# Patient Record
Sex: FEMALE | Race: WHITE | NOT HISPANIC OR LATINO | Employment: UNEMPLOYED | URBAN - METROPOLITAN AREA
[De-identification: names, ages, dates, MRNs, and addresses within clinical notes are randomized per-mention and may not be internally consistent; named-entity substitution may affect disease eponyms.]

---

## 2017-03-31 ENCOUNTER — HOSPITAL ENCOUNTER (EMERGENCY)
Facility: CLINIC | Age: 27
Discharge: HOME | End: 2017-04-01
Attending: INTERNAL MEDICINE | Admitting: INTERNAL MEDICINE
Payer: MEDICAID

## 2017-03-31 DIAGNOSIS — F10.920 ALCOHOL INTOXICATION, UNCOMPLICATED (H): ICD-10-CM

## 2017-03-31 LAB
ALCOHOL BREATH TEST: 0.22 (ref 0–0.01)
ALCOHOL BREATH TEST: 0.27 (ref 0–0.01)
AMPHETAMINES UR QL SCN: ABNORMAL
BARBITURATES UR QL: ABNORMAL
BENZODIAZ UR QL: ABNORMAL
CANNABINOIDS UR QL SCN: ABNORMAL
COCAINE UR QL: ABNORMAL
ETHANOL UR QL SCN: ABNORMAL
HCG UR QL: NEGATIVE
OPIATES UR QL SCN: ABNORMAL

## 2017-03-31 PROCEDURE — 82075 ASSAY OF BREATH ETHANOL: CPT | Mod: 76

## 2017-03-31 PROCEDURE — 99285 EMERGENCY DEPT VISIT HI MDM: CPT | Mod: 25

## 2017-03-31 PROCEDURE — 81025 URINE PREGNANCY TEST: CPT | Performed by: INTERNAL MEDICINE

## 2017-03-31 PROCEDURE — 80320 DRUG SCREEN QUANTALCOHOLS: CPT | Performed by: INTERNAL MEDICINE

## 2017-03-31 PROCEDURE — 80307 DRUG TEST PRSMV CHEM ANLYZR: CPT | Performed by: INTERNAL MEDICINE

## 2017-03-31 PROCEDURE — 99284 EMERGENCY DEPT VISIT MOD MDM: CPT | Mod: Z6 | Performed by: INTERNAL MEDICINE

## 2017-03-31 NOTE — ED AVS SNAPSHOT
[facility], Emergency Department    [address]    [place] [address]    Phone:  [phone]    Fax:  [phone]                                       Pat Delgado   MRN: [id number]    Department:  [facility], Emergency Department   Date of Visit:  3/31/2017           Patient Information     Date Of Birth          1990        Your diagnoses for this visit were:     Alcohol intoxication, uncomplicated (H)        You were seen by Rik Phillips MD and Stephen Joe MD.        Discharge Instructions         Alcohol Addiction  Are you addicted to alcohol?    You may be addicted to alcohol if your drinking harms yourself or others or leads to other problems with your daily life.  The medical term for this is alcohol use disorder. Your health care provider may diagnose you with this disorder if you have at least two of the following problems within the span of a year:    You drink alcohol in larger amounts or for a longer period than you intended.    You frequently want to cut down or control alcohol use, or have frequently failed efforts to do so.    You spend a lot of time getting alcohol, using alcohol, or recovering from alcohol use.    You crave or have a strong desire or urge to drink alcohol.    Ongoing alcohol use makes it hard for you to be responsible at work, school, or home.    You continue to use alcohol even though you have had problems in relationships or social settings because of it.    You give up or miss important social, work, or recreational activities because of alcohol use.    You drink alcohol in situations in which it is physically unsafe, such as driving while intoxicated.    You continue to drink alcohol despite knowing it has caused physical or emotional problems.    You need more and more alcohol to get the same effects.    You have withdrawal symptoms or use alcohol to avoid withdrawal symptoms.    7810-2127 [employer]. [address] [place] [address]  Road, [place] [address]. All rights reserved. This information is not intended as a substitute for professional medical care. Always follow your healthcare professional's instructions.      Please make an appointment to follow up with Your Primary Care Provider as soon as possible even if entirely better.     24 Hour Appointment Hotline       To make an appointment at any [place] clinic, call [phone] ([phone]). If you don't have a family doctor or clinic, we will help you find one. [place] clinics are conveniently located to serve the needs of you and your family.             Review of your medicines      Our records show that you are taking the medicines listed below. If these are incorrect, please call your family doctor or clinic.        Dose / Directions Last dose taken    amphetamine-dextroamphetamine 30 MG per 24 hr capsule   Commonly known as:  ADDERALL XR   Dose:  60 mg   Quantity:  60 capsule        Take 2 capsules (60 mg) by mouth daily   Refills:  0        escitalopram 20 MG tablet   Commonly known as:  LEXAPRO   Dose:  20 mg   Quantity:  30 tablet        Take 1 tablet (20 mg) by mouth daily   Refills:  0        GABAPENTIN PO        Take by mouth 3 times daily   Refills:  0        HYDROXYZINE HCL PO   Dose:  50 mg   Indication:  Anxiety Neurosis        Take 50 mg by mouth as needed for itching   Refills:  0        PROPRANOLOL HCL PO   Dose:  10 mg        Take 10 mg by mouth 3 times daily   Refills:  0                Procedures and tests performed during your visit     Procedure/Test Number of Times Performed    Alcohol breath test POCT 2    Drug abuse screen 6 urine (chem dep) 1    HCG qualitative urine 1      Orders Needing Specimen Collection     None      Pending Results     No orders found for last 3 day(s).            Pending Culture Results     No orders found for last 3 day(s).            Thank you for choosing [facility]       Thank you for choosing [facility] for your care. Our goal  "is always to provide you with excellent care. Hearing back from our patients is one way we can continue to improve our services. Please take a few minutes to complete the written survey that you may receive in the mail after you visit with us. Thank you!        [employer] Information     [employer] lets you send messages to your doctor, view your test results, renew your prescriptions, schedule appointments and more. To sign up, go to [email or web address] . Click on \"Log in\" on the left side of the screen, which will take you to the Welcome page. Then click on \"Sign up Now\" on the right side of the page.     You will be asked to enter the access code listed below, as well as some personal information. Please follow the directions to create your username and password.     Your access code is: [email or web address]  Expires: 2017  6:24 AM     Your access code will  in 90 days. If you need help or a new code, please call your [facility] clinic or [phone].        Care EveryWhere ID     This is your Care EveryWhere ID. This could be used by other organizations to access your [facility] [place] medical records  [id number]        After Visit Summary       This is your record. Keep this with you and show to your community pharmacist(s) and doctor(s) at your next visit.                  "

## 2017-03-31 NOTE — ED AVS SNAPSHOT
[facility], Emergency Department    [address]    [facility] [address]    Phone:  [phone]    Fax:  [phone]                                       Pat Delgado   MRN: [id number]    Department:  [facility], Emergency Department   Date of Visit:  3/31/2017           After Visit Summary Signature Page     I have received my discharge instructions, and my questions have been answered. I have discussed any challenges I see with this plan with the nurse or doctor.    ..........................................................................................................................................  Patient/Patient Representative Signature      ..........................................................................................................................................  Patient Representative Print Name and Relationship to Patient    ..................................................               ................................................  Date                                            Time    ..........................................................................................................................................  Reviewed by Signature/Title    ...................................................              ..............................................  Date                                                            Time

## 2017-04-01 VITALS
HEART RATE: 96 BPM | SYSTOLIC BLOOD PRESSURE: 152 MMHG | OXYGEN SATURATION: 100 % | RESPIRATION RATE: 18 BRPM | TEMPERATURE: 97.3 F | DIASTOLIC BLOOD PRESSURE: 97 MMHG

## 2017-04-01 PROCEDURE — 90791 PSYCH DIAGNOSTIC EVALUATION: CPT

## 2017-04-01 ASSESSMENT — ENCOUNTER SYMPTOMS
SHORTNESS OF BREATH: 0
ABDOMINAL PAIN: 0
FEVER: 0

## 2017-04-01 NOTE — DISCHARGE INSTRUCTIONS
Alcohol Addiction  Are you addicted to alcohol?    You may be addicted to alcohol if your drinking harms yourself or others or leads to other problems with your daily life.  The medical term for this is alcohol use disorder. Your health care provider may diagnose you with this disorder if you have at least two of the following problems within the span of a year:    You drink alcohol in larger amounts or for a longer period than you intended.    You frequently want to cut down or control alcohol use, or have frequently failed efforts to do so.    You spend a lot of time getting alcohol, using alcohol, or recovering from alcohol use.    You crave or have a strong desire or urge to drink alcohol.    Ongoing alcohol use makes it hard for you to be responsible at work, school, or home.    You continue to use alcohol even though you have had problems in relationships or social settings because of it.    You give up or miss important social, work, or recreational activities because of alcohol use.    You drink alcohol in situations in which it is physically unsafe, such as driving while intoxicated.    You continue to drink alcohol despite knowing it has caused physical or emotional problems.    You need more and more alcohol to get the same effects.    You have withdrawal symptoms or use alcohol to avoid withdrawal symptoms.    3256-6980 [employer]. [address] [place] [address], [place] [address]. All rights reserved. This information is not intended as a substitute for professional medical care. Always follow your healthcare professional's instructions.      Please make an appointment to follow up with Your Primary Care Provider as soon as possible even if entirely better.

## 2017-04-01 NOTE — ED NOTES
Patient got in a disagreement with her mom whom she lives with and her mother called the police.  Patient is intoxicated, unemployed.  She stated she has been on a 4 day mayorga and last drank at about 7pm and started drinking at about noon.  She drank about 2/3rd bottle of a 1.75L of whiskey today.  She has been on and off with sobriety.  Patient is calm, cooperative and steady on her feet.  Denies SI.

## 2017-04-01 NOTE — ED PROVIDER NOTES
[facility] [place] ([facility])    March 31, 2017    ED 16 10:00 PM   History     Chief Complaint   Patient presents with     Alcohol Intoxication     drank 2/3's bottle whiskey today. last drink about 7pm and blew 0.274.     Aggressive Behavior     got in altercation with mother who called police.     The history is provided by the patient and medical records.     Pat Delgado is a 26 year old female who presents with alcohol intoxication. She has a history of alcohol abuse with prior attempts in treatment with good stretches with sobriety. Prior to December she had 6 months of sobriety and intermittent bouts of use. Patient has been living with her parents until she found a new place. She states that living with her family has been been stressful. She is trying to be sober but drank tonight. Her mother got very upset at this and they had an argument. Ultimately mother called police and patient was brought here in restraints. She state  I was brought here not under any choice of mine.  She admits to drinking over the last couple of days, unsure of how much exactly. Patient notes that her parents trying to kick her out at this point given her alcohol abuse. She is not interested in detox at this time and is unsure of what to do. She wants to get a new place and is hoping they would reconsider and take her back home and let her get her car and cat.     I have reviewed the Medications, Allergies, Past Medical and Surgical History, and Social History in the [facility] system.  PAST MEDICAL HISTORY:   Past Medical History:   Diagnosis Date     ADHD (attention deficit hyperactivity disorder)      Anxiety      Migraine headache      Substance abuse        PAST SURGICAL HISTORY: History reviewed. No pertinent surgical history.    FAMILY HISTORY:   Family History   Problem Relation Age of Onset     Anxiety Disorder Mother      Dementia Maternal Grandfather      Unknown/Adopted No family hx of       Depression No family hx of      Schizophrenia No family hx of      Bipolar Disorder No family hx of      Suicide No family hx of      [place] Disease No family hx of      Parkinsonism No family hx of      Autism Spectrum Disorder No family hx of      Intellectual Disability (Mental Retardation) No family hx of      MENTAL ILLNESS No family hx of      Substance Abuse Other        SOCIAL HISTORY:   Social History   Substance Use Topics     Smoking status: Never Smoker     Smokeless tobacco: Not on file     Alcohol use Yes      Comment: 750mL whiskey per day       Patient's Medications   New Prescriptions    No medications on file   Previous Medications    AMPHETAMINE-DEXTROAMPHETAMINE (ADDERALL XR) 30 MG PER CAPSULE    Take 2 capsules (60 mg) by mouth daily    ESCITALOPRAM (LEXAPRO) 20 MG TABLET    Take 1 tablet (20 mg) by mouth daily    GABAPENTIN PO    Take by mouth 3 times daily    HYDROXYZINE HCL PO    Take 50 mg by mouth as needed for itching    PROPRANOLOL HCL PO    Take 10 mg by mouth 3 times daily   Modified Medications    No medications on file   Discontinued Medications    ACAMPROSATE (CAMPRAL) 333 MG TABLET    Take 2 tablets (666 mg) by mouth 3 times daily    QUETIAPINE FUMARATE (SEROQUEL PO)    Take by mouth as needed          Allergies   Allergen Reactions     Penicillins Unknown      Review of Systems   Constitutional: Negative for fever.   Respiratory: Negative for shortness of breath.    Cardiovascular: Negative for chest pain.   Gastrointestinal: Negative for abdominal pain.   All other systems reviewed and are negative.      Physical Exam   BP: 136/90  Pulse: 109  Temp: 97.4  F (36.3  C)  Resp: 16  SpO2: 98 %  Physical Exam   Constitutional: She is oriented to person, place, and time. No distress.   HENT:   Head: Atraumatic.   Mouth/Throat: Oropharynx is clear and moist. No oropharyngeal exudate.   Eyes: Pupils are equal, round, and reactive to light. No scleral icterus.   Neck: Neck supple. No  JVD present.   Cardiovascular: Normal rate, normal heart sounds and intact distal pulses.  Exam reveals no gallop and no friction rub.    No murmur heard.  Pulmonary/Chest: Effort normal and breath sounds normal. No respiratory distress. She has no wheezes. She has no rales. She exhibits no tenderness.   Abdominal: Soft. Bowel sounds are normal. She exhibits no distension and no mass. There is no tenderness. There is no rebound and no guarding.   Musculoskeletal: She exhibits no edema or tenderness.   Neurological: She is alert and oriented to person, place, and time. No cranial nerve deficit. Coordination normal.   Skin: Skin is warm. No rash noted. She is not diaphoretic.   Psychiatric: She has a normal mood and affect. Her behavior is normal. Judgment and thought content normal.       ED Course     ED Course     Procedures  Results for orders placed or performed during the hospital encounter of 03/31/17 (from the past 24 hour(s))   HCG qualitative urine   Result Value Ref Range    HCG Qual Urine Negative NEG   Drug abuse screen 6 urine (chem dep)   Result Value Ref Range    Amphetamine Qual Urine (A) NEG     Positive   Cutoff for a positive amphetamine is greater than 500 ng/mL. This is an   unconfirmed screening result to be used for medical purposes only.      Barbiturates Qual Urine  NEG     Negative   Cutoff for a negative barbiturate is 200 ng/mL or less.      Benzodiazepine Qual Urine  NEG     Negative   Cutoff for a negative benzodiazepine is 200 ng/mL or less.      Cannabinoids Qual Urine  NEG     Negative   Cutoff for a negative cannabinoid is 50 ng/mL or less.      Cocaine Qual Urine  NEG     Negative   Cutoff for a negative cocaine is 300 ng/mL or less.      Ethanol Qual Urine (A) NEG     Positive   Cutoff for a positive urine ethanol is greater than 0.05 g/dL.  This is an   unconfirmed screening result to be used for medical purposes only.      Opiates Qualitative Urine  NEG     Negative   Cutoff for  a negative opiate is 300 ng/mL or less.     Alcohol breath test POCT   Result Value Ref Range    Alcohol Breath Test 0.274 (A) 0.00 - 0.01   Alcohol breath test POCT   Result Value Ref Range    Alcohol Breath Test 0.222 (A) 0.00 - 0.01     Medications - No data to display      Assessments & Plan (with Medical Decision Making)  Alcohol intoxication, just recently relapsed, not interested in Detox, no withdraw now, since parents not coming to take her home, the plan is for her to sober up overnight and DC. Will sign out to incoming EP.       I have reviewed the nursing notes.    I have reviewed the findings, diagnosis, plan and need for follow up with the patient.    New Prescriptions    No medications on file       Final diagnoses:   Alcohol intoxication, uncomplicated (H)     I, Carolyn Negro, am serving as a trained medical scribe to document services personally performed by Rik Phillips MD, based on the provider's statements to me.  This document has been checked and approved by the attending provider.    IRik MD, was physically present and have reviewed and verified the accuracy of this note documented by Carolyn Negro medical scribe.    3/31/2017   [facility], [place], EMERGENCY DEPARTMENT     Rik Phillips MD  04/03/17 0000

## 2017-04-01 NOTE — ED NOTES
Patient was signed out to me by Dr. Phillips at 1:28 AM.  Please see their note for full details and history.  Patient has history of etoh abuse.  Here with intoxication.  Got in fight with mom.  Plan at time of sign out is reevaluate when sober.  Likely d/c home. .       Course in the ED:  Reevaluated at 6:53 AM .  Patient is now clinically sober.  No slurred speech, ataxia or signs of alcohol intoxication.  No current signs of withdrawal.  No suicidal ideation will discharge.       Stephen Joe MD  04/01/17 0653

## 2017-05-28 ENCOUNTER — HOSPITAL ENCOUNTER (EMERGENCY)
Facility: CLINIC | Age: 27
Discharge: HOME | End: 2017-05-28
Attending: EMERGENCY MEDICINE | Admitting: EMERGENCY MEDICINE
Payer: MEDICAID

## 2017-05-28 VITALS
SYSTOLIC BLOOD PRESSURE: 115 MMHG | HEART RATE: 96 BPM | TEMPERATURE: 98 F | DIASTOLIC BLOOD PRESSURE: 95 MMHG | OXYGEN SATURATION: 97 % | RESPIRATION RATE: 20 BRPM

## 2017-05-28 DIAGNOSIS — F10.930 ALCOHOL WITHDRAWAL, UNCOMPLICATED (H): ICD-10-CM

## 2017-05-28 DIAGNOSIS — F41.9 ANXIETY: ICD-10-CM

## 2017-05-28 LAB — ETHANOL SERPL-MCNC: <0.01 G/DL

## 2017-05-28 PROCEDURE — 25000132 ZZH RX MED GY IP 250 OP 250 PS 637: Performed by: EMERGENCY MEDICINE

## 2017-05-28 PROCEDURE — 90791 PSYCH DIAGNOSTIC EVALUATION: CPT

## 2017-05-28 PROCEDURE — 80320 DRUG SCREEN QUANTALCOHOLS: CPT | Performed by: EMERGENCY MEDICINE

## 2017-05-28 PROCEDURE — 99285 EMERGENCY DEPT VISIT HI MDM: CPT | Mod: 25

## 2017-05-28 PROCEDURE — 36415 COLL VENOUS BLD VENIPUNCTURE: CPT | Performed by: EMERGENCY MEDICINE

## 2017-05-28 RX ORDER — LORAZEPAM 1 MG/1
1 TABLET ORAL ONCE
Status: COMPLETED | OUTPATIENT
Start: 2017-05-28 | End: 2017-05-28

## 2017-05-28 RX ADMIN — LORAZEPAM 1 MG: 1 TABLET ORAL at 11:13

## 2017-05-28 ASSESSMENT — ENCOUNTER SYMPTOMS
CHILLS: 0
FEVER: 0
NERVOUS/ANXIOUS: 1

## 2017-05-28 NOTE — ED AVS SNAPSHOT
[facility] Emergency Department    [address]    [facility] [address]    Phone:  [phone]    Fax:  [phone]                                       Pat Delgado   MRN: [id number]    Department:  [facility] Emergency Department   Date of Visit:  5/28/2017           After Visit Summary Signature Page     I have received my discharge instructions, and my questions have been answered. I have discussed any challenges I see with this plan with the nurse or doctor.    ..........................................................................................................................................  Patient/Patient Representative Signature      ..........................................................................................................................................  Patient Representative Print Name and Relationship to Patient    ..................................................               ................................................  Date                                            Time    ..........................................................................................................................................  Reviewed by Signature/Title    ...................................................              ..............................................  Date                                                            Time

## 2017-05-28 NOTE — DISCHARGE INSTRUCTIONS
Understanding the Disease of Addiction  What is addiction?  Addiction is a chronic disease of the brain. It affects how your brain learns and functions.  Your genes and your environment can affect your risk for addiction. A family history of addiction also raises your risk, but anyone can have an addiction.  Unfortunately, many people falsely believe that addiction is a moral weakness. They think that people addicted to drugs or alcohol are simply behaving badly or making poor choices.    How does addiction affect my brain?  Whether you start using drugs or alcohol is your choice. But once your brain is exposed to the addictive substance, your brain begins to change. This is especially true if you are vulnerable to addiction. These brain changes override self-control. This happens because the substance overexcites the reward center in the brain. The substance mimics the brain's own natural feel-good chemicals. The brain is rewired into believing that the substance is a good thing and that you need it to survive. So strong is this rewiring that over time, you no longer find pleasure in other things. The addiction trumps all other motivations.  If you continue to use the substance, your brain makes less and less of its own feel-good chemicals. You then must keep using drugs or alcohol to try to make up for the low levels of the brain chemicals. Your brain eventually needs more and more of the drug to achieve this. You need the drug without regard to the physical, emotional, and social costs.  Can you become addicted to things other than drugs or alcohol?  Addiction can develop in response to other pleasurable activities that stimulate the reward center of the brain. These activities include eating, having sex, gambling, using tobacco, and even using the internet.  Can you get control over a brain disease?  The only way to get over an addiction is to stop using the substance. By not using it, your brain can recover  "and return to its normal functioning. You can relearn how to find pleasure in other things. But, your brain will always be at risk for addiction. Because addiction is so powerful, you usually will need medical help and social support for long-term success.    9485-4299 [employer]. [address] [place] [address]. All rights reserved. This information is not intended as a substitute for professional medical care. Always follow your healthcare professional's instructions.          Alcohol Abuse  Alcoholic drinks are harmful when you have too many of them. There is no set number of drinks that defines too much. Drinking that disrupts your life or your health is called alcohol abuse. Alcohol abuse can hurt your relationships with others. You may lose friends, a spouse, or even your job. You may be abusing alcohol if any of the following are true for you:    Duties at home or with  suffer because of drinking.    Duties at work or in school suffer because of drinking.    You have missed work or school because of drinking.    You use alcohol while driving or operating machinery.    You have legal problems such as arrests due to drinking.    You keep drinking even though it causes serious problems in your life.  Health effects  Alcohol abuse causes health problems. Sometimes this can happen after only drinking a  little.\" There is no set number of drinks or amount of alcohol that defines too much. The more you drink at one time, and the more often you drink determine both the short-term and long-term health effects. It affects all parts of your body and your health, including your:    Brain. Alcohol is a central nervous system depressant. It can damage parts of the brain that affect your balance, memory, thinking, and emotions. It can cause memory loss, blackouts, depression, agitation, sleep cycle changes, and seizures. These changes may or may not be reversible.    Heart and vascular " system. Alcohol affects multiple areas. It can damage heart muscle causing cardiomyopathy, which is a weakening and stretching of the heart muscle. This can lead to trouble breathing, an irregular heartbeat, atrial fibrillation, leg swelling, and heart failure. Alcohol use makes the blood vessels stiffen causing hypertension (high blood pressure). All of these problems increase your risk of having heart attacks or strokes.    Liver. Alcohol causes fat to build up in the liver, affecting its normal function. This increases the risk for hepatitis, leading to abdominal pain, appetite loss, jaundice, bleeding problems, liver fibrosis, and cirrhosis. This, in turn, can affect your ability to fight off infections, and can cause diabetes. The liver changes prevent it from removing toxins in your blood that can cause encephalopathy which may show with confusion, altered level of consciousness, personality changes, memory loss, seizures, coma, and death.    Pancreas. Alcohol can cause inflammation of the pancreas, or pancreatitis. This can cause abdominal pain, fever, and diabetes.    Immune system. Alcohol weakens your immune system in a number of ways. It suppresses your immune system making it harder to fight infections and colds. It also increases the chance of getting pneumonia and tuberculosis.    Cancer. Alcohol is a risk factor for developing cancer of the mouth, esophagus, pharynx, larynx, liver, and breast.    Sexual function. Alcohol can lead to sexual problems.  Home care  The following guidelines will help you deal with alcohol abuse:    Admit you have a problem with alcohol.    Ask for help from your health care provider and trusted family members or close friends.    Get help from people trained in dealing with alcohol abuse. This may be individual counseling or group therapy, or it may be a supervised alcohol treatment program.    Join a self-help group for alcohol abuse such as Alcoholics Anonymous  (AA).    Avoid people who abuse alcohol or tempt you to drink.  Follow-up care  Follow up as advised by the doctor or our staff. Contact these groups to get help:    Alcoholics Anonymous (AA): Go to www.aa.org or check the phone book for meetings near you.    National Alcohol and Substance Abuse Information Center (NASAIC): 777.306.6204 www.addictionQualiall.PolyPid    National [place] on Alcoholism and Drug Dependence (NCADD): 618-UTE-FBDY (189-5258) www.ncadd.org    Al-Anon: 730-1BD-DUIO (808-4724) www.al-anon.org  Call 911  Call 911 if any of these occur:    Trouble breathing or slow irregular breathing    Chest pain    Sudden weakness on one side of your body or sudden trouble speaking    Heavy bleeding or vomiting blood    Very drowsy or trouble awakening    Fainting or loss of consciousness    Rapid heart rate    Seizure  When to seek medical care  Get prompt medical attention if you have:    Confusion    Hallucinations (seeing, hearing, or feeling things that aren t there)    Pain in your upper abdomen that gets worse    Repeated vomiting or black or tarry stools    Severe shakiness    2972-1108 [employer]. [address] [place] [address], [place] [address]. All rights reserved. This information is not intended as a substitute for professional medical care. Always follow your healthcare professional's instructions.

## 2017-05-28 NOTE — ED AVS SNAPSHOT
[facility]    [address] [place] [address]    Phone:  [phone]    Fax:  [phone]                                       Pat Delgado   MRN: [id number]    Department:  [facility]   Date of Visit:  5/28/2017           Patient Information     Date Of Birth          1990        Your diagnoses for this visit were:     Alcohol withdrawal, uncomplicated (H)     Anxiety        You were seen by Kai Bailon MD.      Follow-up Information     Follow up with Page Campos MD In 2 days.    Specialty:  Family Practice    Contact information:    [facility] [place]  [address] [place] [address]  [phone]          Discharge Instructions         Understanding the Disease of Addiction  What is addiction?  Addiction is a chronic disease of the brain. It affects how your brain learns and functions.  Your genes and your environment can affect your risk for addiction. A family history of addiction also raises your risk, but anyone can have an addiction.  Unfortunately, many people falsely believe that addiction is a moral weakness. They think that people addicted to drugs or alcohol are simply behaving badly or making poor choices.    How does addiction affect my brain?  Whether you start using drugs or alcohol is your choice. But once your brain is exposed to the addictive substance, your brain begins to change. This is especially true if you are vulnerable to addiction. These brain changes override self-control. This happens because the substance overexcites the reward center in the brain. The substance mimics the brain's own natural feel-good chemicals. The brain is rewired into believing that the substance is a good thing and that you need it to survive. So strong is this rewiring that over time, you no longer find pleasure in other things. The addiction trumps all other motivations.  If you  continue to use the substance, your brain makes less and less of its own feel-good chemicals. You then must keep using drugs or alcohol to try to make up for the low levels of the brain chemicals. Your brain eventually needs more and more of the drug to achieve this. You need the drug without regard to the physical, emotional, and social costs.  Can you become addicted to things other than drugs or alcohol?  Addiction can develop in response to other pleasurable activities that stimulate the reward center of the brain. These activities include eating, having sex, gambling, using tobacco, and even using the internet.  Can you get control over a brain disease?  The only way to get over an addiction is to stop using the substance. By not using it, your brain can recover and return to its normal functioning. You can relearn how to find pleasure in other things. But, your brain will always be at risk for addiction. Because addiction is so powerful, you usually will need medical help and social support for long-term success.    5580-0312 [employer]. [address] [place] [address]. All rights reserved. This information is not intended as a substitute for professional medical care. Always follow your healthcare professional's instructions.          Alcohol Abuse  Alcoholic drinks are harmful when you have too many of them. There is no set number of drinks that defines too much. Drinking that disrupts your life or your health is called alcohol abuse. Alcohol abuse can hurt your relationships with others. You may lose friends, a spouse, or even your job. You may be abusing alcohol if any of the following are true for you:    Duties at home or with  suffer because of drinking.    Duties at work or in school suffer because of drinking.    You have missed work or school because of drinking.    You use alcohol while driving or operating machinery.    You have legal problems such as arrests due  "to drinking.    You keep drinking even though it causes serious problems in your life.  Health effects  Alcohol abuse causes health problems. Sometimes this can happen after only drinking a  little.\" There is no set number of drinks or amount of alcohol that defines too much. The more you drink at one time, and the more often you drink determine both the short-term and long-term health effects. It affects all parts of your body and your health, including your:    Brain. Alcohol is a central nervous system depressant. It can damage parts of the brain that affect your balance, memory, thinking, and emotions. It can cause memory loss, blackouts, depression, agitation, sleep cycle changes, and seizures. These changes may or may not be reversible.    Heart and vascular system. Alcohol affects multiple areas. It can damage heart muscle causing cardiomyopathy, which is a weakening and stretching of the heart muscle. This can lead to trouble breathing, an irregular heartbeat, atrial fibrillation, leg swelling, and heart failure. Alcohol use makes the blood vessels stiffen causing hypertension (high blood pressure). All of these problems increase your risk of having heart attacks or strokes.    Liver. Alcohol causes fat to build up in the liver, affecting its normal function. This increases the risk for hepatitis, leading to abdominal pain, appetite loss, jaundice, bleeding problems, liver fibrosis, and cirrhosis. This, in turn, can affect your ability to fight off infections, and can cause diabetes. The liver changes prevent it from removing toxins in your blood that can cause encephalopathy which may show with confusion, altered level of consciousness, personality changes, memory loss, seizures, coma, and death.    Pancreas. Alcohol can cause inflammation of the pancreas, or pancreatitis. This can cause abdominal pain, fever, and diabetes.    Immune system. Alcohol weakens your immune system in a number of ways. It " suppresses your immune system making it harder to fight infections and colds. It also increases the chance of getting pneumonia and tuberculosis.    Cancer. Alcohol is a risk factor for developing cancer of the mouth, esophagus, pharynx, larynx, liver, and breast.    Sexual function. Alcohol can lead to sexual problems.  Home care  The following guidelines will help you deal with alcohol abuse:    Admit you have a problem with alcohol.    Ask for help from your health care provider and trusted family members or close friends.    Get help from people trained in dealing with alcohol abuse. This may be individual counseling or group therapy, or it may be a supervised alcohol treatment program.    Join a self-help group for alcohol abuse such as Alcoholics Anonymous (AA).    Avoid people who abuse alcohol or tempt you to drink.  Follow-up care  Follow up as advised by the doctor or our staff. Contact these groups to get help:    Alcoholics Anonymous (AA): Go to www.aa.org or check the phone book for meetings near you.    National Alcohol and Substance Abuse Information Center (NASAIC): 137.637.1441 www.addictioncareGetfugu.Dash    National [place] on Alcoholism and Drug Dependence (NCADD): 187-ZIK-GRVN (427-5430) www.ncadd.org    Al-Anon: 955-4IM-OLQE (833-4885) www.al-anon.org  Call 911  Call 911 if any of these occur:    Trouble breathing or slow irregular breathing    Chest pain    Sudden weakness on one side of your body or sudden trouble speaking    Heavy bleeding or vomiting blood    Very drowsy or trouble awakening    Fainting or loss of consciousness    Rapid heart rate    Seizure  When to seek medical care  Get prompt medical attention if you have:    Confusion    Hallucinations (seeing, hearing, or feeling things that aren t there)    Pain in your upper abdomen that gets worse    Repeated vomiting or black or tarry stools    Severe shakiness    3869-4379 [employer]. [address] [place] [address],  [place] [address]. All rights reserved. This information is not intended as a substitute for professional medical care. Always follow your healthcare professional's instructions.          24 Hour Appointment Hotline       To make an appointment at any [facility], call [phone] ([phone]). If you don't have a family doctor or clinic, we will help you find one. [facility] clinics are conveniently located to serve the needs of you and your family.             Review of your medicines      Our records show that you are taking the medicines listed below. If these are incorrect, please call your family doctor or clinic.        Dose / Directions Last dose taken    amphetamine-dextroamphetamine 30 MG per 24 hr capsule   Commonly known as:  ADDERALL XR   Dose:  60 mg   Quantity:  60 capsule        Take 2 capsules (60 mg) by mouth daily   Refills:  0        escitalopram 20 MG tablet   Commonly known as:  LEXAPRO   Dose:  20 mg   Quantity:  30 tablet        Take 1 tablet (20 mg) by mouth daily   Refills:  0        HYDROXYZINE HCL PO   Dose:  50 mg   Indication:  Anxiety Neurosis        Take 50 mg by mouth as needed for itching   Refills:  0                Procedures and tests performed during your visit     Alcohol level blood      Orders Needing Specimen Collection     None      Pending Results     No orders found from 5/26/2017 to 5/29/2017.            Pending Culture Results     No orders found from 5/26/2017 to 5/29/2017.            Pending Results Instructions     If you had any lab results that were not finalized at the time of your Discharge, you can call the ED Lab Result RN at [phone]. You will be contacted by this team for any positive Lab results or changes in treatment. The nurses are available 7 days a week from 10A to 6:30P.  You can leave a message 24 hours per day and they will return your call.        Test Results From Your Hospital Stay        5/28/2017 11:29 AM      Component Results      Component Value Ref Range & Units Status    Ethanol g/dL <0.01 <0.01 g/dL Final                Clinical Quality Measure: Blood Pressure Screening     Your blood pressure was checked while you were in the emergency department today. The last reading we obtained was  BP: (!) 115/95 . Please read the guidelines below about what these numbers mean and what you should do about them.  If your systolic blood pressure (the top number) is less than 120 and your diastolic blood pressure (the bottom number) is less than 80, then your blood pressure is normal. There is nothing more that you need to do about it.  If your systolic blood pressure (the top number) is 120-139 or your diastolic blood pressure (the bottom number) is 80-89, your blood pressure may be higher than it should be. You should have your blood pressure rechecked within a year by a primary care provider.  If your systolic blood pressure (the top number) is 140 or greater or your diastolic blood pressure (the bottom number) is 90 or greater, you may have high blood pressure. High blood pressure is treatable, but if left untreated over time it can put you at risk for heart attack, stroke, or kidney failure. You should have your blood pressure rechecked by a primary care provider within the next 4 weeks.  If your provider in the emergency department today gave you specific instructions to follow-up with your doctor or provider even sooner than that, you should follow that instruction and not wait for up to 4 weeks for your follow-up visit.        Thank you for choosing [facility]       Thank you for choosing [facility] for your care. Our goal is always to provide you with excellent care. Hearing back from our patients is one way we can continue to improve our services. Please take a few minutes to complete the written survey that you may receive in the mail after you visit with us. Thank you!        [employer] Information     [facility] lets you send messages to your  "doctor, view your test results, renew your prescriptions, schedule appointments and more. To sign up, go to [email or web address] . Click on \"Log in\" on the left side of the screen, which will take you to the Welcome page. Then click on \"Sign up Now\" on the right side of the page.     You will be asked to enter the access code listed below, as well as some personal information. Please follow the directions to create your username and password.     Your access code is: [email or web address]  Expires: 2017  6:24 AM     Your access code will  in 90 days. If you need help or a new code, please call your [facility] clinic or [phone].        Care EveryWhere ID     This is your Care EveryWhere ID. This could be used by other organizations to access your [facility] medical records  [id number]        After Visit Summary       This is your record. Keep this with you and show to your community pharmacist(s) and doctor(s) at your next visit.                  "

## 2017-05-28 NOTE — ED PROVIDER NOTES
History     Chief Complaint:  Drug / Alcohol Assessment    HPI   Pat Delgado is a 26 year old female with a history of anxiety, alcohol dependence, chemical dependency, ADHD, and depression who presents to the emergency department for a drug and alcohol assessment.  The patient states that she was drinking vodka both last night and this morning after being removed from the sober house in [place].  She states that she currently has withdrawal symptoms as well as slight anxiety.  She also says that she has been sober on and off for the past 2 year. She states that last week while at the sober house she did use alcohol as well, but was not caught at that time.  The patient denies any use of other drugs.  She would like to get back into treatment and notes that she last completed treatment in July of 2016.  Of note, she is just getting over a sinus infection.    Allergies:  Penicillins    Medications:    Hydroxyzine  Gabapentin  Propranolol  Lexapro  Adderall    Past Medical History:    Alcohol dependence  Anxiety  Hypertension  Depressive disorder  Chemical dependency  ADHD  Migraine headaches    Past Surgical History:    The patient does not have any pertinent past surgical history.    Family History:    Mother: Anxiety disorder    Social History:  Alcohol abuse  Never a smoker  Marital Status:  Single    Review of Systems   Constitutional: Negative for chills and fever.   Psychiatric/Behavioral: The patient is nervous/anxious.    All other systems reviewed and are negative.    Physical Exam     Patient Vitals for the past 24 hrs:   BP Temp Temp src Pulse Resp SpO2   05/28/17 0935 (!) 115/95 98  F (36.7  C) Oral 96 20 97 %          Physical Exam  General: Patient is alert and interactive when I enter the room  Head:  The scalp, face, and head appear normal  Eyes:  The pupils are equal, round, and reactive to light    Conjunctivae and sclerae are normal  ENT:    External acoustic canals are normal    The  oropharynx is normal without erythema.     Uvula is in the midline  Neck:  Normal range of motion  CV:  Regular rate. S1/S2. No murmurs.   Resp:  Lungs are clear without wheezes or rales. No distress  GI:  Abdomen is soft, no rigidity, guarding, or rebound    No distension. No tenderness to palpation in any quadrant.     MS:  Normal tone. Joints grossly normal without effusions.     No asymmetric leg swelling, calf or thigh tenderness.      Normal motor assessment of all extremities.  Skin:  No rash or lesions noted. Normal capillary refill noted  Neuro: Speech is normal and fluent. Face is symmetric.     Moving all extremities well.   Psych:  Anxious. Denies SI/HI.  Mood not depressed. Awake. Alert.  Normal affect.  Appropriate interactions.  Lymph: No anterior cervical lymphadenopathy noted    Emergency Department Course   Laboratory:  Alcohol level Ethanol <0.01    Interventions:  1113 Ativan 1 mg PO     Emergency Department Course:  Nursing notes and vitals reviewed.  I performed an exam of the patient as documented above.    Blood was drawn from the patient. This was sent for laboratory testing, findings above.      (1031) I spoke with DEC regarding the patient. They are working on getting the patient into detox.     (1345) A treatment facility for the patient was possibly found, however, she now refuses to go anywhere and would like to be discharged.     Findings and plan explained to the patient. Patient discharged home with instructions regarding supportive care, medications, and reasons to return. The importance of close follow-up was reviewed.    I personally reviewed the laboratory results with the patient and answered all related questions prior to discharge.      Impression & Plan    Medical Decision Making:  Pat Delgado is a 26 year old female who presents for evaluation of alcohol abuse.  She is not  intoxicated here in ED by blood work. No signs of alcoholic ketoacidosis, significant liver  impairment or acute alcoholic hepatitis.    She appears on exam to be going through very mild acute alcohol withdrawal.  We discussed this and decided on treatment w/ benzodiazepines, see medications given in ED above.     There are no signs of co-ingestion including acetaminophen, drugs, medications, volatile alcohols. She has no signs of trauma related to alcohol use and no further workup is needed including head CT.     Alcohol counseling provided by myself and patient does not want treatment resources.  We discussed withdrawal, seizures, DT's.  DEC/SW did evaluate patient.          Diagnosis:    ICD-10-CM    1. Alcohol withdrawal, uncomplicated (H) F10.230    2. Anxiety F41.9        Disposition:  discharged to home      Mihai OLIVIA, am serving as a scribe on 5/28/2017 at 9:34 AM to personally document services performed by Kai Bailon MD based on my observations and the provider's statements to me.     5/28/2017   [facility] EMERGENCY DEPARTMENT       Kai Bailon MD  05/28/17 1400

## 2017-05-28 NOTE — ED NOTES
Pt was 0.18 last evening by breathalyzer at her sober house.  Pt reports drinking 1/5 bottle of voda at that time but denies any alcohol consumption after that.  Pt requesting eval for inpatient alcohol treatment.

## 2017-06-10 ENCOUNTER — HEALTH MAINTENANCE LETTER (OUTPATIENT)
Age: 27
End: 2017-06-10

## 2020-02-09 ENCOUNTER — HOSPITAL ENCOUNTER (INPATIENT)
Facility: CLINIC | Age: 30
LOS: 25 days | Discharge: HOME | DRG: 871 | End: 2020-03-05
Attending: EMERGENCY MEDICINE | Admitting: HOSPITALIST
Payer: COMMERCIAL

## 2020-02-09 ENCOUNTER — APPOINTMENT (OUTPATIENT)
Dept: ULTRASOUND IMAGING | Facility: CLINIC | Age: 30
DRG: 871 | End: 2020-02-09
Attending: EMERGENCY MEDICINE
Payer: COMMERCIAL

## 2020-02-09 ENCOUNTER — APPOINTMENT (OUTPATIENT)
Dept: GENERAL RADIOLOGY | Facility: CLINIC | Age: 30
DRG: 871 | End: 2020-02-09
Attending: HOSPITALIST
Payer: COMMERCIAL

## 2020-02-09 ENCOUNTER — APPOINTMENT (OUTPATIENT)
Dept: CT IMAGING | Facility: CLINIC | Age: 30
DRG: 871 | End: 2020-02-09
Attending: NURSE PRACTITIONER
Payer: COMMERCIAL

## 2020-02-09 DIAGNOSIS — E87.1 HYPONATREMIA: ICD-10-CM

## 2020-02-09 DIAGNOSIS — A41.9 SEPSIS, DUE TO UNSPECIFIED ORGANISM, UNSPECIFIED WHETHER ACUTE ORGAN DYSFUNCTION PRESENT (H): ICD-10-CM

## 2020-02-09 DIAGNOSIS — K70.10 ALCOHOLIC HEPATITIS, UNSPECIFIED WHETHER ASCITES PRESENT (H): ICD-10-CM

## 2020-02-09 DIAGNOSIS — K70.11 ALCOHOLIC HEPATITIS WITH ASCITES (H): Primary | ICD-10-CM

## 2020-02-09 DIAGNOSIS — K72.00 ACUTE LIVER FAILURE WITHOUT HEPATIC COMA: ICD-10-CM

## 2020-02-09 LAB
ALBUMIN SERPL-MCNC: 1.6 G/DL (ref 3.4–5)
ALBUMIN UR-MCNC: 100 MG/DL
ALP SERPL-CCNC: 357 U/L (ref 40–150)
ALT SERPL W P-5'-P-CCNC: 33 U/L (ref 0–50)
AMPHETAMINES UR QL SCN: POSITIVE
ANION GAP SERPL CALCULATED.3IONS-SCNC: 12 MMOL/L (ref 3–14)
ANION GAP SERPL CALCULATED.3IONS-SCNC: 14 MMOL/L (ref 3–14)
ANION GAP SERPL CALCULATED.3IONS-SCNC: 15 MMOL/L (ref 3–14)
APPEARANCE UR: ABNORMAL
APTT PPP: 42 SEC (ref 22–37)
AST SERPL W P-5'-P-CCNC: 203 U/L (ref 0–45)
B-HCG SERPL-ACNC: <1 IU/L (ref 0–5)
BACTERIA #/AREA URNS HPF: ABNORMAL /HPF
BARBITURATES UR QL: NEGATIVE
BASE DEFICIT BLDV-SCNC: 1.7 MMOL/L
BASOPHILS # BLD AUTO: 0.1 10E9/L (ref 0–0.2)
BASOPHILS NFR BLD AUTO: 0.2 %
BENZODIAZ UR QL: NEGATIVE
BILIRUB SERPL-MCNC: 8.1 MG/DL (ref 0.2–1.3)
BILIRUB UR QL STRIP: ABNORMAL
BUN SERPL-MCNC: 1 MG/DL (ref 7–30)
BUN SERPL-MCNC: 2 MG/DL (ref 7–30)
BUN SERPL-MCNC: 2 MG/DL (ref 7–30)
CA-I BLD-MCNC: 3.2 MG/DL (ref 4.4–5.2)
CALCIUM SERPL-MCNC: 6.2 MG/DL (ref 8.5–10.1)
CALCIUM SERPL-MCNC: 6.3 MG/DL (ref 8.5–10.1)
CALCIUM SERPL-MCNC: 6.8 MG/DL (ref 8.5–10.1)
CANNABINOIDS UR QL SCN: POSITIVE
CHLORIDE SERPL-SCNC: 84 MMOL/L (ref 94–109)
CHLORIDE SERPL-SCNC: 85 MMOL/L (ref 94–109)
CHLORIDE SERPL-SCNC: 86 MMOL/L (ref 94–109)
CO2 SERPL-SCNC: 18 MMOL/L (ref 20–32)
CO2 SERPL-SCNC: 21 MMOL/L (ref 20–32)
CO2 SERPL-SCNC: 22 MMOL/L (ref 20–32)
COCAINE UR QL: NEGATIVE
COLOR UR AUTO: ABNORMAL
CREAT SERPL-MCNC: 0.32 MG/DL (ref 0.52–1.04)
CREAT SERPL-MCNC: 0.35 MG/DL (ref 0.52–1.04)
CREAT SERPL-MCNC: 0.38 MG/DL (ref 0.52–1.04)
CRP SERPL-MCNC: 57.7 MG/L (ref 0–8)
DIFFERENTIAL METHOD BLD: ABNORMAL
EOSINOPHIL # BLD AUTO: 0 10E9/L (ref 0–0.7)
EOSINOPHIL NFR BLD AUTO: 0.2 %
ERYTHROCYTE [DISTWIDTH] IN BLOOD BY AUTOMATED COUNT: 19.5 % (ref 10–15)
ERYTHROCYTE [DISTWIDTH] IN BLOOD BY AUTOMATED COUNT: 19.5 % (ref 10–15)
ETHANOL SERPL-MCNC: 0.14 G/DL
FERRITIN SERPL-MCNC: 116 NG/ML (ref 12–150)
GFR SERPL CREATININE-BSD FRML MDRD: >90 ML/MIN/{1.73_M2}
GLUCOSE BLDC GLUCOMTR-MCNC: 126 MG/DL (ref 70–99)
GLUCOSE SERPL-MCNC: 108 MG/DL (ref 70–99)
GLUCOSE SERPL-MCNC: 118 MG/DL (ref 70–99)
GLUCOSE SERPL-MCNC: 123 MG/DL (ref 70–99)
GLUCOSE UR STRIP-MCNC: 30 MG/DL
HBA1C MFR BLD: 5.1 % (ref 0–5.6)
HCO3 BLDV-SCNC: 23 MMOL/L (ref 21–28)
HCT VFR BLD AUTO: 28.8 % (ref 35–47)
HCT VFR BLD AUTO: 29.4 % (ref 35–47)
HGB BLD-MCNC: 10.4 G/DL (ref 11.7–15.7)
HGB BLD-MCNC: 9.8 G/DL (ref 11.7–15.7)
HGB UR QL STRIP: ABNORMAL
HYALINE CASTS #/AREA URNS LPF: 28 /LPF (ref 0–2)
IMM GRANULOCYTES # BLD: 0.1 10E9/L (ref 0–0.4)
IMM GRANULOCYTES NFR BLD: 0.4 %
INR PPP: 1.86 (ref 0.86–1.14)
INTERPRETATION ECG - MUSE: NORMAL
IRON SATN MFR SERPL: 41 % (ref 15–46)
IRON SERPL-MCNC: 68 UG/DL (ref 35–180)
KETONES UR STRIP-MCNC: 5 MG/DL
LACTATE BLD-SCNC: 5.9 MMOL/L (ref 0.7–2)
LACTATE BLD-SCNC: 6.8 MMOL/L (ref 0.7–2)
LACTATE BLD-SCNC: 7.5 MMOL/L (ref 0.7–2)
LEUKOCYTE ESTERASE UR QL STRIP: ABNORMAL
LIPASE SERPL-CCNC: 119 U/L (ref 73–393)
LYMPHOCYTES # BLD AUTO: 2.8 10E9/L (ref 0.8–5.3)
LYMPHOCYTES NFR BLD AUTO: 11.4 %
MAGNESIUM SERPL-MCNC: 1.8 MG/DL (ref 1.6–2.3)
MCH RBC QN AUTO: 33.4 PG (ref 26.5–33)
MCH RBC QN AUTO: 34.4 PG (ref 26.5–33)
MCHC RBC AUTO-ENTMCNC: 34 G/DL (ref 31.5–36.5)
MCHC RBC AUTO-ENTMCNC: 35.4 G/DL (ref 31.5–36.5)
MCV RBC AUTO: 97 FL (ref 78–100)
MCV RBC AUTO: 98 FL (ref 78–100)
MONOCYTES # BLD AUTO: 2.2 10E9/L (ref 0–1.3)
MONOCYTES NFR BLD AUTO: 9.1 %
MUCOUS THREADS #/AREA URNS LPF: PRESENT /LPF
NEUTROPHILS # BLD AUTO: 19.1 10E9/L (ref 1.6–8.3)
NEUTROPHILS NFR BLD AUTO: 78.7 %
NITRATE UR QL: NEGATIVE
NRBC # BLD AUTO: 0 10*3/UL
NRBC BLD AUTO-RTO: 0 /100
OPIATES UR QL SCN: NEGATIVE
OXYHGB MFR BLDV: 32 %
PCO2 BLDV: 37 MM HG (ref 40–50)
PCP UR QL SCN: NEGATIVE
PH BLDV: 7.4 PH (ref 7.32–7.43)
PH UR STRIP: 6 PH (ref 5–7)
PHOSPHATE SERPL-MCNC: 1.2 MG/DL (ref 2.5–4.5)
PLATELET # BLD AUTO: 279 10E9/L (ref 150–450)
PLATELET # BLD AUTO: 304 10E9/L (ref 150–450)
PO2 BLDV: 24 MM HG (ref 25–47)
POTASSIUM SERPL-SCNC: 3.3 MMOL/L (ref 3.4–5.3)
POTASSIUM SERPL-SCNC: 3.4 MMOL/L (ref 3.4–5.3)
POTASSIUM SERPL-SCNC: 3.7 MMOL/L (ref 3.4–5.3)
PROCALCITONIN SERPL-MCNC: 0.53 NG/ML
PROT SERPL-MCNC: 6.7 G/DL (ref 6.8–8.8)
RBC # BLD AUTO: 2.93 10E12/L (ref 3.8–5.2)
RBC # BLD AUTO: 3.02 10E12/L (ref 3.8–5.2)
RBC #/AREA URNS AUTO: 4 /HPF (ref 0–2)
RETICS # AUTO: 218 10E9/L (ref 25–95)
RETICS/RBC NFR AUTO: 7 % (ref 0.5–2)
SODIUM SERPL-SCNC: 118 MMOL/L (ref 133–144)
SODIUM SERPL-SCNC: 119 MMOL/L (ref 133–144)
SODIUM SERPL-SCNC: 120 MMOL/L (ref 133–144)
SOURCE: ABNORMAL
SP GR UR STRIP: 1.03 (ref 1–1.03)
SQUAMOUS #/AREA URNS AUTO: 10 /HPF (ref 0–1)
TIBC SERPL-MCNC: 166 UG/DL (ref 240–430)
UROBILINOGEN UR STRIP-MCNC: 2 MG/DL (ref 0–2)
WBC # BLD AUTO: 19.9 10E9/L (ref 4–11)
WBC # BLD AUTO: 24.3 10E9/L (ref 4–11)
WBC #/AREA URNS AUTO: 24 /HPF (ref 0–5)

## 2020-02-09 PROCEDURE — 84999 UNLISTED CHEMISTRY PROCEDURE: CPT | Performed by: HOSPITALIST

## 2020-02-09 PROCEDURE — 82805 BLOOD GASES W/O2 SATURATION: CPT | Performed by: NURSE PRACTITIONER

## 2020-02-09 PROCEDURE — 83036 HEMOGLOBIN GLYCOSYLATED A1C: CPT | Performed by: HOSPITALIST

## 2020-02-09 PROCEDURE — 85025 COMPLETE CBC W/AUTO DIFF WBC: CPT | Performed by: EMERGENCY MEDICINE

## 2020-02-09 PROCEDURE — 25000125 ZZHC RX 250: Performed by: HOSPITALIST

## 2020-02-09 PROCEDURE — 00000146 ZZHCL STATISTIC GLUCOSE BY METER IP

## 2020-02-09 PROCEDURE — HZ2ZZZZ DETOXIFICATION SERVICES FOR SUBSTANCE ABUSE TREATMENT: ICD-10-PCS | Performed by: INTERNAL MEDICINE

## 2020-02-09 PROCEDURE — 25000125 ZZHC RX 250: Performed by: EMERGENCY MEDICINE

## 2020-02-09 PROCEDURE — 83735 ASSAY OF MAGNESIUM: CPT | Performed by: EMERGENCY MEDICINE

## 2020-02-09 PROCEDURE — 25000132 ZZH RX MED GY IP 250 OP 250 PS 637: Performed by: HOSPITALIST

## 2020-02-09 PROCEDURE — 86803 HEPATITIS C AB TEST: CPT | Performed by: EMERGENCY MEDICINE

## 2020-02-09 PROCEDURE — 84100 ASSAY OF PHOSPHORUS: CPT | Performed by: EMERGENCY MEDICINE

## 2020-02-09 PROCEDURE — 82330 ASSAY OF CALCIUM: CPT | Performed by: NURSE PRACTITIONER

## 2020-02-09 PROCEDURE — 80329 ANALGESICS NON-OPIOID 1 OR 2: CPT | Performed by: HOSPITALIST

## 2020-02-09 PROCEDURE — 96365 THER/PROPH/DIAG IV INF INIT: CPT

## 2020-02-09 PROCEDURE — 80048 BASIC METABOLIC PNL TOTAL CA: CPT | Performed by: HOSPITALIST

## 2020-02-09 PROCEDURE — 21000000 ZZH R&B IMCU HEART CARE

## 2020-02-09 PROCEDURE — 87340 HEPATITIS B SURFACE AG IA: CPT | Performed by: EMERGENCY MEDICINE

## 2020-02-09 PROCEDURE — 25000128 H RX IP 250 OP 636: Performed by: HOSPITALIST

## 2020-02-09 PROCEDURE — 99291 CRITICAL CARE FIRST HOUR: CPT | Performed by: NURSE PRACTITIONER

## 2020-02-09 PROCEDURE — 83690 ASSAY OF LIPASE: CPT | Performed by: EMERGENCY MEDICINE

## 2020-02-09 PROCEDURE — 83550 IRON BINDING TEST: CPT | Performed by: HOSPITALIST

## 2020-02-09 PROCEDURE — 25800030 ZZH RX IP 258 OP 636: Performed by: NURSE PRACTITIONER

## 2020-02-09 PROCEDURE — 80307 DRUG TEST PRSMV CHEM ANLYZR: CPT | Performed by: HOSPITALIST

## 2020-02-09 PROCEDURE — 74177 CT ABD & PELVIS W/CONTRAST: CPT

## 2020-02-09 PROCEDURE — 25000128 H RX IP 250 OP 636: Performed by: EMERGENCY MEDICINE

## 2020-02-09 PROCEDURE — 85045 AUTOMATED RETICULOCYTE COUNT: CPT | Performed by: EMERGENCY MEDICINE

## 2020-02-09 PROCEDURE — 96367 TX/PROPH/DG ADDL SEQ IV INF: CPT

## 2020-02-09 PROCEDURE — 85610 PROTHROMBIN TIME: CPT | Performed by: HOSPITALIST

## 2020-02-09 PROCEDURE — 82728 ASSAY OF FERRITIN: CPT | Performed by: HOSPITALIST

## 2020-02-09 PROCEDURE — 80320 DRUG SCREEN QUANTALCOHOLS: CPT | Performed by: EMERGENCY MEDICINE

## 2020-02-09 PROCEDURE — 99285 EMERGENCY DEPT VISIT HI MDM: CPT | Mod: 25

## 2020-02-09 PROCEDURE — 87040 BLOOD CULTURE FOR BACTERIA: CPT | Performed by: EMERGENCY MEDICINE

## 2020-02-09 PROCEDURE — 83605 ASSAY OF LACTIC ACID: CPT | Performed by: HOSPITALIST

## 2020-02-09 PROCEDURE — 85027 COMPLETE CBC AUTOMATED: CPT | Performed by: HOSPITALIST

## 2020-02-09 PROCEDURE — 36415 COLL VENOUS BLD VENIPUNCTURE: CPT | Performed by: HOSPITALIST

## 2020-02-09 PROCEDURE — 86140 C-REACTIVE PROTEIN: CPT | Performed by: EMERGENCY MEDICINE

## 2020-02-09 PROCEDURE — 83540 ASSAY OF IRON: CPT | Performed by: HOSPITALIST

## 2020-02-09 PROCEDURE — 83605 ASSAY OF LACTIC ACID: CPT | Performed by: EMERGENCY MEDICINE

## 2020-02-09 PROCEDURE — 71045 X-RAY EXAM CHEST 1 VIEW: CPT

## 2020-02-09 PROCEDURE — 25800030 ZZH RX IP 258 OP 636: Performed by: EMERGENCY MEDICINE

## 2020-02-09 PROCEDURE — 96366 THER/PROPH/DIAG IV INF ADDON: CPT

## 2020-02-09 PROCEDURE — 83605 ASSAY OF LACTIC ACID: CPT | Performed by: NURSE PRACTITIONER

## 2020-02-09 PROCEDURE — 84702 CHORIONIC GONADOTROPIN TEST: CPT | Performed by: EMERGENCY MEDICINE

## 2020-02-09 PROCEDURE — 36415 COLL VENOUS BLD VENIPUNCTURE: CPT | Performed by: NURSE PRACTITIONER

## 2020-02-09 PROCEDURE — 84145 PROCALCITONIN (PCT): CPT | Performed by: EMERGENCY MEDICINE

## 2020-02-09 PROCEDURE — 80048 BASIC METABOLIC PNL TOTAL CA: CPT | Performed by: NURSE PRACTITIONER

## 2020-02-09 PROCEDURE — 21000001 ZZH R&B HEART CARE

## 2020-02-09 PROCEDURE — 81001 URINALYSIS AUTO W/SCOPE: CPT | Performed by: HOSPITALIST

## 2020-02-09 PROCEDURE — 80053 COMPREHEN METABOLIC PANEL: CPT | Performed by: EMERGENCY MEDICINE

## 2020-02-09 PROCEDURE — 99223 1ST HOSP IP/OBS HIGH 75: CPT | Mod: AI | Performed by: HOSPITALIST

## 2020-02-09 PROCEDURE — 96361 HYDRATE IV INFUSION ADD-ON: CPT

## 2020-02-09 PROCEDURE — 76705 ECHO EXAM OF ABDOMEN: CPT

## 2020-02-09 PROCEDURE — 85610 PROTHROMBIN TIME: CPT | Performed by: EMERGENCY MEDICINE

## 2020-02-09 PROCEDURE — 93005 ELECTROCARDIOGRAM TRACING: CPT

## 2020-02-09 PROCEDURE — 85730 THROMBOPLASTIN TIME PARTIAL: CPT | Performed by: EMERGENCY MEDICINE

## 2020-02-09 PROCEDURE — 25000132 ZZH RX MED GY IP 250 OP 250 PS 637: Performed by: EMERGENCY MEDICINE

## 2020-02-09 PROCEDURE — 87086 URINE CULTURE/COLONY COUNT: CPT | Performed by: EMERGENCY MEDICINE

## 2020-02-09 PROCEDURE — 96375 TX/PRO/DX INJ NEW DRUG ADDON: CPT

## 2020-02-09 RX ORDER — LORAZEPAM 2 MG/ML
2 INJECTION INTRAMUSCULAR ONCE
Status: COMPLETED | OUTPATIENT
Start: 2020-02-09 | End: 2020-02-09

## 2020-02-09 RX ORDER — MAGNESIUM SULFATE HEPTAHYDRATE 40 MG/ML
2 INJECTION, SOLUTION INTRAVENOUS DAILY PRN
Status: DISCONTINUED | OUTPATIENT
Start: 2020-02-09 | End: 2020-02-24

## 2020-02-09 RX ORDER — GABAPENTIN 300 MG/1
300 CAPSULE ORAL 3 TIMES DAILY
Status: DISCONTINUED | OUTPATIENT
Start: 2020-02-09 | End: 2020-02-11

## 2020-02-09 RX ORDER — NAPROXEN SODIUM 220 MG
220 TABLET ORAL DAILY PRN
Status: ON HOLD | COMMUNITY
End: 2020-03-05

## 2020-02-09 RX ORDER — SODIUM CHLORIDE AND POTASSIUM CHLORIDE 150; 900 MG/100ML; MG/100ML
INJECTION, SOLUTION INTRAVENOUS CONTINUOUS
Status: DISCONTINUED | OUTPATIENT
Start: 2020-02-09 | End: 2020-02-11

## 2020-02-09 RX ORDER — NALOXONE HYDROCHLORIDE 0.4 MG/ML
.1-.4 INJECTION, SOLUTION INTRAMUSCULAR; INTRAVENOUS; SUBCUTANEOUS
Status: DISCONTINUED | OUTPATIENT
Start: 2020-02-09 | End: 2020-03-05 | Stop reason: HOSPADM

## 2020-02-09 RX ORDER — LORAZEPAM 1 MG/1
1-2 TABLET ORAL EVERY 30 MIN PRN
Status: DISCONTINUED | OUTPATIENT
Start: 2020-02-09 | End: 2020-02-21

## 2020-02-09 RX ORDER — AMOXICILLIN 250 MG
2 CAPSULE ORAL 2 TIMES DAILY
Status: DISCONTINUED | OUTPATIENT
Start: 2020-02-09 | End: 2020-02-13

## 2020-02-09 RX ORDER — ONDANSETRON 4 MG/1
4 TABLET, ORALLY DISINTEGRATING ORAL EVERY 6 HOURS PRN
Status: DISCONTINUED | OUTPATIENT
Start: 2020-02-09 | End: 2020-03-05 | Stop reason: HOSPADM

## 2020-02-09 RX ORDER — IOPAMIDOL 755 MG/ML
135 INJECTION, SOLUTION INTRAVASCULAR ONCE
Status: COMPLETED | OUTPATIENT
Start: 2020-02-09 | End: 2020-02-09

## 2020-02-09 RX ORDER — FAMOTIDINE 20 MG/1
20 TABLET, FILM COATED ORAL 2 TIMES DAILY PRN
COMMUNITY
End: 2020-10-04

## 2020-02-09 RX ORDER — AMOXICILLIN 250 MG
1 CAPSULE ORAL 2 TIMES DAILY
Status: DISCONTINUED | OUTPATIENT
Start: 2020-02-09 | End: 2020-02-13

## 2020-02-09 RX ORDER — ONDANSETRON 2 MG/ML
4 INJECTION INTRAMUSCULAR; INTRAVENOUS EVERY 6 HOURS PRN
Status: DISCONTINUED | OUTPATIENT
Start: 2020-02-09 | End: 2020-03-05 | Stop reason: HOSPADM

## 2020-02-09 RX ORDER — DEXTROAMPHETAMINE SACCHARATE, AMPHETAMINE ASPARTATE, DEXTROAMPHETAMINE SULFATE AND AMPHETAMINE SULFATE 5; 5; 5; 5 MG/1; MG/1; MG/1; MG/1
20 TABLET ORAL EVERY EVENING
Status: ON HOLD | COMMUNITY
End: 2020-04-06

## 2020-02-09 RX ORDER — LIDOCAINE 40 MG/G
CREAM TOPICAL
Status: DISCONTINUED | OUTPATIENT
Start: 2020-02-09 | End: 2020-02-24

## 2020-02-09 RX ORDER — AMOXICILLIN 250 MG
1 CAPSULE ORAL 2 TIMES DAILY PRN
Status: DISCONTINUED | OUTPATIENT
Start: 2020-02-09 | End: 2020-02-18

## 2020-02-09 RX ORDER — QUETIAPINE FUMARATE 25 MG/1
25-50 TABLET, FILM COATED ORAL EVERY 6 HOURS PRN
Status: DISCONTINUED | OUTPATIENT
Start: 2020-02-09 | End: 2020-02-09

## 2020-02-09 RX ORDER — DEXTROAMPHETAMINE SACCHARATE, AMPHETAMINE ASPARTATE MONOHYDRATE, DEXTROAMPHETAMINE SULFATE AND AMPHETAMINE SULFATE 7.5; 7.5; 7.5; 7.5 MG/1; MG/1; MG/1; MG/1
30 CAPSULE, EXTENDED RELEASE ORAL EVERY MORNING
Status: ON HOLD | COMMUNITY
End: 2020-04-06

## 2020-02-09 RX ORDER — LORAZEPAM 2 MG/ML
2 INJECTION INTRAMUSCULAR ONCE
Status: DISCONTINUED | OUTPATIENT
Start: 2020-02-09 | End: 2020-02-09

## 2020-02-09 RX ORDER — PROCHLORPERAZINE MALEATE 5 MG
10 TABLET ORAL EVERY 6 HOURS PRN
Status: DISCONTINUED | OUTPATIENT
Start: 2020-02-09 | End: 2020-02-24

## 2020-02-09 RX ORDER — DEXTROSE MONOHYDRATE 25 G/50ML
25-50 INJECTION, SOLUTION INTRAVENOUS
Status: DISCONTINUED | OUTPATIENT
Start: 2020-02-09 | End: 2020-03-05 | Stop reason: HOSPADM

## 2020-02-09 RX ORDER — MAGNESIUM SULFATE HEPTAHYDRATE 40 MG/ML
2 INJECTION, SOLUTION INTRAVENOUS ONCE
Status: COMPLETED | OUTPATIENT
Start: 2020-02-09 | End: 2020-02-09

## 2020-02-09 RX ORDER — POTASSIUM CHLORIDE 29.8 MG/ML
20 INJECTION INTRAVENOUS
Status: DISCONTINUED | OUTPATIENT
Start: 2020-02-09 | End: 2020-02-11

## 2020-02-09 RX ORDER — NICOTINE POLACRILEX 4 MG
15-30 LOZENGE BUCCAL
Status: DISCONTINUED | OUTPATIENT
Start: 2020-02-09 | End: 2020-03-05 | Stop reason: HOSPADM

## 2020-02-09 RX ORDER — POTASSIUM CHLORIDE 1500 MG/1
20-40 TABLET, EXTENDED RELEASE ORAL
Status: DISCONTINUED | OUTPATIENT
Start: 2020-02-09 | End: 2020-02-11

## 2020-02-09 RX ORDER — POTASSIUM CHLORIDE 7.45 MG/ML
10 INJECTION INTRAVENOUS
Status: DISCONTINUED | OUTPATIENT
Start: 2020-02-09 | End: 2020-02-11

## 2020-02-09 RX ORDER — POTASSIUM CL/LIDO/0.9 % NACL 10MEQ/0.1L
10 INTRAVENOUS SOLUTION, PIGGYBACK (ML) INTRAVENOUS
Status: DISCONTINUED | OUTPATIENT
Start: 2020-02-09 | End: 2020-02-11

## 2020-02-09 RX ORDER — METOCLOPRAMIDE HYDROCHLORIDE 5 MG/ML
10 INJECTION INTRAMUSCULAR; INTRAVENOUS EVERY 6 HOURS PRN
Status: DISCONTINUED | OUTPATIENT
Start: 2020-02-09 | End: 2020-02-24

## 2020-02-09 RX ORDER — PROCHLORPERAZINE 25 MG
25 SUPPOSITORY, RECTAL RECTAL EVERY 12 HOURS PRN
Status: DISCONTINUED | OUTPATIENT
Start: 2020-02-09 | End: 2020-02-24

## 2020-02-09 RX ORDER — METOCLOPRAMIDE 5 MG/1
10 TABLET ORAL EVERY 6 HOURS PRN
Status: DISCONTINUED | OUTPATIENT
Start: 2020-02-09 | End: 2020-02-24

## 2020-02-09 RX ORDER — POTASSIUM CHLORIDE 1.5 G/1.58G
20-40 POWDER, FOR SOLUTION ORAL
Status: DISCONTINUED | OUTPATIENT
Start: 2020-02-09 | End: 2020-02-11

## 2020-02-09 RX ORDER — MAGNESIUM SULFATE HEPTAHYDRATE 40 MG/ML
4 INJECTION, SOLUTION INTRAVENOUS EVERY 4 HOURS PRN
Status: DISCONTINUED | OUTPATIENT
Start: 2020-02-09 | End: 2020-02-24

## 2020-02-09 RX ORDER — AMOXICILLIN 250 MG
2 CAPSULE ORAL 2 TIMES DAILY PRN
Status: DISCONTINUED | OUTPATIENT
Start: 2020-02-09 | End: 2020-02-18

## 2020-02-09 RX ORDER — LORAZEPAM 2 MG/ML
1-2 INJECTION INTRAMUSCULAR EVERY 30 MIN PRN
Status: DISCONTINUED | OUTPATIENT
Start: 2020-02-09 | End: 2020-02-21

## 2020-02-09 RX ORDER — BISACODYL 10 MG
10 SUPPOSITORY, RECTAL RECTAL DAILY PRN
Status: DISCONTINUED | OUTPATIENT
Start: 2020-02-09 | End: 2020-02-24

## 2020-02-09 RX ORDER — POTASSIUM CHLORIDE 1.5 G/1.58G
40 POWDER, FOR SOLUTION ORAL ONCE
Status: COMPLETED | OUTPATIENT
Start: 2020-02-09 | End: 2020-02-09

## 2020-02-09 RX ORDER — CALCIUM CARBONATE 500 MG/1
1 TABLET, CHEWABLE ORAL 2 TIMES DAILY PRN
Status: ON HOLD | COMMUNITY
End: 2020-03-05

## 2020-02-09 RX ADMIN — SODIUM CHLORIDE 1000 ML: 9 INJECTION, SOLUTION INTRAVENOUS at 15:46

## 2020-02-09 RX ADMIN — LORAZEPAM 2 MG: 2 INJECTION INTRAMUSCULAR; INTRAVENOUS at 20:59

## 2020-02-09 RX ADMIN — SODIUM CHLORIDE, POTASSIUM CHLORIDE, SODIUM LACTATE AND CALCIUM CHLORIDE 1000 ML: 600; 310; 30; 20 INJECTION, SOLUTION INTRAVENOUS at 22:39

## 2020-02-09 RX ADMIN — POTASSIUM CHLORIDE 40 MEQ: 1.5 POWDER, FOR SOLUTION ORAL at 18:12

## 2020-02-09 RX ADMIN — SODIUM CHLORIDE, POTASSIUM CHLORIDE, SODIUM LACTATE AND CALCIUM CHLORIDE 1000 ML: 600; 310; 30; 20 INJECTION, SOLUTION INTRAVENOUS at 21:05

## 2020-02-09 RX ADMIN — LORAZEPAM 1 MG: 1 TABLET ORAL at 22:35

## 2020-02-09 RX ADMIN — LORAZEPAM 2 MG: 2 INJECTION INTRAMUSCULAR; INTRAVENOUS at 18:15

## 2020-02-09 RX ADMIN — MAGNESIUM SULFATE HEPTAHYDRATE 2 G: 40 INJECTION, SOLUTION INTRAVENOUS at 18:11

## 2020-02-09 RX ADMIN — POTASSIUM CHLORIDE AND SODIUM CHLORIDE: 900; 150 INJECTION, SOLUTION INTRAVENOUS at 20:04

## 2020-02-09 RX ADMIN — SODIUM CHLORIDE 80 ML: 9 INJECTION, SOLUTION INTRAVENOUS at 23:02

## 2020-02-09 RX ADMIN — CEFEPIME HYDROCHLORIDE 2 G: 2 INJECTION, POWDER, FOR SOLUTION INTRAVENOUS at 16:25

## 2020-02-09 RX ADMIN — IOPAMIDOL 135 ML: 755 INJECTION, SOLUTION INTRAVENOUS at 23:01

## 2020-02-09 RX ADMIN — SENNOSIDES AND DOCUSATE SODIUM 2 TABLET: 8.6; 5 TABLET ORAL at 20:04

## 2020-02-09 RX ADMIN — FOLIC ACID: 5 INJECTION, SOLUTION INTRAMUSCULAR; INTRAVENOUS; SUBCUTANEOUS at 16:59

## 2020-02-09 ASSESSMENT — ACTIVITIES OF DAILY LIVING (ADL)
AMBULATION: 0-->INDEPENDENT
COGNITION: 0 - NO COGNITION ISSUES REPORTED
RETIRED_EATING: 0-->INDEPENDENT
ADLS_ACUITY_SCORE: 10
TRANSFERRING: 0-->INDEPENDENT
TOILETING: 0-->INDEPENDENT
FALL_HISTORY_WITHIN_LAST_SIX_MONTHS: NO
RETIRED_COMMUNICATION: 0-->UNDERSTANDS/COMMUNICATES WITHOUT DIFFICULTY
SWALLOWING: 0-->SWALLOWS FOODS/LIQUIDS WITHOUT DIFFICULTY
BATHING: 0-->INDEPENDENT
DRESS: 0-->INDEPENDENT

## 2020-02-09 ASSESSMENT — ENCOUNTER SYMPTOMS
SHORTNESS OF BREATH: 1
ABDOMINAL DISTENTION: 1
NAUSEA: 1
ABDOMINAL PAIN: 1
CONSTIPATION: 1

## 2020-02-09 ASSESSMENT — MIFFLIN-ST. JEOR: SCORE: 1993.5

## 2020-02-09 NOTE — ED NOTES
DATE:  2/9/2020   TIME OF RECEIPT FROM LAB: 9521  LAB TEST:  LA  LAB VALUE:  7.5  RESULTS GIVEN WITH READ-BACK TO (PROVIDER):  Espino  TIME LAB VALUE REPORTED TO PROVIDER:   8868

## 2020-02-09 NOTE — ED PROVIDER NOTES
"  History     Chief Complaint:  Abdominal Pain      HPI   Pat Delgado is a 29 year old female with a history of alcoholism who presents to the emergency department with her significant other for evaluation of abdominal pain. Patient reports on and off abdominal distension and constipation for the past month that worsened this week in conjunction with nausea, abdominal pain, and yellow eyes. Patient also states she feels short of breath on exertion. Patient lost her job several months ago and since then has been drinking more than usual. Her last drink was at noon today. She denies blood in emesis and dark/tarry stools.     Allergies:  Penicillins    Medications:    Adderall  Lexapro  Hydroxyzine    Past Medical History:    ADHD  Anxiety  Migraine  Substance abuse  Alcohol dependence  HTN  Depression    Past Surgical History:    History reviewed. No pertinent surgical history.    Family History:    Anxiety - mother    Social History:  Smoking status: everyday smoker  Alcohol use: yes. 750 mL whiskey per day  Drug use: no  The patient presents to the emergency department with her significant other.  PCP: Page Campos  Marital Status:  Single    Review of Systems   Respiratory: Positive for shortness of breath.    Gastrointestinal: Positive for abdominal distention, abdominal pain, constipation and nausea.   All other systems reviewed and are negative.    Physical Exam     Patient Vitals for the past 24 hrs:   BP Temp Temp src Pulse Resp SpO2 Height   02/09/20 1519 139/85 98.5  F (36.9  C) Oral 129 18 98 % 1.727 m (5' 8\")       Physical Exam  Vitals: reviewed by me  General: Pt seen on [facility], pleasant, cooperative, and alert to conversation, unwell appearing and sick appearing.  Somewhat pale.  Jaundice.  Eyes: Tracking well, mild icterus noted bilateral conjunctivitis.  ENT: MMM, midline trachea.   Lungs:   No tachypnea, no accessory muscle use. No respiratory distress.   CV: Rate as above, " regular rhythm.    Abd: Soft, distended, noted thyromegaly.  Minimally tender throughout, no guarding or rebound though.  MSK: 2+ peripheral edema symmetrically, no joint effusion.  No evidence of trauma  Skin: No rash, normal turgor and temperature  Neuro: Clear speech and no facial droop.  Psych: Not RIS, no e/o AH/VH        Emergency Department Course   ECG (18:59:50):  Rate 128 bpm. WA interval 126. QRS duration 78. QT/QTc 338/493. P-R-T axes 68 57 38. Sinus tachycardia. Otherwise normal ECG. Interpreted at 1910 by Alexis Espino MD.    Imaging:  Radiographic findings were communicated with the patient who voiced understanding of the findings.  US Abdomen, limited (RUQ):  IMPRESSION:  1. Steatosis and hepatomegaly.   2. Limited study due to patient body habitus, bowel gas, and distended  abdomen.  3. Pancreas is completely obscured and could not be evaluated. Bile  ducts are not seen due to difficulty in penetrating the liver.  4. No evidence for cholelithiasis or acute cholecystitis.    Laboratory:  CBC: WBC: 24.3 (H), HGB: 10.4 (L), PLT: 304  CMP: Glucose 118 (H), Sodium 120 (L), Potassium 3.3 (L), Chloride 84 (L), Urea nitrogen 1 (L), Calcium 6.8 (L), Bilirubin total 8.1 (H), Albumin 1.6 (L), Protein total 6.7 (L), Alkaline phosphatase 357 (H),  (H), o/w WNL (Creatinine: 0.35 (L))  1547 Lactic acid: 7.5 (HH)  UA: Glucose 30, Bilirubin large, Ketones 5, Blood moderate, Protein albumin 100, Leukocyte esterase small, WBC 24 (H), RBC 4 (H), Bacteria few, Squamous epithelial 10 (H), Mucous present, Hyaline Casts 28 (H), o/w Negative  INR: 1.86 (H)  Alcohol ethyl: 0.14 (H)  PTT: 42 (H)  CRP inflammation: 57.7 (H)  Magnesium: 1.8  Phosphorus: 1.2 (L)  Blood cultures (X2): pending  Urine culture aerobic bacterial: pending  Procalcitonin: 0.53  HCG quantitative pregnancy: <1    Interventions:  1546 NS 1000 mL IV  1625 Maxipime 2 g IV  1659 NS 1000 mL with Infuvite adult 10 mL, thiamine 100 mg,  folic acid 1 mg infusion IV  1811 magnesium sulfate 2 g IV  1812 potassium chloride 40 mEq Oral  1815 Ativan 2 mg IV    Emergency Department Course:  Nursing notes and vitals reviewed. (1525) I performed an exam of the patient as documented above.     IV inserted. Medicine administered as documented above. Blood drawn. This was sent to the lab for further testing, results above.     The patient was sent for a ultrasound while in the emergency department, findings above.     1740 I rechecked the patient and discussed the results of her workup thus far.     1753  I consulted with Dr. Watts of the hospitalist services. He is in agreement to accept the patient for admission.    Findings and plan explained to the Patient who consents to admission. Discussed the patient with Dr. Watts, who will admit the patient to a [facility] bed for further monitoring, evaluation, and treatment.    Impression & Plan    Medical Decision Making:  Pat Delgado is a very pleasant 29 year old female who presents to the emergency department with what appears to be liver failure. This is likely due to alcohol abuse. This diagnosis is supported by her physical exam, large liver on ultrasound, and laboratory analyses. It also fits with her history as she has been endorsing a large amount of alcohol in the last several days to weeks. I think that she actually is progressing towards liver failure and needs to be admitted, of course, to the care of the hospitalist Dr. Watts. His has generously agreed to accept care of the patient. We will also provide antibiotics given her white count and elevated lactate. This is likely due to the decreased clearance on behalf on her liver. We will plan for admission as above and supportive therapy throughout.    Diagnosis:    ICD-10-CM    1. Acute liver failure without hepatic coma K72.00 CRP inflammation     CRP inflammation     Magnesium     Magnesium     Procalcitonin     Procalcitonin     Phosphorus      Phosphorus   2. Hyponatremia E87.1    3. Sepsis, due to unspecified organism, unspecified whether acute organ dysfunction present (H) A41.9        Disposition:  Admitted to the care of Dr. Stefanie Ashford  2/9/2020    EMERGENCY DEPARTMENT  Scribe Disclosure:  I, Nathaniel Ashford, am serving as a scribe at 3:25 PM on 2/9/2020 to document services personally performed by Alexis Espino MD based on my observations and the provider's statements to me.        Alexis Espino MD  02/09/20 2153

## 2020-02-09 NOTE — ED TRIAGE NOTES
Patient has had bloating and abd pain for the past month, and today it became severe and her boyfriend noticed that she had increased yellowing of her eyes.

## 2020-02-09 NOTE — ED NOTES
[facility]  ED Nurse Handoff Report    ED Chief complaint: Abdominal Pain      ED Diagnosis:   Final diagnoses:   Acute liver failure without hepatic coma   Hyponatremia   Sepsis, due to unspecified organism, unspecified whether acute organ dysfunction present (H)       Code Status: Full Code    Allergies:   Allergies   Allergen Reactions     Penicillins Unknown     Hives         Patient Story: Chronic ETOH use with abd pain  Focused Assessment:  matti has a history of ETOH abuse, drinking 750ml or more a day of vodka or whiskey.  She recenlty has increased in etoh use and has become severely bloated with abd pain and her eyes have turned yellow which became worry some to her boyfriend.  Her abd is tender and slightly firm over her liver.  She is super thirsty.    Labs Ordered and Resulted from Time of ED Arrival Up to the Time of Departure from the ED   CBC WITH PLATELETS DIFFERENTIAL - Abnormal; Notable for the following components:       Result Value    WBC 24.3 (*)     RBC Count 3.02 (*)     Hemoglobin 10.4 (*)     Hematocrit 29.4 (*)     MCH 34.4 (*)     RDW 19.5 (*)     Absolute Neutrophil 19.1 (*)     Absolute Monocytes 2.2 (*)     All other components within normal limits   COMPREHENSIVE METABOLIC PANEL - Abnormal; Notable for the following components:    Sodium 120 (*)     Potassium 3.3 (*)     Chloride 84 (*)     Glucose 118 (*)     Urea Nitrogen 1 (*)     Creatinine 0.35 (*)     Calcium 6.8 (*)     Bilirubin Total 8.1 (*)     Albumin 1.6 (*)     Protein Total 6.7 (*)     Alkaline Phosphatase 357 (*)      (*)     All other components within normal limits   LACTIC ACID WHOLE BLOOD - Abnormal; Notable for the following components:    Lactic Acid 7.5 (*)     All other components within normal limits   ALCOHOL ETHYL - Abnormal; Notable for the following components:    Ethanol g/dL 0.14 (*)     All other components within normal limits   INR - Abnormal; Notable for the following  "components:    INR 1.86 (*)     All other components within normal limits   PARTIAL THROMBOPLASTIN TIME - Abnormal; Notable for the following components:    PTT 42 (*)     All other components within normal limits   CRP INFLAMMATION - Abnormal; Notable for the following components:    CRP Inflammation 57.7 (*)     All other components within normal limits   PHOSPHORUS - Abnormal; Notable for the following components:    Phosphorus 1.2 (*)     All other components within normal limits   MAGNESIUM   PROCALCITONIN   MAGNESIUM   PHOSPHORUS   PROCALCITONIN   CRP INFLAMMATION   DRUG ABUSE SCREEN 77 URINE (FL, RH, SH)   ROUTINE UA WITH MICROSCOPIC REFLEX TO CULTURE   BLOOD CULTURE   BLOOD CULTURE         Treatments and/or interventions provided: Abx, liter of fluids and vit bag  Patient's response to treatments and/or interventions: fair    To be done/followed up on inpatient unit:  NA    Does this patient have any cognitive concerns?: NA    Activity level - Baseline/Home:  Independent  Activity Level - Current:   Independent    Patient's Preferred language: English   Needed?: No    Isolation: None  Infection: Not Applicable  Bariatric?: No    Vital Signs:   Vitals:    02/09/20 1519   BP: 139/85   Pulse: 129   Resp: 18   Temp: 98.5  F (36.9  C)   TempSrc: Oral   SpO2: 98%   Height: 1.727 m (5' 8\")       Cardiac Rhythm:     Was the PSS-3 completed:   Yes  What interventions are required if any?               Family Comments: boyfriend is at the bedside  OBS brochure/video discussed/provided to patient/family: NO              Name of person given brochure if not patient: NA              Relationship to patient: NA    For the majority of the shift this patient's behavior was Green.   Behavioral interventions performed were NONE.    ED NURSE PHONE NUMBER: *[phone]           "

## 2020-02-09 NOTE — ED NOTES
Bed: ED21  Expected date:   Expected time:   Means of arrival:   Comments:  padma - 531 - 29 F abd pain eta 1503

## 2020-02-10 ENCOUNTER — APPOINTMENT (OUTPATIENT)
Dept: BEHAVIORAL HEALTH | Facility: CLINIC | Age: 30
End: 2020-02-10
Payer: COMMERCIAL

## 2020-02-10 LAB
ALBUMIN SERPL-MCNC: 1.5 G/DL (ref 3.4–5)
ALP SERPL-CCNC: 325 U/L (ref 40–150)
ALT SERPL W P-5'-P-CCNC: 32 U/L (ref 0–50)
ANION GAP SERPL CALCULATED.3IONS-SCNC: 9 MMOL/L (ref 3–14)
AST SERPL W P-5'-P-CCNC: 189 U/L (ref 0–45)
BACTERIA SPEC CULT: NORMAL
BILIRUB SERPL-MCNC: 9.4 MG/DL (ref 0.2–1.3)
BUN SERPL-MCNC: 2 MG/DL (ref 7–30)
CALCIUM SERPL-MCNC: 6.7 MG/DL (ref 8.5–10.1)
CHLORIDE SERPL-SCNC: 88 MMOL/L (ref 94–109)
CO2 SERPL-SCNC: 23 MMOL/L (ref 20–32)
CREAT SERPL-MCNC: 0.34 MG/DL (ref 0.52–1.04)
ERYTHROCYTE [DISTWIDTH] IN BLOOD BY AUTOMATED COUNT: 20 % (ref 10–15)
FOLATE SERPL-MCNC: 15.4 NG/ML
GFR SERPL CREATININE-BSD FRML MDRD: >90 ML/MIN/{1.73_M2}
GLUCOSE BLDC GLUCOMTR-MCNC: 106 MG/DL (ref 70–99)
GLUCOSE BLDC GLUCOMTR-MCNC: 116 MG/DL (ref 70–99)
GLUCOSE BLDC GLUCOMTR-MCNC: 121 MG/DL (ref 70–99)
GLUCOSE BLDC GLUCOMTR-MCNC: 132 MG/DL (ref 70–99)
GLUCOSE BLDC GLUCOMTR-MCNC: 139 MG/DL (ref 70–99)
GLUCOSE SERPL-MCNC: 106 MG/DL (ref 70–99)
HBV SURFACE AG SERPL QL IA: NONREACTIVE
HCT VFR BLD AUTO: 27.4 % (ref 35–47)
HCV AB SERPL QL IA: NONREACTIVE
HGB BLD-MCNC: 9.5 G/DL (ref 11.7–15.7)
INR PPP: 1.99 (ref 0.86–1.14)
LACTATE BLD-SCNC: 2.4 MMOL/L (ref 0.7–2)
LACTATE BLD-SCNC: 2.4 MMOL/L (ref 0.7–2)
LACTATE BLD-SCNC: 2.9 MMOL/L (ref 0.7–2)
LACTATE BLD-SCNC: 3.4 MMOL/L (ref 0.7–2)
MAGNESIUM SERPL-MCNC: 2.1 MG/DL (ref 1.6–2.3)
MCH RBC QN AUTO: 33.8 PG (ref 26.5–33)
MCHC RBC AUTO-ENTMCNC: 34.7 G/DL (ref 31.5–36.5)
MCV RBC AUTO: 98 FL (ref 78–100)
PHOSPHATE SERPL-MCNC: 0.9 MG/DL (ref 2.5–4.5)
PLATELET # BLD AUTO: 263 10E9/L (ref 150–450)
POTASSIUM SERPL-SCNC: 3.7 MMOL/L (ref 3.4–5.3)
PROT SERPL-MCNC: 6.5 G/DL (ref 6.8–8.8)
RBC # BLD AUTO: 2.81 10E12/L (ref 3.8–5.2)
SALICYLATES SERPL-MCNC: <2 MG/DL
SODIUM SERPL-SCNC: 120 MMOL/L (ref 133–144)
SODIUM SERPL-SCNC: 121 MMOL/L (ref 133–144)
SODIUM SERPL-SCNC: 121 MMOL/L (ref 133–144)
SPECIMEN SOURCE: NORMAL
VIT B12 SERPL-MCNC: 1312 PG/ML (ref 193–986)
WBC # BLD AUTO: 19 10E9/L (ref 4–11)

## 2020-02-10 PROCEDURE — 83605 ASSAY OF LACTIC ACID: CPT | Performed by: HOSPITALIST

## 2020-02-10 PROCEDURE — 99232 SBSQ HOSP IP/OBS MODERATE 35: CPT | Performed by: HOSPITALIST

## 2020-02-10 PROCEDURE — 25000132 ZZH RX MED GY IP 250 OP 250 PS 637: Performed by: PHYSICIAN ASSISTANT

## 2020-02-10 PROCEDURE — 25000132 ZZH RX MED GY IP 250 OP 250 PS 637: Performed by: HOSPITALIST

## 2020-02-10 PROCEDURE — 83605 ASSAY OF LACTIC ACID: CPT | Performed by: NURSE PRACTITIONER

## 2020-02-10 PROCEDURE — 40000007 ZZH STATISTIC ADULT CD FACE TO FACE-NO CHRG

## 2020-02-10 PROCEDURE — 84100 ASSAY OF PHOSPHORUS: CPT | Performed by: HOSPITALIST

## 2020-02-10 PROCEDURE — 84295 ASSAY OF SERUM SODIUM: CPT | Performed by: HOSPITALIST

## 2020-02-10 PROCEDURE — 82607 VITAMIN B-12: CPT | Performed by: HOSPITALIST

## 2020-02-10 PROCEDURE — 25000125 ZZHC RX 250: Performed by: PHYSICIAN ASSISTANT

## 2020-02-10 PROCEDURE — 25000125 ZZHC RX 250: Performed by: HOSPITALIST

## 2020-02-10 PROCEDURE — 99223 1ST HOSP IP/OBS HIGH 75: CPT | Performed by: PSYCHIATRY & NEUROLOGY

## 2020-02-10 PROCEDURE — 80053 COMPREHEN METABOLIC PANEL: CPT | Performed by: HOSPITALIST

## 2020-02-10 PROCEDURE — 85027 COMPLETE CBC AUTOMATED: CPT | Performed by: HOSPITALIST

## 2020-02-10 PROCEDURE — 82746 ASSAY OF FOLIC ACID SERUM: CPT | Performed by: HOSPITALIST

## 2020-02-10 PROCEDURE — 25000132 ZZH RX MED GY IP 250 OP 250 PS 637: Performed by: NURSE PRACTITIONER

## 2020-02-10 PROCEDURE — 25000128 H RX IP 250 OP 636: Performed by: HOSPITALIST

## 2020-02-10 PROCEDURE — 36415 COLL VENOUS BLD VENIPUNCTURE: CPT | Performed by: HOSPITALIST

## 2020-02-10 PROCEDURE — 83735 ASSAY OF MAGNESIUM: CPT | Performed by: HOSPITALIST

## 2020-02-10 PROCEDURE — 80329 ANALGESICS NON-OPIOID 1 OR 2: CPT | Performed by: HOSPITALIST

## 2020-02-10 PROCEDURE — 25000125 ZZHC RX 250: Performed by: NURSE PRACTITIONER

## 2020-02-10 PROCEDURE — 25800030 ZZH RX IP 258 OP 636: Performed by: HOSPITALIST

## 2020-02-10 PROCEDURE — 25800030 ZZH RX IP 258 OP 636: Performed by: NURSE PRACTITIONER

## 2020-02-10 PROCEDURE — 36415 COLL VENOUS BLD VENIPUNCTURE: CPT | Performed by: NURSE PRACTITIONER

## 2020-02-10 PROCEDURE — 00000146 ZZHCL STATISTIC GLUCOSE BY METER IP

## 2020-02-10 PROCEDURE — 25000128 H RX IP 250 OP 636: Performed by: NURSE PRACTITIONER

## 2020-02-10 PROCEDURE — 21000000 ZZH R&B IMCU HEART CARE

## 2020-02-10 RX ORDER — METOPROLOL TARTRATE 1 MG/ML
2.5 INJECTION, SOLUTION INTRAVENOUS EVERY 4 HOURS PRN
Status: DISCONTINUED | OUTPATIENT
Start: 2020-02-10 | End: 2020-02-10

## 2020-02-10 RX ADMIN — FOLIC ACID: 5 INJECTION, SOLUTION INTRAMUSCULAR; INTRAVENOUS; SUBCUTANEOUS at 23:45

## 2020-02-10 RX ADMIN — POTASSIUM PHOSPHATE, MONOBASIC AND POTASSIUM PHOSPHATE, DIBASIC 25 MMOL: 224; 236 INJECTION, SOLUTION INTRAVENOUS at 17:43

## 2020-02-10 RX ADMIN — LORAZEPAM 1 MG: 1 TABLET ORAL at 00:41

## 2020-02-10 RX ADMIN — LORAZEPAM 1 MG: 1 TABLET ORAL at 08:56

## 2020-02-10 RX ADMIN — POTASSIUM CHLORIDE AND SODIUM CHLORIDE: 900; 150 INJECTION, SOLUTION INTRAVENOUS at 00:30

## 2020-02-10 RX ADMIN — CEFEPIME HYDROCHLORIDE 2 G: 2 INJECTION, POWDER, FOR SOLUTION INTRAVENOUS at 00:03

## 2020-02-10 RX ADMIN — POTASSIUM CHLORIDE AND SODIUM CHLORIDE 100 ML/HR: 900; 150 INJECTION, SOLUTION INTRAVENOUS at 09:37

## 2020-02-10 RX ADMIN — GABAPENTIN 300 MG: 300 CAPSULE ORAL at 00:21

## 2020-02-10 RX ADMIN — SENNOSIDES AND DOCUSATE SODIUM 2 TABLET: 8.6; 5 TABLET ORAL at 08:57

## 2020-02-10 RX ADMIN — LACTULOSE 200 G: 10 SOLUTION ORAL; RECTAL at 14:39

## 2020-02-10 RX ADMIN — GABAPENTIN 300 MG: 300 CAPSULE ORAL at 08:56

## 2020-02-10 RX ADMIN — CEFEPIME HYDROCHLORIDE 2 G: 2 INJECTION, POWDER, FOR SOLUTION INTRAVENOUS at 08:57

## 2020-02-10 RX ADMIN — CEFEPIME HYDROCHLORIDE 2 G: 2 INJECTION, POWDER, FOR SOLUTION INTRAVENOUS at 23:04

## 2020-02-10 RX ADMIN — PHYTONADIONE 5 MG: 10 INJECTION, EMULSION INTRAMUSCULAR; INTRAVENOUS; SUBCUTANEOUS at 10:19

## 2020-02-10 RX ADMIN — GABAPENTIN 300 MG: 300 CAPSULE ORAL at 17:00

## 2020-02-10 RX ADMIN — LORAZEPAM 1 MG: 2 INJECTION INTRAMUSCULAR; INTRAVENOUS at 16:59

## 2020-02-10 RX ADMIN — CEFEPIME HYDROCHLORIDE 2 G: 2 INJECTION, POWDER, FOR SOLUTION INTRAVENOUS at 16:59

## 2020-02-10 RX ADMIN — FOLIC ACID: 5 INJECTION, SOLUTION INTRAMUSCULAR; INTRAVENOUS; SUBCUTANEOUS at 00:08

## 2020-02-10 RX ADMIN — LACTULOSE 200 G: 10 SOLUTION ORAL; RECTAL at 22:44

## 2020-02-10 RX ADMIN — POTASSIUM CHLORIDE AND SODIUM CHLORIDE: 900; 150 INJECTION, SOLUTION INTRAVENOUS at 19:52

## 2020-02-10 RX ADMIN — CALCIUM GLUCONATE 2 G: 98 INJECTION, SOLUTION INTRAVENOUS at 00:16

## 2020-02-10 RX ADMIN — METOPROLOL TARTRATE 2.5 MG: 5 INJECTION INTRAVENOUS at 04:23

## 2020-02-10 ASSESSMENT — MIFFLIN-ST. JEOR: SCORE: 2012.73

## 2020-02-10 NOTE — PROGRESS NOTES
Update in patient status; patient will be transferring [place] [facility] due to unstable lab values. LA is elevated at 6.8, one bolus given and ativan for CIWA of 12. BP slightly hypertensive and pt is tachycardic 130's. Possible low grade fever, mouth is very dry temp of 98.2, axillary 98.6/99.1, a bit sweaty. Up SBA. Reported some dizziness, when up. Unable to complete orthostatics. , A1c elevated, pt denies being dx with diabetes or told to check her sugars. Abdomen is tender and tight, complains of fullness and at times crampy stabby sensation on movement. US largely non-revealing/conclusive, c-xray clear. CT pending. Urine abnormal. Patient tolerated a meal at 8pm. Denies nausea at the time. Pt has jaundiced eyes. Tele is sinus tachy.  Patient also reports bloody stools, last one yesterday. hgb in 10's.    Report given to [facility] nurse

## 2020-02-10 NOTE — CONSULTS
"CLINICAL NUTRITION SERVICES  -  ASSESSMENT NOTE    Recommendations Ordered by Registered Dietitian (RD):   - Will await bowel function, GI consult.    Malnutrition:   % Weight Loss: Unable to assess  % Intake:  </= 75% for >/= 1 month (severe malnutrition) --> based on information obtained from EMR.   Subcutaneous Fat Loss:  None observed  Muscle Loss:  Temporal region mild depletion  Fluid Retention:  Mild 2+    Malnutrition Diagnosis: Non-Severe malnutrition  In Context of:  Acute illness or injury  Environmental or social circumstances     REASON FOR ASSESSMENT  Pat Delgado is a 29 year old female seen by Registered Dietitian for Admission Nutrition Risk Screen for reduced oral intake over the last month \"(also gaining weight)\"    NUTRITION HISTORY  - Information obtained from EMR, patient. Pat gives vague history.   - PMH: known alcohol use disorder. H/o depression, ADHD.   - Admitted with LE swelling, abdominal pain, distention over several days. Dx - lactic acidosis, severe sepsis, electrolyte abnormalities, abdominal pain.   - Pt has been drinking 750 mL vodka daily for the past 3 months. Oral intake has been poor, abdomen distended. Bowels have not bee moving normally.   - Pat states that she has been eating much less than normal. Sometimes she is able to cook a meal, but that only happens a couple times weekly. She likes pasta with pesto.   She also may have easier snacks on hand.   - Has been eating less, because she has been drinking more. Also endorses some nausea and emesis over the past few weeks.   - Unsure if she has lost weight, denies extensive fat/muscle losses.     CURRENT NUTRITION ORDERS  Diet Order:     NPO     Current Intake/Tolerance:  NA - taking ice chips, requested a popsicle    NUTRITION FOCUSED PHYSICAL ASSESSMENT FOR DIAGNOSING MALNUTRITION)  Yes           Observed:    Mild temporal wasting  Distention in abdomen    Obtained from Chart/Interdisciplinary Team:  Slightly " "jaundiced   Alcoholic hepatitis --> possible ileus. GI consult  Mild Edema 2+    ANTHROPOMETRICS  Height: 5' 8\"  Weight: 273 lbs 3.2 oz (123.9 kg)   Body mass index is 41.54 kg/m .  Weight Status:  Obesity Grade III BMI >40  IBW: 63.6 kg  % IBW: 195%  Weight History: Unable to assess recent weight hx. Likely up with fluid? Pt denies recent weight loss, UBW is ~220#.   Wt Readings from Last 10 Encounters:   02/10/20 123.9 kg (273 lb 3.2 oz)   02/09/15 95.3 kg (210 lb)   01/22/15 108.4 kg (239 lb)   12/25/17 99.8 kg (220 lb)    LABS  Labs reviewed  Recent Labs   Lab Test 02/10/20  0545 02/09/20  2334 02/09/20  2131 02/09/20  1547 01/21/15  0733   POTASSIUM 3.7 3.4 3.7 3.3* 4.1     Recent Labs   Lab Test 02/09/20  1547   PHOS 1.2*     Recent Labs   Lab Test 02/09/20  1547   MAG 1.8     Recent Labs   Lab Test 02/10/20  0545 02/09/20  2334 02/09/20  2131 02/09/20  1547 01/21/15  0733   * 118* 119* 120* 137     Recent Labs   Lab Test 02/10/20  0545 02/09/20  2334 02/09/20  2131 02/09/20  1547 01/21/15  0733   CR 0.34* 0.38* 0.32* 0.35* 0.57     Recent Labs   Lab 02/10/20  0545 02/09/20  2334 02/09/20  2131 02/09/20  1547   * 108* 123* 118*     Lab Results   Component Value Date    A1C 5.1 02/09/2020     MEDICATIONS  Medications reviewed  MSSI   Bowel program  NaCl + KCl IVF @ 100 ml/hr   PRN antiemetics  Lyte replacement protocol in place    ASSESSED NUTRITION NEEDS PER APPROVED PRACTICE GUIDELINES:  Dosing Weight 79 kg - adjusted   Estimated Energy Needs: 7091-7021 kcals (25-30 Kcal/Kg)  Justification: maintenance  Estimated Protein Needs:  grams protein (1.2-1.5 g pro/Kg)  Justification: maintenance  Estimated Fluid Needs: Per MD pending fluid status     MALNUTRITION:  % Weight Loss: None reported  % Intake:  </= 75% for >/= 1 month (severe malnutrition) --> based on information obtained from EMR.   Subcutaneous Fat Loss:  None observed  Muscle Loss:  Temporal region mild depletion  Fluid Retention:  " Mild 2+    Malnutrition Diagnosis: Non-Severe malnutrition  In Context of:  Acute illness or injury  Environmental or social circumstances    NUTRITION DIAGNOSIS:  Inadequate oral intake related to suspected displacement of nutrient-dense options with etoh as evidenced by intakes <75% for >1 month, e/o at least mild muscle loss, coding for non-severe malnutrition.       NUTRITION INTERVENTIONS  Recommendations / Nutrition Prescription  Diet per MD --> pending return of bowel function, GI consult.       Implementation  Nutrition education: Not appropriate at this time due to patient condition      Nutrition Goals  Tolerance of diet >CLD in the next 48 hours.       MONITORING AND EVALUATION:  Progress towards goals will be monitored and evaluated per protocol and Practice Guidelines    Nicole Larios RD, LD  [facility] [place], 66, 55, [facility]   Pager: [phone]  Weekend Pager: [phone]

## 2020-02-10 NOTE — PROGRESS NOTES
Sepsis Evaluation Progress Note    I was called to see Pat Delgado due to abnormal vital signs triggering the Sepsis SIRS screening alert. She is not known to have an infection.     Physical Exam   Vital Signs:  Temp: 98.6  F (37  C) Temp src: Oral BP: (!) 141/88 Pulse: 130   Resp: 16 SpO2: 98 % O2 Device: None (Room air)      Lab:  Lactic Acid   Date Value Ref Range Status   02/09/2020 6.8 (HH) 0.7 - 2.0 mmol/L Final     Comment:     Critical Value called to and read back by  FROILAN MIRANDA IN 66 AT 2035 AN         The patient is at baseline mental status.     The rest of their physical exam is significant for refer to RRT note    Assessment & Plan   Pat Delgado meets SIRS criteria AND has a lactate >2 or other evidence of acute organ damage.  These vital signs, lab and physical exam findings are consistent with SEVERE SEPSIS.    Sepsis Time-Zero (time severe sepsis diagnosis confirmed): 1547  02/09/20 as this was the time when Lactate resulted, and the level was > 2.0     Anti-infectives (From now, onward)    Start     Dose/Rate Route Frequency Ordered Stop    02/10/20 0000  ceFEPIme (MAXIPIME) 2 g vial to attach to  ml bag for ADULTS or 50 ml bag for PEDS      2 g  over 30 Minutes Intravenous EVERY 8 HOURS 02/09/20 1940          Current antibiotic coverage is appropriate for source of infection.    3 Hour Severe Sepsis Bundle Completion:  1. Initial Lactic Acid Result:   Recent Labs   Lab Test 02/09/20 2022 02/09/20  1547   LACT 6.8* 7.5*     2. Blood Cultures before Antibiotics: Yes  3. Broad Spectrum Antibiotics Administered: yes  4. Fluids: 2000 ml of IV fluids given.     Current weight: 121 kg   Ideal body weight: 63.9 kg (140 lb 14 oz)  Adjusted ideal body weight: 87.1 kg (192 lb 1.7 oz) (must be >=60 inches tall to calculate IBW)      Body mass index is 40.9 kg/m .      I attest to having performed a repeat sepsis exam and assessment of perfusion at 2040 and the results demonstrate no  change.      Lab: Repeat lactic acid ordered for 2 hours from now.    Disposition: The patient will remain on the current unit. We will continue to monitor this patient closely.    NELIDA Palm, CNP  Hospitalist - [facility]  Text Page  ([id number])

## 2020-02-10 NOTE — H&P
[facility]    History and Physical - Hospitalist Service       Date of Admission:  2/9/2020    Assessment & Plan   Pat Delgado is a 29 year old female with a history of alcohol dependence, ADHD and major depression who is been drinking 750 mL of vodka daily for about 3 months.  She presented to the emergency room complaining of several days of abdominal pain and distention.  She was found to have evidence of alcoholic hepatitis, lactic acidosis, electrolyte abnormalities, coagulopathy and possibly sepsis.  She is being admitted to the hospital for further care.    Lactic acidosis (7.5)  Hyponatremia, probably chronic (120)  Hypokalemia(3.3), hypophosphatemia(1.2)  The lactic acid level is little high for just type B lactic acidosis from her liver disease.  Clinically she does not appear to be septic and she is not hypotensive however.    Continue IV fluid    Monitor serum sodium every 6 hours and try to avoid fast correction of her serum sodium level as this is probably chronic hyponatremia    Replace and monitor potassium, phosphate and magnesium levels    Monitor lactic acid level    Alcoholic hepatitis with hyperbilirubinemia(TB 8.1), hypoalbuminemia(1.6), coagulopathy(INR 1.9)  Abdominal distention/pain ?ileus or ascites  Right upper quadrant ultrasound showed hepatomegaly and steatosis.  I do not have the final report so it is unclear if she has ascites.  She might have an ileus.    Monitor    Consult GI tomorrow    Possible sepsis, no clear source   No clear source of infection and clinically she does not appear septic.  However, she has leukocytosis, elevated CRP, procalcitonin and lactic acid. Chest X-ray is negative. Urinanalysis is mildly abnormal.    Cultures were sent in the emergency department and she received cefepime.    Continue intravenous cefepime    Await culture results    Follow up ultrasound report to see if there is ascites     Anemia, normocytic(10.4g)  Possibly from  alcohol toxicity but MCV is normal and platelets are normal  She does not report any bleeding or black stools.    Check iron studies, B12, folate and occult blood    Monitor hemoglobin    Alcohol intoxication(0.14) and dependence   ADHD and major depressive disorder   As mentioned in the HPI, she has been drinking 750 mL of vodka over the past 3 months.  Her last period of sobriety was about 2 or 2-1/2 years ago after inpatient alcohol treatment.  She was sober for 6 to 8 months.  She has been through treatment several times.  She was already in mild withdrawal in the emergency room and received lorazepam.  She denies a history of DTs or alcohol withdrawal seizure.    [facility] protocol ordered    Psychiatry and chemical dependency consultation     Diet: clears, advance diet as tolerated   DVT Prophylaxis: Pneumatic Compression Devices  Amado Catheter: not present  Code Status: Full     Disposition Plan   Expected discharge: 4 - 7 days, recommended to prior living arrangement once liver function is stable, alcohol detox completed.  Entered: Jh Watts MD 02/09/2020, 6:29 PM     The patient's care was discussed with the Patient and Patient's Family.    Jh Watts MD  [facility]    ______________________________________________________________________    Chief Complaint   Abdominal pain     History is obtained from the patient, electronic health record and emergency department physician    History of Present Illness   Pat Delgado is a 29 year old female who has a history of alcohol dependence as well as ADHD and major depressive disorder.  She does not have a known history of alcoholic liver disease.  She presented to the emergency room today due to abdominal pain and distention which has been present for several days and worsening.  She has been drinking 750 mL of vodka daily for about 3 months.  Her last period of sobriety was after inpatient treatment about 2-1/2 years ago  she says.  She was sober for about 6 to 8 months.  Her oral intake other than alcohol is been poor recently.  She feels that her abdomen is very distended.  She does not have right upper quadrant, right lower quadrant or left lower quadrant abdominal pain.  She is noticed that she is not really moving her bowels.  She is urinating less than usual.  She has no fever, chills, chest pain.  She has some dyspnea with exertion.  She had a cough a couple weeks ago but that is been getting better.  She has no dysuria or flank pain.  She notes that her skin has been yellow in her urine dark.  She has been drinking alcohol up until around noon today.  She has no blood in her stool or black stools.  In the emergency department her heart rate was 129 but her other vital signs were stable.  She had a non-surgical abdomen.  EKG showed sinus tachycardia.  Labs notable for white count 24,000, hemoglobin 10.4 g, sodium 120, potassium 3.3, bilirubin 8.1, albumin 1.6, alkaline phosphatase 357, , creatinine 0.35, lactic acid 7.5, INR 1.86, PTT 42, alcohol 0.14, CRP 58, magnesium 1.2, phosphorus 1.2, procalcitonin 0.53, quantitative hCG undetectable.  UA showed 30 glucose, 5 ketones, 24 white cells, 4 red cells, few bacteria, 28 hyaline casts.  Right upper quadrant ultrasound showed hepatomegaly with steatosis.  Pancreas obscured.  The patient was given 2 L of normal saline, cefepime, Infuvite, thiamine, folic acid, magnesium sulfate, potassium chloride and lorazepam.  She is being admitted to the hospital for further management.    Review of Systems    The 10 point Review of Systems is negative other than noted in the HPI or here.     Past Medical History    I have reviewed this patient's medical history and updated it with pertinent information if needed.   Past Medical History:   Diagnosis Date     ADHD (attention deficit hyperactivity disorder)      Alcohol dependence (H) 1/20/2015     Anxiety      Chemical dependency (H)  1/22/2015     Depressive disorder, not elsewhere classified 8/20/2015    Per 1/22/15 visit with Batool Gonzalez NP      Hypertension goal BP (blood pressure) < 150/90 11/10/2015     Migraine headache      Substance abuse (H)      Tobacco abuse        Past Surgical History   I have reviewed this patient's surgical history and updated it with pertinent information if needed.  Past Surgical History:   Procedure Laterality Date     HC TOOTH EXTRACTION W/FORCEP         Social History   I have reviewed this patient's social history and updated it with pertinent information if needed.  Social History     Tobacco Use     Smoking status: Current Every Day Smoker     Types: Vaping Device     Smokeless tobacco: Current User   Substance Use Topics     Alcohol use: Yes     Comment: 750mL whiskey per day     Drug use: No       Family History   I have reviewed this patient's family history and updated it with pertinent information if needed.   Family History   Problem Relation Age of Onset     Anxiety Disorder Mother      Dementia Maternal Grandfather      Substance Abuse Other      Unknown/Adopted No family hx of      Depression No family hx of      Schizophrenia No family hx of      Bipolar Disorder No family hx of      Suicide No family hx of      Loup Disease No family hx of      Parkinsonism No family hx of      Autism Spectrum Disorder No family hx of      Intellectual Disability (Mental Retardation) No family hx of      Mental Illness No family hx of        Prior to Admission Medications   Prior to Admission Medications   Prescriptions Last Dose Informant Patient Reported? Taking?   HYDROXYZINE HCL PO More than a month at Unknown time  Yes No   Sig: Take 50 mg by mouth as needed for itching   amphetamine-dextroamphetamine (ADDERALL XR) 30 MG per capsule 2/9/2020 at Unknown time Self No Yes   Sig: Take 2 capsules (60 mg) by mouth daily   escitalopram (LEXAPRO) 20 MG tablet More than a month at Unknown time  No No   Sig:  Take 1 tablet (20 mg) by mouth daily   Patient taking differently: Take 30 mg by mouth daily       Facility-Administered Medications: None     Allergies   Allergies   Allergen Reactions     Penicillins Unknown     Hives         Physical Exam   Vital Signs: Temp: 98.5  F (36.9  C) Temp src: Oral BP: 139/85 Pulse: 129   Resp: 18 SpO2: 98 % O2 Device: None (Room air)    Weight: 0 lbs 0 oz    Constitutional: awake, alert, cooperative, no apparent distress  Eyes: Lids and lashes normal, pupils equal, round, extra ocular muscles intact, sclera icteric, conjunctiva normal  ENT: Normocephalic, without obvious abnormality, atraumatic  Hematologic / Lymphatic: no cervical lymphadenopathy  Respiratory: No increased work of breathing, good air exchange, clear to auscultation bilaterally, no crackles or wheezing  Cardiovascular: tachycardic, normal S1 and S2, no S3 or S4, and no murmur noted  GI: soft, distended, non tender, markedly decrease bowel sounds, no mass or hernia.  Liver edge/spleen not palpated due to body habitus   Skin: no bruising or bleeding, no rashes and jaundice noted  Musculoskeletal: There is no redness, warmth, or swelling of the joints.  Full range of motion noted.  Motor strength is 5 out of 5 all extremities bilaterally.  Tone is normal.  Neurologic: Awake, alert, oriented to name, place and time.  Cranial nerves II-XII are grossly intact.  Motor is 5 out of 5 bilaterally. No tremor.  Neuropsychiatric: General: normal, calm and normal eye contact  Level of consciousness: alert / normal  Affect: normal    Data   Data reviewed today: I reviewed all medications, new labs and imaging results over the last 24 hours. I personally reviewed no images or EKG's today.    Recent Labs   Lab 02/09/20  1547   WBC 24.3*   HGB 10.4*   MCV 97      INR 1.86*   *   POTASSIUM 3.3*   CHLORIDE 84*   CO2 22   BUN 1*   CR 0.35*   ANIONGAP 14   EMILY 6.8*   *   ALBUMIN 1.6*   PROTTOTAL 6.7*   BILITOTAL 8.1*    ALKPHOS 357*   ALT 33   *   LIPASE 119     No results found for this or any previous visit (from the past 24 hour(s)).

## 2020-02-10 NOTE — CONSULTS
[facility]  Gastroenterology Consultation         Pat Delgado  [address]   [facility] [address]  29 year old female    Admission Date/Time: 2/9/2020  Primary Care Provider: [facility], [place]  Referring / Attending Physician:  Dr. Jh Watts    We were asked to see the patient in consultation by Dr. Jh Watts for evaluation of alcoholic hepatitis.      CC: abdominal pain and distention    HPI:  Pat Delgado is a 29 year old female who has a history of alcohol dependence as well as ADHD and major depressive disorder. She presented to ED with complaints of abdominal pain and distention for a week and was increasing. She drinks 750 mL of vodka daily for last 3 months. Had previously been sober for last 6-8 months prior to drinking again. She reports nausea and minimal oral intake besides alcohol. She has had infrequent bowel movement and noted hard stools as well as soft stools. She has noted bright red blood in stool as well. She denies rectal pain. She has had no fever, chills, chest pain, cough, or back pain. She has not vomited.     She has been afebrile, normotensive and normal O2 stats. Labs notable for white count 24 -> 19, hemoglobin 10.4 g- > 9.5, sodium 120, potassium 3.3 -> 3.7, bilirubin 8.1, albumin 1.6-> 1.5, alkaline phosphatase 357- > 325, ->189, creatinine 0.35, lactic acid 7.5 -> 2.9, INR 1.86, , alcohol 0.14, CRP 58, procalcitonin 0.53, quantitative hCG undetectable.    Imaging:  Abdominal ultrasound findings:   1. Steatosis and hepatomegaly.   2. Limited study due to patient body habitus, bowel gas, and distended  abdomen.  3. Pancreas is completely obscured and could not be evaluated. Bile  ducts are not seen due to difficulty in penetrating the liver.  4. No evidence for cholelithiasis or acute cholecystitis.    CT scan of abdomen and pelvis:  1.  Marked hepatomegaly and hepatic steatosis.  2.  Small amount of ascites along  the liver margin and in the pelvis.  3.  Wall thickening of the cecum and proximal ascending colon possibly related to incomplete distention. Colitis could have a similar appearance. No evidence for obstruction.  4.  Edema subcutaneous tissues.    ROS: A comprehensive ten point review of systems was negative aside from those in mentioned in the HPI.      PAST MED HX:  I have reviewed this patient's medical history and updated it with pertinent information if needed.   Past Medical History:   Diagnosis Date     ADHD (attention deficit hyperactivity disorder)      Alcohol dependence (H) 1/20/2015     Anxiety      Chemical dependency (H) 1/22/2015     Depressive disorder, not elsewhere classified 8/20/2015    Per 1/22/15 visit with Batool Gonzalez NP      Hypertension goal BP (blood pressure) < 150/90 11/10/2015     Migraine headache      Substance abuse (H)      Tobacco abuse        MEDICATIONS:   Prior to Admission Medications   Prescriptions Last Dose Informant Patient Reported? Taking?   amphetamine-dextroamphetamine (ADDERALL XR) 30 MG 24 hr capsule 2/8/2020 at Unknown time Self Yes Yes   Sig: Take 30 mg by mouth every morning   amphetamine-dextroamphetamine (ADDERALL) 20 MG tablet 2/7/2020 Self Yes Yes   Sig: Take 20 mg by mouth every evening   calcium carbonate (TUMS) 500 MG chewable tablet  at PRN Self Yes Yes   Sig: Take 1 chew tab by mouth 2 times daily as needed   famotidine (PEPCID) 20 MG tablet  at PRN Self Yes Yes   Sig: Take 20 mg by mouth 2 times daily as needed   magnesium oxide 200 MG TABS  at PRN Self Yes Yes   Sig: Take 200 mg by mouth daily as needed   naproxen sodium (ANAPROX) 220 MG tablet  at PRN Self Yes Yes   Sig: Take 220 mg by mouth daily as needed for moderate pain      Facility-Administered Medications: None       ALLERGIES:   Allergies   Allergen Reactions     Penicillins Unknown     Hives         SOCIAL HISTORY:  Social History     Tobacco Use     Smoking status: Current Every Day Smoker      Types: Vaping Device     Smokeless tobacco: Current User   Substance Use Topics     Alcohol use: Yes     Comment: 750mL whiskey per day     Drug use: No       FAMILY HISTORY:  Family History   Problem Relation Age of Onset     Anxiety Disorder Mother      Dementia Maternal Grandfather      Substance Abuse Other      Unknown/Adopted No family hx of      Depression No family hx of      Schizophrenia No family hx of      Bipolar Disorder No family hx of      Suicide No family hx of      [place] Disease No family hx of      Parkinsonism No family hx of      Autism Spectrum Disorder No family hx of      Intellectual Disability (Mental Retardation) No family hx of      Mental Illness No family hx of        PHYSICAL EXAM:   General  Patient is somnolent, slurring words, oriented and comfortable  Vital Signs with Ranges  Temp: 97.4  F (36.3  C) Temp src: Oral BP: 117/76 Pulse: 129 Heart Rate: 129 Resp: (!) 33 SpO2: 96 % O2 Device: None (Room air)    I/O last 3 completed shifts:  In: 1305 [I.V.:1305]  Out: 275 [Urine:275]    Constitutional: alert, mild distress, cooperative, pale and obese   Cardiovascular: negative, PMI normal. No lifts, heaves, or thrills. RRR. No murmurs, clicks gallops or rub  Respiratory: negative, Percussion normal. Good diaphragmatic excursion. Lungs clear  Head: Normocephalic. No masses, lesions, tenderness or abnormalities  Neck: Neck supple. No adenopathy. Thyroid symmetric, normal size,, Carotids without bruits.  Abdomen: Abdomen soft, tender. BS normal. No masses, organomegaly, positive findings: distended, tenderness moderate generalized, distant bowel sounds. Tympanic- minimal fluid- very low in pelvis          ADDITIONAL COMMENTS:   I reviewed the patient's new clinical lab test results.   Recent Labs   Lab Test 02/10/20  0545 02/09/20  2334 02/09/20  2131 02/09/20  1547   WBC 19.0*  --  19.9* 24.3*   HGB 9.5*  --  9.8* 10.4*   MCV 98  --  98 97     --  279 304   INR  --  1.99*  --   1.86*     Recent Labs   Lab Test 02/10/20  0545 02/09/20  2334 02/09/20  2131   POTASSIUM 3.7 3.4 3.7   CHLORIDE 88* 85* 86*   CO2 23 21 18*   BUN 2* 2* 2*   ANIONGAP 9 12 15*     Recent Labs   Lab Test 02/10/20  0545 02/09/20  1755 02/09/20  1547 02/05/15  1030   ALBUMIN 1.5*  --  1.6* 3.9   BILITOTAL 9.4*  --  8.1* 0.3   ALT 32  --  33 71*   *  --  203* 43   PROTEIN  --  100*  --   --    LIPASE  --   --  119  --        I reviewed the patient's new imaging results.        CONSULTATION ASSESSMENT AND PLAN:    Pat Delgado is a pleasant 29 year old female with history of alcohol abuse. She has complaints of abdominal pain and distention. GI was asked to evaluate for ascites and consider paracentesis.    Active Problems:  Abdominal distention  Patient presented with complaints of abdominal pain. She is noted have elevated WBC and lactic acid. Being treated for sepsis but unsure source. Imaging notes no pocket of abdominal or pelvic fluid collection. Only small amount of fluid noted on imaging. NO ascites noted on exam. SBP not likely given no fluid collection. A sample would be difficult/impossible to obtain given no pocket of fluid noted. Imaging does not mention ileus or evidence of colonic obstruction. She has had limited oral intake and constipation. Therefore abdominal distention likely related to chronic constipation. NO paracentesis recommended.    - Recommend got give rectal lactulose q 6 hours  - Start clear liquid diet    Alcoholic hepatitis  Patient has noted hepatomegaly on CT scan. Platelets 263. INR is elevated. No history of EGD. Unlikely has liver cirrhosis as no evidence on CT and no noted thrombocytopenia. AST elevated and trending down. Consistent with alcohol hepatitis.   - Appreciate psychiatry and CD consults  - Avoid alcohol intake  - Daily CMP  - CIWA protocol    Bright red rectal bleeding  Anemia  Patient has reported bright red blood in stools but not to ED. Her hemoglobin is at  9.5 today and below baseline 12-13. There is a concern for possible hemorrhoids vs unlikely IBD. CT does note wall thickening in cecum likely from non-distended colon. Also there is concern for alcoholic gastritis with anemia.   - Recommend daily CBC  - Will plan EGD and colonoscopy once stable or as outpatient to r/o GI bleed - sodium currently at 120.    Possible Sepsis  Currently being treated with IV cefepime. Highly unlikely related to SBP. Awaiting culture results    Hyponatremia  Hypokalemia  Hypophosphatemia  Lactic acidosis  Currently replacing and monitoring potassium phosphate and magnesium. Once patient has withdrawn from alcohol and electrolytes improved can consider EGD and possible colonoscopy      [facility].  Office: [phone]  Cell : [phone] (Dr. Hobson)  Cell: [phone] (Aminata Rangel PA-C)

## 2020-02-10 NOTE — CODE/RAPID RESPONSE
[facility]  [facility] RRT Note  2/9/2020   Time Called: 2035  RRT called for: Lactic acidosis  Code Status: Full Code    Assessment & Plan   I was paged to the bedside to evaluate Ms. Pat Delgado for elevated lactic acid of 6.8, which is a decrease from prior of 7.5. Patient has complaints of abdominal pain and a feeling of fullness in her abdomen. Patient states that she has not had a fever or chills but has been feeling more tired at home recently. On my initial exam, patient appeared acutely ill. Patient was pale, lightly diaphoretic, and exhibited mild tremors. Hemodynamically, patient was tachycardic and mildly hypertensive, patient was on room air. Patient has endorsed withdrawing from alcohol before and did not endorse seizures. Prior labs and imaging reviewed. Ultrasound imaging demonstrates hepatomegaly, but bile ducts and pancreas were not visualized. There is no cholelithiasis or cholecystitis. Given the limited study and concern for bowel obstruction, I have ordered stat CT imaging of her abdomen. In the meantime we will give 2 L LR, patient is hyponatremic at 120, however, LR's sodium content is around 130 mEq/L. Repeat LA at 2300.    Diagnosis:  -- lactic acidosis, unknown etiology   -- severe sepsis, unknown source, however patient does have a UTI based on UA in the ED  -- electrolyte abnormalities, hyponatremia, hypokalemia, hypochloremia  -- abdominal pain    Interventions ordered/provided:  -- CBC, BMP  -- 2 L LR   -- CT abdomen with contrast  -- telemetry given electrolyte abnormalities  -- repeat LA and a VBG at 2300  -- 2 g IV calcium gluconate  -- transfer to [facility]    At the conclusion of this RRT patient will be transferred to [facility]    0100 ADDENDUM - repeat 2330 LA shows some improvement however BMP shows worsening sodium despite LR and NS infusions. Repeat LA and BMP ordered for 2 hours from now.    Patient has persistent tachycardia and is also hypertensive. I have  ordered a small dose of metoprolol.    Interval History     Ms. Pat Delgado is a 29 year old female who was admitted on 2/9/2020 for abdominal pain.    Her history is significant for:  Past Medical History:   Diagnosis Date     ADHD (attention deficit hyperactivity disorder)      Alcohol dependence (H) 1/20/2015     Anxiety      Chemical dependency (H) 1/22/2015     Depressive disorder, not elsewhere classified 8/20/2015    Per 1/22/15 visit with Batool Gonzalez NP      Hypertension goal BP (blood pressure) < 150/90 11/10/2015     Migraine headache      Substance abuse (H)      Tobacco abuse      Past Surgical History:   Procedure Laterality Date     HC TOOTH EXTRACTION W/FORCEP         Allergies   Allergies   Allergen Reactions     Penicillins Unknown     Hives         Physical Exam   Physical Exam  Vitals signs and nursing note reviewed.   Constitutional:       Appearance: Normal appearance. She is obese.   HENT:      Head: Normocephalic and atraumatic.      Mouth/Throat:      Mouth: Mucous membranes are dry.   Eyes:      Pupils: Pupils are equal, round, and reactive to light.   Neck:      Musculoskeletal: Normal range of motion and neck supple.   Cardiovascular:      Rate and Rhythm: Normal rate and regular rhythm.      Pulses: Normal pulses.   Pulmonary:      Effort: Pulmonary effort is normal.      Breath sounds: Normal breath sounds.   Abdominal:      General: There is distension.      Tenderness: There is abdominal tenderness.   Musculoskeletal: Normal range of motion.   Skin:     General: Skin is warm and dry.      Capillary Refill: Capillary refill takes 2 to 3 seconds.   Neurological:      General: No focal deficit present.      Mental Status: She is alert and oriented to person, place, and time.   Psychiatric:         Mood and Affect: Mood normal.         Behavior: Behavior normal.         Vital Signs with Ranges:  Temp:  [98.5  F (36.9  C)-99.1  F (37.3  C)] 98.6  F (37  C)  Pulse:  [126-135]  130  Resp:  [16-18] 16  BP: (127-141)/(65-88) 141/88  SpO2:  [98 %-100 %] 98 %  No intake/output data recorded.    Data     EKG:  none    ABG:  -No lab results found in last 7 days.    Troponin:    No lab results found.    IMAGING: (X-ray/CT/MRI)   Recent Results (from the past 24 hour(s))   Abdomen US, limited (RUQ only)    Narrative    LIMITED ABDOMINAL ULTRASOUND  2/9/2020 5:42 PM     HISTORY:  Severe RUQ pain.    COMPARISON: None.    FINDINGS: Study is limited due to patient body habitus, bowel gas, and  hard distended abdomen.   Gallbladder: Normal with no cholelithiasis, wall thickening or focal  tenderness.      Bile ducts:  Common bile duct is not seen and cannot be evaluated. No  definite intrahepatic biliary ductal dilatation is seen.    Liver: Liver is hyperechoic and very difficult to penetrate most  consistent with diffuse fatty infiltration. Liver is enlarged  measuring 21 cm in length. No obvious focal intrahepatic lesion or  biliary ductal dilatation is seen but could be obscured by the  difficulty in penetration.    Pancreas: Completely obscured by bowel gas and patient body habitus.  Pancreas could not be evaluated.    Right kidney:  Normal.       Impression    IMPRESSION:  1. Steatosis and hepatomegaly.   2. Limited study due to patient body habitus, bowel gas, and distended  abdomen.  3. Pancreas is completely obscured and could not be evaluated. Bile  ducts are not seen due to difficulty in penetrating the liver.  4. No evidence for cholelithiasis or acute cholecystitis.   XR Chest Port 1 View    Narrative    EXAM: XR CHEST PORT 1 VW  LOCATION: [facility]  DATE/TIME: 2/9/2020 7:49 PM    INDICATION: SIRS  COMPARISON: None.      Impression    IMPRESSION: Heart size and pulmonary vascularity normal. The lungs are clear. Old right seventh rib fracture.       CBC with Diff:  Recent Labs   Lab Test 02/09/20  1547   WBC 24.3*   HGB 10.4*   MCV 97      INR 1.86*      No results  found for: RETICABSCT  No results found for: RETP    Lactic Acid:    Lactic Acid   Date Value Ref Range Status   02/09/2020 6.8 (HH) 0.7 - 2.0 mmol/L Final     Comment:     Critical Value called to and read back by  FROILAN MIRANDA IN 66 AT 2035 AN     02/09/2020 7.5 (HH) 0.7 - 2.0 mmol/L Final     Comment:     Critical Value called to and read back by  HOLGER MAJOR IN ER AT 1557 AN       No results found for: LACTS     Comprehensive Metabolic Panel:  Recent Labs   Lab 02/09/20  1547   *   POTASSIUM 3.3*   CHLORIDE 84*   CO2 22   ANIONGAP 14   *   BUN 1*   CR 0.35*   GFRESTIMATED >90   GFRESTBLACK >90   EMILY 6.8*   MAG 1.8   PHOS 1.2*   PROTTOTAL 6.7*   ALBUMIN 1.6*   BILITOTAL 8.1*   ALKPHOS 357*   *   ALT 33       INR:    Recent Labs   Lab Test 02/09/20  1547   INR 1.86*       D-DIMER:  No components found for: DDIMER    BNP:  No results found for: BNP    UA:  Recent Labs   Lab 02/09/20  1755   COLOR Dark Brown   APPEARANCE Cloudy   URINEGLC 30*   URINEBILI Large*   URINEKETONE 5*   SG 1.033   UBLD Moderate*   URINEPH 6.0   PROTEIN 100*   NITRITE Negative   LEUKEST Small*   RBCU 4*   WBCU 24*       Time Spent on this Encounter   I spent 60 minutes of critical care time on the unit/floor managing the care of Pat Delgado. Upon evaluation, this patient had a high probability of imminent or life-threatening deterioration due to lactic acidosis, which required my direct attention, intervention, and personal management. 100% of my time was spent at the bedside counseling the patient and/or coordinating care regarding services listed in this note.    Unruly Whitlock, APRN, CNP  Hospitalist - [facility]  Text Page  [phone])

## 2020-02-10 NOTE — CONSULTS
"2/10/20 chem dep consult completed.      Met with patient, introduced self and role, and she was agreeable to interview.  She reported that she completed [facility] program and sober living about 1-1.5 years ago.  She reported she still has resources and connections through this program and is open to returning to outpatient programming there.  She reported she did have \"some\" sobriety following her completion of that program and when asked for specific she reported she did just relapse 3 months ago following quitting her job.  I inquired with her about the need for inpatient programming following the hospital stay due to any risk for return to drinking, she reported that her boyfriend is in recovery and she also has supportive parents.  I offered to complete a full evaluation in order to make referral to [facility] for programming, but patient reported she has connections there and can follow up. I recommended patient connect with [facility] prior to discharge from hospital to arrange outpatient services so she has a plan in place.  I left patient my business card and informed her she could contact me int he future for any resources regarding substance use services.      Following meeting with patient I discussed case with , Lindsey THOMAS  I suggested prior to patient's discharge it may be helpful to secure an outpatient appointment for her at [facility] if that remains her program of choice. Per psychiatry consult, they may support a commitment; however, did not state they would file one at this time and it is unclear if they are being re-consulted.     Dariana Francisco, [facility] [place]  [phone]  "

## 2020-02-10 NOTE — PROVIDER NOTIFICATION
MD Notification    Notified Person: MD    Notified Person Name:     Notification Date/Time: 2-, 1551    Notification Interaction:    Purpose of Notification: Lactic acid 2.4    Orders Received:    Comments:

## 2020-02-10 NOTE — PHARMACY-ADMISSION MEDICATION HISTORY
Pharmacy Medication History  Admission medication history interview status for the 2/9/2020  admission is complete. See EPIC admission navigator for prior to admission medications     Medication history sources: Patient  Medication history source reliability: Good  Adherence assessment: Good    Significant changes made to the medication list:  Medications removed:  - Escitalopram  - Hydroxyzine    Medications added:  - Adderall IR  - Naproxen  - Tums  - Famotidine  - Magnesium oxide      Additional medication history information:   Patient confirmed taking Adderall XR 30 mg in the morning and Adderall IR 20 mg in the evening.    Medication reconciliation completed by provider prior to medication history? No    Time spent in this activity: 20 minutes      Prior to Admission medications    Medication Sig Last Dose Taking? Auth Provider   amphetamine-dextroamphetamine (ADDERALL XR) 30 MG 24 hr capsule Take 30 mg by mouth every morning 2/8/2020 at Unknown time Yes Unknown, Entered By History   amphetamine-dextroamphetamine (ADDERALL) 20 MG tablet Take 20 mg by mouth every evening 2/7/2020 Yes Unknown, Entered By History   calcium carbonate (TUMS) 500 MG chewable tablet Take 1 chew tab by mouth 2 times daily as needed  at PRN Yes Unknown, Entered By History   famotidine (PEPCID) 20 MG tablet Take 20 mg by mouth 2 times daily as needed  at PRN Yes Unknown, Entered By History   magnesium oxide 200 MG TABS Take 200 mg by mouth daily as needed  at PRN Yes Unknown, Entered By History   naproxen sodium (ANAPROX) 220 MG tablet Take 220 mg by mouth daily as needed for moderate pain  at PRN Yes Unknown, Entered By History

## 2020-02-10 NOTE — PROGRESS NOTES
[facility]    Medicine Progress Note - Hospitalist Service       Date of Admission:  2/9/2020  Assessment & Plan      Lactic acidosis (7.5)  Hyponatremia, probably chronic (120)  Hypokalemia(3.3), hypophosphatemia(1.2)  The lactic acid level is little high for just type B lactic acidosis from her liver disease.  Clinically she does not appear to be septic  Could be multifactorial: hepatitis and hypovolumia, but agree with continuing to treat sepsis    q6 hours sodiums    Replace and monitor potassium, phosphate and magnesium levels    q6 hour lactic acids    Continue potential sepsis treatment with cefepime     Alcoholic hepatitis with hyperbilirubinemia(TB 8.1), hypoalbuminemia(1.6), coagulopathy(INR 1.9)  Right upper quadrant ultrasound showed hepatomegaly and steatosis.   CT scan did confirm a small amount of ascites as well as extensive hepatomegaly  SBP considered a possibility, but given she has > 24 hours of antibiotic, INR is > 2 will defer need for paracentesis to GI    Monitor    Consult GI    Oral vitamin K x 1     Possible sepsis, no clear source   No clear source of infection and clinically she does not appear septic.  However, she has leukocytosis, elevated CRP, procalcitonin and lactic acid. Chest X-ray is negative. Urinanalysis is mildly abnormal.    Cultures were sent in the emergency department and she received cefepime  Possibly SBP as above, but will defer need for paracentesis to GI given the rationale above    Continue intravenous cefepime    Await culture results     Anemia, normocytic(10.4g)  Possibly from alcohol toxicity but MCV is normal and platelets are normal  She does not report any bleeding or black stools.    Check iron studies, B12, folate and occult blood    Monitor hemoglobin     Alcohol intoxication(0.14) and dependence   ADHD and major depressive disorder   As mentioned in the HPI, she has been drinking 750 mL of vodka over the past 3 months.  Her last period of  sobriety was about 2 or 2-1/2 years ago after inpatient alcohol treatment.  She was sober for 6 to 8 months.  She has been through treatment several times.  She was already in mild withdrawal in the emergency room and received lorazepam.  She denies a history of DTs or alcohol withdrawal seizure.    [facility] protocol ordered    Psychiatry and chemical dependency consultation    Diet: Advance Diet as Tolerated: Clear Liquid Diet    DVT Prophylaxis: Pneumatic Compression Devices  Amado Catheter: not present  Code Status: Full Code      Disposition Plan   Expected discharge: 2 - 3 days, recommended to prior living arrangement once SIRS/Sepsis treated.  Entered: Emory Mcdaniel DO 02/10/2020, 8:24 AM       The patient's care was discussed with the Patient.    Emory Mcdaniel DO  Hospitalist Service  [facility]    ______________________________________________________________________    Interval History   Feels distended. No fever or chills. Interested in stopping alcohol use. Ativan x 1    Data reviewed today: I reviewed all medications, new labs and imaging results over the last 24 hours. I personally reviewed no images or EKG's today.    Physical Exam   Vital Signs: Temp: 99.3  F (37.4  C) Temp src: Oral BP: 134/72 Pulse: 126 Heart Rate: 128 Resp: 18 SpO2: 97 % O2 Device: None (Room air)    Weight: 273 lbs 3.2 oz  Constitutional: awake, alert, cooperative, no apparent distress  Eyes: extra ocular muscles intact, sclera icteric  Respiratory: No increased work of breathing, good air exchange, clear to auscultation bilaterally, no crackles or wheezing  Cardiovascular: tachycardic, normal S1 and S2, no S3 or S4, and no murmur noted  GI: soft, distended, non tender, markedly decrease bowel sounds, no mass or hernia.  hepatomegaly noted  Skin: no bruising or bleeding, no rashes and jaundice noted  Neurologic: Awake, alert, oriented to name, place and time.  Cranial nerves II-XII are grossly intact.   Motor is 5 out of 5 bilaterally. No tremor.  Neuropsychiatric: General: normal, calm and normal eye contact  Level of consciousness: alert / normal  Affect: normal    Data   Recent Labs   Lab 02/10/20  0545 02/09/20  2334 02/09/20  2131 02/09/20  1547   WBC 19.0*  --  19.9* 24.3*   HGB 9.5*  --  9.8* 10.4*   MCV 98  --  98 97     --  279 304   INR  --  1.99*  --  1.86*   * 118* 119* 120*   POTASSIUM 3.7 3.4 3.7 3.3*   CHLORIDE 88* 85* 86* 84*   CO2 23 21 18* 22   BUN 2* 2* 2* 1*   CR 0.34* 0.38* 0.32* 0.35*   ANIONGAP 9 12 15* 14   EMILY 6.7* 6.2* 6.3* 6.8*   * 108* 123* 118*   ALBUMIN 1.5*  --   --  1.6*   PROTTOTAL 6.5*  --   --  6.7*   BILITOTAL 9.4*  --   --  8.1*   ALKPHOS 325*  --   --  357*   ALT 32  --   --  33   *  --   --  203*   LIPASE  --   --   --  119     Recent Results (from the past 24 hour(s))   Abdomen US, limited (RUQ only)    Narrative    LIMITED ABDOMINAL ULTRASOUND  2/9/2020 5:42 PM     HISTORY:  Severe RUQ pain.    COMPARISON: None.    FINDINGS: Study is limited due to patient body habitus, bowel gas, and  hard distended abdomen.   Gallbladder: Normal with no cholelithiasis, wall thickening or focal  tenderness.      Bile ducts:  Common bile duct is not seen and cannot be evaluated. No  definite intrahepatic biliary ductal dilatation is seen.    Liver: Liver is hyperechoic and very difficult to penetrate most  consistent with diffuse fatty infiltration. Liver is enlarged  measuring 21 cm in length. No obvious focal intrahepatic lesion or  biliary ductal dilatation is seen but could be obscured by the  difficulty in penetration.    Pancreas: Completely obscured by bowel gas and patient body habitus.  Pancreas could not be evaluated.    Right kidney:  Normal.       Impression    IMPRESSION:  1. Steatosis and hepatomegaly.   2. Limited study due to patient body habitus, bowel gas, and distended  abdomen.  3. Pancreas is completely obscured and could not be evaluated.  Bile  ducts are not seen due to difficulty in penetrating the liver.  4. No evidence for cholelithiasis or acute cholecystitis.    GERALDO GARZON MD   XR Chest Port 1 View    Narrative    EXAM: XR CHEST PORT 1 VW  LOCATION: [facility]  DATE/TIME: 2/9/2020 7:49 PM    INDICATION: SIRS  COMPARISON: None.      Impression    IMPRESSION: Heart size and pulmonary vascularity normal. The lungs are clear. Old right seventh rib fracture.   CT Abdomen Pelvis w Contrast    Narrative    EXAM: CT ABDOMEN PELVIS W CONTRAST  LOCATION: [facility]  DATE/TIME: 2/9/2020 10:55 PM    INDICATION: Abd distension. Generalized abdominal pain.  COMPARISON: None.  TECHNIQUE: CT scan of the abdomen and pelvis was performed following injection of IV contrast. Multiplanar reformats were obtained. Dose reduction techniques were used.  CONTRAST: 135mL [id number]    FINDINGS:   LOWER CHEST: Normal.    HEPATOBILIARY: Marked hepatomegaly measuring 30 cm in length with diffuse fatty infiltration. Small amount of ascites along the margin of the liver.    PANCREAS: Normal.    SPLEEN: Normal.    ADRENAL GLANDS: Normal.    KIDNEYS/BLADDER: Normal.    BOWEL: Wall thickening of the cecum and proximal ascending colon. The rest of the colon is unremarkable. No evidence for obstruction.    LYMPH NODES: Normal.    VASCULATURE: Unremarkable.    PELVIC ORGANS: Mild ascites.    MUSCULOSKELETAL: Edematous changes in the subcutaneous tissues.      Impression    IMPRESSION:   1.  Marked hepatomegaly and hepatic steatosis.  2.  Small amount of ascites along the liver margin and in the pelvis.  3.  Wall thickening of the cecum and proximal ascending colon possibly related to incomplete distention. Colitis could have a similar appearance. No evidence for obstruction.  4.  Edema subcutaneous tissues.       Medications     0.9% sodium chloride + KCl 20 mEq/L 100 mL/hr at 02/10/20 0030       ceFEPIme (MAXIPIME) IV  2 g Intravenous Q8H      gabapentin  300 mg Oral TID     influenza vaccine adult (product based on age)  0.5 mL Intramuscular Prior to discharge     insulin aspart  1-7 Units Subcutaneous TID AC     insulin aspart  1-5 Units Subcutaneous At Bedtime     phytonadione  5 mg Oral Once     senna-docusate  1 tablet Oral BID    Or     senna-docusate  2 tablet Oral BID     sodium chloride (PF)  3 mL Intracatheter Q8H     IV Fluid with vitamins   Intravenous Q24H

## 2020-02-10 NOTE — PROGRESS NOTES
Critical phos called from lab of 0.9. Pt is already on protocol, dose requested from pharmacy. No MD notification made at this time.

## 2020-02-10 NOTE — PROGRESS NOTES
RECEIVING UNIT ED HANDOFF REVIEW    ED Nurse Handoff Report was reviewed by: Meka Russo RN on February 9, 2020 at 6:39 PM

## 2020-02-10 NOTE — CONSULTS
"Consult Date:  02/10/2020      PSYCHIATRY CONSULTATION      REQUESTING PHYSICIAN:  Dr. Watts      REASON FOR CONSULTATION:  Alcohol abuse, ADHD.      IDENTIFYING DATA:  The patient is a 29-year-old single  female with a known alcohol use disorder, past history of depression/ADHD, admitted with lower extremity leg swelling and signs of significant alcoholic liver disease.  She came in with complaints of abdominal pain and distention over several days.  She has been drinking about 750 mL of vodka on a daily basis for the last several months and lives with her boyfriend.  She had some inpatient treatment almost 3 years ago after completing detoxification at [facility], but has been unable to stay sober.      CHIEF COMPLAINT:  \"I was having pain and my stomach started to swell.\"      HISTORY OF PRESENT ILLNESS:  The patient is a 29-year-old  female who came to the emergency room with the above history.  She is convalescing on the cardiac intensive care unit.  She has markedly abnormal labs.  Her blood alcohol level on admission was 0.14 with a CRP of 58.  Her alkaline phosphatase was 357 and  with a bilirubin of 8.1 and a low sodium of 120.  She is not currently in any distress, but she looks a little bit short of breath.  She was sitting comfortably on her bed, eating a popsicle when I came into the room.  She indicates she has had long-term treatment using Adderall for ADHD and sees Dr. Elif Baca through [facility].  She has never been hospitalized for psychiatric reasons, and I do not believe she has had medical hospitalizations because of her drinking.  She has had a couple of DWIs.  She has been on Celexa and Lexapro in the past but has not been taking her antidepressant recently.  Lexapro was listed as a prior to admission medication along with Adderall-XR 60 mg daily.      On further questioning, she does not allude to convincing hypomania, kasia, psychosis, " panic disorder, obsessive-compulsive disorder, eating disorder history or trauma history.  She does mention having some issues with anxiety and mild depression.  She used to take some hydroxyzine, but has not been taking that recently.  She is not currently in any distress, not endorsing neurovegetative symptoms of depression or any safety concerns.  She has some willingness to talk with chemical dependency, currently has insurance, but is unemployed.      PAST PSYCHIATRIC HISTORY:  As above.      PAST CHEMICAL DEPENDENCY HISTORY:  Notable for severe alcohol dependence.  One prior treatment with detox at [facility] followed by a stay at [facility].  She has been drinking for the last 2-1/2 years on and off with heavy use in the last 3 months.      PAST MEDICAL HISTORY:  Per chart review includes alcoholic liver disease with significant hepatitis, migraine headaches, hypertension.      MEDICATIONS:  Listed medications in the hospital:   1.  Calcium gluconate.   2.  Maxipime   3.  Gabapentin 300 mg t.i.d.   4.  Sliding scale insulin.   5.  Isovue.   6.  Lactated Ringer's.   7.  Ativan p.r.n.    8.  Mephyton/vitamin K.   9.  Klor-Con.   10.  Magnesium supplements.    11.  Ativan p.r.n. per our [facility] protocol.      FAMILY HISTORY:  It sounds like there is some alcoholism and anxiety in her family.      SOCIAL HISTORY:  The patient grew [place] locally in the [place] and lives in [place] with her boyfriend.  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  She has never been , does not have any children.  Reports having 2 DWIs.  She denies trauma history or  history.      REVIEW OF SYSTEMS:  A 10-point review of systems is unchanged from Dr. Watts's initial H and P 02/09/2019 at 8:40 p.m.      MOST RECENT VITAL SIGNS:  Temperature 99.3, pulse 126, respiratory rate 18, blood pressure 134/72, oxygen saturation 97%.      MENTAL STATUS EXAMINATION:  Appearance:  The patient is a pleasant woman.   "She is sitting in her bed.  Her abdomen is distended and she looks a little bit jaundiced, slightly short of breath.  She seems to be a reasonable historian and is cooperative.  Speech is soft and winded.  Use of language appropriate.  Motor exam:  Calm.  Coordination, station and gait within normal limits.  Muscle strength and tone adequate.  Affect is pleasant.  Mood \"okay.\"  Thought process logical, coherent and goal directed.  No loosening of associations, no flight of ideas.  No formal thought disorder on exam.  Thought content negative for hallucinations, delusions, paranoia, suicidal or homicidal ideation.  Insight and judgment poor with respect to chemical use.  Cognitive exam:  The patient is alert and oriented x 3.  Concentration intact.  Recent and remote memory intact.  General fund of knowledge average.      IMPRESSION:  The patient is a 29-year-old woman presenting with a severe alcohol use disorder and resultant medical complications, including evidence of alcoholic liver disease and hyponatremia.  By history, she has attention deficit hyperactivity disorder and anxiety.  Given the grave nature of her situation, a chemical dependency assessment is warranted.  She says she would agree to this.  I would hold off on giving her stimulants at this point or antidepressants, given the fact she has not been taking an SSRI and she has some hyponatremia.  Issues regarding psychiatric medication management can be looked at once she establishes sobriety and an appropriate chemical dependency referral is made.  Currently, she is cooperative with assessment and understands my strong recommendation that she consider chemical dependency treatment.  Should she demand to leave in the immediate future, I think there may be grounds to consider a 72-hour hold and a commitment process, but hopefully, we can avoid that.      DIAGNOSES:   1.  Alcohol use disorder, severe.   2.  Attention deficit hyperactivity disorder by " history.   3.  Unspecified depressive disorder.   4.  Alcoholic liver disease with evidence of ascites and severe hepatitis.   5.  Hyponatremia.   6.  Coagulopathy secondary to liver impairment.      PLAN:   1.  Continue present medical management.   2.  Offer chemical dependency assessment.   3.  Would hold off on psychotropic medicines at this point.  The relative benefit of giving her a stimulant in this situation is questionable given her chemical use patterns and current liver impairment.   4.  Chemical dependency assessment has been ordered, and she says she would agree to cooperate with their recommendations.  If she demands to leave impulsively, I think there may be grounds to consider a 72-hour hold and a commitment process, given her young age and very grave medical problems.  Currently, she is agreeing to cooperate with assessment.         AVIS DOHERTY MD             D: 02/10/2020   T: 02/10/2020   MT: EDA      Name:     MARCUS LUEVANO   MRN:      [id number]        Account:       [id number]   :      1990           Consult Date:  02/10/2020      Document: [id number]

## 2020-02-10 NOTE — PLAN OF CARE
VSS. Monitor remains Sinus tachycardia. Pt. Complains of abdominal distention and bloating. Lactic acid 2.4. Pt. Has been lethargic, sleeping most of shift. Continue to monitor. Mother at bedside.

## 2020-02-10 NOTE — PLAN OF CARE
Neuro: Alert and oriented x 4, yellow sclera,   Recent vital signs: tachycardia, diaphoretic, hypertensive at time, dizzy with movement  Respiratory: room air, denies SOB   Pain: abdomen  Tele: ST  Activity:Assist  of 2 with belt, complains of leg weakness,   Drips/Drains/IVF: NS with KCL 20 mEq at 100 ml/hr; infuvite  Skin:scattered red rash on the back, trace edema   GI/:Clear liquid diet, voided 275 ml/hr tea/ dark maria isabel color, hypoactive bowel sounds, distended abdomen  Aggression color:green  Plan:Test/Consult: GI consult  Labs:  Misc: IV on  each arm   Events this shift:  Metoprolol given for HR > 130, bladder scan 300. CIWA score 8 and 4, ativan po given x1.  and 116

## 2020-02-11 ENCOUNTER — APPOINTMENT (OUTPATIENT)
Dept: GENERAL RADIOLOGY | Facility: CLINIC | Age: 30
DRG: 871 | End: 2020-02-11
Attending: INTERNAL MEDICINE
Payer: COMMERCIAL

## 2020-02-11 ENCOUNTER — APPOINTMENT (OUTPATIENT)
Dept: GENERAL RADIOLOGY | Facility: CLINIC | Age: 30
DRG: 871 | End: 2020-02-11
Attending: HOSPITALIST
Payer: COMMERCIAL

## 2020-02-11 LAB
ALBUMIN SERPL-MCNC: 1.5 G/DL (ref 3.4–5)
ALP SERPL-CCNC: 296 U/L (ref 40–150)
ALT SERPL W P-5'-P-CCNC: 33 U/L (ref 0–50)
AMMONIA PLAS-SCNC: <10 UMOL/L (ref 10–50)
ANION GAP SERPL CALCULATED.3IONS-SCNC: 7 MMOL/L (ref 3–14)
ANION GAP SERPL CALCULATED.3IONS-SCNC: 8 MMOL/L (ref 3–14)
APAP SERPL-MCNC: <5 UG/ML (ref 10–30)
AST SERPL W P-5'-P-CCNC: 172 U/L (ref 0–45)
BASE DEFICIT BLDV-SCNC: 3.2 MMOL/L
BASE DEFICIT BLDV-SCNC: 3.5 MMOL/L
BILIRUB SERPL-MCNC: 12.1 MG/DL (ref 0.2–1.3)
BUN SERPL-MCNC: 4 MG/DL (ref 7–30)
BUN SERPL-MCNC: 5 MG/DL (ref 7–30)
CALCIUM SERPL-MCNC: 6.8 MG/DL (ref 8.5–10.1)
CALCIUM SERPL-MCNC: 6.9 MG/DL (ref 8.5–10.1)
CHLORIDE SERPL-SCNC: 94 MMOL/L (ref 94–109)
CHLORIDE SERPL-SCNC: 94 MMOL/L (ref 94–109)
CO2 SERPL-SCNC: 21 MMOL/L (ref 20–32)
CO2 SERPL-SCNC: 22 MMOL/L (ref 20–32)
CREAT SERPL-MCNC: 0.48 MG/DL (ref 0.52–1.04)
CREAT SERPL-MCNC: 0.56 MG/DL (ref 0.52–1.04)
ERYTHROCYTE [DISTWIDTH] IN BLOOD BY AUTOMATED COUNT: 20.2 % (ref 10–15)
GFR SERPL CREATININE-BSD FRML MDRD: >90 ML/MIN/{1.73_M2}
GFR SERPL CREATININE-BSD FRML MDRD: >90 ML/MIN/{1.73_M2}
GLUCOSE BLDC GLUCOMTR-MCNC: 117 MG/DL (ref 70–99)
GLUCOSE BLDC GLUCOMTR-MCNC: 120 MG/DL (ref 70–99)
GLUCOSE BLDC GLUCOMTR-MCNC: 129 MG/DL (ref 70–99)
GLUCOSE BLDC GLUCOMTR-MCNC: 135 MG/DL (ref 70–99)
GLUCOSE BLDC GLUCOMTR-MCNC: 148 MG/DL (ref 70–99)
GLUCOSE SERPL-MCNC: 133 MG/DL (ref 70–99)
GLUCOSE SERPL-MCNC: 141 MG/DL (ref 70–99)
HCO3 BLDV-SCNC: 21 MMOL/L (ref 21–28)
HCO3 BLDV-SCNC: 22 MMOL/L (ref 21–28)
HCT VFR BLD AUTO: 28.2 % (ref 35–47)
HEMOCCULT STL QL: NEGATIVE
HGB BLD-MCNC: 9.5 G/DL (ref 11.7–15.7)
INR PPP: 1.99 (ref 0.86–1.14)
LACTATE BLD-SCNC: 1.9 MMOL/L (ref 0.7–2)
LACTATE BLD-SCNC: 2.5 MMOL/L (ref 0.7–2)
MCH RBC QN AUTO: 33.7 PG (ref 26.5–33)
MCHC RBC AUTO-ENTMCNC: 33.7 G/DL (ref 31.5–36.5)
MCV RBC AUTO: 100 FL (ref 78–100)
NT-PROBNP SERPL-MCNC: 142 PG/ML (ref 0–450)
O2/TOTAL GAS SETTING VFR VENT: ABNORMAL %
OXYHGB MFR BLDV: 69 %
OXYHGB MFR BLDV: 84 %
PCO2 BLDV: 35 MM HG (ref 40–50)
PCO2 BLDV: 36 MM HG (ref 40–50)
PH BLDV: 7.38 PH (ref 7.32–7.43)
PH BLDV: 7.39 PH (ref 7.32–7.43)
PHOSPHATE SERPL-MCNC: 1.4 MG/DL (ref 2.5–4.5)
PHOSPHATE SERPL-MCNC: 1.9 MG/DL (ref 2.5–4.5)
PHOSPHATE SERPL-MCNC: 2.3 MG/DL (ref 2.5–4.5)
PLATELET # BLD AUTO: 310 10E9/L (ref 150–450)
PO2 BLDV: 41 MM HG (ref 25–47)
PO2 BLDV: 54 MM HG (ref 25–47)
POTASSIUM SERPL-SCNC: 4 MMOL/L (ref 3.4–5.3)
POTASSIUM SERPL-SCNC: 4.3 MMOL/L (ref 3.4–5.3)
PROT SERPL-MCNC: 6.3 G/DL (ref 6.8–8.8)
RBC # BLD AUTO: 2.82 10E12/L (ref 3.8–5.2)
SODIUM SERPL-SCNC: 121 MMOL/L (ref 133–144)
SODIUM SERPL-SCNC: 122 MMOL/L (ref 133–144)
SODIUM SERPL-SCNC: 123 MMOL/L (ref 133–144)
WBC # BLD AUTO: 19.7 10E9/L (ref 4–11)

## 2020-02-11 PROCEDURE — 85610 PROTHROMBIN TIME: CPT | Performed by: HOSPITALIST

## 2020-02-11 PROCEDURE — 99207 ZZC NO BILLABLE SERVICE THIS VISIT: CPT | Performed by: INTERNAL MEDICINE

## 2020-02-11 PROCEDURE — 85027 COMPLETE CBC AUTOMATED: CPT | Performed by: HOSPITALIST

## 2020-02-11 PROCEDURE — 82805 BLOOD GASES W/O2 SATURATION: CPT | Performed by: INTERNAL MEDICINE

## 2020-02-11 PROCEDURE — 82272 OCCULT BLD FECES 1-3 TESTS: CPT | Performed by: HOSPITALIST

## 2020-02-11 PROCEDURE — 84100 ASSAY OF PHOSPHORUS: CPT | Performed by: HOSPITALIST

## 2020-02-11 PROCEDURE — 84295 ASSAY OF SERUM SODIUM: CPT | Performed by: HOSPITALIST

## 2020-02-11 PROCEDURE — 83880 ASSAY OF NATRIURETIC PEPTIDE: CPT | Performed by: HOSPITALIST

## 2020-02-11 PROCEDURE — 99231 SBSQ HOSP IP/OBS SF/LOW 25: CPT | Performed by: PSYCHIATRY & NEUROLOGY

## 2020-02-11 PROCEDURE — 36415 COLL VENOUS BLD VENIPUNCTURE: CPT | Performed by: INTERNAL MEDICINE

## 2020-02-11 PROCEDURE — 00000146 ZZHCL STATISTIC GLUCOSE BY METER IP

## 2020-02-11 PROCEDURE — 25000125 ZZHC RX 250: Performed by: HOSPITALIST

## 2020-02-11 PROCEDURE — 25000128 H RX IP 250 OP 636: Performed by: HOSPITALIST

## 2020-02-11 PROCEDURE — 25800030 ZZH RX IP 258 OP 636: Performed by: HOSPITALIST

## 2020-02-11 PROCEDURE — 36415 COLL VENOUS BLD VENIPUNCTURE: CPT | Performed by: HOSPITALIST

## 2020-02-11 PROCEDURE — 40000275 ZZH STATISTIC RCP TIME EA 10 MIN

## 2020-02-11 PROCEDURE — 80048 BASIC METABOLIC PNL TOTAL CA: CPT | Performed by: HOSPITALIST

## 2020-02-11 PROCEDURE — 80053 COMPREHEN METABOLIC PANEL: CPT | Performed by: HOSPITALIST

## 2020-02-11 PROCEDURE — P9047 ALBUMIN (HUMAN), 25%, 50ML: HCPCS | Performed by: HOSPITALIST

## 2020-02-11 PROCEDURE — 83605 ASSAY OF LACTIC ACID: CPT | Performed by: HOSPITALIST

## 2020-02-11 PROCEDURE — 21000000 ZZH R&B IMCU HEART CARE

## 2020-02-11 PROCEDURE — 82805 BLOOD GASES W/O2 SATURATION: CPT | Performed by: HOSPITALIST

## 2020-02-11 PROCEDURE — 99233 SBSQ HOSP IP/OBS HIGH 50: CPT | Performed by: HOSPITALIST

## 2020-02-11 PROCEDURE — 71045 X-RAY EXAM CHEST 1 VIEW: CPT | Mod: 77

## 2020-02-11 PROCEDURE — 82140 ASSAY OF AMMONIA: CPT | Performed by: HOSPITALIST

## 2020-02-11 PROCEDURE — 83605 ASSAY OF LACTIC ACID: CPT | Performed by: INTERNAL MEDICINE

## 2020-02-11 PROCEDURE — 71045 X-RAY EXAM CHEST 1 VIEW: CPT

## 2020-02-11 PROCEDURE — 25000132 ZZH RX MED GY IP 250 OP 250 PS 637: Performed by: PHYSICIAN ASSISTANT

## 2020-02-11 PROCEDURE — 94640 AIRWAY INHALATION TREATMENT: CPT | Mod: 76

## 2020-02-11 PROCEDURE — 94640 AIRWAY INHALATION TREATMENT: CPT

## 2020-02-11 RX ORDER — IPRATROPIUM BROMIDE AND ALBUTEROL SULFATE 2.5; .5 MG/3ML; MG/3ML
3 SOLUTION RESPIRATORY (INHALATION)
Status: DISCONTINUED | OUTPATIENT
Start: 2020-02-11 | End: 2020-02-12

## 2020-02-11 RX ORDER — IPRATROPIUM BROMIDE AND ALBUTEROL SULFATE 2.5; .5 MG/3ML; MG/3ML
3 SOLUTION RESPIRATORY (INHALATION)
Status: DISCONTINUED | OUTPATIENT
Start: 2020-02-11 | End: 2020-02-11

## 2020-02-11 RX ORDER — IPRATROPIUM BROMIDE AND ALBUTEROL SULFATE 2.5; .5 MG/3ML; MG/3ML
SOLUTION RESPIRATORY (INHALATION)
Status: DISCONTINUED
Start: 2020-02-11 | End: 2020-02-12 | Stop reason: HOSPADM

## 2020-02-11 RX ORDER — POTASSIUM CL/LIDO/0.9 % NACL 10MEQ/0.1L
10 INTRAVENOUS SOLUTION, PIGGYBACK (ML) INTRAVENOUS
Status: DISCONTINUED | OUTPATIENT
Start: 2020-02-11 | End: 2020-02-21

## 2020-02-11 RX ORDER — POTASSIUM CHLORIDE 7.45 MG/ML
10 INJECTION INTRAVENOUS
Status: DISCONTINUED | OUTPATIENT
Start: 2020-02-11 | End: 2020-02-21

## 2020-02-11 RX ORDER — BUMETANIDE 0.25 MG/ML
1 INJECTION INTRAMUSCULAR; INTRAVENOUS EVERY 12 HOURS
Status: DISCONTINUED | OUTPATIENT
Start: 2020-02-11 | End: 2020-02-11

## 2020-02-11 RX ORDER — POTASSIUM CHLORIDE 29.8 MG/ML
20 INJECTION INTRAVENOUS
Status: DISCONTINUED | OUTPATIENT
Start: 2020-02-11 | End: 2020-02-21

## 2020-02-11 RX ORDER — POTASSIUM CHLORIDE 1500 MG/1
20-40 TABLET, EXTENDED RELEASE ORAL
Status: DISCONTINUED | OUTPATIENT
Start: 2020-02-11 | End: 2020-02-21

## 2020-02-11 RX ORDER — POTASSIUM CHLORIDE 1.5 G/1.58G
20-40 POWDER, FOR SOLUTION ORAL
Status: DISCONTINUED | OUTPATIENT
Start: 2020-02-11 | End: 2020-02-21

## 2020-02-11 RX ORDER — THIAMINE HYDROCHLORIDE 100 MG/ML
100 INJECTION, SOLUTION INTRAMUSCULAR; INTRAVENOUS DAILY
Status: DISCONTINUED | OUTPATIENT
Start: 2020-02-11 | End: 2020-03-02

## 2020-02-11 RX ORDER — POLYETHYLENE GLYCOL 3350 17 G/17G
17 POWDER, FOR SOLUTION ORAL DAILY
Status: DISCONTINUED | OUTPATIENT
Start: 2020-02-11 | End: 2020-02-18

## 2020-02-11 RX ORDER — ALBUMIN (HUMAN) 12.5 G/50ML
12.5 SOLUTION INTRAVENOUS ONCE
Status: COMPLETED | OUTPATIENT
Start: 2020-02-11 | End: 2020-02-11

## 2020-02-11 RX ORDER — SODIUM CHLORIDE 9 MG/ML
INJECTION, SOLUTION INTRAVENOUS CONTINUOUS
Status: DISCONTINUED | OUTPATIENT
Start: 2020-02-11 | End: 2020-02-12

## 2020-02-11 RX ORDER — PANTOPRAZOLE SODIUM 40 MG/1
40 TABLET, DELAYED RELEASE ORAL
Status: DISCONTINUED | OUTPATIENT
Start: 2020-02-11 | End: 2020-02-18 | Stop reason: CLARIF

## 2020-02-11 RX ADMIN — IPRATROPIUM BROMIDE AND ALBUTEROL SULFATE 3 ML: .5; 3 SOLUTION RESPIRATORY (INHALATION) at 20:16

## 2020-02-11 RX ADMIN — IPRATROPIUM BROMIDE AND ALBUTEROL SULFATE 3 ML: .5; 3 SOLUTION RESPIRATORY (INHALATION) at 15:56

## 2020-02-11 RX ADMIN — CEFEPIME HYDROCHLORIDE 2 G: 2 INJECTION, POWDER, FOR SOLUTION INTRAVENOUS at 09:06

## 2020-02-11 RX ADMIN — THIAMINE HYDROCHLORIDE 100 MG: 100 INJECTION, SOLUTION INTRAMUSCULAR; INTRAVENOUS at 20:59

## 2020-02-11 RX ADMIN — ALBUMIN HUMAN 12.5 G: 0.25 SOLUTION INTRAVENOUS at 10:41

## 2020-02-11 RX ADMIN — ONDANSETRON 4 MG: 2 INJECTION INTRAMUSCULAR; INTRAVENOUS at 05:43

## 2020-02-11 RX ADMIN — POTASSIUM PHOSPHATE, MONOBASIC AND POTASSIUM PHOSPHATE, DIBASIC 20 MMOL: 224; 236 INJECTION, SOLUTION INTRAVENOUS at 13:31

## 2020-02-11 RX ADMIN — SODIUM CHLORIDE: 9 INJECTION, SOLUTION INTRAVENOUS at 21:02

## 2020-02-11 RX ADMIN — IPRATROPIUM BROMIDE AND ALBUTEROL SULFATE 3 ML: .5; 3 SOLUTION RESPIRATORY (INHALATION) at 13:25

## 2020-02-11 RX ADMIN — PANTOPRAZOLE SODIUM 40 MG: 40 TABLET, DELAYED RELEASE ORAL at 10:31

## 2020-02-11 RX ADMIN — CEFEPIME HYDROCHLORIDE 2 G: 2 INJECTION, POWDER, FOR SOLUTION INTRAVENOUS at 16:32

## 2020-02-11 RX ADMIN — POTASSIUM PHOSPHATE, MONOBASIC AND POTASSIUM PHOSPHATE, DIBASIC 20 MMOL: 224; 236 INJECTION, SOLUTION INTRAVENOUS at 03:23

## 2020-02-11 RX ADMIN — LORAZEPAM 1 MG: 2 INJECTION INTRAMUSCULAR; INTRAVENOUS at 05:43

## 2020-02-11 RX ADMIN — BUMETANIDE 1 MG: 0.25 INJECTION INTRAMUSCULAR; INTRAVENOUS at 14:06

## 2020-02-11 RX ADMIN — CEFEPIME HYDROCHLORIDE 2 G: 2 INJECTION, POWDER, FOR SOLUTION INTRAVENOUS at 23:44

## 2020-02-11 RX ADMIN — PROCHLORPERAZINE EDISYLATE 10 MG: 5 INJECTION INTRAMUSCULAR; INTRAVENOUS at 10:38

## 2020-02-11 ASSESSMENT — MIFFLIN-ST. JEOR: SCORE: 2042.66

## 2020-02-11 NOTE — PLAN OF CARE
A&Ox4, c/o mild HA. On RA, c/o intermittent SOB when anxious, dyspnea on exertion. ST, VSS. Abdomen distended & firm, denies tenderness or nausea. Minimal void this shift, difficult to assess with diarrhea from enema. CIWA 5-8 this shift. Ativan given once. Unsteady.

## 2020-02-11 NOTE — PROGRESS NOTES
[facility] [place]    Medicine Progress Note - Hospitalist Service       Date of Admission:  2/9/2020  Assessment & Plan      Acute hypoxemic respiratory failure  New on 2/11, became hypoxemic with increased work of breathing  Differential includes pulmonary edema in the setting of hypoalbueminemia, aspiration pneumonia, hepatic hydrothorax, etc  No chest pain  Did start requiring O2 at 3 liters, crackles at bases, lower extremity edema present  Urine output has been very poor since admission, was receiving IVF for the hyponatremia, banana bag, antibiotics, phosphorus, etc  Plan  - stop all IVF for now  - stat CXR and VBG  - bumex 1 mg once  - bipap at the bedside if needed  - continue cefepime, based on if there is aspiration concerns would start flagyl  - daily cmp  - supplemental O2  - place loaiza for strict ins and outs    Addendum 1800  CXR with no infiltrates. Restart fluids at lower rate of 50 ml/hr, her urine output has been poor. bnp and CXR make volume overload less likely. Aspiration precautions and IV thiamine. Discussed with patient's mother.      Lactic acidosis  Hyponatremia, probably chronic (120)  Hypokalemia(3.3), hypophosphatemia(1.2)  The lactic acid level is little high for just type B lactic acidosis from her liver disease.  Clinically she does not appear to be septic  Could be multifactorial: hepatitis and hypovolumia, but agree with continuing to treat sepsis    q6 hours sodiums    Stop the IVF     Alcoholic hepatitis with hyperbilirubinemia(TB 8.1), hypoalbuminemia(1.6) with anasarca coagulopathy(INR 1.9)  Right upper quadrant ultrasound showed hepatomegaly and steatosis.   CT scan did confirm a small amount of ascites as well as extensive hepatomegaly  SBP considered a possibility, but given she has > 24 hours of antibiotic    Monitor    Consulted GI    Oral vitamin K x 1     Possible sepsis, no clear source   No clear source of infection and clinically she does not appear septic.   However, she has leukocytosis, elevated CRP, procalcitonin and lactic acid. Chest X-ray is negative. Urinanalysis is mildly abnormal.    Cultures were sent in the emergency department and she received cefepime  Possibly SBP as above, but will defer need for paracentesis to GI given the rationale above    Continue intravenous cefepime    Await culture results     Anemia, normocytic(10.4g) possible from liver and anemia of chronic disease  Possibly from alcohol toxicity but MCV is normal and platelets are normal  She does not report any bleeding or black stools.  Iron studies, b12, and folic acid negative  Hemoccult negative    Monitor hemoglobin     Alcohol intoxication(0.14) and dependence   ADHD and major depressive disorder   As mentioned in the HPI, she has been drinking 750 mL of vodka over the past 3 months.  Her last period of sobriety was about 2 or 2-1/2 years ago after inpatient alcohol treatment.  She was sober for 6 to 8 months.  She has been through treatment several times.  She was already in mild withdrawal in the emergency room and received lorazepam.  She denies a history of DTs or alcohol withdrawal seizure.    [facility] protocol ordered    Psychiatry and chemical dependency consultation    Diet: NPO for Medical/Clinical Reasons Except for: Ice Chips, Meds    DVT Prophylaxis: Pneumatic Compression Devices  Amado Catheter: not present  Code Status: Full Code      Disposition Plan   Expected discharge: 4 - 7 days, recommended to prior living arrangement once SIRS/Sepsis treated, respiratory status improves  Entered: Emory Mcdaniel DO 02/11/2020, 2:05 PM       The patient's care was discussed with the Patient.    Emory Mcdaniel DO  Hospitalist Service  [facility]    ______________________________________________________________________    Interval History   Feels short of breath, no chest pain. Abdominal discomfort slightly improved. Multiple BM overnight. No nausea and no  vomiting    Data reviewed today: I reviewed all medications, new labs and imaging results over the last 24 hours. I personally reviewed no images or EKG's today.    Physical Exam   Vital Signs: Temp: 99.5  F (37.5  C) Temp src: Oral BP: 125/69 Pulse: 125 Heart Rate: 124 Resp: 20 SpO2: 91 % O2 Device: Nasal cannula Oxygen Delivery: 2 LPM  Weight: 279 lbs 12.8 oz  Constitutional: awake, alert, cooperative, no apparent distress  Eyes: extra ocular muscles intact, sclera icteric  Respiratory: WOB increased. Crackles at tbe bases  Cardiovascular: tachycardic, normal S1 and S2, no S3 or S4, and no murmur noted. 2+ pitting edema  GI: soft, distended, non tender, markedly decrease bowel sounds, no mass or hernia.  hepatomegaly noted  Skin: no bruising or bleeding, no rashes and jaundice noted  Neurologic: Awake, alert, oriented to name, place and time.  Cranial nerves II-XII are grossly intact.  Motor is 5 out of 5 bilaterally. No tremor.  Neuropsychiatric: General: normal, calm and normal eye contact  Level of consciousness: alert / normal  Affect: normal    Data   Recent Labs   Lab 02/11/20  1344 02/11/20  1133 02/11/20  0542  02/10/20  0545 02/09/20  2334 02/09/20  2131 02/09/20  1547   WBC  --   --  19.7*  --  19.0*  --  19.9* 24.3*   HGB  --   --  9.5*  --  9.5*  --  9.8* 10.4*   MCV  --   --  100  --  98  --  98 97   PLT  --   --  310  --  263  --  279 304   INR  --   --  1.99*  --   --  1.99*  --  1.86*   * 123* 123*   < > 120* 118* 119* 120*   POTASSIUM 4.3  --  4.0  --  3.7 3.4 3.7 3.3*   CHLORIDE 94  --  94  --  88* 85* 86* 84*   CO2 PENDING  --  22  --  23 21 18* 22   BUN PENDING  --  4*  --  2* 2* 2* 1*   CR PENDING  --  0.48*  --  0.34* 0.38* 0.32* 0.35*   ANIONGAP PENDING  --  7  --  9 12 15* 14   EMILY PENDING  --  6.8*  --  6.7* 6.2* 6.3* 6.8*   GLC PENDING  --  141*  --  106* 108* 123* 118*   ALBUMIN  --   --  1.5*  --  1.5*  --   --  1.6*   PROTTOTAL  --   --  6.3*  --  6.5*  --   --  6.7*   BILITOTAL   --   --  12.1*  --  9.4*  --   --  8.1*   ALKPHOS  --   --  296*  --  325*  --   --  357*   ALT  --   --  33  --  32  --   --  33   AST  --   --  172*  --  189*  --   --  203*   LIPASE  --   --   --   --   --   --   --  119    < > = values in this interval not displayed.     No results found for this or any previous visit (from the past 24 hour(s)).  Medications     - MEDICATION INSTRUCTIONS -         ipratropium - albuterol 0.5 mg/2.5 mg/3 mL         bumetanide  1 mg Intravenous Q12H     ceFEPIme (MAXIPIME) IV  2 g Intravenous Q8H     influenza vaccine adult (product based on age)  0.5 mL Intramuscular Prior to discharge     insulin aspart  1-7 Units Subcutaneous TID AC     insulin aspart  1-5 Units Subcutaneous At Bedtime     ipratropium - albuterol 0.5 mg/2.5 mg/3 mL  3 mL Nebulization 4x daily     pantoprazole  40 mg Oral QAM AC     polyethylene glycol  17 g Oral Daily     psyllium  1 packet Oral Daily     senna-docusate  1 tablet Oral BID    Or     senna-docusate  2 tablet Oral BID     sodium chloride (PF)  3 mL Intracatheter Q8H     IV Fluid with vitamins   Intravenous Q24H

## 2020-02-11 NOTE — PROGRESS NOTES
"MD Notification    Notified Person Name: Dr. Mcdaniel    Notification Date/Time: 2/11/20 9200    Notification Interaction: Text page    Purpose of Notification: Patient's breathing more labored. Wheezes noted. Patient stating that she \"can't breathe\".    Orders Received: Duoneb.     Comments: No change after duoneb, requiring 3L NC to maintain sats >90%. MD made aware, assessed patient at bedside. Order for IV bumex and to discontinue IVF. Orders placed for bipap if needed. Amado placed for strict I&Os.  "

## 2020-02-11 NOTE — PROVIDER NOTIFICATION
MD Notification    Notified Person: MD    Notified Person Name: Dr Elias  Notification Date/Time: 02/11/20 6:11 AM    Notification Interaction: Text page    Purpose of Notification:  Pt with large watery stool. Lactulose enema ordered for this am. Hold? Or, can we change to oral?    Orders Received: Hold per MD    Comments:

## 2020-02-11 NOTE — PROGRESS NOTES
Hospitalist Yuval Cover  2/11/2020    Notified of ongoing watery stool with scheduled lactulose enemas. Recommend holding AM dose and consider changing to po lactulose.     Since last update, patient has had some urine output but not easily quantified as it is mixed with liquid stool. Still getting continuous IV fluids but given albumin 1.5 likely third spacing.     Lactate 2.5 noted. No change in plan for now.     Laura Elias MD

## 2020-02-11 NOTE — PROGRESS NOTES
[facility]  Gastroenterology Progress Note     Pat Delgado MRN# [id number]   YOB: 1990 Age: 29 year old          Assessment and Plan:   Update:   Patient has had elevated in bilirubin but ALP and AST trending down. WBC remain elevated at 19.7. Abdomen distended- having frequent watery stools. Patient states slightly better than yesterday. Patient quiet somnolent today and mild diaphoretic.     Active Problems:  Abdominal distention  Pat Delgado is a pleasant 29 year old female with history of alcohol abuse. She has complaints of abdominal pain and distention. GI was asked to evaluate for ascites and consider paracentesis.  She is noted have elevated WBC and lactic acid. Being treated for sepsis but unsure source. Imaging notes only small amount of fluid noted on imaging largest pocket 2 cm. NO ascites noted on exam. SBP not likely given no fluid collection. A sample would be difficult/impossible to obtain given no pocket of fluid noted. Imaging does not mention ileus or evidence of colonic obstruction. She has had limited oral intake and constipation. Therefore abdominal distention likely related to chronic constipation. Started on rectal lactulose and having large amount of watery stool- now discontinued.     - Has positive bowel sounds- ok to advance to regular diet  - Start daily miralax and metamucil     Alcoholic hepatitis  Patient has noted hepatomegaly on CT scan. Platelets 263. INR is elevated. No history of EGD. Unlikely has liver cirrhosis as no evidence on CT and no noted thrombocytopenia. AST elevated and trending down. Consistent with alcohol hepatitis.   - Appreciate psychiatry and CD consults  - Avoid alcohol intake  - Daily CMP  - CIWA protocol     Bright red rectal bleeding  Anemia  Patient has reported bright red blood in stools but not to ED. Her hemoglobin is at 9.5 today and below baseline 12-13. There is a concern for possible hemorrhoids vs unlikely  IBD. CT does note wall thickening in cecum likely from non-distended colon. Also there is concern for alcoholic gastritis with anemia. Occult blood negative.  - Recommend daily CBC  - Will plan EGD and colonoscopy once stable or as outpatient to r/o GI bleed - sodium currently at 120.  - Start oral pantoprazole 40 mg daily     Possible Sepsis  Currently being treated with IV cefepime. Highly unlikely related to SBP. Awaiting blood culture results- prelim results negative.     Hyponatremia  Hypokalemia  Hypophosphatemia  Lactic acidosis  Currently replacing and monitoring potassium and sodium. Once patient has withdrawn from alcohol and electrolytes improved can plan EGD and colonoscopy. Sodium has improved to 123, potassium at 4.            Acute liver failure without hepatic coma  Hyponatremia  Sepsis, due to unspecified organism, unspecified whether acute organ dysfunction present (H)      Interval History:   no new complaints, denies chest pain, denies shortness of breath, pain is controlled, alert, oriented to person, place and time and has had a bowel movement in the last 24 hours              Review of Systems:   C: NEGATIVE for fever, chills, change in weight  E/M: NEGATIVE for ear, mouth and throat problems  R: NEGATIVE for significant cough or SOB  CV: NEGATIVE for chest pain, palpitations or peripheral edema             Medications:   I have reviewed this patient's current medications    albumin human  12.5 g Intravenous Once     ceFEPIme (MAXIPIME) IV  2 g Intravenous Q8H     gabapentin  300 mg Oral TID     influenza vaccine adult (product based on age)  0.5 mL Intramuscular Prior to discharge     insulin aspart  1-7 Units Subcutaneous TID AC     insulin aspart  1-5 Units Subcutaneous At Bedtime     senna-docusate  1 tablet Oral BID    Or     senna-docusate  2 tablet Oral BID     sodium chloride (PF)  3 mL Intracatheter Q8H     IV Fluid with vitamins   Intravenous Q24H                  Physical Exam:    Vitals were reviewed  Vital Signs with Ranges  Temp:  [99  F (37.2  C)-99.6  F (37.6  C)] 99.4  F (37.4  C)  Pulse:  [121-129] 124  Heart Rate:  [123-130] 125  Resp:  [13-26] 21  BP: (108-159)/() 124/62  SpO2:  [93 %-98 %] 94 %  I/O last 3 completed shifts:  In: 2128.33 [P.O.:450; I.V.:1678.33]  Out: 300 [Urine:300]  Constitutional: healthy, alert and no distress   Cardiovascular: negative, PMI normal. No lifts, heaves, or thrills. RRR. No murmurs, clicks gallops or rub  Respiratory: negative, Percussion normal. Good diaphragmatic excursion. Lungs clear  Head: Normocephalic. No masses, lesions, tenderness or abnormalities  Neck: Neck supple. No adenopathy. Thyroid symmetric, normal size,, Carotids without bruits.  Abdomen: Abdomen soft, non-tender. BS normal. No masses, organomegaly, positive findings: distended           Data:   I reviewed the patient's new clinical lab test results.   Recent Labs   Lab Test 02/11/20 0542 02/10/20  0545 02/09/20  2334 02/09/20  2131 02/09/20  1547   WBC 19.7* 19.0*  --  19.9* 24.3*   HGB 9.5* 9.5*  --  9.8* 10.4*    98  --  98 97    263  --  279 304   INR 1.99*  --  1.99*  --  1.86*     Recent Labs   Lab Test 02/11/20  0542 02/10/20  0545 02/09/20  2334   POTASSIUM 4.0 3.7 3.4   CHLORIDE 94 88* 85*   CO2 22 23 21   BUN 4* 2* 2*   ANIONGAP 7 9 12     Recent Labs   Lab Test 02/11/20  0542 02/10/20  0545 02/09/20  1755 02/09/20  1547   ALBUMIN 1.5* 1.5*  --  1.6*   BILITOTAL 12.1* 9.4*  --  8.1*   ALT 33 32  --  33   * 189*  --  203*   PROTEIN  --   --  100*  --    LIPASE  --   --   --  119       I reviewed the patient's new imaging results.    All laboratory data reviewed  All imaging studies reviewed by me.    Aminata Rangel PA-C,  2/11/2020  [facility]  Office : [phone]  Cell: [phone] (Dr. Hobson)  Cell: [phone] (Aminata Rangel PA-C)

## 2020-02-11 NOTE — PROVIDER NOTIFICATION
MD Notification    Notified Person: MD    Notified Person Name: Jada    Notification Date/Time: 2/11/20 0633    Notification Interaction: amcon text page    Purpose of Notification: Lactic acid 2.5, please advise. Ok to hold morning enema? Pt having large watery stools.    Orders Received: Ok to hold morning enema. No new orders at this time.     Comments:

## 2020-02-11 NOTE — PROVIDER NOTIFICATION
MD Notification    Notified Person: MD    Notified Person Name: Jada    Notification Date/Time: 2/10/20 4685    Notification Interaction: amcon text page    Purpose of Notification: No urine output from 1500. bladder scan for 230cc.  Having diarrhea, due to lactulose enemas. Serial lactic acids, last one was 2.4. On NS with 20mEqKCl @ 100ml/hr, please advise.     Orders Received: Continue to monitor. Notify MD if no void by 0600.    Comments:

## 2020-02-11 NOTE — PLAN OF CARE
A/Ox4. VSS. CIWA 9 and 4, scoring for noticeable tremors, headache and anxiety. Ativan given per protocol. Lung sounds are clear. Pt on phosphorous, mag and high potassium protocol. Phosophorous and potassium being replaced. Phosphorous recheck for 0200.  +2 edema to BLE. NS with 20 K+ infusing. Pt is jaundice and has yellow sclera. Up with assist x2 to BSC.  Tele .

## 2020-02-11 NOTE — PROVIDER NOTIFICATION
Spoke to Dr Elias regarding no void since 1500 and bladder scan of 230. Continue to monitor, encourage voiding on BSC, notify MD by 0600 if still no void.

## 2020-02-11 NOTE — CONSULTS
"[facility]  Psychiatry Consultation - Follow-up note      Interim History:   Reason for consultation: Assistant comanaging alcohol use disorder.  Prior consultation from Dr. Lyon noted.    On examination today, the patient was in her room sleeping comfortably.  She awoke briefly on name call, was able to tell me that she is feeling \"okay\", then proceeded to fall back asleep.  This presentation was maintained throughout my meeting with her today.    xamination was limited today by the patient's level of sedation.    She did not report suicidal or homicidal ideation today.  She did not report hallucinations.           Medications:       ceFEPIme (MAXIPIME) IV  2 g Intravenous Q8H     influenza vaccine adult (product based on age)  0.5 mL Intramuscular Prior to discharge     insulin aspart  1-7 Units Subcutaneous TID AC     insulin aspart  1-5 Units Subcutaneous At Bedtime     ipratropium - albuterol 0.5 mg/2.5 mg/3 mL         ipratropium - albuterol 0.5 mg/2.5 mg/3 mL  3 mL Nebulization 4x daily     pantoprazole  40 mg Oral QAM AC     polyethylene glycol  17 g Oral Daily     psyllium  1 packet Oral Daily     senna-docusate  1 tablet Oral BID    Or     senna-docusate  2 tablet Oral BID     sodium chloride (PF)  3 mL Intracatheter Q8H     IV Fluid with vitamins   Intravenous Q24H          Allergies:     Allergies   Allergen Reactions     Penicillins Unknown     Hives            Labs:     Recent Results (from the past 24 hour(s))   Glucose by meter    Collection Time: 02/10/20  6:01 PM   Result Value Ref Range    Glucose 132 (H) 70 - 99 mg/dL   Sodium    Collection Time: 02/10/20  8:48 PM   Result Value Ref Range    Sodium 121 (L) 133 - 144 mmol/L   Lactic acid whole blood    Collection Time: 02/10/20  8:48 PM   Result Value Ref Range    Lactic Acid 2.4 (H) 0.7 - 2.0 mmol/L   Glucose by meter    Collection Time: 02/10/20 10:29 PM   Result Value Ref Range    Glucose 139 (H) 70 - 99 mg/dL "   Phosphorus    Collection Time: 02/11/20  1:30 AM   Result Value Ref Range    Phosphorus 1.4 (L) 2.5 - 4.5 mg/dL   Sodium    Collection Time: 02/11/20  1:30 AM   Result Value Ref Range    Sodium 122 (L) 133 - 144 mmol/L   Glucose by meter    Collection Time: 02/11/20  2:00 AM   Result Value Ref Range    Glucose 120 (H) 70 - 99 mg/dL   Comprehensive metabolic panel    Collection Time: 02/11/20  5:42 AM   Result Value Ref Range    Sodium 123 (L) 133 - 144 mmol/L    Potassium 4.0 3.4 - 5.3 mmol/L    Chloride 94 94 - 109 mmol/L    Carbon Dioxide 22 20 - 32 mmol/L    Anion Gap 7 3 - 14 mmol/L    Glucose 141 (H) 70 - 99 mg/dL    Urea Nitrogen 4 (L) 7 - 30 mg/dL    Creatinine 0.48 (L) 0.52 - 1.04 mg/dL    GFR Estimate >90 >60 mL/min/[1.73_m2]    GFR Estimate If Black >90 >60 mL/min/[1.73_m2]    Calcium 6.8 (L) 8.5 - 10.1 mg/dL    Bilirubin Total 12.1 (H) 0.2 - 1.3 mg/dL    Albumin 1.5 (L) 3.4 - 5.0 g/dL    Protein Total 6.3 (L) 6.8 - 8.8 g/dL    Alkaline Phosphatase 296 (H) 40 - 150 U/L    ALT 33 0 - 50 U/L     (H) 0 - 45 U/L   INR    Collection Time: 02/11/20  5:42 AM   Result Value Ref Range    INR 1.99 (H) 0.86 - 1.14   CBC with platelets    Collection Time: 02/11/20  5:42 AM   Result Value Ref Range    WBC 19.7 (H) 4.0 - 11.0 10e9/L    RBC Count 2.82 (L) 3.8 - 5.2 10e12/L    Hemoglobin 9.5 (L) 11.7 - 15.7 g/dL    Hematocrit 28.2 (L) 35.0 - 47.0 %     78 - 100 fl    MCH 33.7 (H) 26.5 - 33.0 pg    MCHC 33.7 31.5 - 36.5 g/dL    RDW 20.2 (H) 10.0 - 15.0 %    Platelet Count 310 150 - 450 10e9/L   Lactic acid whole blood    Collection Time: 02/11/20  5:42 AM   Result Value Ref Range    Lactic Acid 2.5 (H) 0.7 - 2.0 mmol/L   Occult blood stool    Collection Time: 02/11/20  6:05 AM   Result Value Ref Range    Occult Blood Negative NEG^Negative   Glucose by meter    Collection Time: 02/11/20  8:16 AM   Result Value Ref Range    Glucose 135 (H) 70 - 99 mg/dL   Phosphorus    Collection Time: 02/11/20 10:40 AM  "  Result Value Ref Range    Phosphorus 1.9 (L) 2.5 - 4.5 mg/dL   Sodium    Collection Time: 02/11/20 11:33 AM   Result Value Ref Range    Sodium 123 (L) 133 - 144 mmol/L   Glucose by meter    Collection Time: 02/11/20 11:33 AM   Result Value Ref Range    Glucose 148 (H) 70 - 99 mg/dL   Basic metabolic panel    Collection Time: 02/11/20  1:44 PM   Result Value Ref Range    Sodium 123 (L) 133 - 144 mmol/L    Potassium 4.3 3.4 - 5.3 mmol/L    Chloride 94 94 - 109 mmol/L    Carbon Dioxide 21 20 - 32 mmol/L    Anion Gap 8 3 - 14 mmol/L    Glucose 133 (H) 70 - 99 mg/dL    Urea Nitrogen 5 (L) 7 - 30 mg/dL    Creatinine 0.56 0.52 - 1.04 mg/dL    GFR Estimate >90 >60 mL/min/[1.73_m2]    GFR Estimate If Black >90 >60 mL/min/[1.73_m2]    Calcium 6.9 (L) 8.5 - 10.1 mg/dL   Ammonia    Collection Time: 02/11/20  1:44 PM   Result Value Ref Range    Ammonia <10 (L) 10 - 50 umol/L   Nt probnp inpatient    Collection Time: 02/11/20  1:44 PM   Result Value Ref Range    N-Terminal Pro BNP Inpatient 142 0 - 450 pg/mL   Blood gas venous with oxyhemoglobin    Collection Time: 02/11/20  3:20 PM   Result Value Ref Range    Ph Venous 7.39 7.32 - 7.43 pH    PCO2 Venous 35 (L) 40 - 50 mm Hg    PO2 Venous 54 (H) 25 - 47 mm Hg    Bicarbonate Venous 21 21 - 28 mmol/L    Oxyhemoglobin Venous 84 %    Base Deficit Venous 3.5 mmol/L   Glucose by meter    Collection Time: 02/11/20  5:24 PM   Result Value Ref Range    Glucose 129 (H) 70 - 99 mg/dL          Psychiatric Examination:     /89   Pulse 125   Temp 98.3  F (36.8  C) (Axillary)   Resp 20   Ht 1.727 m (5' 8\")   Wt 126.9 kg (279 lb 12.8 oz)   SpO2 98%   BMI 42.54 kg/m    Weight is 279 lbs 12.8 oz  Body mass index is 42.54 kg/m .  Orthostatic Vitals     None            Appearance: fatigued  Attitude:  Passive  Eye Contact:  poor   Mood:  \"Okay\"  Affect:  intensity is blunted  Speech:  decreased prosody  Psychomotor Behavior:  no evidence of tardive dyskinesia, dystonia, or " tics  Throught Process:  linear  Associations:  no loose associations  Thought Content:  no evidence of suicidal ideation or homicidal ideation and no evidence of psychotic thought  Insight:  limited  Judgement:  limited  Oriented to:  Could not assess  Attention Span and Concentration:  limited  Recent and Remote Memory:  limited           DIagnoses:     Alcohol use disorder, severe  Alcoholic hepatitis      Recommendations:     Examination was limited today by the patient's level of sedation.  Once the patient is more alert and able to engage in a more comprehensive interview, please reconsult to ensure mood stability while commenting on optimal disposition planning.    Records do indicate that the patient was able to interview with the chemical dependency counselor yesterday who has [facility].  Assuming adequate recovery from her current medical condition and stability of mental health symptoms, this should be a reasonable disposition option to proceed with.    Please reconsult with Psychiatry as needed.   Dimitris Wallace MD   Text Page

## 2020-02-11 NOTE — PLAN OF CARE
Disoriented to time. Lethargic, arouses with verbal or tactile stimulation.Tele: ST, HR 120s. On 2L NC. Amado draining dark maria isabel urine. Urine output minimal. One loose bowel movement this shift. Scoring between 2 and 6 on CIWA. Very minimal oral intake. Abdomin distended.

## 2020-02-12 ENCOUNTER — APPOINTMENT (OUTPATIENT)
Dept: GENERAL RADIOLOGY | Facility: CLINIC | Age: 30
DRG: 871 | End: 2020-02-12
Attending: NURSE PRACTITIONER
Payer: COMMERCIAL

## 2020-02-12 ENCOUNTER — APPOINTMENT (OUTPATIENT)
Dept: ULTRASOUND IMAGING | Facility: CLINIC | Age: 30
DRG: 871 | End: 2020-02-12
Attending: HOSPITALIST
Payer: COMMERCIAL

## 2020-02-12 ENCOUNTER — APPOINTMENT (OUTPATIENT)
Dept: SPEECH THERAPY | Facility: CLINIC | Age: 30
DRG: 871 | End: 2020-02-12
Attending: INTERNAL MEDICINE
Payer: COMMERCIAL

## 2020-02-12 LAB
ALBUMIN SERPL-MCNC: 1.4 G/DL (ref 3.4–5)
ALP SERPL-CCNC: 244 U/L (ref 40–150)
ALT SERPL W P-5'-P-CCNC: 30 U/L (ref 0–50)
ANION GAP SERPL CALCULATED.3IONS-SCNC: 9 MMOL/L (ref 3–14)
ANION GAP SERPL CALCULATED.3IONS-SCNC: 9 MMOL/L (ref 3–14)
AST SERPL W P-5'-P-CCNC: 139 U/L (ref 0–45)
BASE DEFICIT BLDV-SCNC: 4.2 MMOL/L
BILIRUB SERPL-MCNC: 13.9 MG/DL (ref 0.2–1.3)
BUN SERPL-MCNC: 6 MG/DL (ref 7–30)
BUN SERPL-MCNC: 7 MG/DL (ref 7–30)
CALCIUM SERPL-MCNC: 6.7 MG/DL (ref 8.5–10.1)
CALCIUM SERPL-MCNC: 6.9 MG/DL (ref 8.5–10.1)
CHLORIDE SERPL-SCNC: 93 MMOL/L (ref 94–109)
CHLORIDE SERPL-SCNC: 94 MMOL/L (ref 94–109)
CK SERPL-CCNC: 108 U/L (ref 30–225)
CO2 SERPL-SCNC: 18 MMOL/L (ref 20–32)
CO2 SERPL-SCNC: 20 MMOL/L (ref 20–32)
CREAT SERPL-MCNC: 0.83 MG/DL (ref 0.52–1.04)
CREAT SERPL-MCNC: 1.06 MG/DL (ref 0.52–1.04)
ERYTHROCYTE [DISTWIDTH] IN BLOOD BY AUTOMATED COUNT: 20.6 % (ref 10–15)
GFR SERPL CREATININE-BSD FRML MDRD: 71 ML/MIN/{1.73_M2}
GFR SERPL CREATININE-BSD FRML MDRD: >90 ML/MIN/{1.73_M2}
GLUCOSE BLDC GLUCOMTR-MCNC: 110 MG/DL (ref 70–99)
GLUCOSE BLDC GLUCOMTR-MCNC: 114 MG/DL (ref 70–99)
GLUCOSE BLDC GLUCOMTR-MCNC: 84 MG/DL (ref 70–99)
GLUCOSE BLDC GLUCOMTR-MCNC: 92 MG/DL (ref 70–99)
GLUCOSE BLDC GLUCOMTR-MCNC: 93 MG/DL (ref 70–99)
GLUCOSE SERPL-MCNC: 97 MG/DL (ref 70–99)
GLUCOSE SERPL-MCNC: 97 MG/DL (ref 70–99)
HCO3 BLDV-SCNC: 22 MMOL/L (ref 21–28)
HCT VFR BLD AUTO: 27 % (ref 35–47)
HGB BLD-MCNC: 9 G/DL (ref 11.7–15.7)
INR PPP: 1.95 (ref 0.86–1.14)
LACTATE BLD-SCNC: 2 MMOL/L (ref 0.7–2)
MAGNESIUM SERPL-MCNC: 1.9 MG/DL (ref 1.6–2.3)
MAGNESIUM SERPL-MCNC: 2 MG/DL (ref 1.6–2.3)
MCH RBC QN AUTO: 33.6 PG (ref 26.5–33)
MCHC RBC AUTO-ENTMCNC: 33.3 G/DL (ref 31.5–36.5)
MCV RBC AUTO: 101 FL (ref 78–100)
O2/TOTAL GAS SETTING VFR VENT: NORMAL %
OXYHGB MFR BLDV: 49 %
PCO2 BLDV: 42 MM HG (ref 40–50)
PH BLDV: 7.32 PH (ref 7.32–7.43)
PHOSPHATE SERPL-MCNC: 2.3 MG/DL (ref 2.5–4.5)
PHOSPHATE SERPL-MCNC: 2.7 MG/DL (ref 2.5–4.5)
PLATELET # BLD AUTO: 310 10E9/L (ref 150–450)
PO2 BLDV: 32 MM HG (ref 25–47)
POTASSIUM SERPL-SCNC: 4.3 MMOL/L (ref 3.4–5.3)
POTASSIUM SERPL-SCNC: 4.5 MMOL/L (ref 3.4–5.3)
PROT SERPL-MCNC: 6.1 G/DL (ref 6.8–8.8)
RBC # BLD AUTO: 2.68 10E12/L (ref 3.8–5.2)
SODIUM SERPL-SCNC: 120 MMOL/L (ref 133–144)
SODIUM SERPL-SCNC: 123 MMOL/L (ref 133–144)
SODIUM SERPL-SCNC: 123 MMOL/L (ref 133–144)
SODIUM SERPL-SCNC: 124 MMOL/L (ref 133–144)
WBC # BLD AUTO: 21.4 10E9/L (ref 4–11)

## 2020-02-12 PROCEDURE — 36415 COLL VENOUS BLD VENIPUNCTURE: CPT | Performed by: HOSPITALIST

## 2020-02-12 PROCEDURE — 83605 ASSAY OF LACTIC ACID: CPT | Performed by: HOSPITALIST

## 2020-02-12 PROCEDURE — 25000125 ZZHC RX 250: Performed by: INTERNAL MEDICINE

## 2020-02-12 PROCEDURE — 85027 COMPLETE CBC AUTOMATED: CPT | Performed by: HOSPITALIST

## 2020-02-12 PROCEDURE — 83735 ASSAY OF MAGNESIUM: CPT | Performed by: HOSPITALIST

## 2020-02-12 PROCEDURE — 21000000 ZZH R&B IMCU HEART CARE

## 2020-02-12 PROCEDURE — 93010 ELECTROCARDIOGRAM REPORT: CPT | Performed by: INTERNAL MEDICINE

## 2020-02-12 PROCEDURE — 25000132 ZZH RX MED GY IP 250 OP 250 PS 637: Performed by: INTERNAL MEDICINE

## 2020-02-12 PROCEDURE — 80053 COMPREHEN METABOLIC PANEL: CPT | Performed by: HOSPITALIST

## 2020-02-12 PROCEDURE — 25000128 H RX IP 250 OP 636: Performed by: HOSPITALIST

## 2020-02-12 PROCEDURE — 84100 ASSAY OF PHOSPHORUS: CPT | Performed by: HOSPITALIST

## 2020-02-12 PROCEDURE — 92526 ORAL FUNCTION THERAPY: CPT | Mod: GN | Performed by: SPEECH-LANGUAGE PATHOLOGIST

## 2020-02-12 PROCEDURE — 99232 SBSQ HOSP IP/OBS MODERATE 35: CPT | Performed by: HOSPITALIST

## 2020-02-12 PROCEDURE — P9047 ALBUMIN (HUMAN), 25%, 50ML: HCPCS | Performed by: HOSPITALIST

## 2020-02-12 PROCEDURE — 25000132 ZZH RX MED GY IP 250 OP 250 PS 637: Performed by: PHYSICIAN ASSISTANT

## 2020-02-12 PROCEDURE — 84295 ASSAY OF SERUM SODIUM: CPT | Performed by: HOSPITALIST

## 2020-02-12 PROCEDURE — 85610 PROTHROMBIN TIME: CPT | Performed by: HOSPITALIST

## 2020-02-12 PROCEDURE — 25000125 ZZHC RX 250: Performed by: HOSPITALIST

## 2020-02-12 PROCEDURE — 36415 COLL VENOUS BLD VENIPUNCTURE: CPT | Performed by: NURSE PRACTITIONER

## 2020-02-12 PROCEDURE — 92610 EVALUATE SWALLOWING FUNCTION: CPT | Mod: GN | Performed by: SPEECH-LANGUAGE PATHOLOGIST

## 2020-02-12 PROCEDURE — 25000132 ZZH RX MED GY IP 250 OP 250 PS 637: Performed by: HOSPITALIST

## 2020-02-12 PROCEDURE — 76770 US EXAM ABDO BACK WALL COMP: CPT

## 2020-02-12 PROCEDURE — 82805 BLOOD GASES W/O2 SATURATION: CPT | Performed by: NURSE PRACTITIONER

## 2020-02-12 PROCEDURE — 00000146 ZZHCL STATISTIC GLUCOSE BY METER IP

## 2020-02-12 PROCEDURE — 82550 ASSAY OF CK (CPK): CPT | Performed by: HOSPITALIST

## 2020-02-12 PROCEDURE — 90686 IIV4 VACC NO PRSV 0.5 ML IM: CPT | Performed by: HOSPITALIST

## 2020-02-12 PROCEDURE — 74018 RADEX ABDOMEN 1 VIEW: CPT

## 2020-02-12 PROCEDURE — 25000128 H RX IP 250 OP 636: Performed by: NURSE PRACTITIONER

## 2020-02-12 PROCEDURE — 84100 ASSAY OF PHOSPHORUS: CPT | Performed by: NURSE PRACTITIONER

## 2020-02-12 PROCEDURE — 93005 ELECTROCARDIOGRAM TRACING: CPT

## 2020-02-12 PROCEDURE — 80048 BASIC METABOLIC PNL TOTAL CA: CPT | Performed by: NURSE PRACTITIONER

## 2020-02-12 PROCEDURE — 71045 X-RAY EXAM CHEST 1 VIEW: CPT

## 2020-02-12 PROCEDURE — 25800030 ZZH RX IP 258 OP 636: Performed by: HOSPITALIST

## 2020-02-12 PROCEDURE — 99356 ZZC PROLONGED SERV,INPATIENT,1ST HR: CPT | Performed by: HOSPITALIST

## 2020-02-12 PROCEDURE — 83735 ASSAY OF MAGNESIUM: CPT | Performed by: NURSE PRACTITIONER

## 2020-02-12 PROCEDURE — 76705 ECHO EXAM OF ABDOMEN: CPT

## 2020-02-12 RX ORDER — SODIUM CHLORIDE 9 MG/ML
INJECTION, SOLUTION INTRAVENOUS CONTINUOUS
Status: DISCONTINUED | OUTPATIENT
Start: 2020-02-13 | End: 2020-02-12

## 2020-02-12 RX ORDER — ALBUMIN (HUMAN) 12.5 G/50ML
12.5 SOLUTION INTRAVENOUS ONCE
Status: DISCONTINUED | OUTPATIENT
Start: 2020-02-12 | End: 2020-02-12

## 2020-02-12 RX ORDER — ALBUMIN (HUMAN) 12.5 G/50ML
25 SOLUTION INTRAVENOUS 4 TIMES DAILY
Status: DISCONTINUED | OUTPATIENT
Start: 2020-02-12 | End: 2020-02-12

## 2020-02-12 RX ORDER — FUROSEMIDE 10 MG/ML
10 INJECTION INTRAMUSCULAR; INTRAVENOUS EVERY 6 HOURS
Status: DISCONTINUED | OUTPATIENT
Start: 2020-02-12 | End: 2020-02-12

## 2020-02-12 RX ORDER — METOPROLOL TARTRATE 25 MG/1
25 TABLET, FILM COATED ORAL 2 TIMES DAILY
Status: DISCONTINUED | OUTPATIENT
Start: 2020-02-12 | End: 2020-02-14

## 2020-02-12 RX ORDER — ALBUMIN (HUMAN) 12.5 G/50ML
25 SOLUTION INTRAVENOUS 4 TIMES DAILY
Status: COMPLETED | OUTPATIENT
Start: 2020-02-12 | End: 2020-02-14

## 2020-02-12 RX ORDER — MULTIVITAMIN,THERAPEUTIC
1 TABLET ORAL DAILY
Status: DISCONTINUED | OUTPATIENT
Start: 2020-02-13 | End: 2020-02-18

## 2020-02-12 RX ORDER — FUROSEMIDE 10 MG/ML
10 INJECTION INTRAMUSCULAR; INTRAVENOUS EVERY 6 HOURS
Status: DISCONTINUED | OUTPATIENT
Start: 2020-02-12 | End: 2020-02-13

## 2020-02-12 RX ORDER — ALBUMIN (HUMAN) 12.5 G/50ML
12.5 SOLUTION INTRAVENOUS ONCE
Status: COMPLETED | OUTPATIENT
Start: 2020-02-12 | End: 2020-02-12

## 2020-02-12 RX ORDER — FOLIC ACID 1 MG/1
1 TABLET ORAL DAILY
Status: DISCONTINUED | OUTPATIENT
Start: 2020-02-13 | End: 2020-02-18

## 2020-02-12 RX ORDER — METOPROLOL TARTRATE 1 MG/ML
5 INJECTION, SOLUTION INTRAVENOUS EVERY 4 HOURS PRN
Status: DISCONTINUED | OUTPATIENT
Start: 2020-02-12 | End: 2020-03-05 | Stop reason: HOSPADM

## 2020-02-12 RX ORDER — BUMETANIDE 0.25 MG/ML
1 INJECTION INTRAMUSCULAR; INTRAVENOUS ONCE
Status: COMPLETED | OUTPATIENT
Start: 2020-02-12 | End: 2020-02-12

## 2020-02-12 RX ORDER — IPRATROPIUM BROMIDE AND ALBUTEROL SULFATE 2.5; .5 MG/3ML; MG/3ML
3 SOLUTION RESPIRATORY (INHALATION) EVERY 4 HOURS PRN
Status: DISCONTINUED | OUTPATIENT
Start: 2020-02-12 | End: 2020-02-18

## 2020-02-12 RX ORDER — FUROSEMIDE 10 MG/ML
10 INJECTION INTRAMUSCULAR; INTRAVENOUS EVERY 12 HOURS
Status: DISCONTINUED | OUTPATIENT
Start: 2020-02-12 | End: 2020-02-12

## 2020-02-12 RX ADMIN — POTASSIUM PHOSPHATE, MONOBASIC AND POTASSIUM PHOSPHATE, DIBASIC 15 MMOL: 224; 236 INJECTION, SOLUTION INTRAVENOUS at 00:37

## 2020-02-12 RX ADMIN — METOPROLOL TARTRATE 25 MG: 25 TABLET ORAL at 22:12

## 2020-02-12 RX ADMIN — ALBUMIN HUMAN 12.5 G: 0.25 SOLUTION INTRAVENOUS at 14:51

## 2020-02-12 RX ADMIN — CEFEPIME HYDROCHLORIDE 2 G: 2 INJECTION, POWDER, FOR SOLUTION INTRAVENOUS at 09:11

## 2020-02-12 RX ADMIN — PANTOPRAZOLE SODIUM 40 MG: 40 TABLET, DELAYED RELEASE ORAL at 09:22

## 2020-02-12 RX ADMIN — SENNOSIDES AND DOCUSATE SODIUM 1 TABLET: 8.6; 5 TABLET ORAL at 22:12

## 2020-02-12 RX ADMIN — METOPROLOL TARTRATE 25 MG: 25 TABLET ORAL at 09:23

## 2020-02-12 RX ADMIN — CEFEPIME HYDROCHLORIDE 2 G: 2 INJECTION, POWDER, FOR SOLUTION INTRAVENOUS at 22:16

## 2020-02-12 RX ADMIN — FUROSEMIDE 10 MG: 10 INJECTION, SOLUTION INTRAVENOUS at 22:14

## 2020-02-12 RX ADMIN — ALBUMIN HUMAN 25 G: 0.25 SOLUTION INTRAVENOUS at 22:19

## 2020-02-12 RX ADMIN — INFLUENZA A VIRUS A/BRISBANE/02/2018 IVR-190 (H1N1) ANTIGEN (FORMALDEHYDE INACTIVATED), INFLUENZA A VIRUS A/KANSAS/14/2017 X-327 (H3N2) ANTIGEN (FORMALDEHYDE INACTIVATED), INFLUENZA B VIRUS B/PHUKET/3073/2013 ANTIGEN (FORMALDEHYDE INACTIVATED), AND INFLUENZA B VIRUS B/MARYLAND/15/2016 BX-69A ANTIGEN (FORMALDEHYDE INACTIVATED) 0.5 ML: 15; 15; 15; 15 INJECTION, SUSPENSION INTRAMUSCULAR at 09:13

## 2020-02-12 RX ADMIN — METOPROLOL TARTRATE 5 MG: 5 INJECTION INTRAVENOUS at 02:26

## 2020-02-12 RX ADMIN — BUMETANIDE 1 MG: 0.25 INJECTION INTRAMUSCULAR; INTRAVENOUS at 17:34

## 2020-02-12 RX ADMIN — THIAMINE HYDROCHLORIDE 100 MG: 100 INJECTION, SOLUTION INTRAMUSCULAR; INTRAVENOUS at 09:12

## 2020-02-12 ASSESSMENT — MIFFLIN-ST. JEOR: SCORE: 2065.5

## 2020-02-12 NOTE — PLAN OF CARE
"DATE & TIME: 20 1900 - 20 0730                  COGNITION/BEHAVIOR: Disoriented to time, otherwise oriented. Lethargic, sleeping between cares, difficult to assess at times due to lethargy.   Vital signs: /73 (BP Location: Right arm)   Pulse 115   Temp 98.7  F (37.1  C) (Oral)   Resp 20   Ht 1.727 m (5' 8\")   Wt 126.9 kg (279 lb 12.8 oz)   SpO2 96%   BMI 42.54 kg/m        TELEMETRY RHYTHM: ST -119 after PRN IV metoprolol admin  MOBILITY: Assist x2 with gait belt & pivot to commode  PAIN: Occasional headache relieved with ice to neck, abdominal discomfort/fullness that pt is able to sleep through intermittently   DIET: NPO with ice chips - pt having difficulty swallowing water - unable to give PO meds overnight. Aspiration precautions. Speech consult ordered by MD.  RESP: O2 sats >94% on 1L NC - pt reports SOB/difficulty breathing  CV/PV: BL upper lobes clear, Diminished in bases  GI/: Abdomen firm/distended, TTP. Pt reports \"I feel like I'm going to burst\". Bowel sounds audible. Liquid BM x1 - pt reports this slightly improved her sensation of abdominal fullness. Very low UO - Ghislaine/tea colored.  SKIN: Blanchable redness to buttocks, jaundiced, pale extremities. Generalized edema & edema to BLE  LINES: PIV x2, NS @ 50 mL/hr, Amado catheter  LAB/B , Q6 hour Na+ checks, electrolyte replacement protocol  TESTS/PROCEDURES: CXR completed overnight for SOB  Neuro: Able to follow commands, sluggish responses. Jaundiced sclera & skin. Neuros otherwise intact. Lethargic.   CIWA: 4, 4, & 3 - no interventions needed    Dolores Haas RN on 2020 at 6:29 AM        "

## 2020-02-12 NOTE — PLAN OF CARE
Monitor remains Sinus tachycardia. Pt. Is lethargic, responds to voice. Abdomen is distended, Tender and firm. Poor urine   Output, 150 ml this shift. IV Albumin per order. Continue to monitor.

## 2020-02-12 NOTE — PLAN OF CARE
Discharge Planner SLP   Patient plan for discharge: Not stated.   Current status: Clinical swallow evaluation completed and treatment initiated. Patient tolerating trials of thin liquids, puree, solids, and whole pills with water without overt Sx of aspiration, but with increased work of breathing during upright positioning and intake.  Recommend initiate regular diet with thin liquids, as approved per GI note as well.  Eating only when alert, up at 90 degrees during all intake.    Barriers to return to prior living situation: Weakness, confusion.   Recommendations for discharge: Home per SLP needs.   Rationale for recommendations: Defer to care team for further care needs and safety assessment, however, likely no swallowing barriers to return home.        Entered by: Rachel Liriano 02/12/2020 9:35 AM

## 2020-02-12 NOTE — CODE/RAPID RESPONSE
[facility]    RRT Note  2/12/2020   Time Called: 434pm    RRT called for: Hypoxia, dyspnea, abdominal distention decreased urine output    Assessment & Plan   IMPRESSION & PLAN:    Pat Delgado is a 29 year old female w/ PMH of ADHD and depression who was admitted on 2/9/2020 for alcoholic hepatitis and withdrawal with hyperbilirubinemia, hypoalbuminemia, anasarca, coagulopathy.  Her course has been complicated by acute respiratory failure secondary to aspiration, EBEN, hyponatremia and other electrolyte abnormalities and lactic acidosis as well as possible sepsis of unclear source being empirically treated for SBP.    Over the course of the afternoon urine output has been decreasing with 30 mL's in the last 2 hours.  She has had a new O2 requirement up to 2 L after having SPO2 was 88% on room air.  RN staff reports she is dyspneic with exertion as well as conversation and having reflux-like chest discomfort.    On my arrival heart rate is 106, SPO2 98% on 2 L, respiratory rate 22, /59.  She is uncomfortable appearing, jaundiced, grossly volume overloaded with anasarca.  She is passing flatus during the exam and staff reports she had a BM today.  Her abdomen is distended and tympanitic on percussion.  Patient's biggest complaint is fatigue she is requesting a rest as well as dry mouth and wheezing through her nose and feeling bloated.  EKG was obtained prior to my arrival.    Impression    Respiratory insufficiency with mild hypoxia and tachypnea coupled with EBEN (?contrast related vs hypovolemia vs HRS) total body volume overload.    Differential diagnosis:  -pulmonary edema, noncardiogenic  -atelectasis  -Need to rule out acidemia.  -Considered ileus and/or abdominal distention causing atelectasis  -Need to evaluate for pleural effusion, considered hepatic hydrothorax but less likely in the absence of any significant amount of intra-abdominal ascites.  -Considered PE as she is not been  terribly mobile but although her INR is elevated recognizes not completely protective.  -Considered HAP in light of her leukocytosis.  Patient is on cefepime.    INTERVENTIONS:  -stat pCXR--> poor inspiration, elevated right hemidiaphragm, bilateral pulmonary vascular congestion  -stat abd XR--> distended loops of bowel on the left but no large gastric bubble so we will hold off on NG tube for now  -stat BMP, vbg  -stat gluc 92mg/dL  -IS x10 q1h while awake  -mobilize OOB  -consult PT to assist w/ mobility  -discontinue IVF  -bumex 1mg IV x1 stat  -echo in am to r/o etoh related cardiomyopathy    Working diagnosis: Atelectasis and/or noncardiogenic pulmonary edema    At the end of the RRT and EKG  radiographic diagnostics have been completed and reviewed, labs have been drawn, Bumex administered and incentive spirometry teaching provided.    disposition: Patient may remain on this unit    Discussed with and defer further cares to rounding hospitalist Dr. Mcdaniel    Code Status: Full Code    Allergies   Allergies   Allergen Reactions     Penicillins Unknown     Hives         Physical Exam   Vital Signs with Ranges:  Temp:  [98.3  F (36.8  C)-99.7  F (37.6  C)] 98.3  F (36.8  C)  Pulse:  [103-125] 103  Heart Rate:  [103-125] 105  Resp:  [16-30] 20  BP: ()/(59-89) 157/87  SpO2:  [88 %-100 %] 97 %  I/O last 3 completed shifts:  In: 1529.33 [P.O.:660; I.V.:869.33]  Out: 770 [Urine:770]    Constitutional mild acute distress  Neuro: +follows commands wiggle toes and show 2 fingers bilat, face symmetric, tongue midline, speech fluent  HEENT: NC/AT, pupils equal round 3mm briskly reactive bilat, sclera icteric, noninjected, conjunctiva pink, mouth moist oral mucosa  Neck: supple  Heart: S1S2, ST, normotensive  Lungs: audible wheezing heard from foot of bed, expiratory wheeze on bilat upper & lower lobe auscultation  Abd:normoactive bowel sounds, soft, nontender, nondisteded  Ext: anasarca, from feet to abd  Integ:  jaundiced    Data     EKG:  Interpreted by NELIDA Guillaume CNP  Time reviewed: 452pm  Symptoms at time of EKG: dyspnea   Rhythm: sinus tachycardia  Rate: 100-110  Axis: Normal  Ectopy: none  Conduction: normal, although QTc is longer than prior >500mS  ST Segments/ T Waves: No acute ischemic changes  Q Waves: none  Comparison to prior: Unchanged from 2/9/20    Troponin:    No lab results found.    IMAGING: (X-ray/CT/MRI)   Recent Results (from the past 24 hour(s))   XR Chest Port 1 View    Narrative    EXAM: XR CHEST PORT 1 VW  LOCATION: [facility]  DATE/TIME: 2/11/2020 10:07 PM    INDICATION: Tachypnea.  COMPARISON: None.      Impression    IMPRESSION: Low lung volumes. Heart size within normal limits for portable technique. Pulmonary vascularity normal. The lungs are clear.   US Abdomen Limited    Narrative    ULTRASOUND ABDOMEN LIMITED February 12, 2020 8:18 AM     HISTORY: Ascites evaluation.    FINDINGS: There are fluid-filled bowel loops.      Impression    IMPRESSION: No significant ascites identified.    JAMA HWANG MD       CBC with Diff:  Recent Labs   Lab Test 02/12/20  0555   WBC 21.4*   HGB 9.0*   *      INR 1.95*      Comprehensive Metabolic Panel:  Recent Labs   Lab 02/12/20  1251 02/12/20  0708 02/12/20  0555   *  --  120*   POTASSIUM  --   --  4.3   CHLORIDE  --   --  93*   CO2  --   --  18*   ANIONGAP  --   --  9   GLC  --   --  97   BUN  --   --  6*   CR  --   --  0.83   GFRESTIMATED  --   --  >90   GFRESTBLACK  --   --  >90   EMILY  --   --  6.7*   MAG  --   --  2.0   PHOS  --  2.7  --    PROTTOTAL  --   --  6.1*   ALBUMIN  --   --  1.4*   BILITOTAL  --   --  13.9*   ALKPHOS  --   --  244*   AST  --   --  139*   ALT  --   --  30       INR:    Recent Labs   Lab Test 02/12/20  0555   INR 1.95*     UA:  Recent Labs   Lab 02/09/20  1755   COLOR Dark Brown   APPEARANCE Cloudy   URINEGLC 30*   URINEBILI Large*   URINEKETONE 5*   SG 1.033   UBLD Moderate*   URINEPH  6.0   PROTEIN 100*   NITRITE Negative   LEUKEST Small*   RBCU 4*   WBCU 24*       Time Spent on this Encounter   I spent 60 minutes (435-535pm) on the unit/floor managing the care of Pat Delgado. Over 50% of my time was spent counseling the patient and/or coordinating care regarding services listed in this note.    Aminata Mckinney, [facility]  Hospitalist [place]  [phone]

## 2020-02-12 NOTE — PROGRESS NOTES
[facility]    Medicine Progress Note - Hospitalist Service       Date of Admission:  2/9/2020  Assessment & Plan      Acute hypoxemic respiratory failure likely secondary to episode of aspiration (resolving)  New on 2/11, became hypoxemic with increased work of breathing  bumex 1 given with improvement after  CXR was clear after done a few hours later, VBG without severe hypoxemia  bnp was negative  Plan  - continues on gentle IVF  - stat CXR and VBG  - continue cefepime  - place loaiza for strict ins and outs    Acute kidney injury  Likely secondary to prerenal azotemia, hopefully not hepatorenal syndrome  0.4 baseline, up to 0.8 with sluggish urine output  Plan  - continue loaiza for strict ins and out  - albumin x1, watch for increasing shortness of breath  - UA, urine sodium/urea/creatinine, renal US  - if continues to elevate will need nephrology    Lactic acidosis  Hyponatremia, probably chronic (120)  Hypokalemia(3.3), hypophosphatemia(1.2)  The lactic acid level is little high for just type B lactic acidosis from her liver disease.  Clinically she does not appear to be septic  Could be multifactorial: hepatitis and hypovolumia, but agree with continuing to treat sepsis    q6 hours sodiums    Gentle IVF     Alcoholic hepatitis with hyperbilirubinemia(TB 8.1), hypoalbuminemia(1.6) with anasarca coagulopathy(INR 1.9)  Right upper quadrant ultrasound showed hepatomegaly and steatosis.   CT scan did confirm a small amount of ascites as well as extensive hepatomegaly  Repeat US showed no ascites    Monitor    Consulted GI, asked to reassess for steroids    Oral vitamin K x 1 did not correct hypercoagulapathy     Possible sepsis, no clear source   No clear source of infection and clinically she does not appear septic.  However, she has leukocytosis, elevated CRP, procalcitonin and lactic acid. Chest X-ray is negative. Urinanalysis is mildly abnormal.    Cultures were sent in the emergency department  and she received cefepime  Possibly SBP as above, but will defer need for paracentesis to GI given the rationale above    Continue intravenous cefepime    Await culture results    Plan for 5 day course at least     Anemia, normocytic(10.4g) possible from liver and anemia of chronic disease  Possibly from alcohol toxicity but MCV is normal and platelets are normal  She does not report any bleeding or black stools.  Iron studies, b12, and folic acid negative  Hemoccult negative    Monitor hemoglobin     Alcohol intoxication(0.14) and dependence   ADHD and major depressive disorder   As mentioned in the HPI, she has been drinking 750 mL of vodka over the past 3 months.  Her last period of sobriety was about 2 or 2-1/2 years ago after inpatient alcohol treatment.  She was sober for 6 to 8 months.  She has been through treatment several times.  She was already in mild withdrawal in the emergency room and received lorazepam.  She denies a history of DTs or alcohol withdrawal seizure.    [facility] protocol ordered    Psychiatry and chemical dependency consultation    Diet: Combination Diet 2 gm NA Diet; Thin Liquids (water, ice chips, juice, milk, gelatin, ice cream, etc)    DVT Prophylaxis: Pneumatic Compression Devices  Amado Catheter: in place, indication: Strict 1-2 Hour I&O  Code Status: Full Code      Disposition Plan   Expected discharge: 4 - 7 days, recommended to prior living arrangement once SIRS/Sepsis treated, respiratory status improves  Entered: Emory Mcdaniel DO 02/12/2020, 1:33 PM       The patient's care was discussed with the Patient.    Emory Mcdaniel DO  Hospitalist Service  [facility]    ______________________________________________________________________    Interval History   Shortness of breath improved. SLP cleared, started diet. Feels abdomen is a bit less distended. No chest pain. Discussed in length with patient and her parents.    Data reviewed today: I reviewed all  medications, new labs and imaging results over the last 24 hours. I personally reviewed no images or EKG's today.    Physical Exam   Vital Signs: Temp: 99.6  F (37.6  C) Temp src: Oral BP: 113/78 Pulse: 112 Heart Rate: 103 Resp: 20 SpO2: 96 % O2 Device: Nasal cannula Oxygen Delivery: 2 LPM  Weight: 284 lbs 13.35 oz  Constitutional: awake, alert, cooperative, no apparent distress  Eyes: extra ocular muscles intact, sclera icteric  Respiratory: WOB WNL. Diminished at bases but no adventitious signs  Cardiovascular: tachycardic, normal S1 and S2, no S3 or S4, and no murmur noted. 2+ pitting edema  GI: soft, distended, non tender, markedly decrease bowel sounds, no mass or hernia.  hepatomegaly noted  Skin: no bruising or bleeding, no rashes and jaundice noted  Neurologic: Awake, alert, oriented to name, place and time.  Cranial nerves II-XII are grossly intact.  Motor is 5 out of 5 bilaterally. No tremor.  Neuropsychiatric: General: normal, calm and normal eye contact  Level of consciousness: alert / normal  Affect: normal    Data   Recent Labs   Lab 02/12/20  1251 02/12/20  0555 02/11/20  2224  02/11/20  1344  02/11/20  0542  02/10/20  0545 02/09/20  2334  02/09/20  1547   WBC  --  21.4*  --   --   --   --  19.7*  --  19.0*  --    < > 24.3*   HGB  --  9.0*  --   --   --   --  9.5*  --  9.5*  --    < > 10.4*   MCV  --  101*  --   --   --   --  100  --  98  --    < > 97   PLT  --  310  --   --   --   --  310  --  263  --    < > 304   INR  --  1.95*  --   --   --   --  1.99*  --   --  1.99*  --  1.86*   * 120* 123*   < > 123*   < > 123*   < > 120* 118*   < > 120*   POTASSIUM  --  4.3  --   --  4.3  --  4.0  --  3.7 3.4   < > 3.3*   CHLORIDE  --  93*  --   --  94  --  94  --  88* 85*   < > 84*   CO2  --  18*  --   --  21  --  22  --  23 21   < > 22   BUN  --  6*  --   --  5*  --  4*  --  2* 2*   < > 1*   CR  --  0.83  --   --  0.56  --  0.48*  --  0.34* 0.38*   < > 0.35*   ANIONGAP  --  9  --   --  8  --  7  --  9  12   < > 14   EMILY  --  6.7*  --   --  6.9*  --  6.8*  --  6.7* 6.2*   < > 6.8*   GLC  --  97  --   --  133*  --  141*  --  106* 108*   < > 118*   ALBUMIN  --  1.4*  --   --   --   --  1.5*  --  1.5*  --   --  1.6*   PROTTOTAL  --  6.1*  --   --   --   --  6.3*  --  6.5*  --   --  6.7*   BILITOTAL  --  13.9*  --   --   --   --  12.1*  --  9.4*  --   --  8.1*   ALKPHOS  --  244*  --   --   --   --  296*  --  325*  --   --  357*   ALT  --  30  --   --   --   --  33  --  32  --   --  33   AST  --  139*  --   --   --   --  172*  --  189*  --   --  203*   LIPASE  --   --   --   --   --   --   --   --   --   --   --  119    < > = values in this interval not displayed.     Recent Results (from the past 24 hour(s))   XR Chest Port 1 View    Narrative    CHEST ONE VIEW February 11, 2020 4:00 PM     HISTORY: Shortness of breath.    COMPARISON: February 9, 2020.      Impression    IMPRESSION: Low lung volumes, no definite infiltrates.    GINO RYAN MD   XR Chest Port 1 View    Narrative    EXAM: XR CHEST PORT 1 VW  LOCATION: [facility]  DATE/TIME: 2/11/2020 10:07 PM    INDICATION: Tachypnea.  COMPARISON: None.      Impression    IMPRESSION: Low lung volumes. Heart size within normal limits for portable technique. Pulmonary vascularity normal. The lungs are clear.   US Abdomen Limited    Narrative    ULTRASOUND ABDOMEN LIMITED February 12, 2020 8:18 AM     HISTORY: Ascites evaluation.    FINDINGS: There are fluid-filled bowel loops.      Impression    IMPRESSION: No significant ascites identified.    JAMA HWANG MD     Medications     - MEDICATION INSTRUCTIONS -       sodium chloride 50 mL/hr (02/12/20 0914)       ceFEPIme (MAXIPIME) IV  2 g Intravenous Q12H     insulin aspart  1-7 Units Subcutaneous TID AC     insulin aspart  1-5 Units Subcutaneous At Bedtime     ipratropium - albuterol 0.5 mg/2.5 mg/3 mL  3 mL Nebulization 4x daily     metoprolol tartrate  25 mg Oral BID     pantoprazole  40 mg Oral QAM AC      polyethylene glycol  17 g Oral Daily     psyllium  1 packet Oral Daily     senna-docusate  1 tablet Oral BID    Or     senna-docusate  2 tablet Oral BID     sodium chloride (PF)  3 mL Intracatheter Q8H     IV Fluid with vitamins   Intravenous Q24H     thiamine  100 mg Intravenous Daily

## 2020-02-12 NOTE — PLAN OF CARE
MD Notification    Notified Person: MD    Notified Person Name: Jada    Notification Date/Time: 02/12/20 1:22 AM    Notification Interaction: Text page sent    Purpose of Notification: [address] M. G.  To clarify - pt is not doing well with swallowing, held her oral senna earlier. On aspiration precautions. May need SLP consult? Do you want the metoprolol IV?  Dolores MEDINA *[id number]    Orders Received: PRN metoprolol ordered for HR >120    Comments:

## 2020-02-12 NOTE — PLAN OF CARE
MD Notification    Notified Person: MD    Notified Person Name: Avelino    Notification Date/Time: 2/11/20 9:14 PM    Notification Interaction: Text page    Purpose of Notification: 267 M. G.  Pt feeling very SOB, difficult to get words out, restless. O2 sats 96% on 2L NC. Wondering if you'd like to lay eyes on her.  Dolores MEDINA *[id number]    Orders Received: CXR, lactic acid, VBG    Comments:

## 2020-02-12 NOTE — PLAN OF CARE
"MD Notification    Notified Person: MD    Notified Person Name: Jada    Notification Date/Time: 1:09 AM 2/12/20    Notification Interaction: Text page sent    Purpose of Notification: [address] M. G.  Pt having trouble sleeping, restless. Feeling SOB, VSS except HR 120s O2 sats 96% on 1L NC. Abdomen distended & very low UO in Amado. States \"I just feel like I'm going to burst\" re: abdomen.   Dolores MEDINA *[id number]    Orders Received: Scheduled metoprolol ordered.    Comments:      "

## 2020-02-12 NOTE — PROGRESS NOTES
CLINICAL SWALLOW EVALUATION       02/12/20 0900   General Information   Onset Date 02/11/20   Start of Care Date 02/12/20   Referring Physician Dr. Mcdaniel   Patient Profile Review/OT: Additional Occupational Profile Info See Profile for full history and prior level of function   Patient/Family Goals Statement Patient would like to eat and drink.    Swallowing Evaluation Bedside swallow evaluation   Behaviorial Observations Alert;Confused   Mode of current nutrition NPO   Respiratory Status Room air   Comments Patient admitted with acute respiratory failure and lactic acidosis, alcoholic hepatitis.  Her PMH is significant for ETOH abuse, ADHD, and depression.  Her mouth is very dry and she reports blood on lips and teeth is due to dryness.  She denies difficulty swallowing at baseline and eats a regular diet with thin liquids.     Clinical Swallow Evaluation   Oral Musculature generally intact   Structural Abnormalities none present   Dentition present and adequate   Mucosal Quality dry;cracked   Mandibular Strength and Mobility intact   Oral Labial Strength and Mobility WFL   Lingual Strength and Mobility WFL   Velar Elevation intact   Buccal Strength and Mobility intact   Laryngeal Function Cough;Throat clear;Swallow;Voicing initiated;Dry swallow palpated   Additional Documentation Yes   Clinical Swallow Eval: Thin Liquid Texture Trial   Mode of Presentation, Thin Liquids cup;self-fed;spoon;fed by clinician   Volume of Liquid or Food Presented 8 oz   Oral Phase of Swallow WFL   Pharyngeal Phase of Swallow intact   Diagnostic Statement No overt Sx of aspiration.    Clinical Swallow Eval: Puree Solid Texture Trial   Mode of Presentation, Puree spoon;self-fed   Volume of Puree Presented 3 oz   Oral Phase, Puree Poor AP movement   Pharyngeal Phase, Puree intact   Diagnostic Statement Slow oral phase, no overt Sx of aspiration.    Clinical Swallow Eval: Solid Food Texture Trial   Mode of Presentation, Solid  self-fed   Volume of Solid Food Presented 2 crackers   Oral Phase, Solid Poor AP movement   Pharyngeal Phase, Solid intact   Diagnostic Statement Slow oral phase with dry solids, no aspiration Sx and good clearance with liquid rinse.    VFSS Evaluation   VFSS Additional Documentation No   Esophageal Phase of Swallow   Esophageal comments GERD hx, on PPI   Swallow Eval: Clinical Impressions   Skilled Criteria for Therapy Intervention Skilled criteria met.  Treatment indicated.   Functional Assessment Scale (FAS) 5   Dysphagia Outcome Severity Scale (JANAY) Level 5 - JANAY   Treatment Diagnosis Mild oropharyngeal dysphagia   Diet texture recommendations Regular diet;Thin liquids   Recommended Feeding/Eating Techniques maintain upright posture during/after eating for 30 mins;alternate between small bites and sips of food/liquid;small sips/bites;other (see comments)  (Breathing breaks)   Demonstrates Need for Referral to Another Service dietitian;social work   Therapy Frequency 2x/week   Predicted Duration of Therapy Intervention (days/wks) 1 week   Anticipated Discharge Disposition home   Risks and Benefits of Treatment have been explained. Yes   Patient, family and/or staff in agreement with Plan of Care Yes   Clinical Impression Comments Patient presents with acute dysphagia likely related to altered mental status and generalized weakness, component of oral dryness.  Patient tolerating trials of thin liquids, puree, solids, and whole pills with water without overt Sx of aspiration, but with increased work of breathing during upright positioning and intake.  Recommend initiate regular diet with thin liquids, as approved per GI note as well.  Eating only when alert, up at 90 degrees during all intake.  Recommend intermittent cueing to slow pace rate of intake to allow breathing breaks.    Total Evaluation Time   Total Evaluation Time (Minutes) 30

## 2020-02-12 NOTE — PLAN OF CARE
MD Notification    Notified Person: MD    Notified Person Name: Jada    Notification Date/Time: 2/11/20 11:01 PM    Notification Interaction: Text page    Purpose of Notification: [address] M. G.  Pt CXR & lab results in that were ordered STAT by Dr. You.  Dolores RN *[id number]    Orders Received: Dr. You called and stated no interventions necessary.    Comments:

## 2020-02-12 NOTE — PROGRESS NOTES
[facility]  Gastroenterology Progress Note     Pat Delgado MRN# [id number]   YOB: 1990 Age: 29 year old          Assessment and Plan:   Update:   Patient has had elevated in bilirubin but ALP and AST trending down. WBC remain elevated at 21.4. Abdomen distended-but patient states abdomen feels better. Having loose non bloody stools. Tolerating regular diet. Much more alert today with slurred speech.      Active Problems:  Abdominal distention  Pat Delgado is a pleasant 29 year old female with history of alcohol abuse. She has complaints of abdominal pain and distention. GI was asked to evaluate for ascites and consider paracentesis.  She is noted have elevated WBC and lactic acid. Being treated for sepsis but unsure source. Imaging notes only small amount of fluid noted on imaging largest pocket 2 cm. NO ascites noted on exam. SBP not likely given no fluid collection. A sample would be difficult/impossible to obtain given no pocket of fluid noted. Imaging does not mention ileus or evidence of colonic obstruction. She has had limited oral intake and constipation. Therefore abdominal distention likely related to chronic constipation. Started on rectal lactulose and having large amount of watery stool- now discontinued.     - Has positive bowel sounds- ok to continue regular diet  - Continue with daily miralax and metamucil     Alcoholic hepatitis  Patient has noted hepatomegaly on CT scan. Platelets 310. INR is elevated. No history of EGD. Unlikely has liver cirrhosis as no evidence on CT and no noted thrombocytopenia. AST elevated and trending down. Bilirubin continues to elevate but often trails liver transaminases. Consistent with alcohol hepatitis.   - Appreciate psychiatry and CD consults  - Avoid alcohol intake  - Daily CMP  - CIWA protocol     Bright red rectal bleeding  Anemia  Patient has reported bright red blood in stools but not to ED. Her hemoglobin is at 9.0  today and below baseline 12-13.  Occult blood negative. There is a concern for possible hemorrhoids vs unlikely IBD vs PUD.  Also there is concern for alcoholic gastritis with anemia.   CT does note wall thickening in cecum likely from non-distended colon.   - Recommend daily CBC  - Will plan EGD and colonoscopy once stable or as outpatient to r/o GI bleed - sodium currently at 120.  - Continue oral pantoprazole 40 mg daily     Possible Sepsis  Currently being treated with IV cefepime. Highly unlikely related to SBP. Awaiting blood culture results- prelim results negative.            Acute liver failure without hepatic coma  Hyponatremia  Sepsis, due to unspecified organism, unspecified whether acute organ dysfunction present (H)      Interval History:   no new complaints, denies chest pain, denies shortness of breath, pain is controlled, alert, oriented to person, place and time and has had a bowel movement in the last 24 hours              Review of Systems:   C: NEGATIVE for fever, chills, change in weight  E/M: NEGATIVE for ear, mouth and throat problems  R: NEGATIVE for significant cough or SOB  CV: NEGATIVE for chest pain, palpitations or peripheral edema             Medications:   I have reviewed this patient's current medications    ceFEPIme (MAXIPIME) IV  2 g Intravenous Q8H     insulin aspart  1-7 Units Subcutaneous TID AC     insulin aspart  1-5 Units Subcutaneous At Bedtime     ipratropium - albuterol 0.5 mg/2.5 mg/3 mL  3 mL Nebulization 4x daily     metoprolol tartrate  25 mg Oral BID     pantoprazole  40 mg Oral QAM AC     polyethylene glycol  17 g Oral Daily     psyllium  1 packet Oral Daily     senna-docusate  1 tablet Oral BID    Or     senna-docusate  2 tablet Oral BID     sodium chloride (PF)  3 mL Intracatheter Q8H     IV Fluid with vitamins   Intravenous Q24H     thiamine  100 mg Intravenous Daily                  Physical Exam:   Vitals were reviewed  Vital Signs with Ranges  Temp:  [98.3  F  (36.8  C)-100.2  F (37.9  C)] 98.8  F (37.1  C)  Pulse:  [115-126] 125  Heart Rate:  [107-126] 122  Resp:  [16-26] 26  BP: ()/(52-89) 120/69  SpO2:  [87 %-100 %] 96 %  I/O last 3 completed shifts:  In: 3760.83 [P.O.:180; I.V.:3580.83]  Out: 620 [Urine:620]  Constitutional: alert, no distress, cooperative, smiling and jaundice   Cardiovascular: negative, PMI normal. No lifts, heaves, or thrills. RRR. No murmurs, clicks gallops or rub  Respiratory: negative, Percussion normal. Good diaphragmatic excursion. Lungs clear  Head: Normocephalic. No masses, lesions, tenderness or abnormalities  Neck: Neck supple. No adenopathy. Thyroid symmetric, normal size,, Carotids without bruits.  Abdomen: Abdomen firm- improved , tender. BS normal. No masses, organomegaly, positive findings: distended, tenderness moderate generalized           Data:   I reviewed the patient's new clinical lab test results.   Recent Labs   Lab Test 02/12/20  0555 02/11/20  0542 02/10/20  0545 02/09/20  2334   WBC 21.4* 19.7* 19.0*  --    HGB 9.0* 9.5* 9.5*  --    * 100 98  --     310 263  --    INR 1.95* 1.99*  --  1.99*     Recent Labs   Lab Test 02/12/20  0555 02/11/20  1344 02/11/20  0542   POTASSIUM 4.3 4.3 4.0   CHLORIDE 93* 94 94   CO2 18* 21 22   BUN 6* 5* 4*   ANIONGAP 9 8 7     Recent Labs   Lab Test 02/12/20  0555 02/11/20  0542 02/10/20  0545 02/09/20  1755 02/09/20  1547   ALBUMIN 1.4* 1.5* 1.5*  --  1.6*   BILITOTAL 13.9* 12.1* 9.4*  --  8.1*   ALT 30 33 32  --  33   * 172* 189*  --  203*   PROTEIN  --   --   --  100*  --    LIPASE  --   --   --   --  119       I reviewed the patient's new imaging results.    All laboratory data reviewed  All imaging studies reviewed by me.    Aminata Rangel PA-C,  2/12/2020  [facility]  Office : [phone]  Cell: [phone] (Dr. Hobson)  Cell: [phone] (Aminata Rangel PA-C)

## 2020-02-12 NOTE — PROGRESS NOTES
Paged by nurse, patient is lethargic, confused, complaining of abdominal discomfort and appearing to have some SOB. IVF were restarted around 2000. CXR earlier today with low lung volumes. Lactic acid earlier today mildly elevated to 2.5. Patient is on cefepime. Heart rate is stable in 120s, /75. I do not see any clear change in her clinical status.  - Check CXR  - Check lactate, VBG  - Continue to monitor    Addendum: Labs look stable, CXR unchanged. Patient reportedly doing better with family visiting. May be a component of alcohol withdrawal earlier.    Kavon You MD

## 2020-02-12 NOTE — PROGRESS NOTES
Patient was given  Nebulizer treatments as ordered. BS are Clear but diminished bilaterally. SpO2 is 96% on 1 lpm nc.

## 2020-02-13 ENCOUNTER — APPOINTMENT (OUTPATIENT)
Dept: PHYSICAL THERAPY | Facility: CLINIC | Age: 30
DRG: 871 | End: 2020-02-13
Attending: NURSE PRACTITIONER
Payer: COMMERCIAL

## 2020-02-13 ENCOUNTER — APPOINTMENT (OUTPATIENT)
Dept: CARDIOLOGY | Facility: CLINIC | Age: 30
DRG: 871 | End: 2020-02-13
Attending: HOSPITALIST
Payer: COMMERCIAL

## 2020-02-13 LAB
ALBUMIN SERPL-MCNC: 1.7 G/DL (ref 3.4–5)
ALP SERPL-CCNC: 190 U/L (ref 40–150)
ALT SERPL W P-5'-P-CCNC: 27 U/L (ref 0–50)
ANION GAP SERPL CALCULATED.3IONS-SCNC: 7 MMOL/L (ref 3–14)
ANION GAP SERPL CALCULATED.3IONS-SCNC: 8 MMOL/L (ref 3–14)
AST SERPL W P-5'-P-CCNC: 104 U/L (ref 0–45)
BILIRUB SERPL-MCNC: 15.4 MG/DL (ref 0.2–1.3)
BUN SERPL-MCNC: 8 MG/DL (ref 7–30)
BUN SERPL-MCNC: 9 MG/DL (ref 7–30)
CALCIUM SERPL-MCNC: 7.1 MG/DL (ref 8.5–10.1)
CALCIUM SERPL-MCNC: 7.1 MG/DL (ref 8.5–10.1)
CHLORIDE SERPL-SCNC: 94 MMOL/L (ref 94–109)
CHLORIDE SERPL-SCNC: 96 MMOL/L (ref 94–109)
CO2 SERPL-SCNC: 20 MMOL/L (ref 20–32)
CO2 SERPL-SCNC: 20 MMOL/L (ref 20–32)
CREAT SERPL-MCNC: 1.07 MG/DL (ref 0.52–1.04)
CREAT SERPL-MCNC: 1.25 MG/DL (ref 0.52–1.04)
ERYTHROCYTE [DISTWIDTH] IN BLOOD BY AUTOMATED COUNT: 21.1 % (ref 10–15)
GFR SERPL CREATININE-BSD FRML MDRD: 58 ML/MIN/{1.73_M2}
GFR SERPL CREATININE-BSD FRML MDRD: 70 ML/MIN/{1.73_M2}
GLUCOSE BLDC GLUCOMTR-MCNC: 80 MG/DL (ref 70–99)
GLUCOSE BLDC GLUCOMTR-MCNC: 82 MG/DL (ref 70–99)
GLUCOSE BLDC GLUCOMTR-MCNC: 88 MG/DL (ref 70–99)
GLUCOSE BLDC GLUCOMTR-MCNC: 93 MG/DL (ref 70–99)
GLUCOSE BLDC GLUCOMTR-MCNC: 97 MG/DL (ref 70–99)
GLUCOSE SERPL-MCNC: 89 MG/DL (ref 70–99)
GLUCOSE SERPL-MCNC: 90 MG/DL (ref 70–99)
HCT VFR BLD AUTO: 23.9 % (ref 35–47)
HGB BLD-MCNC: 8.2 G/DL (ref 11.7–15.7)
INR PPP: 2.13 (ref 0.86–1.14)
LACTATE BLD-SCNC: 1.5 MMOL/L (ref 0.7–2)
MAGNESIUM SERPL-MCNC: 2.2 MG/DL (ref 1.6–2.3)
MCH RBC QN AUTO: 34.7 PG (ref 26.5–33)
MCHC RBC AUTO-ENTMCNC: 34.3 G/DL (ref 31.5–36.5)
MCV RBC AUTO: 101 FL (ref 78–100)
PHOSPHATE SERPL-MCNC: 2.4 MG/DL (ref 2.5–4.5)
PHOSPHATE SERPL-MCNC: 2.5 MG/DL (ref 2.5–4.5)
PLATELET # BLD AUTO: 253 10E9/L (ref 150–450)
POTASSIUM SERPL-SCNC: 4.1 MMOL/L (ref 3.4–5.3)
POTASSIUM SERPL-SCNC: 4.4 MMOL/L (ref 3.4–5.3)
PROT SERPL-MCNC: 5.5 G/DL (ref 6.8–8.8)
RBC # BLD AUTO: 2.36 10E12/L (ref 3.8–5.2)
SODIUM SERPL-SCNC: 121 MMOL/L (ref 133–144)
SODIUM SERPL-SCNC: 122 MMOL/L (ref 133–144)
SODIUM SERPL-SCNC: 122 MMOL/L (ref 133–144)
SODIUM SERPL-SCNC: 124 MMOL/L (ref 133–144)
SODIUM SERPL-SCNC: 125 MMOL/L (ref 133–144)
WBC # BLD AUTO: 16.7 10E9/L (ref 4–11)

## 2020-02-13 PROCEDURE — 25000125 ZZHC RX 250: Performed by: HOSPITALIST

## 2020-02-13 PROCEDURE — 25000132 ZZH RX MED GY IP 250 OP 250 PS 637: Performed by: HOSPITALIST

## 2020-02-13 PROCEDURE — 84100 ASSAY OF PHOSPHORUS: CPT | Performed by: HOSPITALIST

## 2020-02-13 PROCEDURE — 80048 BASIC METABOLIC PNL TOTAL CA: CPT | Performed by: HOSPITALIST

## 2020-02-13 PROCEDURE — 25000132 ZZH RX MED GY IP 250 OP 250 PS 637: Performed by: INTERNAL MEDICINE

## 2020-02-13 PROCEDURE — 25800030 ZZH RX IP 258 OP 636: Performed by: HOSPITALIST

## 2020-02-13 PROCEDURE — 85027 COMPLETE CBC AUTOMATED: CPT | Performed by: HOSPITALIST

## 2020-02-13 PROCEDURE — 99232 SBSQ HOSP IP/OBS MODERATE 35: CPT | Performed by: HOSPITALIST

## 2020-02-13 PROCEDURE — 25000128 H RX IP 250 OP 636: Performed by: HOSPITALIST

## 2020-02-13 PROCEDURE — 93306 TTE W/DOPPLER COMPLETE: CPT | Mod: 26 | Performed by: INTERNAL MEDICINE

## 2020-02-13 PROCEDURE — 36415 COLL VENOUS BLD VENIPUNCTURE: CPT | Performed by: HOSPITALIST

## 2020-02-13 PROCEDURE — 25500064 ZZH RX 255 OP 636: Performed by: HOSPITALIST

## 2020-02-13 PROCEDURE — 97161 PT EVAL LOW COMPLEX 20 MIN: CPT | Mod: GP

## 2020-02-13 PROCEDURE — 21000000 ZZH R&B IMCU HEART CARE

## 2020-02-13 PROCEDURE — 83605 ASSAY OF LACTIC ACID: CPT | Performed by: HOSPITALIST

## 2020-02-13 PROCEDURE — 93306 TTE W/DOPPLER COMPLETE: CPT

## 2020-02-13 PROCEDURE — P9047 ALBUMIN (HUMAN), 25%, 50ML: HCPCS | Performed by: HOSPITALIST

## 2020-02-13 PROCEDURE — 00000146 ZZHCL STATISTIC GLUCOSE BY METER IP

## 2020-02-13 PROCEDURE — 84295 ASSAY OF SERUM SODIUM: CPT | Performed by: HOSPITALIST

## 2020-02-13 PROCEDURE — 25000132 ZZH RX MED GY IP 250 OP 250 PS 637: Performed by: PHYSICIAN ASSISTANT

## 2020-02-13 PROCEDURE — 25000132 ZZH RX MED GY IP 250 OP 250 PS 637: Performed by: NURSE PRACTITIONER

## 2020-02-13 PROCEDURE — 97116 GAIT TRAINING THERAPY: CPT | Mod: GP

## 2020-02-13 PROCEDURE — 83735 ASSAY OF MAGNESIUM: CPT | Performed by: HOSPITALIST

## 2020-02-13 PROCEDURE — 97530 THERAPEUTIC ACTIVITIES: CPT | Mod: GP

## 2020-02-13 PROCEDURE — 80053 COMPREHEN METABOLIC PANEL: CPT | Performed by: HOSPITALIST

## 2020-02-13 PROCEDURE — 40000141 ZZH STATISTIC PERIPHERAL IV START W/O US GUIDANCE

## 2020-02-13 PROCEDURE — 85610 PROTHROMBIN TIME: CPT | Performed by: HOSPITALIST

## 2020-02-13 RX ORDER — MIDODRINE HYDROCHLORIDE 2.5 MG/1
2.5 TABLET ORAL
Status: DISCONTINUED | OUTPATIENT
Start: 2020-02-13 | End: 2020-02-14

## 2020-02-13 RX ORDER — ALBUMIN (HUMAN) 12.5 G/50ML
25 SOLUTION INTRAVENOUS EVERY 8 HOURS
Status: DISCONTINUED | OUTPATIENT
Start: 2020-02-13 | End: 2020-02-13

## 2020-02-13 RX ADMIN — THIAMINE HYDROCHLORIDE 100 MG: 100 INJECTION, SOLUTION INTRAMUSCULAR; INTRAVENOUS at 08:46

## 2020-02-13 RX ADMIN — METOPROLOL TARTRATE 25 MG: 25 TABLET ORAL at 20:50

## 2020-02-13 RX ADMIN — CEFEPIME HYDROCHLORIDE 2 G: 2 INJECTION, POWDER, FOR SOLUTION INTRAVENOUS at 20:47

## 2020-02-13 RX ADMIN — MAGNESIUM SULFATE HEPTAHYDRATE 2 G: 40 INJECTION, SOLUTION INTRAVENOUS at 00:25

## 2020-02-13 RX ADMIN — ALBUMIN HUMAN 25 G: 0.25 SOLUTION INTRAVENOUS at 18:31

## 2020-02-13 RX ADMIN — SENNOSIDES AND DOCUSATE SODIUM 1 TABLET: 8.6; 5 TABLET ORAL at 08:55

## 2020-02-13 RX ADMIN — CEFEPIME HYDROCHLORIDE 2 G: 2 INJECTION, POWDER, FOR SOLUTION INTRAVENOUS at 08:46

## 2020-02-13 RX ADMIN — PANTOPRAZOLE SODIUM 40 MG: 40 TABLET, DELAYED RELEASE ORAL at 08:55

## 2020-02-13 RX ADMIN — FUROSEMIDE 10 MG: 10 INJECTION, SOLUTION INTRAVENOUS at 08:46

## 2020-02-13 RX ADMIN — THERA TABS 1 TABLET: TAB at 08:55

## 2020-02-13 RX ADMIN — ALBUMIN HUMAN 25 G: 0.25 SOLUTION INTRAVENOUS at 08:47

## 2020-02-13 RX ADMIN — POLYETHYLENE GLYCOL 3350 17 G: 17 POWDER, FOR SOLUTION ORAL at 11:20

## 2020-02-13 RX ADMIN — POTASSIUM PHOSPHATE, MONOBASIC AND POTASSIUM PHOSPHATE, DIBASIC 15 MMOL: 224; 236 INJECTION, SOLUTION INTRAVENOUS at 00:03

## 2020-02-13 RX ADMIN — METOPROLOL TARTRATE 25 MG: 25 TABLET ORAL at 08:55

## 2020-02-13 RX ADMIN — HUMAN ALBUMIN MICROSPHERES AND PERFLUTREN 9 ML: 10; .22 INJECTION, SOLUTION INTRAVENOUS at 10:25

## 2020-02-13 RX ADMIN — PSYLLIUM HUSK 1 PACKET: 3.4 POWDER ORAL at 13:02

## 2020-02-13 RX ADMIN — FUROSEMIDE 10 MG: 10 INJECTION, SOLUTION INTRAVENOUS at 15:54

## 2020-02-13 RX ADMIN — ALBUMIN HUMAN 25 G: 0.25 SOLUTION INTRAVENOUS at 12:58

## 2020-02-13 RX ADMIN — CARBIDOPA AND LEVODOPA 2.5 MG: 50; 200 TABLET, EXTENDED RELEASE ORAL at 20:49

## 2020-02-13 RX ADMIN — FUROSEMIDE 10 MG: 10 INJECTION, SOLUTION INTRAVENOUS at 05:09

## 2020-02-13 RX ADMIN — FOLIC ACID 1 MG: 1 TABLET ORAL at 08:55

## 2020-02-13 RX ADMIN — ALBUMIN HUMAN 25 G: 0.25 SOLUTION INTRAVENOUS at 21:52

## 2020-02-13 ASSESSMENT — MIFFLIN-ST. JEOR: SCORE: 2071.69

## 2020-02-13 NOTE — PLAN OF CARE
Pt is disorientated to time, unable to recall day. Pt is lethargic and arouses to voice, telling staff all I want to do is sleep. Pt dyspneic and is requiring 2L of O2. Pt c/o nasal congestion and BROWNING. Unable to complete a sentence without becoming winded. Abdomen distended and firm, tender to touch. Amado in place with decreased urine output.  Bowel sounds are hypoactive. +3/+4 generalized edema. Sclera and skin yellow. Up with assist x2 using gait belt to bedside commode. RRT called for low urine output and increased dyspnea. Pt refused dinner stating she was too tired to eat. Tele .

## 2020-02-13 NOTE — PLAN OF CARE
Neuro- Disoriented to time of day   Most Recent Vitals- Temp: 99.2  F (37.3  C) Temp src: Oral BP: 124/57 Pulse: 96 Heart Rate: 100 Resp: 26 SpO2: 97 % O2 Device: None (Room air) Oxygen Delivery: 2 LPM  Tele - SR/ST  Cardiac- No chest pain reported   Resp- LS diminished-no shortness of breath reported  O2- RA  Activity- Ax2 with GB  Pain- Denies   Drips- SL  Drains/Tubes- PIV  Aggression Color- Green  Plan- Lasix to be given with albumin to increase output, echo to be done today  Misc-     Cheryl Frye, RN

## 2020-02-13 NOTE — PROGRESS NOTES
[facility]  Gastroenterology Progress Note     Pat Delgado MRN# [id number]   YOB: 1990 Age: 29 year old          Assessment and Plan:   Update:   Patient has had elevated in bilirubin to 15.4,but ALP and AST trending down. WBC remain elevated but improved to 16.4 Abdomen distended-but patient states abdomen feels better. Abdominal xray notes concerns for small bowel obstruction. Has hypoactive bowel sounds. Had large bowel movement yesterday and passing flatus. Has decreased urine output. Albumin 1.7 and had albumin started yesterday. Had episode of shortness of breath and evaluated yesterday. Chest xray noted hypo inflated lungs and bilateral vascular prominence and could represent increased edema. Die not borderline cardiomegaly.Having loose non bloody stools.      Active Problems:  Abdominal distention  Pat Delgado is a pleasant 29 year old female with history of alcohol abuse. She has complaints of abdominal pain and distention. GI was asked to evaluate for ascites and consider paracentesis.  Imaging notes only small amount of fluid noted on imaging largest pocket 2 cm. NO ascites noted on exam. SBP not likely given no fluid collection. Abdominal xray notes small bowel obstruction. Passing gas, bowel sound hypoactive.     - Start clear liquid diet  - Repeat abdominal xray tomorrow  - Monitor bowel movement     Alcoholic hepatitis  Maddrey score calculated yesterday at 52. Mentation is improved. Patient has noted hepatomegaly on CT scan. Cause of elevation in WBC may be related to hepatitis. INR continues to elevate.  AST elevated and trending down. Bilirubin continues to elevate but often trails liver transaminases.    - Appreciate psychiatry and CD consults  - Avoid alcohol intake  - Daily CMP and INR  - [facility] protocol     Bright red rectal bleeding  Anemia  Patient has reported bright red blood in stools but not to ED. Her hemoglobin is at 8.2 today and below baseline  12-13.  Occult blood negative. There is a concern for PUD vs lower gi bleed.  Also, there is concern for alcoholic gastritis with anemia.   CT does note wall thickening in cecum likely from non-distended colon.   - Recommend daily CBC  - Will plan EGD and colonoscopy once stable or as outpatient to r/o GI bleed - sodium currently at 121.  - Continue oral pantoprazole 40 mg daily     Hyponatremia w/ increase in creatinine  -doubling of the creatinine is a fair concern given usually liver dz pt has protein wasting given decrease in synthesis and catabolism, combined with pt's anasarca, hypoalbuminemia, will start albumin challenge, it will also help w/ inflammation from Etoh hepatitis  -will increase albumin to 25g QID w/ lasix IV 10 mg QID in hope of increase oncotic pressure with lasix to diuresis generalized edema, increase lasix as per renal function or urine output            Acute liver failure without hepatic coma  Hyponatremia  Sepsis, due to unspecified organism, unspecified whether acute organ dysfunction present (H)      Interval History:   no new complaints, doing well, denies chest pain, denies shortness of breath, denies abdominal pain, alert, oriented to person, place and time, has had a bowel movement in the last 24 hours and doing well; no cp, sob, n/v/d, or abd pain.              Review of Systems:   C: NEGATIVE for fever, chills, change in weight  E/M: NEGATIVE for ear, mouth and throat problems  R: NEGATIVE for significant cough or SOB  CV: NEGATIVE for chest pain, palpitations or peripheral edema             Medications:   I have reviewed this patient's current medications    albumin human  25 g Intravenous 4x Daily     ceFEPIme (MAXIPIME) IV  2 g Intravenous Q12H     folic acid  1 mg Oral Daily     furosemide  10 mg Intravenous Q6H     insulin aspart  1-7 Units Subcutaneous TID AC     insulin aspart  1-5 Units Subcutaneous At Bedtime     metoprolol tartrate  25 mg Oral BID     multivitamin,  therapeutic  1 tablet Oral Daily     pantoprazole  40 mg Oral QAM AC     polyethylene glycol  17 g Oral Daily     psyllium  1 packet Oral Daily     senna-docusate  1 tablet Oral BID    Or     senna-docusate  2 tablet Oral BID     sodium chloride (PF)  3 mL Intracatheter Q8H     thiamine  100 mg Intravenous Daily                  Physical Exam:   Vitals were reviewed  Vital Signs with Ranges  Temp:  [97.5  F (36.4  C)-99.6  F (37.6  C)] 97.5  F (36.4  C)  Pulse:  [] 96  Heart Rate:  [] 100  Resp:  [13-30] 26  BP: ()/() 124/57  SpO2:  [88 %-100 %] 96 %  I/O last 3 completed shifts:  In: 480 [P.O.:480]  Out: 630 [Urine:630]  Constitutional: healthy, alert and no distress   Cardiovascular: negative, PMI normal. No lifts, heaves, or thrills. RRR. No murmurs, clicks gallops or rub  Respiratory: negative, Percussion normal. Good diaphragmatic excursion. Lungs clear  Head: Normocephalic. No masses, lesions, tenderness or abnormalities  Neck: Neck supple. No adenopathy. Thyroid symmetric, normal size,, Carotids without bruits.  Abdomen: Abdomen soft, non-tender. No masses, organomegaly, positive findings: distended, hypoactive bowel sounds           Data:   I reviewed the patient's new clinical lab test results.   Recent Labs   Lab Test 02/13/20  0544 02/12/20  0555 02/11/20  0542   WBC 16.7* 21.4* 19.7*   HGB 8.2* 9.0* 9.5*   * 101* 100    310 310   INR 2.13* 1.95* 1.99*     Recent Labs   Lab Test 02/13/20  0544 02/12/20  1731 02/12/20  0555   POTASSIUM 4.4 4.5 4.3   CHLORIDE 94 94 93*   CO2 20 20 18*   BUN 8 7 6*   ANIONGAP 7 9 9     Recent Labs   Lab Test 02/13/20  0544 02/12/20  0555 02/11/20  0542  02/09/20  1755 02/09/20  1547   ALBUMIN 1.7* 1.4* 1.5*   < >  --  1.6*   BILITOTAL 15.4* 13.9* 12.1*   < >  --  8.1*   ALT 27 30 33   < >  --  33   * 139* 172*   < >  --  203*   PROTEIN  --   --   --   --  100*  --    LIPASE  --   --   --   --   --  119    < > = values in this  interval not displayed.       I reviewed the patient's new imaging results.    All laboratory data reviewed  All imaging studies reviewed by me.    Aminata Rangel PA-C,  2/13/2020  [facility]  Office : [phone]  Cell: [phone] (Dr. Hobson)  Cell: [phone] (Aminata Rangel PA-C)

## 2020-02-13 NOTE — PROGRESS NOTES
"CLINICAL NUTRITION SERVICES - REASSESSMENT NOTE      Recommendations Ordered by Registered Dietitian (RD): If unable to advance from clear liquid diet within 1-3 days, consider TPN   Malnutrition (2/10):   % Weight Loss: Unable to assess  % Intake:  </= 75% for >/= 1 month (severe malnutrition) --> based on information obtained from EMR.   Subcutaneous Fat Loss: None observed  Muscle Loss: Temporal region mild depletion  Fluid Retention:  Mild 2+  Malnutrition Diagnosis: Non-Severe malnutrition  In Context of:  Acute illness or injury  Environmental or social circumstances     EVALUATION OF PROGRESS TOWARD GOALS   Diet:  Clear liquid - started 2/13    Intake/Tolerance: No oral intake x4 days since admission.    ASSESSED NUTRITION NEEDS:  Dosing Weight: 79 kg - adjusted   Estimated Energy Needs: 5844-4583 kcals (25-30 Kcal/Kg)  Justification: maintenance  Estimated Protein Needs:  grams protein (1.2-1.5 g pro/Kg)  Justification: maintenance  Estimated Fluid Needs: Per MD pending fluid status     NEW FINDINGS:   - Per GI note 2/13 - \"Abdominal xray notes concerns for small bowel obstruction. Has hypoactive bowel sounds. Had large bowel movement yesterday and passing flatus. Has decreased urine output.\"    Previous Goals:   Tolerance of diet >CLD in the next 48 hours.  Evaluation: Not met     Previous Nutrition Diagnosis:   Inadequate oral intake related to suspected displacement of nutrient-dense options with etoh as evidenced by intakes <75% for >1 month, e/o at least mild muscle loss, coding for non-severe malnutrition.   Evaluation: No change    MALNUTRITION  % Weight Loss: Unable to assess  % Intake:  </= 75% for >/= 1 month (severe malnutrition) --> based on information obtained from EMR.   Subcutaneous Fat Loss: None observed  Muscle Loss:  Temporal region mild depletion  Fluid Retention:  Mild 2+  Malnutrition Diagnosis: Non-Severe malnutrition  In Context of:  Acute illness or injury  Environmental or " social circumstances    CURRENT NUTRITION DIAGNOSIS  Inadequate oral intake related to altered GI function and inability to advance diet AEB NPO/CLD x4 days and coding for malnutrition.     INTERVENTIONS  Recommendations / Nutrition Prescription  -If unable to advance from clear liquid diet within 1-3 days, consider TPN   -Diet advancement per GI    Implementation  Nutrition education: Not appropriate at this time due to patient condition    Goals  Tolerance of diet advancement from clear liquids within 1-3 days.       MONITORING AND EVALUATION:  Progress towards goals will be monitored and evaluated per protocol and Practice Guidelines      Kiara Arreguin  Dietetic Intern

## 2020-02-13 NOTE — PROGRESS NOTES
[facility]    Medicine Progress Note - Hospitalist Service       Date of Admission:  2/9/2020  Assessment & Plan      Acute hypoxemic respiratory failure likely secondary to episode of aspiration (resolving)  New on 2/11 and occurred again on 2/12, became hypoxemic with increased work of breathing  Occurred after her first 2 dosings of IV albumin but has not occurred on subsequent infusions  Some improvements with diuretics after   CXR on both occasions was poor inspiration, which can exaggerate pulmonary edema patters so it is not entirely clear to me if she was truly volume overloaded  BNP was only 200 and normal  Plan  - continues on albumin with close monitoring with lasix coadministration  - limit other unnecessary IVF  - continue cefepime and complete course  - continue loaiza  - supplemental oxygen    Acute kidney injury  Likely secondary to prerenal azotemia, hopefully not hepatorenal syndrome  0.4 baseline, up to 1  UOP improved after albumin  US of the renal system with no hydronephrosis  Plan  - continue loaiza for strict ins and out  - albumin for today  - urine tests are pending  - monitor daily    Dilated central small bowel loops  Hint at possible obstruction  Clinically, however, the patient is passing stool and flatus, and not having nausea or vomiting.   Additionally her abdominal distention is improve  Plan  - clears for now per GI  - repeat KUB in the AM    Lactic acidosis (resolved)  Hyponatremia, probably chronic (120)  Hypokalemia(3.3), hypophosphatemia(1.2)  Lactic acid quite elevated on admission likely secondary to hypovolumia and alcoholic hepatitis  Hyponatremia has remained stable and most likely secondary to hepatitis as well  Received IVF without Na improvement as she tends to third space.    q12 hours sodiums    Phos replacement held for now to limit NS infusion     Alcoholic hepatitis with hyperbilirubinemia(TB 8.1), hypoalbuminemia(1.6) with anasarca coagulopathy(INR  1.9)  Right upper quadrant ultrasound showed hepatomegaly and steatosis.   CT scan did confirm a small amount of ascites as well as extensive hepatomegaly  Repeat US showed no ascites    Bilirubin continues to worsen    Consulted GI, asked to reassess for steroids    Oral vitamin K x 1 did not correct hypercoagulapathy     Possible sepsis, no clear source   No clear source of infection and clinically she does not appear septic.  However, she has leukocytosis, elevated CRP, procalcitonin and lactic acid. Chest X-ray is negative. Urinanalysis is mildly abnormal.    Cultures were sent in the emergency department and she received cefepime  Possibly SBP as above, but will defer need for paracentesis to GI given the rationale above    Continue intravenous cefepime and complete course    Blood cultures negative     Anemia, normocytic(10.4g) possible from liver and anemia of chronic disease  Possibly from alcohol toxicity but MCV is normal and platelets are normal  She does not report any bleeding or black stools.  Iron studies, b12, and folic acid negative  Hemoccult negative    Monitor hemoglobin      Alcohol intoxication(0.14) and dependence   ADHD and major depressive disorder   As mentioned in the HPI, she has been drinking 750 mL of vodka over the past 3 months.  Her last period of sobriety was about 2 or 2-1/2 years ago after inpatient alcohol treatment.  She was sober for 6 to 8 months.  She has been through treatment several times.  She was already in mild withdrawal in the emergency room and received lorazepam.  She denies a history of DTs or alcohol withdrawal seizure.    Can cancel CIWA at this point    Psychiatry and chemical dependency consultation obtained    Patient hopeful to go to an outpatient facility and potentially live at home with her parents at this point.    Would be holdable if attempts to leave AMA    Diet: Clear Liquid Diet    DVT Prophylaxis: Pneumatic Compression Devices  Amado Catheter: in  place, indication: Strict 1-2 Hour I&O  Code Status: Full Code      Disposition Plan   Expected discharge: 4 - 7 days, recommended to prior living arrangement once SIRS/Sepsis treated, respiratory status improves  Entered: Emory cMdaniel DO 02/13/2020, 2:00 PM       The patient's care was discussed with the Patient.    Emory Mcdaniel DO  Hospitalist Service  [facility]    ______________________________________________________________________    Interval History   Had RRT later yesterday, but did respond well to the albumin in terms of UOP. Creatinine stable. Denies nausea or vomiting. Passing gas. And abdominal distention is improved today     Data reviewed today: I reviewed all medications, new labs and imaging results over the last 24 hours. I personally reviewed no images or EKG's today.    Physical Exam   Vital Signs: Temp: 97.5  F (36.4  C) Temp src: Oral BP: 100/70 Pulse: 94 Heart Rate: 101 Resp: 20 SpO2: 94 % O2 Device: None (Room air) Oxygen Delivery: 2 LPM  Weight: 286 lbs 3.2 oz  Constitutional: awake, alert, cooperative, no apparent distress  Eyes: extra ocular muscles intact, sclera icteric  Respiratory: WOB WNL. Diminished at bases but no adventitious signs  Cardiovascular: tachycardic, normal S1 and S2, no S3 or S4, and no murmur noted. 4+ pitting edema  GI: soft, distended, non tender, markedly decrease bowel sounds, no mass or hernia.  hepatomegaly noted  Skin: no bruising or bleeding, no rashes and jaundice noted  Neurologic: Awake, alert, oriented to name, place and time.  Cranial nerves II-XII are grossly intact.  Motor is 5 out of 5 bilaterally. No tremor.  Neuropsychiatric: General: normal, calm and normal eye contact  Level of consciousness: alert / normal  Affect: normal    Data   Recent Labs   Lab 02/13/20  1152 02/13/20  0544 02/13/20  0010 02/12/20  1731  02/12/20  0555  02/11/20  0542  02/09/20  1547   WBC  --  16.7*  --   --   --  21.4*  --  19.7*   < > 24.3*    HGB  --  8.2*  --   --   --  9.0*  --  9.5*   < > 10.4*   MCV  --  101*  --   --   --  101*  --  100   < > 97   PLT  --  253  --   --   --  310  --  310   < > 304   INR  --  2.13*  --   --   --  1.95*  --  1.99*   < > 1.86*   * 121* 122* 123*  123*   < > 120*   < > 123*   < > 120*   POTASSIUM  --  4.4  --  4.5  --  4.3   < > 4.0   < > 3.3*   CHLORIDE  --  94  --  94  --  93*   < > 94   < > 84*   CO2  --  20  --  20  --  18*   < > 22   < > 22   BUN  --  8  --  7  --  6*   < > 4*   < > 1*   CR  --  1.07*  --  1.06*  --  0.83   < > 0.48*   < > 0.35*   ANIONGAP  --  7  --  9  --  9   < > 7   < > 14   EMILY  --  7.1*  --  6.9*  --  6.7*   < > 6.8*   < > 6.8*   GLC  --  89  --  97  --  97   < > 141*   < > 118*   ALBUMIN  --  1.7*  --   --   --  1.4*  --  1.5*   < > 1.6*   PROTTOTAL  --  5.5*  --   --   --  6.1*  --  6.3*   < > 6.7*   BILITOTAL  --  15.4*  --   --   --  13.9*  --  12.1*   < > 8.1*   ALKPHOS  --  190*  --   --   --  244*  --  296*   < > 357*   ALT  --  27  --   --   --  30  --  33   < > 33   AST  --  104*  --   --   --  139*  --  172*   < > 203*   LIPASE  --   --   --   --   --   --   --   --   --  119    < > = values in this interval not displayed.     Recent Results (from the past 24 hour(s))   XR Chest Port 1 View    Narrative    XR PORTABLE CHEST ONE VIEW   2/12/2020 5:56 PM     HISTORY: Dyspnea, new oxygen requirement.    COMPARISON: 2/11/2020.      Impression    IMPRESSION: Hypoinflated lungs. Mild bilateral vascular prominence  could represent increased edema. Borderline cardiomegaly.    LAISHA TOMLINSON MD   XR Abdomen Port 1 View    Narrative    ABDOMEN ONE VIEW PORTABLE    2/12/2020 5:58 PM     HISTORY: Abdominal distention    COMPARISON: None.      Impression    IMPRESSION: Multiple gas dilated central small bowel loops worrisome  for potential obstruction.    LAISHA TOMLINSON MD   US Renal Complete    Narrative    US RENAL COMPLETE   2/12/2020 6:54 PM     HISTORY: Rule out  obstruction.    COMPARISON: CT abdomen and pelvis 2020.    FINDINGS:  Right Kidney: No hydronephrosis. Kidney measures 10.7 x 7.5 x 5.3 cm.  Cortical thickness is 2.3 cm.    Left Kidney: No hydronephrosis. Kidney measures 13.5 x 5.9 x 6.6 cm.  Cortical thickness is 2 cm.    Bladder: Decompressed bladder limits assessment.    Minimal ascites of the pelvis.      Impression    IMPRESSION:   1. No hydronephrosis.  2. Renal measurements as above.  3. Trace ascites.    LAISHA TOMLINSON MD   Echocardiogram Complete    Narrative    [id number]  [id number]  [id number]  [id number]^DIONISIO^TJ^[facility]  Echocardiography Laboratory  [address] [place] [address]        Name: MARCUS LUEVANO  MRN: [id number]  : 1990  Study Date: 2020 09:45 AM  Age: 29 yrs  Gender: Female  Patient Location: [facility]  Reason For Study: SOB  Ordering Physician: TJ NICHOLE  Referring Physician: BURNSVILLE PARK NICOLLET  Performed By: Ciro Hughes RDCS     BSA: 2.4 m2  Height: 68 in  Weight: 286 lb  HR: 93  BP: 124/57 mmHg  _____________________________________________________________________________  __        Procedure  Complete Portable Echo Adult. Optison (NDC #[id number]) given intravenously.  _____________________________________________________________________________  __        Interpretation Summary     The left ventricle is normal in structure, function and size.  The visual ejection fraction is estimated at 60-65%.  The right ventricle is normal in structure, function and size.  No significant valve dysfunction.  The inferior vena cava was normal in size with preserved respiratory  variability.  IVC diameter <2.1 cm collapsing >50% with sniff suggests a normal RA pressure  of 3 mmHg.  There is no pericardial effusion.  Pericardium appears normal however there is significant abnormal septal shift  and mitral velocity inflow variation which is suggestive of  constrictive  physiology.     No prior for comparison. Recommend cardiac MRI to evaluate for constriction.  _____________________________________________________________________________  __        Left Ventricle  The left ventricle is normal in structure, function and size. There is normal  left ventricular wall thickness. The visual ejection fraction is estimated at  60-65%. Left ventricular systolic function is normal. Left ventricular  diastolic function is normal. Normal left ventricular wall motion.     Right Ventricle  The right ventricle is normal in structure, function and size.     Atria  The left atrium is mildly dilated. Right atrial size is normal.     Mitral Valve  The mitral valve is normal in structure and function. There is mild (1+)  mitral regurgitation.        Tricuspid Valve  The tricuspid valve is normal in structure and function. The right ventricular  systolic pressure is approximated at 24mmHg plus the right atrial pressure.  Right ventricular systolic pressure is normal.     Aortic Valve  The aortic valve is normal in structure and function.     Pulmonic Valve  The pulmonic valve is normal in structure and function.     Vessels  Normal size aorta. The aortic root is normal size. The inferior vena cava was  normal in size with preserved respiratory variability. IVC diameter <2.1 cm  collapsing >50% with sniff suggests a normal RA pressure of 3 mmHg.     Pericardium  There is no pericardial effusion. Pericardium appears normal however there is  significant abnormal septal shift and mitral velocity inflow variation which  is suggestive of constrictive physiology.     _____________________________________________________________________________  __  MMode/2D Measurements & Calculations  IVSd: 0.86 cm  LVIDd: 5.4 cm  LVIDs: 3.0 cm  LVPWd: 0.82 cm  FS: 43.3 %  LV mass(C)d: 163.3 grams  LV mass(C)dI: 68.6 grams/m2     Ao root diam: 3.0 cm  LA dimension: 4.2 cm  asc Aorta Diam: 2.7 cm  LA/Ao:  1.4  LA Volume (BP): 90.9 ml  LA Volume Index (BP): 38.2 ml/m2  RWT: 0.30        Doppler Measurements & Calculations  MV E max martha: 141.0 cm/sec  MV A max martha: 113.8 cm/sec  MV E/A: 1.2  MV dec time: 0.19 sec     Ao V2 max: 160.2 cm/sec  Ao max PG: 10.0 mmHg  Ao V2 mean: 111.4 cm/sec  Ao mean P.6 mmHg  Ao V2 VTI: 33.5 cm  PA acc time: 0.12 sec  TR max martha: 242.4 cm/sec  TR max P.5 mmHg  E/E' avg: 10.8  Lateral E/e': 10.1  Medial E/e': 11.5           _____________________________________________________________________________  __           Report approved by: Roxana Bass 2020 11:59 AM        Medications     - MEDICATION INSTRUCTIONS -         albumin human  25 g Intravenous 4x Daily     ceFEPIme (MAXIPIME) IV  2 g Intravenous Q12H     folic acid  1 mg Oral Daily     furosemide  10 mg Intravenous Q6H     insulin aspart  1-7 Units Subcutaneous TID AC     insulin aspart  1-5 Units Subcutaneous At Bedtime     metoprolol tartrate  25 mg Oral BID     multivitamin, therapeutic  1 tablet Oral Daily     pantoprazole  40 mg Oral QAM AC     polyethylene glycol  17 g Oral Daily     psyllium  1 packet Oral Daily     senna-docusate  1 tablet Oral BID    Or     senna-docusate  2 tablet Oral BID     sodium chloride (PF)  3 mL Intracatheter Q8H     thiamine  100 mg Intravenous Daily

## 2020-02-13 NOTE — PROGRESS NOTES
"Spoke with dr markham-consult due to echo report of constriction  I do not think this is constriction-the exaggerated MV E wave can be due to constriction OR due to accentuated respiratory effort either from obesity or a resp problem    Ideally a SVC doppler separates the two (resp variation if due to a resp problem and no sig resp variation if due to constriction.) I don't see any SVC doppler  BUT--with constriction the IVC is dilated (I dont see the ivc clearly but the report says it was normal) and with constriction we expect \"annulus reversus\" which is the e' velocity of the medial annulus is > lateral annulus (normal is Lat>medial)--in this case it is normal    Therefore this is almost certainly exaggerated respiratory effort and not constriction  After talking to MD constriction is unlikely in this clinical picture of Alcohol liver dz  Consult was cancelled  "

## 2020-02-13 NOTE — PROGRESS NOTES
Cross cover:    I received sign out of the patient from my collegues Aminata Mckinney CNP and Dr. Emory Mcdaniel regarding concerns with volume status and respiratory status.  I was called by nursing regarding low urine output and IV multivitamin replacement.   Concern for total body volume overload with possible development of hepatorenal syndrome with decreased urine output and rising creatinine. Urine output decreased throughout the day despite additional IV bumex and albumin/lasix administered. Continue to closely monitor I/O status, aware of low urine output, no new interventions at this time.   Patient was scheduled for IV multivitamin bag tonight. Due to volume status and patient able to tolerate PO intake, IV multivitamin changed to PO multivitamin and folic acid 1mg daily, patient already has thiamine scheduled.     NELIDA Carrizales CNP  Text Page     No charge

## 2020-02-13 NOTE — PLAN OF CARE
SLP: Patient currently on clear liquids per GI. Will follow up with patient to complete a meal follow up session once she is able to return to solid textures again per GI. Will reschedule for 2/14/20 in the PM.

## 2020-02-13 NOTE — PROGRESS NOTES
[facility]  Heart    SH visited Pt per length of stay. Pt said she is doing well and explained that her  visited her earlier today. She is part of a [employer] and has been a member for over five years. She finds a lot of support in her [employer] community. Pt has no additional SH needs at this time.     SH will remain available as needed.     Abbie Lira  Chaplain Resident

## 2020-02-13 NOTE — PLAN OF CARE
Discharge Planner PT   Patient plan for discharge: none stated.   Current status: PT eval and treatment initiated. 28 y/o F admitted with acute hypoxemic resp failure. Dilated central small bowel loops, alcoholic hepatitis, possible sepsis, alcohol intoxication (0.14) and dependence. PLOF: Pt lives in an apt with her boyfriend, elevator access, however pt reports sometimes broken, 2 flights to her apt. Boyfriend works at night. Pt currently looking for work. Pt reports typically IND with no mobility issues until 1 week prior to admission.     Supine to sit with elevated HOB with mod A at trunk. Sit to stand SBA from EOB and toilet, pt IND with pericares. Amb in room 10ft+50ft with CGA for safety with balance, slight staggering gait with lateral deviation, pt required 1 standing rest break in the hallway due to dyspnea and fatigue, required intermittent holding onto counter for support in casillas. Vitals stable post amb (see flowsheet), RR: 32resp/min post amb. SOB and abdominal pain appear to be the largest barrier for activity tolerance currently.   Barriers to return to prior living situation: decreased activity tolerance for basic mobility, level of assist, 2 flights of stairs to her apt (if elevator not working).   Recommendations for discharge: home with assist x 1 for mobility.   Rationale for recommendations: Anticipate pt will improve with her level of mobility with continued medical management and skilled IP PT to discharge home.        Entered by: Maria M Lopez 02/13/2020 3:20 PM

## 2020-02-13 NOTE — PROVIDER NOTIFICATION
MD Notification    Notified Person: MD    Notified Person Name: Dr. Elias     Notification Date/Time: 2/13 0615    Notification Interaction: Web based     Purpose of Notification: Total bilirubin 15.4, up from 13.9    Orders Received: No new orders at this time     Comments:

## 2020-02-14 LAB
ALBUMIN SERPL-MCNC: 2.3 G/DL (ref 3.4–5)
ALBUMIN UR-MCNC: 100 MG/DL
ALP SERPL-CCNC: 175 U/L (ref 40–150)
ALT SERPL W P-5'-P-CCNC: 25 U/L (ref 0–50)
AMORPH CRY #/AREA URNS HPF: ABNORMAL /HPF
ANION GAP SERPL CALCULATED.3IONS-SCNC: 12 MMOL/L (ref 3–14)
APPEARANCE UR: ABNORMAL
AST SERPL W P-5'-P-CCNC: 97 U/L (ref 0–45)
BACTERIA #/AREA URNS HPF: ABNORMAL /HPF
BILIRUB SERPL-MCNC: 17.7 MG/DL (ref 0.2–1.3)
BILIRUB UR QL STRIP: ABNORMAL
BUN SERPL-MCNC: 12 MG/DL (ref 7–30)
CALCIUM SERPL-MCNC: 7.5 MG/DL (ref 8.5–10.1)
CHLORIDE SERPL-SCNC: 94 MMOL/L (ref 94–109)
CO2 SERPL-SCNC: 16 MMOL/L (ref 20–32)
COLOR UR AUTO: ABNORMAL
CREAT SERPL-MCNC: 1.51 MG/DL (ref 0.52–1.04)
CREAT UR-MCNC: 197 MG/DL
CREAT UR-MCNC: 197 MG/DL
ERYTHROCYTE [DISTWIDTH] IN BLOOD BY AUTOMATED COUNT: 21.7 % (ref 10–15)
GFR SERPL CREATININE-BSD FRML MDRD: 46 ML/MIN/{1.73_M2}
GLUCOSE BLDC GLUCOMTR-MCNC: 112 MG/DL (ref 70–99)
GLUCOSE BLDC GLUCOMTR-MCNC: 74 MG/DL (ref 70–99)
GLUCOSE BLDC GLUCOMTR-MCNC: 85 MG/DL (ref 70–99)
GLUCOSE BLDC GLUCOMTR-MCNC: 95 MG/DL (ref 70–99)
GLUCOSE BLDC GLUCOMTR-MCNC: 97 MG/DL (ref 70–99)
GLUCOSE SERPL-MCNC: 81 MG/DL (ref 70–99)
GLUCOSE UR STRIP-MCNC: NEGATIVE MG/DL
GRAN CASTS #/AREA URNS LPF: 1 /LPF
HCT VFR BLD AUTO: 24.1 % (ref 35–47)
HGB BLD-MCNC: 7.8 G/DL (ref 11.7–15.7)
HGB BLD-MCNC: 8.1 G/DL (ref 11.7–15.7)
HGB UR QL STRIP: ABNORMAL
HYALINE CASTS #/AREA URNS LPF: 12 /LPF (ref 0–2)
INR PPP: 2.22 (ref 0.86–1.14)
KETONES UR STRIP-MCNC: 10 MG/DL
LAB SCANNED RESULT: NORMAL
LACTATE BLD-SCNC: 1.8 MMOL/L (ref 0.7–2)
LEUKOCYTE ESTERASE UR QL STRIP: ABNORMAL
MCH RBC QN AUTO: 34.8 PG (ref 26.5–33)
MCHC RBC AUTO-ENTMCNC: 33.6 G/DL (ref 31.5–36.5)
MCV RBC AUTO: 103 FL (ref 78–100)
MUCOUS THREADS #/AREA URNS LPF: PRESENT /LPF
NITRATE UR QL: NEGATIVE
PH UR STRIP: 5.5 PH (ref 5–7)
PLATELET # BLD AUTO: 259 10E9/L (ref 150–450)
POTASSIUM SERPL-SCNC: 4.3 MMOL/L (ref 3.4–5.3)
PROT SERPL-MCNC: 5.8 G/DL (ref 6.8–8.8)
RBC # BLD AUTO: 2.33 10E12/L (ref 3.8–5.2)
RBC #/AREA URNS AUTO: 47 /HPF (ref 0–2)
SODIUM SERPL-SCNC: 122 MMOL/L (ref 133–144)
SODIUM SERPL-SCNC: 124 MMOL/L (ref 133–144)
SODIUM UR-SCNC: <5 MMOL/L
SOURCE: ABNORMAL
SP GR UR STRIP: 1.02 (ref 1–1.03)
SQUAMOUS #/AREA URNS AUTO: 1 /HPF (ref 0–1)
UROBILINOGEN UR STRIP-MCNC: NORMAL MG/DL (ref 0–2)
UUN UR-MCNC: 145 MG/DL
UUN/CREAT 24H UR: 1 G/G CR
WBC # BLD AUTO: 16.4 10E9/L (ref 4–11)
WBC #/AREA URNS AUTO: 19 /HPF (ref 0–5)

## 2020-02-14 PROCEDURE — 87088 URINE BACTERIA CULTURE: CPT | Performed by: HOSPITALIST

## 2020-02-14 PROCEDURE — 40000275 ZZH STATISTIC RCP TIME EA 10 MIN

## 2020-02-14 PROCEDURE — 85610 PROTHROMBIN TIME: CPT | Performed by: HOSPITALIST

## 2020-02-14 PROCEDURE — 99232 SBSQ HOSP IP/OBS MODERATE 35: CPT | Performed by: HOSPITALIST

## 2020-02-14 PROCEDURE — 80053 COMPREHEN METABOLIC PANEL: CPT | Performed by: HOSPITALIST

## 2020-02-14 PROCEDURE — 25000132 ZZH RX MED GY IP 250 OP 250 PS 637: Performed by: INTERNAL MEDICINE

## 2020-02-14 PROCEDURE — P9047 ALBUMIN (HUMAN), 25%, 50ML: HCPCS | Performed by: HOSPITALIST

## 2020-02-14 PROCEDURE — 25800030 ZZH RX IP 258 OP 636: Performed by: HOSPITALIST

## 2020-02-14 PROCEDURE — 25000128 H RX IP 250 OP 636: Performed by: HOSPITALIST

## 2020-02-14 PROCEDURE — 87086 URINE CULTURE/COLONY COUNT: CPT | Performed by: HOSPITALIST

## 2020-02-14 PROCEDURE — 84300 ASSAY OF URINE SODIUM: CPT | Performed by: HOSPITALIST

## 2020-02-14 PROCEDURE — 25000125 ZZHC RX 250: Performed by: HOSPITALIST

## 2020-02-14 PROCEDURE — 81001 URINALYSIS AUTO W/SCOPE: CPT | Performed by: HOSPITALIST

## 2020-02-14 PROCEDURE — 40000141 ZZH STATISTIC PERIPHERAL IV START W/O US GUIDANCE

## 2020-02-14 PROCEDURE — 83605 ASSAY OF LACTIC ACID: CPT | Performed by: HOSPITALIST

## 2020-02-14 PROCEDURE — 85018 HEMOGLOBIN: CPT | Performed by: HOSPITALIST

## 2020-02-14 PROCEDURE — 25000132 ZZH RX MED GY IP 250 OP 250 PS 637: Performed by: HOSPITALIST

## 2020-02-14 PROCEDURE — 94640 AIRWAY INHALATION TREATMENT: CPT

## 2020-02-14 PROCEDURE — 94640 AIRWAY INHALATION TREATMENT: CPT | Mod: 76

## 2020-02-14 PROCEDURE — 84295 ASSAY OF SERUM SODIUM: CPT | Performed by: HOSPITALIST

## 2020-02-14 PROCEDURE — 25000131 ZZH RX MED GY IP 250 OP 636 PS 637: Performed by: INTERNAL MEDICINE

## 2020-02-14 PROCEDURE — 21000000 ZZH R&B IMCU HEART CARE

## 2020-02-14 PROCEDURE — 25000132 ZZH RX MED GY IP 250 OP 250 PS 637: Performed by: NURSE PRACTITIONER

## 2020-02-14 PROCEDURE — 00000146 ZZHCL STATISTIC GLUCOSE BY METER IP

## 2020-02-14 PROCEDURE — 36415 COLL VENOUS BLD VENIPUNCTURE: CPT | Performed by: HOSPITALIST

## 2020-02-14 PROCEDURE — 25000132 ZZH RX MED GY IP 250 OP 250 PS 637: Performed by: PHYSICIAN ASSISTANT

## 2020-02-14 PROCEDURE — 84540 ASSAY OF URINE/UREA-N: CPT | Performed by: HOSPITALIST

## 2020-02-14 PROCEDURE — 85027 COMPLETE CBC AUTOMATED: CPT | Performed by: HOSPITALIST

## 2020-02-14 RX ORDER — SODIUM CHLORIDE 9 MG/ML
INJECTION, SOLUTION INTRAVENOUS CONTINUOUS
Status: DISCONTINUED | OUTPATIENT
Start: 2020-02-14 | End: 2020-02-14

## 2020-02-14 RX ORDER — SODIUM CHLORIDE 9 MG/ML
INJECTION, SOLUTION INTRAVENOUS CONTINUOUS
Status: ACTIVE | OUTPATIENT
Start: 2020-02-14 | End: 2020-02-14

## 2020-02-14 RX ORDER — SODIUM CHLORIDE 9 MG/ML
INJECTION, SOLUTION INTRAVENOUS CONTINUOUS
Status: ACTIVE | OUTPATIENT
Start: 2020-02-14 | End: 2020-02-15

## 2020-02-14 RX ORDER — IPRATROPIUM BROMIDE AND ALBUTEROL SULFATE 2.5; .5 MG/3ML; MG/3ML
3 SOLUTION RESPIRATORY (INHALATION)
Status: DISCONTINUED | OUTPATIENT
Start: 2020-02-14 | End: 2020-02-18

## 2020-02-14 RX ORDER — OCTREOTIDE ACETATE 200 UG/ML
200 INJECTION, SOLUTION INTRAVENOUS; SUBCUTANEOUS 3 TIMES DAILY
Status: DISCONTINUED | OUTPATIENT
Start: 2020-02-14 | End: 2020-02-19

## 2020-02-14 RX ORDER — MIDODRINE HYDROCHLORIDE 5 MG/1
10 TABLET ORAL
Status: DISCONTINUED | OUTPATIENT
Start: 2020-02-14 | End: 2020-02-18

## 2020-02-14 RX ORDER — ALBUMIN (HUMAN) 12.5 G/50ML
25 SOLUTION INTRAVENOUS 4 TIMES DAILY
Status: COMPLETED | OUTPATIENT
Start: 2020-02-14 | End: 2020-02-15

## 2020-02-14 RX ORDER — ALBUMIN (HUMAN) 12.5 G/50ML
25 SOLUTION INTRAVENOUS 4 TIMES DAILY
Status: DISCONTINUED | OUTPATIENT
Start: 2020-02-14 | End: 2020-02-14

## 2020-02-14 RX ADMIN — IPRATROPIUM BROMIDE AND ALBUTEROL SULFATE 3 ML: .5; 3 SOLUTION RESPIRATORY (INHALATION) at 11:42

## 2020-02-14 RX ADMIN — OCTREOTIDE ACETATE 200 MCG: 200 INJECTION, SOLUTION INTRAVENOUS; SUBCUTANEOUS at 22:16

## 2020-02-14 RX ADMIN — CEFEPIME HYDROCHLORIDE 2 G: 2 INJECTION, POWDER, FOR SOLUTION INTRAVENOUS at 10:18

## 2020-02-14 RX ADMIN — FOLIC ACID 1 MG: 1 TABLET ORAL at 09:24

## 2020-02-14 RX ADMIN — SODIUM CHLORIDE 50 ML/HR: 9 INJECTION, SOLUTION INTRAVENOUS at 10:18

## 2020-02-14 RX ADMIN — ALBUMIN HUMAN 25 G: 0.25 SOLUTION INTRAVENOUS at 15:31

## 2020-02-14 RX ADMIN — IPRATROPIUM BROMIDE AND ALBUTEROL SULFATE 3 ML: .5; 3 SOLUTION RESPIRATORY (INHALATION) at 03:25

## 2020-02-14 RX ADMIN — CARBIDOPA AND LEVODOPA 10 MG: 50; 200 TABLET, EXTENDED RELEASE ORAL at 17:41

## 2020-02-14 RX ADMIN — ALBUMIN HUMAN 25 G: 0.25 SOLUTION INTRAVENOUS at 17:42

## 2020-02-14 RX ADMIN — IPRATROPIUM BROMIDE AND ALBUTEROL SULFATE 3 ML: .5; 3 SOLUTION RESPIRATORY (INHALATION) at 19:47

## 2020-02-14 RX ADMIN — METOPROLOL TARTRATE 25 MG: 25 TABLET ORAL at 09:24

## 2020-02-14 RX ADMIN — ALBUMIN HUMAN 25 G: 0.25 SOLUTION INTRAVENOUS at 22:40

## 2020-02-14 RX ADMIN — CARBIDOPA AND LEVODOPA 2.5 MG: 50; 200 TABLET, EXTENDED RELEASE ORAL at 09:24

## 2020-02-14 RX ADMIN — THERA TABS 1 TABLET: TAB at 09:24

## 2020-02-14 RX ADMIN — OCTREOTIDE ACETATE 200 MCG: 200 INJECTION, SOLUTION INTRAVENOUS; SUBCUTANEOUS at 17:56

## 2020-02-14 RX ADMIN — THIAMINE HYDROCHLORIDE 100 MG: 100 INJECTION, SOLUTION INTRAMUSCULAR; INTRAVENOUS at 09:23

## 2020-02-14 RX ADMIN — IPRATROPIUM BROMIDE AND ALBUTEROL SULFATE 3 ML: .5; 3 SOLUTION RESPIRATORY (INHALATION) at 08:15

## 2020-02-14 RX ADMIN — MAGNESIUM HYDROXIDE 30 ML: 400 SUSPENSION ORAL at 03:12

## 2020-02-14 RX ADMIN — LORAZEPAM 1 MG: 1 TABLET ORAL at 15:32

## 2020-02-14 RX ADMIN — OXYCODONE HYDROCHLORIDE 2.5 MG: 5 TABLET ORAL at 18:03

## 2020-02-14 RX ADMIN — PANTOPRAZOLE SODIUM 40 MG: 40 TABLET, DELAYED RELEASE ORAL at 09:24

## 2020-02-14 RX ADMIN — CEFEPIME HYDROCHLORIDE 2 G: 2 INJECTION, POWDER, FOR SOLUTION INTRAVENOUS at 22:02

## 2020-02-14 RX ADMIN — IPRATROPIUM BROMIDE AND ALBUTEROL SULFATE 3 ML: .5; 3 SOLUTION RESPIRATORY (INHALATION) at 15:45

## 2020-02-14 RX ADMIN — ALBUMIN HUMAN 25 G: 0.25 SOLUTION INTRAVENOUS at 09:10

## 2020-02-14 ASSESSMENT — MIFFLIN-ST. JEOR: SCORE: 2071.69

## 2020-02-14 NOTE — PLAN OF CARE
SLP: Patient currently on full liquid diet per medical team. Will schedule meal follow up session once patient is able to return to solid textures per GI team. Will reschedule for 2/15/2020 in the PM.

## 2020-02-14 NOTE — PROGRESS NOTES
GI review note:  Pt's creatinine slowly going up w/ prior strategy of albumin w/ lasix, doesn't increase much of the output either.   -will continue albumin  -stop IV lasix  -agree with midodrine  -recommend nephrology consult    Aida Winter MD on 2/14/2020 at 8:30 AM  Cell: [phone]

## 2020-02-14 NOTE — CONSULTS
[facility]    Nephrology Consultation     Date of Admission:  2/9/2020    Assessment & Plan     Pat Delgado is a 29 year old female who was admitted on 2/9/2020.       1) Alcohol Use Disorder with depression    2) Acute Alcohol Hepatitis. Falling AST, but climbing bili.  INR 2.2.     3) EBEN: In setting of severe acute liver disease.  U na < 5.  This has not responded so far to volume loading.  This may be hepatorenal syndrome.  The only thing that makes the water a little cloudy is the IV contrast given on 2/9/20.  Her creatinine started to rise 2/11/20.  This is appropriate timing for contrast related EBEN.  It may be a matter of both factors contributing.      4) Hyponatremia.  Due to liver disease.      Suggest:    Agree with 25% albumin  Stop metoprolol  Increase midodrine to 10 mg TID  Add octreotide     Following.        Eliseo Alford MD  [facility] - Nephrology  [phone]    Reason for Consult     I was asked to see the patient for EBEN.      Primary Care Physician     [facility]    Chief Complaint     EBEN    History is obtained from the patient and chart review.      History of Present Illness     Pat Delgado is a 29 year old female who presents with EBEN.    She was admitted 2/9/20 via ED after presenting with abdominal pain, nausea, constipation and SOB.    She underwent CT with contrast to evaluate her abdominal pain and distension.  Findings included hepatomegaly, steatosis, and a small amount of ascites.      She has been found to have acute liver failure due to alcoholic hepatitis.      Her cr at time of admission was 0.35.    It began to rise on 2/11 and has been increasing daily since.  Today it is 1.51.  She remains oliguric as well despite challenges with 25% albumin and furosemide.  The furosemide has been stopped due to declining kidney function.     U na < 5.      Past Medical History   I have reviewed this patient's medical history and  updated it with pertinent information if needed.   Past Medical History:   Diagnosis Date     ADHD (attention deficit hyperactivity disorder)      Alcohol dependence (H) 1/20/2015     Anxiety      Chemical dependency (H) 1/22/2015     Depressive disorder, not elsewhere classified 8/20/2015    Per 1/22/15 visit with Batool Gonzalez NP      Hypertension goal BP (blood pressure) < 150/90 11/10/2015     Migraine headache      Substance abuse (H)      Tobacco abuse        Past Surgical History   I have reviewed this patient's surgical history and updated it with pertinent information if needed.  Past Surgical History:   Procedure Laterality Date     HC TOOTH EXTRACTION W/FORCEP         Prior to Admission Medications   Prior to Admission Medications   Prescriptions Last Dose Informant Patient Reported? Taking?   amphetamine-dextroamphetamine (ADDERALL XR) 30 MG 24 hr capsule 2/8/2020 at Unknown time Self Yes Yes   Sig: Take 30 mg by mouth every morning   amphetamine-dextroamphetamine (ADDERALL) 20 MG tablet 2/7/2020 Self Yes Yes   Sig: Take 20 mg by mouth every evening   calcium carbonate (TUMS) 500 MG chewable tablet  at PRN Self Yes Yes   Sig: Take 1 chew tab by mouth 2 times daily as needed   famotidine (PEPCID) 20 MG tablet  at PRN Self Yes Yes   Sig: Take 20 mg by mouth 2 times daily as needed   magnesium oxide 200 MG TABS  at PRN Self Yes Yes   Sig: Take 200 mg by mouth daily as needed   naproxen sodium (ANAPROX) 220 MG tablet  at PRN Self Yes Yes   Sig: Take 220 mg by mouth daily as needed for moderate pain      Facility-Administered Medications: None     Allergies   Allergies   Allergen Reactions     Penicillins Unknown     Hives         Social History   I have reviewed this patient's social history and updated it with pertinent information if needed. Pat Delgado  reports that she has been smoking vaping device. She uses smokeless tobacco. She reports current alcohol use. She reports that she does not use  drugs.    Family History   I have reviewed this patient's family history and updated it with pertinent information if needed.   Family History   Problem Relation Age of Onset     Anxiety Disorder Mother      Dementia Maternal Grandfather      Substance Abuse Other      Unknown/Adopted No family hx of      Depression No family hx of      Schizophrenia No family hx of      Bipolar Disorder No family hx of      Suicide No family hx of      [place] Disease No family hx of      Parkinsonism No family hx of      Autism Spectrum Disorder No family hx of      Intellectual Disability (Mental Retardation) No family hx of      Mental Illness No family hx of        Review of Systems   The 10 point Review of Systems is negative other than noted in the HPI.     Physical Exam   Temp: 99.3  F (37.4  C) Temp src: Oral BP: 91/44 Pulse: 107 Heart Rate: 93 Resp: 18 SpO2: 94 % O2 Device: None (Room air)    Vital Signs with Ranges  Temp:  [98.3  F (36.8  C)-99.3  F (37.4  C)] 99.3  F (37.4  C)  Pulse:  [] 107  Heart Rate:  [] 93  Resp:  [16-32] 18  BP: ()/(41-98) 91/44  SpO2:  [93 %-99 %] 94 %  286 lbs 3.2 oz    GENERAL: ill appearing.    HEENT:  Normocephalic. No gross abnormalities.  Pupils equal.  MMM.  Dentition is ok.  CV: RRR, no murmurs, no clicks, gallops, or rubs, 2-3+ edema, no carotid bruits  RESP: Clear bilaterally with good efforts  GI: Abdomen distended, soft, fluid wave  MUSCULOSKELETAL: extremities nl - no gross deformities noted  SKIN: no suspicious lesions or rashes, dry to touch  NEURO:  Strength normal and symmetric.   PSYCH: mood good, affect appropriate  LYMPH: No palpable ant/post cervical and supraclavicular adenopathy    Data   BMP  Recent Labs   Lab 02/14/20  0601 02/13/20  1753 02/13/20  1359 02/13/20  1152 02/13/20  0544  02/12/20  1731   * 122* 124* 125* 121*   < > 123*  123*   POTASSIUM 4.3  --  4.1  --  4.4  --  4.5   CHLORIDE 94  --  96  --  94  --  94   EMILY 7.5*  --  7.1*  --   7.1*  --  6.9*   CO2 16*  --  20  --  20  --  20   BUN 12  --  9  --  8  --  7   CR 1.51*  --  1.25*  --  1.07*  --  1.06*   GLC 81  --  90  --  89  --  97    < > = values in this interval not displayed.     [email or web address](phos:4)  CBC)  Recent Labs   Lab 02/14/20  0601 02/13/20  0544 02/12/20  0555 02/11/20  0542   WBC 16.4* 16.7* 21.4* 19.7*   HGB 8.1* 8.2* 9.0* 9.5*   HCT 24.1* 23.9* 27.0* 28.2*   * 101* 101* 100    253 310 310     Recent Labs   Lab 02/14/20  0601 02/11/20  1344   AST 97*   < >  --    ALT 25   < >  --    ALKPHOS 175*   < >  --    BILITOTAL 17.7*   < >  --    SRINIVAS  --   --  <10*    < > = values in this interval not displayed.     Recent Labs   Lab 02/14/20  0601   INR 2.22*     No results found for: D2VIT, D3VIT, DTOT  Recent Labs   Lab 02/14/20  0601  02/10/20  0545 02/09/20  2131 02/09/20  1547   HGB 8.1*   < > 9.5* 9.8* 10.4*   HCT 24.1*   < > 27.4* 28.8* 29.4*   *   < > 98 98 97   IRON  --   --   --  68  --    IRONSAT  --   --   --  41  --    RETICABSCT  --   --   --   --  218.0*   RETP  --   --   --   --  7.0*   FEB  --   --   --  166*  --    ASHLEY  --   --   --  116  --    B12  --   --  1,312*  --   --    FOLIC  --   --  15.4  --   --     < > = values in this interval not displayed.     No results for input(s): PTHI in the last 168 hours.

## 2020-02-14 NOTE — PROVIDER NOTIFICATION
MD Notification    Notified Person: MD     Notified Person Name:     Notification Date/Time: 2-, 13:20    Notification Interaction:    Purpose of Notification: Pt. Had loose BM. Bright red blood noted in toilet.    Orders Received:    Comments:

## 2020-02-14 NOTE — PROGRESS NOTES
02/13/20 1451   Quick Adds   Type of Visit Initial PT Evaluation   Living Environment   Lives With significant other   Living Arrangements apartment   Home Accessibility stairs to enter home   Number of Stairs, Main Entrance other (see comments)  (2 flights to apt)   Stair Railings, Main Entrance railings on both sides of stairs   Living Environment Comment pt reports has elevator access in apt building, but frequently broken, 2 flight of stairs to apt, pt reports her S.O. works at night.    Self-Care   Usual Activity Tolerance good   Current Activity Tolerance fair   Regular Exercise No   Equipment Currently Used at Home none   Functional Level Prior   Ambulation 0-->independent   Transferring 0-->independent   Toileting 0-->independent   Bathing 0-->independent   Communication 0-->understands/communicates without difficulty   Swallowing 0-->swallows foods/liquids without difficulty   Cognition 0 - no cognition issues reported   Fall history within last six months no   Which of the above functional risks had a recent onset or change? none   Prior Functional Level Comment pt reports looking for a new job currently.    General Information   Onset of Illness/Injury or Date of Surgery - Date 02/09/20   Referring Physician Aminata Mckinney, NELIDA CNP   Patient/Family Goals Statement none stated   Pertinent History of Current Problem (include personal factors and/or comorbidities that impact the POC)  30 y/o F admitted with acute hypoxemic resp failure. Dilated central small bowel loops, alcoholic hepatitis, possible sepsis, alcohol intoxication (0.14) and dependence.   Precautions/Limitations fall precautions   General Observations sleeping in bed, able to arouse by voice   Cognitive Status Examination   Orientation orientation to person, place and time   Level of Consciousness alert   Follows Commands and Answers Questions 100% of the time   Personal Safety and Judgment intact   Pain Assessment   Patient Currently in  "Pain Yes, see Vital Sign flowsheet  (8/10 with movement )   Integumentary/Edema   Integumentary/Edema Comments pitting edema    Range of Motion (ROM)   ROM Comment ext x 4 WFL   Strength   Strength Comments no focal weakness noted, 5/5 bilateral UE/LE   Bed Mobility   Bed Mobility Comments supine to sit with mod A at trunk    Transfer Skills   Transfer Comments sit to stand SBA    Gait   Gait Comments amb 10 ft to the toilet wtih CGA for safety with balance no AD.    Balance   Balance Comments SBA with standing balance with WBOS without UE support   Sensory Examination   Sensory Perception Comments bilateral LE intact to light touch, pt denies numbness   Coordination   Coordination Comments decreased coordination with reaching for her cup-spilled her water on her bed.    Muscle Tone   Muscle Tone no deficits were identified   General Therapy Interventions   Planned Therapy Interventions balance training;bed mobility training;gait training;transfer training;strengthening;home program guidelines;progressive activity/exercise;risk factor education   Clinical Impression   Criteria for Skilled Therapeutic Intervention yes, treatment indicated   PT Diagnosis impaired gait   Influenced by the following impairments impaired balance, impaired activity tolerance   Functional limitations due to impairments impaired IND with functional mobility    Clinical Presentation Stable/Uncomplicated   Clinical Presentation Rationale medical status, clinical judgement    Clinical Decision Making (Complexity) Low complexity   Therapy Frequency 4x/week   Predicted Duration of Therapy Intervention (days/wks) 1 week   Anticipated Discharge Disposition Home with Assist   Risk & Benefits of therapy have been explained Yes   Patient, Family & other staff in agreement with plan of care Yes   [facility] AM-PAC TM \"6 Clicks\"   2016, Trustees of [facility], under license to [employer].  All rights reserved.   6 Clicks Short Forms " "Basic Mobility Inpatient Short Form   [facility] AM-PAC  \"6 Clicks\" V.2 Basic Mobility Inpatient Short Form   1. Turning from your back to your side while in a flat bed without using bedrails? 4 - None   2. Moving from lying on your back to sitting on the side of a flat bed without using bedrails? 2 - A Lot   3. Moving to and from a bed to a chair (including a wheelchair)? 3 - A Little   4. Standing up from a chair using your arms (e.g., wheelchair, or bedside chair)? 3 - A Little   5. To walk in hospital room? 3 - A Little   6. Climbing 3-5 steps with a railing? 3 - A Little   Basic Mobility Raw Score (Score out of 24.Lower scores equate to lower levels of function) 18   Total Evaluation Time   Total Evaluation Time (Minutes) 10     "

## 2020-02-14 NOTE — PLAN OF CARE
VSS.  Pt denies any c/o of pain.  Tele is SR/ST.  Up with SBA to bathroom, ambulated in casillas x1 with therapy.  Amado patent, maria isabel urine 350 in output.  2 BM's today.  Tolerating clear liquid diet.  Repeat abd US ordered for tomorrow.  Plan to continue abx, and albumin.  +3-4 generalized edema.  Per Dr. Mcdaniel no replacement of Phos 2.4 today, will recheck tomorrow.  Family updated at bedside.  Will continue to monitor.

## 2020-02-14 NOTE — PLAN OF CARE
Monitor shows Sinus rhythm. Pt. Becomes very dyspneic with ambulation to bathroom. Pt. Had 2 loose stools today. Pt. Is anxious at times. Pt. Has had 100 ml urine output this shift. Abdomen is very distended and firm. Tolerated full liquid diet.   Boyfriend at bedside. Continue to monitor closely.

## 2020-02-14 NOTE — PLAN OF CARE
PT: Attempted PT session. RN reports pt SOB with activity and just returned to bed to rest. Will defer PT today to allow pt to rest.

## 2020-02-14 NOTE — PROGRESS NOTES
[facility]    Hospitalist Progress Note  Interval History   Pt clinically doing well which has non-specific abdominal pain which is from gaseous distension. Her kidney function however has worsened which she was started on albumin 25% challenge but reported to have SOB at the end of transfusion which improved w/ bumex hence it was decided to give lasix together w/ albumin.     But after further detail hx, pt states she has SOB in general and doesn't appear to be correlated with albumin infusion which triggers the lasix use. Given severely pre-renal (Na <5), will stop lasix and continue with albumin.    All other review of systems are negative.    Assessment & Plan   Pat Delgado is a 29 year old female who was admitted on 2/9/2020. GI has been consulted for Etoh hepatitis.    Minimal Ascites:  -s/p 5 days course of IV cefepime for SBP ppx  -no pocket to tap at this time     Etoh hepatitis: Maddrey score of 50s  -however mental status improving  -elevated white count and lactate could be also from Etoh hepatitis  -will not start steroid at this time given clinically pt is doing better, will trend daily LFT w/ INR  -the elevation in Tbili likely a lagging response given all AST and ALT trending down; ALP also trending down  -daily LFT w/ INR     Abdominal pain:  -from gaseous distention given minimal ascites, likely from clear liquid diet which comprises mostly carbohydrate, pt also states to have minimal stool output  -can advance diet even to regular  -given relative well mental status, will not give lactulose at this time given it may make sx worse given the mechanism of lactulose is unabsorbable simple sugar   -avoid sugar or substitute sugar intake  -need to follow up daily BM/flatus to ensure this is not from ileus or obstruction (unlikely given still passing gas and stool daily)     Hyponatremia w/ increase in creatinine and minimal urine output in the setting of anasarca:  -no need for  fluid restriction at this time given Na > 120 and mentating well  -c/w albumin to 25g QID, no need for lasix given pt's SOB has nothing to do w/ fluid challenge and is a statement of generalized unwellness  -midodrine 10 mg TID, octreotide, follow up daily creatinine    Code Status: Full Code    -Data reviewed today: I reviewed all new labs and imaging results over the last 24 hours.     Physical Exam   Temp: 99.3  F (37.4  C) Temp src: Oral BP: 111/45 Pulse: 107 Heart Rate: 98 Resp: 18 SpO2: 98 % O2 Device: Nasal cannula Oxygen Delivery: 2 LPM  Vitals:    02/12/20 0639 02/13/20 0835 02/14/20 0050   Weight: 129.2 kg (284 lb 13.4 oz) 129.8 kg (286 lb 3.2 oz) 129.8 kg (286 lb 3.2 oz)     Vital Signs with Ranges  Temp:  [98.3  F (36.8  C)-99.3  F (37.4  C)] 99.3  F (37.4  C)  Pulse:  [] 107  Heart Rate:  [] 98  Resp:  [16-20] 18  BP: ()/(41-90) 111/45  SpO2:  [93 %-99 %] 98 %  I/O last 3 completed shifts:  In: 1855 [P.O.:1380; I.V.:475]  Out: 520 [Urine:520]    Constitutional: Awake, alert, cooperative, no apparent distress  Respiratory: Clear to auscultation bilaterally, no crackles or wheezing  Cardiovascular: Regular rate and rhythm, normal S1 and S2, and no murmur noted  GI: Normal bowel sounds, soft, distended w/ gaseous sound, non-tender  Skin/Integumen: No rashes, no cyanosis, no edema  Other:     Aida Winter MD  ([facility])  [facility]  Office: [phone]  Cell: [phone], please feel free to call the cell    Medications     - MEDICATION INSTRUCTIONS -       sodium chloride 50 mL/hr (02/14/20 1504)       albumin human  25 g Intravenous 4x Daily     ceFEPIme (MAXIPIME) IV  2 g Intravenous Q12H     folic acid  1 mg Oral Daily     insulin aspart  1-7 Units Subcutaneous TID AC     insulin aspart  1-5 Units Subcutaneous At Bedtime     ipratropium - albuterol 0.5 mg/2.5 mg/3 mL  3 mL Nebulization Q4H While awake     midodrine  10 mg Oral TID w/meals     multivitamin,  therapeutic  1 tablet Oral Daily     octreotide  200 mcg Subcutaneous TID     pantoprazole  40 mg Oral QAM AC     polyethylene glycol  17 g Oral Daily     sodium chloride (PF)  3 mL Intracatheter Q8H     thiamine  100 mg Intravenous Daily       Data   Recent Labs   Lab 02/14/20  0601 02/13/20  1753 02/13/20  1359  02/13/20  0544  02/12/20  0555  02/09/20  1547   WBC 16.4*  --   --   --  16.7*  --  21.4*   < > 24.3*   HGB 8.1*  --   --   --  8.2*  --  9.0*   < > 10.4*   *  --   --   --  101*  --  101*   < > 97     --   --   --  253  --  310   < > 304   INR 2.22*  --   --   --  2.13*  --  1.95*   < > 1.86*   * 122* 124*   < > 121*   < > 120*   < > 120*   POTASSIUM 4.3  --  4.1  --  4.4   < > 4.3   < > 3.3*   CHLORIDE 94  --  96  --  94   < > 93*   < > 84*   CO2 16*  --  20  --  20   < > 18*   < > 22   BUN 12  --  9  --  8   < > 6*   < > 1*   CR 1.51*  --  1.25*  --  1.07*   < > 0.83   < > 0.35*   ANIONGAP 12  --  8  --  7   < > 9   < > 14   EMILY 7.5*  --  7.1*  --  7.1*   < > 6.7*   < > 6.8*   GLC 81  --  90  --  89   < > 97   < > 118*   ALBUMIN 2.3*  --   --   --  1.7*  --  1.4*   < > 1.6*   PROTTOTAL 5.8*  --   --   --  5.5*  --  6.1*   < > 6.7*   BILITOTAL 17.7*  --   --   --  15.4*  --  13.9*   < > 8.1*   ALKPHOS 175*  --   --   --  190*  --  244*   < > 357*   ALT 25  --   --   --  27  --  30   < > 33   AST 97*  --   --   --  104*  --  139*   < > 203*   LIPASE  --   --   --   --   --   --   --   --  119    < > = values in this interval not displayed.       No results found for this or any previous visit (from the past 24 hour(s)).     Aida Winter MD  ([facility]  Office: [phone]  Cell: [phone]

## 2020-02-14 NOTE — PROVIDER NOTIFICATION
MD Notification    Notified Person: MD    Notified Person Name:      Notification Date/Time: 2-, 1505    Notification Interaction:    Purpose of Notification: Pt. Requests medication for abdominal discomfort.    Orders Received:    Comments:

## 2020-02-14 NOTE — PROGRESS NOTES
[facility]    Medicine Progress Note - Hospitalist Service       Date of Admission:  2/9/2020  Assessment & Plan        Acute kidney injury  Likely secondary to prerenal azotemia or perhaps contrast nephropathy, hopefully not the hepatorenal syndrome  0.4 baseline, up to 1.5 with slowing of UOP  UOP improved after albumin  Initially albumin infusion was followed by respiratory distress (see below) with RRTs  US of the renal system with no hydronephrosis  Usodium of 5  Goal is to increase intravascular volume, and albumin is finally improving so hopefully will see renal improvement soon.  Plan  - continue loaiza for strict ins and out  - albumin x 3 days  - nephrology consultation  - continue midodrine     Alcoholic hepatitis with hyperbilirubinemia(TB 8.1), hypoalbuminemia(1.6) with anasarca coagulopathy(INR 1.9)  Right upper quadrant ultrasound showed hepatomegaly and steatosis.   CT scan did confirm a small amount of ascites as well as extensive hepatomegaly  Repeat US showed no ascites    Bilirubin continues to worsen    GI consulted    Oral vitamin K x 5 mg did not correct hypercoagulapathy     Acute hypoxemic respiratory failure likely secondary to episode of aspiration (resolved)  New on 2/11 and occurred again on 2/12, became hypoxemic with increased work of breathing  Occurred after her first 2 dosings of IV albumin but has not occurred on subsequent infusions  Some improvements with diuretics after   CXR on both occasions was poor inspiration, which can exaggerate pulmonary edema patters so it is not entirely clear to me if she was truly volume overloaded  BNP was only 200 and normal  Plan  - continues on albumin with close monitoring with lasix coadministration  - gentle NS given her albumin is improving  - continue cefepime and complete course  - continue loaiza  - supplemental oxygen prn  - schedule nebs    Dilated central small bowel loops  Hint at possible obstruction  Clinically,  however, the patient is passing stool and flatus, and not having nausea or vomiting.   Additionally her abdominal distention is improved andshe is tolerating the clears  Plan  - discussed with Dr. Hobson, start full liquis  - GI may repeat the XR    Lactic acidosis (resolved)  Hyponatremia, probably chronic (120)  Hypokalemia(3.3), hypophosphatemia(1.2)  Lactic acid quite elevated on admission likely secondary to hypovolumia and alcoholic hepatitis  Hyponatremia has remained stable and most likely secondary to hepatitis as well  Received IVF without Na improvement as she tends to third space.    q12 hours sodiums    Phos replacement held for now to limit NS infusion         Possible sepsis, no clear source   No clear source of infection and clinically she does not appear septic.  However, she has leukocytosis, elevated CRP, procalcitonin and lactic acid. Chest X-ray is negative. Urinanalysis is mildly abnormal.    Cultures were sent in the emergency department and she received cefepime  Possibly SBP as above, but will defer need for paracentesis to GI given the rationale above    Continue intravenous cefepime and complete course    Blood cultures negative     Anemia, normocytic(10.4g) possible from liver and anemia of chronic disease  Possibly from alcohol toxicity but MCV is normal and platelets are normal  She does not report any bleeding or black stools.  Iron studies, b12, and folic acid negative  Hemoccult negative    Monitor hemoglobin      Alcohol intoxication(0.14) and dependence   ADHD and major depressive disorder   As mentioned in the HPI, she has been drinking 750 mL of vodka over the past 3 months.  Her last period of sobriety was about 2 or 2-1/2 years ago after inpatient alcohol treatment.  She was sober for 6 to 8 months.  She has been through treatment several times.  She was already in mild withdrawal in the emergency room and received lorazepam.  She denies a history of DTs or alcohol withdrawal  seizure.    Can cancel CIWA at this point    Psychiatry and chemical dependency consultation obtained    Patient hopeful to go to an outpatient facility and potentially live at home with her parents at this point.    Would be holdable if attempts to leave AMA    Diet: Full Liquid Diet    DVT Prophylaxis: Pneumatic Compression Devices  Amado Catheter: in place, indication: Strict 1-2 Hour I&O  Code Status: Full Code      Disposition Plan   Expected discharge: 4 - 7 days, recommended to prior living arrangement once SIRS/Sepsis treated, respiratory status improves  Entered: Emory Mcdaniel DO 02/14/2020, 11:57 AM       The patient's care was discussed with the Patient.    Emory Mcdaniel DO  Hospitalist Service  [facility]    ______________________________________________________________________    Interval History   Worsening renal failure despite albumin and midodrine. Bili worse. Patient more alert and awake, out of bed yesterday afternoon. Agreeable to stay. We discussed her kidney failure is worse slightly.    Data reviewed today: I reviewed all medications, new labs and imaging results over the last 24 hours. I personally reviewed no images or EKG's today.    Physical Exam   Vital Signs: Temp: 99.3  F (37.4  C) Temp src: Oral BP: 91/44 Pulse: 107 Heart Rate: 93 Resp: 18 SpO2: 94 % O2 Device: None (Room air)    Weight: 286 lbs 3.2 oz  Constitutional: awake, alert, cooperative, no apparent distress  Eyes: extra ocular muscles intact, sclera icteric  Respiratory: WOB WNL. Diminished at bases but no adventitious signs  Cardiovascular: tachycardic, normal S1 and S2, no S3 or S4, and no murmur noted. 4+ pitting edema  GI: soft, distended, non tender, markedly decrease bowel sounds, no mass or hernia.  hepatomegaly noted  Skin: no bruising or bleeding, no rashes and jaundice noted  Neurologic: Awake, alert, oriented to name, place and time.  Cranial nerves II-XII are grossly intact.  Motor is  5 out of 5 bilaterally. No tremor.  Neuropsychiatric: General: normal, calm and normal eye contact  Level of consciousness: alert / normal  Affect: normal    Data   Recent Labs   Lab 02/14/20  0601 02/13/20  1753 02/13/20  1359  02/13/20  0544  02/12/20  0555  02/09/20  1547   WBC 16.4*  --   --   --  16.7*  --  21.4*   < > 24.3*   HGB 8.1*  --   --   --  8.2*  --  9.0*   < > 10.4*   *  --   --   --  101*  --  101*   < > 97     --   --   --  253  --  310   < > 304   INR 2.22*  --   --   --  2.13*  --  1.95*   < > 1.86*   * 122* 124*   < > 121*   < > 120*   < > 120*   POTASSIUM 4.3  --  4.1  --  4.4   < > 4.3   < > 3.3*   CHLORIDE 94  --  96  --  94   < > 93*   < > 84*   CO2 16*  --  20  --  20   < > 18*   < > 22   BUN 12  --  9  --  8   < > 6*   < > 1*   CR 1.51*  --  1.25*  --  1.07*   < > 0.83   < > 0.35*   ANIONGAP 12  --  8  --  7   < > 9   < > 14   EMILY 7.5*  --  7.1*  --  7.1*   < > 6.7*   < > 6.8*   GLC 81  --  90  --  89   < > 97   < > 118*   ALBUMIN 2.3*  --   --   --  1.7*  --  1.4*   < > 1.6*   PROTTOTAL 5.8*  --   --   --  5.5*  --  6.1*   < > 6.7*   BILITOTAL 17.7*  --   --   --  15.4*  --  13.9*   < > 8.1*   ALKPHOS 175*  --   --   --  190*  --  244*   < > 357*   ALT 25  --   --   --  27  --  30   < > 33   AST 97*  --   --   --  104*  --  139*   < > 203*   LIPASE  --   --   --   --   --   --   --   --  119    < > = values in this interval not displayed.     No results found for this or any previous visit (from the past 24 hour(s)).  Medications     - MEDICATION INSTRUCTIONS -       sodium chloride 50 mL/hr (02/14/20 1018)       ceFEPIme (MAXIPIME) IV  2 g Intravenous Q12H     folic acid  1 mg Oral Daily     insulin aspart  1-7 Units Subcutaneous TID AC     insulin aspart  1-5 Units Subcutaneous At Bedtime     ipratropium - albuterol 0.5 mg/2.5 mg/3 mL  3 mL Nebulization Q4H While awake     metoprolol tartrate  25 mg Oral BID     midodrine  2.5 mg Oral TID w/meals     multivitamin,  therapeutic  1 tablet Oral Daily     pantoprazole  40 mg Oral QAM AC     polyethylene glycol  17 g Oral Daily     sodium chloride (PF)  3 mL Intracatheter Q8H     thiamine  100 mg Intravenous Daily

## 2020-02-14 NOTE — PROVIDER NOTIFICATION
MD Notification    Notified Person: MD    Notified Person Name: Jada    Notification Date/Time: 2/13/20 3489    Notification Interaction: amcon text page    Purpose of Notification: Low urine output. 110 cc in 5 hours.     Orders Received: No new orders at this time. Continue to monitor.    Comments:

## 2020-02-14 NOTE — PLAN OF CARE
5436-1310: A&Ox4 but very tired. VSS on RA, soft BPs, on midodrine. LS diminished with exp wheezes with activity. Prn neb given x1 BROWNING. Jaundiced and pale. +3-4 BLE and abdominal edema. BS hypoactive, passing gas, no stools overnight. Reports abd discomfort and distention the same, reports feeling constipated, given prn MOM x1. Amado catheter in place, low urine output, tea colored, 220cc in last 12 hours. Up SBA. Tele SR. Bgm checks, running 80-90s. On clear liquid diet, poor appetite. Having a difficult time with IV access, may need PICC line. Plan for repeat abd XR 2-14.

## 2020-02-14 NOTE — PROGRESS NOTES
Initial visit with patient and parents at bedside. Pt became very irritable and restless. C/o anxiety and abd pain / fullness. Ativan 1 mg given to pt at 1532 with good relief of anxiety at this time. Cont to monitor.

## 2020-02-14 NOTE — PROVIDER NOTIFICATION
MD Notification    Notified Person: MD    Notified Person Name: Jada    Notification Date/Time: 2/14/20 0607    Notification Interaction: amcon text page    Purpose of Notification: FYI continued low urine output of 110cc in 7 hours.     Orders Received: No new orders at this time. Leave sticky note for AM rounders.     Comments:

## 2020-02-15 ENCOUNTER — APPOINTMENT (OUTPATIENT)
Dept: SPEECH THERAPY | Facility: CLINIC | Age: 30
DRG: 871 | End: 2020-02-15
Payer: COMMERCIAL

## 2020-02-15 LAB
ALBUMIN SERPL-MCNC: 3.2 G/DL (ref 3.4–5)
ALP SERPL-CCNC: 159 U/L (ref 40–150)
ALT SERPL W P-5'-P-CCNC: 26 U/L (ref 0–50)
ANION GAP SERPL CALCULATED.3IONS-SCNC: 12 MMOL/L (ref 3–14)
AST SERPL W P-5'-P-CCNC: 92 U/L (ref 0–45)
BACTERIA SPEC CULT: NO GROWTH
BACTERIA SPEC CULT: NO GROWTH
BILIRUB SERPL-MCNC: 20.2 MG/DL (ref 0.2–1.3)
BUN SERPL-MCNC: 13 MG/DL (ref 7–30)
CALCIUM SERPL-MCNC: 7.8 MG/DL (ref 8.5–10.1)
CHLORIDE SERPL-SCNC: 93 MMOL/L (ref 94–109)
CO2 SERPL-SCNC: 15 MMOL/L (ref 20–32)
CREAT SERPL-MCNC: 1.95 MG/DL (ref 0.52–1.04)
ERYTHROCYTE [DISTWIDTH] IN BLOOD BY AUTOMATED COUNT: 21.6 % (ref 10–15)
GFR SERPL CREATININE-BSD FRML MDRD: 34 ML/MIN/{1.73_M2}
GLUCOSE BLDC GLUCOMTR-MCNC: 110 MG/DL (ref 70–99)
GLUCOSE BLDC GLUCOMTR-MCNC: 115 MG/DL (ref 70–99)
GLUCOSE BLDC GLUCOMTR-MCNC: 116 MG/DL (ref 70–99)
GLUCOSE BLDC GLUCOMTR-MCNC: 120 MG/DL (ref 70–99)
GLUCOSE BLDC GLUCOMTR-MCNC: 122 MG/DL (ref 70–99)
GLUCOSE SERPL-MCNC: 120 MG/DL (ref 70–99)
HCT VFR BLD AUTO: 22.9 % (ref 35–47)
HGB BLD-MCNC: 7 G/DL (ref 11.7–15.7)
HGB BLD-MCNC: 7.5 G/DL (ref 11.7–15.7)
INR PPP: 2.26 (ref 0.86–1.14)
MCH RBC QN AUTO: 34.4 PG (ref 26.5–33)
MCHC RBC AUTO-ENTMCNC: 32.8 G/DL (ref 31.5–36.5)
MCV RBC AUTO: 105 FL (ref 78–100)
PHOSPHATE SERPL-MCNC: 2.8 MG/DL (ref 2.5–4.5)
PLATELET # BLD AUTO: 279 10E9/L (ref 150–450)
POTASSIUM SERPL-SCNC: 4.2 MMOL/L (ref 3.4–5.3)
PROT SERPL-MCNC: 6.5 G/DL (ref 6.8–8.8)
RBC # BLD AUTO: 2.18 10E12/L (ref 3.8–5.2)
SODIUM SERPL-SCNC: 120 MMOL/L (ref 133–144)
SODIUM SERPL-SCNC: 121 MMOL/L (ref 133–144)
SPECIMEN SOURCE: NORMAL
SPECIMEN SOURCE: NORMAL
WBC # BLD AUTO: 18.3 10E9/L (ref 4–11)

## 2020-02-15 PROCEDURE — 25000128 H RX IP 250 OP 636: Performed by: HOSPITALIST

## 2020-02-15 PROCEDURE — 21000000 ZZH R&B IMCU HEART CARE

## 2020-02-15 PROCEDURE — 25000132 ZZH RX MED GY IP 250 OP 250 PS 637: Performed by: PHYSICIAN ASSISTANT

## 2020-02-15 PROCEDURE — 25000132 ZZH RX MED GY IP 250 OP 250 PS 637: Performed by: INTERNAL MEDICINE

## 2020-02-15 PROCEDURE — 85018 HEMOGLOBIN: CPT | Performed by: HOSPITALIST

## 2020-02-15 PROCEDURE — P9047 ALBUMIN (HUMAN), 25%, 50ML: HCPCS | Performed by: HOSPITALIST

## 2020-02-15 PROCEDURE — 85610 PROTHROMBIN TIME: CPT | Performed by: HOSPITALIST

## 2020-02-15 PROCEDURE — 80053 COMPREHEN METABOLIC PANEL: CPT | Performed by: HOSPITALIST

## 2020-02-15 PROCEDURE — 25000132 ZZH RX MED GY IP 250 OP 250 PS 637: Performed by: HOSPITALIST

## 2020-02-15 PROCEDURE — 94640 AIRWAY INHALATION TREATMENT: CPT | Mod: 76

## 2020-02-15 PROCEDURE — 92526 ORAL FUNCTION THERAPY: CPT | Mod: GN

## 2020-02-15 PROCEDURE — 99232 SBSQ HOSP IP/OBS MODERATE 35: CPT | Performed by: HOSPITALIST

## 2020-02-15 PROCEDURE — 94640 AIRWAY INHALATION TREATMENT: CPT

## 2020-02-15 PROCEDURE — 84100 ASSAY OF PHOSPHORUS: CPT | Performed by: HOSPITALIST

## 2020-02-15 PROCEDURE — 84295 ASSAY OF SERUM SODIUM: CPT | Performed by: HOSPITALIST

## 2020-02-15 PROCEDURE — 25000132 ZZH RX MED GY IP 250 OP 250 PS 637: Performed by: NURSE PRACTITIONER

## 2020-02-15 PROCEDURE — 40000275 ZZH STATISTIC RCP TIME EA 10 MIN

## 2020-02-15 PROCEDURE — 36415 COLL VENOUS BLD VENIPUNCTURE: CPT | Performed by: HOSPITALIST

## 2020-02-15 PROCEDURE — 85027 COMPLETE CBC AUTOMATED: CPT | Performed by: HOSPITALIST

## 2020-02-15 PROCEDURE — 00000146 ZZHCL STATISTIC GLUCOSE BY METER IP

## 2020-02-15 PROCEDURE — 25000125 ZZHC RX 250: Performed by: HOSPITALIST

## 2020-02-15 PROCEDURE — 25000131 ZZH RX MED GY IP 250 OP 636 PS 637: Performed by: INTERNAL MEDICINE

## 2020-02-15 RX ADMIN — ALBUMIN HUMAN 25 G: 0.25 SOLUTION INTRAVENOUS at 12:32

## 2020-02-15 RX ADMIN — FOLIC ACID 1 MG: 1 TABLET ORAL at 08:19

## 2020-02-15 RX ADMIN — ALBUMIN HUMAN 25 G: 0.25 SOLUTION INTRAVENOUS at 10:14

## 2020-02-15 RX ADMIN — CARBIDOPA AND LEVODOPA 10 MG: 50; 200 TABLET, EXTENDED RELEASE ORAL at 08:18

## 2020-02-15 RX ADMIN — OCTREOTIDE ACETATE 200 MCG: 200 INJECTION, SOLUTION INTRAVENOUS; SUBCUTANEOUS at 15:48

## 2020-02-15 RX ADMIN — LORAZEPAM 2 MG: 2 INJECTION INTRAMUSCULAR; INTRAVENOUS at 22:16

## 2020-02-15 RX ADMIN — CARBIDOPA AND LEVODOPA 10 MG: 50; 200 TABLET, EXTENDED RELEASE ORAL at 18:07

## 2020-02-15 RX ADMIN — IPRATROPIUM BROMIDE AND ALBUTEROL SULFATE 3 ML: .5; 3 SOLUTION RESPIRATORY (INHALATION) at 11:00

## 2020-02-15 RX ADMIN — THIAMINE HYDROCHLORIDE 100 MG: 100 INJECTION, SOLUTION INTRAMUSCULAR; INTRAVENOUS at 08:17

## 2020-02-15 RX ADMIN — OXYCODONE HYDROCHLORIDE 2.5 MG: 5 TABLET ORAL at 14:30

## 2020-02-15 RX ADMIN — OCTREOTIDE ACETATE 200 MCG: 200 INJECTION, SOLUTION INTRAVENOUS; SUBCUTANEOUS at 10:12

## 2020-02-15 RX ADMIN — OCTREOTIDE ACETATE 200 MCG: 200 INJECTION, SOLUTION INTRAVENOUS; SUBCUTANEOUS at 22:07

## 2020-02-15 RX ADMIN — LORAZEPAM 1 MG: 1 TABLET ORAL at 08:19

## 2020-02-15 RX ADMIN — ALBUMIN HUMAN 25 G: 0.25 SOLUTION INTRAVENOUS at 18:04

## 2020-02-15 RX ADMIN — IPRATROPIUM BROMIDE AND ALBUTEROL SULFATE 3 ML: .5; 3 SOLUTION RESPIRATORY (INHALATION) at 08:25

## 2020-02-15 RX ADMIN — PANTOPRAZOLE SODIUM 40 MG: 40 TABLET, DELAYED RELEASE ORAL at 08:19

## 2020-02-15 RX ADMIN — IPRATROPIUM BROMIDE AND ALBUTEROL SULFATE 3 ML: .5; 3 SOLUTION RESPIRATORY (INHALATION) at 14:41

## 2020-02-15 RX ADMIN — THERA TABS 1 TABLET: TAB at 08:19

## 2020-02-15 RX ADMIN — CARBIDOPA AND LEVODOPA 10 MG: 50; 200 TABLET, EXTENDED RELEASE ORAL at 12:33

## 2020-02-15 RX ADMIN — ONDANSETRON 4 MG: 2 INJECTION INTRAMUSCULAR; INTRAVENOUS at 22:15

## 2020-02-15 RX ADMIN — IPRATROPIUM BROMIDE AND ALBUTEROL SULFATE 3 ML: .5; 3 SOLUTION RESPIRATORY (INHALATION) at 23:55

## 2020-02-15 RX ADMIN — POLYETHYLENE GLYCOL 3350 17 G: 17 POWDER, FOR SOLUTION ORAL at 08:20

## 2020-02-15 RX ADMIN — LORAZEPAM 1 MG: 1 TABLET ORAL at 22:07

## 2020-02-15 RX ADMIN — CEFEPIME HYDROCHLORIDE 2 G: 2 INJECTION, POWDER, FOR SOLUTION INTRAVENOUS at 08:20

## 2020-02-15 RX ADMIN — IPRATROPIUM BROMIDE AND ALBUTEROL SULFATE 3 ML: .5; 3 SOLUTION RESPIRATORY (INHALATION) at 17:58

## 2020-02-15 ASSESSMENT — MIFFLIN-ST. JEOR: SCORE: 2095.28

## 2020-02-15 NOTE — PLAN OF CARE
"Pt A/O. VSS. Up with sba. Tele shows SR. Pt denies pain most of day, had episodes of moaning and pain but could not locate the pain, described it as an all over pain/discomfort. Oxy given x1. 2L NC on for comfort. Very poor urine output, MD notified. Continue albumin, midodrine, ocreotide. States having small BMs throughout day. Put on regular diet. Amado patent. Pt has no new complaints.    Blood pressure 111/73, pulse 100, temperature 97.5  F (36.4  C), temperature source Oral, resp. rate 22, height 1.727 m (5' 8\"), weight 132.2 kg (291 lb 6.4 oz), SpO2 98 %, not currently breastfeeding.      "

## 2020-02-15 NOTE — PLAN OF CARE
Neuro- A&Ox4 but lethargic at times  Most Recent Vitals- Temp: 98.5  F (36.9  C) Temp src: Oral BP: 106/78 Pulse: 95 Heart Rate: 98 Resp: 22 SpO2: 96 % O2 Device: Nasal cannula Oxygen Delivery: 2 LPM  Tele/Cardiac- SR, denies CP  Resp- continues on 2L via NC, gets BROWNING/SOB, LS diminished  Activity- SBA/Ind  Pain- denies  Drips- NS @ 50ml/hr  Drains/Tubes- PIV, Amado  Skin- Anasarca +4 edema, jaundice   GI/- minimal UO on shift, urine dark tea colored   Aggression Color- Green  Plan- Continue to monitor kidney and liver function  Misc- NA    Alyssa Baugh, RN

## 2020-02-15 NOTE — PLAN OF CARE
Discharge Planner SLP   Patient plan for discharge: Patient did not state  Current status: Patient seen for swallowing tx in the AM. Per RN, GI wants to advance diet to regular diet and he did so she was able to order breakfast. Trialed puree and solid consistencies with mild throat clearing observed with dry cracker. Pt independently alternated thin liquid via straw with no overt s/sx of aspiration. Provided education to pt and her mother regarding safe swallow strategies, they demonstrated understanding.     Recommend regular diet with thin liquids. Strict swallow precautions: GERD precautions, upright for all oral intake, slow rate, small bites/sips, and alternate consistencies. SLP to follow 1x to ensure tolerance of regular diet and continued education.   Barriers to return to prior living situation: Medical status  Recommendations for discharge: Home   Rationale for recommendations: Likely will meet swallowing goals inpatient       Entered by: Brooklyn Mancia 02/15/2020 9:21 AM

## 2020-02-15 NOTE — PROGRESS NOTES
"[facility]    Nephrology Progress Note     Assessment & Plan     Pat Delgado is a 29 year old female who was admitted on 2/9/2020.         1) Alcohol Use Disorder with depression     2) Acute Alcohol Hepatitis. Falling AST, but climbing bili.  INR ~2.2.      3) EBEN: In setting of severe acute liver disease.  U na < 5.  This has not responded so far to volume loading.  This may be hepatorenal syndrome.  The only thing that makes the water a little cloudy is the IV contrast given on 2/9/20.  Her creatinine started to rise 2/11/20.  This is appropriate timing for contrast related EBEN.  It may be a matter of both factors contributing.      I discussed with her and her mother that if her kidney function does not improve in the next few days, she may need hemodialysis.  I would consider her a candidate given her age and also the acuity of her liver disease.       4) Hyponatremia.  Due to liver disease.        Suggest:     Continue 25% albumin,midodrine and octreotide.       Following.           Eliseo Alford MD  [facility] - Nephrology  [phone]    Interval History     Feels about the same.  She says \"a little wheezy.\"  No pain.  No N/V.  No urine output.    Physical Exam   Temp: 99.1  F (37.3  C) Temp src: Axillary BP: 98/51 Pulse: 96 Heart Rate: 98 Resp: 22 SpO2: 93 % O2 Device: Nasal cannula Oxygen Delivery: 2 LPM  Vitals:    02/13/20 0835 02/14/20 0050 02/15/20 0500   Weight: 129.8 kg (286 lb 3.2 oz) 129.8 kg (286 lb 3.2 oz) 132.2 kg (291 lb 6.4 oz)     Vital Signs with Ranges  Temp:  [98.5  F (36.9  C)-99.3  F (37.4  C)] 99.1  F (37.3  C)  Pulse:  [] 96  Heart Rate:  [93-98] 98  Resp:  [18-22] 22  BP: ()/() 98/51  SpO2:  [93 %-98 %] 93 %  I/O last 3 completed shifts:  In: 955 [P.O.:780; I.V.:175]  Out: 275 [Urine:275]    GENERAL APPEARANCE: pleasant, NAD, a & o  HEENT:  Eyes/ears/nose/neck grossly normal  RESP: lungs cta b c good efforts, no crackles, rhonchi " or wheezes  CV: RRR, nl S1/S2, no m/r/g   ABDOMEN: o/s/nt/nd, bs present  EXTREMITIES/SKIN: no rashes/lesions; 2-3+ BLE edema    Medications     - MEDICATION INSTRUCTIONS -         albumin human  25 g Intravenous 4x Daily     folic acid  1 mg Oral Daily     insulin aspart  1-7 Units Subcutaneous TID AC     insulin aspart  1-5 Units Subcutaneous At Bedtime     ipratropium - albuterol 0.5 mg/2.5 mg/3 mL  3 mL Nebulization Q4H While awake     midodrine  10 mg Oral TID w/meals     multivitamin, therapeutic  1 tablet Oral Daily     octreotide  200 mcg Subcutaneous TID     pantoprazole  40 mg Oral QAM AC     polyethylene glycol  17 g Oral Daily     sodium chloride (PF)  3 mL Intracatheter Q8H     thiamine  100 mg Intravenous Daily       Data   BMP  Recent Labs   Lab 02/15/20  0539 02/14/20  1809 02/14/20  0601 02/13/20  1753 02/13/20  1359  02/13/20  0544   * 124* 122* 122* 124*   < > 121*   POTASSIUM 4.2  --  4.3  --  4.1  --  4.4   CHLORIDE 93*  --  94  --  96  --  94   EMILY 7.8*  --  7.5*  --  7.1*  --  7.1*   CO2 15*  --  16*  --  20  --  20   BUN 13  --  12  --  9  --  8   CR 1.95*  --  1.51*  --  1.25*  --  1.07*   *  --  81  --  90  --  89    < > = values in this interval not displayed.     [email or web address](phos:4)  CBC)  Recent Labs   Lab 02/15/20  0539 02/14/20  1809 02/14/20  0601 02/13/20  0544 02/12/20  0555   WBC 18.3*  --  16.4* 16.7* 21.4*   HGB 7.5* 7.8* 8.1* 8.2* 9.0*   HCT 22.9*  --  24.1* 23.9* 27.0*   *  --  103* 101* 101*     --  259 253 310     Recent Labs   Lab 02/15/20  0539  02/11/20  1344   AST 92*   < >  --    ALT 26   < >  --    ALKPHOS 159*   < >  --    BILITOTAL 20.2*   < >  --    SRINIVAS  --   --  <10*    < > = values in this interval not displayed.     Recent Labs   Lab 02/15/20  0539   INR 2.26*     No results found for: D2VIT, D3VIT, DTOT  Recent Labs   Lab 02/15/20  0539  02/10/20  0545 02/09/20  2131 02/09/20  1547   HGB 7.5*   < > 9.5* 9.8* 10.4*   HCT 22.9*   <  > 27.4* 28.8* 29.4*   *   < > 98 98 97   IRON  --   --   --  68  --    IRONSAT  --   --   --  41  --    RETICABSCT  --   --   --   --  218.0*   RETP  --   --   --   --  7.0*   FEB  --   --   --  166*  --    ASHLEY  --   --   --  116  --    B12  --   --  1,312*  --   --    FOLIC  --   --  15.4  --   --     < > = values in this interval not displayed.     No results for input(s): PTHI in the last 168 hours.    Attestation:   I have reviewed today's relevant vital signs, notes, medications, labs and imaging.

## 2020-02-15 NOTE — PROGRESS NOTES
[facility]    Medicine Progress Note - Hospitalist Service       Date of Admission:  2/9/2020  Assessment & Plan       Acute alcoholic hepatitis with Maddrey score >50  Abdominal pain likely due to gaseous distension  Minimal ascites status post 5 days of cefepime for prophylaxis   Lab Results   Component Value Date    BILITOTAL 20.2 02/15/2020    BILITOTAL 17.7 02/14/2020    BILITOTAL 15.4 02/13/2020    BILITOTAL 13.9 02/12/2020    BILITOTAL 12.1 02/11/2020     INR   Date Value Ref Range Status   02/15/2020 2.26 (H) 0.86 - 1.14 Final       Continue to monitor     GI is following     Acute kidney injury due to acute tubular necrosis and/or hepatorenal syndrome   Hyponatremia with anasarca   Lactic acidosis (resolved)  Hyponatremia, probably chronic   Hypokalemia, hypophosphatemia  Creatinine   Date Value Ref Range Status   02/15/2020 1.95 (H) 0.52 - 1.04 mg/dL Final     Sodium   Date Value Ref Range Status   02/15/2020 120 (L) 133 - 144 mmol/L Final     Potassium   Date Value Ref Range Status   02/15/2020 4.2 3.4 - 5.3 mmol/L Final       Creatinine still trending up, urine output poor.      She may ultimately require hemodialysis     Continue albumin, octreotide and midodrine    Monitor renal function, urine output and electrolytes     Acute hypoxemic respiratory failure likely secondary to episode of aspiration (resolved)  New on 2/11 and occurred again on 2/12, became hypoxemic with increased work of breathing  Occurred after her first 2 dosings of IV albumin but has not occurred on subsequent infusions  Some improvements with diuretics.  CXR on both occasions was poor inspiration  BNP was only 200 and normal    Continue supplemental oxygen as needed    Diet: Regular Diet Adult    DVT Prophylaxis: Pneumatic Compression Devices  Amado Catheter: in place, indication: Strict 1-2 Hour I&O  Code Status: Full Code      Disposition Plan   Expected discharge: > 7 days, recommended to transitional care  unit once renal and liver function are stable .  Entered: Jh Watts MD 02/15/2020, 12:52 PM       The patient's care was discussed with the Bedside Nurse, Patient and Patient's Family.    Jh Watts MD  Hospitalist Service  [facility]    ______________________________________________________________________    Interval History   No pain or complaints.  She has just received lorazepam for anxiety.    Data reviewed today: I reviewed all medications, new labs and imaging results over the last 24 hours. I personally reviewed no images or EKG's today.    Physical Exam   Vital Signs: Temp: 99.1  F (37.3  C) Temp src: Axillary BP: 105/88 Pulse: 100 Heart Rate: 98 Resp: 22 SpO2: 98 % O2 Device: Nasal cannula Oxygen Delivery: 2 LPM  Weight: 291 lbs 6.4 oz  Constitutional: drowsy, cooperative, no apparent distress  Respiratory: No increased work of breathing, good air exchange, clear to auscultation bilaterally, no crackles or wheezing  Cardiovascular: regular rate and rhythm, normal S1 and S2, no S3 or S4, and no murmur noted  GI: normal bowel sounds, soft, mildly distended, non tender   Skin: no rashes, jaundice noted  Neuropsychiatric: General: drowsy, calm    Data   Recent Labs   Lab 02/15/20  0539 02/14/20  1809 02/14/20  0601  02/13/20  1359  02/13/20  0544  02/09/20  1547   WBC 18.3*  --  16.4*  --   --   --  16.7*   < > 24.3*   HGB 7.5* 7.8* 8.1*  --   --   --  8.2*   < > 10.4*   *  --  103*  --   --   --  101*   < > 97     --  259  --   --   --  253   < > 304   INR 2.26*  --  2.22*  --   --   --  2.13*   < > 1.86*   * 124* 122*   < > 124*   < > 121*   < > 120*   POTASSIUM 4.2  --  4.3  --  4.1  --  4.4   < > 3.3*   CHLORIDE 93*  --  94  --  96  --  94   < > 84*   CO2 15*  --  16*  --  20  --  20   < > 22   BUN 13  --  12  --  9  --  8   < > 1*   CR 1.95*  --  1.51*  --  1.25*  --  1.07*   < > 0.35*   ANIONGAP 12  --  12  --  8  --  7   < > 14   EMILY 7.8*   --  7.5*  --  7.1*  --  7.1*   < > 6.8*   *  --  81  --  90  --  89   < > 118*   ALBUMIN 3.2*  --  2.3*  --   --   --  1.7*   < > 1.6*   PROTTOTAL 6.5*  --  5.8*  --   --   --  5.5*   < > 6.7*   BILITOTAL 20.2*  --  17.7*  --   --   --  15.4*   < > 8.1*   ALKPHOS 159*  --  175*  --   --   --  190*   < > 357*   ALT 26  --  25  --   --   --  27   < > 33   AST 92*  --  97*  --   --   --  104*   < > 203*   LIPASE  --   --   --   --   --   --   --   --  119    < > = values in this interval not displayed.     No results found for this or any previous visit (from the past 24 hour(s)).  Medications     - MEDICATION INSTRUCTIONS -         albumin human  25 g Intravenous 4x Daily     folic acid  1 mg Oral Daily     insulin aspart  1-7 Units Subcutaneous TID AC     insulin aspart  1-5 Units Subcutaneous At Bedtime     ipratropium - albuterol 0.5 mg/2.5 mg/3 mL  3 mL Nebulization Q4H While awake     midodrine  10 mg Oral TID w/meals     multivitamin, therapeutic  1 tablet Oral Daily     octreotide  200 mcg Subcutaneous TID     pantoprazole  40 mg Oral QAM AC     polyethylene glycol  17 g Oral Daily     sodium chloride (PF)  3 mL Intracatheter Q8H     thiamine  100 mg Intravenous Daily

## 2020-02-15 NOTE — PROGRESS NOTES
HGB 7.8  Paged to hospitalist Dr You.Awaiting response. Incidentelly pt was up to bathroom and had small brown stool. No blood noted. Will cont to monitor.

## 2020-02-15 NOTE — PLAN OF CARE
PT: Attempted session, pt supine in bed, sleeping, difficult to awaken and once awake pt becoming agitated at repeated attempts to encourage mobilization. Pt's family reports she has been up all morning and is quite fatigued. Spoke with RN who states he will attempt to walk with pt later if she is more agreeable. Will cancel PT session at this time.

## 2020-02-16 ENCOUNTER — APPOINTMENT (OUTPATIENT)
Dept: SPEECH THERAPY | Facility: CLINIC | Age: 30
DRG: 871 | End: 2020-02-16
Payer: COMMERCIAL

## 2020-02-16 ENCOUNTER — APPOINTMENT (OUTPATIENT)
Dept: GENERAL RADIOLOGY | Facility: CLINIC | Age: 30
DRG: 871 | End: 2020-02-16
Attending: HOSPITALIST
Payer: COMMERCIAL

## 2020-02-16 LAB
ABO + RH BLD: NORMAL
ABO + RH BLD: NORMAL
ALBUMIN SERPL-MCNC: 3.3 G/DL (ref 3.4–5)
ALP SERPL-CCNC: 143 U/L (ref 40–150)
ALT SERPL W P-5'-P-CCNC: 23 U/L (ref 0–50)
AMMONIA PLAS-SCNC: 27 UMOL/L (ref 10–50)
ANION GAP SERPL CALCULATED.3IONS-SCNC: 12 MMOL/L (ref 3–14)
AST SERPL W P-5'-P-CCNC: 83 U/L (ref 0–45)
BACTERIA SPEC CULT: ABNORMAL
BASE DEFICIT BLDA-SCNC: 10 MMOL/L
BILIRUB SERPL-MCNC: 21.2 MG/DL (ref 0.2–1.3)
BLD GP AB SCN SERPL QL: NORMAL
BLD PROD TYP BPU: NORMAL
BLD UNIT ID BPU: 0
BLD UNIT ID BPU: 0
BLOOD BANK CMNT PATIENT-IMP: NORMAL
BLOOD PRODUCT CODE: NORMAL
BLOOD PRODUCT CODE: NORMAL
BPU ID: NORMAL
BPU ID: NORMAL
BUN SERPL-MCNC: 15 MG/DL (ref 7–30)
CALCIUM SERPL-MCNC: 8.1 MG/DL (ref 8.5–10.1)
CHLORIDE SERPL-SCNC: 91 MMOL/L (ref 94–109)
CO2 SERPL-SCNC: 17 MMOL/L (ref 20–32)
CREAT SERPL-MCNC: 2.24 MG/DL (ref 0.52–1.04)
ERYTHROCYTE [DISTWIDTH] IN BLOOD BY AUTOMATED COUNT: 21.1 % (ref 10–15)
GFR SERPL CREATININE-BSD FRML MDRD: 29 ML/MIN/{1.73_M2}
GLUCOSE BLDC GLUCOMTR-MCNC: 101 MG/DL (ref 70–99)
GLUCOSE BLDC GLUCOMTR-MCNC: 104 MG/DL (ref 70–99)
GLUCOSE BLDC GLUCOMTR-MCNC: 79 MG/DL (ref 70–99)
GLUCOSE BLDC GLUCOMTR-MCNC: 99 MG/DL (ref 70–99)
GLUCOSE SERPL-MCNC: 105 MG/DL (ref 70–99)
HCO3 BLD-SCNC: 16 MMOL/L (ref 21–28)
HCT VFR BLD AUTO: 21 % (ref 35–47)
HGB BLD-MCNC: 7.1 G/DL (ref 11.7–15.7)
HGB BLD-MCNC: 7.1 G/DL (ref 11.7–15.7)
HGB BLD-MCNC: 7.8 G/DL (ref 11.7–15.7)
INR PPP: 2.33 (ref 0.86–1.14)
Lab: ABNORMAL
MCH RBC QN AUTO: 35 PG (ref 26.5–33)
MCHC RBC AUTO-ENTMCNC: 33.8 G/DL (ref 31.5–36.5)
MCV RBC AUTO: 103 FL (ref 78–100)
NUM BPU REQUESTED: 2
O2/TOTAL GAS SETTING VFR VENT: ABNORMAL %
OXYHGB MFR BLD: 98 % (ref 92–100)
PCO2 BLD: 34 MM HG (ref 35–45)
PH BLD: 7.28 PH (ref 7.35–7.45)
PLATELET # BLD AUTO: 267 10E9/L (ref 150–450)
PO2 BLD: 171 MM HG (ref 80–105)
POTASSIUM SERPL-SCNC: 4.1 MMOL/L (ref 3.4–5.3)
PROT SERPL-MCNC: 6.2 G/DL (ref 6.8–8.8)
RBC # BLD AUTO: 2.03 10E12/L (ref 3.8–5.2)
SODIUM SERPL-SCNC: 120 MMOL/L (ref 133–144)
SODIUM SERPL-SCNC: 120 MMOL/L (ref 133–144)
SPECIMEN EXP DATE BLD: NORMAL
SPECIMEN SOURCE: ABNORMAL
TRANSFUSION STATUS PATIENT QL: NORMAL
WBC # BLD AUTO: 18.1 10E9/L (ref 4–11)

## 2020-02-16 PROCEDURE — 80053 COMPREHEN METABOLIC PANEL: CPT | Performed by: HOSPITALIST

## 2020-02-16 PROCEDURE — 21000000 ZZH R&B IMCU HEART CARE

## 2020-02-16 PROCEDURE — 85018 HEMOGLOBIN: CPT | Performed by: HOSPITALIST

## 2020-02-16 PROCEDURE — 40000257 ZZH STATISTIC CONSULT NO CHARGE VASC ACCESS

## 2020-02-16 PROCEDURE — 84295 ASSAY OF SERUM SODIUM: CPT | Performed by: HOSPITALIST

## 2020-02-16 PROCEDURE — 71045 X-RAY EXAM CHEST 1 VIEW: CPT

## 2020-02-16 PROCEDURE — 36600 WITHDRAWAL OF ARTERIAL BLOOD: CPT

## 2020-02-16 PROCEDURE — 85027 COMPLETE CBC AUTOMATED: CPT | Performed by: HOSPITALIST

## 2020-02-16 PROCEDURE — 25000125 ZZHC RX 250: Performed by: HOSPITALIST

## 2020-02-16 PROCEDURE — 82140 ASSAY OF AMMONIA: CPT | Performed by: HOSPITALIST

## 2020-02-16 PROCEDURE — 94640 AIRWAY INHALATION TREATMENT: CPT | Mod: 76

## 2020-02-16 PROCEDURE — 86900 BLOOD TYPING SEROLOGIC ABO: CPT | Performed by: INTERNAL MEDICINE

## 2020-02-16 PROCEDURE — 87106 FUNGI IDENTIFICATION YEAST: CPT | Performed by: HOSPITALIST

## 2020-02-16 PROCEDURE — 99233 SBSQ HOSP IP/OBS HIGH 50: CPT | Performed by: HOSPITALIST

## 2020-02-16 PROCEDURE — 25000132 ZZH RX MED GY IP 250 OP 250 PS 637: Performed by: NURSE PRACTITIONER

## 2020-02-16 PROCEDURE — 86923 COMPATIBILITY TEST ELECTRIC: CPT | Performed by: INTERNAL MEDICINE

## 2020-02-16 PROCEDURE — 94660 CPAP INITIATION&MGMT: CPT

## 2020-02-16 PROCEDURE — P9016 RBC LEUKOCYTES REDUCED: HCPCS | Performed by: INTERNAL MEDICINE

## 2020-02-16 PROCEDURE — 36569 INSJ PICC 5 YR+ W/O IMAGING: CPT

## 2020-02-16 PROCEDURE — 36415 COLL VENOUS BLD VENIPUNCTURE: CPT | Performed by: INTERNAL MEDICINE

## 2020-02-16 PROCEDURE — 92526 ORAL FUNCTION THERAPY: CPT | Mod: GN

## 2020-02-16 PROCEDURE — 27211441 ZZ H KIT SHRLOCK 5FR POWER PICC TRIPLE LUMEN

## 2020-02-16 PROCEDURE — 25000132 ZZH RX MED GY IP 250 OP 250 PS 637: Performed by: HOSPITALIST

## 2020-02-16 PROCEDURE — 86901 BLOOD TYPING SEROLOGIC RH(D): CPT | Performed by: INTERNAL MEDICINE

## 2020-02-16 PROCEDURE — 25000131 ZZH RX MED GY IP 250 OP 636 PS 637: Performed by: INTERNAL MEDICINE

## 2020-02-16 PROCEDURE — 82805 BLOOD GASES W/O2 SATURATION: CPT | Performed by: HOSPITALIST

## 2020-02-16 PROCEDURE — 40000275 ZZH STATISTIC RCP TIME EA 10 MIN

## 2020-02-16 PROCEDURE — 36415 COLL VENOUS BLD VENIPUNCTURE: CPT | Performed by: HOSPITALIST

## 2020-02-16 PROCEDURE — 00000146 ZZHCL STATISTIC GLUCOSE BY METER IP

## 2020-02-16 PROCEDURE — 25000128 H RX IP 250 OP 636: Performed by: HOSPITALIST

## 2020-02-16 PROCEDURE — 94640 AIRWAY INHALATION TREATMENT: CPT

## 2020-02-16 PROCEDURE — 86850 RBC ANTIBODY SCREEN: CPT | Performed by: INTERNAL MEDICINE

## 2020-02-16 PROCEDURE — 25000132 ZZH RX MED GY IP 250 OP 250 PS 637: Performed by: INTERNAL MEDICINE

## 2020-02-16 PROCEDURE — 87102 FUNGUS ISOLATION CULTURE: CPT | Performed by: HOSPITALIST

## 2020-02-16 PROCEDURE — 25000132 ZZH RX MED GY IP 250 OP 250 PS 637: Performed by: PHYSICIAN ASSISTANT

## 2020-02-16 PROCEDURE — 85610 PROTHROMBIN TIME: CPT | Performed by: HOSPITALIST

## 2020-02-16 PROCEDURE — 25000131 ZZH RX MED GY IP 250 OP 636 PS 637: Performed by: HOSPITALIST

## 2020-02-16 PROCEDURE — 85018 HEMOGLOBIN: CPT | Performed by: INTERNAL MEDICINE

## 2020-02-16 RX ORDER — LIDOCAINE 40 MG/G
CREAM TOPICAL
Status: DISCONTINUED | OUTPATIENT
Start: 2020-02-16 | End: 2020-02-16

## 2020-02-16 RX ORDER — PREDNISOLONE SODIUM PHOSPHATE 10 MG/1
40 TABLET, ORALLY DISINTEGRATING ORAL DAILY
Status: DISCONTINUED | OUTPATIENT
Start: 2020-02-16 | End: 2020-02-18 | Stop reason: CLARIF

## 2020-02-16 RX ORDER — SODIUM BICARBONATE 650 MG/1
1300 TABLET ORAL 2 TIMES DAILY
Status: DISCONTINUED | OUTPATIENT
Start: 2020-02-16 | End: 2020-02-18

## 2020-02-16 RX ORDER — HEPARIN SODIUM,PORCINE 10 UNIT/ML
2-5 VIAL (ML) INTRAVENOUS
Status: DISCONTINUED | OUTPATIENT
Start: 2020-02-16 | End: 2020-02-18

## 2020-02-16 RX ORDER — SODIUM BICARBONATE 84 MG/ML
50 INJECTION, SOLUTION INTRAVENOUS ONCE
Status: COMPLETED | OUTPATIENT
Start: 2020-02-16 | End: 2020-02-16

## 2020-02-16 RX ADMIN — FOLIC ACID 1 MG: 1 TABLET ORAL at 08:35

## 2020-02-16 RX ADMIN — OCTREOTIDE ACETATE 200 MCG: 200 INJECTION, SOLUTION INTRAVENOUS; SUBCUTANEOUS at 19:37

## 2020-02-16 RX ADMIN — PREDNISOLONE SODIUM PHOSPHATE 40 MG: 10 TABLET, ORALLY DISINTEGRATING ORAL at 17:27

## 2020-02-16 RX ADMIN — THERA TABS 1 TABLET: TAB at 08:35

## 2020-02-16 RX ADMIN — PANTOPRAZOLE SODIUM 40 MG: 40 TABLET, DELAYED RELEASE ORAL at 08:35

## 2020-02-16 RX ADMIN — CARBIDOPA AND LEVODOPA 10 MG: 50; 200 TABLET, EXTENDED RELEASE ORAL at 12:19

## 2020-02-16 RX ADMIN — SODIUM BICARBONATE 650 MG TABLET 1300 MG: at 21:25

## 2020-02-16 RX ADMIN — OCTREOTIDE ACETATE 200 MCG: 200 INJECTION, SOLUTION INTRAVENOUS; SUBCUTANEOUS at 11:41

## 2020-02-16 RX ADMIN — IPRATROPIUM BROMIDE AND ALBUTEROL SULFATE 3 ML: .5; 3 SOLUTION RESPIRATORY (INHALATION) at 07:19

## 2020-02-16 RX ADMIN — CARBIDOPA AND LEVODOPA 10 MG: 50; 200 TABLET, EXTENDED RELEASE ORAL at 08:35

## 2020-02-16 RX ADMIN — SODIUM BICARBONATE 50 MEQ: 84 INJECTION, SOLUTION INTRAVENOUS at 21:25

## 2020-02-16 RX ADMIN — CARBIDOPA AND LEVODOPA 10 MG: 50; 200 TABLET, EXTENDED RELEASE ORAL at 17:28

## 2020-02-16 RX ADMIN — LORAZEPAM 1 MG: 1 TABLET ORAL at 08:38

## 2020-02-16 RX ADMIN — IPRATROPIUM BROMIDE AND ALBUTEROL SULFATE 3 ML: .5; 3 SOLUTION RESPIRATORY (INHALATION) at 20:20

## 2020-02-16 RX ADMIN — IPRATROPIUM BROMIDE AND ALBUTEROL SULFATE 3 ML: .5; 3 SOLUTION RESPIRATORY (INHALATION) at 16:25

## 2020-02-16 RX ADMIN — LIDOCAINE HYDROCHLORIDE 1 ML: 10 INJECTION, SOLUTION INFILTRATION; PERINEURAL at 09:40

## 2020-02-16 RX ADMIN — THIAMINE HYDROCHLORIDE 100 MG: 100 INJECTION, SOLUTION INTRAMUSCULAR; INTRAVENOUS at 10:39

## 2020-02-16 ASSESSMENT — MIFFLIN-ST. JEOR: SCORE: 2112.52

## 2020-02-16 NOTE — PROGRESS NOTES
[facility]    Medicine Progress Note - Hospitalist Service       Date of Admission:  2/9/2020  Assessment & Plan    Acute alcoholic hepatitis with Maddrey score >50  Abdominal pain likely due to gaseous distension  Minimal ascites status post 5 days of cefepime for prophylaxis   Lab Results   Component Value Date    BILITOTAL 21.2 02/16/2020    BILITOTAL 20.2 02/15/2020    BILITOTAL 17.7 02/14/2020    BILITOTAL 15.4 02/13/2020    BILITOTAL 13.9 02/12/2020     INR   Date Value Ref Range Status   02/16/2020 2.33 (H) 0.86 - 1.14 Final   Bilirubin continues to rise.  Discussed with Dr. Hobson today    Start prednisolone    Continue to monitor     GI is following     Acute kidney injury due to acute tubular necrosis and/or hepatorenal syndrome   Hyponatremia with anasarca   Lactic acidosis (resolved)  Hyponatremia, probably chronic   Hypokalemia, hypophosphatemia  Creatinine   Date Value Ref Range Status   02/16/2020 2.24 (H) 0.52 - 1.04 mg/dL Final     Sodium   Date Value Ref Range Status   02/16/2020 120 (L) 133 - 144 mmol/L Final     Potassium   Date Value Ref Range Status   02/16/2020 4.1 3.4 - 5.3 mmol/L Final       Creatinine still trending up, urine output poor      She will likely require hemodialysis     Continue albumin, octreotide and midodrine    Monitor renal function, urine output and electrolytes     Acute hypoxemic respiratory failure likely secondary to episode of aspiration (resolved)  Recurrent hypoxic respiratory failure due to volume overload    Continue supplemental oxygen as needed    Will likely need hemodialysis    ABG today    Hematochezia  Discussed with Dr. Hobson - possibly hemorrhoidal in the setting of coagulopathy     Monitor hemoglobin     Diet: Regular Diet Adult    DVT Prophylaxis: Pneumatic Compression Devices  Amado Catheter: in place, indication: Strict 1-2 Hour I&O  Code Status: Full Code      Disposition Plan    Prognosis is extremely guarded  Expected discharge: >  7 days, recommended to transitional care unit once renal and liver function are stable .  Entered: Jh Watts MD 02/16/2020, 4:14 PM       The patient's care was discussed with the Bedside Nurse, Patient and Dr. Joce Watts MD  Hospitalist Service  [facility]    ______________________________________________________________________    Interval History   Having rectal bleeding today    Data reviewed today: I reviewed all medications, new labs and imaging results over the last 24 hours. I personally reviewed no images or EKG's today.    Physical Exam   Vital Signs: Temp: 97.8  F (36.6  C) Temp src: Axillary BP: 112/69 Pulse: 102 Heart Rate: 102 Resp: 22 SpO2: 93 % O2 Device: None (Room air) Oxygen Delivery: 2 LPM  Weight: 295 lbs 3.2 oz  Constitutional: drowsy, cooperative, no apparent distress  Respiratory: No increased work of breathing, good air exchange, clear to auscultation bilaterally, no crackles or wheezing  Cardiovascular: regular rate and rhythm, normal S1 and S2, no S3 or S4, and no murmur noted; generalized edema present 2-3+  GI: normal bowel sounds, soft, mildly distended, non tender   Skin: no rashes, jaundice noted  Neuropsychiatric: General: drowsy, calm    Data   Recent Labs   Lab 02/16/20  0548 02/16/20  0022 02/15/20  1835 02/15/20  0539  02/14/20  0601   WBC 18.1*  --   --  18.3*  --  16.4*   HGB 7.1* 7.1* 7.0* 7.5*   < > 8.1*   *  --   --  105*  --  103*     --   --  279  --  259   INR 2.33*  --   --  2.26*  --  2.22*   *  --  121* 120*   < > 122*   POTASSIUM 4.1  --   --  4.2  --  4.3   CHLORIDE 91*  --   --  93*  --  94   CO2 17*  --   --  15*  --  16*   BUN 15  --   --  13  --  12   CR 2.24*  --   --  1.95*  --  1.51*   ANIONGAP 12  --   --  12  --  12   EMILY 8.1*  --   --  7.8*  --  7.5*   *  --   --  120*  --  81   ALBUMIN 3.3*  --   --  3.2*  --  2.3*   PROTTOTAL 6.2*  --   --  6.5*  --  5.8*   BILITOTAL 21.2*  --    --  20.2*  --  17.7*   ALKPHOS 143  --   --  159*  --  175*   ALT 23  --   --  26  --  25   AST 83*  --   --  92*  --  97*    < > = values in this interval not displayed.     Recent Results (from the past 24 hour(s))   XR Chest Port 1 View    Narrative    CHEST ONE VIEW PORTABLE  2/16/2020 7:45 AM     HISTORY: Shortness of breath.    COMPARISON: 2/12/2020      Impression    IMPRESSION: There is vascular congestion and interstitial thickening.  No pneumothorax or pleural effusion. No lobar consolidation. Heart  size similar to prior. Findings suggestive of CHF.    CLIFFORD HUGHES MD     Medications     - MEDICATION INSTRUCTIONS -         folic acid  1 mg Oral Daily     insulin aspart  1-7 Units Subcutaneous TID AC     insulin aspart  1-5 Units Subcutaneous At Bedtime     ipratropium - albuterol 0.5 mg/2.5 mg/3 mL  3 mL Nebulization Q4H While awake     midodrine  10 mg Oral TID w/meals     multivitamin, therapeutic  1 tablet Oral Daily     octreotide  200 mcg Subcutaneous TID     pantoprazole  40 mg Oral QAM AC     polyethylene glycol  17 g Oral Daily     prednisoLONE  40 mg Oral Daily     sodium chloride (PF)  10 mL Intracatheter Q8H     thiamine  100 mg Intravenous Daily

## 2020-02-16 NOTE — PLAN OF CARE
Neuro- A&Ox3, lethargic at times, incomprehensible speech at times  Most Recent Vitals- Temp: 97.9  F (36.6  C) Temp src: Oral BP: 105/75 Pulse: 99 Heart Rate: 101 Resp: 22 SpO2: 100 % O2 Device: Nasal cannula Oxygen Delivery: 2 LPM  Tele/Cardiac- SR/ST, denies CP  Resp- labored breathing, continues on 2L NC, LS diminished, gets BROWNING  Activity- A-1  Pain- denies  Drips- None  Drains/Tubes- Amado. NO IV ACCESS-MD aware  Skin- Anasarca +3-4 all over body, jaundice  GI/- Minimal UO, Urine dark tea colored, Loose BM's with blood  Aggression Color- Yellow  Plan- Continue to monitor Renal fx. Hgb, and Liver fx. Needs Picc placed  Misc- Lost IV access when attempting to administer ordered unit of blood, Unable to get new access, order was placed for a PICC. Pt still needs one unit of PRBC    Alyssa Baugh RN

## 2020-02-16 NOTE — PROVIDER NOTIFICATION
MD Notification    Notified Person: MD    Notified Person Name:   Jh Watts  Notification Date/Time:2/16/2020 at 1415    Notification Interaction: Text paged     Purpose of Notification:  To notify that PICC line ordered by hospitalist service was attempted today but unable to thread PICC down into SVC. Attempted several times. Left line in as a midline and was going to activate order to have pt go to IR tomorrow to adjust PICC into the right place but Dr. Alford with nephrology spoke to bedside RN and recommended to leave as midline at this time as Pt will possibly go for dialysis catheter in the next few days.   Orders Received: Awaiting response    Comments:

## 2020-02-16 NOTE — PROGRESS NOTES
On-Call Provider (Mary Ann) called mother of patient (Natasha) to obtain consent for blood administration. Bedside nurse also called at 0350 to verify consent with Mother. Blood consent form filled out and signed.

## 2020-02-16 NOTE — PROGRESS NOTES
[facility]    Nephrology Progress Note     Assessment & Plan       Pat Delgado is a 29 year old female who was admitted on 2/9/2020.         1) Alcohol Use Disorder with depression     2) Acute Alcohol Hepatitis. Falling AST, but climbing bili.  INR ~2.3     3) EBEN: In setting of severe acute liver disease.  U na < 5.  This has not responded so far to volume loading.  This may be hepatorenal syndrome.  The only thing that makes the water a little cloudy is the IV contrast given on 2/9/20.  Her creatinine started to rise 2/11/20.  This is appropriate timing for contrast related EBEN.  It may be a matter of both factors contributing.       I discussed with her and her mother that if her kidney function does not improve in the next few days, she may need hemodialysis.  I would consider her a candidate given her age and also the acuity of her liver disease.       4) Hyponatremia.  Due to liver disease.     5) Anemia:  She is to have one unit RBCs.     Suggest:     Cont midodrine and octreotide.    Stop albumin in light of RBCs and resp status.    Agree with trial of steroid for acute etoh hepatitis.       Following.      Eliseo Alford MD  [facility] - Nephrology  [phone]    Interval History         Physical Exam   Temp: 98.4  F (36.9  C) Temp src: Axillary BP: 123/72 Pulse: 102 Heart Rate: 103 Resp: 22 SpO2: 97 % O2 Device: Nasal cannula Oxygen Delivery: 2 LPM  Vitals:    02/13/20 0835 02/14/20 0050 02/15/20 0500   Weight: 129.8 kg (286 lb 3.2 oz) 129.8 kg (286 lb 3.2 oz) 132.2 kg (291 lb 6.4 oz)     Vital Signs with Ranges  Temp:  [97.5  F (36.4  C)-98.8  F (37.1  C)] 98.4  F (36.9  C)  Pulse:  [] 102  Heart Rate:  [101-103] 103  Resp:  [20-24] 22  BP: ()/(52-88) 123/72  SpO2:  [91 %-100 %] 97 %  I/O last 3 completed shifts:  In: 2220 [P.O.:820; I.V.:1400]  Out: 400 [Urine:200; Emesis/NG output:200]    GENERAL APPEARANCE: lying in bed, calling out at times,  anxious  HEENT:  Eyes/ears/nose/neck grossly normal  RESP: lungs cta b c good efforts, no crackles, rhonchi or wheezes  CV: RRR, nl S1/S2,  m/r/g   ABDOMEN: mildly distended, non tender.    EXTREMITIES/SKIN: no rashes/lesions; 2-3+ BLE edema    Medications     - MEDICATION INSTRUCTIONS -         folic acid  1 mg Oral Daily     insulin aspart  1-7 Units Subcutaneous TID AC     insulin aspart  1-5 Units Subcutaneous At Bedtime     ipratropium - albuterol 0.5 mg/2.5 mg/3 mL  3 mL Nebulization Q4H While awake     midodrine  10 mg Oral TID w/meals     multivitamin, therapeutic  1 tablet Oral Daily     octreotide  200 mcg Subcutaneous TID     pantoprazole  40 mg Oral QAM AC     polyethylene glycol  17 g Oral Daily     sodium chloride (PF)  10 mL Intracatheter Q8H     thiamine  100 mg Intravenous Daily       Data   BMP  Recent Labs   Lab 02/16/20  0548 02/15/20  1835 02/15/20  0539 02/14/20  1809 02/14/20  0601  02/13/20  1359   * 121* 120* 124* 122*   < > 124*   POTASSIUM 4.1  --  4.2  --  4.3  --  4.1   CHLORIDE 91*  --  93*  --  94  --  96   EMILY 8.1*  --  7.8*  --  7.5*  --  7.1*   CO2 17*  --  15*  --  16*  --  20   BUN 15  --  13  --  12  --  9   CR 2.24*  --  1.95*  --  1.51*  --  1.25*   *  --  120*  --  81  --  90    < > = values in this interval not displayed.     [id number](phos:4)  CBC)  Recent Labs   Lab 02/16/20  0548 02/16/20  0022 02/15/20  1835 02/15/20  0539  02/14/20  0601 02/13/20  0544   WBC 18.1*  --   --  18.3*  --  16.4* 16.7*   HGB 7.1* 7.1* 7.0* 7.5*   < > 8.1* 8.2*   HCT 21.0*  --   --  22.9*  --  24.1* 23.9*   *  --   --  105*  --  103* 101*     --   --  279  --  259 253    < > = values in this interval not displayed.     Recent Labs   Lab 02/16/20  0548  02/11/20  1344   AST 83*   < >  --    ALT 23   < >  --    ALKPHOS 143   < >  --    BILITOTAL 21.2*   < >  --    SRINIVAS  --   --  <10*    < > = values in this interval not displayed.     Recent Labs   Lab  02/16/20  0548   INR 2.33*     No results found for: D2VIT, D3VIT, DTOT  Recent Labs   Lab 02/16/20  0548  02/10/20  0545 02/09/20  2131 02/09/20  1547   HGB 7.1*   < > 9.5* 9.8* 10.4*   HCT 21.0*   < > 27.4* 28.8* 29.4*   *   < > 98 98 97   IRON  --   --   --  68  --    IRONSAT  --   --   --  41  --    RETICABSCT  --   --   --   --  218.0*   RETP  --   --   --   --  7.0*   FEB  --   --   --  166*  --    ASHLEY  --   --   --  116  --    B12  --   --  1,312*  --   --    FOLIC  --   --  15.4  --   --     < > = values in this interval not displayed.     No results for input(s): PTHI in the last 168 hours.    Attestation:   I have reviewed today's relevant vital signs, notes, medications, labs and imaging.

## 2020-02-16 NOTE — TREATMENT PLAN
Called regarding hgb of 7, no signs of bleeding. Patient admitted with alcoholic hepatitis, liver failure with hepatorenal syndrome. She has chronic mild tachycardia and leukocytosis. No signs of clinical change. Recheck hgb in 6 hours. I do not suspect sepsis.

## 2020-02-16 NOTE — PROVIDER NOTIFICATION
MD Notification    Notified Person: MD    Notified Person Name: Dr. Hobson    Notification Date/Time: 1200    Notification Interaction: verbal    Purpose of Notification: having some bloody stools    Orders Received: none, monitor    Comments:

## 2020-02-16 NOTE — PROVIDER NOTIFICATION
MD Notification    Notified Person: MD    Notified Person Name: Dr. Joy    Notification Date/Time: 1851    Notification Interaction: page    Purpose of Notification: Mafengwo 7.0.     Orders Received:    Comments:

## 2020-02-16 NOTE — PROVIDER NOTIFICATION
MD Notification    Notified Person: MD    Notified Person Name: Mary Ann    Notification Date/Time: 2/16 @ 0110, R-epaged d/t no response @ 0134    Notification Interaction: phone    Purpose of Notification: Hgb 7.1, bright red liquid stool    Orders Received: Type and screen and BRBC 1 unit    Comments: MD to call family for blood consent

## 2020-02-16 NOTE — PROGRESS NOTES
[facility]  Gastroenterology Progress Note     Pat Delgado MRN# [id number]   YOB: 1990 Age: 29 year old          Assessment and Plan:     Alcoholic hepatitis  Patient is clinically fairly unchanged patient has significantly severe acute alcoholic hepatitis on top of chronic liver disease patient has worsening of her creatinine patient's findings are worrisome for acute kidney injury possible component also include hepatorenal syndrome patient has improvement in her albumin with IV infusion patient is currently on midodrine patient was started on IV fluids.  Patient has progressive worsening of bilirubin.  There is no clinical signs of sepsis.  I will recommend the patient to continue on current treatment plan.  Greatly appreciate nephrology input.  Patient may benefit from course of prednisone due to significantly high discriminant factor and meld score.  Patient has significantly guarded prognosis due to advanced chronic liver disease and severe alcoholic hepatitis now worsening of her kidney functions.          Acute liver failure without hepatic coma  Hyponatremia  Sepsis, due to unspecified organism, unspecified whether acute organ dysfunction present (H)      Interval History:   doing well; no cp, sob, n/v/d, or abd pain.              Review of Systems:   C: NEGATIVE for fever, chills, change in weight  E/M: NEGATIVE for ear, mouth and throat problems  R: NEGATIVE for significant cough or SOB  CV: NEGATIVE for chest pain, palpitations or peripheral edema             Medications:   I have reviewed this patient's current medications    folic acid  1 mg Oral Daily     insulin aspart  1-7 Units Subcutaneous TID AC     insulin aspart  1-5 Units Subcutaneous At Bedtime     ipratropium - albuterol 0.5 mg/2.5 mg/3 mL  3 mL Nebulization Q4H While awake     midodrine  10 mg Oral TID w/meals     multivitamin, therapeutic  1 tablet Oral Daily     octreotide  200 mcg Subcutaneous TID      pantoprazole  40 mg Oral QAM AC     polyethylene glycol  17 g Oral Daily     sodium chloride (PF)  3 mL Intracatheter Q8H     thiamine  100 mg Intravenous Daily                  Physical Exam:   Vitals were reviewed  Vital Signs with Ranges  Temp:  [97.5  F (36.4  C)-99.1  F (37.3  C)] 97.5  F (36.4  C)  Pulse:  [] 97  Heart Rate:  [101] 101  Resp:  [18-22] 20  BP: ()/(46-88) 114/75  SpO2:  [91 %-98 %] 91 %  I/O last 3 completed shifts:  In: 2220 [P.O.:820; I.V.:1400]  Out: 275 [Urine:275]  Constitutional: healthy, alert and no distress   Cardiovascular: negative, PMI normal. No lifts, heaves, or thrills. RRR. No murmurs, clicks gallops or rub  Respiratory: negative, Percussion normal. Good diaphragmatic excursion. Lungs clear  Head: Normocephalic. No masses, lesions, tenderness or abnormalities  Neck: Neck supple. No adenopathy. Thyroid symmetric, normal size,, Carotids without bruits.  Abdomen: Abdomen soft, non-tender. BS normal. No masses, organomegaly  SKIN: no suspicious lesions or rashes           Data:   I reviewed the patient's new clinical lab test results.   Recent Labs   Lab Test 02/15/20  1835 02/15/20  0539 02/14/20  1809 02/14/20  0601 02/13/20  0544   WBC  --  18.3*  --  16.4* 16.7*   HGB 7.0* 7.5* 7.8* 8.1* 8.2*   MCV  --  105*  --  103* 101*   PLT  --  279  --  259 253   INR  --  2.26*  --  2.22* 2.13*     Recent Labs   Lab Test 02/15/20  0539 02/14/20  0601 02/13/20  1359   POTASSIUM 4.2 4.3 4.1   CHLORIDE 93* 94 96   CO2 15* 16* 20   BUN 13 12 9   ANIONGAP 12 12 8     Recent Labs   Lab Test 02/15/20  0539 02/14/20  0653 02/14/20  0601 02/13/20  0544  02/09/20  1755 02/09/20  1547   ALBUMIN 3.2*  --  2.3* 1.7*   < >  --  1.6*   BILITOTAL 20.2*  --  17.7* 15.4*   < >  --  8.1*   ALT 26  --  25 27   < >  --  33   AST 92*  --  97* 104*   < >  --  203*   PROTEIN  --  100*  --   --   --  100*  --    LIPASE  --   --   --   --   --   --  119    < > = values in this interval not displayed.        I reviewed the patient's new imaging results.    All laboratory data reviewed  All imaging studies reviewed by me.    Liban Hobson MD,  2/15/2020  [facility]  Office : [phone]  Cell: [phone]

## 2020-02-16 NOTE — PROGRESS NOTES
[facility]  Gastroenterology Progress Note     Pat Delgado MRN# [id number]   YOB: 1990 Age: 29 year old          Assessment and Plan:     Alcoholic hepatitis  Clinically patient is fairly unchanged patient continues to be significantly jaundiced patient is responding appropriately to verbal command patient has worsening of her labs.  Patient is significantly tachycardic.  Patient is short of breath chest x-ray is consistent with possible fluid overload patient has no significant urine output her creatinine is continuously climbing.  Patient findings are consistent with significantly severe acute on chronic liver disease with coagulopathy and most likely hepatorenal syndrome along with possible acute kidney injury.  Patient's respiratory symptoms could be due to increase fluid overload versus her body habitus her last echocardiogram showed ejection fraction of 60%.  I will recommend the patient to be evaluated with arterial blood gases.  Check her fungal cultures in the blood.  Continue on supportive care and stop her albumin today patient last albumin is 3.3 patient is getting blood transfusion patient has minimal rectal bleeding most likely due to hemorrhoids in the setting of coagulopathy.  Patient's condition was discussed in detail with patient's family 45 minutes were spent mostly counseling answering question and explaining the underlying disease and process.  Findings were also discussed in detail with hospitalist team.  Patient's condition were also discussed with the nephrology there is strong concern for possible possible need for dialysis.  Patient also need nutritional supplements and likely TPN.            Acute liver failure without hepatic coma  Hyponatremia  Sepsis, due to unspecified organism, unspecified whether acute organ dysfunction present (H)      Interval History:   doing well; no cp, sob, n/v/d, or abd pain.              Review of Systems:   C: NEGATIVE  for fever, chills, change in weight  E/M: NEGATIVE for ear, mouth and throat problems  R: NEGATIVE for significant cough or SOB  CV: NEGATIVE for chest pain, palpitations or peripheral edema             Medications:   I have reviewed this patient's current medications    folic acid  1 mg Oral Daily     insulin aspart  1-7 Units Subcutaneous TID AC     insulin aspart  1-5 Units Subcutaneous At Bedtime     ipratropium - albuterol 0.5 mg/2.5 mg/3 mL  3 mL Nebulization Q4H While awake     midodrine  10 mg Oral TID w/meals     multivitamin, therapeutic  1 tablet Oral Daily     octreotide  200 mcg Subcutaneous TID     pantoprazole  40 mg Oral QAM AC     polyethylene glycol  17 g Oral Daily     prednisoLONE  40 mg Oral Daily     sodium chloride (PF)  10 mL Intracatheter Q8H     thiamine  100 mg Intravenous Daily                  Physical Exam:   Vitals were reviewed  Vital Signs with Ranges  Temp:  [97.8  F (36.6  C)-98.8  F (37.1  C)] 97.8  F (36.6  C)  Pulse:  [] 113  Heart Rate:  [102-103] 102  Resp:  [20-24] 22  BP: ()/(52-92) 105/76  SpO2:  [91 %-100 %] 98 %  I/O last 3 completed shifts:  In: 541.67 [P.O.:100]  Out: 400 [Urine:200; Emesis/NG output:200]  Constitutional: healthy, alert and no distress   Cardiovascular: negative, PMI normal. No lifts, heaves, or thrills. RRR. No murmurs, clicks gallops or rub  Respiratory: negative, Percussion normal. Good diaphragmatic excursion. Lungs clear  Neck: Neck supple. No adenopathy. Thyroid symmetric, normal size,, Carotids without bruits.  ENT: ENT exam normal, no neck nodes or sinus tenderness  SKIN: no suspicious lesions or rashes               Data:   I reviewed the patient's new clinical lab test results.   Recent Labs   Lab Test 02/16/20  1605 02/16/20  0548 02/16/20  0022  02/15/20  0539  02/14/20  0601   WBC  --  18.1*  --   --  18.3*  --  16.4*   HGB 7.8* 7.1* 7.1*   < > 7.5*   < > 8.1*   MCV  --  103*  --   --  105*  --  103*   PLT  --  267  --   --  279   --  259   INR  --  2.33*  --   --  2.26*  --  2.22*    < > = values in this interval not displayed.     Recent Labs   Lab Test 02/16/20  0548 02/15/20  0539 02/14/20  0601   POTASSIUM 4.1 4.2 4.3   CHLORIDE 91* 93* 94   CO2 17* 15* 16*   BUN 15 13 12   ANIONGAP 12 12 12     Recent Labs   Lab Test 02/16/20  0548 02/15/20  0539 02/14/20  0653 02/14/20  0601  02/09/20  1755 02/09/20  1547   ALBUMIN 3.3* 3.2*  --  2.3*   < >  --  1.6*   BILITOTAL 21.2* 20.2*  --  17.7*   < >  --  8.1*   ALT 23 26  --  25   < >  --  33   AST 83* 92*  --  97*   < >  --  203*   PROTEIN  --   --  100*  --   --  100*  --    LIPASE  --   --   --   --   --   --  119    < > = values in this interval not displayed.       I reviewed the patient's new imaging results.    All laboratory data reviewed  All imaging studies reviewed by me.    Liban Hobson MD,  2/16/2020  [facility]  Office : [phone]  Cell: [phone]

## 2020-02-16 NOTE — PROVIDER NOTIFICATION
MD Notification    Notified Person: MD    Notified Person Name: Dr Alford    Notification Date/Time:  02/16/2020 @ 0800  Notification Interaction: Paged MD    Purpose of Notification: To notify of order for PICC line placement in pt who is under consideration for dialysis. Inquiring if Dr. Alford would like VAT team to use R vs L arm or if he has any other input regarding PICC placement due to possible need for dialysis in the near future.     Orders Received: NONE    Comments: OK to go ahead with PICC placement in either arm

## 2020-02-16 NOTE — PROGRESS NOTES
Attempted PICC placement x 1. Accessed basilic vein without complication and was able to thread wire smoothly. Experienced difficulty getting the PICC line to drop down into place as it kept going upwards toward the jugular. After many attempts to get line to drop down into SVC the decision was made to leave the line in as peripheral access and refer her to IR on 2/17. Line was left with 29cm VC out and 12 cm in. Good blood return in all lumens at this position and all are flushing well. Notified bedside RN that line is NOT to be used as a central line but only as a peripheral at this time and that IR order was placed for tomorrow to get PICC to drop down into correct area. Pt tolerated procedure well after receiving some ativan for anxiety and restlessness which helped to calm her some. Will continue to monitor.

## 2020-02-16 NOTE — PROVIDER NOTIFICATION
MD Notification    Notified Person: MD    Notified Person Name: Dr. Watts    Notification Date/Time: 1500    Notification Interaction: page    Purpose of Notification: more significant bloody stools.    Orders Received: check Hgb at 1600    Comments:

## 2020-02-17 LAB
ALBUMIN SERPL-MCNC: 3 G/DL (ref 3.4–5)
ALP SERPL-CCNC: 154 U/L (ref 40–150)
ALT SERPL W P-5'-P-CCNC: 22 U/L (ref 0–50)
AMMONIA PLAS-SCNC: <10 UMOL/L (ref 10–50)
ANION GAP SERPL CALCULATED.3IONS-SCNC: 13 MMOL/L (ref 3–14)
AST SERPL W P-5'-P-CCNC: 82 U/L (ref 0–45)
BILIRUB SERPL-MCNC: 22.8 MG/DL (ref 0.2–1.3)
BUN SERPL-MCNC: 15 MG/DL (ref 7–30)
CALCIUM SERPL-MCNC: 8.3 MG/DL (ref 8.5–10.1)
CHLORIDE SERPL-SCNC: 92 MMOL/L (ref 94–109)
CO2 SERPL-SCNC: 15 MMOL/L (ref 20–32)
CREAT SERPL-MCNC: 2.49 MG/DL (ref 0.52–1.04)
ERYTHROCYTE [DISTWIDTH] IN BLOOD BY AUTOMATED COUNT: 20.6 % (ref 10–15)
GFR SERPL CREATININE-BSD FRML MDRD: 25 ML/MIN/{1.73_M2}
GLUCOSE BLDC GLUCOMTR-MCNC: 110 MG/DL (ref 70–99)
GLUCOSE BLDC GLUCOMTR-MCNC: 111 MG/DL (ref 70–99)
GLUCOSE BLDC GLUCOMTR-MCNC: 92 MG/DL (ref 70–99)
GLUCOSE BLDC GLUCOMTR-MCNC: 94 MG/DL (ref 70–99)
GLUCOSE SERPL-MCNC: 117 MG/DL (ref 70–99)
HCT VFR BLD AUTO: 24.6 % (ref 35–47)
HGB BLD-MCNC: 8.2 G/DL (ref 11.7–15.7)
INR PPP: 2.46 (ref 0.86–1.14)
MAGNESIUM SERPL-MCNC: 2.6 MG/DL (ref 1.6–2.3)
MCH RBC QN AUTO: 34 PG (ref 26.5–33)
MCHC RBC AUTO-ENTMCNC: 33.3 G/DL (ref 31.5–36.5)
MCV RBC AUTO: 102 FL (ref 78–100)
PHOSPHATE SERPL-MCNC: 3.3 MG/DL (ref 2.5–4.5)
PLATELET # BLD AUTO: 265 10E9/L (ref 150–450)
POTASSIUM SERPL-SCNC: 4.6 MMOL/L (ref 3.4–5.3)
PROT SERPL-MCNC: 6.2 G/DL (ref 6.8–8.8)
RBC # BLD AUTO: 2.41 10E12/L (ref 3.8–5.2)
SODIUM SERPL-SCNC: 120 MMOL/L (ref 133–144)
SODIUM SERPL-SCNC: 123 MMOL/L (ref 133–144)
WBC # BLD AUTO: 18.9 10E9/L (ref 4–11)

## 2020-02-17 PROCEDURE — 84295 ASSAY OF SERUM SODIUM: CPT | Performed by: HOSPITALIST

## 2020-02-17 PROCEDURE — 25000131 ZZH RX MED GY IP 250 OP 636 PS 637: Performed by: INTERNAL MEDICINE

## 2020-02-17 PROCEDURE — 94640 AIRWAY INHALATION TREATMENT: CPT

## 2020-02-17 PROCEDURE — 99232 SBSQ HOSP IP/OBS MODERATE 35: CPT | Performed by: HOSPITALIST

## 2020-02-17 PROCEDURE — 25000128 H RX IP 250 OP 636: Performed by: INTERNAL MEDICINE

## 2020-02-17 PROCEDURE — 25000125 ZZHC RX 250: Performed by: INTERNAL MEDICINE

## 2020-02-17 PROCEDURE — 00000146 ZZHCL STATISTIC GLUCOSE BY METER IP

## 2020-02-17 PROCEDURE — 25000131 ZZH RX MED GY IP 250 OP 636 PS 637: Performed by: HOSPITALIST

## 2020-02-17 PROCEDURE — 85027 COMPLETE CBC AUTOMATED: CPT | Performed by: HOSPITALIST

## 2020-02-17 PROCEDURE — 25000132 ZZH RX MED GY IP 250 OP 250 PS 637: Performed by: INTERNAL MEDICINE

## 2020-02-17 PROCEDURE — 40000275 ZZH STATISTIC RCP TIME EA 10 MIN

## 2020-02-17 PROCEDURE — 83735 ASSAY OF MAGNESIUM: CPT | Performed by: HOSPITALIST

## 2020-02-17 PROCEDURE — 85610 PROTHROMBIN TIME: CPT | Performed by: HOSPITALIST

## 2020-02-17 PROCEDURE — 82140 ASSAY OF AMMONIA: CPT | Performed by: HOSPITALIST

## 2020-02-17 PROCEDURE — 25000125 ZZHC RX 250: Performed by: HOSPITALIST

## 2020-02-17 PROCEDURE — 25000132 ZZH RX MED GY IP 250 OP 250 PS 637: Performed by: NURSE PRACTITIONER

## 2020-02-17 PROCEDURE — 25000132 ZZH RX MED GY IP 250 OP 250 PS 637: Performed by: PHYSICIAN ASSISTANT

## 2020-02-17 PROCEDURE — 36415 COLL VENOUS BLD VENIPUNCTURE: CPT | Performed by: HOSPITALIST

## 2020-02-17 PROCEDURE — 21000000 ZZH R&B IMCU HEART CARE

## 2020-02-17 PROCEDURE — 25000128 H RX IP 250 OP 636: Performed by: HOSPITALIST

## 2020-02-17 PROCEDURE — 84100 ASSAY OF PHOSPHORUS: CPT | Performed by: HOSPITALIST

## 2020-02-17 PROCEDURE — 80053 COMPREHEN METABOLIC PANEL: CPT | Performed by: HOSPITALIST

## 2020-02-17 RX ORDER — FUROSEMIDE 10 MG/ML
40 INJECTION INTRAMUSCULAR; INTRAVENOUS ONCE
Status: COMPLETED | OUTPATIENT
Start: 2020-02-17 | End: 2020-02-17

## 2020-02-17 RX ORDER — LORAZEPAM 2 MG/ML
0.5 INJECTION INTRAMUSCULAR ONCE
Status: DISCONTINUED | OUTPATIENT
Start: 2020-02-17 | End: 2020-02-19

## 2020-02-17 RX ADMIN — PREDNISOLONE SODIUM PHOSPHATE 40 MG: 10 TABLET, ORALLY DISINTEGRATING ORAL at 10:06

## 2020-02-17 RX ADMIN — FUROSEMIDE 10 MG/HR: 10 INJECTION, SOLUTION INTRAVENOUS at 16:07

## 2020-02-17 RX ADMIN — OCTREOTIDE ACETATE 200 MCG: 200 INJECTION, SOLUTION INTRAVENOUS; SUBCUTANEOUS at 21:29

## 2020-02-17 RX ADMIN — IPRATROPIUM BROMIDE AND ALBUTEROL SULFATE 3 ML: .5; 3 SOLUTION RESPIRATORY (INHALATION) at 11:37

## 2020-02-17 RX ADMIN — OCTREOTIDE ACETATE 200 MCG: 200 INJECTION, SOLUTION INTRAVENOUS; SUBCUTANEOUS at 02:14

## 2020-02-17 RX ADMIN — CARBIDOPA AND LEVODOPA 10 MG: 50; 200 TABLET, EXTENDED RELEASE ORAL at 12:56

## 2020-02-17 RX ADMIN — THIAMINE HYDROCHLORIDE 100 MG: 100 INJECTION, SOLUTION INTRAMUSCULAR; INTRAVENOUS at 10:38

## 2020-02-17 RX ADMIN — FOLIC ACID 1 MG: 1 TABLET ORAL at 10:16

## 2020-02-17 RX ADMIN — FUROSEMIDE 40 MG: 10 INJECTION, SOLUTION INTRAVENOUS at 10:40

## 2020-02-17 RX ADMIN — THERA TABS 1 TABLET: TAB at 10:27

## 2020-02-17 RX ADMIN — PANTOPRAZOLE SODIUM 40 MG: 40 TABLET, DELAYED RELEASE ORAL at 10:27

## 2020-02-17 RX ADMIN — OCTREOTIDE ACETATE 200 MCG: 200 INJECTION, SOLUTION INTRAVENOUS; SUBCUTANEOUS at 10:47

## 2020-02-17 RX ADMIN — SODIUM BICARBONATE 650 MG TABLET 1300 MG: at 10:04

## 2020-02-17 RX ADMIN — OCTREOTIDE ACETATE 200 MCG: 200 INJECTION, SOLUTION INTRAVENOUS; SUBCUTANEOUS at 16:10

## 2020-02-17 ASSESSMENT — MIFFLIN-ST. JEOR: SCORE: 2103.9

## 2020-02-17 NOTE — PROGRESS NOTES
[facility]  Gastroenterology Progress Note     Pat Delgado MRN# [id number]   YOB: 1990 Age: 29 year old          Assessment and Plan:   Update: Continued increase in creatinine at 2.49- Nephrology following. Remains jaundice- Bili continues to elevate now at 22.8. Patient appears uncomfortable and unable answer questions. Yeast cultures ordered- pending.    Problems:  Acute kidney injury due to acute tubular necrosis and/or hepatorenal syndrome Patient has increase in creatinine at 2.49. Urine output limited. Consistent with severe acute on chronic liver diseae with coagulpathy and hepatorenal syndrome- with possible acute kidney injury. Nephrology following and managing fluids and considering hemodialysis. Started on prednisolone 40 mg.    Recurrent hypoxic respiratory failure due to volume overload  Concern of fluid overload versus body habitus. Echo showed ejection fraction of 60% Blood gases ordered. Breath sounds clear no crackles of wheezes.     Abdominal distention- likely due to gaseous distention  Pat Delgado is a pleasant 29 year old female with history of alcohol abuse. She has complaints of abdominal pain and distention. GI was asked to evaluate for ascites and consider paracentesis.  Imaging notes only small amount of fluid noted on imaging largest pocket 2 cm. NO ascites noted on exam. SBP not likely given no fluid collection. Passing stools and flatus     - Monitor bowel movements     Alcoholic hepatitis  Maddrey score calculated yesterday at 52.  Patient has noted hepatomegaly on CT scan. Cause of elevation in WBC may be related to hepatitis. INR continues to elevate increased to 2.46.  AST elevated and trending down. Bilirubin continues to elevate but often trails liver transaminases.  Albumin at 3.0. ( supplemental albumin has been held- will monitor)  - Appreciate psychiatry and CD consults  - Avoid alcohol intake  - Daily CMP and INR  - Albumin has been  held. Consider restarting if albumin decreases.     Anemia  Patient has reported bright red blood in stools but not to ED. Her hemoglobin is at 8.2 today and below baseline 12-13.  Occult blood negative. There is a concern for PUD vs lower gi bleed.  Also, there is concern for alcoholic gastritis with anemia.   CT does note wall thickening in cecum likely from non-distended colon.   - Recommend daily CBC  - Will plan EGD and colonoscopy once stable or as outpatient to r/o GI bleed - sodium currently at 120  - Continue oral pantoprazole 40 mg daily     Hyponatremia w/ increase in creatinine and anasarca  Hypokalemia and hypophosphatemia  - creatinine trending up and urine output porr  _ will likely require hemodialysis  - Continue on octreotide and midodrine  - Daily CMP          Acute liver failure without hepatic coma  Hyponatremia  Sepsis, due to unspecified organism, unspecified whether acute organ dysfunction present (H)      Interval History:   Patient unable to coherently answer questions              Review of Systems:   C: NEGATIVE for fever, chills, change in weight  E/M: NEGATIVE for ear, mouth and throat problems  R: NEGATIVE for significant cough or SOB  CV: NEGATIVE for chest pain, palpitations or peripheral edema             Medications:   I have reviewed this patient's current medications    folic acid  1 mg Oral Daily     insulin aspart  1-7 Units Subcutaneous TID AC     insulin aspart  1-5 Units Subcutaneous At Bedtime     ipratropium - albuterol 0.5 mg/2.5 mg/3 mL  3 mL Nebulization Q4H While awake     midodrine  10 mg Oral TID w/meals     multivitamin, therapeutic  1 tablet Oral Daily     octreotide  200 mcg Subcutaneous TID     pantoprazole  40 mg Oral QAM AC     polyethylene glycol  17 g Oral Daily     prednisoLONE  40 mg Oral Daily     sodium bicarbonate  1,300 mg Oral BID     sodium chloride (PF)  10 mL Intracatheter Q8H     thiamine  100 mg Intravenous Daily                  Physical Exam:    Vitals were reviewed  Vital Signs with Ranges  Temp:  [97.4  F (36.3  C)-98.7  F (37.1  C)] 97.5  F (36.4  C)  Pulse:  [] 100  Heart Rate:  [102-103] 102  Resp:  [22-24] 22  BP: (105-146)/(69-92) 127/80  SpO2:  [93 %-100 %] 94 %  I/O last 3 completed shifts:  In: 580.67 [P.O.:100; I.V.:39]  Out: 325 [Urine:325]  Constitutional: alert, severe distress and jaundice   Cardiovascular: negative, PMI normal. No lifts, heaves, or thrills. RRR. No murmurs, clicks gallops or rub  Respiratory: negative, Percussion normal. Good diaphragmatic excursion. Lungs clear  Head: Normocephalic. No masses, lesions, tenderness or abnormalities  Neck: Neck supple. No adenopathy. Thyroid symmetric, normal size,, Carotids without bruits.  Abdomen: Abdomen soft, non-tender. BS normal. No masses, organomegaly, positive findings: distended           Data:   I reviewed the patient's new clinical lab test results.   Recent Labs   Lab Test 02/17/20  0532 02/16/20  1605 02/16/20  0548  02/15/20  0539   WBC 18.9*  --  18.1*  --  18.3*   HGB 8.2* 7.8* 7.1*   < > 7.5*   *  --  103*  --  105*     --  267  --  279   INR 2.46*  --  2.33*  --  2.26*    < > = values in this interval not displayed.     Recent Labs   Lab Test 02/17/20  0532 02/16/20  0548 02/15/20  0539   POTASSIUM 4.6 4.1 4.2   CHLORIDE 92* 91* 93*   CO2 15* 17* 15*   BUN 15 15 13   ANIONGAP 13 12 12     Recent Labs   Lab Test 02/17/20  0532 02/16/20  0548 02/15/20  0539 02/14/20  0653  02/09/20  1755 02/09/20  1547   ALBUMIN 3.0* 3.3* 3.2*  --    < >  --  1.6*   BILITOTAL 22.8* 21.2* 20.2*  --    < >  --  8.1*   ALT 22 23 26  --    < >  --  33   AST 82* 83* 92*  --    < >  --  203*   PROTEIN  --   --   --  100*  --  100*  --    LIPASE  --   --   --   --   --   --  119    < > = values in this interval not displayed.       I reviewed the patient's new imaging results.    All laboratory data reviewed  All imaging studies reviewed by me.    Aminata Rangel PA-C,   2/17/2020  [facility]  Office : [phone]  Cell: [phone] (Dr. Hobson)  Cell: [phone] (Aminata Rangel PA-C)

## 2020-02-17 NOTE — TREATMENT PLAN
Cross-Cover Note    Rounding doctor ordered ABG today and I was called with results. Patient is developing ARF in the context of liver failure with metabolic acidosis. She is hyperventilating with normal oxygen saturations.     - Stop BiPAP  - I would normally start bicarbonte drip but given sodium of 120, I have asked the RN to call nephrology for their input regarding IVF. I appreciate their help.

## 2020-02-17 NOTE — PLAN OF CARE
"Pt A/O but lethargic. Slurs her words. Looks to be in constant discomfort while awake. Is impulsive and weak with ambulation. Started having significant blood with bowel movements, GI and IM notified. Received 1 unit of blood, monitoring Hgb. PICC line attempted, wasn't able to advance, so it is in place to be a midline. Pt has had labored breathing all day, ABG checked showing metabolic acidosis, bicarb ordered. Steroids started. Hemodynamically stable. Up with ax2. Tele shows SR/ST. Pt looks noticeably better and more comfortable post BIPAP trial and BiCarb administration.     Blood pressure 120/79, pulse 98, temperature 98.2  F (36.8  C), temperature source Oral, resp. rate 22, height 1.727 m (5' 8\"), weight 133.9 kg (295 lb 3.2 oz), SpO2 94 %, not currently breastfeeding.    "

## 2020-02-17 NOTE — PROGRESS NOTES
CLINICAL NUTRITION SERVICES - REASSESSMENT NOTE    RECOMMENDATIONS FOR MD/PROVIDER TO ORDER:   Consider initiation of TF if PO intake does not improve in 24-48 hours.    Recommendations Ordered by Registered Dietitian (RD):   Continue Regular diet  Add Boost Strawberry between meals  Add Magic cup at breakfast and dinner  Add Gelatein Plus at lunch     Malnutrition (2/10):   % Weight Loss: Unable to assess  % Intake:  </= 75% for >/= 1 month (severe malnutrition) --> based on information obtained from EMR.   Subcutaneous Fat Loss: None observed  Muscle Loss: Temporal region mild depletion  Fluid Retention:  Mild 2+  Malnutrition Diagnosis: Non-Severe malnutrition  In Context of:  Acute illness or injury  Environmental or social circumstances     EVALUATION OF PROGRESS TOWARD GOALS   Diet:  Regular    Intake/Tolerance:  Per flowsheet, 50% of one meal since diet advancement to Regular diet. Confirmed minimal intake with patient's mother.  Discussed supplement options.  Pt's mother agreed they wanted to attempt some intake with supplements.     NEW FINDINGS:   2/17 - Wt 133 kg (293 lb 4.8 oz) - wt up 25# since admit, likely due to fluids  2/16 - Numerous loose BM's with blood in stool. GI consulted.  2/16 - Per Nephrology, pt may need hemodialysis if no improvement in kidney function.   2/17 -  Plan to go to IR to advance PICC today.    Labs reviewed:  Na 120 (L)  Cr 2.49 (H)  Mg 2.6 (H)  Bili 22.8 (H)  Alk Phos 154 (H)  AST 82 (H)    Previous Goals:   Tolerance of diet advancement from clear liquids within 1-3 days.   Evaluation: Met    Previous Nutrition Diagnosis:   Inadequate oral intake related to altered GI function and inability to advance diet AEB NPO/CLD x4 days and coding for malnutrition.   Evaluation: Updated below    CURRENT NUTRITION DIAGNOSIS  Inadequate oral intake related to abdominal pain and distention as evidenced by minimal po intake since admit (9 days).    INTERVENTIONS  Recommendations /  Nutrition Prescription  Continue Regular diet  Add Boost Strawberry between meals  Add Magic cup at breakfast and dinner  Add Gelatein Plus at lunch     Consider initiation of TF if PO intake does not improve in 24-48 hours.     Implementation  Medical Food Supplement - ordered as above    Goals  Patient to consume 25-50% of meals and supplements in 24-48 hours.     MONITORING AND EVALUATION:  Progress towards goals will be monitored and evaluated per protocol and Practice Guidelines    Ophelia Srinivasan RDN, LD

## 2020-02-17 NOTE — PROGRESS NOTES
[facility]     Renal Progress Note       SHORTHAND KEY FOR MY NOTES:  c = with, s = without, p = after, a = before, x = except, asx = asymptomatic, tx = transplant or treatment, sx = symptoms or symptomatic, cx = canceled or culture, rxn = reaction, yday = yesterday, nl = normal, abx = antibiotics, fxn = function, dx = diagnosis, dz = disease, m/h = melena/hematochezia, c/d/l/ha = cramping/dizziness/lightheadedness/headache, d/c = discharge or diarrhea/constipation, f/c/n/v = fevers/chills/nausea/vomiting, cp/sob = chest pain/shortness of breath, tbv = total body volume, rxn = reaction, tdc = tunneled dialysis catheter, pta = prior to admission, hd = hemodialysis, pd = peritoneal dialysis, hhd = home hemodialysis         Assessment/Plan:     1.  Oliguric EBEN.  Pt likely has HRS vs ATN 2 dye.  Either is a possibility.  The rate of change has been the same for the last few days.  She is TBV up from hypoalbuminemic third-spacing, however it's hard to assess her intravascular volume in this setting.  She rec'd a decent amt of alb/blood, but her lungs are fairly clear on exam.  She made ~50 ml of urine p the IV furos earlier.  She doesn't need HD today, but may need it soon.  Her family is interested in pursuing it, if necessary.  A.  Follow labs, uo, sx.  B.  Start furos gtt at 10 mg/h.  C.  Based on results, we may start HD as soon as tmrw.  If so, she will need a TDC.  Already d/w IR.    2.  EtOHic hepatitis / encephalopathy.  Pt's NH3 was ok yday, but she seems encephalopathic today.  Bili is still trending higher.  INR is high from a fixed coagulopathy.  A.  Follow clinically.  B.  May benefit from lactulose, but oral puts her at risk for aspiration at this point.  If worsens, may need rectal.  Will defer to hospitalist team to determine if/when to use it.  C.  Check NH3.    3.  Anemia.  Pt rec'd 1 unit of blood yday.  Hb is up to 8 from 7.  She had red blood in her stool yday.  A.  Follow  "clinically.  B.  No heparin.    4.  Hyponatremia.  Na is stable ~120.  This is from the liver dz.  She is volume up, which may also contribute.  A.  Follow Na.    5.  RTA.  Pt's bicarb is down to 15, but k is ok.  She is on oral bicarb.  A.  Continue bicarb for now.    6.  FEN.  Electrolytes are ok x as noted above.    A.  Pt should be NPO.        Interval History:     Unable.  Pt is encephalopathic.  She's in a tough spot and isn't making much urine.  She has been complaining of being sob, but sats are 95% on RA.    D/w mother, sister at bedside.          Medications and Allergies:       folic acid  1 mg Oral Daily     insulin aspart  1-7 Units Subcutaneous TID AC     insulin aspart  1-5 Units Subcutaneous At Bedtime     ipratropium - albuterol 0.5 mg/2.5 mg/3 mL  3 mL Nebulization Q4H While awake     LORazepam  0.5 mg Intravenous Once     midodrine  10 mg Oral TID w/meals     multivitamin, therapeutic  1 tablet Oral Daily     octreotide  200 mcg Subcutaneous TID     pantoprazole  40 mg Oral QAM AC     polyethylene glycol  17 g Oral Daily     prednisoLONE  40 mg Oral Daily     sodium bicarbonate  1,300 mg Oral BID     sodium chloride (PF)  10 mL Intracatheter Q8H     thiamine  100 mg Intravenous Daily     Allergies   Allergen Reactions     Penicillins Unknown     Hives            Physical Exam:     Vitals were reviewed    Heart Rate: 102, Blood pressure 109/87, pulse 96, temperature 98.9  F (37.2  C), temperature source Axillary, resp. rate 22, height 1.727 m (5' 8\"), weight 133 kg (293 lb 4.8 oz), SpO2 98 %, not currently breastfeeding.  Wt Readings from Last 3 Encounters:   02/17/20 133 kg (293 lb 4.8 oz)   02/09/15 95.3 kg (210 lb)   01/22/15 108.4 kg (239 lb)     Intake/Output Summary (Last 24 hours) at 2/17/2020 1422  Last data filed at 2/17/2020 1300  Gross per 24 hour   Intake 610.67 ml   Output 340 ml   Net 270.67 ml     GENERAL APPEARANCE: uncomfortable, writhing around, confused  HEENT:  " eyes/ears/nose/neck grossly normal x scleral icterus  RESP: lungs CTA B c good efforts, no crackles, shallow resp  CV: Reg, tachy, nl S1/S2  ABDOMEN: o/s/nt, + distended  EXTREMITIES/SKIN: 1-2+ ble edema  OTHER:  R arm PICC (currently fxning as a midline)         Data:     CBC RESULTS:     Recent Labs   Lab 02/17/20  0532 02/16/20  1605 02/16/20  0548 02/16/20  0022 02/15/20  1835 02/15/20  0539  02/14/20  0601 02/13/20  0544 02/12/20  0555   WBC 18.9*  --  18.1*  --   --  18.3*  --  16.4* 16.7* 21.4*   RBC 2.41*  --  2.03*  --   --  2.18*  --  2.33* 2.36* 2.68*   HGB 8.2* 7.8* 7.1* 7.1* 7.0* 7.5*   < > 8.1* 8.2* 9.0*   HCT 24.6*  --  21.0*  --   --  22.9*  --  24.1* 23.9* 27.0*     --  267  --   --  279  --  259 253 310    < > = values in this interval not displayed.     Basic Metabolic Panel:  Recent Labs   Lab 02/17/20  0532 02/16/20  1835 02/16/20  0548 02/15/20  1835 02/15/20  0539 02/14/20  1809 02/14/20  0601  02/13/20  1359  02/13/20  0544   * 120* 120* 121* 120* 124* 122*   < > 124*   < > 121*   POTASSIUM 4.6  --  4.1  --  4.2  --  4.3  --  4.1  --  4.4   CHLORIDE 92*  --  91*  --  93*  --  94  --  96  --  94   CO2 15*  --  17*  --  15*  --  16*  --  20  --  20   BUN 15  --  15  --  13  --  12  --  9  --  8   CR 2.49*  --  2.24*  --  1.95*  --  1.51*  --  1.25*  --  1.07*   *  --  105*  --  120*  --  81  --  90  --  89   EMILY 8.3*  --  8.1*  --  7.8*  --  7.5*  --  7.1*  --  7.1*    < > = values in this interval not displayed.     INR  Recent Labs   Lab 02/17/20  0532 02/16/20  0548 02/15/20  0539 02/14/20  0601   INR 2.46* 2.33* 2.26* 2.22*      Attestation:   I have reviewed today's relevant vital signs, notes, medications, labs and imaging.    Desmond Moses MD  [facility] - Nephrology  [phone]

## 2020-02-17 NOTE — PROGRESS NOTES
Patient had picc line placed on 2-16-20.  VAT nurse unable to get catheter to drop down into the SVC.  Line was pulled back and orders given to use picc as midline for access.  Dr Moses from nephrology here to see patient now.  Discussed picc line issues with Dr. Moses.  His recommendation is to leave picc line in place today and continue to use it as just a midline and then re evaluate on 2-18.  Unit RN updated and present during discussion of this plan.

## 2020-02-17 NOTE — PROVIDER NOTIFICATION
MD Notification    Notified Person: MD    Notified Person Name: Dr Watts    Notification Date/Time: 1730    Notification Interaction: telephone    Purpose of Notification: ABG results in. Want further intervention?    Orders Received: Trial BIPAP    Comments:

## 2020-02-17 NOTE — PROGRESS NOTES
[facility]    Medicine Progress Note - Hospitalist Service       Date of Admission:  2/9/2020  Assessment & Plan    Acute alcoholic hepatitis with Maddrey score >50  Abdominal pain likely due to gaseous distension  Minimal ascites status post 5 days of cefepime for prophylaxis   Lab Results   Component Value Date    BILITOTAL 22.8 02/17/2020    BILITOTAL 21.2 02/16/2020    BILITOTAL 20.2 02/15/2020    BILITOTAL 17.7 02/14/2020    BILITOTAL 15.4 02/13/2020     INR   Date Value Ref Range Status   02/17/2020 2.46 (H) 0.86 - 1.14 Final   Bilirubin continues to rise.  We started prednisolone on 2/16    Continue prednisolone    Continue to monitor     GI is following     Acute kidney injury due to acute tubular necrosis and/or hepatorenal syndrome   Hyponatremia with anasarca   Lactic acidosis (resolved)  Hyponatremia, probably chronic   Hypokalemia, hypophosphatemia  Creatinine   Date Value Ref Range Status   02/17/2020 2.49 (H) 0.52 - 1.04 mg/dL Final     Sodium   Date Value Ref Range Status   02/17/2020 120 (L) 133 - 144 mmol/L Final     Potassium   Date Value Ref Range Status   02/17/2020 4.6 3.4 - 5.3 mmol/L Final       Creatinine still trending up, urine output poor      Continue albumin, octreotide and midodrine    Discussed with nephrologist- trying diuretic today    Monitor renal function, urine output and electrolytes     Acute hypoxemic respiratory failure likely secondary to episode of aspiration (resolved)  Recurrent hypoxic respiratory failure due to volume overload  This morning she was not on supplemental oxygen.    Continue supplemental oxygen as needed     starting diuretic today    Hematochezia  Discussed with Dr. Hobson - possibly hemorrhoidal in the setting of coagulopathy  Bleeding appears to have stopped.  Hemoglobin is stable.    Monitor hemoglobin     Diet: Regular Diet Adult  Snacks/Supplements Adult: Boost Plus; Between Meals  Snacks/Supplements Adult: Magic Cup; With  Meals  Snacks/Supplements Adult: Gelatein Plus; With Meals    DVT Prophylaxis: Pneumatic Compression Devices  Amado Catheter: in place, indication: Strict 1-2 Hour I&O  Code Status: Full Code      Disposition Plan    Prognosis is extremely guarded  Expected discharge: > 7 days, recommended to transitional care unit once renal and liver function are stable .  Entered: Jh Watts MD 02/17/2020, 12:51 PM       The patient's care was discussed with the Bedside Nurse, Patient.    Jh Watts MD  Hospitalist Service  [facility]    ______________________________________________________________________    Interval History   Stable overnight.  Urine output remains poor. Her respiratory status has remained stable.  Hematochezia has stopped for now.    Data reviewed today: I reviewed all medications, new labs and imaging results over the last 24 hours. I personally reviewed no images or EKG's today.    Physical Exam   Vital Signs: Temp: 97.5  F (36.4  C) Temp src: Axillary BP: 109/87 Pulse: 96 Heart Rate: 102 Resp: 22 SpO2: 98 % O2 Device: None (Room air)    Weight: 293 lbs 4.8 oz  Constitutional: drowsy, cooperative, no apparent distress  Respiratory: No increased work of breathing, good air exchange, clear to auscultation bilaterally, no crackles or wheezing  Cardiovascular: regular rate and rhythm, normal S1 and S2, no S3 or S4, and no murmur noted; generalized edema present 2-3+  GI: normal bowel sounds, soft, mildly distended, non tender   Skin: no rashes, jaundice noted  Neuropsychiatric: General: drowsy, calm    Data   Recent Labs   Lab 02/17/20  0532 02/16/20  1835 02/16/20  1605 02/16/20  0548  02/15/20  0539   WBC 18.9*  --   --  18.1*  --  18.3*   HGB 8.2*  --  7.8* 7.1*   < > 7.5*   *  --   --  103*  --  105*     --   --  267  --  279   INR 2.46*  --   --  2.33*  --  2.26*   * 120*  --  120*   < > 120*   POTASSIUM 4.6  --   --  4.1  --  4.2   CHLORIDE 92*   --   --  91*  --  93*   CO2 15*  --   --  17*  --  15*   BUN 15  --   --  15  --  13   CR 2.49*  --   --  2.24*  --  1.95*   ANIONGAP 13  --   --  12  --  12   EMILY 8.3*  --   --  8.1*  --  7.8*   *  --   --  105*  --  120*   ALBUMIN 3.0*  --   --  3.3*  --  3.2*   PROTTOTAL 6.2*  --   --  6.2*  --  6.5*   BILITOTAL 22.8*  --   --  21.2*  --  20.2*   ALKPHOS 154*  --   --  143  --  159*   ALT 22  --   --  23  --  26   AST 82*  --   --  83*  --  92*    < > = values in this interval not displayed.     No results found for this or any previous visit (from the past 24 hour(s)).  Medications     - MEDICATION INSTRUCTIONS -         folic acid  1 mg Oral Daily     insulin aspart  1-7 Units Subcutaneous TID AC     insulin aspart  1-5 Units Subcutaneous At Bedtime     ipratropium - albuterol 0.5 mg/2.5 mg/3 mL  3 mL Nebulization Q4H While awake     midodrine  10 mg Oral TID w/meals     multivitamin, therapeutic  1 tablet Oral Daily     octreotide  200 mcg Subcutaneous TID     pantoprazole  40 mg Oral QAM AC     polyethylene glycol  17 g Oral Daily     prednisoLONE  40 mg Oral Daily     sodium bicarbonate  1,300 mg Oral BID     sodium chloride (PF)  10 mL Intracatheter Q8H     thiamine  100 mg Intravenous Daily

## 2020-02-17 NOTE — PROVIDER NOTIFICATION
MD Notification    Notified Person: MD    Notified Person Name: Dr. Joy    Notification Date/Time: 1923    Notification Interaction: telephone     Purpose of Notification: Bicarb and pH low. Concern for metabolic acidosis.    Orders Received: BIPAP not necessary. Contact nephrology for further treatment plans regarding bicarb.    Comments:

## 2020-02-17 NOTE — PLAN OF CARE
Patient slept intermittently tonight.  A/Ox2-3, lethargic vs restless, extremely tearful/crying and unable to make needs known.  HR SR, BP stable.  Up to bedside commode with assist of 2, quite weak.  HBD=784, dark maria isabel/tea colored.  Plan to go to IR to advance PICC today.

## 2020-02-18 ENCOUNTER — APPOINTMENT (OUTPATIENT)
Dept: GENERAL RADIOLOGY | Facility: CLINIC | Age: 30
DRG: 871 | End: 2020-02-18
Attending: HOSPITALIST
Payer: COMMERCIAL

## 2020-02-18 ENCOUNTER — APPOINTMENT (OUTPATIENT)
Dept: PHYSICAL THERAPY | Facility: CLINIC | Age: 30
DRG: 871 | End: 2020-02-18
Payer: COMMERCIAL

## 2020-02-18 LAB
ALBUMIN SERPL-MCNC: 2.8 G/DL (ref 3.4–5)
ALP SERPL-CCNC: 152 U/L (ref 40–150)
ALT SERPL W P-5'-P-CCNC: 25 U/L (ref 0–50)
ANION GAP SERPL CALCULATED.3IONS-SCNC: 15 MMOL/L (ref 3–14)
AST SERPL W P-5'-P-CCNC: 111 U/L (ref 0–45)
BILIRUB SERPL-MCNC: 22.8 MG/DL (ref 0.2–1.3)
BUN SERPL-MCNC: 20 MG/DL (ref 7–30)
CALCIUM SERPL-MCNC: 8.8 MG/DL (ref 8.5–10.1)
CHLORIDE SERPL-SCNC: 96 MMOL/L (ref 94–109)
CO2 SERPL-SCNC: 15 MMOL/L (ref 20–32)
CREAT SERPL-MCNC: 2.72 MG/DL (ref 0.52–1.04)
ERYTHROCYTE [DISTWIDTH] IN BLOOD BY AUTOMATED COUNT: 20.7 % (ref 10–15)
GFR SERPL CREATININE-BSD FRML MDRD: 23 ML/MIN/{1.73_M2}
GLUCOSE BLDC GLUCOMTR-MCNC: 100 MG/DL (ref 70–99)
GLUCOSE BLDC GLUCOMTR-MCNC: 100 MG/DL (ref 70–99)
GLUCOSE BLDC GLUCOMTR-MCNC: 101 MG/DL (ref 70–99)
GLUCOSE BLDC GLUCOMTR-MCNC: 92 MG/DL (ref 70–99)
GLUCOSE BLDC GLUCOMTR-MCNC: 99 MG/DL (ref 70–99)
GLUCOSE BLDC GLUCOMTR-MCNC: 99 MG/DL (ref 70–99)
GLUCOSE SERPL-MCNC: 101 MG/DL (ref 70–99)
HCT VFR BLD AUTO: 24.2 % (ref 35–47)
HGB BLD-MCNC: 8.1 G/DL (ref 11.7–15.7)
INR PPP: 2.34 (ref 0.86–1.14)
INTERPRETATION ECG - MUSE: NORMAL
MCH RBC QN AUTO: 34 PG (ref 26.5–33)
MCHC RBC AUTO-ENTMCNC: 33.5 G/DL (ref 31.5–36.5)
MCV RBC AUTO: 102 FL (ref 78–100)
PLATELET # BLD AUTO: 282 10E9/L (ref 150–450)
POTASSIUM SERPL-SCNC: 4.6 MMOL/L (ref 3.4–5.3)
PROT SERPL-MCNC: 6.1 G/DL (ref 6.8–8.8)
RBC # BLD AUTO: 2.38 10E12/L (ref 3.8–5.2)
SODIUM SERPL-SCNC: 126 MMOL/L (ref 133–144)
SODIUM SERPL-SCNC: 126 MMOL/L (ref 133–144)
WBC # BLD AUTO: 21.7 10E9/L (ref 4–11)

## 2020-02-18 PROCEDURE — 27210432 ZZH NUTRITION PRODUCT RENAL BASIC LITER

## 2020-02-18 PROCEDURE — 36415 COLL VENOUS BLD VENIPUNCTURE: CPT | Performed by: HOSPITALIST

## 2020-02-18 PROCEDURE — 85027 COMPLETE CBC AUTOMATED: CPT | Performed by: HOSPITALIST

## 2020-02-18 PROCEDURE — 80053 COMPREHEN METABOLIC PANEL: CPT | Performed by: HOSPITALIST

## 2020-02-18 PROCEDURE — 40000275 ZZH STATISTIC RCP TIME EA 10 MIN

## 2020-02-18 PROCEDURE — 99232 SBSQ HOSP IP/OBS MODERATE 35: CPT | Performed by: HOSPITALIST

## 2020-02-18 PROCEDURE — 44500 INTRO GASTROINTESTINAL TUBE: CPT

## 2020-02-18 PROCEDURE — 00000146 ZZHCL STATISTIC GLUCOSE BY METER IP

## 2020-02-18 PROCEDURE — 25000132 ZZH RX MED GY IP 250 OP 250 PS 637: Performed by: HOSPITALIST

## 2020-02-18 PROCEDURE — 94640 AIRWAY INHALATION TREATMENT: CPT

## 2020-02-18 PROCEDURE — 25000128 H RX IP 250 OP 636: Performed by: HOSPITALIST

## 2020-02-18 PROCEDURE — 25000128 H RX IP 250 OP 636: Performed by: INTERNAL MEDICINE

## 2020-02-18 PROCEDURE — 25000131 ZZH RX MED GY IP 250 OP 636 PS 637: Performed by: HOSPITALIST

## 2020-02-18 PROCEDURE — 25000125 ZZHC RX 250: Performed by: INTERNAL MEDICINE

## 2020-02-18 PROCEDURE — 21000000 ZZH R&B IMCU HEART CARE

## 2020-02-18 PROCEDURE — 25000131 ZZH RX MED GY IP 250 OP 636 PS 637: Performed by: INTERNAL MEDICINE

## 2020-02-18 PROCEDURE — 25000128 H RX IP 250 OP 636: Performed by: PHYSICIAN ASSISTANT

## 2020-02-18 PROCEDURE — 84295 ASSAY OF SERUM SODIUM: CPT | Performed by: HOSPITALIST

## 2020-02-18 PROCEDURE — 25000125 ZZHC RX 250: Performed by: HOSPITALIST

## 2020-02-18 PROCEDURE — 97530 THERAPEUTIC ACTIVITIES: CPT | Mod: GP

## 2020-02-18 PROCEDURE — 85610 PROTHROMBIN TIME: CPT | Performed by: HOSPITALIST

## 2020-02-18 PROCEDURE — 94640 AIRWAY INHALATION TREATMENT: CPT | Mod: 76

## 2020-02-18 PROCEDURE — P9047 ALBUMIN (HUMAN), 25%, 50ML: HCPCS | Performed by: INTERNAL MEDICINE

## 2020-02-18 PROCEDURE — 25000125 ZZHC RX 250: Performed by: PHYSICIAN ASSISTANT

## 2020-02-18 PROCEDURE — 27210429 ZZH NUTRITION PRODUCT INTERMEDIATE LITER

## 2020-02-18 PROCEDURE — 40000239 ZZH STATISTIC VAT ROUNDS

## 2020-02-18 RX ORDER — AMOXICILLIN 250 MG
2 CAPSULE ORAL 2 TIMES DAILY PRN
Status: DISCONTINUED | OUTPATIENT
Start: 2020-02-18 | End: 2020-02-24

## 2020-02-18 RX ORDER — PREDNISOLONE SODIUM PHOSPHATE 15 MG/5ML
40 SOLUTION ORAL DAILY
Status: DISCONTINUED | OUTPATIENT
Start: 2020-02-18 | End: 2020-02-22

## 2020-02-18 RX ORDER — NICOTINE POLACRILEX 4 MG
15-30 LOZENGE BUCCAL
Status: CANCELLED | OUTPATIENT
Start: 2020-02-18

## 2020-02-18 RX ORDER — LIDOCAINE 40 MG/G
CREAM TOPICAL
Status: CANCELLED | OUTPATIENT
Start: 2020-02-18

## 2020-02-18 RX ORDER — ALBUMIN (HUMAN) 12.5 G/50ML
25 SOLUTION INTRAVENOUS EVERY 8 HOURS
Status: DISCONTINUED | OUTPATIENT
Start: 2020-02-18 | End: 2020-02-20

## 2020-02-18 RX ORDER — DEXTROSE MONOHYDRATE 100 MG/ML
INJECTION, SOLUTION INTRAVENOUS CONTINUOUS PRN
Status: DISCONTINUED | OUTPATIENT
Start: 2020-02-18 | End: 2020-03-05 | Stop reason: HOSPADM

## 2020-02-18 RX ORDER — LIDOCAINE HYDROCHLORIDE 20 MG/ML
10 JELLY TOPICAL ONCE
Status: COMPLETED | OUTPATIENT
Start: 2020-02-18 | End: 2020-02-18

## 2020-02-18 RX ORDER — DEXTROSE MONOHYDRATE 25 G/50ML
25-50 INJECTION, SOLUTION INTRAVENOUS
Status: CANCELLED | OUTPATIENT
Start: 2020-02-18

## 2020-02-18 RX ORDER — HEPARIN SODIUM,PORCINE 10 UNIT/ML
2-5 VIAL (ML) INTRAVENOUS
Status: DISCONTINUED | OUTPATIENT
Start: 2020-02-18 | End: 2020-02-18

## 2020-02-18 RX ORDER — FOLIC ACID 1 MG/1
1 TABLET ORAL DAILY
Status: DISCONTINUED | OUTPATIENT
Start: 2020-02-19 | End: 2020-02-26

## 2020-02-18 RX ORDER — B COMPLEX C NO.10/FOLIC ACID 900MCG/5ML
5 LIQUID (ML) ORAL DAILY
Status: DISCONTINUED | OUTPATIENT
Start: 2020-02-18 | End: 2020-02-18

## 2020-02-18 RX ORDER — POLYETHYLENE GLYCOL 3350 17 G/17G
17 POWDER, FOR SOLUTION ORAL DAILY
Status: DISCONTINUED | OUTPATIENT
Start: 2020-02-19 | End: 2020-02-26

## 2020-02-18 RX ORDER — ALBUMIN (HUMAN) 12.5 G/50ML
25 SOLUTION INTRAVENOUS ONCE
Status: COMPLETED | OUTPATIENT
Start: 2020-02-18 | End: 2020-02-18

## 2020-02-18 RX ORDER — LIDOCAINE 40 MG/G
CREAM TOPICAL
Status: DISCONTINUED | OUTPATIENT
Start: 2020-02-18 | End: 2020-02-18

## 2020-02-18 RX ORDER — IPRATROPIUM BROMIDE AND ALBUTEROL SULFATE 2.5; .5 MG/3ML; MG/3ML
3 SOLUTION RESPIRATORY (INHALATION) EVERY 4 HOURS PRN
Status: DISCONTINUED | OUTPATIENT
Start: 2020-02-18 | End: 2020-03-05 | Stop reason: HOSPADM

## 2020-02-18 RX ORDER — MIDODRINE HYDROCHLORIDE 5 MG/1
10 TABLET ORAL
Status: DISCONTINUED | OUTPATIENT
Start: 2020-02-18 | End: 2020-02-19

## 2020-02-18 RX ORDER — SODIUM BICARBONATE 650 MG/1
1300 TABLET ORAL 2 TIMES DAILY
Status: DISCONTINUED | OUTPATIENT
Start: 2020-02-18 | End: 2020-02-18 | Stop reason: CLARIF

## 2020-02-18 RX ORDER — AMOXICILLIN 250 MG
1 CAPSULE ORAL 2 TIMES DAILY PRN
Status: DISCONTINUED | OUTPATIENT
Start: 2020-02-18 | End: 2020-02-24

## 2020-02-18 RX ADMIN — FUROSEMIDE 10 MG/HR: 10 INJECTION, SOLUTION INTRAVENOUS at 23:28

## 2020-02-18 RX ADMIN — SODIUM BICARBONATE 15 MEQ: 84 INJECTION, SOLUTION INTRAVENOUS at 14:23

## 2020-02-18 RX ADMIN — OXYCODONE HYDROCHLORIDE 2.5 MG: 5 TABLET ORAL at 15:36

## 2020-02-18 RX ADMIN — ALBUMIN HUMAN 25 G: 0.25 SOLUTION INTRAVENOUS at 09:13

## 2020-02-18 RX ADMIN — IPRATROPIUM BROMIDE AND ALBUTEROL SULFATE 3 ML: .5; 3 SOLUTION RESPIRATORY (INHALATION) at 07:45

## 2020-02-18 RX ADMIN — MIDODRINE HYDROCHLORIDE 10 MG: 5 TABLET ORAL at 17:39

## 2020-02-18 RX ADMIN — ALBUMIN HUMAN 25 G: 0.25 SOLUTION INTRAVENOUS at 14:36

## 2020-02-18 RX ADMIN — OCTREOTIDE ACETATE 200 MCG: 200 INJECTION, SOLUTION INTRAVENOUS; SUBCUTANEOUS at 09:14

## 2020-02-18 RX ADMIN — MIDAZOLAM HYDROCHLORIDE 1 MG: 1 INJECTION, SOLUTION INTRAMUSCULAR; INTRAVENOUS at 10:22

## 2020-02-18 RX ADMIN — OCTREOTIDE ACETATE 200 MCG: 200 INJECTION, SOLUTION INTRAVENOUS; SUBCUTANEOUS at 22:19

## 2020-02-18 RX ADMIN — Medication 40 MG: at 14:30

## 2020-02-18 RX ADMIN — THIAMINE HYDROCHLORIDE 100 MG: 100 INJECTION, SOLUTION INTRAMUSCULAR; INTRAVENOUS at 14:16

## 2020-02-18 RX ADMIN — PREDNISOLONE SODIUM PHOSPHATE 40 MG: 15 SOLUTION ORAL at 15:36

## 2020-02-18 RX ADMIN — OCTREOTIDE ACETATE 200 MCG: 200 INJECTION, SOLUTION INTRAVENOUS; SUBCUTANEOUS at 17:54

## 2020-02-18 RX ADMIN — LIDOCAINE HYDROCHLORIDE 6 ML: 20 JELLY TOPICAL at 10:44

## 2020-02-18 RX ADMIN — ALBUMIN HUMAN 25 G: 0.25 SOLUTION INTRAVENOUS at 22:10

## 2020-02-18 RX ADMIN — IPRATROPIUM BROMIDE AND ALBUTEROL SULFATE 3 ML: .5; 3 SOLUTION RESPIRATORY (INHALATION) at 01:03

## 2020-02-18 ASSESSMENT — MIFFLIN-ST. JEOR: SCORE: 2068.52

## 2020-02-18 NOTE — PROGRESS NOTES
[facility] [place]     Renal Progress Note       SHORTHAND KEY FOR MY NOTES:  c = with, s = without, p = after, a = before, x = except, asx = asymptomatic, tx = transplant or treatment, sx = symptoms or symptomatic, cx = canceled or culture, rxn = reaction, yday = yesterday, nl = normal, abx = antibiotics, fxn = function, dx = diagnosis, dz = disease, m/h = melena/hematochezia, c/d/l/ha = cramping/dizziness/lightheadedness/headache, d/c = discharge or diarrhea/constipation, f/c/n/v = fevers/chills/nausea/vomiting, cp/sob = chest pain/shortness of breath, tbv = total body volume, rxn = reaction, tdc = tunneled dialysis catheter, pta = prior to admission, hd = hemodialysis, pd = peritoneal dialysis, hhd = home hemodialysis         Assessment/Plan:     1.  Oliguric EBEN.  Pt likely has a combo of HRS + ATN 2 dye.  Cr is up again today - rate of change is stable.  She responded to the diuretics, so we will continue them today.  In addition, we will add some IV 25% alb q8h.  No need for  HD, yet.  She is on octreotide, but wasn't getting midodrine bc of NPO status.  Now that she has a FT she can get it.  A.  Follow labs, uo.  B.  Continue furos gtt at 10/h.  C.  Add 25% IV alb q 8h.  D.  Daily assessment for HD needs.  E.  Continue octreotide/midodrine.    2.  EtOHic hepatitis.  Pt's bili continues to rise and she is icteric and jaundiced.  She is on steroids.  A.  Per GI.    3.  Encephalopathy.  Pt's NH3 was ok, but she is still encephalopathic.  BUN is only 20 so it's not uremic in nature.  It doesn't appear that she has rec'd any sedatives today.  Afebrile.  WBCs are high from steroids - doesn't appear infected, though there was yeast in the urine.  A.  Follow clinically.  B.  If worsens, would check BCx and consider empiric abx.    4.   Anemia.  Pt's hb is stable ~8.  She bled a bit p the FT placement.  INR is high.  A.  Follow hb, clinically.    5.  Hyponatremia.  Na is up to 126 c initiation of furos gtt  "suggesting free water and volume loss.    A.  Follow Na daily.    6.  RTA.  Pt's bicarb is stable ~ 15 and k is ok.  She is on oral bicarb.  A.  Continue bicarb for now.    7.  FEN.  FT placed today.  May start feeding slowly.  A.  Watch for aspiration.  B.  Follow electrolytes.        Interval History:     Unable.  Pt is still out of it.      She bled p the NJ tube placement.  Her UO has increased since starting the furos gtt yday.           Medications and Allergies:       albumin human  25 g Intravenous Q8H     [START ON 2/19/2020] folic acid  1 mg Oral or Feeding Tube Daily     insulin aspart  1-7 Units Subcutaneous Q4H     LORazepam  0.5 mg Intravenous Once     midodrine  10 mg Oral or Feeding Tube TID w/meals     octreotide  200 mcg Subcutaneous TID     pantoprazole  40 mg Per Feeding Tube QAM AC     [START ON 2/19/2020] polyethylene glycol  17 g Oral or Feeding Tube Daily     prednisoLONE  40 mg Oral Daily     sodium bicarbonate  15 mEq Oral or Feeding Tube BID     sodium chloride (PF)  10 mL Intracatheter Q8H     thiamine  100 mg Intravenous Daily     Allergies   Allergen Reactions     Penicillins Unknown     Hives            Physical Exam:     Vitals were reviewed    Heart Rate: 98, Blood pressure 132/81, pulse 89, temperature 98.7  F (37.1  C), temperature source Axillary, resp. rate 24, height 1.727 m (5' 8\"), weight 129.5 kg (285 lb 8 oz), SpO2 95 %, not currently breastfeeding.  Wt Readings from Last 3 Encounters:   02/18/20 129.5 kg (285 lb 8 oz)   02/09/15 95.3 kg (210 lb)   01/22/15 108.4 kg (239 lb)     Intake/Output Summary (Last 24 hours) at 2/18/2020 1316  Last data filed at 2/18/2020 0635  Gross per 24 hour   Intake 6.3 ml   Output 575 ml   Net -568.7 ml     GENERAL APPEARANCE: lying in bed, snoring, eyes open to voice, not communicative  HEENT:  eyes/ears/nose/neck grossly normal x scleral icterus, + NJ tube  RESP: lungs CTA B ant/lat c decent efforts, no crackles, shallow resp  CV: Reg, tachy, " nl S1/S2  ABDOMEN: o/s/nt, + distended  EXTREMITIES/SKIN: 1-2+ ble edema  OTHER:  R arm PICC (currently fxning as a midline)         Data:     CBC RESULTS:     Recent Labs   Lab 02/18/20  0558 02/17/20  0532 02/16/20  1605 02/16/20  0548 02/16/20  0022 02/15/20  1835 02/15/20  0539  02/14/20  0601 02/13/20  0544   WBC 21.7* 18.9*  --  18.1*  --   --  18.3*  --  16.4* 16.7*   RBC 2.38* 2.41*  --  2.03*  --   --  2.18*  --  2.33* 2.36*   HGB 8.1* 8.2* 7.8* 7.1* 7.1* 7.0* 7.5*   < > 8.1* 8.2*   HCT 24.2* 24.6*  --  21.0*  --   --  22.9*  --  24.1* 23.9*    265  --  267  --   --  279  --  259 253    < > = values in this interval not displayed.     Basic Metabolic Panel:  Recent Labs   Lab 02/18/20  0558 02/17/20  1819 02/17/20  0532 02/16/20  1835 02/16/20  0548 02/15/20  1835 02/15/20  0539  02/14/20  0601  02/13/20  1359   * 123* 120* 120* 120* 121* 120*   < > 122*   < > 124*   POTASSIUM 4.6  --  4.6  --  4.1  --  4.2  --  4.3  --  4.1   CHLORIDE 96  --  92*  --  91*  --  93*  --  94  --  96   CO2 15*  --  15*  --  17*  --  15*  --  16*  --  20   BUN 20  --  15  --  15  --  13  --  12  --  9   CR 2.72*  --  2.49*  --  2.24*  --  1.95*  --  1.51*  --  1.25*   *  --  117*  --  105*  --  120*  --  81  --  90   EMILY 8.8  --  8.3*  --  8.1*  --  7.8*  --  7.5*  --  7.1*    < > = values in this interval not displayed.     INR  Recent Labs   Lab 02/18/20  0558 02/17/20  0532 02/16/20  0548 02/15/20  0539   INR 2.34* 2.46* 2.33* 2.26*      Attestation:   I have reviewed today's relevant vital signs, notes, medications, labs and imaging.    Desmond Moses MD  [facility] - Nephrology  [phone]

## 2020-02-18 NOTE — PROGRESS NOTES
RADIOLOGY PROCEDURE NOTE  Patient name: Pat Delgado  MRN: [id number]  : 1990    Pre-procedure diagnosis: Hepatitis, dysphagia  Post-procedure diagnosis: Same    Procedure Date/Time: 2020  11:06 AM  Procedure: NJ feeding tube placement  Estimated blood loss: None  Specimen(s) collected with description: none  The patient tolerated the procedure well with no immediate complications.  Significant findings:Tube ready for immediate use, could benefit from placement of bridal for securement    See imaging dictation for procedural details.    Provider name: CARLOS Gilbert  Assistant(s):None

## 2020-02-18 NOTE — PLAN OF CARE
VSS. Pt not responding to orientation questions, occasionally follows commands but is slow to respond. Sating well on RA. HR SR/ST. Pt able to reposition self in bed. Amado patent, urine dark tea colored w/ small amount of sediment noted occasionally.

## 2020-02-18 NOTE — PROGRESS NOTES
[facility]    Medicine Progress Note - Hospitalist Service       Date of Admission:  2/9/2020  Assessment & Plan    Pat Delgado is a 29 year old female with chronic alcoholism who was admitted with acute alcoholic hepatitis and acute kidney injury.       Acute alcoholic hepatitis with Maddrey score >50  Abdominal pain likely due to gaseous distension  Minimal ascites status post 5 days of cefepime for prophylaxis   Lab Results   Component Value Date    BILITOTAL 22.8 02/18/2020    BILITOTAL 22.8 02/17/2020    BILITOTAL 21.2 02/16/2020    BILITOTAL 20.2 02/15/2020    BILITOTAL 17.7 02/14/2020     INR   Date Value Ref Range Status   02/18/2020 2.34 (H) 0.86 - 1.14 Final   Bilirubin stable overnight as is INR.  She was sttarted on prednisolone on 2/16    Continue prednisolone    Continue to monitor     GI is following     Acute kidney injury due to acute tubular necrosis and/or hepatorenal syndrome   Hyponatremia with anasarca   Lactic acidosis (resolved)  Hyponatremia, probably chronic   Hypokalemia, hypophosphatemia  Creatinine   Date Value Ref Range Status   02/18/2020 2.72 (H) 0.52 - 1.04 mg/dL Final     Sodium   Date Value Ref Range Status   02/18/2020 126 (L) 133 - 144 mmol/L Final     Potassium   Date Value Ref Range Status   02/18/2020 4.6 3.4 - 5.3 mmol/L Final     Intake/Output Summary (Last 24 hours) at 2/18/2020 1312  Last data filed at 2/18/2020 0635  Gross per 24 hour   Intake 6.3 ml   Output 575 ml   Net -568.7 ml   She was started on a furosemide infusion yesterday and her urine output has started to increase.      Continue albumin, octreotide and midodrine    Monitor renal function, urine output and electrolytes     No hemodialysis today     Acute hypoxemic respiratory failure likely secondary to episode of aspiration (resolved)  Recurrent hypoxic respiratory failure due to volume overload- improved  She is not on supplemental oxygen.    Continue supplemental oxygen as  needed     continue intravenous furosemide     Hematochezia  Resolved  Discussed with Dr. Hobson - possibly hemorrhoidal in the setting of coagulopathy  Bleeding appears to have stopped.  Hemoglobin is stable.    Monitor hemoglobin     Nutrition     NGT placed today    Starting tube feedings     Diet: NPO for Medical/Clinical Reasons Except for: NPO but receiving Tube Feeding  Adult Formula Drip Feeding: Continuous Nepro; Other - Specify in Comment; Goal Rate: 55; mL/hr; Medication - Feeding Tube Flush Frequency: At least 15-30 mL water before and after medication administration and with tube clogging; FT placement pend...    DVT Prophylaxis: Pneumatic Compression Devices  Amado Catheter: in place, indication: Strict 1-2 Hour I&O  Code Status: Full Code      Disposition Plan    Prognosis is extremely guarded  Expected discharge: > 7 days, recommended to transitional care unit once renal and liver function are stable .  Entered: Jh Watts MD 02/18/2020, 1:11 PM       The patient's care was discussed with the Bedside Nurse, Patient.    Jh Watts MD  Hospitalist Service  [facility]    ______________________________________________________________________    Interval History   No new events.  Respiratory status has been stable.  She had an NGT placed today for tube feedings.      Data reviewed today: I reviewed all medications, new labs and imaging results over the last 24 hours. I personally reviewed no images or EKG's today.    Physical Exam   Vital Signs: Temp: 98.7  F (37.1  C) Temp src: Axillary BP: 132/81 Pulse: 89 Heart Rate: 98 Resp: 24 SpO2: 95 % O2 Device: None (Room air)    Weight: 285 lbs 8 oz  Constitutional: drowsy, cooperative, no apparent distress  Respiratory: No increased work of breathing, good air exchange, clear to auscultation bilaterally, no crackles or wheezing  Cardiovascular: regular rate and rhythm, normal S1 and S2, no S3 or S4, and no murmur noted;  generalized edema present 2-3+  GI: normal bowel sounds, soft, mildly distended, non tender   Skin: no rashes, jaundice noted  Neuropsychiatric: General: drowsy, calm    Data   Recent Labs   Lab 02/18/20  0558 02/17/20  1819 02/17/20  0532  02/16/20  1605 02/16/20  0548   WBC 21.7*  --  18.9*  --   --  18.1*   HGB 8.1*  --  8.2*  --  7.8* 7.1*   *  --  102*  --   --  103*     --  265  --   --  267   INR 2.34*  --  2.46*  --   --  2.33*   * 123* 120*   < >  --  120*   POTASSIUM 4.6  --  4.6  --   --  4.1   CHLORIDE 96  --  92*  --   --  91*   CO2 15*  --  15*  --   --  17*   BUN 20  --  15  --   --  15   CR 2.72*  --  2.49*  --   --  2.24*   ANIONGAP 15*  --  13  --   --  12   EMILY 8.8  --  8.3*  --   --  8.1*   *  --  117*  --   --  105*   ALBUMIN 2.8*  --  3.0*  --   --  3.3*   PROTTOTAL 6.1*  --  6.2*  --   --  6.2*   BILITOTAL 22.8*  --  22.8*  --   --  21.2*   ALKPHOS 152*  --  154*  --   --  143   ALT 25  --  22  --   --  23   *  --  82*  --   --  83*    < > = values in this interval not displayed.     No results found for this or any previous visit (from the past 24 hour(s)).  Medications     dextrose       furosemide 10 mg/hr (02/18/20 0900)     - MEDICATION INSTRUCTIONS -         [START ON 2/19/2020] folic acid  1 mg Oral or Feeding Tube Daily     insulin aspart  1-7 Units Subcutaneous Q4H     LORazepam  0.5 mg Intravenous Once     midodrine  10 mg Oral or Feeding Tube TID w/meals     octreotide  200 mcg Subcutaneous TID     pantoprazole  40 mg Per Feeding Tube QAM AC     [START ON 2/19/2020] polyethylene glycol  17 g Oral or Feeding Tube Daily     prednisoLONE  40 mg Oral Daily     sodium bicarbonate  15 mEq Oral or Feeding Tube BID     sodium chloride (PF)  10 mL Intracatheter Q8H     thiamine  100 mg Intravenous Daily

## 2020-02-18 NOTE — PLAN OF CARE
Pt with limited verbal communication, incomprehensible sounds/words and slurred speech. Often tearful or restless when awake. Pt slow or does not answer questions. Inconsistent/slow to follow commands. VSS on RA. Tele: SR. X1 brown stool. Up to BSC with assist x2, gait belt. Hypoactive bowel sounds, firm abdomen. Amado in pace- low dark maria isabel UO. Lasix gtt started. +3-4 generalized edema. PICC not advanced today, continues to be used as midline. Plan for NG placement in xray for TF tomorrow.

## 2020-02-18 NOTE — PLAN OF CARE
PT:  Discharge Planner PT   Patient plan for discharge: Not stated  Current status: Pt has made a large medical and physical decline over the last few days. Pt is not following commands, assisting with movement or speaking/gesturing this date with PT, although was awake. Required dependent assist of 2 for supine to sit, sat EOB 10 min with CGA for cares and pressure relief. Sit to supine with dependent assist of 2, dependent assist of 3 to roll and change bedding.  Barriers to return to prior living situation: Pt is total cares at this time, unable to ambulate or participate in cares or mobility.  Recommendations for discharge: TCU  Rationale for recommendations: Pt would benefit from continued PT while admitted and at discharge to improve strength, balance, mobility to increase independence, reduce falls risk and increase safety before returning home. Pt is far below her baseline mobility status.       Entered by: Leelee Gilmore 02/18/2020 5:03 PM

## 2020-02-18 NOTE — PROGRESS NOTES
Patient in IR having Feeding tube placed Not available in room.   Labs note ongoing elevation in creatinine to 2.72, albumin down to 2.8, bilirubin 22.8 and increasing, WBC elevated at 21.7 and increasing, hemoglobin 8.1 and stable, INR elevated at 2.34 but improved.  Nephrology following and likely plan to start hemodialysis.    GI will plan to see patient today once back from IR.    [facility]  [phone]

## 2020-02-18 NOTE — PROGRESS NOTES
Pt s/p dobhoff tube placement. Still able to answer one step question but appeared to be lethargic and grossly jaundice. Both the kidney and liver function showed no improvement.    -can start rifaximin BID, if no BM, can give rectal lactulose  -can start nutrition per NGT or gastrostomy tube  -c/w steroid, daily LFT and INR  -c/w midodrine and octreotide    I anticipate this is going to be a long hospital stay given pt not a candidate for liver transplant due to Etoh use and already on steroid, we can only hope that pt's liver function recover by itself while we maintain her stability.    Aida Winter MD (Richard)  Provider's Cell: [phone]     [facility]  [phone]

## 2020-02-18 NOTE — PROGRESS NOTES
"SPIRITUAL HEALTH SERVICES Progress Note  [facility]    F/U visit per family request.  Pt's mother and sister invited SH to offer prayer for pt.  Pt's mother said pt's drinking has created serious health problems and adds that pt \"has a long road ahead of her.\"  Pt was minimally responsive during this brief conversation, but did seem alert to the prayer SH provided.   will plan to visit pt again tomorrow per family invitation to support pt in her recovery efforts.                                                                                                                                           Eliseo Dorado M.Div., [facility]  Staff   Pager [phone]    "

## 2020-02-18 NOTE — PHARMACY-CONSULT NOTE
"Pharmacy Tube Feeding Consult    Medication reviewed for administration by feeding tube and for potential food/drug interactions.    Recommendation:   Change prednisolone and pantoprazole to solution and route to \"per feeding tube\".     Will be ordering sodium bicarb oral solution for tube feeding administration.   All other oral medication routes changed to per feeding tube.      Pharmacy will continue to follow as new medications are ordered.    "

## 2020-02-18 NOTE — CONSULTS
"CLINICAL NUTRITION SERVICES - REASSESSMENT NOTE  +Consult Received: \"Registered Dietitian to order TF per Medical Nutrition Therapy Guidelines\"    RECOMMENDATIONS FOR MD/PROVIDER TO ORDER:   - Update fluids as needed pending fluid status.   - Recommend post-pyloric FT placement    Recommendations Ordered by Registered Dietitian (RD):   Recommend TF as follows:   - Type of Feeding Tube: Placement pending (2/18)  rec p/p placement  - Enteral Frequency:  Continuous  - Enteral Regimen: Nepro at 55 mL/hr  - Total Enteral Provisions: (1320 ml/day) to provide 2376 kcals (30 kcal/kg), 107 g PRO (1.4 g/kg), 964 ml free H2O, 213 g CHO and 17 g Fiber daily.  - Meets > 100% DRIs   - Free Water Flush: standard 60 mL q4 hours for tube patency - updates per MD  Start at 10 mL/hr, increase by 15 mL q12 hours until goal rate reached - If patient exhibits s/sx intolerance of enteral feeds OK to hold at current rate/decrease to last tolerated rate, or hold as needed.   Malnutrition: (2/18)  % Weight Loss: Unable to assess  % Intake:  </= 50% for >/= 5 days (severe malnutrition)  Subcutaneous Fat Loss: None observed - 2/10  Muscle Loss: Temporal region mild depletion - 2/10  Fluid Retention: Moderate-Severe 2-4+    Malnutrition Diagnosis: Non-Severe malnutrition  In Context of:  Acute illness or injury  Environmental or social circumstances     EVALUATION OF PROGRESS TOWARD GOALS   Diet:  NPO  Nutrition Support: Planned FT placement for enteral feeds today    Intake/Tolerance:  Pt has been w/ negligible PO x9 days since admission.    ASSESSED NUTRITION NEEDS:  Dosing Weight: 79 kg - adjusted   Estimated Energy Needs: 8279-7492 kcals (25-30 Kcal/Kg)  Justification: maintenance  Estimated Protein Needs:  grams protein (1.2-1.5 g pro/Kg)  Justification: maintenance    NEW FINDINGS:   - Nephrology --> Start HD as soon as today per last note  - Documented possible hepatorenal syndrome  - Encephalopathy     - Weight trending  Vitals: "    02/09/20 1900 02/10/20 0440 02/11/20 0605 02/12/20 0639   Weight: 122 kg (268 lb 15.4 oz) 123.9 kg (273 lb 3.2 oz) 126.9 kg (279 lb 12.8 oz) 129.2 kg (284 lb 13.4 oz)    02/13/20 0835 02/14/20 0050 02/15/20 0500 02/16/20 1500   Weight: 129.8 kg (286 lb 3.2 oz) 129.8 kg (286 lb 3.2 oz) 132.2 kg (291 lb 6.4 oz) 133.9 kg (295 lb 3.2 oz)    02/17/20 0456 02/18/20 0634   Weight: 133 kg (293 lb 4.8 oz) 129.5 kg (285 lb 8 oz)     - Labs:   LFTs -   AlkPhos 152 (H), ALT 25 - NL,  (H)  Ferritin (2/9) - 116 - NL    Electrolytes  Potassium (mmol/L)   Date Value   02/18/2020 4.6   02/17/2020 4.6   02/16/2020 4.1     Phosphorus (mg/dL)   Date Value   02/17/2020 3.3   02/15/2020 2.8   02/13/2020 2.4 (L)   02/13/2020 2.5   02/12/2020 2.3 (L)    Blood Glucose  Glucose (mg/dL)   Date Value   02/18/2020 101 (H)   02/17/2020 117 (H)   02/16/2020 105 (H)   02/15/2020 120 (H)   02/14/2020 81     Hemoglobin A1C (%)   Date Value   02/09/2020 5.1    Inflammatory Markers  CRP Inflammation (mg/L)   Date Value   02/09/2020 57.7 (H)     WBC (10e9/L)   Date Value   02/18/2020 21.7 (H)   02/17/2020 18.9 (H)   02/16/2020 18.1 (H)     Albumin (g/dL)   Date Value   02/18/2020 2.8 (L)   02/17/2020 3.0 (L)   02/16/2020 3.3 (L)      Magnesium (mg/dL)   Date Value   02/17/2020 2.6 (H)   02/13/2020 2.2   02/12/2020 1.9     Sodium (mmol/L)   Date Value   02/18/2020 126 (L)   02/17/2020 123 (L)   02/17/2020 120 (L)    Renal  Urea Nitrogen (mg/dL)   Date Value   02/18/2020 20   02/17/2020 15   02/16/2020 15     Creatinine (mg/dL)   Date Value   02/18/2020 2.72 (H)   02/17/2020 2.49 (H)   02/16/2020 2.24 (H)     Additional  Ketones Urine (mg/dL)   Date Value   02/14/2020 10 (A)         - Meds  MSSI  Folic Acid 1 mg daily   Thera vit daily   Scheduled Miralax  Lasix off at this time    Previous Goals:   Patient to consume 25-50% of meals and supplements in 24-48 hours.   Evaluation: Not met    Previous Nutrition Diagnosis:   Inadequate oral intake  related to abdominal pain and distention as evidenced by minimal po intake since admit (9 days).  Evaluation: No change    CURRENT NUTRITION DIAGNOSIS  Inadequate protein-energy intake related to Altered GI function w/ abdominal pain, distention as evidenced by negligible intakes x9 days, plan for FT placement.     INTERVENTIONS  Recommendations / Nutrition Prescription  Recommend TF as follows:   - Type of Feeding Tube: Placement pending (2/18)  rec p/p placement  - Enteral Frequency:  Continuous  - Enteral Regimen: Nepro at 55 mL/hr  - Total Enteral Provisions: (1320 ml/day) to provide 2376 kcals (30 kcal/kg), 107 g PRO (1.4 g/kg), 964 ml free H2O, 213 g CHO and 17 g Fiber daily.  - Meets > 100% DRIs   - Free Water Flush: standard 60 mL q4 hours for tube patency - updates per MD  Start at 10 mL/hr, increase by 15 mL q12 hours until goal rate reached - If patient exhibits s/sx intolerance of enteral feeds OK to hold at current rate/decrease to last tolerated rate, or hold as needed.      Implementation  EN Composition, EN Schedule and Feeding Tube Flush: as above    Goals  EN advancement to goal in the next 72 hours.     MONITORING AND EVALUATION:  Progress towards goals will be monitored and evaluated per protocol and Practice Guidelines    Nicole Larios RD,   [facility] [place], 66, 55, [facility]   Pager: [phone]  Weekend Pager: [phone]

## 2020-02-19 ENCOUNTER — APPOINTMENT (OUTPATIENT)
Dept: INTERVENTIONAL RADIOLOGY/VASCULAR | Facility: CLINIC | Age: 30
DRG: 871 | End: 2020-02-19
Attending: HOSPITALIST
Payer: COMMERCIAL

## 2020-02-19 ENCOUNTER — APPOINTMENT (OUTPATIENT)
Dept: INTERVENTIONAL RADIOLOGY/VASCULAR | Facility: CLINIC | Age: 30
DRG: 871 | End: 2020-02-19
Attending: INTERNAL MEDICINE
Payer: COMMERCIAL

## 2020-02-19 LAB
ALBUMIN SERPL-MCNC: 3.5 G/DL (ref 3.4–5)
ALP SERPL-CCNC: 138 U/L (ref 40–150)
ALT SERPL W P-5'-P-CCNC: 28 U/L (ref 0–50)
ANION GAP SERPL CALCULATED.3IONS-SCNC: 16 MMOL/L (ref 3–14)
AST SERPL W P-5'-P-CCNC: 126 U/L (ref 0–45)
BILIRUB SERPL-MCNC: 24.2 MG/DL (ref 0.2–1.3)
BUN SERPL-MCNC: 31 MG/DL (ref 7–30)
CALCIUM SERPL-MCNC: 9.3 MG/DL (ref 8.5–10.1)
CHLORIDE SERPL-SCNC: 97 MMOL/L (ref 94–109)
CO2 SERPL-SCNC: 14 MMOL/L (ref 20–32)
CREAT SERPL-MCNC: 2.99 MG/DL (ref 0.52–1.04)
ERYTHROCYTE [DISTWIDTH] IN BLOOD BY AUTOMATED COUNT: 20.8 % (ref 10–15)
GFR SERPL CREATININE-BSD FRML MDRD: 20 ML/MIN/{1.73_M2}
GLUCOSE BLDC GLUCOMTR-MCNC: 104 MG/DL (ref 70–99)
GLUCOSE BLDC GLUCOMTR-MCNC: 75 MG/DL (ref 70–99)
GLUCOSE BLDC GLUCOMTR-MCNC: 92 MG/DL (ref 70–99)
GLUCOSE BLDC GLUCOMTR-MCNC: 92 MG/DL (ref 70–99)
GLUCOSE BLDC GLUCOMTR-MCNC: 93 MG/DL (ref 70–99)
GLUCOSE SERPL-MCNC: 90 MG/DL (ref 70–99)
HCT VFR BLD AUTO: 23.8 % (ref 35–47)
HGB BLD-MCNC: 7.8 G/DL (ref 11.7–15.7)
INR PPP: 2.12 (ref 0.86–1.14)
Lab: ABNORMAL
MAGNESIUM SERPL-MCNC: 2.7 MG/DL (ref 1.6–2.3)
MCH RBC QN AUTO: 33.8 PG (ref 26.5–33)
MCHC RBC AUTO-ENTMCNC: 32.8 G/DL (ref 31.5–36.5)
MCV RBC AUTO: 103 FL (ref 78–100)
PHOSPHATE SERPL-MCNC: 4.7 MG/DL (ref 2.5–4.5)
PLATELET # BLD AUTO: 231 10E9/L (ref 150–450)
POTASSIUM SERPL-SCNC: 4.5 MMOL/L (ref 3.4–5.3)
PROT SERPL-MCNC: 6.8 G/DL (ref 6.8–8.8)
RBC # BLD AUTO: 2.31 10E12/L (ref 3.8–5.2)
SODIUM SERPL-SCNC: 127 MMOL/L (ref 133–144)
SODIUM SERPL-SCNC: 130 MMOL/L (ref 133–144)
SPECIMEN SOURCE: ABNORMAL
WBC # BLD AUTO: 20.4 10E9/L (ref 4–11)
YEAST SPEC QL CULT: ABNORMAL

## 2020-02-19 PROCEDURE — 02PYX3Z REMOVAL OF INFUSION DEVICE FROM GREAT VESSEL, EXTERNAL APPROACH: ICD-10-PCS | Performed by: RADIOLOGY

## 2020-02-19 PROCEDURE — 25000131 ZZH RX MED GY IP 250 OP 636 PS 637: Performed by: INTERNAL MEDICINE

## 2020-02-19 PROCEDURE — 25000128 H RX IP 250 OP 636: Performed by: HOSPITALIST

## 2020-02-19 PROCEDURE — P9047 ALBUMIN (HUMAN), 25%, 50ML: HCPCS | Performed by: INTERNAL MEDICINE

## 2020-02-19 PROCEDURE — 77001 FLUOROGUIDE FOR VEIN DEVICE: CPT

## 2020-02-19 PROCEDURE — 25000131 ZZH RX MED GY IP 250 OP 636 PS 637: Performed by: HOSPITALIST

## 2020-02-19 PROCEDURE — 25000132 ZZH RX MED GY IP 250 OP 250 PS 637: Performed by: HOSPITALIST

## 2020-02-19 PROCEDURE — 99152 MOD SED SAME PHYS/QHP 5/>YRS: CPT

## 2020-02-19 PROCEDURE — 87076 CULTURE ANAEROBE IDENT EACH: CPT | Performed by: INTERNAL MEDICINE

## 2020-02-19 PROCEDURE — 87181 SC STD AGAR DILUTION PER AGT: CPT | Performed by: INTERNAL MEDICINE

## 2020-02-19 PROCEDURE — 21000000 ZZH R&B IMCU HEART CARE

## 2020-02-19 PROCEDURE — 85027 COMPLETE CBC AUTOMATED: CPT | Performed by: HOSPITALIST

## 2020-02-19 PROCEDURE — 27210432 ZZH NUTRITION PRODUCT RENAL BASIC LITER

## 2020-02-19 PROCEDURE — C1769 GUIDE WIRE: HCPCS

## 2020-02-19 PROCEDURE — 36415 COLL VENOUS BLD VENIPUNCTURE: CPT | Performed by: HOSPITALIST

## 2020-02-19 PROCEDURE — 21000001 ZZH R&B HEART CARE

## 2020-02-19 PROCEDURE — 99232 SBSQ HOSP IP/OBS MODERATE 35: CPT | Performed by: HOSPITALIST

## 2020-02-19 PROCEDURE — 87040 BLOOD CULTURE FOR BACTERIA: CPT | Performed by: INTERNAL MEDICINE

## 2020-02-19 PROCEDURE — 80053 COMPREHEN METABOLIC PANEL: CPT | Performed by: HOSPITALIST

## 2020-02-19 PROCEDURE — 25000125 ZZHC RX 250

## 2020-02-19 PROCEDURE — 87800 DETECT AGNT MULT DNA DIREC: CPT | Performed by: INTERNAL MEDICINE

## 2020-02-19 PROCEDURE — 84295 ASSAY OF SERUM SODIUM: CPT | Performed by: HOSPITALIST

## 2020-02-19 PROCEDURE — 84100 ASSAY OF PHOSPHORUS: CPT | Performed by: HOSPITALIST

## 2020-02-19 PROCEDURE — 25800030 ZZH RX IP 258 OP 636: Performed by: INTERNAL MEDICINE

## 2020-02-19 PROCEDURE — 00000146 ZZHCL STATISTIC GLUCOSE BY METER IP

## 2020-02-19 PROCEDURE — 25000125 ZZHC RX 250: Performed by: INTERNAL MEDICINE

## 2020-02-19 PROCEDURE — 85610 PROTHROMBIN TIME: CPT | Performed by: HOSPITALIST

## 2020-02-19 PROCEDURE — 0DHA3UZ INSERTION OF FEEDING DEVICE INTO JEJUNUM, PERCUTANEOUS APPROACH: ICD-10-PCS | Performed by: RADIOLOGY

## 2020-02-19 PROCEDURE — 99153 MOD SED SAME PHYS/QHP EA: CPT

## 2020-02-19 PROCEDURE — C1751 CATH, INF, PER/CENT/MIDLINE: HCPCS

## 2020-02-19 PROCEDURE — 27210738 ZZH ACCESSORY CR2

## 2020-02-19 PROCEDURE — 36415 COLL VENOUS BLD VENIPUNCTURE: CPT | Performed by: INTERNAL MEDICINE

## 2020-02-19 PROCEDURE — 25000128 H RX IP 250 OP 636

## 2020-02-19 PROCEDURE — 25000128 H RX IP 250 OP 636: Performed by: INTERNAL MEDICINE

## 2020-02-19 PROCEDURE — 25000132 ZZH RX MED GY IP 250 OP 250 PS 637: Performed by: INTERNAL MEDICINE

## 2020-02-19 PROCEDURE — 83735 ASSAY OF MAGNESIUM: CPT | Performed by: HOSPITALIST

## 2020-02-19 RX ORDER — NALOXONE HYDROCHLORIDE 0.4 MG/ML
.1-.4 INJECTION, SOLUTION INTRAMUSCULAR; INTRAVENOUS; SUBCUTANEOUS
Status: DISCONTINUED | OUTPATIENT
Start: 2020-02-19 | End: 2020-02-19

## 2020-02-19 RX ORDER — FENTANYL CITRATE 50 UG/ML
INJECTION, SOLUTION INTRAMUSCULAR; INTRAVENOUS
Status: COMPLETED
Start: 2020-02-19 | End: 2020-02-19

## 2020-02-19 RX ORDER — FENTANYL CITRATE 50 UG/ML
25-50 INJECTION, SOLUTION INTRAMUSCULAR; INTRAVENOUS EVERY 5 MIN PRN
Status: DISCONTINUED | OUTPATIENT
Start: 2020-02-19 | End: 2020-02-21 | Stop reason: HOSPADM

## 2020-02-19 RX ORDER — FLUMAZENIL 0.1 MG/ML
0.2 INJECTION, SOLUTION INTRAVENOUS
Status: DISCONTINUED | OUTPATIENT
Start: 2020-02-19 | End: 2020-02-21 | Stop reason: HOSPADM

## 2020-02-19 RX ORDER — LIDOCAINE HYDROCHLORIDE 10 MG/ML
INJECTION, SOLUTION INFILTRATION; PERINEURAL
Status: COMPLETED
Start: 2020-02-19 | End: 2020-02-19

## 2020-02-19 RX ADMIN — MIDAZOLAM HYDROCHLORIDE 0.5 MG: 1 INJECTION, SOLUTION INTRAMUSCULAR; INTRAVENOUS at 15:00

## 2020-02-19 RX ADMIN — LIDOCAINE HYDROCHLORIDE 6 ML: 10 INJECTION, SOLUTION INFILTRATION; PERINEURAL at 15:30

## 2020-02-19 RX ADMIN — RIFAXIMIN 550 MG: 550 TABLET ORAL at 16:09

## 2020-02-19 RX ADMIN — SODIUM BICARBONATE 15 MEQ: 84 INJECTION, SOLUTION INTRAVENOUS at 20:30

## 2020-02-19 RX ADMIN — ALBUMIN HUMAN 25 G: 0.25 SOLUTION INTRAVENOUS at 16:10

## 2020-02-19 RX ADMIN — RIFAXIMIN 550 MG: 550 TABLET ORAL at 23:59

## 2020-02-19 RX ADMIN — SODIUM BICARBONATE 15 MEQ: 84 INJECTION, SOLUTION INTRAVENOUS at 16:09

## 2020-02-19 RX ADMIN — MICAFUNGIN SODIUM 100 MG: 10 INJECTION, POWDER, LYOPHILIZED, FOR SOLUTION INTRAVENOUS at 16:36

## 2020-02-19 RX ADMIN — Medication 40 MG: at 16:46

## 2020-02-19 RX ADMIN — ALBUMIN HUMAN 25 G: 0.25 SOLUTION INTRAVENOUS at 23:59

## 2020-02-19 RX ADMIN — THIAMINE HYDROCHLORIDE 100 MG: 100 INJECTION, SOLUTION INTRAMUSCULAR; INTRAVENOUS at 12:17

## 2020-02-19 RX ADMIN — FOLIC ACID 1 MG: 1 TABLET ORAL at 17:17

## 2020-02-19 RX ADMIN — PREDNISOLONE SODIUM PHOSPHATE 40 MG: 15 SOLUTION ORAL at 16:46

## 2020-02-19 RX ADMIN — OCTREOTIDE ACETATE 200 MCG: 200 INJECTION, SOLUTION INTRAVENOUS; SUBCUTANEOUS at 12:20

## 2020-02-19 RX ADMIN — ALBUMIN HUMAN 25 G: 0.25 SOLUTION INTRAVENOUS at 06:14

## 2020-02-19 RX ADMIN — FENTANYL CITRATE 25 MCG: 50 INJECTION, SOLUTION INTRAMUSCULAR; INTRAVENOUS at 15:00

## 2020-02-19 ASSESSMENT — MIFFLIN-ST. JEOR: SCORE: 2066.25

## 2020-02-19 NOTE — TREATMENT PLAN
Cross-Cover Note    Patient pulled NG tube placed by IR today and is unable to take PO.   I have reviewed all her medications and these can be held tonight.   Ordered for IR to replace NG tomorrow.

## 2020-02-19 NOTE — PRE-PROCEDURE
[facility] Procedure Note        Sedation:      Performed by: Angelo Mccray MD  Authorized by: Angelo Mccray MD    Pre-Procedure Assessment done at 1400.    Expected Level:  Minimal Sedation    Indication:  Sedation is required to allow for picc      PO Intake:  Appropriately NPO for procedure    ASA Class:  Class 2 - MILD SYSTEMIC DISEASE, NO ACUTE PROBLEMS, NO FUNCTIONAL LIMITATIONS.    Mallampati:  Grade 2:  Soft palate, base of uvula, tonsillar pillars, and portion of posterior pharyngeal wall visible    Lungs: Lungs Clear with good breath sounds bilaterally.     Heart: Normal heart sounds and rate    History and physical reviewed and no updates needed. I have reviewed the lab findings, diagnostic data, medications, and the plan for sedation. I have determined this patient to be an appropriate candidate for the planned sedation and procedure and have reassessed the patient IMMEDIATELY PRIOR to sedation and procedure.

## 2020-02-19 NOTE — PLAN OF CARE
Neuro: Lethargic, nonverbal, incoherent speech, yellow sclera  Recent vital signs:VSS  Respiratory: room air  Pain:AVI  Tele:SR  Activity:Assist of 2-3 with turning and repositioning  Drips/Drains/IVF:Lasix gtt  Skin:Yellow, +3 BLE  and Bilateral upper extremities edema  GI/:Distended firm abdomen, had loose BM once this shift, loaiza in place with tea colored urine, NPO  Aggression color:green  Plan:Test/Consult:NG placement  Labs: BG 99,93  Misc: PICC in place, right  arm,      Events this shift:

## 2020-02-19 NOTE — PROGRESS NOTES
[facility]     Renal Progress Note       SHORTHAND KEY FOR MY NOTES:  c = with, s = without, p = after, a = before, x = except, asx = asymptomatic, tx = transplant or treatment, sx = symptoms or symptomatic, cx = canceled or culture, rxn = reaction, yday = yesterday, nl = normal, abx = antibiotics, fxn = function, dx = diagnosis, dz = disease, m/h = melena/hematochezia, c/d/l/ha = cramping/dizziness/lightheadedness/headache, d/c = discharge or diarrhea/constipation, f/c/n/v = fevers/chills/nausea/vomiting, cp/sob = chest pain/shortness of breath, tbv = total body volume, rxn = reaction, tdc = tunneled dialysis catheter, pta = prior to admission, hd = hemodialysis, pd = peritoneal dialysis, hhd = home hemodialysis         Assessment/Plan:     1.  Oliguric EBEN.  Pt's cr continues to rise slowly.  She is diuresing and her uo is slowing increasing since starting the furos gtt.  Given the uo, this is not HRS I and is most likely due to ATN.  She has yeast in her urine, so it may make sense to treat that - will d/w hospitalist.  Dialysis is not indicated at this time.  She isn't receiving midodrine bc the FT was pulled out, but still getting octreotide.    A.  Continue IV alb / furos gtt.  B.  Follow labs, uo.  C.  Treat yeast UTI - micafungin 100 mg iv every day.  D/w ID.  D.  No need for midodrine/octreotide so will stop it (d/w GI).      2.  EtOHic hepatitis.  Pt's bili continues to rise and she is icteric and jaundiced.  She is on steroids.  GI following.  A.  Per GI.    3.  Encephalopathy.  Pt is still quite encephalopathic, but hasn't rec'd significant sedation.  NH3 has been ok, but GI is considering rifaximin if she doesn't improve.  She has yeast in the urine, but has been afebrile - even so, could have an infectious etiology.  WBC up, but stable, and she's on steroids.    A.  Follow clinically.  B.  Consider BCx and consider empiric abx.    4.   Anemia.  Pt's hb is a bit lower today.  INR is  "high.  No signs of active bleeding at present.  A.  Follow hb, clinically.    5.  Hyponatremia.  Na is up to 127.    A.  Follow Na daily.    6.  RTA.  Pt's bicarb is 14 and k is ok.  She is on oral bicarb.  A.  Continue bicarb for now.    7.  FEN.  Electrolytes are ok x as noted above.  A.  Follow labs daily.        Interval History:     Unable.  Pt is still confused.           Medications and Allergies:       albumin human  25 g Intravenous Q8H     folic acid  1 mg Oral or Feeding Tube Daily     insulin aspart  1-7 Units Subcutaneous Q4H     LORazepam  0.5 mg Intravenous Once     midodrine  10 mg Oral or Feeding Tube TID w/meals     octreotide  200 mcg Subcutaneous TID     pantoprazole  40 mg Per Feeding Tube QAM AC     polyethylene glycol  17 g Oral or Feeding Tube Daily     prednisoLONE  40 mg Oral Daily     sodium bicarbonate  15 mEq Oral or Feeding Tube BID     sodium chloride (PF)  10 mL Intracatheter Q8H     thiamine  100 mg Intravenous Daily     Allergies   Allergen Reactions     Penicillins Unknown     Hives            Physical Exam:     Vitals were reviewed    Heart Rate: 93, Blood pressure 126/87, pulse 98, temperature 98.6  F (37  C), temperature source Axillary, resp. rate 20, height 1.727 m (5' 8\"), weight 129.3 kg (285 lb), SpO2 95 %, not currently breastfeeding.  Wt Readings from Last 3 Encounters:   02/19/20 129.3 kg (285 lb)   02/09/15 95.3 kg (210 lb)   01/22/15 108.4 kg (239 lb)     Intake/Output Summary (Last 24 hours) at 2/19/2020 1150  Last data filed at 2/19/2020 0614  Gross per 24 hour   Intake 293 ml   Output 1000 ml   Net -707 ml     GENERAL APPEARANCE: lying in bed, snoring, eyes open to voice, not communicative  HEENT:  eyes/ears/nose/neck grossly normal x scleral icterus  RESP: lungs CTA B ant/lat c decent efforts, no crackles, shallow resp  CV: Reg, tachy, nl S1/S2  ABDOMEN: o/s/nt, + distended  EXTREMITIES/SKIN: 1-2+ ble edema  OTHER:  R arm PICC         Data:     CBC RESULTS:   "   Recent Labs   Lab 02/19/20  0550 02/18/20  0558 02/17/20  0532 02/16/20  1605 02/16/20  0548 02/16/20  0022  02/15/20  0539  02/14/20  0601   WBC 20.4* 21.7* 18.9*  --  18.1*  --   --  18.3*  --  16.4*   RBC 2.31* 2.38* 2.41*  --  2.03*  --   --  2.18*  --  2.33*   HGB 7.8* 8.1* 8.2* 7.8* 7.1* 7.1*   < > 7.5*   < > 8.1*   HCT 23.8* 24.2* 24.6*  --  21.0*  --   --  22.9*  --  24.1*    282 265  --  267  --   --  279  --  259    < > = values in this interval not displayed.     Basic Metabolic Panel:  Recent Labs   Lab 02/19/20  0550 02/18/20  1911 02/18/20  0558 02/17/20  1819 02/17/20  0532 02/16/20  1835 02/16/20  0548  02/15/20  0539  02/14/20  0601   * 126* 126* 123* 120* 120* 120*   < > 120*   < > 122*   POTASSIUM 4.5  --  4.6  --  4.6  --  4.1  --  4.2  --  4.3   CHLORIDE 97  --  96  --  92*  --  91*  --  93*  --  94   CO2 14*  --  15*  --  15*  --  17*  --  15*  --  16*   BUN 31*  --  20  --  15  --  15  --  13  --  12   CR 2.99*  --  2.72*  --  2.49*  --  2.24*  --  1.95*  --  1.51*   GLC 90  --  101*  --  117*  --  105*  --  120*  --  81   EMILY 9.3  --  8.8  --  8.3*  --  8.1*  --  7.8*  --  7.5*    < > = values in this interval not displayed.     INR  Recent Labs   Lab 02/19/20  0550 02/18/20  0558 02/17/20  0532 02/16/20  0548   INR 2.12* 2.34* 2.46* 2.33*      Attestation:   I have reviewed today's relevant vital signs, notes, medications, labs and imaging.    Desmond Moses MD  [facility] - Nephrology  [phone]

## 2020-02-19 NOTE — PROGRESS NOTES
[facility]    Medicine Progress Note - Hospitalist Service       Date of Admission:  2/9/2020  Assessment & Plan    Pat Delgado is a 29 year old female with chronic alcoholism who was admitted with acute alcoholic hepatitis and acute kidney injury.       Acute alcoholic hepatitis with Maddrey score >50  Abdominal pain likely due to gaseous distension- improved   Minimal ascites status post 5 days of cefepime for prophylaxis   Lab Results   Component Value Date    BILITOTAL 24.2 02/19/2020    BILITOTAL 22.8 02/18/2020    BILITOTAL 22.8 02/17/2020    BILITOTAL 21.2 02/16/2020    BILITOTAL 20.2 02/15/2020     INR   Date Value Ref Range Status   02/19/2020 2.12 (H) 0.86 - 1.14 Final   Bilirubin increased, INR is stable.  She was started on prednisolone on 2/16    Continue prednisolone    Continue to monitor     GI is following     Acute kidney injury due to acute tubular necrosis and/or hepatorenal syndrome   Hyponatremia with anasarca   Lactic acidosis (resolved)  Hyponatremia, probably chronic   Hypokalemia, hypophosphatemia  Creatinine   Date Value Ref Range Status   02/19/2020 2.99 (H) 0.52 - 1.04 mg/dL Final     Sodium   Date Value Ref Range Status   02/19/2020 127 (L) 133 - 144 mmol/L Final     Potassium   Date Value Ref Range Status   02/19/2020 4.5 3.4 - 5.3 mmol/L Final     Intake/Output Summary (Last 24 hours) at 2/19/2020 1150  Last data filed at 2/19/2020 0614  Gross per 24 hour   Intake 293 ml   Output 1000 ml   Net -707 ml     She was started on a furosemide infusion yesterday and her urine output has started to increase. She had 1L out yesterday.      Continue albumin, octreotide and midodrine    Continue furosemide infusion     Monitor renal function, urine output and electrolytes     Acute hypoxemic respiratory failure likely secondary to episode of aspiration (resolved)  Recurrent hypoxic respiratory failure due to volume overload- improved  She is not on supplemental  oxygen.    Continue supplemental oxygen as needed     continue intravenous furosemide     Acute metabolic encephalopathy  Due to kidney and liver failure but serum ammonia is not elevated.  Her mental status has not really changed in the last few days.    Continue to monitor     Hematochezia  Resolved  Discussed with Dr. Hobson - possibly hemorrhoidal in the setting of coagulopathy  Bleeding appears to have stopped.  Hemoglobin is stable.    Monitor hemoglobin     Nutrition     NGT placed but she pulled it out    Replace nasogastric tube and use soft wrist restraints     Resume tube feedings when able     Diet: NPO for Medical/Clinical Reasons Except for: NPO but receiving Tube Feeding  Adult Formula Drip Feeding: Continuous Nepro; Other - Specify in Comment; Goal Rate: 55; mL/hr; Medication - Feeding Tube Flush Frequency: At least 15-30 mL water before and after medication administration and with tube clogging; FT placement pend...    DVT Prophylaxis: Pneumatic Compression Devices  Amado Catheter: in place, indication: Strict 1-2 Hour I&O  Code Status: Full Code      Disposition Plan    Prognosis is extremely guarded  Expected discharge: > 7 days, recommended to transitional care unit once renal and liver function are stable .  Entered: Jh Watts MD 02/19/2020, 11:49 AM       The patient's care was discussed with the Bedside Nurse, Patient.    Jh Watts MD  Hospitalist Service  [facility]    ______________________________________________________________________    Interval History   Stable overnight but she pulled out her nasogastric tube       Data reviewed today: I reviewed all medications, new labs and imaging results over the last 24 hours. I personally reviewed no images or EKG's today.    Physical Exam   Vital Signs: Temp: 98.6  F (37  C) Temp src: Axillary BP: 126/87 Pulse: 98 Heart Rate: 93 Resp: 20 SpO2: 95 % O2 Device: None (Room air)    Weight: 285 lbs 0  oz  Constitutional: drowsy, cooperative, no apparent distress  Respiratory: No increased work of breathing, good air exchange, clear to auscultation bilaterally, no crackles or wheezing  Cardiovascular: regular rate and rhythm, normal S1 and S2, no S3 or S4, and no murmur noted; generalized edema present 2-3+  GI: normal bowel sounds, soft, mildly distended, non tender   Skin: no rashes, jaundice noted  Neuropsychiatric: General: drowsy, calm, not oriented    Data   Recent Labs   Lab 02/19/20  0550 02/18/20  1911 02/18/20  0558  02/17/20  0532   WBC 20.4*  --  21.7*  --  18.9*   HGB 7.8*  --  8.1*  --  8.2*   *  --  102*  --  102*     --  282  --  265   INR 2.12*  --  2.34*  --  2.46*   * 126* 126*   < > 120*   POTASSIUM 4.5  --  4.6  --  4.6   CHLORIDE 97  --  96  --  92*   CO2 14*  --  15*  --  15*   BUN 31*  --  20  --  15   CR 2.99*  --  2.72*  --  2.49*   ANIONGAP 16*  --  15*  --  13   EMILY 9.3  --  8.8  --  8.3*   GLC 90  --  101*  --  117*   ALBUMIN 3.5  --  2.8*  --  3.0*   PROTTOTAL 6.8  --  6.1*  --  6.2*   BILITOTAL 24.2*  --  22.8*  --  22.8*   ALKPHOS 138  --  152*  --  154*   ALT 28  --  25  --  22   *  --  111*  --  82*    < > = values in this interval not displayed.     No results found for this or any previous visit (from the past 24 hour(s)).  Medications     dextrose       furosemide 10 mg/hr (02/18/20 3751)     - MEDICATION INSTRUCTIONS -         albumin human  25 g Intravenous Q8H     folic acid  1 mg Oral or Feeding Tube Daily     insulin aspart  1-7 Units Subcutaneous Q4H     LORazepam  0.5 mg Intravenous Once     midodrine  10 mg Oral or Feeding Tube TID w/meals     octreotide  200 mcg Subcutaneous TID     pantoprazole  40 mg Per Feeding Tube QAM AC     polyethylene glycol  17 g Oral or Feeding Tube Daily     prednisoLONE  40 mg Oral Daily     sodium bicarbonate  15 mEq Oral or Feeding Tube BID     sodium chloride (PF)  10 mL Intracatheter Q8H     thiamine  100 mg  Intravenous Daily

## 2020-02-19 NOTE — PLAN OF CARE
Pt very lethargic this shift. Only able to open eyes half way, not answering questions or following commands. Speech is unintelligible. Pt is in pain per non-verbal indicators. Administered PRN oxycodone x1. When pt returned from XRay following NGT placement, pt was coughing up moderate amount of bloody fluid and in respiratory distress. Required oral suctioning to assist with expectorating secretions. Respiratory status is at baseline and lungs are clear. Meds administered per NGT without difficulty. TF initiated at 10 ml/hr. Pt appears to be tolerating it well. 3+ generalized edema present. Lasix gtt continues at 10 mg/hr. UOP is adequate. Urine is tea-colored with sediment. Turned and repositioned every 2 hrs and PRN. Will have PICC line advanced tomorrow.   Pt pulled out NGT. TF stopped. MD notified.

## 2020-02-19 NOTE — IR NOTE
Interventional Radiology Intra-procedural Nursing Note    Patient Name: Pat Delgado  Medical Record Number: [id number]  Today's Date: February 19, 2020    Start Time: 1455  End of procedure time: 1518  Procedure: right upper extremity picc exchange, nasogastric tube placement  Report given to: ADAM Ratliff  Time pt departs:  1530  : n/a    Other Notes:     Patient tolerated procedure fairly. Bridle securing device for NGT was attempted but caused nose bleeding so it was not used. NGT secured with tape. RUE PICC c/d/i covered with sterile dressing    Versed 0.5mg IV  Fentanyl 25mcg IV    Kelly Do RN on 2/19/2020 at 3:44 PM

## 2020-02-19 NOTE — PROGRESS NOTES
[facility]    Hospitalist Progress Note  Interval History   Improved in urine output but pulled out her NGT overnight. Still lethargic and not able to follow command this am when seen by author.    All other review of systems are negative.    Assessment & Plan   Pat Delgado is a 29 year old female who was admitted on 2/9/2020. GI consulted for Etoh hepatitis complicated by liver failure and ATN.    Decrease in mental status:  -likely multi-factorial w/ both liver and kidney injury, pt being lethargic; however ammonia level normal, having BM daily per nursing  -will start rifaximin BID  -if no BM, can give lactulose either per NGT or per rectum  -pt also has arterial pH in the 7.2s w/ acidosis not sure whether it contributes to her mental status, getting bicarb at this time  -please have low threshold for infection work up, given most of the morbidity from liver dz is infection and pt also on steroid    Minimal Ascites:  -s/p 5 days course of IV cefepime for SBP ppx  -no pocket to tap at this time     Etoh hepatitis: Maddrey score of > 50s  -started on steroid trial on 2/16  -currently Tbili likely peak, INR seems to come down, author is hoping pt at the turning point to do better  -daily LFT w/ INR      Hyponatremia w/ increase in creatinine and minimal urine output in the setting of anasarca:  -no need for fluid restriction at this time given Na > 120 and mentating well  -care per renal, currently on lasix drip w/ albumin infusion, currently albumin at 3.5 now  -appreciate renal input, will treat as ATN, will stop midodrine and octreotide given no change in status while on them and objective evidence of ATN      Code Status: Full Code    -Data reviewed today: I reviewed all new labs and imaging results over the last 24 hours.     Physical Exam   Temp: 98.6  F (37  C) Temp src: Axillary BP: 126/87 Pulse: 98 Heart Rate: 93 Resp: 20 SpO2: 95 % O2 Device: None (Room air)    Vitals:    02/17/20  0456 02/18/20 0634 02/19/20 0400   Weight: 133 kg (293 lb 4.8 oz) 129.5 kg (285 lb 8 oz) 129.3 kg (285 lb)     Vital Signs with Ranges  Temp:  [97.6  F (36.4  C)-98.7  F (37.1  C)] 98.6  F (37  C)  Pulse:  [93-99] 98  Heart Rate:  [93-95] 93  Resp:  [20] 20  BP: (115-131)/(81-92) 126/87  SpO2:  [90 %-98 %] 95 %  I/O last 3 completed shifts:  In: 283 [I.V.:148; NG/GT:135]  Out: 700 [Urine:700]    Constitutional: Lethargic, only responsive to painful stimuli  Respiratory: Clear to auscultation bilaterally, no crackles or wheezing  Cardiovascular: Regular rate and rhythm, normal S1 and S2, and no murmur noted  GI: Normal bowel sounds, soft, distended w/ gas, non-tender  Skin/Integumen: No rashes, no cyanosis, no edema  Other:     Aida Winter MD  ([facility]  Office: [phone]  Cell: [phone], please feel free to call the cell    Medications     dextrose       furosemide 10 mg/hr (02/18/20 2328)     - MEDICATION INSTRUCTIONS -         albumin human  25 g Intravenous Q8H     folic acid  1 mg Oral or Feeding Tube Daily     insulin aspart  1-7 Units Subcutaneous Q4H     micafungin  100 mg Intravenous Q24H     pantoprazole  40 mg Per Feeding Tube QAM AC     polyethylene glycol  17 g Oral or Feeding Tube Daily     prednisoLONE  40 mg Oral Daily     rifaximin  550 mg Oral BID     sodium bicarbonate  15 mEq Oral or Feeding Tube BID     sodium chloride (PF)  10 mL Intracatheter Q8H     thiamine  100 mg Intravenous Daily       Data   Recent Labs   Lab 02/19/20  0550 02/18/20  1911 02/18/20  0558  02/17/20  0532   WBC 20.4*  --  21.7*  --  18.9*   HGB 7.8*  --  8.1*  --  8.2*   *  --  102*  --  102*     --  282  --  265   INR 2.12*  --  2.34*  --  2.46*   * 126* 126*   < > 120*   POTASSIUM 4.5  --  4.6  --  4.6   CHLORIDE 97  --  96  --  92*   CO2 14*  --  15*  --  15*   BUN 31*  --  20  --  15   CR 2.99*  --  2.72*  --  2.49*   ANIONGAP 16*  --  15*  --  13   EMILY  9.3  --  8.8  --  8.3*   GLC 90  --  101*  --  117*   ALBUMIN 3.5  --  2.8*  --  3.0*   PROTTOTAL 6.8  --  6.1*  --  6.2*   BILITOTAL 24.2*  --  22.8*  --  22.8*   ALKPHOS 138  --  152*  --  154*   ALT 28  --  25  --  22   *  --  111*  --  82*    < > = values in this interval not displayed.       Recent Results (from the past 24 hour(s))   IR PICC Exchange Right    Narrative    INTERVENTIONAL RADIOLOGY PICC EXCHANGE RIGHT, INTERVENTIONAL RADIOLOGY  FEEDING TUBE PLACEMENT WITH FLUORO/MD 2/19/2020 3:15 PM    1. IR PICC exchange right.  2. IR feeding tube placement with fluoroscopy/M.D.      HISTORY: 29-year-old female requires secure long-term central venous  access for blood draws, medications. An attempt was made at placing a  PICC 2 days ago however, the catheter could not be advanced centrally  and therefore was left in the left axillary vein. Patient also is not  adequately taking oral nutrition. An NJ tube was requested.    DESCRIPTION OF PROCEDURE: After obtaining informed consent, the  patient was placed in a supine position on the fluoroscopy table. The  right arm including external portions of existing midline were prepped  and draped in the usual sterile manner. 1% lidocaine was injected for  local anesthesia. The midline was removed over a wire. The wire was  advanced into the superior vena cava. A 5 Uzbek peel-away sheath was  placed. Through the peel-away sheath, a 5 Uzbek triple lumen PICC was  placed. The tip was placed at the atrial caval junction. A  fluoroscopic image was saved to document catheter tip position. All 3  ports were aspirated and flushed with saline. The catheter was secured  to the patient's arm. A dressing was applied.    Next, under fluoroscopic guidance, a standard Keofeed nasojejunal tube  was placed with tip positioned in the distal duodenum just proximal to  the ligament of Treitz. A fluoroscopic image was saved to document  catheter position. The catheter was secured  to the patient's right  cheek. A dressing was applied.    I determined this patient to be an appropriate candidate for the  planned sedation and procedure and reassessed the patient immediately  prior to sedation and procedure. Moderate intravenous conscious  sedation was supervised by me. The patient was independently monitored  by a registered nurse assigned to the Department of radiology using  automated blood pressure, EKG and pulse oximetry. The patient  tolerated the procedure well. There were no immediate postprocedure  complications. The patient's vital signs were monitored by radiology  nursing staff under my supervision and remained stable throughout the  study. Radiation dose for this scan was reduced using automated  exposure control, adjustment of the mA and/or kV according to patient  size, or iterative reconstruction technique. Maximal Sterile Barrier  Technique Utilized: Cap AND mask AND sterile gown AND sterile gloves  AND sterile full body drape AND hand hygiene AND skin preparation 2%  chlorhexidine for cutaneous antisepsis (or acceptable alternative  antiseptics).     Sterile Ultrasound Technique Utilized Sterile gel AND sterile probe  covers.    MEDICATIONS: 0.5 mg Versed, 25 mcg fentanyl.    Sedation time: 23 minutes    Fluoroscopy time: 1.2 minutes    Total fluoroscopy dose: 16.99 mGy Air Kerma    Contrast: None    Local anesthetic: 6mL 1% lidocaine      Impression    IMPRESSION:  1. Midline venous catheter exchanged for a PICC as described above.  2. Nasojejunal tube placed as above.    SYDNI ARREOLA MD   IR Feeding Tube Placement w Shira    Narrative    INTERVENTIONAL RADIOLOGY PICC EXCHANGE RIGHT, INTERVENTIONAL RADIOLOGY  FEEDING TUBE PLACEMENT WITH SHIRA 2/19/2020 3:15 PM    1. IR PICC exchange right.  2. IR feeding tube placement with fluoroscopy/M.D.      HISTORY: 29-year-old female requires secure long-term central venous  access for blood draws, medications. An attempt was  made at placing a  PICC 2 days ago however, the catheter could not be advanced centrally  and therefore was left in the left axillary vein. Patient also is not  adequately taking oral nutrition. An NJ tube was requested.    DESCRIPTION OF PROCEDURE: After obtaining informed consent, the  patient was placed in a supine position on the fluoroscopy table. The  right arm including external portions of existing midline were prepped  and draped in the usual sterile manner. 1% lidocaine was injected for  local anesthesia. The midline was removed over a wire. The wire was  advanced into the superior vena cava. A 5 Namibian peel-away sheath was  placed. Through the peel-away sheath, a 5 Namibian triple lumen PICC was  placed. The tip was placed at the atrial caval junction. A  fluoroscopic image was saved to document catheter tip position. All 3  ports were aspirated and flushed with saline. The catheter was secured  to the patient's arm. A dressing was applied.    Next, under fluoroscopic guidance, a standard Keofeed nasojejunal tube  was placed with tip positioned in the distal duodenum just proximal to  the ligament of Treitz. A fluoroscopic image was saved to document  catheter position. The catheter was secured to the patient's right  cheek. A dressing was applied.    I determined this patient to be an appropriate candidate for the  planned sedation and procedure and reassessed the patient immediately  prior to sedation and procedure. Moderate intravenous conscious  sedation was supervised by me. The patient was independently monitored  by a registered nurse assigned to the Department of radiology using  automated blood pressure, EKG and pulse oximetry. The patient  tolerated the procedure well. There were no immediate postprocedure  complications. The patient's vital signs were monitored by radiology  nursing staff under my supervision and remained stable throughout the  study. Radiation dose for this scan was reduced  using automated  exposure control, adjustment of the mA and/or kV according to patient  size, or iterative reconstruction technique. Maximal Sterile Barrier  Technique Utilized: Cap AND mask AND sterile gown AND sterile gloves  AND sterile full body drape AND hand hygiene AND skin preparation 2%  chlorhexidine for cutaneous antisepsis (or acceptable alternative  antiseptics).     Sterile Ultrasound Technique Utilized Sterile gel AND sterile probe  covers.    MEDICATIONS: 0.5 mg Versed, 25 mcg fentanyl.    Sedation time: 23 minutes    Fluoroscopy time: 1.2 minutes    Total fluoroscopy dose: 16.99 mGy Air Kerma    Contrast: None    Local anesthetic: 6mL 1% lidocaine      Impression    IMPRESSION:  1. Midline venous catheter exchanged for a PICC as described above.  2. Nasojejunal tube placed as above.    MD Aida MOSER MD (Richard)  [facility]  Office: [phone]  Cell: [phone]

## 2020-02-20 LAB
ALBUMIN SERPL-MCNC: 3.8 G/DL (ref 3.4–5)
ALP SERPL-CCNC: 128 U/L (ref 40–150)
ALT SERPL W P-5'-P-CCNC: 34 U/L (ref 0–50)
ANION GAP SERPL CALCULATED.3IONS-SCNC: 16 MMOL/L (ref 3–14)
AST SERPL W P-5'-P-CCNC: 146 U/L (ref 0–45)
BILIRUB SERPL-MCNC: 23.6 MG/DL (ref 0.2–1.3)
BUN SERPL-MCNC: 43 MG/DL (ref 7–30)
CALCIUM SERPL-MCNC: 9.2 MG/DL (ref 8.5–10.1)
CHLORIDE SERPL-SCNC: 97 MMOL/L (ref 94–109)
CO2 SERPL-SCNC: 16 MMOL/L (ref 20–32)
CREAT SERPL-MCNC: 3.42 MG/DL (ref 0.52–1.04)
ERYTHROCYTE [DISTWIDTH] IN BLOOD BY AUTOMATED COUNT: 20.9 % (ref 10–15)
GFR SERPL CREATININE-BSD FRML MDRD: 17 ML/MIN/{1.73_M2}
GLUCOSE BLDC GLUCOMTR-MCNC: 100 MG/DL (ref 70–99)
GLUCOSE BLDC GLUCOMTR-MCNC: 103 MG/DL (ref 70–99)
GLUCOSE BLDC GLUCOMTR-MCNC: 124 MG/DL (ref 70–99)
GLUCOSE BLDC GLUCOMTR-MCNC: 88 MG/DL (ref 70–99)
GLUCOSE BLDC GLUCOMTR-MCNC: 96 MG/DL (ref 70–99)
GLUCOSE SERPL-MCNC: 101 MG/DL (ref 70–99)
HCT VFR BLD AUTO: 21.7 % (ref 35–47)
HGB BLD-MCNC: 7.3 G/DL (ref 11.7–15.7)
INR PPP: 2.14 (ref 0.86–1.14)
MAGNESIUM SERPL-MCNC: 2.8 MG/DL (ref 1.6–2.3)
MCH RBC QN AUTO: 34.8 PG (ref 26.5–33)
MCHC RBC AUTO-ENTMCNC: 33.6 G/DL (ref 31.5–36.5)
MCV RBC AUTO: 103 FL (ref 78–100)
PHOSPHATE SERPL-MCNC: 4.8 MG/DL (ref 2.5–4.5)
PLATELET # BLD AUTO: 247 10E9/L (ref 150–450)
POTASSIUM SERPL-SCNC: 4.3 MMOL/L (ref 3.4–5.3)
PROT SERPL-MCNC: 7.1 G/DL (ref 6.8–8.8)
RBC # BLD AUTO: 2.1 10E12/L (ref 3.8–5.2)
SODIUM SERPL-SCNC: 129 MMOL/L (ref 133–144)
SODIUM SERPL-SCNC: 131 MMOL/L (ref 133–144)
WBC # BLD AUTO: 19.5 10E9/L (ref 4–11)

## 2020-02-20 PROCEDURE — 84100 ASSAY OF PHOSPHORUS: CPT | Performed by: HOSPITALIST

## 2020-02-20 PROCEDURE — 84295 ASSAY OF SERUM SODIUM: CPT | Performed by: HOSPITALIST

## 2020-02-20 PROCEDURE — 83735 ASSAY OF MAGNESIUM: CPT | Performed by: HOSPITALIST

## 2020-02-20 PROCEDURE — 99232 SBSQ HOSP IP/OBS MODERATE 35: CPT | Performed by: HOSPITALIST

## 2020-02-20 PROCEDURE — 80053 COMPREHEN METABOLIC PANEL: CPT | Performed by: HOSPITALIST

## 2020-02-20 PROCEDURE — 85610 PROTHROMBIN TIME: CPT | Performed by: HOSPITALIST

## 2020-02-20 PROCEDURE — P9047 ALBUMIN (HUMAN), 25%, 50ML: HCPCS | Performed by: INTERNAL MEDICINE

## 2020-02-20 PROCEDURE — 25000128 H RX IP 250 OP 636: Performed by: INTERNAL MEDICINE

## 2020-02-20 PROCEDURE — 25800030 ZZH RX IP 258 OP 636: Performed by: INTERNAL MEDICINE

## 2020-02-20 PROCEDURE — 00000146 ZZHCL STATISTIC GLUCOSE BY METER IP

## 2020-02-20 PROCEDURE — 25000128 H RX IP 250 OP 636: Performed by: HOSPITALIST

## 2020-02-20 PROCEDURE — 25000132 ZZH RX MED GY IP 250 OP 250 PS 637: Performed by: HOSPITALIST

## 2020-02-20 PROCEDURE — 40000893 ZZH STATISTIC PT IP EVAL DEFER

## 2020-02-20 PROCEDURE — 80048 BASIC METABOLIC PNL TOTAL CA: CPT | Performed by: HOSPITALIST

## 2020-02-20 PROCEDURE — 36415 COLL VENOUS BLD VENIPUNCTURE: CPT | Performed by: HOSPITALIST

## 2020-02-20 PROCEDURE — 40000239 ZZH STATISTIC VAT ROUNDS

## 2020-02-20 PROCEDURE — 85027 COMPLETE CBC AUTOMATED: CPT | Performed by: HOSPITALIST

## 2020-02-20 PROCEDURE — 21000000 ZZH R&B IMCU HEART CARE

## 2020-02-20 PROCEDURE — 25000131 ZZH RX MED GY IP 250 OP 636 PS 637: Performed by: HOSPITALIST

## 2020-02-20 PROCEDURE — 25000125 ZZHC RX 250: Performed by: INTERNAL MEDICINE

## 2020-02-20 RX ORDER — ALBUMIN (HUMAN) 12.5 G/50ML
25 SOLUTION INTRAVENOUS EVERY 8 HOURS
Status: DISCONTINUED | OUTPATIENT
Start: 2020-02-20 | End: 2020-02-21

## 2020-02-20 RX ADMIN — MICAFUNGIN SODIUM 100 MG: 10 INJECTION, POWDER, LYOPHILIZED, FOR SOLUTION INTRAVENOUS at 17:13

## 2020-02-20 RX ADMIN — Medication 40 MG: at 10:14

## 2020-02-20 RX ADMIN — SODIUM BICARBONATE 15 MEQ: 84 INJECTION, SOLUTION INTRAVENOUS at 20:12

## 2020-02-20 RX ADMIN — RIFAXIMIN 550 MG: 550 TABLET ORAL at 22:03

## 2020-02-20 RX ADMIN — ALBUMIN HUMAN 25 G: 0.25 SOLUTION INTRAVENOUS at 10:16

## 2020-02-20 RX ADMIN — PREDNISOLONE SODIUM PHOSPHATE 40 MG: 15 SOLUTION ORAL at 17:13

## 2020-02-20 RX ADMIN — POLYETHYLENE GLYCOL 3350 17 G: 17 POWDER, FOR SOLUTION ORAL at 10:14

## 2020-02-20 RX ADMIN — SODIUM BICARBONATE 15 MEQ: 84 INJECTION, SOLUTION INTRAVENOUS at 10:14

## 2020-02-20 RX ADMIN — RIFAXIMIN 550 MG: 550 TABLET ORAL at 10:13

## 2020-02-20 RX ADMIN — ALBUMIN HUMAN 25 G: 0.25 SOLUTION INTRAVENOUS at 17:13

## 2020-02-20 RX ADMIN — FOLIC ACID 1 MG: 1 TABLET ORAL at 10:13

## 2020-02-20 RX ADMIN — THIAMINE HYDROCHLORIDE 100 MG: 100 INJECTION, SOLUTION INTRAMUSCULAR; INTRAVENOUS at 10:16

## 2020-02-20 ASSESSMENT — MIFFLIN-ST. JEOR: SCORE: 2067.61

## 2020-02-20 NOTE — PROGRESS NOTES
[facility]     Renal Progress Note       SHORTHAND KEY FOR MY NOTES:  c = with, s = without, p = after, a = before, x = except, asx = asymptomatic, tx = transplant or treatment, sx = symptoms or symptomatic, cx = canceled or culture, rxn = reaction, yday = yesterday, nl = normal, abx = antibiotics, fxn = function, dx = diagnosis, dz = disease, m/h = melena/hematochezia, c/d/l/ha = cramping/dizziness/lightheadedness/headache, d/c = discharge or diarrhea/constipation, f/c/n/v = fevers/chills/nausea/vomiting, cp/sob = chest pain/shortness of breath, tbv = total body volume, rxn = reaction, tdc = tunneled dialysis catheter, pta = prior to admission, hd = hemodialysis, pd = peritoneal dialysis, hhd = home hemodialysis         Assessment/Plan:     1.  Oliguric EBEN.  Pt's cr is higher today, in part bc she may now be overdiuresed.  Her uo has decreased steadily over the past couple of days.  Urine remains quite dark.  This is most likely ATN + HRS II, which can convert to HRS I.  Family is interested in dialysis, which may become necessary as early as tmrw.  D/w mother, sister and aunt at bedside.  Mother has signed consents for dialysis and TDC placement.  A.  Stop furos gtt.  B.  Continue IV alb.  C.  Continue treating yeast UTI - 3d course of micafungin 100 mg iv every day.    D.  Ordered TDC to be placed if cr goes up tmrw.  Labs set for 0530.  D/w IR.     2.  EtOHic hepatitis.  Pt's bili remains high.  She is icteric and jaundiced.  She is on steroids.  GI following.  A.  Per GI.    3.  Encephalopathy.  Pt is still quite encephalopathic despite minimal sedative or pain meds.  NH3 has been ok, but now on rifax.  Can't r/o infectious etiology, though afeb and BCx still neg.  BP isn't low.  Her BUN is up to 43 today, but may be more related to being intravascularly dry.  For a chronic alcoholic this could be relatively high, though her mentation has been poor even when the BUN was 20.  A.  Follow  "clinically.    4.   Anemia.  Pt's hb is a bit lower today despite being diuresed.  INR is high.  No signs of active bleeding at present.  A.  Follow hb, clinically.    5.  Hyponatremia.  Na is up to 129.    A.  Follow Na daily.    6.  RTA.  Pt's bicarb is up to 16.  She is on oral bicarb.  A.  Continue bicarb for now.    7.  FEN.  Electrolytes are ok x as noted above.  A.  Follow labs daily.        Interval History:     Unable.  Pt is still confused.           Medications and Allergies:       albumin human  25 g Intravenous Q8H     folic acid  1 mg Oral or Feeding Tube Daily     insulin aspart  1-7 Units Subcutaneous Q4H     micafungin  100 mg Intravenous Q24H     pantoprazole  40 mg Per Feeding Tube QAM AC     polyethylene glycol  17 g Oral or Feeding Tube Daily     prednisoLONE  40 mg Oral Daily     rifaximin  550 mg Oral or Feeding Tube BID     sodium bicarbonate  15 mEq Oral or Feeding Tube BID     sodium chloride (PF)  10 mL Intracatheter Q7 Days     sodium chloride (PF)  10 mL Intracatheter Q8H     thiamine  100 mg Intravenous Daily     Allergies   Allergen Reactions     Penicillins Unknown     Hives            Physical Exam:     Vitals were reviewed    Heart Rate: 105, Blood pressure 131/81, pulse 107, temperature 98.7  F (37.1  C), temperature source Axillary, resp. rate 18, height 1.727 m (5' 8\"), weight 129.4 kg (285 lb 4.8 oz), SpO2 97 %, not currently breastfeeding.  Wt Readings from Last 3 Encounters:   02/20/20 129.4 kg (285 lb 4.8 oz)   02/09/15 95.3 kg (210 lb)   01/22/15 108.4 kg (239 lb)     Intake/Output Summary (Last 24 hours) at 2/20/2020 1434  Last data filed at 2/20/2020 1403  Gross per 24 hour   Intake 1300.1 ml   Output 450 ml   Net 850.1 ml     GENERAL APPEARANCE: lying in bed, not interactive, eyes open to voice, not communicative  HEENT:  eyes/ears/nose/neck grossly normal x scleral icterus, facial edema  RESP: lungs CTA B ant/lat c decent efforts, no crackles, shallow resp  CV: Reg, " tachy, nl S1/S2  ABDOMEN: o/s/nt, + distended  EXTREMITIES/SKIN: 2+ ble edema  OTHER:  R arm PICC; + loaiza c dark urine         Data:     CBC RESULTS:     Recent Labs   Lab 02/20/20  0559 02/19/20  0550 02/18/20  0558 02/17/20  0532 02/16/20  1605 02/16/20  0548  02/15/20  0539   WBC 19.5* 20.4* 21.7* 18.9*  --  18.1*  --  18.3*   RBC 2.10* 2.31* 2.38* 2.41*  --  2.03*  --  2.18*   HGB 7.3* 7.8* 8.1* 8.2* 7.8* 7.1*   < > 7.5*   HCT 21.7* 23.8* 24.2* 24.6*  --  21.0*  --  22.9*    231 282 265  --  267  --  279    < > = values in this interval not displayed.     Basic Metabolic Panel:  Recent Labs   Lab 02/20/20  0559 02/19/20  1812 02/19/20  0550 02/18/20  1911 02/18/20  0558 02/17/20  1819 02/17/20  0532  02/16/20  0548  02/15/20  0539   * 130* 127* 126* 126* 123* 120*   < > 120*   < > 120*   POTASSIUM 4.3  --  4.5  --  4.6  --  4.6  --  4.1  --  4.2   CHLORIDE 97  --  97  --  96  --  92*  --  91*  --  93*   CO2 16*  --  14*  --  15*  --  15*  --  17*  --  15*   BUN 43*  --  31*  --  20  --  15  --  15  --  13   CR 3.42*  --  2.99*  --  2.72*  --  2.49*  --  2.24*  --  1.95*   *  --  90  --  101*  --  117*  --  105*  --  120*   EMILY 9.2  --  9.3  --  8.8  --  8.3*  --  8.1*  --  7.8*    < > = values in this interval not displayed.     INR  Recent Labs   Lab 02/20/20  0559 02/19/20  0550 02/18/20  0558 02/17/20  0532   INR 2.14* 2.12* 2.34* 2.46*      Attestation:   I have reviewed today's relevant vital signs, notes, medications, labs and imaging.    Desmond Moses MD  [facility] - Nephrology  [phone]

## 2020-02-20 NOTE — PLAN OF CARE
VSS, pt appears comfortable, repositioned frequently.  Lasix gtt infusing, urine continues to be dark/tea colored, lg loose BM today.  PICC line placed this afternoon along with NJ tube, both ok to use per RN.  TF restarted at 10ml/hr around 1620 and will increase per orders.  Will continue to monitor.

## 2020-02-20 NOTE — PROGRESS NOTES
[facility]    Hospitalist Progress Note      Assessment & Plan   Pat Delgado is a 29 year old female who was admitted on 2/9/2020 with acute alcoholic hepatitis and acute kidney injury    Acute alcoholic hepatitis with Maddrey score >50  Abdominal pain likely due to gaseous distension- improved   Minimal ascites status post 5 days of cefepime for SBP prophylaxis   She was started on prednisolone on 2/16. T bili peaked on 2/19, numbers starting to improve, continue to trend  - Continue prednisolone  - GI consulted, appreciate their cares     Acute kidney injury likely due to acute tubular necrosis and type 2 hepatorenal syndrome  Hyponatremia with anasarca   Lactic acidosis (resolved)  Hyponatremia, probably chronic   Hypokalemia, hypophosphatemia  She was started on a furosemide infusion 2/18 and her urine output started to increase. She had 1L out 2/18, but decreased output on 2/19 when she also did not have NG in place  - Furosemide gtt for few days, discontinued 2/20.  - Continued albumin q8h  - Monitor renal function, urine output and electrolytes   - On oral bicarbonate tabs  - appreciate nephrology consult  - Dialysis in AM if creatinine continues to worsen  - Pending response to dialysis, will discuss palliative care consult with family.     Acute hypoxemic respiratory failure likely secondary to episode of aspiration (resolved)  Recurrent hypoxic respiratory failure due to volume overload- improved  She is not on supplemental oxygen.  - IV lasix gtt discontinued 2/20     Acute metabolic encephalopathy  Due to kidney and liver failure but serum ammonia is not elevated.  Her mental status has been stable past few days  - Continue to monitor   - Rifaximin BID     Candiduria  Candida albicans/dubliniensis >100k in urine 2/16  - on treatment with micafungin 100mg IV every day x3 days (#2/3)    Hematochezia, Resolved  Discussed with Dr. Hobson - possibly hemorrhoidal in the setting of  coagulopathy  Bleeding appears to have stopped.  Hemoglobin is stable.  - continue to monitor     Nutrition   - NGT replaced by IR on 2/19 (also had PICC placed)    Low back discomfort  Does not like being on her left side. Intermittently pointing at her back, suspected to have some discomfort  - Trial heating pad to see if helps improve pain    DVT Prophylaxis: Pneumatic Compression Devices  Code Status: Full Code  Expected discharge: >7 days, likely TCU, pending stabilization of renal/liver function and improvement in encephalopathy    Tatyana Lundberg, DO  Text Page (7am - 6pm)    Interval History   Patient seen and examined. Eyes intermittently open, but not participating in conversation. Grunting frequently. Mother at bedside and updated with today's lab values. Patient has mitts in place to keep NGT in place, will continue these soft restraint orders. Discussed that patient has been having some low back discomfort, indicating it while position changes in bed. Trial heating pad and follow up.    -Data reviewed today: I reviewed all new labs and imaging results over the last 24 hours. I personally reviewed no new imaging or EKGs    Physical Exam   Temp: 98.7  F (37.1  C) Temp src: Axillary BP: 131/81 Pulse: 107 Heart Rate: 105 Resp: 18 SpO2: 97 % O2 Device: None (Room air)    Vitals:    02/18/20 0634 02/19/20 0400 02/20/20 0650   Weight: 129.5 kg (285 lb 8 oz) 129.3 kg (285 lb) 129.4 kg (285 lb 4.8 oz)     Vital Signs with Ranges  Temp:  [97.6  F (36.4  C)-98.9  F (37.2  C)] 98.7  F (37.1  C)  Pulse:  [105-109] 107  Heart Rate:  [103-110] 105  Resp:  [14-22] 18  BP: (113-132)/(80-99) 131/81  SpO2:  [91 %-100 %] 97 %  I/O last 3 completed shifts:  In: 420 [I.V.:100; NG/GT:180]  Out: 450 [Urine:450]    Constitutional: Eyes open, not following commands, no apparent distress  Respiratory: Clear to auscultation bilaterally, no crackles or wheezing, + snoring respirations   Cardiovascular: Regular rate and rhythm,  normal S1 and S2, and no murmur noted  GI: Normal bowel sounds, soft, non-distended, non-tender  Skin/Integumen: No rashes, no cyanosis, 2-3+ edema (anasarca)  Other: + jaundice    Medications     dextrose       - MEDICATION INSTRUCTIONS -         albumin human  25 g Intravenous Q8H     folic acid  1 mg Oral or Feeding Tube Daily     insulin aspart  1-7 Units Subcutaneous Q4H     micafungin  100 mg Intravenous Q24H     pantoprazole  40 mg Per Feeding Tube QAM AC     polyethylene glycol  17 g Oral or Feeding Tube Daily     prednisoLONE  40 mg Oral Daily     rifaximin  550 mg Oral or Feeding Tube BID     sodium bicarbonate  15 mEq Oral or Feeding Tube BID     sodium chloride (PF)  10 mL Intracatheter Q7 Days     sodium chloride (PF)  10 mL Intracatheter Q8H     thiamine  100 mg Intravenous Daily       Data   Recent Labs   Lab 02/20/20  0559 02/19/20  1812 02/19/20  0550  02/18/20  0558   WBC 19.5*  --  20.4*  --  21.7*   HGB 7.3*  --  7.8*  --  8.1*   *  --  103*  --  102*     --  231  --  282   INR 2.14*  --  2.12*  --  2.34*   * 130* 127*   < > 126*   POTASSIUM 4.3  --  4.5  --  4.6   CHLORIDE 97  --  97  --  96   CO2 16*  --  14*  --  15*   BUN 43*  --  31*  --  20   CR 3.42*  --  2.99*  --  2.72*   ANIONGAP 16*  --  16*  --  15*   EMILY 9.2  --  9.3  --  8.8   *  --  90  --  101*   ALBUMIN 3.8  --  3.5  --  2.8*   PROTTOTAL 7.1  --  6.8  --  6.1*   BILITOTAL 23.6*  --  24.2*  --  22.8*   ALKPHOS 128  --  138  --  152*   ALT 34  --  28  --  25   *  --  126*  --  111*    < > = values in this interval not displayed.       Recent Results (from the past 24 hour(s))   IR PICC Exchange Right    Narrative    INTERVENTIONAL RADIOLOGY PICC EXCHANGE RIGHT, INTERVENTIONAL RADIOLOGY  FEEDING TUBE PLACEMENT WITH FLUORO/MD 2/19/2020 3:15 PM    1. IR PICC exchange right.  2. IR feeding tube placement with fluoroscopy/M.D.      HISTORY: 29-year-old female requires secure long-term central  venous  access for blood draws, medications. An attempt was made at placing a  PICC 2 days ago however, the catheter could not be advanced centrally  and therefore was left in the left axillary vein. Patient also is not  adequately taking oral nutrition. An NJ tube was requested.    DESCRIPTION OF PROCEDURE: After obtaining informed consent, the  patient was placed in a supine position on the fluoroscopy table. The  right arm including external portions of existing midline were prepped  and draped in the usual sterile manner. 1% lidocaine was injected for  local anesthesia. The midline was removed over a wire. The wire was  advanced into the superior vena cava. A 5 Lebanese peel-away sheath was  placed. Through the peel-away sheath, a 5 Lebanese triple lumen PICC was  placed. The tip was placed at the atrial caval junction. A  fluoroscopic image was saved to document catheter tip position. All 3  ports were aspirated and flushed with saline. The catheter was secured  to the patient's arm. A dressing was applied.    Next, under fluoroscopic guidance, a standard Keofeed nasojejunal tube  was placed with tip positioned in the distal duodenum just proximal to  the ligament of Treitz. A fluoroscopic image was saved to document  catheter position. The catheter was secured to the patient's right  cheek. A dressing was applied.    I determined this patient to be an appropriate candidate for the  planned sedation and procedure and reassessed the patient immediately  prior to sedation and procedure. Moderate intravenous conscious  sedation was supervised by me. The patient was independently monitored  by a registered nurse assigned to the Department of radiology using  automated blood pressure, EKG and pulse oximetry. The patient  tolerated the procedure well. There were no immediate postprocedure  complications. The patient's vital signs were monitored by radiology  nursing staff under my supervision and remained stable  throughout the  study. Radiation dose for this scan was reduced using automated  exposure control, adjustment of the mA and/or kV according to patient  size, or iterative reconstruction technique. Maximal Sterile Barrier  Technique Utilized: Cap AND mask AND sterile gown AND sterile gloves  AND sterile full body drape AND hand hygiene AND skin preparation 2%  chlorhexidine for cutaneous antisepsis (or acceptable alternative  antiseptics).     Sterile Ultrasound Technique Utilized Sterile gel AND sterile probe  covers.    MEDICATIONS: 0.5 mg Versed, 25 mcg fentanyl.    Sedation time: 23 minutes    Fluoroscopy time: 1.2 minutes    Total fluoroscopy dose: 16.99 mGy Air Kerma    Contrast: None    Local anesthetic: 6mL 1% lidocaine      Impression    IMPRESSION:  1. Midline venous catheter exchanged for a PICC as described above.  2. Nasojejunal tube placed as above.    SYDNI ARREOLA MD   IR Feeding Tube Placement w Radha/MD    [facility]    INTERVENTIONAL RADIOLOGY PICC EXCHANGE RIGHT, INTERVENTIONAL RADIOLOGY  FEEDING TUBE PLACEMENT WITH RADHA/MD 2/19/2020 3:15 PM    1. IR PICC exchange right.  2. IR feeding tube placement with fluoroscopy/MTOPHER      HISTORY: 29-year-old female requires secure long-term central venous  access for blood draws, medications. An attempt was made at placing a  PICC 2 days ago however, the catheter could not be advanced centrally  and therefore was left in the left axillary vein. Patient also is not  adequately taking oral nutrition. An NJ tube was requested.    DESCRIPTION OF PROCEDURE: After obtaining informed consent, the  patient was placed in a supine position on the fluoroscopy table. The  right arm including external portions of existing midline were prepped  and draped in the usual sterile manner. 1% lidocaine was injected for  local anesthesia. The midline was removed over a wire. The wire was  advanced into the superior vena cava. A 5 Emirati peel-away sheath was  placed. Through  the peel-away sheath, a 5 Welsh triple lumen PICC was  placed. The tip was placed at the atrial caval junction. A  fluoroscopic image was saved to document catheter tip position. All 3  ports were aspirated and flushed with saline. The catheter was secured  to the patient's arm. A dressing was applied.    Next, under fluoroscopic guidance, a standard Keofeed nasojejunal tube  was placed with tip positioned in the distal duodenum just proximal to  the ligament of Treitz. A fluoroscopic image was saved to document  catheter position. The catheter was secured to the patient's right  cheek. A dressing was applied.    I determined this patient to be an appropriate candidate for the  planned sedation and procedure and reassessed the patient immediately  prior to sedation and procedure. Moderate intravenous conscious  sedation was supervised by me. The patient was independently monitored  by a registered nurse assigned to the Department of radiology using  automated blood pressure, EKG and pulse oximetry. The patient  tolerated the procedure well. There were no immediate postprocedure  complications. The patient's vital signs were monitored by radiology  nursing staff under my supervision and remained stable throughout the  study. Radiation dose for this scan was reduced using automated  exposure control, adjustment of the mA and/or kV according to patient  size, or iterative reconstruction technique. Maximal Sterile Barrier  Technique Utilized: Cap AND mask AND sterile gown AND sterile gloves  AND sterile full body drape AND hand hygiene AND skin preparation 2%  chlorhexidine for cutaneous antisepsis (or acceptable alternative  antiseptics).     Sterile Ultrasound Technique Utilized Sterile gel AND sterile probe  covers.    MEDICATIONS: 0.5 mg Versed, 25 mcg fentanyl.    Sedation time: 23 minutes    Fluoroscopy time: 1.2 minutes    Total fluoroscopy dose: 16.99 mGy Air Kerma    Contrast: None    Local anesthetic: 6mL 1%  lidocaine      Impression    IMPRESSION:  1. Midline venous catheter exchanged for a PICC as described above.  2. Nasojejunal tube placed as above.    SYDIN ARREOLA MD

## 2020-02-20 NOTE — PLAN OF CARE
Pt continues to be lethargic. Does not make effort to communicate verbally. She is tearful and will sigh and localize pain. Tele shows ST. LS clear but pt snores when alert. Abdomen is very distended. Pt is incontinent of bowel. Amado patent and draining adequate amounts of tea colored urine. 3+ generalized edema noted. Yellow, serous fluid oozing from lab draw and injection sites. Pt remains NPO for NGT placement today. Will also have IV advanced from midline to PICC. Pt is a total care. No discharge plans.

## 2020-02-20 NOTE — PLAN OF CARE
Discharge Planner PT   Patient plan for discharge: Not stated  Current status:     Family present and supportive at bedside. Pt supine in bed, mits on, sleeping/lethargic, recently finished nursing cares and rolling in bed with nursing. Currently pt not appropriate for PT intervention as pt not able to follow commands to successfully participate. Would not be appropriate to lift pt to chair at this time as pt very fatigued after moving in bed with nursing. Discussed care plan with family and planned progression with therapy. Family appreciative   Barriers to return to prior living situation: Level of assist, total cares at this time, unable to ambulate   Recommendations for discharge: tcu  Rationale for recommendations: Pt would benefit from continued PT while admitted and at discharge to improve strength, balance, mobility to increase independence, reduce falls risk and increase safety before returning home. Pt is far below her baseline mobility status.         Entered by: Mary Miner 02/20/2020 3:48 PM

## 2020-02-20 NOTE — PLAN OF CARE
Neuro: Lethargic, nonverbal, incoherent speech, yellow sclera  Recent vital signs:VSS  Respiratory: room air, lung sound diminished with expiratory wheezes  Pain:AVI  Tele:SR  Activity:Assist of 2-3 with lift, turned and repositioned frequently  Drips/Drains/IVF:Lasix gtt  Skin:Yellow, +3 BLE  and Bilateral upper extremities edema  GI/:Distended firm abdomen, had loose BM once this shift, loaiza in place with tea colored urine, NPO  Aggression color:green  Plan:Test/Consult:  Labs:  and 88  Misc: PICC in place infusing well

## 2020-02-20 NOTE — PROGRESS NOTES
SPIRITUAL HEALTH SERVICES Progress Note  [facility]    F/U visit. SH primarily spoke w pt's mother--who reported on pt's ongoing medical concerns and invites pastoral support for pt's recovery.  Pt's mother said the  of her Hindu ([place],  Vinay[place]) visited pt yesterday and intends to be part of pt/family's support.  Pt's mother also invites SH to provide support to pt during this hospitalization.  SH provided emotional/spiritual support to pt's mother and provided prayer for/with pt.  SH will plan to visit again after the weekend.                                                                                                                                           Eliseo Dorado M.Div., [facility]  Staff   Pager [phone]

## 2020-02-20 NOTE — PROGRESS NOTES
[facility]  Gastroenterology Progress Note     Pat Delgado MRN# [id number]   YOB: 1990 Age: 29 year old          Assessment and Plan:   Pat Delgado is a 29 year old female who was admitted on 2/9/2020. GI consulted for Etoh hepatitis complicated by liver failure and ATN. NGT replaced by IR on 2/19 (also had PICC placed)     Acute metabolic encephalopathy  -likely multi-factorial w/ both liver and kidney injury, pt being lethargic- but not chagnes;  ammonia level normal, having BM daily per nursing- had large bm yesterday  -continue rifaximin BID  -if no BM, can give lactulose either per NGT or per rectum  -pt also has arterial pH in the 7.2s w/ acidosis not sure whether it contributes to her mental status, getting bicarb at this time   -s/p 5 days course of IV cefepime for SBP ppx  -no pocket to tap at this time     Acute alcoholic hepatitis with Maddrey score >50  Abdominal pain likely due to gaseous distension- improved   Minimal ascites status post 5 days of cefepime for SBP prophylaxisMaddrey score of > 50s  -started on steroid trial on 2/16  -currently Tbili likely peak- and trending down ( 24.2 -> 23.6), INR trending down  -Continue with daily LFT w/ INR  -s/p 5 days course of IV cefepime for SBP ppx  -no pocket to tap at this time      Hyponatremia w/ increase in creatinine and minimal urine output in the setting of anasarca:  -no need for fluid restriction at this time given Na > 120 and mentating well  -care per renal, currently on lasix drip w/ albumin infusion, currently albumin at 3.8 now- Can stop albumin  -appreciate renal input, will treat as ATN  -creatinine 3.42 and continues to elevate           Acute liver failure without hepatic coma  Hyponatremia  Sepsis, due to unspecified organism, unspecified whether acute organ dysfunction present (H)      Interval History:   no new complaints, doing well, has had a bowel movement in the last 24 hours and doing  well; no cp, sob, n/v/d, or abd pain.              Review of Systems:   C: NEGATIVE for fever, chills, change in weight  E/M: NEGATIVE for ear, mouth and throat problems  R: NEGATIVE for significant cough or SOB  CV: NEGATIVE for chest pain, palpitations or peripheral edema             Medications:   I have reviewed this patient's current medications    albumin human  25 g Intravenous Q8H     folic acid  1 mg Oral or Feeding Tube Daily     insulin aspart  1-7 Units Subcutaneous Q4H     micafungin  100 mg Intravenous Q24H     pantoprazole  40 mg Per Feeding Tube QAM AC     polyethylene glycol  17 g Oral or Feeding Tube Daily     prednisoLONE  40 mg Oral Daily     rifaximin  550 mg Oral or Feeding Tube BID     sodium bicarbonate  15 mEq Oral or Feeding Tube BID     sodium chloride (PF)  10 mL Intracatheter Q7 Days     sodium chloride (PF)  10 mL Intracatheter Q8H     thiamine  100 mg Intravenous Daily                  Physical Exam:   Vitals were reviewed  Vital Signs with Ranges  Temp:  [97.6  F (36.4  C)-98.9  F (37.2  C)] 98.7  F (37.1  C)  Pulse:  [105-109] 107  Heart Rate:  [103-110] 105  Resp:  [14-22] 18  BP: (113-132)/(80-99) 131/81  SpO2:  [91 %-100 %] 97 %  I/O last 3 completed shifts:  In: 420 [I.V.:100; NG/GT:180]  Out: 450 [Urine:450]  Constitutional: severe distress, cooperative and jaundice   Cardiovascular: negative, PMI normal. No lifts, heaves, or thrills. RRR. No murmurs, clicks gallops or rub  Respiratory: negative, Percussion normal. Good diaphragmatic excursion. Lungs clear  Head: Normocephalic. No masses, lesions, tenderness or abnormalities  Neck: Neck supple. No adenopathy. Thyroid symmetric, normal size,, Carotids without bruits.  Abdomen: Abdomen soft, non-tender. BS normal. No masses, organomegaly, positive findings: distended           Data:   I reviewed the patient's new clinical lab test results.   Recent Labs   Lab Test 02/20/20  0559 02/19/20  0550 02/18/20  0558   WBC 19.5* 20.4*  21.7*   HGB 7.3* 7.8* 8.1*   * 103* 102*    231 282   INR 2.14* 2.12* 2.34*     Recent Labs   Lab Test 02/20/20  0559 02/19/20  0550 02/18/20  0558   POTASSIUM 4.3 4.5 4.6   CHLORIDE 97 97 96   CO2 16* 14* 15*   BUN 43* 31* 20   ANIONGAP 16* 16* 15*     Recent Labs   Lab Test 02/20/20  0559 02/19/20  0550 02/18/20  0558  02/14/20  0653  02/09/20  1755 02/09/20  1547   ALBUMIN 3.8 3.5 2.8*   < >  --    < >  --  1.6*   BILITOTAL 23.6* 24.2* 22.8*   < >  --    < >  --  8.1*   ALT 34 28 25   < >  --    < >  --  33   * 126* 111*   < >  --    < >  --  203*   PROTEIN  --   --   --   --  100*  --  100*  --    LIPASE  --   --   --   --   --   --   --  119    < > = values in this interval not displayed.       I reviewed the patient's new imaging results.    All laboratory data reviewed  All imaging studies reviewed by me.    Aminata Rangel PA-C,  2/20/2020  [facility]  Office : [phone]  Cell: [phone] (Dr. Hobson)  Cell: [phone] (Aminata Rangel PA-C)

## 2020-02-21 ENCOUNTER — APPOINTMENT (OUTPATIENT)
Dept: INTERVENTIONAL RADIOLOGY/VASCULAR | Facility: CLINIC | Age: 30
DRG: 871 | End: 2020-02-21
Attending: INTERNAL MEDICINE
Payer: COMMERCIAL

## 2020-02-21 ENCOUNTER — APPOINTMENT (OUTPATIENT)
Dept: CT IMAGING | Facility: CLINIC | Age: 30
DRG: 871 | End: 2020-02-21
Payer: COMMERCIAL

## 2020-02-21 ENCOUNTER — APPOINTMENT (OUTPATIENT)
Dept: CARDIOLOGY | Facility: CLINIC | Age: 30
DRG: 871 | End: 2020-02-21
Attending: HOSPITALIST
Payer: COMMERCIAL

## 2020-02-21 ENCOUNTER — APPOINTMENT (OUTPATIENT)
Dept: ULTRASOUND IMAGING | Facility: CLINIC | Age: 30
DRG: 871 | End: 2020-02-21
Payer: COMMERCIAL

## 2020-02-21 ENCOUNTER — APPOINTMENT (OUTPATIENT)
Dept: GENERAL RADIOLOGY | Facility: CLINIC | Age: 30
DRG: 871 | End: 2020-02-21
Attending: HOSPITALIST
Payer: COMMERCIAL

## 2020-02-21 ENCOUNTER — APPOINTMENT (OUTPATIENT)
Dept: GENERAL RADIOLOGY | Facility: CLINIC | Age: 30
DRG: 871 | End: 2020-02-21
Attending: INTERNAL MEDICINE
Payer: COMMERCIAL

## 2020-02-21 PROBLEM — N17.0 ACUTE KIDNEY FAILURE WITH TUBULAR NECROSIS (H): Status: ACTIVE | Noted: 2020-02-21

## 2020-02-21 LAB
ABO + RH BLD: NORMAL
ABO + RH BLD: NORMAL
ALBUMIN SERPL-MCNC: 3.7 G/DL (ref 3.4–5)
ALBUMIN SERPL-MCNC: 4.1 G/DL (ref 3.4–5)
ALBUMIN UR-MCNC: 100 MG/DL
ALP SERPL-CCNC: 113 U/L (ref 40–150)
ALP SERPL-CCNC: 124 U/L (ref 40–150)
ALT SERPL W P-5'-P-CCNC: 37 U/L (ref 0–50)
ALT SERPL W P-5'-P-CCNC: 40 U/L (ref 0–50)
AMMONIA PLAS-SCNC: 25 UMOL/L (ref 10–50)
ANION GAP SERPL CALCULATED.3IONS-SCNC: 13 MMOL/L (ref 3–14)
ANION GAP SERPL CALCULATED.3IONS-SCNC: 13 MMOL/L (ref 3–14)
APPEARANCE UR: ABNORMAL
AST SERPL W P-5'-P-CCNC: 157 U/L (ref 0–45)
AST SERPL W P-5'-P-CCNC: 162 U/L (ref 0–45)
BASE DEFICIT BLDA-SCNC: 4 MMOL/L
BASE DEFICIT BLDA-SCNC: 5.7 MMOL/L
BASE DEFICIT BLDV-SCNC: 4.6 MMOL/L
BILIRUB DIRECT SERPL-MCNC: 15.3 MG/DL (ref 0–0.2)
BILIRUB SERPL-MCNC: 20.1 MG/DL (ref 0.2–1.3)
BILIRUB SERPL-MCNC: 21.5 MG/DL (ref 0.2–1.3)
BILIRUB UR QL STRIP: ABNORMAL
BLD GP AB SCN SERPL QL: NORMAL
BLD PROD TYP BPU: NORMAL
BLD PROD TYP BPU: NORMAL
BLD UNIT ID BPU: 0
BLOOD BANK CMNT PATIENT-IMP: NORMAL
BLOOD PRODUCT CODE: NORMAL
BPU ID: NORMAL
BUN SERPL-MCNC: 38 MG/DL (ref 7–30)
BUN SERPL-MCNC: 52 MG/DL (ref 7–30)
CALCIUM SERPL-MCNC: 8.3 MG/DL (ref 8.5–10.1)
CALCIUM SERPL-MCNC: 9.3 MG/DL (ref 8.5–10.1)
CHLORIDE SERPL-SCNC: 98 MMOL/L (ref 94–109)
CHLORIDE SERPL-SCNC: 99 MMOL/L (ref 94–109)
CO2 SERPL-SCNC: 19 MMOL/L (ref 20–32)
CO2 SERPL-SCNC: 21 MMOL/L (ref 20–32)
COLOR UR AUTO: ABNORMAL
CREAT SERPL-MCNC: 3.13 MG/DL (ref 0.52–1.04)
CREAT SERPL-MCNC: 3.86 MG/DL (ref 0.52–1.04)
ERYTHROCYTE [DISTWIDTH] IN BLOOD BY AUTOMATED COUNT: 20.8 % (ref 10–15)
ERYTHROCYTE [DISTWIDTH] IN BLOOD BY AUTOMATED COUNT: 21.2 % (ref 10–15)
GFR SERPL CREATININE-BSD FRML MDRD: 15 ML/MIN/{1.73_M2}
GFR SERPL CREATININE-BSD FRML MDRD: 19 ML/MIN/{1.73_M2}
GLUCOSE BLDC GLUCOMTR-MCNC: 104 MG/DL (ref 70–99)
GLUCOSE BLDC GLUCOMTR-MCNC: 105 MG/DL (ref 70–99)
GLUCOSE BLDC GLUCOMTR-MCNC: 107 MG/DL (ref 70–99)
GLUCOSE BLDC GLUCOMTR-MCNC: 116 MG/DL (ref 70–99)
GLUCOSE BLDC GLUCOMTR-MCNC: 128 MG/DL (ref 70–99)
GLUCOSE BLDC GLUCOMTR-MCNC: 97 MG/DL (ref 70–99)
GLUCOSE BLDC GLUCOMTR-MCNC: 99 MG/DL (ref 70–99)
GLUCOSE SERPL-MCNC: 105 MG/DL (ref 70–99)
GLUCOSE SERPL-MCNC: 116 MG/DL (ref 70–99)
GLUCOSE UR STRIP-MCNC: NEGATIVE MG/DL
HCO3 BLD-SCNC: 21 MMOL/L (ref 21–28)
HCO3 BLD-SCNC: 22 MMOL/L (ref 21–28)
HCO3 BLDV-SCNC: 24 MMOL/L (ref 21–28)
HCT VFR BLD AUTO: 20.6 % (ref 35–47)
HCT VFR BLD AUTO: 22.5 % (ref 35–47)
HGB BLD-MCNC: 6.8 G/DL (ref 11.7–15.7)
HGB BLD-MCNC: 6.8 G/DL (ref 11.7–15.7)
HGB BLD-MCNC: 7.3 G/DL (ref 11.7–15.7)
HGB UR QL STRIP: ABNORMAL
INR PPP: 2.02 (ref 0.86–1.14)
INR PPP: 2.09 (ref 0.86–1.14)
KETONES UR STRIP-MCNC: 10 MG/DL
LACTATE BLD-SCNC: 0.7 MMOL/L (ref 0.7–2)
LEUKOCYTE ESTERASE UR QL STRIP: ABNORMAL
MAGNESIUM SERPL-MCNC: 2.4 MG/DL (ref 1.6–2.3)
MAGNESIUM SERPL-MCNC: 2.9 MG/DL (ref 1.6–2.3)
MCH RBC QN AUTO: 33.3 PG (ref 26.5–33)
MCH RBC QN AUTO: 34.5 PG (ref 26.5–33)
MCHC RBC AUTO-ENTMCNC: 32.4 G/DL (ref 31.5–36.5)
MCHC RBC AUTO-ENTMCNC: 33 G/DL (ref 31.5–36.5)
MCV RBC AUTO: 103 FL (ref 78–100)
MCV RBC AUTO: 105 FL (ref 78–100)
MUCOUS THREADS #/AREA URNS LPF: PRESENT /LPF
NITRATE UR QL: NEGATIVE
NUM BPU REQUESTED: 1
O2/TOTAL GAS SETTING VFR VENT: ABNORMAL %
O2/TOTAL GAS SETTING VFR VENT: ABNORMAL %
OXYHGB MFR BLD: 96 % (ref 92–100)
OXYHGB MFR BLD: 98 % (ref 92–100)
OXYHGB MFR BLDV: 63 %
PCO2 BLD: 43 MM HG (ref 35–45)
PCO2 BLD: 45 MM HG (ref 35–45)
PCO2 BLDV: 64 MM HG (ref 40–50)
PH BLD: 7.29 PH (ref 7.35–7.45)
PH BLD: 7.3 PH (ref 7.35–7.45)
PH BLDV: 7.17 PH (ref 7.32–7.43)
PH UR STRIP: 5.5 PH (ref 5–7)
PHOSPHATE SERPL-MCNC: 4.2 MG/DL (ref 2.5–4.5)
PHOSPHATE SERPL-MCNC: 5 MG/DL (ref 2.5–4.5)
PLATELET # BLD AUTO: 210 10E9/L (ref 150–450)
PLATELET # BLD AUTO: 251 10E9/L (ref 150–450)
PO2 BLD: 100 MM HG (ref 80–105)
PO2 BLD: 130 MM HG (ref 80–105)
PO2 BLDV: 45 MM HG (ref 25–47)
POTASSIUM SERPL-SCNC: 3.9 MMOL/L (ref 3.4–5.3)
POTASSIUM SERPL-SCNC: 4.3 MMOL/L (ref 3.4–5.3)
PROCALCITONIN SERPL-MCNC: 1.48 NG/ML
PROT SERPL-MCNC: 6.6 G/DL (ref 6.8–8.8)
PROT SERPL-MCNC: 7.1 G/DL (ref 6.8–8.8)
RBC # BLD AUTO: 1.97 10E12/L (ref 3.8–5.2)
RBC # BLD AUTO: 2.19 10E12/L (ref 3.8–5.2)
RBC #/AREA URNS AUTO: >182 /HPF (ref 0–2)
SODIUM SERPL-SCNC: 130 MMOL/L (ref 133–144)
SODIUM SERPL-SCNC: 133 MMOL/L (ref 133–144)
SOURCE: ABNORMAL
SP GR UR STRIP: >1.035 (ref 1–1.03)
SPECIMEN EXP DATE BLD: NORMAL
TRANSFUSION STATUS PATIENT QL: NORMAL
TRANSFUSION STATUS PATIENT QL: NORMAL
TROPONIN I SERPL-MCNC: 3.98 UG/L (ref 0–0.04)
TROPONIN I SERPL-MCNC: 4.77 UG/L (ref 0–0.04)
UROBILINOGEN UR STRIP-MCNC: NORMAL MG/DL (ref 0–2)
WBC # BLD AUTO: 20.3 10E9/L (ref 4–11)
WBC # BLD AUTO: 23.9 10E9/L (ref 4–11)
WBC #/AREA URNS AUTO: >182 /HPF (ref 0–5)
WBC CLUMPS #/AREA URNS HPF: PRESENT /HPF

## 2020-02-21 PROCEDURE — 25000128 H RX IP 250 OP 636: Performed by: HOSPITALIST

## 2020-02-21 PROCEDURE — 84145 PROCALCITONIN (PCT): CPT | Performed by: HOSPITALIST

## 2020-02-21 PROCEDURE — 84484 ASSAY OF TROPONIN QUANT: CPT | Performed by: STUDENT IN AN ORGANIZED HEALTH CARE EDUCATION/TRAINING PROGRAM

## 2020-02-21 PROCEDURE — 71260 CT THORAX DX C+: CPT

## 2020-02-21 PROCEDURE — 40000275 ZZH STATISTIC RCP TIME EA 10 MIN

## 2020-02-21 PROCEDURE — 40000008 ZZH STATISTIC AIRWAY CARE

## 2020-02-21 PROCEDURE — 85027 COMPLETE CBC AUTOMATED: CPT | Performed by: INTERNAL MEDICINE

## 2020-02-21 PROCEDURE — 81001 URINALYSIS AUTO W/SCOPE: CPT | Performed by: HOSPITALIST

## 2020-02-21 PROCEDURE — 27210338 ZZH CIRCUIT HUMID FACE/TRACH MSK

## 2020-02-21 PROCEDURE — 83605 ASSAY OF LACTIC ACID: CPT | Performed by: STUDENT IN AN ORGANIZED HEALTH CARE EDUCATION/TRAINING PROGRAM

## 2020-02-21 PROCEDURE — 25800030 ZZH RX IP 258 OP 636: Performed by: INTERNAL MEDICINE

## 2020-02-21 PROCEDURE — 27211316 ZZ H MASK, CPAP TOTAL HEADGEAR, LATEX FREE

## 2020-02-21 PROCEDURE — 02HV33Z INSERTION OF INFUSION DEVICE INTO SUPERIOR VENA CAVA, PERCUTANEOUS APPROACH: ICD-10-PCS | Performed by: RADIOLOGY

## 2020-02-21 PROCEDURE — 25000131 ZZH RX MED GY IP 250 OP 636 PS 637: Performed by: HOSPITALIST

## 2020-02-21 PROCEDURE — 36415 COLL VENOUS BLD VENIPUNCTURE: CPT | Performed by: HOSPITALIST

## 2020-02-21 PROCEDURE — 36415 COLL VENOUS BLD VENIPUNCTURE: CPT | Performed by: INTERNAL MEDICINE

## 2020-02-21 PROCEDURE — 27210432 ZZH NUTRITION PRODUCT RENAL BASIC LITER

## 2020-02-21 PROCEDURE — 99233 SBSQ HOSP IP/OBS HIGH 50: CPT | Performed by: HOSPITALIST

## 2020-02-21 PROCEDURE — C1769 GUIDE WIRE: HCPCS

## 2020-02-21 PROCEDURE — 85027 COMPLETE CBC AUTOMATED: CPT | Performed by: STUDENT IN AN ORGANIZED HEALTH CARE EDUCATION/TRAINING PROGRAM

## 2020-02-21 PROCEDURE — 87040 BLOOD CULTURE FOR BACTERIA: CPT | Performed by: INTERNAL MEDICINE

## 2020-02-21 PROCEDURE — 25000125 ZZHC RX 250: Performed by: PHYSICIAN ASSISTANT

## 2020-02-21 PROCEDURE — 90937 HEMODIALYSIS REPEATED EVAL: CPT

## 2020-02-21 PROCEDURE — 82140 ASSAY OF AMMONIA: CPT | Performed by: STUDENT IN AN ORGANIZED HEALTH CARE EDUCATION/TRAINING PROGRAM

## 2020-02-21 PROCEDURE — 77001 FLUOROGUIDE FOR VEIN DEVICE: CPT

## 2020-02-21 PROCEDURE — C1750 CATH, HEMODIALYSIS,LONG-TERM: HCPCS

## 2020-02-21 PROCEDURE — 85018 HEMOGLOBIN: CPT | Performed by: STUDENT IN AN ORGANIZED HEALTH CARE EDUCATION/TRAINING PROGRAM

## 2020-02-21 PROCEDURE — 25000128 H RX IP 250 OP 636: Performed by: RADIOLOGY

## 2020-02-21 PROCEDURE — 93010 ELECTROCARDIOGRAM REPORT: CPT | Performed by: INTERNAL MEDICINE

## 2020-02-21 PROCEDURE — 25000128 H RX IP 250 OP 636: Performed by: STUDENT IN AN ORGANIZED HEALTH CARE EDUCATION/TRAINING PROGRAM

## 2020-02-21 PROCEDURE — 0JH63XZ INSERTION OF TUNNELED VASCULAR ACCESS DEVICE INTO CHEST SUBCUTANEOUS TISSUE AND FASCIA, PERCUTANEOUS APPROACH: ICD-10-PCS | Performed by: RADIOLOGY

## 2020-02-21 PROCEDURE — 93321 DOPPLER ECHO F-UP/LMTD STD: CPT | Mod: 26 | Performed by: INTERNAL MEDICINE

## 2020-02-21 PROCEDURE — 76705 ECHO EXAM OF ABDOMEN: CPT

## 2020-02-21 PROCEDURE — 25000132 ZZH RX MED GY IP 250 OP 250 PS 637: Performed by: HOSPITALIST

## 2020-02-21 PROCEDURE — 93325 DOPPLER ECHO COLOR FLOW MAPG: CPT

## 2020-02-21 PROCEDURE — 84460 ALANINE AMINO (ALT) (SGPT): CPT | Performed by: STUDENT IN AN ORGANIZED HEALTH CARE EDUCATION/TRAINING PROGRAM

## 2020-02-21 PROCEDURE — 80069 RENAL FUNCTION PANEL: CPT | Performed by: STUDENT IN AN ORGANIZED HEALTH CARE EDUCATION/TRAINING PROGRAM

## 2020-02-21 PROCEDURE — 71045 X-RAY EXAM CHEST 1 VIEW: CPT

## 2020-02-21 PROCEDURE — 40000986 XR CHEST PORT 1 VW

## 2020-02-21 PROCEDURE — 80053 COMPREHEN METABOLIC PANEL: CPT | Performed by: INTERNAL MEDICINE

## 2020-02-21 PROCEDURE — 25800030 ZZH RX IP 258 OP 636: Performed by: RADIOLOGY

## 2020-02-21 PROCEDURE — 84450 TRANSFERASE (AST) (SGOT): CPT | Performed by: STUDENT IN AN ORGANIZED HEALTH CARE EDUCATION/TRAINING PROGRAM

## 2020-02-21 PROCEDURE — 25000125 ZZHC RX 250: Performed by: HOSPITALIST

## 2020-02-21 PROCEDURE — 84100 ASSAY OF PHOSPHORUS: CPT | Performed by: INTERNAL MEDICINE

## 2020-02-21 PROCEDURE — 82247 BILIRUBIN TOTAL: CPT | Performed by: STUDENT IN AN ORGANIZED HEALTH CARE EDUCATION/TRAINING PROGRAM

## 2020-02-21 PROCEDURE — 25500064 ZZH RX 255 OP 636: Performed by: HOSPITALIST

## 2020-02-21 PROCEDURE — 83735 ASSAY OF MAGNESIUM: CPT | Performed by: INTERNAL MEDICINE

## 2020-02-21 PROCEDURE — 93308 TTE F-UP OR LMTD: CPT | Mod: 26 | Performed by: INTERNAL MEDICINE

## 2020-02-21 PROCEDURE — 27210339 ZZH CIRCUIT HUMIDITY W/CPAP BIP

## 2020-02-21 PROCEDURE — 85610 PROTHROMBIN TIME: CPT | Performed by: INTERNAL MEDICINE

## 2020-02-21 PROCEDURE — 94002 VENT MGMT INPAT INIT DAY: CPT

## 2020-02-21 PROCEDURE — 0CJS8ZZ INSPECTION OF LARYNX, VIA NATURAL OR ARTIFICIAL OPENING ENDOSCOPIC: ICD-10-PCS | Performed by: INTERNAL MEDICINE

## 2020-02-21 PROCEDURE — 25000125 ZZHC RX 250: Performed by: INTERNAL MEDICINE

## 2020-02-21 PROCEDURE — 85610 PROTHROMBIN TIME: CPT | Performed by: STUDENT IN AN ORGANIZED HEALTH CARE EDUCATION/TRAINING PROGRAM

## 2020-02-21 PROCEDURE — 84155 ASSAY OF PROTEIN SERUM: CPT | Performed by: STUDENT IN AN ORGANIZED HEALTH CARE EDUCATION/TRAINING PROGRAM

## 2020-02-21 PROCEDURE — 82805 BLOOD GASES W/O2 SATURATION: CPT | Performed by: HOSPITALIST

## 2020-02-21 PROCEDURE — 99291 CRITICAL CARE FIRST HOUR: CPT | Mod: 24 | Performed by: INTERNAL MEDICINE

## 2020-02-21 PROCEDURE — 87106 FUNGI IDENTIFICATION YEAST: CPT | Performed by: HOSPITALIST

## 2020-02-21 PROCEDURE — 25000125 ZZHC RX 250

## 2020-02-21 PROCEDURE — 31500 INSERT EMERGENCY AIRWAY: CPT | Mod: GC | Performed by: INTERNAL MEDICINE

## 2020-02-21 PROCEDURE — 5A1945Z RESPIRATORY VENTILATION, 24-96 CONSECUTIVE HOURS: ICD-10-PCS | Performed by: INTERNAL MEDICINE

## 2020-02-21 PROCEDURE — 20000003 ZZH R&B ICU

## 2020-02-21 PROCEDURE — 86850 RBC ANTIBODY SCREEN: CPT | Performed by: HOSPITALIST

## 2020-02-21 PROCEDURE — 25000128 H RX IP 250 OP 636

## 2020-02-21 PROCEDURE — 36620 INSERTION CATHETER ARTERY: CPT | Mod: GC | Performed by: INTERNAL MEDICINE

## 2020-02-21 PROCEDURE — P9041 ALBUMIN (HUMAN),5%, 50ML: HCPCS | Performed by: INTERNAL MEDICINE

## 2020-02-21 PROCEDURE — 00000146 ZZHCL STATISTIC GLUCOSE BY METER IP

## 2020-02-21 PROCEDURE — 82248 BILIRUBIN DIRECT: CPT | Performed by: STUDENT IN AN ORGANIZED HEALTH CARE EDUCATION/TRAINING PROGRAM

## 2020-02-21 PROCEDURE — 87086 URINE CULTURE/COLONY COUNT: CPT | Performed by: HOSPITALIST

## 2020-02-21 PROCEDURE — 27210740 ZZH ACCESSORY CR3

## 2020-02-21 PROCEDURE — 82805 BLOOD GASES W/O2 SATURATION: CPT | Performed by: STUDENT IN AN ORGANIZED HEALTH CARE EDUCATION/TRAINING PROGRAM

## 2020-02-21 PROCEDURE — 76937 US GUIDE VASCULAR ACCESS: CPT

## 2020-02-21 PROCEDURE — 25800030 ZZH RX IP 258 OP 636

## 2020-02-21 PROCEDURE — 25000128 H RX IP 250 OP 636: Performed by: INTERNAL MEDICINE

## 2020-02-21 PROCEDURE — 84075 ASSAY ALKALINE PHOSPHATASE: CPT | Performed by: STUDENT IN AN ORGANIZED HEALTH CARE EDUCATION/TRAINING PROGRAM

## 2020-02-21 PROCEDURE — 86923 COMPATIBILITY TEST ELECTRIC: CPT | Performed by: HOSPITALIST

## 2020-02-21 PROCEDURE — 27211193 ZZ H WOUND GLUE CR1

## 2020-02-21 PROCEDURE — P9016 RBC LEUKOCYTES REDUCED: HCPCS | Performed by: HOSPITALIST

## 2020-02-21 PROCEDURE — 87040 BLOOD CULTURE FOR BACTERIA: CPT | Performed by: HOSPITALIST

## 2020-02-21 PROCEDURE — 83735 ASSAY OF MAGNESIUM: CPT | Performed by: STUDENT IN AN ORGANIZED HEALTH CARE EDUCATION/TRAINING PROGRAM

## 2020-02-21 PROCEDURE — 84484 ASSAY OF TROPONIN QUANT: CPT | Performed by: HOSPITALIST

## 2020-02-21 PROCEDURE — 86706 HEP B SURFACE ANTIBODY: CPT | Performed by: INTERNAL MEDICINE

## 2020-02-21 PROCEDURE — 86900 BLOOD TYPING SEROLOGIC ABO: CPT | Performed by: HOSPITALIST

## 2020-02-21 PROCEDURE — 93325 DOPPLER ECHO COLOR FLOW MAPG: CPT | Mod: 26 | Performed by: INTERNAL MEDICINE

## 2020-02-21 PROCEDURE — P9047 ALBUMIN (HUMAN), 25%, 50ML: HCPCS | Performed by: INTERNAL MEDICINE

## 2020-02-21 PROCEDURE — 86901 BLOOD TYPING SEROLOGIC RH(D): CPT | Performed by: HOSPITALIST

## 2020-02-21 PROCEDURE — 27210886 ZZH ACCESSORY CR5

## 2020-02-21 PROCEDURE — 93005 ELECTROCARDIOGRAM TRACING: CPT

## 2020-02-21 PROCEDURE — 36600 WITHDRAWAL OF ARTERIAL BLOOD: CPT

## 2020-02-21 PROCEDURE — 74174 CTA ABD&PLVS W/CONTRAST: CPT

## 2020-02-21 RX ORDER — ETOMIDATE 2 MG/ML
20 INJECTION INTRAVENOUS ONCE
Status: COMPLETED | OUTPATIENT
Start: 2020-02-21 | End: 2020-02-21

## 2020-02-21 RX ORDER — LIDOCAINE HYDROCHLORIDE 10 MG/ML
INJECTION, SOLUTION INFILTRATION; PERINEURAL
Status: DISCONTINUED
Start: 2020-02-21 | End: 2020-02-21 | Stop reason: HOSPADM

## 2020-02-21 RX ORDER — CLINDAMYCIN PHOSPHATE 900 MG/50ML
900 INJECTION, SOLUTION INTRAVENOUS
Status: COMPLETED | OUTPATIENT
Start: 2020-02-21 | End: 2020-02-21

## 2020-02-21 RX ORDER — HEPARIN SODIUM 1000 [USP'U]/ML
3 INJECTION, SOLUTION INTRAVENOUS; SUBCUTANEOUS ONCE
Status: COMPLETED | OUTPATIENT
Start: 2020-02-21 | End: 2020-02-21

## 2020-02-21 RX ORDER — PROPOFOL 10 MG/ML
INJECTION, EMULSION INTRAVENOUS
Status: COMPLETED
Start: 2020-02-21 | End: 2020-02-21

## 2020-02-21 RX ORDER — ALBUMIN, HUMAN INJ 5% 5 %
250 SOLUTION INTRAVENOUS
Status: DISCONTINUED | OUTPATIENT
Start: 2020-02-21 | End: 2020-02-22

## 2020-02-21 RX ORDER — FENTANYL CITRATE 50 UG/ML
50-100 INJECTION, SOLUTION INTRAMUSCULAR; INTRAVENOUS
Status: DISCONTINUED | OUTPATIENT
Start: 2020-02-21 | End: 2020-02-24

## 2020-02-21 RX ORDER — GLIPIZIDE 10 MG/1
2 TABLET ORAL
Status: DISCONTINUED | OUTPATIENT
Start: 2020-02-21 | End: 2020-02-21 | Stop reason: ALTCHOICE

## 2020-02-21 RX ORDER — HEPARIN SODIUM 1000 [USP'U]/ML
INJECTION, SOLUTION INTRAVENOUS; SUBCUTANEOUS
Status: DISCONTINUED
Start: 2020-02-21 | End: 2020-02-21 | Stop reason: HOSPADM

## 2020-02-21 RX ORDER — MINERAL OIL, PETROLATUM 425; 573 MG/G; MG/G
OINTMENT OPHTHALMIC EVERY 6 HOURS
Status: DISCONTINUED | OUTPATIENT
Start: 2020-02-21 | End: 2020-02-24

## 2020-02-21 RX ORDER — HEPARIN SODIUM 1000 [USP'U]/ML
500-6000 INJECTION, SOLUTION INTRAVENOUS; SUBCUTANEOUS
Status: COMPLETED | OUTPATIENT
Start: 2020-02-21 | End: 2020-02-21

## 2020-02-21 RX ORDER — CHLORHEXIDINE GLUCONATE ORAL RINSE 1.2 MG/ML
15 SOLUTION DENTAL 2 TIMES DAILY
Status: DISCONTINUED | OUTPATIENT
Start: 2020-02-21 | End: 2020-02-24

## 2020-02-21 RX ORDER — VANCOMYCIN HYDROCHLORIDE 1 G/200ML
1000 INJECTION, SOLUTION INTRAVENOUS EVERY 24 HOURS
Status: DISCONTINUED | OUTPATIENT
Start: 2020-02-21 | End: 2020-02-21

## 2020-02-21 RX ORDER — FENTANYL CITRATE 50 UG/ML
INJECTION, SOLUTION INTRAMUSCULAR; INTRAVENOUS
Status: DISCONTINUED
Start: 2020-02-21 | End: 2020-02-21 | Stop reason: HOSPADM

## 2020-02-21 RX ORDER — PROPOFOL 10 MG/ML
5-75 INJECTION, EMULSION INTRAVENOUS CONTINUOUS
Status: DISCONTINUED | OUTPATIENT
Start: 2020-02-21 | End: 2020-02-24

## 2020-02-21 RX ORDER — GLIPIZIDE 10 MG/1
1 TABLET ORAL
Status: DISCONTINUED | OUTPATIENT
Start: 2020-02-21 | End: 2020-02-24

## 2020-02-21 RX ORDER — ALBUMIN (HUMAN) 12.5 G/50ML
50 SOLUTION INTRAVENOUS
Status: DISCONTINUED | OUTPATIENT
Start: 2020-02-21 | End: 2020-02-22

## 2020-02-21 RX ORDER — ALBUMIN, HUMAN INJ 5% 5 %
250 SOLUTION INTRAVENOUS
Status: COMPLETED | OUTPATIENT
Start: 2020-02-21 | End: 2020-02-21

## 2020-02-21 RX ORDER — MEROPENEM 500 MG/1
500 INJECTION, POWDER, FOR SOLUTION INTRAVENOUS EVERY 24 HOURS
Status: DISCONTINUED | OUTPATIENT
Start: 2020-02-21 | End: 2020-03-01

## 2020-02-21 RX ORDER — IOPAMIDOL 755 MG/ML
100 INJECTION, SOLUTION INTRAVASCULAR ONCE
Status: COMPLETED | OUTPATIENT
Start: 2020-02-21 | End: 2020-02-21

## 2020-02-21 RX ADMIN — THIAMINE HYDROCHLORIDE 100 MG: 100 INJECTION, SOLUTION INTRAMUSCULAR; INTRAVENOUS at 09:43

## 2020-02-21 RX ADMIN — MIDAZOLAM HYDROCHLORIDE 0.5 MG: 1 INJECTION, SOLUTION INTRAMUSCULAR; INTRAVENOUS at 10:49

## 2020-02-21 RX ADMIN — FENTANYL CITRATE 25 MCG: 50 INJECTION, SOLUTION INTRAMUSCULAR; INTRAVENOUS at 10:49

## 2020-02-21 RX ADMIN — IOPAMIDOL 100 ML: 755 INJECTION, SOLUTION INTRAVENOUS at 19:34

## 2020-02-21 RX ADMIN — Medication 40 MG: at 09:41

## 2020-02-21 RX ADMIN — ALBUMIN HUMAN 25 G: 0.25 SOLUTION INTRAVENOUS at 01:59

## 2020-02-21 RX ADMIN — ETOMIDATE 20 MG: 2 INJECTION, SOLUTION INTRAVENOUS at 20:41

## 2020-02-21 RX ADMIN — MICAFUNGIN SODIUM 100 MG: 10 INJECTION, POWDER, LYOPHILIZED, FOR SOLUTION INTRAVENOUS at 16:27

## 2020-02-21 RX ADMIN — ROCURONIUM BROMIDE 50 MG: 10 INJECTION, SOLUTION INTRAVENOUS at 20:41

## 2020-02-21 RX ADMIN — FOLIC ACID 1 MG: 1 TABLET ORAL at 09:42

## 2020-02-21 RX ADMIN — RIFAXIMIN 550 MG: 550 TABLET ORAL at 23:25

## 2020-02-21 RX ADMIN — SODIUM CHLORIDE 100 ML: 9 INJECTION, SOLUTION INTRAVENOUS at 19:35

## 2020-02-21 RX ADMIN — ALBUMIN HUMAN 25 G: 0.25 SOLUTION INTRAVENOUS at 09:41

## 2020-02-21 RX ADMIN — SODIUM BICARBONATE 15 MEQ: 84 INJECTION, SOLUTION INTRAVENOUS at 09:40

## 2020-02-21 RX ADMIN — HEPARIN SODIUM 10000 UNITS: 10000 INJECTION INTRAVENOUS; SUBCUTANEOUS at 10:58

## 2020-02-21 RX ADMIN — MIDAZOLAM HYDROCHLORIDE 0.5 MG: 1 INJECTION, SOLUTION INTRAMUSCULAR; INTRAVENOUS at 11:00

## 2020-02-21 RX ADMIN — ALBUMIN HUMAN 250 ML: 0.05 INJECTION, SOLUTION INTRAVENOUS at 13:28

## 2020-02-21 RX ADMIN — MEROPENEM 500 MG: 500 INJECTION, POWDER, FOR SOLUTION INTRAVENOUS at 16:26

## 2020-02-21 RX ADMIN — LIDOCAINE HYDROCHLORIDE 10 ML: 10 INJECTION, SOLUTION INFILTRATION; PERINEURAL at 11:15

## 2020-02-21 RX ADMIN — FENTANYL CITRATE 50 MCG: 50 INJECTION, SOLUTION INTRAMUSCULAR; INTRAVENOUS at 23:26

## 2020-02-21 RX ADMIN — HUMAN ALBUMIN MICROSPHERES AND PERFLUTREN 9 ML: 10; .22 INJECTION, SOLUTION INTRAVENOUS at 16:34

## 2020-02-21 RX ADMIN — POLYETHYLENE GLYCOL 3350 17 G: 17 POWDER, FOR SOLUTION ORAL at 09:43

## 2020-02-21 RX ADMIN — HEPARIN SODIUM 2000 UNITS: 1000 INJECTION, SOLUTION INTRAVENOUS; SUBCUTANEOUS at 15:12

## 2020-02-21 RX ADMIN — HEPARIN SODIUM 4000 UNITS: 1000 INJECTION, SOLUTION INTRAVENOUS; SUBCUTANEOUS at 11:13

## 2020-02-21 RX ADMIN — MIDAZOLAM HYDROCHLORIDE 0.5 MG: 1 INJECTION, SOLUTION INTRAMUSCULAR; INTRAVENOUS at 10:55

## 2020-02-21 RX ADMIN — RIFAXIMIN 550 MG: 550 TABLET ORAL at 09:42

## 2020-02-21 RX ADMIN — PROPOFOL: 10 INJECTION, EMULSION INTRAVENOUS at 20:45

## 2020-02-21 RX ADMIN — CLINDAMYCIN PHOSPHATE 900 MG: 900 INJECTION, SOLUTION INTRAVENOUS at 10:49

## 2020-02-21 RX ADMIN — FENTANYL CITRATE 25 MCG: 50 INJECTION, SOLUTION INTRAMUSCULAR; INTRAVENOUS at 10:55

## 2020-02-21 RX ADMIN — HEPARIN SODIUM 2000 UNITS: 1000 INJECTION, SOLUTION INTRAVENOUS; SUBCUTANEOUS at 15:13

## 2020-02-21 RX ADMIN — VANCOMYCIN HYDROCHLORIDE 2000 MG: 5 INJECTION, POWDER, LYOPHILIZED, FOR SOLUTION INTRAVENOUS at 23:33

## 2020-02-21 RX ADMIN — DEXTRAN 70, GLYCERIN, HYPROMELLOSE 1 DROP: 1; 2; 3 SOLUTION/ DROPS OPHTHALMIC at 16:22

## 2020-02-21 RX ADMIN — FENTANYL CITRATE 25 MCG: 50 INJECTION, SOLUTION INTRAMUSCULAR; INTRAVENOUS at 11:00

## 2020-02-21 RX ADMIN — SODIUM CHLORIDE 200 ML: 9 INJECTION, SOLUTION INTRAVENOUS at 13:27

## 2020-02-21 ASSESSMENT — ACTIVITIES OF DAILY LIVING (ADL): ADLS_ACUITY_SCORE: 17

## 2020-02-21 ASSESSMENT — MIFFLIN-ST. JEOR
SCORE: 2059.5
SCORE: 2070.79

## 2020-02-21 NOTE — PROGRESS NOTES
[facility]  Gastroenterology Progress Note     Pat Delgado MRN# [id number]   YOB: 1990 Age: 29 year old          Assessment and Plan:   Pat Delgado is a 29 year old female who was admitted on 2/9/2020. GI consulted for Etoh hepatitis complicated by liver failure and ATN. NGT replaced by IR on 2/19 (also had PICC placed). Planning dialysis - having dialysis catheter placed today.     Acute metabolic encephalopathy  -likely multi-factorial w/ both liver and kidney injury, pt being lethargic- but no changes;  ammonia level normal, having BM daily per nursing- had 2 large bms yesterday  -continue rifaximin BID  -if no BM, can give lactulose either per NGT or per rectum  -pt also has arterial pH in the 7.2s w/ acidosis not sure whether it contributes to her mental status, getting bicarb at this time   -s/p 5 days course of IV cefepime for SBP ppx  -no pocket to tap at this time   - will draw ammonia tomorrow    Acute alcoholic hepatitis with Maddrey score >50  Abdominal pain likely due to gaseous distension- improved   Minimal ascites status post 5 days of cefepime for SBP prophylaxis  Maddrey score of > 50s  -started on steroid trial on 2/16- will continue for now  -currently Tbili likely peak- and trending down ( 22.6 -> 21.5), INR trending down  -Continue with daily LFT w/ INR      Hyponatremia w/ increase in creatinine and minimal urine output in the setting of anasarca:  -no need for fluid restriction at this time given Na > 120  -care per renal, currently on lasix drip w/ albumin infusion, currently albumin at -appreciate renal input, will treat as ATN-  -creatinine 3.86 and continues to elevate- planning dialysis    Acute liver failure without hepatic coma  Hyponatremia  Sepsis, due to unspecified organism, unspecified whether acute organ dysfunction present (H)      Interval History:   no new complaints and has had a bowel movement in the last 24 hours               Review of Systems:   C: NEGATIVE for fever, chills, change in weight  E/M: NEGATIVE for ear, mouth and throat problems  R: NEGATIVE for significant cough or SOB  CV: NEGATIVE for chest pain, palpitations or peripheral edema             Medications:   I have reviewed this patient's current medications    albumin human  25 g Intravenous Q8H     folic acid  1 mg Oral or Feeding Tube Daily     insulin aspart  1-7 Units Subcutaneous Q4H     micafungin  100 mg Intravenous Q24H     pantoprazole  40 mg Per Feeding Tube QAM AC     polyethylene glycol  17 g Oral or Feeding Tube Daily     prednisoLONE  40 mg Oral Daily     rifaximin  550 mg Oral or Feeding Tube BID     sodium bicarbonate  15 mEq Oral or Feeding Tube BID     sodium chloride (PF)  10 mL Intracatheter Q7 Days     sodium chloride (PF)  10 mL Intracatheter Q8H     thiamine  100 mg Intravenous Daily                  Physical Exam:   Vitals were reviewed  Vital Signs with Ranges  Temp:  [97.8  F (36.6  C)-99.3  F (37.4  C)] 98.8  F (37.1  C)  Pulse:  [101-112] 101  Heart Rate:  [101-110] 101  Resp:  [17-20] 17  BP: (113-144)/(73-93) 113/73  SpO2:  [91 %-97 %] 96 %  I/O last 3 completed shifts:  In: 1456.1 [I.V.:371.1; NG/GT:1085]  Out: 325 [Urine:325]  Constitutional: healthy, alert and no distress   Cardiovascular: negative, PMI normal. No lifts, heaves, or thrills. RRR. No murmurs, clicks gallops or rub  Respiratory: negative, Percussion normal. Good diaphragmatic excursion. Lungs clear  Head: Normocephalic. No masses, lesions, tenderness or abnormalities  Neck: Neck supple. No adenopathy. Thyroid symmetric, normal size,, Carotids without bruits.  Abdomen: Abdomen mildly firm- no change. BS normal. No masses, organomegaly, positive findings: distended           Data:   I reviewed the patient's new clinical lab test results.   Recent Labs   Lab Test 02/21/20  0515 02/20/20  0559 02/19/20  0550   WBC 20.3* 19.5* 20.4*   HGB 7.3* 7.3* 7.8*   * 103* 103*   PLT  251 247 231   INR 2.09* 2.14* 2.12*     Recent Labs   Lab Test 02/21/20  0515 02/20/20  0559 02/19/20  0550   POTASSIUM 4.3 4.3 4.5   CHLORIDE 98 97 97   CO2 19* 16* 14*   BUN 52* 43* 31*   ANIONGAP 13 16* 16*     Recent Labs   Lab Test 02/21/20  0515 02/20/20  0559 02/19/20  0550  02/14/20  0653  02/09/20  1755 02/09/20  1547   ALBUMIN 4.1 3.8 3.5   < >  --    < >  --  1.6*   BILITOTAL 21.5* 23.6* 24.2*   < >  --    < >  --  8.1*   ALT 37 34 28   < >  --    < >  --  33   * 146* 126*   < >  --    < >  --  203*   PROTEIN  --   --   --   --  100*  --  100*  --    LIPASE  --   --   --   --   --   --   --  119    < > = values in this interval not displayed.       I reviewed the patient's new imaging results.    All laboratory data reviewed  All imaging studies reviewed by me.    Aminata Rangel PA-C,  2/21/2020  [facility]  Office : [phone]  Cell: [phone] (Dr. Hobson)  Cell: [phone] (Aminata Rangel PA-C)

## 2020-02-21 NOTE — PROGRESS NOTES
Pt placed on BiPAP of 16/6 BUR of 14 and 50% for hypercapnic respiratory failure. Pt seems to be generating large tidal volume with pressure support of 10.  ABG to draw in one hour per MD order. Alarm set 10, skin barrier in place. Will continue to monitor.    Aleksandr Nicole, RT on 2/21/2020 at 3:31 PM

## 2020-02-21 NOTE — PLAN OF CARE
Neuro: Nonverbal, Lethargic, incoherent speech, yellow sclera, not following commands  Recent vital signs:VSS  Respiratory: room air, lung sound diminished   Pain:grunts when uncomfortable  Tele:ST  Activity:Assist of 2-3 with lift, turned and repositioned frequently  Drips/Drains/IVF:None, PICC line on the right upper arm  Skin:Yellow, +3 edema on BLE and +2 bilateral upper extremity edema  GI/:Distended  abdomen, 2 loose BMs, loaiza in place with tea colored urine, NPO  Aggression color:green  Plan:Test/Consult: Placement of tunneled hemodialysis catheter  Labs: Q4 , 128 and 116  Misc: NG turned off at midnight. Mitten on to prevent pt from pulling the NG. Nose bleeding noted on the right nostril, pt has been pushing the NG even with the mittens on.Frequent rounding done, bed alarm on for safety.    0700 IR called per Dr. Moses request for tunnel cath placement

## 2020-02-21 NOTE — PROVIDER NOTIFICATION
MD Notification    Notified Person: MD    Notified Person Name: Dr Lundberg    Notification Date/Time: 2/21/20 1400    Notification Interaction:paged, talked  With MD    Purpose of Notification: Pt respiratory work of breathnig is getting worse, sweaty, tachy 117    Orders Received: CXR, starting Abx for + blood culture, VBG's    Comments:

## 2020-02-21 NOTE — PROGRESS NOTES
[facility]    Hospitalist Progress Note      Assessment & Plan   Pat Delgado is a 29 year old female who was admitted on 2/9/2020 with acute alcoholic hepatitis and acute kidney injury    Acute alcoholic hepatitis with Maddrey score >50  Abdominal pain likely due to gaseous distension- improved   Minimal ascites status post 5 days of cefepime for SBP prophylaxis   She was started on prednisolone on 2/16. T bili peaked on 2/19, numbers starting to improve, continue to trend liver tests. INR slightly improving.  - Continue prednisolone  - GI consulted, appreciate their cares     Acute kidney injury likely due to acute tubular necrosis and type 2 hepatorenal syndrome  Hyponatremia with anasarca   Lactic acidosis (resolved)  Hyponatremia, probably chronic   Hypokalemia, hypophosphatemia  She was started on a furosemide infusion 2/18 and her urine output started to increase. She had 1L out 2/18, but decreased output on 2/19 when she also did not have NG in place. Gtt discontinued on 2/20.  - Albumin q8h discontinued 2/21  - Monitor renal function, urine output and electrolytes   - Sodium bicarb discontinued 2/21, now she will be on dialysis  - appreciate nephrology consult, placement of tunneled dialysis catheter this AM, dialysis this afternoon  - Pending response to dialysis, will discuss palliative care consult with family.  - Next HD session planned for 2/22/20     Acute hypoxemic respiratory failure likely secondary to episode of aspiration (resolved)  Recurrent hypoxic respiratory failure due to volume overload- improved  Hypercapnic respiratory failure  Required supplemental O2 since return from tunneled dialysis catheter placement with increased tachypnea  - Check VBG, shows pH 7.17, CO2 64  - Initiate bipap, recheck ABG in 1 hour. Hold all PO medications  - CXR shows continued pulmonary edema.     Acute metabolic encephalopathy  Due to kidney and liver failure but serum ammonia is not  elevated.  Her mental status has been stable past few days, but is lethargic overall  - Continue to monitor   - Rifaximin BID      Sepsis? Secondary to bacteremia?  Gram negative rods in 1/1 blood cx collected on day 2. Tachycardic, WBC elevated, now with tachypnea. Remains encephalopathic--possibly all due to sepsis  - Start meropenem (recently received treatment with cefepime for 6 days)  - Repeat blood cx  - repeat UA    Candiduria  Candida albicans/dubliniensis >100k in urine 2/16  - on treatment with micafungin 100mg IV for 3 days, completed course on 2/21    Leukocytosis  Continues to trend up. No fevers. Completing course of micafungin today. Noted to have + blood cultures from 2/19  - Repeat CBC in AM  - check procalcitonin as add-on (1.48)    Hematochezia, Resolved  Discussed with Dr. Hobson - possibly hemorrhoidal in the setting of coagulopathy  Bleeding appears to have stopped.  Hemoglobin is stable.  - continue to monitor     Nutrition   - NGT replaced by IR on 2/19 (also had PICC placed)    Low back/left hip discomfort  Does not like being on her left side. Intermittently pointing at her back, suspected to have some discomfort  - Trial heating pad to see if helps improve pain    DVT Prophylaxis: Pneumatic Compression Devices  Code Status: Full Code  Expected discharge: >7 days to TCU, if survives this hospitalization    Tatyana Lundberg, DO  Text Page (7am - 6pm)    Interval History   Patient seen and examined. Eyes intermittently open, but not participating in conversation. Grunting. Plan for dialysis catheter this AM. Currently on her menses, continue warm heating pad for any discomfort. Itchy, scratching at her face with mitts, some blood from nose with NGT manipulation while wearing mitts.  Addendum: worsening respiratory status this afternoon after placement of dialysis cath and during dialysis. VBG shows respiratory acidosis, may have been promoted with some sedation during procedure. Continue bipap  for now, repeat VBGs throughout night. Discussed she may need ICU if respiratory status does not improve. Overall poor prognosis.  ADDENDUM 2: Troponin 3.942 (first time checked). Plan to move patient to ICU later today. Updated family about plan of care. Hold off on heparin gtt. Trend troponin. Check EKG. Check repeat limited echo. If troponin trending up, change in EKG or change in echo, will consult cardiology. Likely troponin is up secondary to demand.    -Data reviewed today: I reviewed all new labs and imaging results over the last 24 hours. I personally reviewed no new imaging or EKGs    Physical Exam   Temp: 98.4  F (36.9  C) Temp src: Axillary BP: 99/61 Pulse: 109 Heart Rate: 108 Resp: 15 SpO2: 99 % O2 Device: BiPAP/CPAP Oxygen Delivery: Other (Comments)  Vitals:    02/19/20 0400 02/20/20 0650 02/21/20 0452   Weight: 129.3 kg (285 lb) 129.4 kg (285 lb 4.8 oz) 129.7 kg (286 lb)     Vital Signs with Ranges  Temp:  [97.8  F (36.6  C)-99.3  F (37.4  C)] 98.4  F (36.9  C)  Pulse:  [101-113] 109  Heart Rate:  [101-115] 108  Resp:  [11-36] 15  BP: ()/() 99/61  SpO2:  [90 %-100 %] 99 %  I/O last 3 completed shifts:  In: 576 [I.V.:200; NG/GT:376]  Out: 325 [Urine:325]    Constitutional: Eyes open, not following commands, no apparent distress, sclera icteric  Respiratory: Clear to auscultation bilaterally, no crackles or wheezing, + snoring respirations, more tachypneic this afternoon  Cardiovascular: Regular rate and rhythm, normal S1 and S2, and no murmur noted  GI: Normal bowel sounds, soft, non-distended, non-tender  Skin/Integumen: No rashes, no cyanosis, 2-3+ edema (anasarca)  Other: + jaundice    Medications     dextrose       - MEDICATION INSTRUCTIONS -       - MEDICATION INSTRUCTIONS -         - MEDICATION INSTRUCTIONS for Dialysis Patients -   Does not apply See Admin Instructions     anticoagulant citrate  3 mL Intracatheter Once     anticoagulant citrate  3 mL Intracatheter Once     folic acid   1 mg Oral or Feeding Tube Daily     heparin  3 mL Intracatheter During Hemodialysis (from stock)     heparin  3 mL Intracatheter During Hemodialysis (from stock)     insulin aspart  1-7 Units Subcutaneous Q4H     meropenem  500 mg Intravenous Q24H     micafungin  100 mg Intravenous Q24H     - MEDICATION INSTRUCTIONS -   Does not apply Once     [START ON 2/22/2020] pantoprazole (PROTONIX) IV  40 mg Intravenous Daily with breakfast     polyethylene glycol  17 g Oral or Feeding Tube Daily     prednisoLONE  40 mg Oral Daily     rifaximin  550 mg Oral or Feeding Tube BID     sodium chloride (PF)  10 mL Intracatheter Q7 Days     sodium chloride (PF)  10 mL Intracatheter Q8H     thiamine  100 mg Intravenous Daily       Data   Recent Labs   Lab 02/21/20  1450 02/21/20  0515 02/20/20  1829 02/20/20  0559  02/19/20  0550   WBC  --  20.3*  --  19.5*  --  20.4*   HGB  --  7.3*  --  7.3*  --  7.8*   MCV  --  103*  --  103*  --  103*   PLT  --  251  --  247  --  231   INR  --  2.09*  --  2.14*  --  2.12*   NA  --  130* 131* 129*   < > 127*   POTASSIUM  --  4.3  --  4.3  --  4.5   CHLORIDE  --  98  --  97  --  97   CO2  --  19*  --  16*  --  14*   BUN  --  52*  --  43*  --  31*   CR  --  3.86*  --  3.42*  --  2.99*   ANIONGAP  --  13  --  16*  --  16*   EMILY  --  9.3  --  9.2  --  9.3   GLC  --  116*  --  101*  --  90   ALBUMIN  --  4.1  --  3.8  --  3.5   PROTTOTAL  --  7.1  --  7.1  --  6.8   BILITOTAL  --  21.5*  --  23.6*  --  24.2*   ALKPHOS  --  124  --  128  --  138   ALT  --  37  --  34  --  28   AST  --  157*  --  146*  --  126*   TROPI 3.982*  --   --   --   --   --     < > = values in this interval not displayed.       Recent Results (from the past 24 hour(s))   IR CVC Tunnel Placement > 5 Yrs of Age    Narrative    IR CVC TUNNEL PLACEMENT > 5 YRS OF AGE   2/21/2020 11:11 AM     CLINICAL HISTORY/INDICATION: Patient with history of hepatorenal  syndrome requiring intermediate term hemodialysis. Patient has  bleeding  diathesis, however, has been optimized for placement of  tunneled catheter.    PROCEDURES PERFORMED: Right internal jugular approach tunneled  dialysis catheter placement    MEDICATIONS: 10 mL 1% lidocaine, 1.5 mg Versed IV, 75 mcg Fentanyl IV  Cleocin 900 mg IV    CONTRAST: None    FLUORO: 0.3 minutes    IMAGES/DOSE: 7 mGy    CONSENT: Following a discussion of the risks, benefits, indications  and alternatives to treatment, appropriate informed consent was  obtained.      TIMEOUT: A timeout was performed per universal protocol policy to  ensure correct patient, site, and procedure to be performed.     CENTRAL LINE STATEMENT: The patient was brought to the interventional  radiology suite and placed supine on the table. The patients right  neck and anterior chest region were prepped and draped in a sterile  fashion for tunneled line placement.  The procedure was performed  using maximal Sterile Barrier Technique Utilized: Cap AND mask AND  sterile gown AND sterile gloves AND sterile full body drape AND hand  hygiene AND skin preparation 2% chlorhexidine for cutaneous antisepsis  (or acceptable alternative antiseptics).  Sterile Ultrasound Technique  Utilized ?Sterile gel AND sterile probe covers.     PROCEDURE AND FINDINGS:   This central line was performed in accordance with the central line  bundle with the exception of vein selection. Following a discussion of  the risks, benefits, indications and alternatives to treatment,  appropriate informed consent was obtained.  The patient was brought to  the interventional radiology suite and placed supine on the table. The  patients right neck and anterior chest region were prepped and draped  in a sterile fashion for tunneled line placement.  A timeout was  performed per universal protocol policy to ensure correct patient,  site, and procedure to be performed.      Ultrasound was utilized to evaluate the veins of the right neck and a  permanent record of the image was  obtained which demonstrates the  internal jugular vein to be patent. Under direct ultrasound guidance,  1% Lidocaine was infiltrated and access was gained easily into the low  lateral aspect of the internal jugular vein utilizing micropuncture  technique. The wire was advanced easily under fluoroscopic guidance  and the tract was serially dilated and a peel away sheath placed. A  subcutaneous tunnel was then created over the anterior/superior chest  wall using local anesthesia and blunt dissection. Under fluoroscopic  guidance, a 14.5 Congolese double lumen 23 cm cuffed catheter was then  pulled through the tunnel and was advanced through the peel away  sheath. A final placement film demonstrates the catheter tip at the  cavoatrial junction with the patient supine.  All of the ports flush  and aspirate without difficulty following placement.  The catheter was  secured in position. The small incision at the base of the neck had  persistent using despite manual hemostasis. Patient was moved to her  transport bed and head of bed was elevated to 45 degrees. Hemostasis  was obtained with manual pressure, Dermabond, and placement of a 2-0  silk suture.  A sterile dressing was applied.     Throughout the procedure, the patient was monitored by a radiology  nurse for cardiac rhythm which remained stable. Total sedation time  was 27 minutes. The patient tolerated the procedure well and left the  interventional radiology suite in stable condition.      Impression    IMPRESSION:  Successful placement of a right internal jugular vein 14.5 Congolese  double lumen 23 cm tunneled palindrome catheter, as described using  ultrasound and fluoroscopic guidance. This catheter is indicated to be  use for hemodialysis or pheresis.    Persistent bleeding at the right neck skin entry site. Successful  hemostasis with Dermabond and a 2-0 silk suture. Silk suture can be  removed on Monday, 2/24/2020.    STELLA HWANG MD   XR Chest Port 1 View     Narrative    CHEST PORTABLE ONE VIEW  2/21/2020 4:15 PM     HISTORY: Increased tachypnea after dialysis cath placement.    COMPARISON: 2/16/2020 at 0740.      Impression    IMPRESSION: Interval placement of a right internal jugular large-bore  catheter with its tip in the expected region of the right atrium.  Interval placement of a feeding tube coursing into the stomach. A  pulmonary edema pattern is again seen, not significant changed. Heart  size is unchanged.    MAYA MARSHALL MD

## 2020-02-21 NOTE — PROVIDER NOTIFICATION
MD Notification    Notified Person: MD    Notified Person Name: Dr Lundberg    Notification Date/Time: 2/21 1548    Notification Interaction:paged    Purpose of Notification :Troponin 3.982    Orders Received: awaiting call back    Comments:

## 2020-02-21 NOTE — PROGRESS NOTES
[facility]     Renal Progress Note       SHORTHAND KEY FOR MY NOTES:  c = with, s = without, p = after, a = before, x = except, asx = asymptomatic, tx = transplant or treatment, sx = symptoms or symptomatic, cx = canceled or culture, rxn = reaction, yday = yesterday, nl = normal, abx = antibiotics, fxn = function, dx = diagnosis, dz = disease, m/h = melena/hematochezia, c/d/l/ha = cramping/dizziness/lightheadedness/headache, d/c = discharge or diarrhea/constipation, f/c/n/v = fevers/chills/nausea/vomiting, cp/sob = chest pain/shortness of breath, tbv = total body volume, rxn = reaction, tdc = tunneled dialysis catheter, pta = prior to admission, hd = hemodialysis, pd = peritoneal dialysis, hhd = home hemodialysis         Assessment/Plan:     1.  Oliguric EBEN.  Pt's cr continuing to trend higher and uo lower.  This is ATN + HRS II.  She will need to initiate HD today.  D/w parents at bedside.  A.  IR to place TDC.  B.  Start HD today and then run again tmrw.    2.  EtOHic hepatitis.  Pt's bili remains high, but is a little lower today.  She is icteric and jaundiced.  She is on steroids.  GI following.  A.  Per GI.    3.  Encephalopathy.  Pt is still encephalopathic.  Favor infectious etiology despite neg signs.  We will dialyse her in case there is any element of med metabolites contributing, or if the BUN is causing relative uremia given that she's an alcoholic.    A.  Monitor clinically.  B.  Watch BCx and treat c abx prn.    4.  Anemia.  Pt's hb remains low, but stable.  INR is high.  No signs of active bleeding at present.  A.  Follow hb, clinically.    5.  RTA.  Pt's bicarb is up to 19.    A.  Stop bicarb.    6.  FEN.  Electrolytes are ok x as noted above.  A.  Follow labs daily.        Interval History:     Unable.  Pt is still confused.           Medications and Allergies:       albumin human  25 g Intravenous Q8H     anticoagulant citrate  3 mL Intracatheter Once     anticoagulant citrate  3  "mL Intracatheter Once     folic acid  1 mg Oral or Feeding Tube Daily     heparin  3 mL Intracatheter During Hemodialysis (from stock)     heparin  3 mL Intracatheter During Hemodialysis (from stock)     heparin Lock (1000 units/mL High concentration)  3 mL Intracatheter Once     heparin Lock (1000 units/mL High concentration)  3 mL Intracatheter Once     insulin aspart  1-7 Units Subcutaneous Q4H     micafungin  100 mg Intravenous Q24H     - MEDICATION INSTRUCTIONS -   Does not apply Once     pantoprazole  40 mg Per Feeding Tube QAM AC     polyethylene glycol  17 g Oral or Feeding Tube Daily     prednisoLONE  40 mg Oral Daily     rifaximin  550 mg Oral or Feeding Tube BID     sodium bicarbonate  15 mEq Oral or Feeding Tube BID     sodium chloride (PF)  10 mL Intracatheter Q7 Days     sodium chloride (PF)  10 mL Intracatheter Q8H     thiamine  100 mg Intravenous Daily     Allergies   Allergen Reactions     Penicillins Unknown     Hives            Physical Exam:     Vitals were reviewed    Heart Rate: 112, Blood pressure 98/81, pulse 112, temperature 98.8  F (37.1  C), temperature source Axillary, resp. rate 20, height 1.727 m (5' 8\"), weight 129.7 kg (286 lb), SpO2 90 %, not currently breastfeeding.  Wt Readings from Last 3 Encounters:   02/21/20 129.7 kg (286 lb)   02/09/15 95.3 kg (210 lb)   01/22/15 108.4 kg (239 lb)     Intake/Output Summary (Last 24 hours) at 2/21/2020 1430  Last data filed at 2/21/2020 0554  Gross per 24 hour   Intake 576 ml   Output 325 ml   Net 251 ml     GENERAL APPEARANCE: lying in bed, not interactive, eyes open, not communicative  HEENT:  eyes/ears/nose/neck grossly normal x scleral icterus, facial edema  RESP: lungs CTA B ant/lat c decent efforts  CV: Reg, tachy, nl S1/S2  ABDOMEN: o/s/nt, + distended  EXTREMITIES/SKIN: 2+ ble edema  OTHER:  R arm PICC; + loaiza c dark urine         Data:     CBC RESULTS:     Recent Labs   Lab 02/21/20  0515 02/20/20  0559 02/19/20  0550 02/18/20  0558 " 02/17/20  0532 02/16/20  1605 02/16/20  0548   WBC 20.3* 19.5* 20.4* 21.7* 18.9*  --  18.1*   RBC 2.19* 2.10* 2.31* 2.38* 2.41*  --  2.03*   HGB 7.3* 7.3* 7.8* 8.1* 8.2* 7.8* 7.1*   HCT 22.5* 21.7* 23.8* 24.2* 24.6*  --  21.0*    247 231 282 265  --  267     Basic Metabolic Panel:  Recent Labs   Lab 02/21/20  0515 02/20/20  1829 02/20/20  0559 02/19/20  1812 02/19/20  0550 02/18/20  1911 02/18/20  0558  02/17/20  0532  02/16/20  0548   * 131* 129* 130* 127* 126* 126*   < > 120*   < > 120*   POTASSIUM 4.3  --  4.3  --  4.5  --  4.6  --  4.6  --  4.1   CHLORIDE 98  --  97  --  97  --  96  --  92*  --  91*   CO2 19*  --  16*  --  14*  --  15*  --  15*  --  17*   BUN 52*  --  43*  --  31*  --  20  --  15  --  15   CR 3.86*  --  3.42*  --  2.99*  --  2.72*  --  2.49*  --  2.24*   *  --  101*  --  90  --  101*  --  117*  --  105*   EMILY 9.3  --  9.2  --  9.3  --  8.8  --  8.3*  --  8.1*    < > = values in this interval not displayed.     INR  Recent Labs   Lab 02/21/20  0515 02/20/20  0559 02/19/20  0550 02/18/20  0558   INR 2.09* 2.14* 2.12* 2.34*      Attestation:   I have reviewed today's relevant vital signs, notes, medications, labs and imaging.    Desmond Moses MD  [facility] - Nephrology  [phone]

## 2020-02-21 NOTE — PLAN OF CARE
Pt continues to be lethargic and non-communicative. Appears uncomfortable at times and will become less restless with repositioning. Tele is ST. Pt's UOP is low. Nephrology aware. 3+ generalized edema present. LS clear respiratory rhythm is snoring. Skin is yellow and taut. Family members at bedside and very supportive. Consents for tunneled catheter and dialysis in pt's chart.

## 2020-02-21 NOTE — PROGRESS NOTES
Jim's test performed prior to right radial ABG draw. Collateral circulation confirmed.  Will continue to monitor.     Aleksandr Nicole RT on 2/21/2020 at 5:01 PM

## 2020-02-21 NOTE — PROGRESS NOTES
CLINICAL NUTRITION SERVICES - REASSESSMENT NOTE    RECOMMENDATIONS FOR MD/PROVIDER TO ORDER:   - defer adjustments to free water flushes to MD. Currently 60 q 4 for tube patency.    Recommendations Ordered by Registered Dietitian (RD):   - Post procedure, restart EN @ 40 ml/hr. Advance to goal after 12 hours.    Malnutrition: ()  % Weight Loss: Unable to assess  % Intake:  </= 50% for >/= 5 days (severe malnutrition)  Subcutaneous Fat Loss: None observed  Muscle Loss: Temporal region mild depletion  Fluid Retention: Moderate-Severe 3-4+     Malnutrition Diagnosis: Non-Severe malnutrition  In Context of:  Acute illness or injury  Environmental or social circumstances     EVALUATION OF PROGRESS TOWARD GOALS   Diet: NPO     Nutrition Support: Off since MN for tunneled dialysis catheter. No documentation of EN rate prior to turning off. EN started on  @ 1620. By MN last night TF rate was scheduled to be running @ 40 mL/hr.     Goal EN as follows -->   Type of Feeding Tube: NJT ()   Enteral Frequency:  Continuous  Enteral Regimen: Nepro @ 55 ml/hr x 24 hours   Total Enteral Provisions: 1320 mL provides 2376 kcals (30 kcal/kg), 107 g PRO (1.4 g/kg), 964 ml free H2O, 213 g CHO and 17 g Fiber daily.  Free Water Flush: 60 mL q4 hours for tube patency    Intake/Tolerance:   - Spoke to patient's parents about EN.   - EN has not yet reached goal. Pt meeting <50% estimated needs for 12 days since admission.   - Stooling: x2 today, 1-2 documented most days.   - Weight trendin.7 kg today, trending down from highest wt .   Vitals:    20 1900 02/10/20 0440 20 0605 20 0639   Weight: 122 kg (268 lb 15.4 oz) 123.9 kg (273 lb 3.2 oz) 126.9 kg (279 lb 12.8 oz) 129.2 kg (284 lb 13.4 oz)    20 0835 20 0050 02/15/20 0500 20 1500   Weight: 129.8 kg (286 lb 3.2 oz) 129.8 kg (286 lb 3.2 oz) 132.2 kg (291 lb 6.4 oz) 133.9 kg (295 lb 3.2 oz)    20 0456 20 0634 20 0404  02/20/20 0650   Weight: 133 kg (293 lb 4.8 oz) 129.5 kg (285 lb 8 oz) 129.3 kg (285 lb) 129.4 kg (285 lb 4.8 oz)    02/21/20 0452   Weight: 129.7 kg (286 lb)     - Labs:    Electrolytes  Potassium (mmol/L)   Date Value   02/21/2020 4.3   02/20/2020 4.3   02/19/2020 4.5     Phosphorus (mg/dL)   Date Value   02/21/2020 5.0 (H)   02/20/2020 4.8 (H)   02/19/2020 4.7 (H)   02/17/2020 3.3   02/15/2020 2.8    Blood Glucose  Glucose (mg/dL)   Date Value   02/21/2020 116 (H)   02/20/2020 101 (H)   02/19/2020 90   02/18/2020 101 (H)   02/17/2020 117 (H)     Hemoglobin A1C (%)   Date Value   02/09/2020 5.1    Inflammatory Markers  CRP Inflammation (mg/L)   Date Value   02/09/2020 57.7 (H)     WBC (10e9/L)   Date Value   02/21/2020 20.3 (H)   02/20/2020 19.5 (H)   02/19/2020 20.4 (H)     Albumin (g/dL)   Date Value   02/21/2020 4.1   02/20/2020 3.8   02/19/2020 3.5      Magnesium (mg/dL)   Date Value   02/20/2020 2.8 (H)   02/19/2020 2.7 (H)   02/17/2020 2.6 (H)     Sodium (mmol/L)   Date Value   02/21/2020 130 (L)   02/20/2020 131 (L)   02/20/2020 129 (L)    Renal  Urea Nitrogen (mg/dL)   Date Value   02/21/2020 52 (H)   02/20/2020 43 (H)   02/19/2020 31 (H)     Creatinine (mg/dL)   Date Value   02/21/2020 3.86 (H)   02/20/2020 3.42 (H)   02/19/2020 2.99 (H)     Additional  Ketones Urine (mg/dL)   Date Value   02/14/2020 10 (A)         - Meds:   Reviewed    ASSESSED NUTRITION NEEDS:  Dosing Weight: 79 kg - adjusted   Estimated Energy Needs: 0222-5192 kcals (25-30 Kcal/Kg)  Justification: maintenance  Estimated Protein Needs:  grams protein (1.2-1.5 g pro/Kg)  Justification: maintenance    NEW FINDINGS:   - Pt pulling at NJT, nose bleeding observed (RN documentation)   - Nephrology --> consents signed for HD and TDC placement.     Previous Goals:   EN advancement to goal in the next 72 hours.   Evaluation: Not met    Previous Nutrition Diagnosis:   Inadequate protein-energy intake related to Altered GI function w/  abdominal pain, distention as evidenced by negligible intakes x9 days, plan for FT placement.   Evaluation: No change    CURRENT NUTRITION DIAGNOSIS  Inadequate protein-energy intake related to Altered GI function w/ abdominal pain, distention, FT in place with slow advancement to goal not yet acheived as evidenced by pt continues to receive <50% estimated needs x12 days since admission, FT in place since 2/19 currently on hold for procedure.     INTERVENTIONS  Recommendations / Nutrition Prescription  Restart EN post procedure:   - Nepro @ 40 ml/hr. Advance to goal after 12 hours.   - 60 mL fluid flush q 4 hrs for tube patency. Defer adjustments to MD.     Implementation  EN Schedule: updated orders (to reflect tube tip, and update to advancement as above).     Goals  EN at goal to provide % estimated needs.       MONITORING AND EVALUATION:  Progress towards goals will be monitored and evaluated per protocol and Practice Guidelines    Nicole Larios RD, LD  [facility], [facility] [facility], [facility]   Pager: [phone]  Weekend Pager: [phone]

## 2020-02-21 NOTE — PROGRESS NOTES
Potassium   Date Value Ref Range Status   02/21/2020 4.3 3.4 - 5.3 mmol/L Final     Hemoglobin   Date Value Ref Range Status   02/21/2020 7.3 (L) 11.7 - 15.7 g/dL Final     Creatinine   Date Value Ref Range Status   02/21/2020 3.86 (H) 0.52 - 1.04 mg/dL Final     Urea Nitrogen   Date Value Ref Range Status   02/21/2020 52 (H) 7 - 30 mg/dL Final     Sodium   Date Value Ref Range Status   02/21/2020 130 (L) 133 - 144 mmol/L Final     INR   Date Value Ref Range Status   02/21/2020 2.09 (H) 0.86 - 1.14 Final       DIALYSIS PROCEDURE NOTE  Hepatitis status of previous patient on machine log was checked and verified ok to use with this patients hepatitis status.  Patient dialyzed for 2.5 hrs. on a 3 K bath with a net fluid removal of  0.5L.  A BFR of 250 ml/min was obtained via a RIJ that was placed today with ok to use per Dr Ellison. 1st run today.   The treatment plan was dicussed with Dr. Moses during the treatment.  Total heparin received during the treatment: 0 units.   Line flushed, clamped and capped with heparin 1:1000 1.9 mL (1900 units) per lumen  Meds  given: Albumin 5% prime pre run. Complications: pt moaning during run report from primary RN that pt had been more alert This am before procedure. VBG's drawn and pt put on Bipap last 30 min of run. Dr Moses aware.   Procedure/ESRD/access care education provided orally/visually to patient/family, patient/family verbalized/demonstrated understanding but needed reinforcement since this is new. Pt unresponsive but mother and father here at [facility].  ICEBOAT? Timeout performed pre-treatment  I: Patient was identified using 2 identifiers  C: Consent obtained/verified current before treatment  E: Equipment preventative maintenance is current and dialysis delivery system OK to use  B: Hepatitis B Surface Antigen: neg; Draw Date: 2/9/20      Hepatitis B Surface Antibody: unknown; Draw Date: 2/21/20 drawn  O: Dialysis orders present and complete prior to treatment  A:  Vascular access verified and assessed prior to treatment  T: Treatment was performed at a clinically appropriate time  ?: Patient was allowed to ask questions and address concerns prior to treatment  See flowsheet in EPIC for further details and post assessment.  Machine water alarm in place and functioning. Transducer pods intact and checked every 15min.  Pt dialyzed in CCU today on Portable water system  Report received from: WILLIAM Barrera R.N.  Report given to: WILLIAM Esquivel R.N.  Chlorine/Chloramine water system checked every 4 hours.  Outpatient Dialysis at [facility] Next run tomorrow 2/22/20 Saturday.

## 2020-02-21 NOTE — PROVIDER NOTIFICATION
MD Notification    Notified Person: MD    Notified Person Name: Dr Lundberg    Notification Date/Time: 2/21/20 1230pm    Notification Interaction:paged    Purpose of Notification: Positive blood cult from 2/19 @ 1811 L arm growing gram neg rods    Orders Received:awaiting call back    Comments:

## 2020-02-21 NOTE — H&P
CC:  Sepsis    HPI:  28 y/o F @ HD13 admitted for alcoholic hepatitis with a prolonged and protracted hospital course complicated by oliguric EBEN, encephalopathy, anemia, renal tubular acidosis, hyponatremia, abdominal pain, ascites, lactic acidosis, hypokalemia, hypophosphatemia, hypoxemic respiratory failure, hypercapnic respiratory failure, candidemia, leukocytosis, hematochezia, and back pain.    Today, patient had acute decompensation with onset of hypoxemia and hypercapnia requiring BiPAP.  With BiPAP, patient also had declination in mental status.  Workup revealed troponin leak.  Patient was transferred to ICU for further monitoring and care.    On exam, patient is a GCS = 8, AAOx0, and RASS = -4.  She does not respond to questioning.    PMHx:  HTN  Migraines    Meds:  Hydroxyzine 50mg PO q6h PRN pruritis  Amphetamine-dextroamphetamine 60mg PO daily  Escitalopram 20mg PO daily    ALL:  PCN (hives)    PSHx:  Dental extractions    POB/GynHx:  Deferred    PPHx:  MDD  Anxiety  ADHD  EtOH dependence  Cannabis abuse    FHx:  Mother:  Anxiety  Maternal grandmother:  Dementia    SHx:  (+) tobacco:  vaping  (+) EtOH:  Daily EtOH  (+) SAVANNA:  Cannabis use    ROS:  Deferred in this noninteractive patient    PE:  VS:  98.4  98  108/76  17  96% BiPAP (12/5/40%)  GEN:  WD obese WF moderate distress  NEURO:  GCS=8 (E = 2, V = 2, M = 4), AAOx0, RASS=-4, moving BUE/BLE spontaneously  PSYCH:  Noninteractive with questioning.  Moans with nociceptive stimuli.  HENT:  Jaundice noted throughout.  Scant bloody mucus from nares.  NCAT otherwise.  Eyes:  Chemosis noted B.  Jaundice.  CAS.  Neck:  Obese neck.  No masses appreciated.  CV:  RRR, no M/R/G  PULM:  B rhonchi noted.  Some coarse breath sounds 2/2 BiPAP.  Otherwise, no W/R.  GI:  Soft, ND, (+) hypoactive BS x4 quadrants.  No hernias noted.  No fluid wave appreciated.  Hepatomegaly from costal margin to just superior to ASIS.  TTP noted with palpation of RUQ.  BLE:  Pitting  edema noted throughout BLE.  Skin:  Jaundiced skin noted through all dermatomes.  No other skin breakdown noted.  The patient is nonflushed, nondiaphoretic.    Labs:  Reviewed  CBC:  20.3>7.322.5<251  Renal:  130/4.398/1952/3.86<104  Karthik/Mag/Phos:  9.3/2.9/5.0  LFT:  7.1/4.121.5/--157/60808/----/--  AB.30/45/130/22  INR:  2.02    Lactic acid:  0.7  Procalcitonin:  1.48  Troponin:  3.982    Hep B ():  Negative  Hep C ():  Negative  UCx ():  >100,000 candida  BCx ():  GNR  UA ():  Spec grav > 1.035, pH 5.5, protein 100, ketones 10, (+) LE, (+) blood, (-) nitrites, >182 WBC, >182 RBC  BCx ():  PENDING    Acetaminophen ():  <5  Ethanol ():  0.14  Salicylates (2/10):  <2  UDS:  (+) amphetamines, (+) cannaboids    Rads:  Reviewed  pCXR ():  RIJ HD catheter and feeding tube.  B pulmonary infiltrates.    Ancillary studies:  Reviewed  Echocardiogram ():  EF 60-65%    Consults:  ICU PRIMARY  Nephrology:  HD  IR:  RIJ tunneled HD catheter  GI:  Hepatic failure  Chem dep:  Recommend [facility] appointment at discharge (SIGNED OFF)  Psychiatry:  Recommend [facility] via chem dep at discharge (SIGNED OFF)  Cardiology:  Consulted for possible cardiac constriction; not visualized on ECHO (SIGNED OFF)    A/P:  30 y/o F @ HD13 admitted for alcoholic hepatitis with a prolonged and protracted hospital course complicated by oliguric EBEN, encephalopathy, anemia, renal tubular acidosis, hyponatremia, abdominal pain, ascites, lactic acidosis, hypokalemia, hypophosphatemia, hypoxemic respiratory failure, hypercapnic respiratory failure, candidemia, leukocytosis, hematochezia, and back pain.    By system    NEURO/PSYCH:  GCS=8, AAOx0, RASS=-4.  Unable to protect airway with BiPAP mask in place.  Workup to assess for reversible causes nonrevealing.  Plan for intubation.  Will start propofol with fentanyl pushes for sedation/analgesia given hepatic failure.  No benzodiazepines.  Chemosis noted on exam,  will start gel to moisturize and protect.  -Intubation  -Propofol infusion and fentanyl 50-100mcg IV q1h PRN pain/sedation  -Daily sedation holidays to assess for mental status improvement  -Daily ammonia levels while hepatopathy resolving  -    CV:  Intermittent tachycardia.  Normotensive.  Unremarkable exam.  No role for vasopressors at this time.  Troponinemia noted without EKG changes on floor.  Unclear--in setting of oliguric renal failure--if this troponin leak is 2/2 acute cardiac insult or if 2/2 prolonged cardiac insults over hospital stay and inability to clear troponins.  Follow up echocardiogram final read; EF 60-65%, remainder of study inadequate given body habitus.  -Continuous telemetry  -EKG x1 on admission  -EKG QAM  -Trend troponins q6h until peak  -Follow up echocardiogram read    PULM:  Hypoxemic/hypercarbic respiratory failure.  Doing well on BiPAP; however, no reversible cause of respiratory failure noted.  Patient's mental status precludes ability to remove BiPAP mask; given concern for possible aspiration event, proceeded with intubation.  Tolerated well.  Plan for minimal settings with higher PEEP given obesity.  Will obtain ABG 1h s/p intubation.  Daily ABGs and pCXRs while intubated.  Acute hypoxemia concerning for pneumonia versus PE versus pleural effusion versus interstitial edema.  Obtained CTA given echocardiogram inadequate study of RV to assess function to assess for PE.  Moreover, B fluffy infiltrates on pCXR concerning for ARDS versus aspiration versus pneumonia versus abscess.  Need CTA to further characterize.  -Intubation  -MV @ VC//16/10/50%  -pCXR on intubation; adequately placed ETT  -CTA, chest  -ABG 1h s/p intubation  -Daily ABG  -Daily pCXR    GI/Nutrition:  NPO throughout day today.  Plan to start TFs tomorrow via post-pyloric Dobhoff per RD.  Hepatopathy persists with hyperbilirubinemia (primarily unconjugated).  No elevation significant rise in AST/ALT and alk  phos.  Plan for RUQUS to ensure no evidence of biliary ductal dilation.  Supportive care in interim.  LFTs on admission and QAM to follow.  CTA of abdomen/pelvis needed to assess for occult source of GNR bacteremia.  Patient with minimal fluid seen on serial abdominal ultrasounds.  Moreover, body habitus is such that unable to adequately assess without CT imaging.  Finally, with evident and significant third spacing of IVF, potential for occult large volume ascites that can obscure intraabdominal abscess is high.  IV contrast needed to ensure any occult rim enhancing abscess can be delineated from the likely ascites that are present to assess for cause of GNR.  RUQUS nonrevelatory for acute cholecystitis.  MELD = 39.  -NPO  -RD consult; TFs per RD  -LFTs now  -LFTs QAM  -RUQUS  -CTA, abd/pelvis  -Continue rifaximin, folate  -Continue PEG for bowel regimen  -Continue steroids for hepatic dysfunction    Renal/F/E:  Oliguric renal failure.  Nephrology following.  Dialysis via tunneled RIJ HD catheter.  Labs QAM.  -Renal panel with full lytes QAM    ID:  AF, WBC elevated.  Meropenem started empirically.  Given CT shows septic emboli and inadequate echo, will add vancomycin for empiric MRSA coverage of presumptive HCAP.  Daily cultures until clear.  Funguria to be treated with antifungals  -Micafungin, meropenem, and vancomycin  -Daily CBC  -CBC now  -Daily BCxs until clear    H&H:  CBC on admission <7/21; 1u pRBCs given.  Daily CBC.  Daily INR to trend hepatic dysfunction and to trend MELD.  -CBC now  -T&C now  -1u PRBCs now  -Daily CBC    ENDO:  FSG adequate; continue MDSSI.  CTM per protocol  -FSGs and MDSSI per protocol    MSK:  PT/OT once extubated.  Offloading per protocol  -PT/OT once extubated    Skin:  Skin care per ICU RN; no skin issues at this time.  -Skin care per protocol    T/L/D:  PICC, tunneled RIJ HD CVC, F, NJT, OGT, ETT, R radial arterial line  -Continue all lines in this critically ill  patient    PROPHY:  PULM:  HOB > 30 degrees, oral care, minimize ventilator settings  GI:  PPI, TFs tomorrow  DVT/PE:  Hold heparin at this time, SCDs    DISPO:  Remain in ICU    MAJ Moise Joyner MD, FS  F1, PGY9  N SICU Fellow    S/d/w Dr. Andrew who agrees with the plan of care as outlined above

## 2020-02-21 NOTE — PROCEDURES
[facility]    Procedure: Right Tunelled HD cath Placement  Date/Time: 2/21/2020 11:19 AM  Performed by: Marino Thompson MD  Authorized by: Marino Thompson MD     UNIVERSAL PROTOCOL   Site Marked: NA  Prior Images Obtained and Reviewed:  Yes  Required items: Required blood products, implants, devices and special equipment available    Patient identity confirmed:  Verbally with patient, arm band, provided demographic data and hospital-assigned identification number  Patient was reevaluated immediately before administering moderate or deep sedation or anesthesia  Confirmation Checklist:  Patient's identity using two indicators, relevant allergies, procedure was appropriate and matched the consent or emergent situation and correct equipment/implants were available  Time out: Immediately prior to the procedure a time out was called    Universal Protocol: the Joint Commission Universal Protocol was followed    Preparation: Patient was prepped and draped in usual sterile fashion    ESBL (mL):  50         ANESTHESIA    Anesthesia: Local infiltration  Local Anesthetic:  Lidocaine 1% without epinephrine  Anesthetic Total (mL):  10      SEDATION    Patient Sedated: Yes    Sedation Type:  Moderate (conscious) sedation  Sedation:  Fentanyl and midazolam  Vital signs: Vital signs monitored during sedation    Fluoroscopy Time: 0 minute(s)  See dictated procedure note for full details.  Findings: Right IJV patent.  Right HD cath placed 14.5 Fr 23 cm cuff to tip.  Cath is flushed and ready for use.  Rigth IJV site with persistent oozing after procedure.  Manual pressure, 2-0 silk suture, and dermabond used to control.      Specimens: none    Complications: None    Condition: Stable    Plan: OK to use cath now.   Suture can be removed Monday    PROCEDURE   Patient Tolerance:  Patient tolerated the procedure well with no immediate complications    Length of time physician/provider present for 1:1 monitoring  during sedation: 30

## 2020-02-21 NOTE — PLAN OF CARE
Pt is obtunded, not responsive to voice but is to virous rub.This am pt was tracking to my voice. Jaundice sclera and skin, sclera is edematous. Had tunnel cath placed for dialysis today. Around 1400 pt work of breathing increased, using accessory muscles, perfuse sweating, and 10 L oxymask needed to maintain 92%. VBG's and CXR done, troponon,  Bipap started. Sinus tach 105-115. BP stable. Abx started for positive blood culture.  Abdomen distended. Urine was dark tea colored, more red since about 1600. Also on menses?Tube feed off since on BiPAP, . MD aware of UA. Tunnel cath dressing C, D, I.  Transferring to .

## 2020-02-21 NOTE — PROVIDER NOTIFICATION
MD Notification    Notified Person: MD    Notified Person Name: Mihai    Notification Date/Time:02/21/20 0046    Notification Interaction:amcom smart web page    Purpose of Notification:Pt noted to have nose bleeding on the right nostril. NG in place, pt has been trying to pull it, mittens on both hands. Please advise, thanks    Orders Received: 4363 Verbal orders to continue to monitor    Comments:

## 2020-02-21 NOTE — PROVIDER NOTIFICATION
MD Notification    Notified Person: MD    Notified Person Name:  Toño    Notification Date/Time: 2/21/20 5509    Notification Interaction: paged    Purpose of Notification: Ph 7.17, Bicarb 67, also [facility] called, verigene test didn't grow, will cont with cultures    Orders Received: BiPAP ordered, repeat VBG's     Comments:

## 2020-02-21 NOTE — IR NOTE
Interventional Radiology Intra-procedural Nursing Note    Patient Name: Pat Delgado  Medical Record Number: [id number]  Today's Date: February 21, 2020    Start Time: 1049  End of procedure time: 1116  Procedure: Central Venous Catheter Placement for Dialysis  Report given to: RN CICU    Other Notes: Pt transferred to IR table. Prepped and draped appropriately. Monitoring equipment applied. NC applied. No complaints of pain at this time. Timeout recorded.    CVC right internal jugular. Site secured with suture. Manual pressure held to site for 10 minutes by technician. Chlorhexidine sponge applied. Site dressed with quick clot gauze and covered with tegaderm. Site C/D/I, soft.    Mediations administered:    Fentanyl 75 mcg IVP  Versed 1.5 mg IVP  Cleocin 900 mg IV  Heparin 6000 units Intracatheter

## 2020-02-22 ENCOUNTER — APPOINTMENT (OUTPATIENT)
Dept: ULTRASOUND IMAGING | Facility: CLINIC | Age: 30
DRG: 871 | End: 2020-02-22
Attending: INTERNAL MEDICINE
Payer: COMMERCIAL

## 2020-02-22 LAB
ALBUMIN SERPL-MCNC: 3.5 G/DL (ref 3.4–5)
ALP SERPL-CCNC: 110 U/L (ref 40–150)
ALT SERPL W P-5'-P-CCNC: 43 U/L (ref 0–50)
ANION GAP SERPL CALCULATED.3IONS-SCNC: 11 MMOL/L (ref 3–14)
AST SERPL W P-5'-P-CCNC: 175 U/L (ref 0–45)
BASE DEFICIT BLDA-SCNC: 4.6 MMOL/L
BILIRUB DIRECT SERPL-MCNC: 15 MG/DL (ref 0–0.2)
BILIRUB SERPL-MCNC: 19.9 MG/DL (ref 0.2–1.3)
BUN SERPL-MCNC: 40 MG/DL (ref 7–30)
CALCIUM SERPL-MCNC: 8.3 MG/DL (ref 8.5–10.1)
CHLORIDE SERPL-SCNC: 100 MMOL/L (ref 94–109)
CO2 SERPL-SCNC: 22 MMOL/L (ref 20–32)
CREAT SERPL-MCNC: 3.35 MG/DL (ref 0.52–1.04)
ERYTHROCYTE [DISTWIDTH] IN BLOOD BY AUTOMATED COUNT: 21.7 % (ref 10–15)
GFR SERPL CREATININE-BSD FRML MDRD: 18 ML/MIN/{1.73_M2}
GLUCOSE BLDC GLUCOMTR-MCNC: 106 MG/DL (ref 70–99)
GLUCOSE BLDC GLUCOMTR-MCNC: 83 MG/DL (ref 70–99)
GLUCOSE BLDC GLUCOMTR-MCNC: 84 MG/DL (ref 70–99)
GLUCOSE BLDC GLUCOMTR-MCNC: 86 MG/DL (ref 70–99)
GLUCOSE BLDC GLUCOMTR-MCNC: 88 MG/DL (ref 70–99)
GLUCOSE BLDC GLUCOMTR-MCNC: 89 MG/DL (ref 70–99)
GLUCOSE SERPL-MCNC: 104 MG/DL (ref 70–99)
HCO3 BLD-SCNC: 22 MMOL/L (ref 21–28)
HCT VFR BLD AUTO: 24.6 % (ref 35–47)
HGB BLD-MCNC: 8 G/DL (ref 11.7–15.7)
INR PPP: 1.88 (ref 0.86–1.14)
LACTATE BLD-SCNC: 0.8 MMOL/L (ref 0.7–2)
LACTATE BLD-SCNC: 0.8 MMOL/L (ref 0.7–2)
LACTATE BLD-SCNC: 0.9 MMOL/L (ref 0.7–2)
MAGNESIUM SERPL-MCNC: 2.4 MG/DL (ref 1.6–2.3)
MCH RBC QN AUTO: 33.5 PG (ref 26.5–33)
MCHC RBC AUTO-ENTMCNC: 32.5 G/DL (ref 31.5–36.5)
MCV RBC AUTO: 103 FL (ref 78–100)
O2/TOTAL GAS SETTING VFR VENT: 50 %
OXYHGB MFR BLD: 98 % (ref 92–100)
PCO2 BLD: 46 MM HG (ref 35–45)
PH BLD: 7.28 PH (ref 7.35–7.45)
PHOSPHATE SERPL-MCNC: 4.7 MG/DL (ref 2.5–4.5)
PLATELET # BLD AUTO: 205 10E9/L (ref 150–450)
PO2 BLD: 150 MM HG (ref 80–105)
POTASSIUM SERPL-SCNC: 3.7 MMOL/L (ref 3.4–5.3)
PROT SERPL-MCNC: 6.5 G/DL (ref 6.8–8.8)
RBC # BLD AUTO: 2.39 10E12/L (ref 3.8–5.2)
SODIUM SERPL-SCNC: 133 MMOL/L (ref 133–144)
TROPONIN I SERPL-MCNC: 2.85 UG/L (ref 0–0.04)
TROPONIN I SERPL-MCNC: 3.19 UG/L (ref 0–0.04)
TROPONIN I SERPL-MCNC: 3.37 UG/L (ref 0–0.04)
VANCOMYCIN SERPL-MCNC: 27.8 MG/L
WBC # BLD AUTO: 23.7 10E9/L (ref 4–11)

## 2020-02-22 PROCEDURE — 85027 COMPLETE CBC AUTOMATED: CPT | Performed by: STUDENT IN AN ORGANIZED HEALTH CARE EDUCATION/TRAINING PROGRAM

## 2020-02-22 PROCEDURE — P9041 ALBUMIN (HUMAN),5%, 50ML: HCPCS | Performed by: INTERNAL MEDICINE

## 2020-02-22 PROCEDURE — 84450 TRANSFERASE (AST) (SGOT): CPT | Performed by: STUDENT IN AN ORGANIZED HEALTH CARE EDUCATION/TRAINING PROGRAM

## 2020-02-22 PROCEDURE — 25000125 ZZHC RX 250

## 2020-02-22 PROCEDURE — 25800030 ZZH RX IP 258 OP 636: Performed by: INTERNAL MEDICINE

## 2020-02-22 PROCEDURE — 40000008 ZZH STATISTIC AIRWAY CARE

## 2020-02-22 PROCEDURE — 84075 ASSAY ALKALINE PHOSPHATASE: CPT | Performed by: STUDENT IN AN ORGANIZED HEALTH CARE EDUCATION/TRAINING PROGRAM

## 2020-02-22 PROCEDURE — 94003 VENT MGMT INPAT SUBQ DAY: CPT

## 2020-02-22 PROCEDURE — 80202 ASSAY OF VANCOMYCIN: CPT | Performed by: HOSPITALIST

## 2020-02-22 PROCEDURE — 82248 BILIRUBIN DIRECT: CPT | Performed by: STUDENT IN AN ORGANIZED HEALTH CARE EDUCATION/TRAINING PROGRAM

## 2020-02-22 PROCEDURE — 84155 ASSAY OF PROTEIN SERUM: CPT | Performed by: STUDENT IN AN ORGANIZED HEALTH CARE EDUCATION/TRAINING PROGRAM

## 2020-02-22 PROCEDURE — 20000003 ZZH R&B ICU

## 2020-02-22 PROCEDURE — 93970 EXTREMITY STUDY: CPT

## 2020-02-22 PROCEDURE — 40000239 ZZH STATISTIC VAT ROUNDS

## 2020-02-22 PROCEDURE — 25000132 ZZH RX MED GY IP 250 OP 250 PS 637: Performed by: STUDENT IN AN ORGANIZED HEALTH CARE EDUCATION/TRAINING PROGRAM

## 2020-02-22 PROCEDURE — C9113 INJ PANTOPRAZOLE SODIUM, VIA: HCPCS | Performed by: HOSPITALIST

## 2020-02-22 PROCEDURE — 25000128 H RX IP 250 OP 636: Performed by: HOSPITALIST

## 2020-02-22 PROCEDURE — 84484 ASSAY OF TROPONIN QUANT: CPT | Performed by: STUDENT IN AN ORGANIZED HEALTH CARE EDUCATION/TRAINING PROGRAM

## 2020-02-22 PROCEDURE — 87040 BLOOD CULTURE FOR BACTERIA: CPT | Performed by: STUDENT IN AN ORGANIZED HEALTH CARE EDUCATION/TRAINING PROGRAM

## 2020-02-22 PROCEDURE — 25000128 H RX IP 250 OP 636: Performed by: INTERNAL MEDICINE

## 2020-02-22 PROCEDURE — 93970 EXTREMITY STUDY: CPT | Mod: XS

## 2020-02-22 PROCEDURE — 40000275 ZZH STATISTIC RCP TIME EA 10 MIN

## 2020-02-22 PROCEDURE — 85610 PROTHROMBIN TIME: CPT | Performed by: STUDENT IN AN ORGANIZED HEALTH CARE EDUCATION/TRAINING PROGRAM

## 2020-02-22 PROCEDURE — 82247 BILIRUBIN TOTAL: CPT | Performed by: STUDENT IN AN ORGANIZED HEALTH CARE EDUCATION/TRAINING PROGRAM

## 2020-02-22 PROCEDURE — 84460 ALANINE AMINO (ALT) (SGPT): CPT | Performed by: STUDENT IN AN ORGANIZED HEALTH CARE EDUCATION/TRAINING PROGRAM

## 2020-02-22 PROCEDURE — 00000146 ZZHCL STATISTIC GLUCOSE BY METER IP

## 2020-02-22 PROCEDURE — 25000132 ZZH RX MED GY IP 250 OP 250 PS 637: Performed by: HOSPITALIST

## 2020-02-22 PROCEDURE — 99291 CRITICAL CARE FIRST HOUR: CPT | Performed by: INTERNAL MEDICINE

## 2020-02-22 PROCEDURE — 82805 BLOOD GASES W/O2 SATURATION: CPT | Performed by: STUDENT IN AN ORGANIZED HEALTH CARE EDUCATION/TRAINING PROGRAM

## 2020-02-22 PROCEDURE — 27210437 ZZH NUTRITION PRODUCT SEMIELEM INTERMED LITER

## 2020-02-22 PROCEDURE — 25000128 H RX IP 250 OP 636: Performed by: STUDENT IN AN ORGANIZED HEALTH CARE EDUCATION/TRAINING PROGRAM

## 2020-02-22 PROCEDURE — 83605 ASSAY OF LACTIC ACID: CPT | Performed by: STUDENT IN AN ORGANIZED HEALTH CARE EDUCATION/TRAINING PROGRAM

## 2020-02-22 PROCEDURE — 80069 RENAL FUNCTION PANEL: CPT | Performed by: STUDENT IN AN ORGANIZED HEALTH CARE EDUCATION/TRAINING PROGRAM

## 2020-02-22 PROCEDURE — 90937 HEMODIALYSIS REPEATED EVAL: CPT

## 2020-02-22 PROCEDURE — 83735 ASSAY OF MAGNESIUM: CPT | Performed by: STUDENT IN AN ORGANIZED HEALTH CARE EDUCATION/TRAINING PROGRAM

## 2020-02-22 RX ORDER — ALBUMIN, HUMAN INJ 5% 5 %
250 SOLUTION INTRAVENOUS
Status: COMPLETED | OUTPATIENT
Start: 2020-02-22 | End: 2020-02-22

## 2020-02-22 RX ORDER — ALBUMIN (HUMAN) 12.5 G/50ML
50 SOLUTION INTRAVENOUS
Status: DISCONTINUED | OUTPATIENT
Start: 2020-02-22 | End: 2020-02-22

## 2020-02-22 RX ORDER — ALBUMIN, HUMAN INJ 5% 5 %
250 SOLUTION INTRAVENOUS
Status: DISCONTINUED | OUTPATIENT
Start: 2020-02-22 | End: 2020-02-22

## 2020-02-22 RX ADMIN — MINERAL OIL, PETROLATUM: 425; 573 OINTMENT OPHTHALMIC at 04:32

## 2020-02-22 RX ADMIN — CHLORHEXIDINE GLUCONATE 15 ML: 1.2 RINSE ORAL at 00:57

## 2020-02-22 RX ADMIN — THIAMINE HYDROCHLORIDE 100 MG: 100 INJECTION, SOLUTION INTRAMUSCULAR; INTRAVENOUS at 08:50

## 2020-02-22 RX ADMIN — MEROPENEM 500 MG: 500 INJECTION, POWDER, FOR SOLUTION INTRAVENOUS at 15:03

## 2020-02-22 RX ADMIN — PROPOFOL 25 MCG/KG/MIN: 10 INJECTION, EMULSION INTRAVENOUS at 19:41

## 2020-02-22 RX ADMIN — PANTOPRAZOLE SODIUM 40 MG: 40 INJECTION, POWDER, FOR SOLUTION INTRAVENOUS at 08:50

## 2020-02-22 RX ADMIN — FOLIC ACID 1 MG: 1 TABLET ORAL at 08:50

## 2020-02-22 RX ADMIN — FENTANYL CITRATE 50 MCG: 50 INJECTION, SOLUTION INTRAMUSCULAR; INTRAVENOUS at 21:28

## 2020-02-22 RX ADMIN — CHLORHEXIDINE GLUCONATE 15 ML: 1.2 RINSE ORAL at 08:57

## 2020-02-22 RX ADMIN — PROPOFOL 25 MCG/KG/MIN: 10 INJECTION, EMULSION INTRAVENOUS at 15:01

## 2020-02-22 RX ADMIN — PROPOFOL 25 MCG/KG/MIN: 10 INJECTION, EMULSION INTRAVENOUS at 23:44

## 2020-02-22 RX ADMIN — MINERAL OIL, PETROLATUM: 425; 573 OINTMENT OPHTHALMIC at 23:47

## 2020-02-22 RX ADMIN — MINERAL OIL, PETROLATUM 3.5 G: 425; 573 OINTMENT OPHTHALMIC at 15:03

## 2020-02-22 RX ADMIN — PROPOFOL 20 MCG/KG/MIN: 10 INJECTION, EMULSION INTRAVENOUS at 07:59

## 2020-02-22 RX ADMIN — FENTANYL CITRATE 50 MCG: 50 INJECTION, SOLUTION INTRAMUSCULAR; INTRAVENOUS at 06:26

## 2020-02-22 RX ADMIN — POLYETHYLENE GLYCOL 3350 17 G: 17 POWDER, FOR SOLUTION ORAL at 08:50

## 2020-02-22 RX ADMIN — CHLORHEXIDINE GLUCONATE 15 ML: 1.2 RINSE ORAL at 20:13

## 2020-02-22 RX ADMIN — SODIUM CHLORIDE 300 ML: 9 INJECTION, SOLUTION INTRAVENOUS at 10:31

## 2020-02-22 RX ADMIN — MINERAL OIL, PETROLATUM 3.5 G: 425; 573 OINTMENT OPHTHALMIC at 17:29

## 2020-02-22 RX ADMIN — RIFAXIMIN 550 MG: 550 TABLET ORAL at 09:12

## 2020-02-22 RX ADMIN — FENTANYL CITRATE 50 MCG: 50 INJECTION, SOLUTION INTRAMUSCULAR; INTRAVENOUS at 04:22

## 2020-02-22 RX ADMIN — RIFAXIMIN 550 MG: 550 TABLET ORAL at 21:59

## 2020-02-22 RX ADMIN — ALBUMIN HUMAN 250 ML: 0.05 INJECTION, SOLUTION INTRAVENOUS at 10:31

## 2020-02-22 RX ADMIN — MINERAL OIL, PETROLATUM: 425; 573 OINTMENT OPHTHALMIC at 00:54

## 2020-02-22 RX ADMIN — PROPOFOL 20 MCG/KG/MIN: 10 INJECTION, EMULSION INTRAVENOUS at 02:13

## 2020-02-22 ASSESSMENT — MIFFLIN-ST. JEOR
SCORE: 2071.5
SCORE: 2071.5

## 2020-02-22 ASSESSMENT — ACTIVITIES OF DAILY LIVING (ADL)
ADLS_ACUITY_SCORE: 17

## 2020-02-22 NOTE — PROGRESS NOTES
Potassium   Date Value Ref Range Status   02/22/2020 3.7 3.4 - 5.3 mmol/L Final     Hemoglobin   Date Value Ref Range Status   02/22/2020 8.0 (L) 11.7 - 15.7 g/dL Final     Creatinine   Date Value Ref Range Status   02/22/2020 3.35 (H) 0.52 - 1.04 mg/dL Final     Urea Nitrogen   Date Value Ref Range Status   02/22/2020 40 (H) 7 - 30 mg/dL Final     Sodium   Date Value Ref Range Status   02/22/2020 133 133 - 144 mmol/L Final     INR   Date Value Ref Range Status   02/22/2020 1.88 (H) 0.86 - 1.14 Final       DIALYSIS PROCEDURE NOTE  Hepatitis status of previous patient on machine log was checked and verified ok to use with this patients hepatitis status.  Patient dialyzed for 3 hrs. on a 3 K bath with a net fluid removal of  1.75 L.  A BFR of 300 ml/min was obtained via a right tunneled catheter     The treatment plan was dicussed with Dr. Moses during the treatment.  Total heparin received during the treatment: 0 units.       Line flushed, clamped and capped with heparin 1:1000 2 mL (2000 units) per lumen  Meds  given: 5% Albumin prime Complications: none      Dialysis procedure explained to parents.  They had minimal understanding of the process.  The only barrier to their learning was being tired and stressed due to their daughter's illness.  Reinforcement recommended.  ICEBOAT? Timeout performed pre-treatment  I: Patient was identified using 2 identifiers  C: Consent obtained/verified current before treatment  E: Equipment preventative maintenance is current and dialysis delivery system OK to use  B: Hepatitis B Surface Antigen: negative; Draw Date: 2/9/20      Hepatitis B Surface Antibody: unknown; Draw Date: 2/22/20  O: Dialysis orders present and complete prior to treatment  A: Vascular access verified and assessed prior to treatment  T: Treatment was performed at a clinically appropriate time  ?: Patient was allowed to ask questions and address concerns prior to treatment  See flowsheet in EPIC for further  details and post assessment.  Machine water alarm in place and functioning. Transducer pods intact and checked every 15min.  Pt dialyzed in ICU  Report received from: STEVIE Melgar RN  Report given to: STEVIE Melgar RN

## 2020-02-22 NOTE — PROGRESS NOTES
[facility] ICU RESPIRATORY NOTE        Date of Admission: 2/9/2020    Date of Intubation (most recent):  2/21/20    Reason for Mechanical Ventilation:   Resp failure    Number of Days on Mechanical Ventilation:  2    Met Criteria for Spontaneous Breathing Trial:  No    Reason for No Spontaneous Breathing Trial: Per MD    Significant Events Today:  None    ABG Results:   Recent Labs   Lab 02/22/20  0555 02/21/20  2220 02/21/20  1654 02/16/20  1630   PH 7.28* 7.29* 7.30* 7.28*   PCO2 46* 43 45 34*   PO2 150* 100 130* 171*   HCO3 22 21 22 16*   O2PER 50 50% 50% 10L02       Current Vent Settings: Ventilation Mode: CMV/AC  (Continuous Mandatory Ventilation/ Assist Control)  FiO2 (%): 50 %  Rate Set (breaths/minute): 16 breaths/min  Tidal Volume Set (mL): 420 mL  PEEP (cm H2O): 10 cmH2O  Oxygen Concentration (%): 40 %  Resp: 17      Plan:  Pt to remain on full vent support    Tobias Leyva, RT

## 2020-02-22 NOTE — PHARMACY-VANCOMYCIN DOSING SERVICE
Pharmacy Vancomycin Initial Note  Date of Service 2020  Patient's  1990  29 year old, female    Indication: [facility] Pneumonia    Current estimated CrCl = Estimated Creatinine Clearance: 37.8 mL/min (A) (based on SCr of 3.13 mg/dL (H)).    Creatinine for last 3 days  2020:  5:50 AM Creatinine 2.99 mg/dL  2020:  5:59 AM Creatinine 3.42 mg/dL  2020:  5:15 AM Creatinine 3.86 mg/dL;  7:00 PM Creatinine 3.13 mg/dL    Recent Vancomycin Level(s) for last 3 days  No results found for requested labs within last 72 hours.      Vancomycin IV Administrations (past 72 hours)      No vancomycin orders with administrations in past 72 hours.                Nephrotoxins and other renal medications (From now, onward)    Start     Dose/Rate Route Frequency Ordered Stop    20 2245  vancomycin 2000 mg in 0.9% NaCl 500 ml intermittent infusion 2,000 mg      2,000 mg  over 90 Minutes Intravenous ONCE 20 22320 2231  vancomycin place fajardo - receiving intermittent dosing      1 each Intravenous SEE ADMIN INSTRUCTIONS 20 2232            Contrast Orders - past 72 hours (72h ago, onward)    Start     Dose/Rate Route Frequency Ordered Stop    20 1915  iopamidol (ISOVUE-370) solution 100 mL      100 mL Intravenous ONCE 20 1901 20 1934    20 1645  perflutren diluted 1mL to 2mL with saline (OPTISON) diluted injection 9 mL     Note to Pharmacy:  NDC# [id number]    9 mL Intravenous ONCE 20 1633 20 1634                Plan:  1.  Start vancomycin  2000 mg IV once then intermittent per levels.   2.  Goal Trough Level: 15-20 mg/L   3.  Pharmacy will check trough levels as appropriate in .    4. Serum creatinine levels will be ordered daily for the first week of therapy and at least twice weekly for subsequent weeks.    5. Alpine method utilized to dose vancomycin therapy: Method 1    Kelli Sherman [facility]

## 2020-02-22 NOTE — PROGRESS NOTES
[facility] ICU RESPIRATORY NOTE  Date of Admission: 2/9/20  Date of Intubation (most recent): 2/21/20  Reason for Mechanical Ventilation: Resp. Failure  Number of Days on Mechanical Ventilation: 2  Met Criteria for Pressure Support Trial:   Length of Pressure Support Trial:    Reason for Stopping Pressure Support Trial:  Reason for No Pressure Support Trial: per MD     Significant Events Today: None overnight     ABG Results:   Recent Labs   Lab 02/21/20  2220 02/21/20  1654 02/16/20  1630   PH 7.29* 7.30* 7.28*   PCO2 43 45 34*   PO2 100 130* 171*   HCO3 21 22 16*   O2PER 50% 50% 10L02     Ventilation Mode: CMV/AC  (Continuous Mandatory Ventilation/ Assist Control)  FiO2 (%): 50 %  Rate Set (breaths/minute): 16 breaths/min  Tidal Volume Set (mL): 420 mL  PEEP (cm H2O): 10 cmH2O  Oxygen Concentration (%): 50 %  Resp: 17    Will continue to follow.     Mihai Hood, RT

## 2020-02-22 NOTE — PROGRESS NOTES
"Daily ICU Note  02/22/20    Last 24 hours:  Hemodynamically stable, on intermittent HD (2nd run today), on [facility] ventilation    Ventilation Mode: CMV/AC  (Continuous Mandatory Ventilation/ Assist Control)  FiO2 (%): 50 %  Rate Set (breaths/minute): 16 breaths/min  Tidal Volume Set (mL): 420 mL  PEEP (cm H2O): 10 cmH2O  Oxygen Concentration (%): 40 %  Resp: 17    Intake/Output Summary (Last 24 hours) at 2/22/2020 1558  Last data filed at 2/22/2020 1400  Gross per 24 hour   Intake 836.75 ml   Output 1944 ml   Net -1107.25 ml     HPI:    30 y/o F @ HD13 admitted for alcoholic hepatitis with a prolonged and protracted hospital course complicated by oliguric EBEN, encephalopathy, anemia, renal tubular acidosis, hyponatremia, abdominal pain, ascites, lactic acidosis, hypokalemia, hypophosphatemia, hypoxemic respiratory failure, hypercapnic respiratory failure, candidemia, leukocytosis, hematochezia, and back pain.    ROS:  Deferred in this noninteractive patient    Exam:   VS:  BP (!) 143/94   Pulse 116   Temp 98  F (36.7  C) (Oral)   Resp 17   Ht 1.727 m (5' 8\")   Wt 129.8 kg (286 lb 2.5 oz)   SpO2 99%   BMI 43.51 kg/m    GEN:  WD obese WF moderate distress  NEURO:  GCS=8 (E = 2, V = 2, M = 4), AAOx0, RASS=-4, moving BUE/BLE spontaneously  HENT:  Jaundice noted throughout.  Scant bloody mucus from nares.  NCAT otherwise.  Eyes:  Chemosis noted B.  Jaundice.  CAS.  Neck:  Obese neck.  No masses appreciated.  CV:  RRR, no M/R/G  PULM:  B rhonchi noted.  Some coarse breath sounds.  Otherwise, no W/R.  GI:  Soft, ND, (+) hypoactive BS x4 quadrants.  No hernias noted.  No fluid wave appreciated.  Hepatomegaly from costal margin to just superior to ASIS.  TTP noted with palpation of RUQ.  BLE:  Pitting edema noted throughout BLE.  Skin:  Jaundiced skin noted through all dermatomes.  No other skin breakdown noted.  The patient is nonflushed, nondiaphoretic.    Labs:   Recent Labs   Lab 02/22/20  0412 02/21/20  1900 " 02/21/20 1821 02/21/20  0515   WBC 23.7* 23.9*  --  20.3*   RBC 2.39* 1.97*  --  2.19*   HGB 8.0* 6.8* 6.8* 7.3*   HCT 24.6* 20.6*  --  22.5*    210  --  251     Recent Labs   Lab 02/22/20 0412 02/21/20  1900 02/21/20  0515    133 130*   POTASSIUM 3.7 3.9 4.3   CHLORIDE 100 99 98   CO2 22 21 19*   BUN 40* 38* 52*   CR 3.35* 3.13* 3.86*   * 105* 116*   EMILY 8.3* 8.3* 9.3     Recent Labs   Lab 02/22/20 0412 02/21/20 1900 02/21/20  0515 02/20/20  0559   * 162* 157* 146*   ALT 43 40 37 34   BILITOTAL 19.9* 20.1* 21.5* 23.6*   ALBUMIN 3.5 3.7 4.1 3.8   PROTTOTAL 6.5* 6.6* 7.1 7.1   ALKPHOS 110 113 124 128     Lactic acid:  0.8  Procalcitonin:  1.48  Troponin:  2.85 (peaked at 4.7&    Hep B (2/9):  Negative  Hep C (2/9):  Negative  UCx (2/16):  >100,000 candida  BCx (2/19):  GNR  UA (2/21):  Spec grav > 1.035, pH 5.5, protein 100, ketones 10, (+) LE, (+) blood, (-) nitrites, >182 WBC, >182 RBC  BCx (2/21):  PENDING    Acetaminophen (2/9):  <5  Ethanol (2/9):  0.14  Salicylates (2/10):  <2  UDS:  (+) amphetamines, (+) cannaboids    Rads:  Reviewed  pCXR (2/21):  RIJ HD catheter and feeding tube.  B pulmonary infiltrates.    Ancillary studies:  Reviewed  Echocardiogram (2/21):  EF 60-65%    Consults:    ICU PRIMARY  Nephrology:  HD  IR:  RIJ tunneled HD catheter  GI:  Hepatic failure  Chem dep:  Recommend [facility] appointment at discharge (SIGNED OFF)  Psychiatry:  Recommend [facility] via chem dep at discharge (SIGNED OFF)  Cardiology:  Consulted for possible cardiac constriction; not visualized on ECHO (SIGNED OFF)    A/P:  30 y/o F @ HD13 admitted for alcoholic hepatitis with a prolonged and protracted hospital course complicated by oliguric EBEN, encephalopathy, anemia, renal tubular acidosis, hyponatremia, abdominal pain, ascites, lactic acidosis, hypokalemia, hypophosphatemia, hypoxemic respiratory failure, hypercapnic respiratory failure, candidemia, leukocytosis, hematochezia, and back  pain.    NEURO/PSYCH: Propofol with fentanyl pushes for sedation/analgesia given hepatic failure.  No benzodiazepines.   -Hepatic encepahloapthy  -Daily sedation holidays to assess for mental status improvement  -Daily ammonia levels while hepatopathy resolving    CV:  Intermittent tachycardia.  Normotensive.  Unremarkable exam.  No role for vasopressors at this time.  Troponinemia noted without EKG changes on floor.  Unclear--in setting of oliguric renal failure--if this troponin leak is 2/2 acute cardiac insult or if 2/2 prolonged cardiac insults over hospital stay and inability to clear troponins.  EF 60-65%, remainder of study inadequate given body habitus.  -Continuous telemetry    PULM:  Hypoxemic/hypercarbic respiratory failure.  On [facility] ventilation, CT chest 2/21 w multiple areas of air space opacifications and R effusion (small)  -CTA, chest    GI/Nutrition: CTA of abdomen/pelvis needed to assess for occult source of GNR bacteremia.  Patient with minimal fluid seen on serial abdominal ultrasounds.  MELD-Na score: 37 at 2/22/2020  4:12 AM  MELD score: 37 at 2/22/2020  4:12 AM  Calculated from:  Serum Creatinine: 3.35 mg/dL at 2/22/2020  4:12 AM  Serum Sodium: 133 mmol/L at 2/22/2020  4:12 AM  Total Bilirubin: 19.9 mg/dL at 2/22/2020  4:12 AM  INR(ratio): 1.88 at 2/22/2020  4:12 AM  Age: 29 years  Enlarged fatty liver, GB sludge  -Nutrition consult  -Monitor LFTs  -Continue rifaximin, folate    Renal/F/E:  Oliguric renal failure.  Nephrology following.  Dialysis via tunneled RIJ HD catheter.    -Monitor    ID:  AF, WBC elevated.  Meropenem empirically.  Given CT shows septic emboli and inadequate echo, added vancomycin for empiric MRSA coverage of presumptive HCAP.  Daily cultures until clear.  Funguria to be treated with antifungals  -Micafungin, meropenem, and vancomycin  -Daily BCxs until clear    Heme:  CBC on admission <7/21; 1u pRBCs given.  Daily CBC.  Daily INR to trend hepatic dysfunction and to  trend MELD. 1u PRBCs 1/21  -Daily CBC    ENDO:  FSG adequate; continue MDSSI.  CTM per protocol  -FSGs and MDSSI per protocol    MSK:  PT/OT once extubated.  Offloading per protocol  -PT/OT once extubated    Skin:  Skin care per ICU RN; no skin issues at this time.  -Skin care per protocol    T/L/D:  PICC, tunneled RIJ HD CVC, F, NJT, OGT, ETT, R radial arterial line  -Continue all lines in this critically ill patient    PROPHY:  PULM:  HOB > 30 degrees, oral care, minimize ventilator settings  GI:  PPI, TFs tomorrow  DVT/PE:  Hold heparin at this time, SCDs    DISPO:  Remain in ICU    Critical care time: 35 minutes    Carmina Green MD

## 2020-02-22 NOTE — CONSULTS
Clinical Nutrition - Brief Note    Received New TF consult, patient currently being followed by the RD. Please refer to assessment completed 02/21 for more details    Chart review and d/w RN. Patient transferred from the floor to ICU, now requiring intubation and propofol. Patient continues to receive dialysis     Labs reviewed: K 3.7 (NL), Mg 2.4 (H), Phos 4.7 (H), BUN 40 (H), Cr 3.35 (H)  Medications reviewed: Propofol at 19.5 mL/hr = 514 kcal/day from lipids    Reassessed Nutrient Needs:  DW: 122 kg (admit; for energy) & 63.6 kg (IBW; for protein)  Estimated Energy Needs: 3383-0660 kcal (11-14 kcal/kg)  Justification: vented, obese  Estimated Protein Needs:  (1.5-2+ gm/kg)  Justification: obese, diaylsis    Will change TF orders to the following ~    Begin TF Peptamen Intense VHP at 15 mL/hr and advance by 15 mL every 12 hrs towards preliminary goal rate of 50 mL/hr to provide 1080 kcal, 110 gm protein   -- Total provisions (TF + Propofol) = 1714 kcal (14 kcal/kg) and 110 gm protein (1.7 gm/kg)  -- Continue standard flushes 60 mL q 4hrs     RD will continue to follow patient per protocol      Della Plummer RD, LD  Clinical Dietitian

## 2020-02-22 NOTE — PROGRESS NOTES
Hospitalist service will sign off. Patient was intubated overnight. Overall prognosis is poor.  If she does do well enough to leave ICU, hospitalist service can resume care at that time.    KASSY Lundberg, DO

## 2020-02-22 NOTE — PROVIDER NOTIFICATION
1840: MD notified of critical Hgb of 6.8. Received order for 1 unit PRBCs    0045: MD notified of critical troponin 3.365, reduced from 4.7. No new orders at this time.     Vera Young RN on 2/22/2020 at 12:51 AM

## 2020-02-22 NOTE — PROGRESS NOTES
Intubation Note    Date of intubation:2/21/20  Time of intubation:2045  Location of intubation:ICU  Intubated by:  Size of ETT:8.0  Location (cm) at lip:25 cm    ETT placement confirmed by:EtCO2 color change and bilateral breath sounds present.    2/21/2020  Maribell Najera, RT

## 2020-02-22 NOTE — PROCEDURES
PREPROCEDURE DIAGNOSIS: Hypoxemic respiratory failure  POSTPROCEDURE DIAGNOSIS: same   PROCEDURE: Endotracheal intubation  SURGEON: Ar  ANESTHESIA: Etomidate, rocuronium  EBL: 0cc   FINDINGS: ETT in trachea @ 24cm depth  COMPLICATIONS: None apparent   SPECIMEN: none   OPERATIVE INDICATIONS: Pat Delgado is a 29 year old female transferred urgently from the floor for AMS and hypoxemia requiring BIPAP.  On evaluation, no reversible cause noted.  No clearing of sensorium.  Plan to intubate given AMS and low GCS in setting of hypoxemia.  OPERATIVE PROCEDURE:   Informed consent was obtained prior to our procedure from patient's father.  The patient's anatomical landmarks were reviewed prior to the procedure. A shoulder roll was placed.  She was preoxygenated with 100% FiO2% via BiPAP.  All necessary equipment and personnel was assembled.  A timeout procedure was performed.  The patient was given sedation followed by paralytics.  Her BiPAP was removed when she no longer had spontaneous respirations.  She was bagged easily.  A #3 Mac blade on the GlideScope was used to perform indirect laryngoscopy.  A grade II view of the vocal cords was visualized.  Some scant bloody mucus was noted in the posterior oropharnyx.  No aspiration occurred with intubation.  A 8.0 ETT was then passed easily through the vocal cords.  The cuff was inflated.  Color change capnography confirmed endotracheal placement.  Equal breath sounds were auscultated B.  The ETT was secured @ 24cm at the lip.  No significant vital sign abnormalities were observed throughout the procedure.  Portable chest radiography was ordered STAT at the conclusion of the procedure.  Dr. Escobar was present for all parts of this case.   MAJ Moise Joyner MD, FS  F1, PGY9  N SICU Fellow    I was present and supervised the entire portion of the procedure for patient Pat Delgado. This includes the time-out for patient verification and procedure verification. I  have reviewed the above report and agree with the findings, interpretation, and recommendations.     Hans Escobar MD   of Medicine  Interventional Pulmonary  Department of Pulmonary, Allergy, Critical Care and Sleep Medicine   [facility], [facility]  Pager: [phone]

## 2020-02-22 NOTE — PROCEDURES
PREPROCEDURE DIAGNOSIS: Hypoxemic respiratory failure  POSTPROCEDURE DIAGNOSIS: same   PROCEDURE: R radial arterial line  SURGEON: Ar  ANESTHESIA: 1% lidocaine  EBL: 5cc   FINDINGS: Arterial line in R radial artery with good waveform   COMPLICATIONS: None apparent   SPECIMEN: none   OPERATIVE INDICATIONS: Pat Delgado is a 29 year old female admitted from the floor for hypoxemic respiratory failure.  Was intubated without issue.  Now needs ABG for frequent ABGs, hemodynamic monitoring, and frequent lab draws.  OPERATIVE PROCEDURE:   Informed consent was obtained prior to our procedure from the patient's father.  The patient's anatomical landmarks were reviewed prior to the procedure with ultrasound.  Ultrasound was used throughout the procedure for real time guidance of instrumentation and visualization of anatomy.  A timeout procedure was performed.  The patient was preped and draped in the usual sterile fashion. The surgical site was numbed with 1% lidocaine. A strong R radial pulse was visualized with the ultrasound. The R radial artery was accessed successfully with one pass of the access needle. A wire was passed through the needle. The introducer needle was removed over the wire. The arterial line was inserted over the wire. The arterial line was attached to the monitor. The arterial line was sutured to the skin. A good waveform was demostrated. A sterile dressing was placed over the arterial line.   Dr. Escobar was immediately available for all parts of this case.   MAJ Moise Joyner MD, FS  F1, PGY9  N SICU Fellow

## 2020-02-22 NOTE — PROGRESS NOTES
Pt arrived to ICU bed 375 at 1730. VSS on Bipap 16/6 40%. Pt moaning and withdrawing from pain, but not following commands. Urine from loaiza with jason blood. Loaiza exchanged and irrigated with sterile water by sushma MEDINA. Dr. Joyner assessed, and Dr. Andrew currently at bedside. Family present and updated by MD.  Vera Young RN on 2/21/2020 at 6:42 PM

## 2020-02-22 NOTE — PLAN OF CARE
Physical Therapy Discharge Summary    Reason for therapy discharge:    Change in medical status.    Progress towards therapy goal(s). See goals on Care Plan in [facility] electronic health record for goal details.  Goals not met.  Barriers to achieving goals:   OT reports nursing states pt has had a decline in status and is no longer appropriate for PT.    Therapy recommendation(s):    No further therapy is recommended. MD to re-order PT if becomes appropriate.

## 2020-02-22 NOTE — PROGRESS NOTES
[facility]    Hospitalist Progress Note  Interval History   Developed   All other review of systems are negative.    Assessment & Plan   Pat Delgado is a 29 year old female who was admitted on 2/9/2020. GI has been following for severe Etoh hepatitis w/ complication of renal failure w/ ATN vs type II hepatorenal syndrome. Overnight developed hypoxemic respiratory failure and further intubated and transferred to ICU. Currently on HD.    Blood culture growing GNR.    Acute metabolic encephalopathy  -likely multi-factorial w/ both liver and kidney injury, and GNR bacteremia  -continue rifaximin BID  -if no BM, can give lactulose either per NGT or per rectum  -pt also has arterial pH in the 7.2s w/ acidosis not sure whether it contributes to her mental status, getting bicarb at this time   -s/p 5 days course of IV cefepime for SBP ppx  -no pocket to tap at this time    Acute alcoholic hepatitis with Maddrey score >50  Abdominal pain likely due to gaseous distension- improved   Minimal ascites status post 5 days of cefepime for SBP prophylaxis  Maddrey score of > 50s  -started on steroid trial on 2/16 but will stop today given GNR bacteremia if no other indication per ICU service  -currently Tbili likely peak- and trending down, INR trending down; likely a turning point if pt tolerates the course of infecton  -Continue with daily LFT w/ INR      Hyponatremia w/ increase in creatinine and minimal urine output in the setting of anasarca:  -ATN vs hepatorenal syndrome type II  -currently being dialyzed, care per renal team    Anemia:  -slow decrease with no sign of overt bleeding, likely anemia from chronic illness  -GI will follow in case of any sign of bleeding    Code Status: Full Code    -Data reviewed today: I reviewed all new labs and imaging results over the last 24 hours.     Physical Exam   Temp: 98  F (36.7  C) Temp src: Oral BP: (!) 143/94 Pulse: 116 Heart Rate: 106 Resp: 11 SpO2: 100 % O2  Device: Mechanical Ventilator Oxygen Delivery: Other (Comments)  Vitals:    02/21/20 1745 02/22/20 0020 02/22/20 1000   Weight: 128.6 kg (283 lb 8.2 oz) 129.8 kg (286 lb 2.5 oz) 129.8 kg (286 lb 2.5 oz)     Vital Signs with Ranges  Temp:  [97.2  F (36.2  C)-99.1  F (37.3  C)] 98  F (36.7  C)  Pulse:  [] 116  Heart Rate:  [] 106  Resp:  [8-29] 11  BP: ()/() 143/94  MAP:  [73 mmHg-120 mmHg] 86 mmHg  Arterial Line BP: ()/(56-88) 121/64  FiO2 (%):  [40 %-100 %] 50 %  SpO2:  [92 %-100 %] 100 %  I/O last 3 completed shifts:  In: 711.75 [I.V.:93]  Out: 791 [Urine:191; Emesis/NG output:100; Other:500]    Constitutional: Intubated in moderate distress  Respiratory: Clear to auscultation bilaterally, no crackles or wheezing  Cardiovascular: Regular rate and rhythm, normal S1 and S2, and no murmur noted  GI: Normal bowel sounds, soft, distended, non-tender  Skin/Integumen: No rashes, no cyanosis, no edema  Other:     Aida Winter MD  ([facility]  Office: [phone]  Cell: [phone], please feel free to call the cell    Medications     dextrose       - MEDICATION INSTRUCTIONS -       - MEDICATION INSTRUCTIONS -       propofol (DIPRIVAN) infusion 25 mcg/kg/min (02/22/20 1357)       - MEDICATION INSTRUCTIONS for Dialysis Patients -   Does not apply See Admin Instructions     anticoagulant citrate  3 mL Intracatheter Once     anticoagulant citrate  3 mL Intracatheter Once     chlorhexidine  15 mL Swish & Spit BID     folic acid  1 mg Oral or Feeding Tube Daily     insulin aspart  1-7 Units Subcutaneous Q4H     meropenem  500 mg Intravenous Q24H     pantoprazole (PROTONIX) IV  40 mg Intravenous Daily with breakfast     polyethylene glycol  17 g Oral or Feeding Tube Daily     prednisoLONE  40 mg Oral Daily     Refresh P.M.   Both Eyes Q6H     rifaximin  550 mg Oral or Feeding Tube BID     sodium chloride (PF)  10 mL Intracatheter Q7 Days     sodium chloride  (PF)  10 mL Intracatheter Q8H     thiamine  100 mg Intravenous Daily     vancomycin place fajardo - receiving intermittent dosing  1 each Intravenous See Admin Instructions       Data   Recent Labs   Lab 02/22/20  1150 02/22/20  0555 02/22/20  0412 02/21/20  2350 02/21/20  1900 02/21/20  1821  02/21/20  0515   WBC  --   --  23.7*  --  23.9*  --   --  20.3*   HGB  --   --  8.0*  --  6.8* 6.8*  --  7.3*   MCV  --   --  103*  --  105*  --   --  103*   PLT  --   --  205  --  210  --   --  251   INR  --   --  1.88*  --  2.02*  --   --  2.09*   NA  --   --  133  --  133  --   --  130*   POTASSIUM  --   --  3.7  --  3.9  --   --  4.3   CHLORIDE  --   --  100  --  99  --   --  98   CO2  --   --  22  --  21  --   --  19*   BUN  --   --  40*  --  38*  --   --  52*   CR  --   --  3.35*  --  3.13*  --   --  3.86*   ANIONGAP  --   --  11  --  13  --   --  13   EMILY  --   --  8.3*  --  8.3*  --   --  9.3   GLC  --   --  104*  --  105*  --   --  116*   ALBUMIN  --   --  3.5  --  3.7  --   --  4.1   PROTTOTAL  --   --  6.5*  --  6.6*  --   --  7.1   BILITOTAL  --   --  19.9*  --  20.1*  --   --  21.5*   ALKPHOS  --   --  110  --  113  --   --  124   ALT  --   --  43  --  40  --   --  37   AST  --   --  175*  --  162*  --   --  157*   TROPI 2.854* 3.195*  --  3.365*  --   --    < >  --     < > = values in this interval not displayed.       Recent Results (from the past 24 hour(s))   XR Chest Port 1 View    Narrative    CHEST PORTABLE ONE VIEW  2/21/2020 4:15 PM     HISTORY: Increased tachypnea after dialysis cath placement.    COMPARISON: 2/16/2020 at 0740.      Impression    IMPRESSION: Interval placement of a right internal jugular large-bore  catheter with its tip in the expected region of the right atrium.  Interval placement of a feeding tube coursing into the stomach. A  pulmonary edema pattern is again seen, not significant changed. Heart  size is unchanged.    MAYA MARSHALL MD   Echocardiogram Limited    Narrative     [id number]  [id number]  [id number]  [id number]^JIMMY^NATALY^LAKESHIA           [facility]  Echocardiography Laboratory  [address] [place] [address]        Name: MARCUS LUEVANO  MRN: [id number]  : 1990  Study Date: 2020 04:19 PM  Age: 29 yrs  Gender: Female  Patient Location: [facility]  Reason For Study: Dyspnea  Ordering Physician: NATALY MARQUEZ  Referring Physician: [facility], [place]  Performed By: Kai Burk RDCS     BSA: 2.4 m2  Height: 68 in  Weight: 286 lb  HR: 106  BP: 99/61 mmHg  _____________________________________________________________________________  __        Procedure  Limited Portable Echo Adult. Optison (NDC #[id number]) given intravenously.  _____________________________________________________________________________  __        Interpretation Summary     The visual ejection fraction is estimated at 60-65%.  Left ventricular systolic function is normal.  Right ventricular function cannot be assessed due to poor image quality.  The study was technically difficult.  _____________________________________________________________________________  __        Left Ventricle  The visual ejection fraction is estimated at 60-65%. Left ventricular systolic  function is normal.     Right Ventricle  The right ventricle is normal size. Right ventricular function cannot be  assessed due to poor image quality.     Mitral Valve  There is mild (1+) mitral regurgitation.     Tricuspid Valve  There is mild (1+) tricuspid regurgitation. The right ventricular systolic  pressure is approximated at 27.8 mmHg plus the right atrial pressure.        Aortic Valve  The aortic valve is not well visualized. There is trace aortic regurgitation.  No hemodynamically significant valvular aortic stenosis.     Pulmonic Valve  There is trace pulmonic valvular regurgitation.     Pericardium  There is no pericardial effusion.     Rhythm  The rhythm was sinus tachycardia.      _____________________________________________________________________________  __        Doppler Measurements & Calculations  TR max martha: 263.7 cm/sec  TR max P.8 mmHg              _____________________________________________________________________________  __        Report approved by: Roxana Ruano 2020 04:46 PM      US Abdomen Limited    Narrative    US ABDOMEN LIMITED 2020 6:55 PM    CLINICAL HISTORY: Concern for cholecystitis.    TECHNIQUE: Abdominal pain and jaundice.    COMPARISON: None.    FINDINGS:    GALLBLADDER: Sludge in the gallbladder. Mild gallbladder wall  thickening measures 4 mm. No pericholecystic fluid.    BILE DUCTS: There is no biliary dilatation. The common duct measures 5  mm.    LIVER: Normal where seen.    RIGHT KIDNEY: No hydronephrosis.    PANCREAS: The liver is enlarged at 21.4 cm in length. There is diffuse  increased echogenicity without focal lesion. No ascites.      Impression    IMPRESSION:  1.  Enlarged, fatty infiltrated liver.  2.  Sludge in the gallbladder. Mild gallbladder wall thickening may be  related to hepatitis/cirrhosis. Cholecystitis considered unlikely  given lack of overt distention.    CLIFFORD HUGHES MD   CT Chest w Contrast    Narrative    CT CHEST WITH CONTRAST 2020 7:40 PM    CLINICAL HISTORY: Pleural effusion. New GNR in blood. Concern for  occult abscess/intra-thoracic source.    TECHNIQUE: CT chest with IV contrast. Multiplanar reformats were  obtained. Dose reduction techniques were used.    CONTRAST: 100mL Isovue-370    COMPARISON: None.    FINDINGS:   LUNGS AND PLEURA: Patchy bilateral airspace opacities, most pronounced  in the right lower lobe. There is a moderate size right pleural  effusion. No pleural fluid on the left. No evidence of pulmonary  abscess.    MEDIASTINUM/AXILLAE: There is a right-sided PICC line and a  right-sided dialysis catheter. A nasogastric tube extends to at least  the stomach. No thoracic or  axillary adenopathy.    UPPER ABDOMEN: The liver appears enlarged, but is not completely  included on this study. The spleen is also enlarged.    MUSCULOSKELETAL: No worrisome bone lesions.      Impression    IMPRESSION:   1.  Multifocal airspace opacities scattered throughout both lungs  concerning for multifocal pneumonia. Septic emboli could have a  similar appearance.  2.  Small to moderate right pleural effusion.  3.  Hepatosplenomegaly.    CLIFFORD HUGHES MD   CTA Abdomen Pelvis with Contrast    Narrative    CTA ABDOMEN AND PELVIS WITH CONTRAST 2/21/2020 7:40 PM    CLINICAL HISTORY: GNR in blood. Concern for occult intra-abdominal  process in setting of new hepatic failure.    TECHNIQUE: CT scan of the abdomen and pelvis was performed in the  arterial phase following injection of IV contrast. Multiplanar  reformats were obtained. Dose reduction techniques were used.    CONTRAST: 100mL Isovue-370    COMPARISON: 2/9/2019    FINDINGS:   LOWER CHEST: Abnormal bibasilar airspace opacities, right worse than  left. Small to moderate right pleural effusion.    HEPATOBILIARY: The liver is markedly enlarged, measuring up to 27.5 cm  in length. There is diffuse decreased attenuation of the liver.  Moderate volume of ascites is present. No focal liver lesions are  identified. Gallbladder unremarkable.    PANCREAS: Normal.    SPLEEN: Mildly enlarged at 15.5 cm in AP dimension.    ADRENAL GLANDS: Normal.    KIDNEYS/BLADDER: Normal.    BOWEL: Normal. Feeding tube extends to the third portion of the  duodenum. No obstruction or free air.    PELVIC ORGANS: Normal.    ADDITIONAL FINDINGS: None.    MUSCULOSKELETAL: Normal.    Vasculature: Aortic caliber is normal. Visceral branches of the  abdominal aorta are normal and patent. Specifically, the hepatic  arteries are patent. There is early opacification of the portal vein,  although incompletely evaluated on this arterial phase study. The  hepatic veins are not yet opacified,  but there is no secondary  evidence to suggest occlusion.      Impression    IMPRESSION:   1.  Markedly enlarged and fatty infiltrated liver.  2.  Moderate volume of ascites.  3.  Splenomegaly.  4.  Bibasilar opacities, likely multifocal pneumonia or septic emboli.  5.  Small to moderate right pleural effusion.  6.  No vascular abnormality demonstrated on this arterial phase study.  Limited evaluation of the hepatic and portal venous systems in the  arterial phase, although grossly normal.    CLIFFORD HUGHES MD   XR Chest Port 1 View    Narrative    CHEST ONE VIEW PORTABLE   2/21/2020 9:08 PM     HISTORY: ETT placement.    COMPARISON: CT 2/21/2020.      Impression    IMPRESSION: Tip of the endotracheal tube is 3.3 cm above the chai.  There appear to be 2 enteric catheters that extend below the left  hemidiaphragm. There is a right internal jugular dialysis catheter.  There are multifocal airspace opacities. No pneumothorax.    CLIFFORD HUGHES MD   US Lower Extremity Venous Duplex Bilateral    Narrative    US LOWER EXTREMITY VENOUS DUPLEX BILATERAL 2/22/2020 10:36 AM    CLINICAL HISTORY/INDICATION: Hepatorenal syndrome. New evidence of  septic emboli on chest CT.    COMPARISON: None relevant    TECHNIQUE:   Grayscale, color-flow, and spectral waveform analysis were performed  of the deep veins of the bilateral lower extremities    FINDINGS:   The right common femoral vein, femoral vein, popliteal vein and tibial  veins demonstrate normal compressibility, spectral waveform, color  flow and augmentation.    The left common femoral vein, femoral vein, popliteal vein and tibial  veins demonstrate normal compressibility, spectral waveform, color  flow and augmentation.      Impression    IMPRESSION:   1. No evidence of deep venous thrombosis in the right lower extremity.  2. No evidence of deep venous thrombosis in the left lower extremity.    STELLA HWANG MD   US Upper Extremity Venous Duplex Bilat    Narrative     US UPPER EXTREMITY VENOUS DUPLEX BILATERAL 2/22/2020 10:36 AM    CLINICAL HISTORY/INDICATION: Hepatorenal syndrome. New septic emboli  suspected on CT of the chest.     COMPARISON: CT of the chest from 2/21/2020.    TECHNIQUE:   Grayscale, color-flow, and spectral waveform analysis were performed  of the bilateral upper extremities    FINDINGS:   The right internal jugular, subclavian, axillary, cephalic, basilic,  and brachial veins demonstrate normal compressibility, spectral  waveform, color flow and augmentation.    The left internal jugular, subclavian, axillary, cephalic, basilic,  and brachial veins demonstrate normal compressibility, spectral  waveform, color flow and augmentation.      Impression    IMPRESSION:   1. No evidence of deep venous thrombosis in the right upper extremity.  2. No evidence of deep venous thrombosis in the left upper extremity.    MD Aida ONEILL MD  ([facility]  Office: [phone]  Cell: [phone]

## 2020-02-22 NOTE — PROGRESS NOTES
Neuro: intermittently following commands in bilat UEs.. PERRL. On propofol at 20 mcg/kg/min and given fentanylx2 to good effect.   CV: NSR/ST. BP stable. Troponin trending down.  R: Intub at 2045 d/t decreased mental status on BiPAP. CMV 50%/10/420/16. Sating well. Moderate yellow/blood-tinged ETT secretions.   GI/: NG clamped, OG to LIS with 100 bloody OP this shift-placement confirmed by xray by Dr. Joyner. UOP bloody and about 5 ml/lhr. Had formerly been tea-colored per chart review and report. Upon arrival to unit, jason blood and clotty. Amado irrigated and exchanged by sushma MEDINA, MD aware. Per MD, continue to monitor for clots and syringe irrigate if needed. No clots present since 1800 last evening.   Access: R PICC with eccymosis, MD notified and US ordered. R radial Orient, R internal jugular tunneled dialysis catheter  Skin: jaundiced, pale. Intact redness in groin. Scleral jaundice and edema.   Family: pts parents and sister updated by MD. Family supportive, appropriate, and realistic.   Vera Young RN on 2/22/2020 at 5:38 AM

## 2020-02-22 NOTE — PROGRESS NOTES
[facility]     Renal Progress Note       SHORTHAND KEY FOR MY NOTES:  c = with, s = without, p = after, a = before, x = except, asx = asymptomatic, tx = transplant or treatment, sx = symptoms or symptomatic, cx = canceled or culture, rxn = reaction, yday = yesterday, nl = normal, abx = antibiotics, fxn = function, dx = diagnosis, dz = disease, m/h = melena/hematochezia, c/d/l/ha = cramping/dizziness/lightheadedness/headache, d/c = discharge or diarrhea/constipation, f/c/n/v = fevers/chills/nausea/vomiting, cp/sob = chest pain/shortness of breath, tbv = total body volume, rxn = reaction, tdc = tunneled dialysis catheter, pta = prior to admission, hd = hemodialysis, pd = peritoneal dialysis, hhd = home hemodialysis         Assessment/Plan:     1.  Oliguric EBEN 2 ATN + HRS II.  Pt started HD yday.  Tolerated the first run s probs.  She had a TDC placed yday and then the BCx returned as positive, so we will have to monitor clinically.  She is due for HD #2 today.  Pt isn't on pressors, so no need for CRRT at this time.    A.  HD #2 today.      2.  Sepsis syndrome.  Pt has GNR in her blood.  Source is unclear; maybe the lungs.  No pressors required at this time.  WBCs are higher.  She is on broad abx.  Vanco level is high; would hold on dosing today to prevent further hits to the kidneys.  D/w PharmD.  A.  Pt is on beatris/vanco.  B.  Await ID/sens.  C.  Check vanco level tmrw to decide re next dose.    3.  Resp failure.  Pt was intubated last night for hypercapnea and airway protection.  Chest CT done yday and lungs don't look good.  A.  Vent mgmt per ICU team.    4.  EtOHic hepatitis.  Pt's bili remains high, but is trending down.  She is icteric and jaundiced.  Remains on steroids.  GI following.  A.  Per GI.    5.  Encephalopathy.  Pt is still pretty encephalopathic.  Most likely reason is the sepsis.  NH3 has been ok and BUN is fine.  A.  Follow clinically.    6.  Anemia.  Pt's hb is higher p a  "transfusion yday.  She had some blood in the loaiza.  INR is high.  No signs of active bleeding at present.  A.  Follow hb, clinically.  B.  Transfuse prn.    7.  FEN.  Electrolytes are ok x as noted above.  A.  Follow labs daily.        Interval History:     Unable.  Pt is still intubated/sedated.           Medications and Allergies:       - MEDICATION INSTRUCTIONS for Dialysis Patients -   Does not apply See Admin Instructions     anticoagulant citrate  3 mL Intracatheter Once     anticoagulant citrate  3 mL Intracatheter Once     chlorhexidine  15 mL Swish & Spit BID     folic acid  1 mg Oral or Feeding Tube Daily     heparin  3 mL Intracatheter During Hemodialysis (from stock)     heparin  3 mL Intracatheter During Hemodialysis (from stock)     insulin aspart  1-7 Units Subcutaneous Q4H     meropenem  500 mg Intravenous Q24H     - MEDICATION INSTRUCTIONS -   Does not apply Once     pantoprazole (PROTONIX) IV  40 mg Intravenous Daily with breakfast     polyethylene glycol  17 g Oral or Feeding Tube Daily     prednisoLONE  40 mg Oral Daily     Refresh P.M.   Both Eyes Q6H     rifaximin  550 mg Oral or Feeding Tube BID     sodium chloride (PF)  10 mL Intracatheter Q7 Days     sodium chloride (PF)  10 mL Intracatheter Q8H     thiamine  100 mg Intravenous Daily     vancomycin place fajardo - receiving intermittent dosing  1 each Intravenous See Admin Instructions     Allergies   Allergen Reactions     Penicillins Unknown     Hives            Physical Exam:     Vitals were reviewed    Heart Rate: 99, Blood pressure (!) 143/94, pulse 116, temperature 99.1  F (37.3  C), temperature source Oral, resp. rate 16, height 1.727 m (5' 8\"), weight 129.8 kg (286 lb 2.5 oz), SpO2 100 %, not currently breastfeeding.  Wt Readings from Last 3 Encounters:   02/22/20 129.8 kg (286 lb 2.5 oz)   02/09/15 95.3 kg (210 lb)   01/22/15 108.4 kg (239 lb)     Intake/Output Summary (Last 24 hours) at 2/22/2020 0946  Last data filed at 2/22/2020 " 0600  Gross per 24 hour   Intake 711.75 ml   Output 791 ml   Net -79.25 ml     GENERAL APPEARANCE: lying in bed, intubated, sedated, eyes open, doesn't follow commands  HEENT:  eyes/ears/nose/neck grossly normal x scleral icterus, facial edema  RESP: coarse vent sounds B  CV: Reg, tachy, nl S1/S2  ABDOMEN: o/s/nt, + distended  EXTREMITIES/SKIN: 2+ ble edema  OTHER:  R arm PICC; + loaiza c bloody urine; + RIJ TDC         Data:     CBC RESULTS:     Recent Labs   Lab 02/22/20  0412 02/21/20  1900 02/21/20  1821 02/21/20  0515 02/20/20  0559 02/19/20  0550 02/18/20  0558   WBC 23.7* 23.9*  --  20.3* 19.5* 20.4* 21.7*   RBC 2.39* 1.97*  --  2.19* 2.10* 2.31* 2.38*   HGB 8.0* 6.8* 6.8* 7.3* 7.3* 7.8* 8.1*   HCT 24.6* 20.6*  --  22.5* 21.7* 23.8* 24.2*    210  --  251 247 231 282     Basic Metabolic Panel:  Recent Labs   Lab 02/22/20 0412 02/21/20  1900 02/21/20  0515 02/20/20  1829 02/20/20  0559 02/19/20  1812 02/19/20  0550  02/18/20  0558    133 130* 131* 129* 130* 127*   < > 126*   POTASSIUM 3.7 3.9 4.3  --  4.3  --  4.5  --  4.6   CHLORIDE 100 99 98  --  97  --  97  --  96   CO2 22 21 19*  --  16*  --  14*  --  15*   BUN 40* 38* 52*  --  43*  --  31*  --  20   CR 3.35* 3.13* 3.86*  --  3.42*  --  2.99*  --  2.72*   * 105* 116*  --  101*  --  90  --  101*   EMILY 8.3* 8.3* 9.3  --  9.2  --  9.3  --  8.8    < > = values in this interval not displayed.     INR  Recent Labs   Lab 02/22/20  0412 02/21/20  1900 02/21/20  0515 02/20/20  0559   INR 1.88* 2.02* 2.09* 2.14*      Attestation:   I have reviewed today's relevant vital signs, notes, medications, labs and imaging.    Desmond Moses MD  [facility]phrology  [phone]

## 2020-02-23 ENCOUNTER — APPOINTMENT (OUTPATIENT)
Dept: GENERAL RADIOLOGY | Facility: CLINIC | Age: 30
DRG: 871 | End: 2020-02-23
Attending: INTERNAL MEDICINE
Payer: COMMERCIAL

## 2020-02-23 LAB
ALBUMIN SERPL-MCNC: 3.1 G/DL (ref 3.4–5)
ALP SERPL-CCNC: 109 U/L (ref 40–150)
ALT SERPL W P-5'-P-CCNC: 46 U/L (ref 0–50)
ANION GAP SERPL CALCULATED.3IONS-SCNC: 10 MMOL/L (ref 3–14)
AST SERPL W P-5'-P-CCNC: 181 U/L (ref 0–45)
BACTERIA SPEC CULT: ABNORMAL
BASE EXCESS BLDA CALC-SCNC: 0.4 MMOL/L
BILIRUB DIRECT SERPL-MCNC: 13.8 MG/DL (ref 0–0.2)
BILIRUB SERPL-MCNC: 17.1 MG/DL (ref 0.2–1.3)
BUN SERPL-MCNC: 31 MG/DL (ref 7–30)
CALCIUM SERPL-MCNC: 8.3 MG/DL (ref 8.5–10.1)
CHLORIDE SERPL-SCNC: 99 MMOL/L (ref 94–109)
CO2 SERPL-SCNC: 24 MMOL/L (ref 20–32)
CREAT SERPL-MCNC: 3.16 MG/DL (ref 0.52–1.04)
ERYTHROCYTE [DISTWIDTH] IN BLOOD BY AUTOMATED COUNT: 22.3 % (ref 10–15)
GFR SERPL CREATININE-BSD FRML MDRD: 19 ML/MIN/{1.73_M2}
GLUCOSE BLDC GLUCOMTR-MCNC: 101 MG/DL (ref 70–99)
GLUCOSE BLDC GLUCOMTR-MCNC: 101 MG/DL (ref 70–99)
GLUCOSE BLDC GLUCOMTR-MCNC: 87 MG/DL (ref 70–99)
GLUCOSE BLDC GLUCOMTR-MCNC: 88 MG/DL (ref 70–99)
GLUCOSE BLDC GLUCOMTR-MCNC: 97 MG/DL (ref 70–99)
GLUCOSE BLDC GLUCOMTR-MCNC: 97 MG/DL (ref 70–99)
GLUCOSE SERPL-MCNC: 98 MG/DL (ref 70–99)
GRAM STN SPEC: NORMAL
GRAM STN SPEC: NORMAL
HCO3 BLD-SCNC: 24 MMOL/L (ref 21–28)
HCT VFR BLD AUTO: 23.1 % (ref 35–47)
HGB BLD-MCNC: 7.6 G/DL (ref 11.7–15.7)
INR PPP: 1.9 (ref 0.86–1.14)
Lab: ABNORMAL
Lab: NORMAL
MAGNESIUM SERPL-MCNC: 2.1 MG/DL (ref 1.6–2.3)
MCH RBC QN AUTO: 33.6 PG (ref 26.5–33)
MCHC RBC AUTO-ENTMCNC: 32.9 G/DL (ref 31.5–36.5)
MCV RBC AUTO: 102 FL (ref 78–100)
OXYHGB MFR BLD: 99 % (ref 92–100)
PCO2 BLD: 35 MM HG (ref 35–45)
PH BLD: 7.45 PH (ref 7.35–7.45)
PHOSPHATE SERPL-MCNC: 2.9 MG/DL (ref 2.5–4.5)
PLATELET # BLD AUTO: 165 10E9/L (ref 150–450)
PO2 BLD: 156 MM HG (ref 80–105)
POTASSIUM SERPL-SCNC: 3.3 MMOL/L (ref 3.4–5.3)
PROT SERPL-MCNC: 6.2 G/DL (ref 6.8–8.8)
RBC # BLD AUTO: 2.26 10E12/L (ref 3.8–5.2)
SODIUM SERPL-SCNC: 133 MMOL/L (ref 133–144)
SPECIMEN SOURCE: ABNORMAL
SPECIMEN SOURCE: NORMAL
VANCOMYCIN SERPL-MCNC: 17.4 MG/L
WBC # BLD AUTO: 21.7 10E9/L (ref 4–11)

## 2020-02-23 PROCEDURE — 25000128 H RX IP 250 OP 636: Performed by: INTERNAL MEDICINE

## 2020-02-23 PROCEDURE — 94003 VENT MGMT INPAT SUBQ DAY: CPT

## 2020-02-23 PROCEDURE — 25000128 H RX IP 250 OP 636: Performed by: HOSPITALIST

## 2020-02-23 PROCEDURE — 80053 COMPREHEN METABOLIC PANEL: CPT | Performed by: INTERNAL MEDICINE

## 2020-02-23 PROCEDURE — 82805 BLOOD GASES W/O2 SATURATION: CPT | Performed by: STUDENT IN AN ORGANIZED HEALTH CARE EDUCATION/TRAINING PROGRAM

## 2020-02-23 PROCEDURE — 40000275 ZZH STATISTIC RCP TIME EA 10 MIN

## 2020-02-23 PROCEDURE — 40000008 ZZH STATISTIC AIRWAY CARE

## 2020-02-23 PROCEDURE — 83735 ASSAY OF MAGNESIUM: CPT | Performed by: INTERNAL MEDICINE

## 2020-02-23 PROCEDURE — 00000146 ZZHCL STATISTIC GLUCOSE BY METER IP

## 2020-02-23 PROCEDURE — 84100 ASSAY OF PHOSPHORUS: CPT | Performed by: INTERNAL MEDICINE

## 2020-02-23 PROCEDURE — 27210437 ZZH NUTRITION PRODUCT SEMIELEM INTERMED LITER

## 2020-02-23 PROCEDURE — 20000003 ZZH R&B ICU

## 2020-02-23 PROCEDURE — C9113 INJ PANTOPRAZOLE SODIUM, VIA: HCPCS | Performed by: HOSPITALIST

## 2020-02-23 PROCEDURE — 25800030 ZZH RX IP 258 OP 636: Performed by: INTERNAL MEDICINE

## 2020-02-23 PROCEDURE — 25000128 H RX IP 250 OP 636: Performed by: STUDENT IN AN ORGANIZED HEALTH CARE EDUCATION/TRAINING PROGRAM

## 2020-02-23 PROCEDURE — 25000132 ZZH RX MED GY IP 250 OP 250 PS 637: Performed by: INTERNAL MEDICINE

## 2020-02-23 PROCEDURE — 25000132 ZZH RX MED GY IP 250 OP 250 PS 637: Performed by: HOSPITALIST

## 2020-02-23 PROCEDURE — 71045 X-RAY EXAM CHEST 1 VIEW: CPT

## 2020-02-23 PROCEDURE — 86706 HEP B SURFACE ANTIBODY: CPT | Performed by: INTERNAL MEDICINE

## 2020-02-23 PROCEDURE — 82248 BILIRUBIN DIRECT: CPT | Performed by: INTERNAL MEDICINE

## 2020-02-23 PROCEDURE — 87070 CULTURE OTHR SPECIMN AEROBIC: CPT | Performed by: INTERNAL MEDICINE

## 2020-02-23 PROCEDURE — 87340 HEPATITIS B SURFACE AG IA: CPT | Performed by: INTERNAL MEDICINE

## 2020-02-23 PROCEDURE — 80202 ASSAY OF VANCOMYCIN: CPT | Performed by: INTERNAL MEDICINE

## 2020-02-23 PROCEDURE — 99291 CRITICAL CARE FIRST HOUR: CPT | Performed by: INTERNAL MEDICINE

## 2020-02-23 PROCEDURE — 85610 PROTHROMBIN TIME: CPT | Performed by: INTERNAL MEDICINE

## 2020-02-23 PROCEDURE — 87205 SMEAR GRAM STAIN: CPT | Performed by: INTERNAL MEDICINE

## 2020-02-23 PROCEDURE — 85027 COMPLETE CBC AUTOMATED: CPT | Performed by: INTERNAL MEDICINE

## 2020-02-23 PROCEDURE — 40000239 ZZH STATISTIC VAT ROUNDS

## 2020-02-23 RX ORDER — POTASSIUM CHLORIDE 20MEQ/15ML
40 LIQUID (ML) ORAL ONCE
Status: COMPLETED | OUTPATIENT
Start: 2020-02-23 | End: 2020-02-23

## 2020-02-23 RX ADMIN — MINERAL OIL, PETROLATUM 3.5 G: 425; 573 OINTMENT OPHTHALMIC at 12:05

## 2020-02-23 RX ADMIN — CHLORHEXIDINE GLUCONATE 15 ML: 1.2 RINSE ORAL at 20:06

## 2020-02-23 RX ADMIN — POLYETHYLENE GLYCOL 3350 17 G: 17 POWDER, FOR SOLUTION ORAL at 08:49

## 2020-02-23 RX ADMIN — PROPOFOL 10 MCG/KG/MIN: 10 INJECTION, EMULSION INTRAVENOUS at 21:30

## 2020-02-23 RX ADMIN — CHLORHEXIDINE GLUCONATE 15 ML: 1.2 RINSE ORAL at 08:45

## 2020-02-23 RX ADMIN — MINERAL OIL, PETROLATUM 3.5 G: 425; 573 OINTMENT OPHTHALMIC at 16:51

## 2020-02-23 RX ADMIN — PROPOFOL 25 MCG/KG/MIN: 10 INJECTION, EMULSION INTRAVENOUS at 04:33

## 2020-02-23 RX ADMIN — PANTOPRAZOLE SODIUM 40 MG: 40 INJECTION, POWDER, FOR SOLUTION INTRAVENOUS at 08:48

## 2020-02-23 RX ADMIN — RIFAXIMIN 550 MG: 550 TABLET ORAL at 22:50

## 2020-02-23 RX ADMIN — FENTANYL CITRATE 100 MCG: 50 INJECTION, SOLUTION INTRAMUSCULAR; INTRAVENOUS at 08:48

## 2020-02-23 RX ADMIN — FOLIC ACID 1 MG: 1 TABLET ORAL at 08:49

## 2020-02-23 RX ADMIN — PROPOFOL 25 MCG/KG/MIN: 10 INJECTION, EMULSION INTRAVENOUS at 09:55

## 2020-02-23 RX ADMIN — MINERAL OIL, PETROLATUM: 425; 573 OINTMENT OPHTHALMIC at 22:51

## 2020-02-23 RX ADMIN — MEROPENEM 500 MG: 500 INJECTION, POWDER, FOR SOLUTION INTRAVENOUS at 14:50

## 2020-02-23 RX ADMIN — VANCOMYCIN HYDROCHLORIDE 2000 MG: 5 INJECTION, POWDER, LYOPHILIZED, FOR SOLUTION INTRAVENOUS at 15:33

## 2020-02-23 RX ADMIN — FENTANYL CITRATE 100 MCG: 50 INJECTION, SOLUTION INTRAMUSCULAR; INTRAVENOUS at 18:46

## 2020-02-23 RX ADMIN — THIAMINE HYDROCHLORIDE 100 MG: 100 INJECTION, SOLUTION INTRAMUSCULAR; INTRAVENOUS at 08:49

## 2020-02-23 RX ADMIN — RIFAXIMIN 550 MG: 550 TABLET ORAL at 09:04

## 2020-02-23 RX ADMIN — POTASSIUM CHLORIDE 40 MEQ: 1.5 SOLUTION ORAL at 18:47

## 2020-02-23 RX ADMIN — FENTANYL CITRATE 50 MCG: 50 INJECTION, SOLUTION INTRAMUSCULAR; INTRAVENOUS at 01:34

## 2020-02-23 RX ADMIN — MINERAL OIL, PETROLATUM: 425; 573 OINTMENT OPHTHALMIC at 04:57

## 2020-02-23 ASSESSMENT — ACTIVITIES OF DAILY LIVING (ADL)
ADLS_ACUITY_SCORE: 17

## 2020-02-23 ASSESSMENT — MIFFLIN-ST. JEOR: SCORE: 2040.5

## 2020-02-23 NOTE — PROGRESS NOTES
[facility] ICU RESPIRATORY NOTE        Date of Admission: 2/9/2020    Date of Intubation (most recent):  2/21/20    Reason for Mechanical Ventilation: Resp failure    Number of Days on Mechanical Ventilation:  3    Met Criteria for Spontaneous Breathing Trial: Yes PS 5 Peep 10 for 30 minutes    Significant Events Today: None    ABG Results:   Recent Labs   Lab 02/23/20  0554 02/22/20  0555 02/21/20  2220 02/21/20  1654   PH 7.45 7.28* 7.29* 7.30*   PCO2 35 46* 43 45   PO2 156* 150* 100 130*   HCO3 24 22 21 22   O2PER  --  50 50% 50%       Current Vent Settings: Ventilation Mode: CMV/AC  (Continuous Mandatory Ventilation/ Assist Control)  FiO2 (%): 40 %  Rate Set (breaths/minute): 16 breaths/min  Tidal Volume Set (mL): 420 mL  PEEP (cm H2O): 10 cmH2O  Pressure Support (cm H2O): 5 cmH2O  Oxygen Concentration (%): 40 %  Resp: 19      Plan:  Pt to remain on full vent support overnight    Tobias Leyva, RT

## 2020-02-23 NOTE — PROGRESS NOTES
Dialysis today. Sedation weaned, pt moved all extremities spontaneously and tracked RN with eyes but did not follow commands. Up in chair for 4 hours.   Tele ST. VSS. FiO2 weaned to 40%.   18cc dark urine total today.     Plan to assess neuro status and evaluate readiness for extubation daily.

## 2020-02-23 NOTE — PROGRESS NOTES
"Daily ICU Note  02/23/20    Last 24 hours:  Hemodynamically stable, on intermittent HD (2nd run 2/22), on [facility] ventilation    Ventilation Mode: CMV/AC  (Continuous Mandatory Ventilation/ Assist Control)  FiO2 (%): 40 %  Rate Set (breaths/minute): 16 breaths/min  Tidal Volume Set (mL): 420 mL  PEEP (cm H2O): 10 cmH2O  Oxygen Concentration (%): 40 %  Resp: 12    Intake/Output Summary (Last 24 hours) at 2/23/2020 1125  Last data filed at 2/23/2020 0800  Gross per 24 hour   Intake 686 ml   Output 1960 ml   Net -1274 ml     HPI:    28 y/o F @ HD15 admitted for alcoholic hepatitis with a prolonged and protracted hospital course complicated by oliguric EBEN, encephalopathy, anemia, renal tubular acidosis, hyponatremia, abdominal pain, ascites, lactic acidosis, hypokalemia, hypophosphatemia, hypoxemic respiratory failure, hypercapnic respiratory failure, candidemia, leukocytosis, hematochezia, and back pain.    ROS:  Deferred in this noninteractive patient    Exam:   VS:  BP (!) 143/94   Pulse 116   Temp 99.4  F (37.4  C) (Axillary)   Resp 12   Ht 1.727 m (5' 8\")   Wt 126.7 kg (279 lb 5.2 oz)   SpO2 99%   BMI 42.47 kg/m    GEN:  WD obese WF moderate distress  NEURO:  GCS=8 (E = 2, V = 2, M = 4), AAOx0, RASS=-4, moving BUE/BLE spontaneously  HENT:  Jaundice noted throughout.  Scant bloody mucus from nares.  NCAT otherwise.  Eyes:  Chemosis noted B.  Jaundice.  CAS.  Neck:  Obese neck.  No masses appreciated.  CV:  RRR, no M/R/G  PULM:  B rhonchi noted.  Some coarse breath sounds.  Otherwise, no W/R.  GI:  Soft, ND, (+) hypoactive BS x4 quadrants.  No hernias noted.  No fluid wave appreciated.  Hepatomegaly from costal margin to just superior to ASIS.  TTP noted with palpation of RUQ.  BLE:  Pitting edema noted throughout BLE.  Skin:  Jaundiced skin noted through all dermatomes.  No other skin breakdown noted.  The patient is nonflushed, nondiaphoretic.    Labs:   Recent Labs   Lab 02/23/20  0515 02/22/20  0412 " 02/21/20  1900   WBC 21.7* 23.7* 23.9*   RBC 2.26* 2.39* 1.97*   HGB 7.6* 8.0* 6.8*   HCT 23.1* 24.6* 20.6*    205 210     Recent Labs   Lab 02/23/20  0515 02/22/20  0412 02/21/20  1900    133 133   POTASSIUM 3.3* 3.7 3.9   CHLORIDE 99 100 99   CO2 24 22 21   BUN 31* 40* 38*   CR 3.16* 3.35* 3.13*   GLC 98 104* 105*   EMILY 8.3* 8.3* 8.3*     Recent Labs   Lab 02/23/20  0515 02/22/20  0412 02/21/20  1900 02/21/20  0515   * 175* 162* 157*   ALT 46 43 40 37   BILITOTAL 17.1* 19.9* 20.1* 21.5*   ALBUMIN 3.1* 3.5 3.7 4.1   PROTTOTAL 6.2* 6.5* 6.6* 7.1   ALKPHOS 109 110 113 124     Lactic acid:  0.8  Procalcitonin:  1.48  Troponin:  2.85 (peaked at 4.78)    Hep B (2/9):  Negative  Hep C (2/9):  Negative  UCx (2/16):  >100,000 candida  BCx (2/19):  GNR  UA (2/21):  Spec grav > 1.035, pH 5.5, protein 100, ketones 10, (+) LE, (+) blood, (-) nitrites, >182 WBC, >182 RBC  BCx (2/21):  PENDING    Acetaminophen (2/9):  <5  Ethanol (2/9):  0.14  Salicylates (2/10):  <2  UDS:  (+) amphetamines, (+) cannaboids    Rads:  Reviewed  pCXR (2/21):  RIJ HD catheter and feeding tube.  B pulmonary infiltrates.    Ancillary studies:  Reviewed  Echocardiogram (2/21):  EF 60-65%    Consults:    ICU PRIMARY  Nephrology:  HD  IR:  RIJ tunneled HD catheter  GI:  Hepatic failure  Chem dep:  Recommend [facility] appointment at discharge (SIGNED OFF)  Psychiatry:  Recommend [facility] via chem dep at discharge (SIGNED OFF)  Cardiology:  Consulted for possible cardiac constriction; not visualized on ECHO (SIGNED OFF)    A/P:  30 y/o F @ HD13 admitted for alcoholic hepatitis with a prolonged and protracted hospital course complicated by oliguric EBEN, encephalopathy, anemia, renal tubular acidosis, hyponatremia, abdominal pain, ascites, lactic acidosis, hypokalemia, hypophosphatemia, hypoxemic respiratory failure, hypercapnic respiratory failure, candidemia, leukocytosis, hematochezia, and back pain.    NEURO/PSYCH: Propofol with fentanyl  pushes for sedation/analgesia given hepatic failure.  No benzodiazepines.   -Hepatic encepahloapthy  -Daily sedation holidays to assess for mental status improvement  -Daily ammonia levels while hepatopathy resolving    CV:  Intermittent tachycardia.  Normotensive.  Unremarkable exam.  No role for vasopressors at this time.  Troponinemia noted without EKG changes on floor.  Unclear--in setting of oliguric renal failure--if this troponin leak is 2/2 acute cardiac insult or if 2/2 prolonged cardiac insults over hospital stay and inability to clear troponins.  EF 60-65%, remainder of study inadequate given body habitus.  -Continuous telemetry    PULM:  Hypoxemic/hypercarbic respiratory failure.  On [facility] ventilation, CT chest 2/21 w multiple areas of air space opacifications and R effusion (small)  -CTA, chest    GI/Nutrition: CTA of abdomen/pelvis needed to assess for occult source of GNR (fusobacterium) bacteremia.  Patient with minimal fluid seen on serial abdominal ultrasounds.  MELD-Na score: 36 at 2/23/2020  5:15 AM  MELD score: 35 at 2/23/2020  5:15 AM  Calculated from:  Serum Creatinine: 3.16 mg/dL at 2/23/2020  5:15 AM  Serum Sodium: 133 mmol/L at 2/23/2020  5:15 AM  Total Bilirubin: 17.1 mg/dL at 2/23/2020  5:15 AM  INR(ratio): 1.90 at 2/23/2020  5:15 AM  Age: 29 years  Enlarged fatty liver, GB sludge  -Nutrition consult  -Monitor LFTs  -Continue rifaximin, folate    Renal/F/E:  Oliguric renal failure.  Nephrology following.  Dialysis via tunneled RIJ HD catheter.    -Monitor    ID:  AF, WBC elevated. 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   Given CT shows septic emboli and inadequate echo, added vancomycin for empiric MRSA coverage of presumptive HCAP.  Daily cultures until clear.    -Meropenem, and vancomycin  -Daily BCxs until clear    Heme:  CBC on admission <7/21; 1u pRBCs given.  Daily CBC.  Daily INR to trend hepatic dysfunction and to trend MELD. 1u PRBCs 1/21  -Daily CBC    ENDO:  FSG  adequate; continue MDSSI.  CTM per protocol  -FSGs and MDSSI per protocol    MSK:  PT/OT once extubated.    -PT/OT once extubated    Skin:  Skin care per ICU RN; no skin issues at this time.  -Skin care per protocol    T/L/D:  PICC, tunneled RIJ HD CVC, F, NJT, OGT, ETT, R radial arterial line  -Continue all lines in this critically ill patient    PROPHY:  PULM:  HOB > 30 degrees, oral care, minimize ventilator settings  GI:  PPI, TFs tomorrow  DVT/PE:  Hold heparin at this time, SCDs    DISPO:  Remain in ICU    Critical care time: 35 minutes    Carmina Green MD

## 2020-02-23 NOTE — PROGRESS NOTES
[facility] [place]     Renal Progress Note       SHORTHAND KEY FOR MY NOTES:  c = with, s = without, p = after, a = before, x = except, asx = asymptomatic, tx = transplant or treatment, sx = symptoms or symptomatic, cx = canceled or culture, rxn = reaction, yday = yesterday, nl = normal, abx = antibiotics, fxn = function, dx = diagnosis, dz = disease, m/h = melena/hematochezia, c/d/l/ha = cramping/dizziness/lightheadedness/headache, d/c = discharge or diarrhea/constipation, f/c/n/v = fevers/chills/nausea/vomiting, cp/sob = chest pain/shortness of breath, tbv = total body volume, rxn = reaction, tdc = tunneled dialysis catheter, pta = prior to admission, hd = hemodialysis, pd = peritoneal dialysis, hhd = home hemodialysis         Assessment/Plan:     1.  Oliguric EBEN 2 ATN + HRS II.  Pt had 2 runs of HD and will run again tmrw.  CXR still looks wet, but oxygenating ok today.  A.  Plan HD tmrw.  Orders placed.  B.  Follow labs, uo for any signs of recovery.    2.  Sepsis syndrome.  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  Source is unclear.  WBCs are ok.  On vanco/beatris.  A.  Continue abx at proper renal doses.    B.  Await ID/sens.    3.  Resp failure.  Pt remains intubated/sedated.    A.  Vent mgmt per ICU team.    4.  EtOHic hepatitis.  Pt's bili remains high and continues to trend down.  She is icteric and jaundiced.  Remains on steroids.  GI following.  A.  Per GI.    5.  Encephalopathy.  Pt is less encephalopathic since starting abx and treating the sepsis.  Labs (NH3, BUN) are ok.    A.  Follow clinically.    6.  Anemia.  Pt's hb is a little lower.  No signs of active bleeding at present.  A.  Follow hb, clinically.  B.  Transfuse prn.    7.  FEN.  Electrolytes are ok x low k.    A.  KCl 40 x 1.  B.  Follow labs daily.        Interval History:     Unable.  Pt is still intubated/sedated.           Medications and Allergies:       - MEDICATION INSTRUCTIONS for Dialysis Patients -   Does not apply See  "Admin Instructions     anticoagulant citrate  3 mL Intracatheter Once     anticoagulant citrate  3 mL Intracatheter Once     chlorhexidine  15 mL Swish & Spit BID     folic acid  1 mg Oral or Feeding Tube Daily     insulin aspart  1-7 Units Subcutaneous Q4H     meropenem  500 mg Intravenous Q24H     pantoprazole (PROTONIX) IV  40 mg Intravenous Daily with breakfast     polyethylene glycol  17 g Oral or Feeding Tube Daily     Refresh P.M.   Both Eyes Q6H     rifaximin  550 mg Oral or Feeding Tube BID     sodium chloride (PF)  10 mL Intracatheter Q7 Days     sodium chloride (PF)  10 mL Intracatheter Q8H     thiamine  100 mg Intravenous Daily     vancomycin (VANCOCIN) IV  2,000 mg Intravenous Once     vancomycin place fajardo - receiving intermittent dosing  1 each Intravenous See Admin Instructions     Allergies   Allergen Reactions     Penicillins Unknown     Hives            Physical Exam:     Vitals were reviewed    Heart Rate: 101, Blood pressure 119/66, pulse 116, temperature 98.7  F (37.1  C), temperature source Oral, resp. rate 19, height 1.727 m (5' 8\"), weight 126.7 kg (279 lb 5.2 oz), SpO2 100 %, not currently breastfeeding.  Wt Readings from Last 3 Encounters:   02/23/20 126.7 kg (279 lb 5.2 oz)   02/09/15 95.3 kg (210 lb)   01/22/15 108.4 kg (239 lb)     Intake/Output Summary (Last 24 hours) at 2/23/2020 1620  Last data filed at 2/23/2020 1300  Gross per 24 hour   Intake 783.35 ml   Output 157 ml   Net 626.35 ml     GENERAL APPEARANCE: lying in bed, intubated, sedated, eyes open, following commands today  HEENT:  eyes/ears/nose/neck grossly normal x scleral icterus, facial edema  RESP: coarse vent sounds B  CV: Reg, tachy, nl S1/S2  ABDOMEN: o/s/nt, + distended  EXTREMITIES/SKIN: 2+ ble edema  OTHER:  R arm PICC; + loaiza c bloody urine; + RIJ TDC         Data:     CBC RESULTS:     Recent Labs   Lab 02/23/20  0515 02/22/20  0412 02/21/20  1900 02/21/20  1821 02/21/20  0515 02/20/20  0559 02/19/20  0550   WBC " 21.7* 23.7* 23.9*  --  20.3* 19.5* 20.4*   RBC 2.26* 2.39* 1.97*  --  2.19* 2.10* 2.31*   HGB 7.6* 8.0* 6.8* 6.8* 7.3* 7.3* 7.8*   HCT 23.1* 24.6* 20.6*  --  22.5* 21.7* 23.8*    205 210  --  251 247 231     Basic Metabolic Panel:  Recent Labs   Lab 02/23/20  0515 02/22/20  0412 02/21/20  1900 02/21/20  0515 02/20/20  1829 02/20/20  0559  02/19/20  0550    133 133 130* 131* 129*   < > 127*   POTASSIUM 3.3* 3.7 3.9 4.3  --  4.3  --  4.5   CHLORIDE 99 100 99 98  --  97  --  97   CO2 24 22 21 19*  --  16*  --  14*   BUN 31* 40* 38* 52*  --  43*  --  31*   CR 3.16* 3.35* 3.13* 3.86*  --  3.42*  --  2.99*   GLC 98 104* 105* 116*  --  101*  --  90   EMILY 8.3* 8.3* 8.3* 9.3  --  9.2  --  9.3    < > = values in this interval not displayed.     INR  Recent Labs   Lab 02/23/20 0515 02/22/20 0412 02/21/20 1900 02/21/20  0515   INR 1.90* 1.88* 2.02* 2.09*      Attestation:   I have reviewed today's relevant vital signs, notes, medications, labs and imaging.    Desmond Moses MD  [facility]phrology  [phone]

## 2020-02-23 NOTE — PHARMACY-VANCOMYCIN DOSING SERVICE
Pharmacy Vancomycin Note  Date of Service 2020  Patient's  1990   29 year old, female    Indication: Healthcare-Associated Pneumonia  Goal Trough Level: 15-20 mg/L  Day of Therapy: 3  Current Vancomycin regimen:  2000 mg IV intermittently based on leveles    Current estimated CrCl = Estimated Creatinine Clearance: 36.9 mL/min (A) (based on SCr of 3.16 mg/dL (H)).    Creatinine for last 3 days  2020:  5:15 AM Creatinine 3.86 mg/dL;  7:00 PM Creatinine 3.13 mg/dL  2020:  4:12 AM Creatinine 3.35 mg/dL  2020:  5:15 AM Creatinine 3.16 mg/dL    Recent Vancomycin Levels (past 3 days)  2020:  8:47 AM Vancomycin Level 27.8 mg/L  2020:  5:15 AM Vancomycin Level 17.4 mg/L    Vancomycin IV Administrations (past 72 hours)                   vancomycin 2000 mg in 0.9% NaCl 500 ml intermittent infusion 2,000 mg (mg) 2,000 mg Given 20 2333                Nephrotoxins and other renal medications (From now, onward)    Start     Dose/Rate Route Frequency Ordered Stop    20 1515  vancomycin 2000 mg in 0.9% NaCl 500 ml intermittent infusion 2,000 mg      2,000 mg  over 90 Minutes Intravenous ONCE 20 1510      20 2231  vancomycin place fajardo - receiving intermittent dosing      1 each Intravenous SEE ADMIN INSTRUCTIONS 20 2232               Contrast Orders - past 72 hours (72h ago, onward)    Start     Dose/Rate Route Frequency Ordered Stop    20 1915  iopamidol (ISOVUE-370) solution 100 mL      100 mL Intravenous ONCE 20 1901 20 1934    20 1645  perflutren diluted 1mL to 2mL with saline (OPTISON) diluted injection 9 mL     Note to Pharmacy:  NDC# [id number]    9 mL Intravenous ONCE 20 1633 20 1634          Interpretation of levels and current regimen:  Trough level is  Therapeutic    Has serum creatinine changed > 50% in last 72 hours: No    Urine output:  diminished urine output    Renal Function: Improving    Plan:  1.   Redose with 2000 mg  2.  Check level 2/24 prior to HD  3. Serum creatinine levels will be ordered daily for the first week of therapy and at least twice weekly for subsequent weeks.      Emma Joiner PharmD        .

## 2020-02-23 NOTE — PROGRESS NOTES
[facility] ICU RESPIRATORY NOTE        Date of Admission: 2/9/2020    Date of Intubation (most recent): 2/21/20    Reason for Mechanical Ventilation: Resp failure     Number of Days on Mechanical Ventilation: 3    Met Criteria for Spontaneous Breathing Trial: No     Reason for No Spontaneous Breathing Trial: Per MD    Significant Events Today: none overnight     ABG Results:   Recent Labs   Lab 02/23/20  0554 02/22/20  0555 02/21/20  2220 02/21/20  1654 02/16/20  1630   PH 7.45 7.28* 7.29* 7.30* 7.28*   PCO2 35 46* 43 45 34*   PO2 156* 150* 100 130* 171*   HCO3 24 22 21 22 16*   O2PER  --  50 50% 50% 10L02       Current Vent Settings: Ventilation Mode: CMV/AC  (Continuous Mandatory Ventilation/ Assist Control)  FiO2 (%): 40 %  Rate Set (breaths/minute): 16 breaths/min  Tidal Volume Set (mL): 420 mL  PEEP (cm H2O): 10 cmH2O  Oxygen Concentration (%): 40 %  Resp: 16      Plan: Pt remains on full ventilator support    [place] Jacome, RT

## 2020-02-23 NOTE — PROGRESS NOTES
Neuro: Spontaneously STEPHENSON, nothing to command. PERRL. On propofol at 25 mcg/kg/min and given fentanylx2 to good effect for behavioral signs of pain.   CV: NSR/ST. BP stable.   R: CMV 40%/10/420/16. Sating well. Moderate yellow ETT secretions.   GI/: post-pyloric NG has TF running at 15/hr, due to advance at 1000. OG to LIS with 100 bloody OP this shift. UOP bloody and 17ml entire shift. Urine brown/bloody with no clots present.  Access: R PICC with eccymosis, R radial [place], R internal jugular tunneled dialysis catheter  Skin: jaundiced, pale. Intact redness in groin. Scleral jaundice and edema.   Inf/Endo: new fever, max 38.7. Fever reduced with fan and ice packs to 38.5.   Family: pts mother updated by phone.   Vera Young RN on 2/22/2020 at 5:38 AM

## 2020-02-23 NOTE — PROGRESS NOTES
[facility]    Hospitalist Progress Note  Interval History   On daily weaning trial. Clinical status grossly unchanged which still intubated sedated and grossly jaundice. However liver enzyme and INR has peaked and coming down.    All other review of systems are negative.    Assessment & Plan   Pat Delgado is a 29 year old female who was admitted on 2/9/2020. GI has been following for severe Etoh hepatitis w/ complication of renal failure w/ ATN vs type II hepatorenal syndrome. Overnight developed hypoxemic respiratory failure and further intubated and transferred to ICU. Currently on HD.     Blood culture growing GNR of unknown origin.     Acute metabolic encephalopathy  -likely multi-factorial w/ both liver and kidney injury, and GNR bacteremia  -continue rifaximin BID  -if no BM, can give lactulose either per NGT or per rectum   -s/p 5 days course of IV cefepime for SBP ppx  -no pocket to tap at this time     Acute alcoholic hepatitis with Maddrey score >50  Abdominal pain likely due to gaseous distension- improved   Minimal ascites status post 5 days of cefepime for SBP prophylaxis  Maddrey score of > 50s  -started on steroid trial on 2/16 but will stop today given GNR bacteremia if no other indication per ICU service  -currently Tbili likely peak- and trending down, INR trending down; likely a turning point if pt tolerates the course of infecton  -Continue with daily LFT w/ INR      Hyponatremia w/ increase in creatinine and minimal urine output in the setting of anasarca:  -ATN vs hepatorenal syndrome type II  -currently being dialyzed, care per renal team     Anemia:  -slow decrease with no sign of overt bleeding, likely anemia from chronic illness  -GI will follow in case of any sign of bleeding    Code Status: Full Code    -Data reviewed today: I reviewed all new labs and imaging results over the last 24 hours.     Physical Exam   Temp: 98.7  F (37.1  C) Temp src: Oral    Heart Rate:  103 Resp: 22 SpO2: 99 % O2 Device: Mechanical Ventilator    Vitals:    02/22/20 0020 02/22/20 1000 02/23/20 0137   Weight: 129.8 kg (286 lb 2.5 oz) 129.8 kg (286 lb 2.5 oz) 126.7 kg (279 lb 5.2 oz)     Vital Signs with Ranges  Temp:  [98.7  F (37.1  C)-101.7  F (38.7  C)] 98.7  F (37.1  C)  Heart Rate:  [] 103  Resp:  [8-36] 22  MAP:  [71 mmHg-97 mmHg] 84 mmHg  Arterial Line BP: ()/(43-77) 125/64  FiO2 (%):  [40 %] 40 %  SpO2:  [93 %-100 %] 99 %  I/O last 3 completed shifts:  In: 783.35 [I.V.:423.35; NG/GT:120]  Out: 157 [Urine:57; Emesis/NG output:100]    Constitutional: moderate distress, sedated and intubated  Respiratory: Clear to auscultation bilaterally, no crackles or wheezing  Cardiovascular: Regular rate and rhythm, normal S1 and S2, and no murmur noted  GI: Normal bowel sounds, soft, non-distended, non-tender  Skin/Integumen: No rashes, no cyanosis, no edema  Other:     Aida Winter MD  ([facility]  Office: [phone]  Cell: [phone], please feel free to call the cell    Medications     dextrose       - MEDICATION INSTRUCTIONS -       - MEDICATION INSTRUCTIONS -       propofol (DIPRIVAN) infusion 10 mcg/kg/min (02/23/20 1207)       - MEDICATION INSTRUCTIONS for Dialysis Patients -   Does not apply See Admin Instructions     anticoagulant citrate  3 mL Intracatheter Once     anticoagulant citrate  3 mL Intracatheter Once     chlorhexidine  15 mL Swish & Spit BID     folic acid  1 mg Oral or Feeding Tube Daily     insulin aspart  1-7 Units Subcutaneous Q4H     meropenem  500 mg Intravenous Q24H     pantoprazole (PROTONIX) IV  40 mg Intravenous Daily with breakfast     polyethylene glycol  17 g Oral or Feeding Tube Daily     Refresh P.M.   Both Eyes Q6H     rifaximin  550 mg Oral or Feeding Tube BID     sodium chloride (PF)  10 mL Intracatheter Q7 Days     sodium chloride (PF)  10 mL Intracatheter Q8H     thiamine  100 mg Intravenous Daily     vancomycin  (VANCOCIN) IV  2,000 mg Intravenous Once     vancomycin place fajardo - receiving intermittent dosing  1 each Intravenous See Admin Instructions       Data   Recent Labs   Lab 02/23/20  0515 02/22/20  1150 02/22/20  0555 02/22/20  0412 02/21/20  2350 02/21/20  1900   WBC 21.7*  --   --  23.7*  --  23.9*   HGB 7.6*  --   --  8.0*  --  6.8*   *  --   --  103*  --  105*     --   --  205  --  210   INR 1.90*  --   --  1.88*  --  2.02*     --   --  133  --  133   POTASSIUM 3.3*  --   --  3.7  --  3.9   CHLORIDE 99  --   --  100  --  99   CO2 24  --   --  22  --  21   BUN 31*  --   --  40*  --  38*   CR 3.16*  --   --  3.35*  --  3.13*   ANIONGAP 10  --   --  11  --  13   EMILY 8.3*  --   --  8.3*  --  8.3*   GLC 98  --   --  104*  --  105*   ALBUMIN 3.1*  --   --  3.5  --  3.7   PROTTOTAL 6.2*  --   --  6.5*  --  6.6*   BILITOTAL 17.1*  --   --  19.9*  --  20.1*   ALKPHOS 109  --   --  110  --  113   ALT 46  --   --  43  --  40   *  --   --  175*  --  162*   TROPI  --  2.854* 3.195*  --  3.365*  --        Recent Results (from the past 24 hour(s))   XR Chest Port 1 View    Narrative    EXAM: XR CHEST PORTABLE 1 VIEW  LOCATION: [facility]  DATE/TIME: 02/23/2020, 4:28 AM    INDICATION: Respiratory failure.  COMPARISON: 02/21/2020.      Impression    IMPRESSION: Endotracheal tube in place with tip 2.5 cm above the chai. There may be a couple enteric tubes present. Tips are below the diaphragm. Right central venous catheter tip projects over the right atrium, unchanged. Cardiac enlargement with   increased pulmonary vascularity. Findings are suggestive of fluid overload or CHF. Mild hazy bilateral perihilar infiltrates versus edema. Old right rib fracture. Compared with the prior study, bilateral mid lung infiltrates have improved.            Aida Winter MD  ([facility]  Office: [phone]  Cell: [phone]

## 2020-02-24 ENCOUNTER — APPOINTMENT (OUTPATIENT)
Dept: GENERAL RADIOLOGY | Facility: CLINIC | Age: 30
DRG: 871 | End: 2020-02-24
Payer: COMMERCIAL

## 2020-02-24 LAB
ALBUMIN SERPL-MCNC: 2.9 G/DL (ref 3.4–5)
ALP SERPL-CCNC: 109 U/L (ref 40–150)
ALT SERPL W P-5'-P-CCNC: 40 U/L (ref 0–50)
AMMONIA PLAS-SCNC: 44 UMOL/L (ref 10–50)
ANION GAP SERPL CALCULATED.3IONS-SCNC: 10 MMOL/L (ref 3–14)
AST SERPL W P-5'-P-CCNC: 149 U/L (ref 0–45)
BILIRUB DIRECT SERPL-MCNC: 13.5 MG/DL (ref 0–0.2)
BILIRUB SERPL-MCNC: 16.8 MG/DL (ref 0.2–1.3)
BUN SERPL-MCNC: 39 MG/DL (ref 7–30)
CALCIUM SERPL-MCNC: 8.5 MG/DL (ref 8.5–10.1)
CHLORIDE SERPL-SCNC: 100 MMOL/L (ref 94–109)
CO2 SERPL-SCNC: 23 MMOL/L (ref 20–32)
CREAT SERPL-MCNC: 4.07 MG/DL (ref 0.52–1.04)
ERYTHROCYTE [DISTWIDTH] IN BLOOD BY AUTOMATED COUNT: 22.2 % (ref 10–15)
GFR SERPL CREATININE-BSD FRML MDRD: 14 ML/MIN/{1.73_M2}
GLUCOSE BLDC GLUCOMTR-MCNC: 100 MG/DL (ref 70–99)
GLUCOSE BLDC GLUCOMTR-MCNC: 103 MG/DL (ref 70–99)
GLUCOSE BLDC GLUCOMTR-MCNC: 105 MG/DL (ref 70–99)
GLUCOSE BLDC GLUCOMTR-MCNC: 88 MG/DL (ref 70–99)
GLUCOSE BLDC GLUCOMTR-MCNC: 92 MG/DL (ref 70–99)
GLUCOSE SERPL-MCNC: 107 MG/DL (ref 70–99)
HBV SURFACE AB SERPL IA-ACNC: 569.55 M[IU]/ML
HBV SURFACE AB SERPL IA-ACNC: 666.67 M[IU]/ML
HBV SURFACE AG SERPL QL IA: NONREACTIVE
HCT VFR BLD AUTO: 23.5 % (ref 35–47)
HGB BLD-MCNC: 7.6 G/DL (ref 11.7–15.7)
INR PPP: 1.79 (ref 0.86–1.14)
MAGNESIUM SERPL-MCNC: 2.1 MG/DL (ref 1.6–2.3)
MCH RBC QN AUTO: 32.9 PG (ref 26.5–33)
MCHC RBC AUTO-ENTMCNC: 32.3 G/DL (ref 31.5–36.5)
MCV RBC AUTO: 102 FL (ref 78–100)
PHOSPHATE SERPL-MCNC: 3 MG/DL (ref 2.5–4.5)
PLATELET # BLD AUTO: 152 10E9/L (ref 150–450)
POTASSIUM SERPL-SCNC: 3.6 MMOL/L (ref 3.4–5.3)
PROT SERPL-MCNC: 6.2 G/DL (ref 6.8–8.8)
RBC # BLD AUTO: 2.31 10E12/L (ref 3.8–5.2)
SODIUM SERPL-SCNC: 133 MMOL/L (ref 133–144)
VANCOMYCIN SERPL-MCNC: 38.3 MG/L
WBC # BLD AUTO: 20.8 10E9/L (ref 4–11)

## 2020-02-24 PROCEDURE — 40000986 XR ABDOMEN PORT 1 VW

## 2020-02-24 PROCEDURE — 25000132 ZZH RX MED GY IP 250 OP 250 PS 637: Performed by: HOSPITALIST

## 2020-02-24 PROCEDURE — 93010 ELECTROCARDIOGRAM REPORT: CPT | Performed by: INTERNAL MEDICINE

## 2020-02-24 PROCEDURE — 40000275 ZZH STATISTIC RCP TIME EA 10 MIN

## 2020-02-24 PROCEDURE — 82248 BILIRUBIN DIRECT: CPT | Performed by: INTERNAL MEDICINE

## 2020-02-24 PROCEDURE — 84100 ASSAY OF PHOSPHORUS: CPT | Performed by: INTERNAL MEDICINE

## 2020-02-24 PROCEDURE — 85610 PROTHROMBIN TIME: CPT | Performed by: INTERNAL MEDICINE

## 2020-02-24 PROCEDURE — 90937 HEMODIALYSIS REPEATED EVAL: CPT

## 2020-02-24 PROCEDURE — 80202 ASSAY OF VANCOMYCIN: CPT | Performed by: INTERNAL MEDICINE

## 2020-02-24 PROCEDURE — 82140 ASSAY OF AMMONIA: CPT | Performed by: INTERNAL MEDICINE

## 2020-02-24 PROCEDURE — 83735 ASSAY OF MAGNESIUM: CPT | Performed by: INTERNAL MEDICINE

## 2020-02-24 PROCEDURE — C9113 INJ PANTOPRAZOLE SODIUM, VIA: HCPCS | Performed by: HOSPITALIST

## 2020-02-24 PROCEDURE — 27210437 ZZH NUTRITION PRODUCT SEMIELEM INTERMED LITER

## 2020-02-24 PROCEDURE — 25000128 H RX IP 250 OP 636: Performed by: INTERNAL MEDICINE

## 2020-02-24 PROCEDURE — 99233 SBSQ HOSP IP/OBS HIGH 50: CPT | Mod: GC | Performed by: INTERNAL MEDICINE

## 2020-02-24 PROCEDURE — 85027 COMPLETE CBC AUTOMATED: CPT | Performed by: INTERNAL MEDICINE

## 2020-02-24 PROCEDURE — 80053 COMPREHEN METABOLIC PANEL: CPT | Performed by: INTERNAL MEDICINE

## 2020-02-24 PROCEDURE — 94003 VENT MGMT INPAT SUBQ DAY: CPT

## 2020-02-24 PROCEDURE — 20000003 ZZH R&B ICU

## 2020-02-24 PROCEDURE — 25800030 ZZH RX IP 258 OP 636: Performed by: INTERNAL MEDICINE

## 2020-02-24 PROCEDURE — 25000128 H RX IP 250 OP 636: Performed by: HOSPITALIST

## 2020-02-24 PROCEDURE — 93005 ELECTROCARDIOGRAM TRACING: CPT

## 2020-02-24 PROCEDURE — 25000128 H RX IP 250 OP 636: Performed by: STUDENT IN AN ORGANIZED HEALTH CARE EDUCATION/TRAINING PROGRAM

## 2020-02-24 PROCEDURE — 5A1D70Z PERFORMANCE OF URINARY FILTRATION, INTERMITTENT, LESS THAN 6 HOURS PER DAY: ICD-10-PCS | Performed by: RADIOLOGY

## 2020-02-24 PROCEDURE — 00000146 ZZHCL STATISTIC GLUCOSE BY METER IP

## 2020-02-24 PROCEDURE — 40000239 ZZH STATISTIC VAT ROUNDS

## 2020-02-24 RX ORDER — ALBUMIN, HUMAN INJ 5% 5 %
250 SOLUTION INTRAVENOUS
Status: DISCONTINUED | OUTPATIENT
Start: 2020-02-24 | End: 2020-02-24

## 2020-02-24 RX ORDER — FENTANYL CITRATE 50 UG/ML
50 INJECTION, SOLUTION INTRAMUSCULAR; INTRAVENOUS
Status: DISCONTINUED | OUTPATIENT
Start: 2020-02-24 | End: 2020-02-25

## 2020-02-24 RX ORDER — HEPARIN SODIUM 5000 [USP'U]/.5ML
5000 INJECTION, SOLUTION INTRAVENOUS; SUBCUTANEOUS EVERY 8 HOURS
Status: DISCONTINUED | OUTPATIENT
Start: 2020-02-24 | End: 2020-02-24

## 2020-02-24 RX ORDER — ALBUMIN (HUMAN) 12.5 G/50ML
50 SOLUTION INTRAVENOUS
Status: DISCONTINUED | OUTPATIENT
Start: 2020-02-24 | End: 2020-02-24

## 2020-02-24 RX ADMIN — Medication: at 09:45

## 2020-02-24 RX ADMIN — RIFAXIMIN 550 MG: 550 TABLET ORAL at 09:36

## 2020-02-24 RX ADMIN — FENTANYL CITRATE 50 MCG: 50 INJECTION, SOLUTION INTRAMUSCULAR; INTRAVENOUS at 22:57

## 2020-02-24 RX ADMIN — THIAMINE HYDROCHLORIDE 100 MG: 100 INJECTION, SOLUTION INTRAMUSCULAR; INTRAVENOUS at 08:24

## 2020-02-24 RX ADMIN — FENTANYL CITRATE 50 MCG: 50 INJECTION, SOLUTION INTRAMUSCULAR; INTRAVENOUS at 04:50

## 2020-02-24 RX ADMIN — PANTOPRAZOLE SODIUM 40 MG: 40 INJECTION, POWDER, FOR SOLUTION INTRAVENOUS at 08:27

## 2020-02-24 RX ADMIN — MINERAL OIL, PETROLATUM: 425; 573 OINTMENT OPHTHALMIC at 04:33

## 2020-02-24 RX ADMIN — FENTANYL CITRATE 50 MCG: 50 INJECTION, SOLUTION INTRAMUSCULAR; INTRAVENOUS at 07:32

## 2020-02-24 RX ADMIN — PROPOFOL 20 MCG/KG/MIN: 10 INJECTION, EMULSION INTRAVENOUS at 03:11

## 2020-02-24 RX ADMIN — PROPOFOL 25 MCG/KG/MIN: 10 INJECTION, EMULSION INTRAVENOUS at 09:27

## 2020-02-24 RX ADMIN — MINERAL OIL, PETROLATUM 3.5 G: 425; 573 OINTMENT OPHTHALMIC at 11:36

## 2020-02-24 RX ADMIN — MEROPENEM 500 MG: 500 INJECTION, POWDER, FOR SOLUTION INTRAVENOUS at 14:14

## 2020-02-24 RX ADMIN — POLYETHYLENE GLYCOL 3350 17 G: 17 POWDER, FOR SOLUTION ORAL at 08:17

## 2020-02-24 RX ADMIN — SODIUM CHLORIDE 300 ML: 9 INJECTION, SOLUTION INTRAVENOUS at 09:43

## 2020-02-24 RX ADMIN — HEPARIN SODIUM 3000 UNITS: 1000 INJECTION, SOLUTION INTRAVENOUS; SUBCUTANEOUS at 12:09

## 2020-02-24 RX ADMIN — FENTANYL CITRATE 50 MCG: 50 INJECTION, SOLUTION INTRAMUSCULAR; INTRAVENOUS at 12:06

## 2020-02-24 RX ADMIN — CHLORHEXIDINE GLUCONATE 15 ML: 1.2 RINSE ORAL at 08:17

## 2020-02-24 RX ADMIN — FENTANYL CITRATE 50 MCG: 50 INJECTION, SOLUTION INTRAMUSCULAR; INTRAVENOUS at 18:49

## 2020-02-24 RX ADMIN — RIFAXIMIN 550 MG: 550 TABLET ORAL at 22:33

## 2020-02-24 RX ADMIN — FOLIC ACID 1 MG: 1 TABLET ORAL at 08:17

## 2020-02-24 RX ADMIN — DEXTRAN 70, GLYCERIN, HYPROMELLOSE 1 DROP: 1; 2; 3 SOLUTION/ DROPS OPHTHALMIC at 11:34

## 2020-02-24 RX ADMIN — SODIUM CHLORIDE 250 ML: 9 INJECTION, SOLUTION INTRAVENOUS at 09:44

## 2020-02-24 RX ADMIN — HEPARIN SODIUM 3000 UNITS: 1000 INJECTION, SOLUTION INTRAVENOUS; SUBCUTANEOUS at 12:08

## 2020-02-24 ASSESSMENT — ACTIVITIES OF DAILY LIVING (ADL)
ADLS_ACUITY_SCORE: 15

## 2020-02-24 ASSESSMENT — MIFFLIN-ST. JEOR
SCORE: 2055.5
SCORE: 2055.5

## 2020-02-24 NOTE — PROGRESS NOTES
Extubation Note    Successful completion of SBT (Yes or No): YEs  Per Dr. Joyner verbal he was present in the room at the time of extubation.  Extubation time:1315    Patient assessment:  Lung sounds:Clear dim  Stridor Present (Yes or No): No  Patient tolerance: tolerated well    Oxygen device:  aerosol  Liter flow: 8  FiO2: 40%  SpO2: 97    Plan: Continue to monitor.

## 2020-02-24 NOTE — PROGRESS NOTES
S:  30 y/o WF @ HD16 admitted for alcoholic hepatitis with a prolonged and protracted hospital course complicated by oliguric EBEN, encephalopathy, anemia, renal tubular acidosis, hyponatremia, abdominal pain, ascites, lactic acidosis, hypokalemia, hypophosphatemia, hypoxemic respiratory failure, hypercapnic respiratory failure, candidemia, leukocytosis, hematochezia, and back pain.    Now @:    PPD3 s/p endotracheal intubation and R radial arterial line placement    No acute events o/n.    On exam, pt is intubated and sedated.  She appears to be in no pain, is NPO, tolerating TFs, has no emesis, is urinating a scant amount of urine via [place], is passing flatus/having BMs, and is bedridden.    O:  99.4  99.4  103 ()  115/65 (115-119/65-66)  23 (8-28)  100% ETT () [VC+ 420/16/5/40%]    I/O:  1329/66/+1263  I:  Propofol:  309      NJT:  300      TF:  720  O:  F:  66      BM:  x3    GEN:  WD obese WF moderate distress  NEURO:  GCS=11T (4/1/6), RASS=-2  PSYCH:  Sedated, re  sponding appropriately to yes/no questions.  HENT:  Jaundice noted throughout.  Scant bloody mucus from nares.  NCAT otherwise.  Eyes:  Chemosis B has improved and almost completely resolved.  Jaundice.  CAS.  Neck:  Obese neck.  No masses appreciated.  Acne noted.  CV:  RRR, no M/R/G.  PULM:  CTAB, no W/R/R  GI:  Soft, NTND, (+) hypoactive BS x4 quadrants.  Skin:  Jaundiced skin noted through all dermatomes.  The patient is nonflushed, nondiaphoretic.     Meds:  Reviewed  -----KYAW-----  Chlorhexidine 15mL MTH BID*  Folic acid 1mg PO daily  Novolog MDSSI 1-7u subcutaneous q4h (0 units over prior 24 hours)  Meropenem 500mg IV daily (day 4 of 14)  Pantoprazole 40mg IV daily  Polyethylene glycol 17gm PO daily  Refresh PM 1 felicity OPH q6h*  Rifaximin 550mg PO BID  Thiamine 100mg PO daily  -----GGT-----  Propofol 5-75mcg/kg/min IV (@ 25mcg/kg/min)  -----PRN-----  Artificial tears (0.1-0.2-0.3%) 1 gtt OPH q1h PRN dry eyes*  Bisacodyl 10mg TX daily  PRN constipation*  Fentanyl 50-100mcg IV q1h PRN pain (50mcg IV @ 0134 / 0848 / 1846 // 0450 / 0732 / 1206 )*  Hypromellose-dextran (0.1-0.3%) 1gtt OPH q1h PRN dry eyes ( // 1 drop @ 1134 / )  Ipratropium-albuterol (0.5-2.5/3mL) 3mL NEB q4h PRN wheezing  Lidocaine for VAD*  Magnesium hydroxide 30mL PO daily PRN constipation*  Melatonin 1mg PO at bedtime PRN insomnia*  Metoclopramide 10mg PO/IV q6h PRN nausea/vomiting*  Metoprolol 5mg IV q4h PRN HR > 110, SBP < 90  Naloxone 0.1-0.4mg IV q2min PRN opioid intoxication  Ondansetron 4mg PO/IV q6h PRN nausea/vomiting  Oxycodone 2.5mg PO q6h PRN pain  Prochlorperazine 10mg/10mg/25mg IV/PO/KS q6h/q6h/q12h PRN nausea/vomiting*  Senna-docusate (8.6-50) 1-2 tabs PO BID PRN constipation*  Sodium chloride 1 spray ROOPA q1h PRN sinus congestion*  Hyper-/Hypo-glycemia protocol  Lyte repletion  Mag  K  Phos    Labs:  Reviewed  CBC:  21.7>7.623.1<165  -//->  20.8>7.623.5<152  Renal:  133/3.399/2431/3.16<98  -//->  133/3.6100/2339/4.07<107  Karthik/Mag/Phos:  8.3/2.1/2.9  -//->  8.5/2.1/3.0  LFT:  6.2/3.117.1/13.8181/02637/----/--  -//->  6.2/2.916.8/13.5149/69553/----/--    FS ()  INR:  1.90  -//->  1.79     Hep B ():  Negative  Hep C ():  Negative  UCx ():  >100,000 candida  BCx ():  Fusobacterium nucleatum  UA ():  Spec grav > 1.035, pH 5.5, protein 100, ketones 10, (+) LE, (+) blood, (-) nitrites, >182 WBC, >182 RBC  UCx ():  50K to 100K  BCx ():  NGTD    Rads:  No new radiologic studies in prior 24 hours     Ancillary studies:  Reviewed  EKG ():  ST, QTc = 479  Echocardiogram ():  EF 60-65%     Consults:  ICU PRIMARY  Nephrology:  HD  IR:  RIJ tunneled HD catheter  GI:  Hepatic failure  Chem dep:  Recommend [facility] appointment at discharge (SIGNED OFF)  Psychiatry:  Recommend [facility] via chem dep at discharge (SIGNED OFF)  Cardiology:  Consulted for possible cardiac constriction; not visualized on ECHO (SIGNED OFF)     A/P:  29  y/o WF @ HD16 admitted for alcoholic hepatitis with a prolonged and protracted hospital course complicated by oliguric EBEN, encephalopathy, anemia, renal tubular acidosis, hyponatremia, abdominal pain, ascites, lactic acidosis, hypokalemia, hypophosphatemia, hypoxemic respiratory failure, hypercapnic respiratory failure, candidemia, leukocytosis, hematochezia, and back pain.    Now @:    PPD3 s/p endotracheal intubation and R radial arterial line placement    By system:     NEURO/PSYCH:  GCS=11T, RASS=-2.  Mentation markedly improved from Friday.  Pain appears adequately controlled.  Will plan on sedation holiday, SBT, and extubation as able.  Chemosis resolved; discontinue all ophthalmologic treatments except PRN hydromellulose drops.  -Continue propofol with fentanyl IVP  -Sedation holiday followed by SBT; if passes, extubation  -Continue hypromellose-dextran drops  -Discontinue refresh and artificial tears     CV:  Intermittent tachycardia.  Normotensive.  Unremarkable exam.  -Continuous telemetry  -EKG QAM  -q1h vitals     PULM:  Respiratory failure resolved.  Oxygenating well on unremarkable vital signs.  Plan for SBT and extubation if able.  -SBT and extubation if able     GI/Nutrition:  TFs to goal.  Continue folic acid, thiamine, rifaximin, and PEG for alcohol abuse, liver failure, and bowel regimen.  Narrow antiemetics to PRN ondansetron.  MELD = 39.  If extubates, SLP evaluation tomorrow.  -NPO  -TFs per RD  -LFTs QAM  -Continue rifaximin, folate, and thiamine  -Continue PEG for bowel regimen  -Narrow antiemetics to PRN ondansetron     Renal/F/E:  Anuric renal failure.  Nephrology following.  Intermittent hemodialysis via tunneled RIJ HD catheter.  Labs QAM.  -Renal panel with full lytes QAM  -IHD per nephrology     ID:  AF, WBC remains elevated.  Meropenem for GNR bacteremia (anticipate 14 days of therapy).  Source remains occult.  Will plan on discontinuing vancomycin tomorrow as pulmonary issues have  largely resolved if doing well s/p extubation.    -Meropenem for GNR bacteremia  -Empiric vancomycin for presumptive HCAP pneumonia  -Daily CBC     H&H:  CBC >7/21.  Daily CBC.  Daily INR to trend hepatic dysfunction and to trend MELD.  -Daily CBC  -Daily INR     ENDO:  FSG adequate; continue MDSSI.  No sliding scale insulin requirements.  Plan to remove from EMR if no need for sliding scale insulin coverage once tolerating diet.  CTM per protocol.  -FSGs and MDSSI per protocol     MSK:  PT/OT once extubated.  Offloading per protocol.  -PT/OT once extubated     Skin:  Skin care per ICU RN; no skin issues at this time.  -Skin care per protocol     T/L/D:  PICC, tunneled RIJ HD CVC, F, NJT, OGT, ETT, R radial arterial line  -Continue all lines in this critically ill patient     PROPHY:  PULM:  HOB > 30 degrees, oral care, minimize ventilator settings  GI:  PPI, TFs  DVT/PE:  Heparin 5000u subcutaneous TID starting today, SCDs     DISPO:  Remain in ICU    MAJ Moise Joyner MD, FS  F1, PGY9  N SICU Fellow     S/d/w Dr. Holland who agrees with the plan of care as outlined above

## 2020-02-24 NOTE — PROGRESS NOTES
More alert today, this PM started following commands consitently and shakes/nods head. Able to keep eyes open and track RN. Restless at times.   Up in chair for 4 hours. PS trial 5/10 for 50 min. Tolerated well, exercise purposes only.   BM x1. TF increased per order. Minimal UOP, still brown, MD aware. Plan for dialysis tomorrow.   LS coarse/dim. Yellow/tan secretions. Sputum cultures sent.     Family at bedside this afternoon, updates given.

## 2020-02-24 NOTE — PROGRESS NOTES
Potassium   Date Value Ref Range Status   02/24/2020 3.6 3.4 - 5.3 mmol/L Final     Hemoglobin   Date Value Ref Range Status   02/24/2020 7.6 (L) 11.7 - 15.7 g/dL Final     Creatinine   Date Value Ref Range Status   02/24/2020 4.07 (H) 0.52 - 1.04 mg/dL Final     Urea Nitrogen   Date Value Ref Range Status   02/24/2020 39 (H) 7 - 30 mg/dL Final     Sodium   Date Value Ref Range Status   02/24/2020 133 133 - 144 mmol/L Final     INR   Date Value Ref Range Status   02/24/2020 1.79 (H) 0.86 - 1.14 Final       DIALYSIS PROCEDURE NOTE  Hepatitis status of previous patient on machine log was checked and verified ok to use with this patients hepatitis status.  Patient dialyzed for 3.5 hrs. on a 3 K bath with a net fluid removal of  3.2L.  A BFR of 400 ml/min was obtained via a Right tunneled Catheter   The treatment plan was dicussed with Dr. Powers during the treatment.  Total heparin received during the treatment: 0 units.     Line flushed, clamped and capped with heparin 1:1000 2 mL (2000 units) per lumen  Meds  given: none Complications: none    Education not provided as patient is unresponsive and sedated.  No family present  ICEBOAT? Timeout performed pre-treatment  I: Patient was identified using 2 identifiers  C: Consent obtained/verified current before treatment  E: Equipment preventative maintenance is current and dialysis delivery system OK to use  B: Hepatitis B Surface Antigen: Negative; Draw Date: 2/23/20      Hepatitis B Surface Antibody: Immune; Draw Date: 2/23/20  O: Dialysis orders present and complete prior to treatment  A: Vascular access verified and assessed prior to treatment  T: Treatment was performed at a clinically appropriate time  ?: Patient was allowed to ask questions and address concerns prior to treatment  See flowsheet in EPIC for further details and post assessment.  Machine water alarm in place and functioning. Transducer pods intact and checked every 15min.  Pt returned dialyzed in ICU  room 375  Report received from: HOANG Rubi RN  Report given to: HOANG Rubi RN  Chlorine/Chloramine water system checked every 4 hours.

## 2020-02-24 NOTE — PROGRESS NOTES
[facility] ICU RESPIRATORY NOTE    Date of Admission: 2/9/2020  Date of Intubation (most recent): 2/21/2020  Reason for Mechanical Ventilation: Resp failure  Number of Days on Mechanical Ventilation: 4  Met Criteria for Spontaneous Breathing Trial: No   Reason for No Spontaneous Breathing Trial: per MD    Significant Events Today: None overnight    ABG Results:   Recent Labs   Lab 02/23/20  0554 02/22/20  0555 02/21/20  2220 02/21/20  1654   PH 7.45 7.28* 7.29* 7.30*   PCO2 35 46* 43 45   PO2 156* 150* 100 130*   HCO3 24 22 21 22   O2PER  --  50 50% 50%     Current Vent Settings: Ventilation Mode: CMV/AC  (Continuous Mandatory Ventilation/ Assist Control)  FiO2 (%): 40 %  Rate Set (breaths/minute): 16 breaths/min  Tidal Volume Set (mL): 420 mL  PEEP (cm H2O): 10 cmH2O  Pressure Support (cm H2O): 5 cmH2O  Oxygen Concentration (%): 40 %  Resp: 24    Plan: Continue patient on full vent support and assess for weaning readiness in AM.     Claude Marinelli, RT

## 2020-02-24 NOTE — PHARMACY-VANCOMYCIN DOSING SERVICE
Pharmacy Vancomycin Note  Date of Service 2020  Patient's  1990   29 year old, female    Indication: Healthcare-Associated Pneumonia and Sepsis  Goal Trough Level: 15-20 mg/L  Day of Therapy: 4  Current Vancomycin regimen:  Intermittent, based on level    Creatinine for last 3 days  2020:  7:00 PM Creatinine 3.13 mg/dL  2020:  4:12 AM Creatinine 3.35 mg/dL  2020:  5:15 AM Creatinine 3.16 mg/dL  2020:  4:30 AM Creatinine 4.07 mg/dL    Recent Vancomycin Levels (past 3 days)  2020:  8:47 AM Vancomycin Level 27.8 mg/L  2020:  5:15 AM Vancomycin Level 17.4 mg/L  2020:  4:30 AM Vancomycin Level 38.3 mg/L    Vancomycin IV Administrations (past 72 hours)                   vancomycin 2000 mg in 0.9% NaCl 500 ml intermittent infusion 2,000 mg (mg) 2,000 mg Given 20 1533                Nephrotoxins and other renal medications (From now, onward)    Start     Dose/Rate Route Frequency Ordered Stop    20 2231  vancomycin place fajardo - receiving intermittent dosing      1 each Intravenous SEE ADMIN INSTRUCTIONS 20 2232               Contrast Orders - past 72 hours (72h ago, onward)    Start     Dose/Rate Route Frequency Ordered Stop    20 1915  iopamidol (ISOVUE-370) solution 100 mL      100 mL Intravenous ONCE 20 1901 20 1934    20 1645  perflutren diluted 1mL to 2mL with saline (OPTISON) diluted injection 9 mL     Note to Pharmacy:  NDC# 4790-3021-28    9 mL Intravenous ONCE 20 1633 20 1634          Interpretation of levels and current regimen:  Trough level is  Supratherapeutic    Has serum creatinine changed > 50% in last 72 hours: No    Urine output:  anuric    Renal Function: ARF on Dialysis    Plan:  1.  No dose today  2.  Pharmacy will check trough levels as appropriate in am tomorrow.    3. Serum creatinine levels will be ordered daily for the first week of therapy and at least twice weekly for subsequent weeks.       Damaris Jeffries [facility]        .

## 2020-02-24 NOTE — PROGRESS NOTES
[facility]     Renal Progress Note       SHOR         Assessment/Plan:     1.  Oliguric EBEN 2 ATN + HRS 1.  Multiple other insults including septicemia, IV Contrast ( 2/9 and 2/21) , High vanco levels.   Pt is getting 3rd run of HD today .    - Attempt 3 L UF. Overall hypervolemic with but with significant 3rd spacing. Not on pressors. Will use Albumin PRN.     2.  Sepsis syndrome.  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  Source is unclear.  ? Septic pulm emboli.  On vanco/beatris.  - Dose vanc per levels ( per pharmacy)     3.  Resp failure.  Pt remains intubated/sedated.    A.  Vent mgmt per ICU team.    4.  EtOHic hepatitis.  Pt's bili remains high and continues to trend down.  She is icteric and jaundiced.  Coming off steroids per GI.     5.  Encephalopathy.   - intubated/ sedated.     6.  Anemia.  Hgb stable.   A.  Follow hb, clinically.  B.  Transfuse prn.          Interval History:     Unable.  Pt is still intubated/sedated.  Seen/examined on HD. Not on pressors. Tube feeds +.          Medications and Allergies:       - MEDICATION INSTRUCTIONS for Dialysis Patients -   Does not apply See Admin Instructions     sodium chloride 0.9%  250 mL Intravenous Once in dialysis     sodium chloride 0.9%  300 mL Hemodialysis Machine Once     anticoagulant citrate  3 mL Intracatheter Once     anticoagulant citrate  3 mL Intracatheter Once     chlorhexidine  15 mL Swish & Spit BID     folic acid  1 mg Oral or Feeding Tube Daily     heparin  3 mL Intracatheter During Hemodialysis (from stock)     heparin  3 mL Intracatheter During Hemodialysis (from stock)     insulin aspart  1-7 Units Subcutaneous Q4H     meropenem  500 mg Intravenous Q24H     - MEDICATION INSTRUCTIONS -   Does not apply Once     pantoprazole (PROTONIX) IV  40 mg Intravenous Daily with breakfast     polyethylene glycol  17 g Oral or Feeding Tube Daily     Refresh P.M.   Both Eyes Q6H     rifaximin  550 mg Oral or Feeding Tube BID     sodium  "chloride (PF)  10 mL Intracatheter Q7 Days     sodium chloride (PF)  10 mL Intracatheter Q8H     thiamine  100 mg Intravenous Daily     vancomycin place fajardo - receiving intermittent dosing  1 each Intravenous See Admin Instructions     Allergies   Allergen Reactions     Penicillins Unknown     Hives            Physical Exam:     Vitals were reviewed    Heart Rate: 106, Blood pressure 115/65, pulse 116, temperature 98.4  F (36.9  C), temperature source Oral, resp. rate 17, height 1.727 m (5' 8\"), weight 128.2 kg (282 lb 10.1 oz), SpO2 97 %, not currently breastfeeding.  Wt Readings from Last 3 Encounters:   02/24/20 128.2 kg (282 lb 10.1 oz)   02/09/15 95.3 kg (210 lb)   01/22/15 108.4 kg (239 lb)       GENERAL APPEARANCE: lying in bed, intubated, sedated, - on dialysis, running well.   HEENT: Icterus +, acneiform rash on lower half of face and upper chest   RESP: coarse vent sounds B  CV: Reg, tachy, nl S1/S2  ABDOMEN: o/s/nt, + distended  EXTREMITIES/SKIN: 2+ ble edema  OTHER:  R arm PICC; + loaiza c bloody urine; + RIJ TDC         Data:     CBC RESULTS:     Recent Labs   Lab 02/24/20  0430 02/23/20 0515 02/22/20 0412 02/21/20 1900 02/21/20 1821 02/21/20  0515 02/20/20  0559   WBC 20.8* 21.7* 23.7* 23.9*  --  20.3* 19.5*   RBC 2.31* 2.26* 2.39* 1.97*  --  2.19* 2.10*   HGB 7.6* 7.6* 8.0* 6.8* 6.8* 7.3* 7.3*   HCT 23.5* 23.1* 24.6* 20.6*  --  22.5* 21.7*    165 205 210  --  251 247     Basic Metabolic Panel:  Recent Labs   Lab 02/24/20  0430 02/23/20  0515 02/22/20  0412 02/21/20  1900 02/21/20  0515 02/20/20  1829 02/20/20  0559    133 133 133 130* 131* 129*   POTASSIUM 3.6 3.3* 3.7 3.9 4.3  --  4.3   CHLORIDE 100 99 100 99 98  --  97   CO2 23 24 22 21 19*  --  16*   BUN 39* 31* 40* 38* 52*  --  43*   CR 4.07* 3.16* 3.35* 3.13* 3.86*  --  3.42*   * 98 104* 105* 116*  --  101*   EMILY 8.5 8.3* 8.3* 8.3* 9.3  --  9.2     INR  Recent Labs   Lab 02/24/20  0430 02/23/20  0515 02/22/20  0412 " 02/21/20  1900   INR 1.79* 1.90* 1.88* 2.02*      Attestation:   I have reviewed today's relevant vital signs, notes, medications, labs and imaging.    Edward Powers MD  [facility] - Nephrology   [phone]

## 2020-02-24 NOTE — PROGRESS NOTES
Critical vanco level, 38.3. Pharmacist Liz notified, pt on intermittent dosing.   Vera Young RN on 2/24/2020 at 5:18 AM

## 2020-02-24 NOTE — PROGRESS NOTES
Neuro: Following commands intermittently, nodding appropriately at times. PERRL. On propofol at 20 mcg/kg/min and given fentanylx1 to good effect for behavioral signs of pain.   CV: NSR/ST. BP stable and [place] correlating.  R: CMV 40%/10/420/16. Sating well. Copious yellow ETT secretions.   GI/: TF running at 45/hr, due to advance at 1200. 35ml entire shift of UOP brown/bloody with no clots present.  Access: R PICC with unchanged eccymosis, R radial Delia, R internal jugular tunneled dialysis catheter  Skin: jaundiced, pale. Intact redness in groin. Scleral jaundice and edema.   Family: pts mother updated by phone last evening.  Vera Young RN on 2/24/2020 at 6:48 AM

## 2020-02-24 NOTE — PROGRESS NOTES
[facility]  Gastroenterology Progress Note     Pat Delgado MRN# [id number]   YOB: 1990 Age: 29 year old          Assessment and Plan:   Update: Patient intubated and sedated. Has been on daily weaning trial. She remains severly jaundice ( Tbili- trending down at 16.3). LFTs and INR trending down.    Pat Delgado is a 29 year old female who was admitted on 2/9/2020. GI has been following for severe alcoholic hepatitis complicated by kidney failure w/ ATN vs type II hepatorenal syndrome. Developed hypoxemic respiratory failure on 2/22/2020 and intubated and transferred to ICU. Currently on HD-creatinine remains elevated.     Acute metabolic encephalopathy  -Probably multifactorial with both liver and kidney injury, and GNR bacteremia of unknown source- currently on vanco/merepenem  -continue rifaximin BID  -if no BM, can give lactulose either per NGT or per rectum  - no evidence of SBP -given no significant fluid pockets on imaging of abdomen     Acute alcoholic hepatitis with Maddrey score >50  Abdominal pain likely due to gaseous distension- improved   Minimal ascites status post 5 days of cefepime for SBP prophylaxis  Maddrey score of > 50s  -started on steroid trial on 2/16 but will stop today given GNR bacteremia if no other indication per ICU service  -Tbili likely peak- and trending down, INR trending down  -Continue with daily LFT w/ INR      Hyponatremia w/ increase in creatinine and minimal urine output in the setting of anasarca:  -ATN vs hepatorenal syndrome type II  -currently being dialyzed, care per renal team     Anemia:  -slow decrease ( near stable around 7-8) with no sign of overt bleeding, likely anemia from chronic illness  -GI will follow in case of any sign of bleeding          Acute liver failure without hepatic coma  Hyponatremia  Sepsis, due to unspecified organism, unspecified whether acute organ dysfunction present (H)      Interval History:    Intubated and sedated and doing well; no cp, sob, n/v/d, or abd pain.              Review of Systems:   C: NEGATIVE for fever, chills, change in weight  E/M: NEGATIVE for ear, mouth and throat problems  R: NEGATIVE for significant cough or SOB  CV: NEGATIVE for chest pain, palpitations or peripheral edema             Medications:   I have reviewed this patient's current medications    - MEDICATION INSTRUCTIONS for Dialysis Patients -   Does not apply See Admin Instructions     anticoagulant citrate  3 mL Intracatheter Once     anticoagulant citrate  3 mL Intracatheter Once     chlorhexidine  15 mL Swish & Spit BID     folic acid  1 mg Oral or Feeding Tube Daily     heparin  3 mL Intracatheter During Hemodialysis (from stock)     heparin  3 mL Intracatheter During Hemodialysis (from stock)     insulin aspart  1-7 Units Subcutaneous Q4H     meropenem  500 mg Intravenous Q24H     pantoprazole (PROTONIX) IV  40 mg Intravenous Daily with breakfast     polyethylene glycol  17 g Oral or Feeding Tube Daily     Refresh P.M.   Both Eyes Q6H     rifaximin  550 mg Oral or Feeding Tube BID     sodium chloride (PF)  10 mL Intracatheter Q7 Days     sodium chloride (PF)  10 mL Intracatheter Q8H     thiamine  100 mg Intravenous Daily     vancomycin place fajardo - receiving intermittent dosing  1 each Intravenous See Admin Instructions                  Physical Exam:   Vitals were reviewed  Vital Signs with Ranges  Temp:  [98.4  F (36.9  C)-99  F (37.2  C)] 98.4  F (36.9  C)  Heart Rate:  [] 109  Resp:  [9-28] 22  BP: (115-119)/(65-66) 115/65  MAP:  [71 mmHg-95 mmHg] 85 mmHg  Arterial Line BP: ()/(50-77) 112/62  FiO2 (%):  [40 %] 40 %  SpO2:  [92 %-100 %] 97 %  I/O last 3 completed shifts:  In: 1329.21 [I.V.:309.21; NG/GT:300]  Out: 66 [Urine:66]  Cardiovascular: negative, PMI normal. No lifts, heaves, or thrills. RRR. No murmurs, clicks gallops or rub  Respiratory: negative, Percussion normal. Good diaphragmatic  excursion. Lungs clear  Head: Normocephalic. No masses, lesions, tenderness or abnormalities  Neck: Neck supple. No adenopathy. Thyroid symmetric, normal size,, Carotids without bruits.  Abdomen: Abdomen soft. BS normal. No masses, organomegaly, positive findings: distended           Data:   I reviewed the patient's new clinical lab test results.   Recent Labs   Lab Test 02/24/20 0430 02/23/20  0515 02/22/20  0412   WBC 20.8* 21.7* 23.7*   HGB 7.6* 7.6* 8.0*   * 102* 103*    165 205   INR 1.79* 1.90* 1.88*     Recent Labs   Lab Test 02/24/20 0430 02/23/20  0515 02/22/20  0412   POTASSIUM 3.6 3.3* 3.7   CHLORIDE 100 99 100   CO2 23 24 22   BUN 39* 31* 40*   ANIONGAP 10 10 11     Recent Labs   Lab Test 02/24/20  0430 02/23/20  0515 02/22/20  0412  02/21/20  1430  02/14/20  0653  02/09/20  1755 02/09/20  1547   ALBUMIN 2.9* 3.1* 3.5   < >  --    < >  --    < >  --  1.6*   BILITOTAL 16.8* 17.1* 19.9*   < >  --    < >  --    < >  --  8.1*   ALT 40 46 43   < >  --    < >  --    < >  --  33   * 181* 175*   < >  --    < >  --    < >  --  203*   PROTEIN  --   --   --   --  100*  --  100*  --  100*  --    LIPASE  --   --   --   --   --   --   --   --   --  119    < > = values in this interval not displayed.       I reviewed the patient's new imaging results.    All laboratory data reviewed  All imaging studies reviewed by me.    Aminata Rangel PA-C,  2/24/2020  [facility]  Office : [phone]  Cell: [phone] (Dr. Hobson)  Cell: [phone] (Aminata Rangel PA-C)

## 2020-02-25 ENCOUNTER — APPOINTMENT (OUTPATIENT)
Dept: SPEECH THERAPY | Facility: CLINIC | Age: 30
DRG: 871 | End: 2020-02-25
Payer: COMMERCIAL

## 2020-02-25 ENCOUNTER — APPOINTMENT (OUTPATIENT)
Dept: PHYSICAL THERAPY | Facility: CLINIC | Age: 30
DRG: 871 | End: 2020-02-25
Attending: INTERNAL MEDICINE
Payer: COMMERCIAL

## 2020-02-25 LAB
ALBUMIN SERPL-MCNC: 3 G/DL (ref 3.4–5)
ALP SERPL-CCNC: 110 U/L (ref 40–150)
ALT SERPL W P-5'-P-CCNC: 38 U/L (ref 0–50)
ANION GAP SERPL CALCULATED.3IONS-SCNC: 7 MMOL/L (ref 3–14)
AST SERPL W P-5'-P-CCNC: 151 U/L (ref 0–45)
BACTERIA SPEC CULT: ABNORMAL
BACTERIA SPEC CULT: NORMAL
BILIRUB DIRECT SERPL-MCNC: 13.2 MG/DL (ref 0–0.2)
BILIRUB SERPL-MCNC: 16.6 MG/DL (ref 0.2–1.3)
BUN SERPL-MCNC: 30 MG/DL (ref 7–30)
CALCIUM SERPL-MCNC: 8.4 MG/DL (ref 8.5–10.1)
CHLORIDE SERPL-SCNC: 101 MMOL/L (ref 94–109)
CO2 SERPL-SCNC: 27 MMOL/L (ref 20–32)
CREAT SERPL-MCNC: 3.27 MG/DL (ref 0.52–1.04)
ERYTHROCYTE [DISTWIDTH] IN BLOOD BY AUTOMATED COUNT: 21.8 % (ref 10–15)
GFR SERPL CREATININE-BSD FRML MDRD: 18 ML/MIN/{1.73_M2}
GLUCOSE BLDC GLUCOMTR-MCNC: 102 MG/DL (ref 70–99)
GLUCOSE BLDC GLUCOMTR-MCNC: 107 MG/DL (ref 70–99)
GLUCOSE BLDC GLUCOMTR-MCNC: 111 MG/DL (ref 70–99)
GLUCOSE BLDC GLUCOMTR-MCNC: 62 MG/DL (ref 70–99)
GLUCOSE BLDC GLUCOMTR-MCNC: 94 MG/DL (ref 70–99)
GLUCOSE BLDC GLUCOMTR-MCNC: 96 MG/DL (ref 70–99)
GLUCOSE BLDC GLUCOMTR-MCNC: 98 MG/DL (ref 70–99)
GLUCOSE SERPL-MCNC: 103 MG/DL (ref 70–99)
HCT VFR BLD AUTO: 22.8 % (ref 35–47)
HGB BLD-MCNC: 7.5 G/DL (ref 11.7–15.7)
INR PPP: 1.67 (ref 0.86–1.14)
INTERPRETATION ECG - MUSE: NORMAL
Lab: ABNORMAL
Lab: NORMAL
MAGNESIUM SERPL-MCNC: 1.9 MG/DL (ref 1.6–2.3)
MCH RBC QN AUTO: 33.8 PG (ref 26.5–33)
MCHC RBC AUTO-ENTMCNC: 32.9 G/DL (ref 31.5–36.5)
MCV RBC AUTO: 103 FL (ref 78–100)
PHOSPHATE SERPL-MCNC: 1.8 MG/DL (ref 2.5–4.5)
PLATELET # BLD AUTO: 147 10E9/L (ref 150–450)
POTASSIUM SERPL-SCNC: 3.5 MMOL/L (ref 3.4–5.3)
PROT SERPL-MCNC: 6.5 G/DL (ref 6.8–8.8)
RBC # BLD AUTO: 2.22 10E12/L (ref 3.8–5.2)
SODIUM SERPL-SCNC: 135 MMOL/L (ref 133–144)
SPECIMEN SOURCE: ABNORMAL
SPECIMEN SOURCE: NORMAL
VANCOMYCIN SERPL-MCNC: 24 MG/L
WBC # BLD AUTO: 20.4 10E9/L (ref 4–11)

## 2020-02-25 PROCEDURE — 97161 PT EVAL LOW COMPLEX 20 MIN: CPT | Mod: GP | Performed by: PHYSICAL THERAPIST

## 2020-02-25 PROCEDURE — 84450 TRANSFERASE (AST) (SGOT): CPT | Performed by: STUDENT IN AN ORGANIZED HEALTH CARE EDUCATION/TRAINING PROGRAM

## 2020-02-25 PROCEDURE — 80069 RENAL FUNCTION PANEL: CPT | Performed by: STUDENT IN AN ORGANIZED HEALTH CARE EDUCATION/TRAINING PROGRAM

## 2020-02-25 PROCEDURE — 84460 ALANINE AMINO (ALT) (SGPT): CPT | Performed by: STUDENT IN AN ORGANIZED HEALTH CARE EDUCATION/TRAINING PROGRAM

## 2020-02-25 PROCEDURE — 92610 EVALUATE SWALLOWING FUNCTION: CPT | Mod: GN | Performed by: SPEECH-LANGUAGE PATHOLOGIST

## 2020-02-25 PROCEDURE — 92526 ORAL FUNCTION THERAPY: CPT | Mod: GN | Performed by: SPEECH-LANGUAGE PATHOLOGIST

## 2020-02-25 PROCEDURE — 85027 COMPLETE CBC AUTOMATED: CPT | Performed by: STUDENT IN AN ORGANIZED HEALTH CARE EDUCATION/TRAINING PROGRAM

## 2020-02-25 PROCEDURE — 84155 ASSAY OF PROTEIN SERUM: CPT | Performed by: STUDENT IN AN ORGANIZED HEALTH CARE EDUCATION/TRAINING PROGRAM

## 2020-02-25 PROCEDURE — 40000239 ZZH STATISTIC VAT ROUNDS

## 2020-02-25 PROCEDURE — 97530 THERAPEUTIC ACTIVITIES: CPT | Mod: GP | Performed by: PHYSICAL THERAPIST

## 2020-02-25 PROCEDURE — 25000132 ZZH RX MED GY IP 250 OP 250 PS 637: Performed by: HOSPITALIST

## 2020-02-25 PROCEDURE — 84075 ASSAY ALKALINE PHOSPHATASE: CPT | Performed by: STUDENT IN AN ORGANIZED HEALTH CARE EDUCATION/TRAINING PROGRAM

## 2020-02-25 PROCEDURE — 85610 PROTHROMBIN TIME: CPT | Performed by: STUDENT IN AN ORGANIZED HEALTH CARE EDUCATION/TRAINING PROGRAM

## 2020-02-25 PROCEDURE — 83735 ASSAY OF MAGNESIUM: CPT | Performed by: STUDENT IN AN ORGANIZED HEALTH CARE EDUCATION/TRAINING PROGRAM

## 2020-02-25 PROCEDURE — 25000128 H RX IP 250 OP 636: Performed by: INTERNAL MEDICINE

## 2020-02-25 PROCEDURE — 80202 ASSAY OF VANCOMYCIN: CPT | Performed by: STUDENT IN AN ORGANIZED HEALTH CARE EDUCATION/TRAINING PROGRAM

## 2020-02-25 PROCEDURE — 25000132 ZZH RX MED GY IP 250 OP 250 PS 637: Performed by: INTERNAL MEDICINE

## 2020-02-25 PROCEDURE — 12000000 ZZH R&B MED SURG/OB

## 2020-02-25 PROCEDURE — C9113 INJ PANTOPRAZOLE SODIUM, VIA: HCPCS | Performed by: HOSPITALIST

## 2020-02-25 PROCEDURE — 99233 SBSQ HOSP IP/OBS HIGH 50: CPT | Performed by: HOSPITALIST

## 2020-02-25 PROCEDURE — 25000128 H RX IP 250 OP 636: Performed by: HOSPITALIST

## 2020-02-25 PROCEDURE — 99233 SBSQ HOSP IP/OBS HIGH 50: CPT | Mod: GC | Performed by: INTERNAL MEDICINE

## 2020-02-25 PROCEDURE — 82248 BILIRUBIN DIRECT: CPT | Performed by: STUDENT IN AN ORGANIZED HEALTH CARE EDUCATION/TRAINING PROGRAM

## 2020-02-25 PROCEDURE — 00000146 ZZHCL STATISTIC GLUCOSE BY METER IP

## 2020-02-25 PROCEDURE — 82247 BILIRUBIN TOTAL: CPT | Performed by: STUDENT IN AN ORGANIZED HEALTH CARE EDUCATION/TRAINING PROGRAM

## 2020-02-25 PROCEDURE — 25000125 ZZHC RX 250: Performed by: HOSPITALIST

## 2020-02-25 PROCEDURE — 97116 GAIT TRAINING THERAPY: CPT | Mod: GP | Performed by: PHYSICAL THERAPIST

## 2020-02-25 PROCEDURE — 25800025 ZZH RX 258: Performed by: HOSPITALIST

## 2020-02-25 PROCEDURE — 25000132 ZZH RX MED GY IP 250 OP 250 PS 637: Performed by: PHYSICIAN ASSISTANT

## 2020-02-25 RX ORDER — AMINO AC/PROTEIN HYDR/WHEY PRO 10G-100/30
1 LIQUID (ML) ORAL EVERY 12 HOURS
Status: DISCONTINUED | OUTPATIENT
Start: 2020-02-25 | End: 2020-02-27

## 2020-02-25 RX ORDER — LANOLIN ALCOHOL/MO/W.PET/CERES
3 CREAM (GRAM) TOPICAL
Status: DISCONTINUED | OUTPATIENT
Start: 2020-02-25 | End: 2020-03-05 | Stop reason: HOSPADM

## 2020-02-25 RX ADMIN — OXYCODONE HYDROCHLORIDE 2.5 MG: 5 TABLET ORAL at 16:41

## 2020-02-25 RX ADMIN — METOPROLOL TARTRATE 5 MG: 5 INJECTION INTRAVENOUS at 08:19

## 2020-02-25 RX ADMIN — PSYLLIUM HUSK 1 PACKET: 3.4 POWDER ORAL at 10:21

## 2020-02-25 RX ADMIN — DEXTROSE MONOHYDRATE 50 ML: 25 INJECTION, SOLUTION INTRAVENOUS at 04:49

## 2020-02-25 RX ADMIN — PANTOPRAZOLE SODIUM 40 MG: 40 INJECTION, POWDER, FOR SOLUTION INTRAVENOUS at 08:18

## 2020-02-25 RX ADMIN — FENTANYL CITRATE 50 MCG: 50 INJECTION, SOLUTION INTRAMUSCULAR; INTRAVENOUS at 02:51

## 2020-02-25 RX ADMIN — MEROPENEM 500 MG: 500 INJECTION, POWDER, FOR SOLUTION INTRAVENOUS at 14:38

## 2020-02-25 RX ADMIN — THIAMINE HYDROCHLORIDE 100 MG: 100 INJECTION, SOLUTION INTRAMUSCULAR; INTRAVENOUS at 08:19

## 2020-02-25 RX ADMIN — RIFAXIMIN 550 MG: 550 TABLET ORAL at 21:43

## 2020-02-25 RX ADMIN — FOLIC ACID 1 MG: 1 TABLET ORAL at 08:19

## 2020-02-25 RX ADMIN — RIFAXIMIN 550 MG: 550 TABLET ORAL at 10:21

## 2020-02-25 RX ADMIN — OXYCODONE HYDROCHLORIDE 2.5 MG: 5 TABLET ORAL at 08:19

## 2020-02-25 RX ADMIN — MELATONIN 3 MG: 3 TAB ORAL at 21:43

## 2020-02-25 ASSESSMENT — ACTIVITIES OF DAILY LIVING (ADL)
ADLS_ACUITY_SCORE: 14

## 2020-02-25 ASSESSMENT — MIFFLIN-ST. JEOR: SCORE: 2008.5

## 2020-02-25 NOTE — PLAN OF CARE
Discharge Planner SLP   Patient plan for discharge: not discussed today  Current status: Clinical swallow evaluation completed. Patient presents with moderate-severe oropharyngeal dysphagia characterized by mild oral motor impairment, mild oral bolus incoordination and inconsistent delayed throat clearing after limited PO intake of sips of water and nectar thick water. Given recent intubation and chest CT findings 2/21/2020 patient is at elevated risk for silent aspiration.     Recommend further instrumental swallow assessment prior to initiating oral diet. Recommend continue NPO with TF for now except for limited quantity of ice chips (6-8 per hour as tolerated) only when fully upright.    Barriers to return to prior living situation: medical needs; deconditioning/weakness; cognition  Recommendations for discharge: TCU  Rationale for recommendations: patient may require SLP intervention at discharge for dysphagia for safe return to baseline or least restrictive diet       Entered by: Kelli Parker 02/25/2020 12:45 PM

## 2020-02-25 NOTE — PROGRESS NOTES
[facility]     Renal Progress Note                Assessment/Plan:     1.  Oliguric EBEN 2 ATN + HRS 1.  Multiple other insults including septicemia, IV Contrast ( 2/9 and 2/21) , High vanco levels.   Pt had 3rd run of HD today .    -No indication for dialysis today.  We will plan for dialysis tomorrow with 3 to 4 L ultrafiltration.    2.  Sepsis syndrome.  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  Source is unclear.  ? Septic pulm emboli.  On vanco/beatris.  - Dose vanc per levels ( per pharmacy)     3.  Resp failure -resolved    4.  EtOHic hepatitis.  Pt's bili remains high and continues to trend down.  She is icteric and jaundiced.  Coming off steroids per GI.     5.  Encephalopathy.   -Resolved    6.  Anemia.  Hgb stable.   A.  Follow hb, clinically.  B.  Transfuse prn.          Interval History:     Patient is extubated and sitting in chair.  Comfortable.  On room air.  No dyspnea.  Awake alert and oriented x3.  Remains oliguric.  Hemodialysis yesterday with 3.2 L UF.  Electrolytes are okay.  Bilirubin stable.  AST remains slightly elevated.  No nausea ,vomiting.  Afebrile.         Medications and Allergies:       - MEDICATION INSTRUCTIONS for Dialysis Patients -   Does not apply See Admin Instructions     folic acid  1 mg Oral or Feeding Tube Daily     insulin aspart  1-7 Units Subcutaneous Q4H     meropenem  500 mg Intravenous Q24H     pantoprazole (PROTONIX) IV  40 mg Intravenous Daily with breakfast     polyethylene glycol  17 g Oral or Feeding Tube Daily     psyllium  1 packet Oral Daily     rifaximin  550 mg Oral or Feeding Tube BID     sodium chloride (PF)  10 mL Intracatheter Q7 Days     sodium chloride (PF)  10 mL Intracatheter Q8H     thiamine  100 mg Intravenous Daily     vancomycin place fajardo - receiving intermittent dosing  1 each Intravenous See Admin Instructions     Allergies   Allergen Reactions     Penicillins Unknown     Hives            Physical Exam:     Vitals were  "reviewed    Heart Rate: 105, Blood pressure 116/79, pulse 107, temperature 98.3  F (36.8  C), temperature source Oral, resp. rate 14, height 1.727 m (5' 8\"), weight 123.5 kg (272 lb 4.3 oz), SpO2 93 %, not currently breastfeeding.  Wt Readings from Last 3 Encounters:   02/25/20 123.5 kg (272 lb 4.3 oz)   02/09/15 95.3 kg (210 lb)   01/22/15 108.4 kg (239 lb)       GENERAL APPEARANCE: Sitting in chair, alert awake, no distress  HEENT: Icterus +, acneiform rash on lower half of face and upper chest   RESP: Clear  CV: Reg, tachy, nl S1/S2  ABDOMEN: o/s/nt, + distended  EXTREMITIES/SKIN: 2+ ble edema  OTHER:  R arm PICC; + loaiza c bloody urine; + RIJ TDC         Data:     CBC RESULTS:     Recent Labs   Lab 02/25/20 0415 02/24/20 0430 02/23/20 0515 02/22/20 0412 02/21/20 1900 02/21/20  1821 02/21/20  0515   WBC 20.4* 20.8* 21.7* 23.7* 23.9*  --  20.3*   RBC 2.22* 2.31* 2.26* 2.39* 1.97*  --  2.19*   HGB 7.5* 7.6* 7.6* 8.0* 6.8* 6.8* 7.3*   HCT 22.8* 23.5* 23.1* 24.6* 20.6*  --  22.5*   * 152 165 205 210  --  251     Basic Metabolic Panel:  Recent Labs   Lab 02/25/20 0415 02/24/20  0430 02/23/20  0515 02/22/20  0412 02/21/20  1900 02/21/20  0515    133 133 133 133 130*   POTASSIUM 3.5 3.6 3.3* 3.7 3.9 4.3   CHLORIDE 101 100 99 100 99 98   CO2 27 23 24 22 21 19*   BUN 30 39* 31* 40* 38* 52*   CR 3.27* 4.07* 3.16* 3.35* 3.13* 3.86*   * 107* 98 104* 105* 116*   EMILY 8.4* 8.5 8.3* 8.3* 8.3* 9.3     INR  Recent Labs   Lab 02/25/20  0415 02/24/20  0430 02/23/20  0515 02/22/20  0412   INR 1.67* 1.79* 1.90* 1.88*      Attestation:   I have reviewed today's relevant vital signs, notes, medications, labs and imaging.    Edward Powers MD  [facility] - Nephrology   [phone]    "

## 2020-02-25 NOTE — PLAN OF CARE
DATE & TIME: 2/25/2020 2:10pm               Cognitive Concerns/ Orientation: A&Ox4  BEHAVIOR & AGGRESSION TOOL COLOR: Green  MOBILITY: Up with Ax1-2 + GB & walker  CIWA SCORE: 1  ABNL VS/O2: Tachycardia  PAIN MANAGMENT: PRN oxy  DIET: NPO (ex. 6-8 ice chips/hr per SLP); TF @ 50mL/hr - BG checks q4hrs  BOWEL/BLADDER: Amado in place; up to BSC for BM  ABNL LAB/BG: Creat 3.27, Bili 16.6, , WBC 20.4, Phos 1.6, Hgb 7.5  DRAIN/DEVICES: Amado, R PICC  TELEMETRY RHYTHM: N/A  SKIN: Jaundice  TESTS/PROCEDURES: Swallow study 2/26  D/C DAY/GOALS/PLACE: Pending  OTHER IMPORTANT INFO: Jaundice throughout. Generalized edema.

## 2020-02-25 NOTE — PROGRESS NOTES
"   02/25/20 1208   General Information   Onset Date 02/09/20   Start of Care Date 02/25/20  (seen previously by SLP 2/12-2/16/2020)   Referring Physician Lizzeth Morgan MD   Patient Profile Review/OT: Additional Occupational Profile Info See Profile for full history and prior level of function   Patient/Family Goals Statement Patient would to eat/drink.   Swallowing Evaluation Bedside swallow evaluation   Behaviorial Observations Alert;Confused;Impulsive   Mode of current nutrition NPO   Respiratory Status Intubated on (date);Extubated on (date);O2 Supply  (intubated 2/21-2/24/2020)   Type of O2 supply Nasal cannula  (room air at time of swallow evaluation)   Comments Per MD note: \"Pat Delgado is a 29 year old female who was admitted on 2/9/2020. GI has been following for severe alcoholic hepatitis complicated by kidney failure w/ ATN vs type II hepatorenal syndrome. Developed hypoxemic respiratory failure on 2/22/2020 and intubated and transferred to ICU- now extubated on\" 2/24/2020. CT chest 2/21 revealed Multifocal airspace opacities scattered throughout both lungs concerning for multifocal pneumonia. Septic emboli could have a similar appearance. Small to moderate right pleural effusion.\" Per GI note okay to start clear liquids. RN reported patient impulsive with feeding and coughing noted with sip of water today.   Clinical Swallow Evaluation   Oral Musculature anomalies present   Structural Abnormalities none present   Dentition present and adequate   Mucosal Quality dry;cracked   Mandibular Strength and Mobility intact   Oral Labial Strength and Mobility WFL   Lingual Strength and Mobility impaired protrusion;impaired coordination   Velar Elevation intact   Buccal Strength and Mobility intact   Laryngeal Function Cough;Throat clear;Swallow;Voicing initiated  (difficult to palpate swallow motion due to body habitus)   Clinical Swallow Eval: Thin Liquid Texture Trial   Mode of Presentation, Thin " Liquids spoon;cup;fed by clinician;self-fed   Volume of Liquid or Food Presented 5 ice chips; 3 sips water by spoon; 6-8 sips water by cup   Oral Phase of Swallow Poor AP movement  (mildly reduced oral bolus acceptance and oral control)   Pharyngeal Phase of Swallow throat clearing  (intermittent delayed throat clearing after sips of water)   Diagnostic Statement No overt signs of aspiration with limited single ice chips.   Clinical Swallow Eval: Nectar Thick Liquid Texture Trial   Mode of Presentation, Nectar spoon;cup;fed by clinician;self-fed   Volume of Nectar Presented 3 sips by spoon; 3 sips by cup   Oral Phase, [place] WFL   Pharyngeal Phase, Nectar throat clearing  (intermittent delayed throat clearing)   Esophageal Phase of Swallow   Patient reports or presents with symptoms of esophageal dysphagia Yes   Esophageal comments intermittent burping   General Therapy Interventions   Planned Therapy Interventions Dysphagia Treatment   Dysphagia treatment Modified diet education;Instruction of safe swallow strategies   Swallow Eval: Clinical Impressions   Skilled Criteria for Therapy Intervention Skilled criteria met.  Treatment indicated.   Functional Assessment Scale (FAS) 2   Treatment Diagnosis moderate-severe oropharyngeal dysphagia   Diet texture recommendations NPO  (except limited single ice chips as tolerated)   Recommended Feeding/Eating Techniques maintain upright posture during/after eating for 30 mins  (one at a time as tolerated)   Demonstrates Need for Referral to Another Service   (involved)   Therapy Frequency Daily   Predicted Duration of Therapy Intervention (days/wks) 1 week   Anticipated Discharge Disposition inpatient rehabilitation facility   Risks and Benefits of Treatment have been explained. Yes   Patient, family and/or staff in agreement with Plan of Care Yes   Clinical Impression Comments Patient presents with moderate-severe oropharyngeal dysphagia characterized by mild oral motor  impairment, mild oral bolus incoordination and inconsistent delayed throat clearing after limited PO intake of sips of water and nectar thick water. Given recent intubation and chest CT findings 2/21/2020 patient is at elevated risk for silent aspiration. Recommend further instrumental swallow assessment prior to initiating oral diet. Recommend continue NPO with TF for now except for limited quantity of ice chips (6-8 per hour as tolerated) only when fully upright.   Total Evaluation Time   Total Evaluation Time (Minutes) 10

## 2020-02-25 NOTE — PLAN OF CARE
Discharge Planner PT   Patient plan for discharge: Go home with boyfriend or to parents home   Current status: Pt is a 28 y/o female admitted with ETOH withdrawal. During hospital stay, pt had acute liver failure, sepsis, hypoxic respiratory failure, and etabolic encephalopathy. Pt was extubated on 2/24/2020 and transferred from ICU. At baseline pt was living in an apartment with boyfriend. She lives on the 3rd floor and usually takes the elevator but it does break down often where she would need to do stairs. She might go to parents place, but they are discussing that option tonight.   To date pt has generalized LE weakness. She require min A and FWW with sit to/from stand and toilet transfers. She has good seated and standing balance. Pt was able to ambulate 8' and 15' with FWW and CGA. She was SOB but O2 sat was 95% after ambulating. Unable to assess bed mobility d/t pt preferring to stay in chair. Discussed with pt her need of assist if she were to go home with boyfriend or parents.   Barriers to return to prior living situation: Fall risk, need of assist with all mobility, decreased strength.   Recommendations for discharge: ARU  Rationale for recommendations: Pt is well below baseline with all mobility. She would benefit from further therapy to improve bed mobility, transfers, and ambulation. Pt would tolerate 3 hours of therapy and d/t pt's age and prior level of activity she would be a good candidate for ARU.. Potential for home with parents if they are able to give min A for transfers, SBA for ambulation, and assist for stairs.  If pt has 24/7 care at home, she would be able to go home with assist for all care.        Entered by: Vera Pierre 02/25/2020 4:44 PM

## 2020-02-25 NOTE — PROGRESS NOTES
[facility]  Gastroenterology Progress Note     Pat Delgado MRN# [id number]   YOB: 1990 Age: 29 year old          Assessment and Plan:   Update: Patient mentation significantly improved. Has been extubated and oriented x3.  Having watery stools with minimal output without solid pieces. Bowel sounds normoactive. Denies abdominal pain. Labs continue improve. NG in place. On hemodialysis.      Pat Delgado is a 29 year old female who was admitted on 2/9/2020. GI has been following for severe alcoholic hepatitis complicated by kidney failure w/ ATN vs type II hepatorenal syndrome. Developed hypoxemic respiratory failure on 2/22/2020 and intubated and transferred to ICU- now extubated on 2/14/2020. Currently on HD-creatinine remains elevated.     Acute metabolic encephalopathy  -Probably multifactorial with both liver and kidney injury, and GNR bacteremia of unknown source- currently on vanco/merepenem  -continue rifaximin BID  -having watery stools- will start on metamucil packet  - no evidence of SBP -given no significant fluid pockets on imaging of abdomen  - Ok with GI to transfer patient to medical floor  - Consider starting trial of clear liquids- Ok with GI to do so. SLP would be appreciated.     Acute alcoholic hepatitis with Maddrey score >50  Abdominal pain likely due to gaseous distension- improved   Minimal ascites status post 5 days of cefepime for SBP prophylaxis  Maddrey score of > 50s  -started on steroid trial on 2/16 but will stop today given GNR bacteremia if no other indication per ICU service  -Tbili likely peak- and trending down, INR trending down  -Continue with daily LFT w/ INR      Hyponatremia w/ increase in creatinine and minimal urine output in the setting of anasarca:  -ATN vs hepatorenal syndrome type II  -currently being dialyzed, care per renal team     Anemia:  -slow decrease ( near stable around 7-8) with no sign of overt bleeding, likely  anemia from chronic illness  -GI will follow in case of any sign of bleeding            Acute liver failure without hepatic coma  Hyponatremia  Sepsis, due to unspecified organism, unspecified whether acute organ dysfunction present (H)      Interval History:   no new complaints, doing well, denies chest pain, denies shortness of breath, denies abdominal pain, alert, oriented to person, place and time, has had a bowel movement in the last 24 hours and doing well; no cp, sob, n/v/d, or abd pain.              Review of Systems:   C: NEGATIVE for fever, chills, change in weight  E/M: NEGATIVE for ear, mouth and throat problems  R: NEGATIVE for significant cough or SOB  CV: NEGATIVE for chest pain, palpitations or peripheral edema             Medications:   I have reviewed this patient's current medications    - MEDICATION INSTRUCTIONS for Dialysis Patients -   Does not apply See Admin Instructions     folic acid  1 mg Oral or Feeding Tube Daily     insulin aspart  1-7 Units Subcutaneous Q4H     meropenem  500 mg Intravenous Q24H     pantoprazole (PROTONIX) IV  40 mg Intravenous Daily with breakfast     polyethylene glycol  17 g Oral or Feeding Tube Daily     rifaximin  550 mg Oral or Feeding Tube BID     sodium chloride (PF)  10 mL Intracatheter Q7 Days     sodium chloride (PF)  10 mL Intracatheter Q8H     thiamine  100 mg Intravenous Daily     vancomycin place fajardo - receiving intermittent dosing  1 each Intravenous See Admin Instructions                  Physical Exam:   Vitals were reviewed  Vital Signs with Ranges  Temp:  [98.2  F (36.8  C)-99.3  F (37.4  C)] 99.1  F (37.3  C)  Pulse:  [102-114] 102  Heart Rate:  [104-120] 104  Resp:  [8-65] 18  BP: (124-133)/() 124/107  MAP:  [73 mmHg-99 mmHg] 93 mmHg  Arterial Line BP: (103-160)/(51-80) 134/73  SpO2:  [92 %-100 %] 92 %  I/O last 3 completed shifts:  In: 1506.24 [I.V.:126.24; NG/GT:360]  Out: 3242 [Urine:42; Other:3200]  Constitutional: healthy, alert and  no distress   Cardiovascular: negative, PMI normal. No lifts, heaves, or thrills. RRR. No murmurs, clicks gallops or rub  Respiratory: negative, Percussion normal. Good diaphragmatic excursion. Lungs clear  Head: Normocephalic. No masses, lesions, tenderness or abnormalities  Neck: Neck supple. No adenopathy. Thyroid symmetric, normal size,, Carotids without bruits.  Abdomen: Abdomen soft, non-tender. BS normal. No masses, organomegaly             Data:   I reviewed the patient's new clinical lab test results.   Recent Labs   Lab Test 02/25/20 0415 02/24/20  0430 02/23/20  0515   WBC 20.4* 20.8* 21.7*   HGB 7.5* 7.6* 7.6*   * 102* 102*   * 152 165   INR 1.67* 1.79* 1.90*     Recent Labs   Lab Test 02/25/20 0415 02/24/20  0430 02/23/20  0515   POTASSIUM 3.5 3.6 3.3*   CHLORIDE 101 100 99   CO2 27 23 24   BUN 30 39* 31*   ANIONGAP 7 10 10     Recent Labs   Lab Test 02/25/20 0415 02/24/20  0430 02/23/20  0515  02/21/20  1430  02/14/20  0653  02/09/20  1755 02/09/20  1547   ALBUMIN 3.0* 2.9* 3.1*   < >  --    < >  --    < >  --  1.6*   BILITOTAL 16.6* 16.8* 17.1*   < >  --    < >  --    < >  --  8.1*   ALT 38 40 46   < >  --    < >  --    < >  --  33   * 149* 181*   < >  --    < >  --    < >  --  203*   PROTEIN  --   --   --   --  100*  --  100*  --  100*  --    LIPASE  --   --   --   --   --   --   --   --   --  119    < > = values in this interval not displayed.       I reviewed the patient's new imaging results.    All laboratory data reviewed  All imaging studies reviewed by me.    Aminata Rangel PA-C,  2/25/2020  [facility]  Office : [phone]  Cell: [phone] (Dr. Hobson)  Cell: [phone] (Aminata Rangel PA-C)

## 2020-02-25 NOTE — PROGRESS NOTES
Patient transferred to ST via cart at 1315 with all belongings, including cell phone, iphone watch, purse, clothing, and other personal belongings in room. A/Ox4. LS clear. On RA. ST w/ wnl BPs. NJ in place on R nostril. TF at her goal. Skin grossly intact, except jaundice. Small, watery BMsx3. Lift with assist x2 to bedside commode. PT/OT/ST ordered. tolerating icechips without difficulty. Dad updated at bedside.

## 2020-02-25 NOTE — PROGRESS NOTES
02/25/20 1500   Quick Adds   Type of Visit Initial PT Evaluation   Living Environment   Lives With spouse   Living Arrangements apartment   Home Accessibility stairs within home;stairs to enter home   Number of Stairs, Main Entrance 3   Stair Railings, Main Entrance railings not in good condition, need repair for safe use   Number of Stairs, Within Home, Primary other (see comments)  (3 flights )   Stair Railings, Within Home, Primary railing on right side (ascending)   Transportation Anticipated car, drives self;family or friend will provide   Self-Care   Usual Activity Tolerance good   Current Activity Tolerance fair   Regular Exercise Yes   Activity/Exercise Type walking   Exercise Amount/Frequency daily   Equipment Currently Used at Home none   Activity/Exercise/Self-Care Comment Was walking approximately 12 miles a day for her job.    Functional Level Prior   Ambulation 0-->independent   Transferring 0-->independent   Toileting 0-->independent   Bathing 0-->independent   Communication 0-->understands/communicates without difficulty   Cognition 2 - difficulty with organizing thoughts   Fall history within last six months yes   Number of times patient has fallen within last six months 1   Which of the above functional risks had a recent onset or change? fall history;ambulation;transferring;toileting   General Information   Onset of Illness/Injury or Date of Surgery - Date 02/09/20   Referring Physician Tatyana Lundberg   Patient/Family Goals Statement Go home with her boyfriend or to her parents   Pertinent History of Current Problem (include personal factors and/or comorbidities that impact the POC) Pt is a 28 y/o female admitted with ETOH withdrawal. During hospital stay, pt had acute liver failure, sepsis, hypoxic respiratory failure, and etabolic encephalopathy. Pt was extubated on 2/24/2020 and transferred from ICU.    Precautions/Limitations fall precautions   Weight-Bearing Status - LUE full  weight-bearing   Weight-Bearing Status - RUE full weight-bearing   Weight-Bearing Status - LLE full weight-bearing   Weight-Bearing Status - RLE full weight-bearing   Cognitive Status Examination   Orientation orientation to person, place and time   Level of Consciousness alert   Follows Commands and Answers Questions 100% of the time   Personal Safety and Judgment intact   Memory intact   Pain Assessment   Patient Currently in Pain No   Integumentary/Edema   Integumentary/Edema Comments edema in LEs   Posture    Posture Not impaired   Range of Motion (ROM)   ROM Quick Adds No deficits were identified   Strength   Strength Comments Generalized LE weakness    Bed Mobility   Bed Mobility Comments Did not assess d/t receiving pt in chair and wanting to stay in chair at end of sesison   Transfer Skills   Transfer Comments Pt able to perform sit to/from stand with FWW and min A. Pt able to sit to/from toilet with min A and use of grab bars. Pt was able to don/doff briefs.   Gait   Gait Comments Ambulation x5 ft with FWW and CGA. Pt had decreased btia with a step through pattern. Pt fatigued easily.    Balance   Balance Comments Pt had good seated and standing balance.    Sensory Examination   Sensory Perception no deficits were identified   General Therapy Interventions   Planned Therapy Interventions bed mobility training;balance training;gait training;neuromuscular re-education;strengthening;transfer training   Clinical Impression   Criteria for Skilled Therapeutic Intervention yes, treatment indicated   PT Diagnosis Impaired ambulation and mobility    Influenced by the following impairments Decreased endurance, decreased strength, decreased activity tolerance   Functional limitations due to impairments Ambulation, transfers, bed mobility    Clinical Presentation Stable/Uncomplicated   Clinical Presentation Rationale Clinical judgement   Clinical Decision Making (Complexity) Low complexity   Therapy Frequency Daily  "  Predicted Duration of Therapy Intervention (days/wks) 5 days    Anticipated Equipment Needs at Discharge front wheeled walker   Anticipated Discharge Disposition Acute Rehabilitation Facility;Home with Assist   Risk & Benefits of therapy have been explained Yes   Patient, Family & other staff in agreement with plan of care Yes   [employer] TM \"6 Clicks\"   2016, Trustees of [facility], under license to [employer].  All rights reserved.   6 Clicks Short Forms Basic Mobility Inpatient Short Form   [facility]  \"6 Clicks\" V.2 Basic Mobility Inpatient Short Form   1. Turning from your back to your side while in a flat bed without using bedrails? 3 - A Little   2. Moving from lying on your back to sitting on the side of a flat bed without using bedrails? 3 - A Little   3. Moving to and from a bed to a chair (including a wheelchair)? 3 - A Little   4. Standing up from a chair using your arms (e.g., wheelchair, or bedside chair)? 3 - A Little   5. To walk in hospital room? 3 - A Little   6. Climbing 3-5 steps with a railing? 2 - A Lot   Basic Mobility Raw Score (Score out of 24.Lower scores equate to lower levels of function) 17   Total Evaluation Time   Total Evaluation Time (Minutes) 20     "

## 2020-02-25 NOTE — PLAN OF CARE
PT. On vent and propofol gtt start of shift, pressure support successful, propofol off and successful extubation at @ 1315, pt. Confused to time and situation, and noted to be figity at end of shift, prn fentanyl given with good result, mittens placed (parents aware and pt. Had mittens previously on the floor), pt's parents at bedside and were supportive, very low dark.brown urine output through loaiza, pt. Did dialyze today for approx. 3 liters removed.

## 2020-02-25 NOTE — PROGRESS NOTES
[facility]    Medicine Progress Note - Hospitalist Service       Date of Admission:  2/9/2020  Assessment & Plan    Pat Delgado is a 29 year old female who was admitted on 2/9/2020 with  severe alcoholic hepatitis complicated by kidney failure w/ ATN vs type II hepatorenal syndrome. She developed hypoxemic respiratory failure on 2/22/2020 was intubated and transferred to ICU- extubated on 2/24/2020 and transferred to floor 2/25/2020. Currently on HD-creatinine remains elevated.    Acute liver failure 2/2 Alcoholic hepatitis  Acute etabolic encephalopathy, improving  Acute hypoxic respiratory failure, resolved  Acute alcoholic hepatitis with Maddrey score >50  Minimal ascites status post 5 days of cefepime for SBP prophylaxis  - extubated 2/24. No longer on sedation.  - transfer out of MICU 2/25  - continue rifaximin BID  - no evidence of SBP -given no significant fluid pockets on imaging of abdomen  - GI following closely  - coming off steroids  - SLP consult prior to trial of clear liquids  - Continue with daily LFT w/ INR    GNR Bacteremia, unclear source  - plan for 14 days antibiotic treatment - continue IV Meropenem  - discontinue Vancomycin today per previous plan     Oliguric renal failure 2/2 ATN and hepato renal syndrome  Multiple other insults including septicemia, IV Contrast ( 2/9 and 2/21) , High vanco levels.   - nephrology following  - tunneled RIJ HD catheter in place  - s/p HD x3, last run 2/24.  Plan for dialysis tomorrow with 3 to 4 L ultrafiltration.     GI/Nutrition:  TFs to goal.  Continue folic acid, thiamine, rifaximin, and PEG for alcohol abuse, liver failure, and bowel regimen.  Narrow antiemetics to PRN ondansetron.  MELD = 39.    -NPO  -TFs per RD. FS while on tube feeds.  -LFTs QAM  -Continue rifaximin, folate, and thiamine  -Continue PEG for bowel regimen  -Narrow antiemetics to PRN ondansetron     Anemia:  -slow decrease ( near stable around 7-8) with no sign of  overt bleeding, likely anemia from chronic illness  -GI will follow in case of any sign of bleeding    Diet: NPO for Medical/Clinical Reasons Except for: No Exceptions  Adult Formula Drip Feeding: Continuous Peptamen Intense VHP; Nasoduodenal tube; Goal Rate: 50; mL/hr; Medication - Feeding Tube Flush Frequency: At least 15-30 mL water before and after medication administration and with tube clogging; 2/22: mendez...    DVT Prophylaxis: Pneumatic Compression Devices  Loaiza Catheter: in place, indication: Strict 1-2 Hour I&O  Code Status: Full Code      Disposition Plan   Expected discharge: 4 - 7 days, recommended to transitional care unit once adequate pain management/ tolerating PO medications, antibiotic plan established and safe disposition plan/ TCU bed available.  Entered: Lizzeth Morgan MD 02/25/2020, 12:26 PM       The patient's care was discussed with the Bedside Nurse, Patient and ICU Consultant.    Lizzeth Morgan MD  Hospitalist Service  [facility]    ______________________________________________________________________    Interval History   Patient seen in ICU. Conversant. Denies acute pain. Was being transferred in jayy lift to chair by 2 nurses. Anxious to attempt po but aware she would need SLP consult. Aware of plan to transfer to floor. No other acute complaints.    Data reviewed today: I reviewed all medications, new labs and imaging results over the last 24 hours. I personally reviewed no images or EKG's today.    Physical Exam   Vital Signs: Temp: 98.3  F (36.8  C) Temp src: Oral BP: 116/79 Pulse: 107 Heart Rate: 105 Resp: 14 SpO2: 93 % O2 Device: None (Room air) Oxygen Delivery: 2 LPM  Weight: 272 lbs 4.29 oz  General Appearance: Awake, alert, NAD, jaundiced. Obese, anasarca. NG tube in place  HEENT: icteric  Respiratory: CTAB  Cardiovascular: tachycardic  GI: soft, NT, mildly distended vs obese  : loaiza with minimal dark brown urine  Skin: jaundice  Other: Moving all  extremities.    Data   Recent Labs   Lab 02/25/20  0415 02/24/20  0430 02/23/20  0515 02/22/20  1150 02/22/20  0555  02/21/20  2350   WBC 20.4* 20.8* 21.7*  --   --    < >  --    HGB 7.5* 7.6* 7.6*  --   --    < >  --    * 102* 102*  --   --    < >  --    * 152 165  --   --    < >  --    INR 1.67* 1.79* 1.90*  --   --    < >  --     133 133  --   --    < >  --    POTASSIUM 3.5 3.6 3.3*  --   --    < >  --    CHLORIDE 101 100 99  --   --    < >  --    CO2 27 23 24  --   --    < >  --    BUN 30 39* 31*  --   --    < >  --    CR 3.27* 4.07* 3.16*  --   --    < >  --    ANIONGAP 7 10 10  --   --    < >  --    EMILY 8.4* 8.5 8.3*  --   --    < >  --    * 107* 98  --   --    < >  --    ALBUMIN 3.0* 2.9* 3.1*  --   --    < >  --    PROTTOTAL 6.5* 6.2* 6.2*  --   --    < >  --    BILITOTAL 16.6* 16.8* 17.1*  --   --    < >  --    ALKPHOS 110 109 109  --   --    < >  --    ALT 38 40 46  --   --    < >  --    * 149* 181*  --   --    < >  --    TROPI  --   --   --  2.854* 3.195*  --  3.365*    < > = values in this interval not displayed.     Recent Results (from the past 24 hour(s))   XR Abdomen Port 1 View    Narrative    ABDOMEN ONE VIEW PORTABLE  2/24/2020 3:21 PM     HISTORY: NG/NJ tube placement    COMPARISON: 2/23/2020       Impression    IMPRESSION: Enteric tube takes an esophageal course, traverses the  stomach, and terminates with the tip in the region of the third  portion of the duodenum.    LESTER J FAHRNER, MD     Medications     dextrose         - MEDICATION INSTRUCTIONS for Dialysis Patients -   Does not apply See Admin Instructions     folic acid  1 mg Oral or Feeding Tube Daily     insulin aspart  1-7 Units Subcutaneous Q4H     meropenem  500 mg Intravenous Q24H     pantoprazole (PROTONIX) IV  40 mg Intravenous Daily with breakfast     polyethylene glycol  17 g Oral or Feeding Tube Daily     psyllium  1 packet Oral Daily     rifaximin  550 mg Oral or Feeding Tube BID     sodium  chloride (PF)  10 mL Intracatheter Q7 Days     sodium chloride (PF)  10 mL Intracatheter Q8H     thiamine  100 mg Intravenous Daily     vancomycin place fajardo - receiving intermittent dosing  1 each Intravenous See Admin Instructions

## 2020-02-25 NOTE — PROGRESS NOTES
S:  30 y/o WF @ HD17 admitted for alcoholic hepatitis with a prolonged and protracted hospital course complicated by oliguric EBEN, encephalopathy, anemia, renal tubular acidosis, hyponatremia, abdominal pain, ascites, lactic acidosis, hypokalemia, hypophosphatemia, hypoxemic respiratory failure, hypercapnic respiratory failure, candidemia, leukocytosis, hematochezia, and back pain.     Now @:    PPD4 s/p endotracheal intubation and R radial arterial line placement     No acute events o/n.     On exam, pt reports not having any pain, is NPO, tolerating TFs, has no emesis, is urinating a scant amount of urine via Amado, is passing flatus/having BMs, and is OOBTC.     O:  99.4  99.0  110 (104-120)  111/60 (111-133/60-83)  16 (8-65)  96% RA ()    I/O:  1506/3242/-1735  I:  Propofol:  126      NJT:  360      TF:  1020  O:  F:  42      CRRT:  3200      BM:  x8     GEN:  WD obese WF NAD  NEURO:  GCS=15), RASS=0, AAOx3  PSYCH:  Euthymic.  HENT:  Jaundice noted throughout.  NCAT otherwise.  Eyes:  Jaundice.  CAS.  Neck:  Obese neck.  No masses appreciated.  Acne noted.  CV:  RRR, no M/R/G.  PULM:  CTAB, no W/R/R  GI:  Soft, NTND, (+) hypoactive BS x4 quadrants.  Skin:  Jaundiced skin noted through all dermatomes.  The patient is nonflushed, nondiaphoretic.     Meds:  Reviewed  -----KYAW-----  Folic acid 1mg PO daily  Novolog MDSSI 1-7u subcutaneous q4h (0 units over prior 24 hours)  Meropenem 500mg IV daily (day 5 of 14)  Pantoprazole 40mg IV daily  Polyethylene glycol 17gm PO daily  Protein modular 1 packet PO BID  Psyllium 1 packet PO daily  Rifaximin 550mg PO BID  Thiamine 100mg IV daily  -----PRN-----  Hypromellose-dextran (0.1-0.3%) 1gtt OPH q1h PRN dry eyes ( 1 drop @ 1134 // )  Ipratropium-albuterol (0.5-2.5/3mL) 3mL NEB q4h PRN wheezing  Metoprolol 5mg IV q4h PRN HR > 110, SBP < 90 ( // 5mg @ 0819 / )  Naloxone 0.1-0.4mg IV q2min PRN opioid intoxication  Ondansetron 4mg PO/IV q6h PRN nausea/vomiting  Oxycodone  2.5mg PO q6h PRN pain ( // 2.5mg PO @ 0819 / 1641 )  Hyper-/Hypo-glycemia protocol  Lyte repletion  Mag  K  Phos     Labs:  Reviewed  CBC:  20.8>7.623.5<152  -//->  20.4>7.522.8<147  Renal:  133/3.6100/2339/4.07<107  -//->  135/3.5101/2730/3.27<103  Karthik/Mag/Phos:  8.5/2.1/3.0  -//->  8.4/1.9/1.8  LFT:  6.2/2.916.8/13.5149/55426/----/--  -//->  6.5/3.016.6/13.2151/09970/----/--     FS ()  INR:  1.79  -//->  1.67     Hep B ():  Negative  Hep C ():  Negative  UCx ():  >100,000 candida  BCx ():  Fusobacterium nucleatum  UA ():  Spec grav > 1.035, pH 5.5, protein 100, ketones 10, (+) LE, (+) blood, (-) nitrites, >182 WBC, >182 RBC  UCx ():  50K to 100K Candida albicans  BCx ():  NGTD     Rads:  Reviewed  pKUB ():  Postpyloric feeding tube in position following extubation     Ancillary studies:  Reviewed  EKG ():  ST, QTc = 479  Echocardiogram ():  EF 60-65%     Consults:  ICU PRIMARY  Nephrology:  HD  IR:  RIJ tunneled HD catheter (SIGNED OFF)  GI:  Hepatic failure  Chem dep:  Recommend [facility] appointment at discharge (SIGNED OFF)  Psychiatry:  Recommend [facility] via chem dep at discharge (SIGNED OFF)  Cardiology:  Consulted for possible cardiac constriction; not visualized on ECHO (SIGNED OFF)     A/P:  28 y/o WF @ HD17 admitted for alcoholic hepatitis with a prolonged and protracted hospital course complicated by oliguric EBEN, encephalopathy, anemia, renal tubular acidosis, hyponatremia, abdominal pain, ascites, lactic acidosis, hypokalemia, hypophosphatemia, hypoxemic respiratory failure, hypercapnic respiratory failure, candidemia, leukocytosis, hematochezia, and back pain.     Now @:    PPD4 s/p endotracheal intubation and R radial arterial line placement     By system:     NEURO/PSYCH:  GCS=15, RASS=0, AAOx3.  Mentation normal.  Pain control PRN.  -PO pain control PRN  -Continue hypromellose-dextran drops     CV:  Intermittent tachycardia.  Normotensive.   Unremarkable exam.  -Continuous telemetry  -EKG QAM  -q1h vitals     PULM:  Normal vitals on room air.  Appropriate exam.  -SBT and extubation if able     GI/Nutrition:  TFs to goal.  Continue folic acid, thiamine, rifaximin, and PEG for alcohol abuse, liver failure, and bowel regimen.  PRN ondansetron as antiemetic.  MELD = 36.  Failed bedside swallow.  SLP consult.  -NPO  -SLP consult  -TFs per RD  -LFTs QAM  -Continue rifaximin, folate, and thiamine  -Continue PEG for bowel regimen  -Narrow antiemetics to PRN ondansetron     Renal/F/E:  Anuric renal failure.  Nephrology following.  Intermittent hemodialysis via tunneled RIJ HD catheter.  Labs QAM.  -Renal panel with full lytes QAM  -IHD per nephrology     ID:  AF, WBC remains elevated.  Meropenem for GNR bacteremia (anticipate 14 days of therapy).  Source remains occult, but most inflammatory markers downtrending (no persistent tachycardia, no significant peripheral insulin resistance, negative ROS, improved mental status).  Discontinue vancomycin.  -Meropenem for GNR bacteremia (day 5 of 14)  -Daily CBC  -Discontinue vancomycinb     H&H:  CBC >7/21.  Daily CBC.  Daily INR to trend hepatic dysfunction and to trend MELD.  -Daily CBC  -Daily INR     ENDO:  FSG adequate; continue MDSSI.  No sliding scale insulin requirements.  Plan to remove from EMR if no need for sliding scale insulin coverage once tolerating diet.  CTM per protocol.  -FSGs and MDSSI per protocol     MSK:  PT/OT.  Offloading per protocol.  -PT/OT consults     Skin:  Skin care per ICU RN; no skin issues at this time.  -Skin care per protocol     T/L/D:  PICC, tunneled RIJ HD CVC, F, NJT  -Discontinue arterial line  -Continue all lines in this critically ill patient     PROPHY:  PULM:  HOB > 30 degrees, cough/deep breathe/IS  GI:  Discontinue PPI (no indication and not taking one at home), TFs  DVT/PE:  Heparin 5000u subcutaneous TID, SCDs     DISPO:  Okay to transfer to [facility]; no ICU needs at this  time    MAJ Moise Joyner MD, FS  F1, PGY9  N SICU Fellow     S/d/w Dr. Holland who agrees with the plan of care as outlined above

## 2020-02-25 NOTE — PROGRESS NOTES
CLINICAL NUTRITION SERVICES - REASSESSMENT NOTE      Recommendations Ordered by Registered Dietitian (RD):   Change TF product to Nepro at 30 mL/hr;  After 12 hrs increase to goal 40 mL/hr = 1728 kcals, 78 gm pro, 700 mL H20, 12 gm fiber, 155 gm CHO  Prosource 1 packet BID = 80 kcals, 22 gm pro  Total (TF + Prosource):  1808 kcals (23 kcal/kg), 100 gm pro (1.3 gm/kg)   Malnutrition: (2/21)  % Weight Loss: Unable to assess  % Intake:  </= 50% for >/= 5 days (severe malnutrition)  Subcutaneous Fat Loss: None observed  Muscle Loss: Temporal region mild depletion  Fluid Retention: Moderate-Severe 3-4+     Malnutrition Diagnosis: Non-Severe malnutrition  In Context of:  Acute illness or injury  Environmental or social circumstances       EVALUATION OF PROGRESS TOWARD GOALS   Diet:  NPO    Nutrition Support:    - TF was changed on 2/22 to Peptamen Intense VHP (from Nepro) in efforts to better meet patient's estimated needs while on Propofol.  - TF was started at 15 mL/hr and increased every 12 hrs to goal 50 mL/hr (achieved yesterday at 1200) as below:    Nutrition Support Enteral:  Type of Feeding Tube: Nasoduodenal  Enteral Frequency:  Continuous  Enteral Regimen:  Peptamen Intense VHP at 50 mL/hr  Total Enteral Provisions:  1200 kcals 15 kcal/kg and 76% energy needs), 110 gm pro (1.4 gm/kg), 1008 mL H20, 5 gm fiber, 91 gm CHO  Free Water Flush:  60 mL every 4 hrs    Propofol off on 2/24.    Intake/Tolerance:    Stool Pattern:  x2 yesterday and x2 today (Mirilax, Psyllium).  K 3.5 (NL), Mg 1.9 (NL)  Phos 1.8 (L) - Pt with EBEN due to ATN + HRS - On HD  BGM:  105, 102, 62, 107, 111, 94 - Med Res sliding scale insulin   Wt:  123.5 kg (up 1.5 kg since admit).  Pt with +2-3 edema.  Note per Care Everywhere, in 2017, pt's weight was 114.6 kg, which may be more reflective of her dry weight.    ASSESSED NUTRITION NEEDS: (Modified as below)  Dosing Weight: 79 kg - adjusted for overweight  Estimated Energy Needs: 4266-0068 kcals  (20-25 Kcal/Kg)  Justification:  Overweight  Estimated Protein Needs:  grams protein (1.2-1.5 g pro/Kg)  Justification:  Stress, HD, repletion      NEW FINDINGS:   2/24:  Extubated   2/25:  SLP bedside swallow = moderate-severe oropharyngeal dysphagia       Previous Goals (2/21):   EN at goal to provide % estimated needs.   Evaluation: Not met    Previous Nutrition Diagnosis (2/21):   Inadequate protein-energy intake related to Altered GI function w/ abdominal pain, distention, FT in place with slow advancement to goal not yet acheived as evidenced by pt continues to receive <50% estimated needs x12 days since admission, FT in place since 2/19 currently on hold for procedure.    Evaluation: Improving        CURRENT NUTRITION DIAGNOSIS  Inadequate energy intake related to hypocaloric TF and Propofol now off as evidenced by TF meeting only 76% energy needs    INTERVENTIONS  Recommendations / Nutrition Prescription  Change TF product to Nepro at 30 mL/hr;  After 12 hrs increase to goa 40 mL/hr = 1728 kcals, 78 gm pro, 700 mL H20, 12 gm fiber, 155 gm CHO  Prosource 1 packet BID = 80 kcals, 22 gm pro  Total (TF + Prosource):  1808 kcals (23 kcal/kg), 100 gm pro (1.3 gm/kg)    Implementation  Collaboration and Referral of Nutrition care - Pt was discussed during ICU interdisciplinary rounds this morning  EN Composition and EN Schedule - TF orders entered as above  Medical food supplement therapy - Ordered Prosource as above    Goals  TF + Prosource will meet % estimated needs while NPO status.      MONITORING AND EVALUATION:  Progress towards goals will be monitored and evaluated per protocol and Practice Guidelines    Jaci Castle, RD, LD, CNSC

## 2020-02-25 NOTE — PLAN OF CARE
Patient improving. Down to 2L nasal cannula with a productive cough. Restless and and anxious at times overnight, not getting much sleep. Complaining of abdominal discomfort. Multiple small loose bowel movements overnight. Remains oliguric. VSS.

## 2020-02-26 ENCOUNTER — APPOINTMENT (OUTPATIENT)
Dept: SPEECH THERAPY | Facility: CLINIC | Age: 30
DRG: 871 | End: 2020-02-26
Payer: COMMERCIAL

## 2020-02-26 ENCOUNTER — APPOINTMENT (OUTPATIENT)
Dept: PHYSICAL THERAPY | Facility: CLINIC | Age: 30
DRG: 871 | End: 2020-02-26
Attending: INTERNAL MEDICINE
Payer: COMMERCIAL

## 2020-02-26 ENCOUNTER — APPOINTMENT (OUTPATIENT)
Dept: GENERAL RADIOLOGY | Facility: CLINIC | Age: 30
DRG: 871 | End: 2020-02-26
Attending: HOSPITALIST
Payer: COMMERCIAL

## 2020-02-26 LAB
ALBUMIN SERPL-MCNC: 2.9 G/DL (ref 3.4–5)
ALP SERPL-CCNC: 114 U/L (ref 40–150)
ALT SERPL W P-5'-P-CCNC: 39 U/L (ref 0–50)
ANION GAP SERPL CALCULATED.3IONS-SCNC: 9 MMOL/L (ref 3–14)
AST SERPL W P-5'-P-CCNC: 162 U/L (ref 0–45)
BILIRUB DIRECT SERPL-MCNC: 13.4 MG/DL (ref 0–0.2)
BILIRUB SERPL-MCNC: 17 MG/DL (ref 0.2–1.3)
BUN SERPL-MCNC: 44 MG/DL (ref 7–30)
CALCIUM SERPL-MCNC: 9 MG/DL (ref 8.5–10.1)
CHLORIDE SERPL-SCNC: 97 MMOL/L (ref 94–109)
CO2 SERPL-SCNC: 25 MMOL/L (ref 20–32)
CREAT SERPL-MCNC: 4.31 MG/DL (ref 0.52–1.04)
ERYTHROCYTE [DISTWIDTH] IN BLOOD BY AUTOMATED COUNT: 21.3 % (ref 10–15)
GFR SERPL CREATININE-BSD FRML MDRD: 13 ML/MIN/{1.73_M2}
GLUCOSE BLDC GLUCOMTR-MCNC: 102 MG/DL (ref 70–99)
GLUCOSE BLDC GLUCOMTR-MCNC: 102 MG/DL (ref 70–99)
GLUCOSE BLDC GLUCOMTR-MCNC: 83 MG/DL (ref 70–99)
GLUCOSE BLDC GLUCOMTR-MCNC: 89 MG/DL (ref 70–99)
GLUCOSE BLDC GLUCOMTR-MCNC: 93 MG/DL (ref 70–99)
GLUCOSE BLDC GLUCOMTR-MCNC: 97 MG/DL (ref 70–99)
GLUCOSE BLDC GLUCOMTR-MCNC: 99 MG/DL (ref 70–99)
GLUCOSE SERPL-MCNC: 96 MG/DL (ref 70–99)
HBV CORE AB SERPL QL IA: NONREACTIVE
HCT VFR BLD AUTO: 23.6 % (ref 35–47)
HGB BLD-MCNC: 7.7 G/DL (ref 11.7–15.7)
INR PPP: 1.52 (ref 0.86–1.14)
INTERPRETATION ECG - MUSE: NORMAL
MAGNESIUM SERPL-MCNC: 1.9 MG/DL (ref 1.6–2.3)
MCH RBC QN AUTO: 32.9 PG (ref 26.5–33)
MCHC RBC AUTO-ENTMCNC: 32.6 G/DL (ref 31.5–36.5)
MCV RBC AUTO: 101 FL (ref 78–100)
PHOSPHATE SERPL-MCNC: 1.2 MG/DL (ref 2.5–4.5)
PHOSPHATE SERPL-MCNC: 1.2 MG/DL (ref 2.5–4.5)
PLATELET # BLD AUTO: 178 10E9/L (ref 150–450)
POTASSIUM SERPL-SCNC: 3.6 MMOL/L (ref 3.4–5.3)
PROT SERPL-MCNC: 6.8 G/DL (ref 6.8–8.8)
RBC # BLD AUTO: 2.34 10E12/L (ref 3.8–5.2)
SODIUM SERPL-SCNC: 131 MMOL/L (ref 133–144)
WBC # BLD AUTO: 21.1 10E9/L (ref 4–11)

## 2020-02-26 PROCEDURE — 84075 ASSAY ALKALINE PHOSPHATASE: CPT | Performed by: STUDENT IN AN ORGANIZED HEALTH CARE EDUCATION/TRAINING PROGRAM

## 2020-02-26 PROCEDURE — 97530 THERAPEUTIC ACTIVITIES: CPT | Mod: GP

## 2020-02-26 PROCEDURE — 80069 RENAL FUNCTION PANEL: CPT | Performed by: STUDENT IN AN ORGANIZED HEALTH CARE EDUCATION/TRAINING PROGRAM

## 2020-02-26 PROCEDURE — 86704 HEP B CORE ANTIBODY TOTAL: CPT | Performed by: STUDENT IN AN ORGANIZED HEALTH CARE EDUCATION/TRAINING PROGRAM

## 2020-02-26 PROCEDURE — 25800030 ZZH RX IP 258 OP 636: Performed by: INTERNAL MEDICINE

## 2020-02-26 PROCEDURE — 84450 TRANSFERASE (AST) (SGOT): CPT | Performed by: STUDENT IN AN ORGANIZED HEALTH CARE EDUCATION/TRAINING PROGRAM

## 2020-02-26 PROCEDURE — 90937 HEMODIALYSIS REPEATED EVAL: CPT

## 2020-02-26 PROCEDURE — 25000132 ZZH RX MED GY IP 250 OP 250 PS 637: Performed by: HOSPITALIST

## 2020-02-26 PROCEDURE — 12000000 ZZH R&B MED SURG/OB

## 2020-02-26 PROCEDURE — 85610 PROTHROMBIN TIME: CPT | Performed by: STUDENT IN AN ORGANIZED HEALTH CARE EDUCATION/TRAINING PROGRAM

## 2020-02-26 PROCEDURE — 25000128 H RX IP 250 OP 636: Performed by: HOSPITALIST

## 2020-02-26 PROCEDURE — 00000146 ZZHCL STATISTIC GLUCOSE BY METER IP

## 2020-02-26 PROCEDURE — 85027 COMPLETE CBC AUTOMATED: CPT | Performed by: STUDENT IN AN ORGANIZED HEALTH CARE EDUCATION/TRAINING PROGRAM

## 2020-02-26 PROCEDURE — 25000125 ZZHC RX 250: Performed by: INTERNAL MEDICINE

## 2020-02-26 PROCEDURE — 92526 ORAL FUNCTION THERAPY: CPT | Mod: GN

## 2020-02-26 PROCEDURE — 83735 ASSAY OF MAGNESIUM: CPT | Performed by: STUDENT IN AN ORGANIZED HEALTH CARE EDUCATION/TRAINING PROGRAM

## 2020-02-26 PROCEDURE — 97116 GAIT TRAINING THERAPY: CPT | Mod: GP

## 2020-02-26 PROCEDURE — 84155 ASSAY OF PROTEIN SERUM: CPT | Performed by: STUDENT IN AN ORGANIZED HEALTH CARE EDUCATION/TRAINING PROGRAM

## 2020-02-26 PROCEDURE — 82248 BILIRUBIN DIRECT: CPT | Performed by: STUDENT IN AN ORGANIZED HEALTH CARE EDUCATION/TRAINING PROGRAM

## 2020-02-26 PROCEDURE — 92611 MOTION FLUOROSCOPY/SWALLOW: CPT | Mod: GN

## 2020-02-26 PROCEDURE — 84100 ASSAY OF PHOSPHORUS: CPT | Performed by: INTERNAL MEDICINE

## 2020-02-26 PROCEDURE — 99233 SBSQ HOSP IP/OBS HIGH 50: CPT | Performed by: INTERNAL MEDICINE

## 2020-02-26 PROCEDURE — 74230 X-RAY XM SWLNG FUNCJ C+: CPT

## 2020-02-26 PROCEDURE — 84460 ALANINE AMINO (ALT) (SGPT): CPT | Performed by: STUDENT IN AN ORGANIZED HEALTH CARE EDUCATION/TRAINING PROGRAM

## 2020-02-26 PROCEDURE — 82247 BILIRUBIN TOTAL: CPT | Performed by: STUDENT IN AN ORGANIZED HEALTH CARE EDUCATION/TRAINING PROGRAM

## 2020-02-26 PROCEDURE — 27210432 ZZH NUTRITION PRODUCT RENAL BASIC LITER

## 2020-02-26 RX ORDER — BARIUM SULFATE 400 MG/ML
SUSPENSION ORAL ONCE
Status: COMPLETED | OUTPATIENT
Start: 2020-02-26 | End: 2020-02-26

## 2020-02-26 RX ORDER — POLYETHYLENE GLYCOL 3350 17 G/17G
17 POWDER, FOR SOLUTION ORAL DAILY
Status: DISCONTINUED | OUTPATIENT
Start: 2020-02-27 | End: 2020-03-05 | Stop reason: HOSPADM

## 2020-02-26 RX ORDER — FOLIC ACID 1 MG/1
1 TABLET ORAL DAILY
Status: DISCONTINUED | OUTPATIENT
Start: 2020-02-27 | End: 2020-03-05 | Stop reason: HOSPADM

## 2020-02-26 RX ADMIN — SODIUM CHLORIDE 300 ML: 9 INJECTION, SOLUTION INTRAVENOUS at 12:53

## 2020-02-26 RX ADMIN — BARIUM SULFATE 30 ML: 400 SUSPENSION ORAL at 10:23

## 2020-02-26 RX ADMIN — THIAMINE HYDROCHLORIDE 100 MG: 100 INJECTION, SOLUTION INTRAMUSCULAR; INTRAVENOUS at 08:50

## 2020-02-26 RX ADMIN — RIFAXIMIN 550 MG: 550 TABLET ORAL at 09:06

## 2020-02-26 RX ADMIN — POLYETHYLENE GLYCOL 3350 17 G: 17 POWDER, FOR SOLUTION ORAL at 08:51

## 2020-02-26 RX ADMIN — MEROPENEM 500 MG: 500 INJECTION, POWDER, FOR SOLUTION INTRAVENOUS at 22:31

## 2020-02-26 RX ADMIN — FOLIC ACID 1 MG: 1 TABLET ORAL at 08:51

## 2020-02-26 RX ADMIN — SODIUM CHLORIDE 250 ML: 9 INJECTION, SOLUTION INTRAVENOUS at 12:52

## 2020-02-26 RX ADMIN — RIFAXIMIN 550 MG: 550 TABLET ORAL at 21:58

## 2020-02-26 RX ADMIN — SODIUM PHOSPHATE, MONOBASIC, MONOHYDRATE 20 MMOL: 276; 142 INJECTION, SOLUTION INTRAVENOUS at 06:08

## 2020-02-26 RX ADMIN — OXYCODONE HYDROCHLORIDE 2.5 MG: 5 TABLET ORAL at 00:52

## 2020-02-26 ASSESSMENT — MIFFLIN-ST. JEOR: SCORE: 2034.5

## 2020-02-26 ASSESSMENT — ACTIVITIES OF DAILY LIVING (ADL)
ADLS_ACUITY_SCORE: 15
ADLS_ACUITY_SCORE: 14
ADLS_ACUITY_SCORE: 14
ADLS_ACUITY_SCORE: 15
ADLS_ACUITY_SCORE: 14
ADLS_ACUITY_SCORE: 14

## 2020-02-26 NOTE — PROVIDER NOTIFICATION
MD Notification    Notified Person: MD    Notified Person Name: Mary Ann     Notification Date/Time: 2/25/2020 2058    Notification Interaction: web page     Purpose of Notification: pt consistently asking for some medication to sleep; also Ph replacement     Orders Received: phos replacement protocol; melatonin     Comments:

## 2020-02-26 NOTE — PLAN OF CARE
Discharge Planner PT   Patient plan for discharge: none stated.   Current status: pt sitting in chair. Sit to stand from multiple surfaces with min to mod A with FWW-pt frequently asking the therapist to pull her up from the gait belt, educated on improved body mechanics. Amb 15ft to the BR per pt's request with min A and FWW. Required assist with lower body dressing and sock management due to LE edema and difficulty with bending forward. Pt IND with pericares, no BM. Worked on gait training in hallway with WC follow, longest distance 40ft. Pt easily fatigued, SOB with activity. Also required frequent redirection with tasks as she is easily distracted.   Barriers to return to prior living situation: Fall risk, need of assist with all mobility, decreased strength.   Recommendations for discharge: ARU  Rationale for recommendations: Pt is well below baseline with all mobility. She would benefit from further therapy to improve bed mobility, transfers, and ambulation. Pt would tolerate 3 hours of therapy and d/t pt's age and prior level of activity she would be a good candidate for ARU.. Potential for home with parents if they are able to give min A for transfers, SBA for ambulation, and assist for stairs.  If pt has 24/7 care at home, she would be able to go home with assist for all care.        Entered by: Maria M Lopez 02/26/2020 1:05 PM

## 2020-02-26 NOTE — PROGRESS NOTES
[facility]    Medicine Progress Note - Hospitalist Service       Date of Admission:  2/9/2020  Assessment & Plan    Pat Delgado is a 29 year old female history of alcohol abuse, anxiety, depression, ADHD, hypertension, migraine, tobacco abuse and substance abuse who was admitted on 2/9/2020 with  severe alcoholic hepatitis complicated by kidney failure w/ ATN vs type II hepatorenal syndrome, encephalopathy, hypoxic hypercapnic respiratory failure on 2/22/2020 was intubated and transferred to ICU- extubated on 2/24/2020 and transferred to floor 2/25/2020. Currently on HD-creatinine remains elevated.  She also developed bacteremia with fusobacterium nucleatum, with repeat blood culture negative so far.        Acute liver failure 2/2 Alcoholic hepatitis  Acute metabolic encephalopathy, improved  Acute hypoxic respiratory failure, resolved  Acute alcoholic hepatitis with Maddrey score >50  Minimal ascites status post 5 days of cefepime for SBP prophylaxis  Was initially started on prednisolone, stop because of the bacteremia.  - extubated 2/24. No longer on sedation.  - transfer out of MICU 2/25  - continue rifaximin 550 BID  - no evidence of SBP -given no significant fluid pockets on imaging of abdomen  - GI following closely  -Off steroids at this time.  Only get for few days.  -Speech is consulted, had video swallow evaluation done this morning.    - Continue with daily LFT w/ INR  Speech can start her on oral diet if she passed video swallow evaluation.    GNR Bacteremia/Fusobacterium nucleatum, unclear source  - plan for 14 days antibiotic treatment -she is currently on IV Meropenem  - discontinue Vancomycin at this time.     Oliguric renal failure 2/2 ATN and hepato renal syndrome  Multiple other insults including septicemia, IV Contrast ( 2/9 and 2/21) , High vanco levels.   - nephrology following  - tunneled RIJ HD catheter in place  - s/p HD x3, last run 2/24.  Plan for dialysis today  with ultrafiltration as per nephrology.  Significant lower extremity edema, will benefit from ultrafiltration.     GI/Nutrition:  TFs to goal.  Continue folic acid, thiamine, rifaximin, and PEG for alcohol abuse, liver failure, and bowel regimen.  Narrow antiemetics to PRN ondansetron.  MELD = 39.    -NPO for now but can be started on diet if she clears her video swallow.  Tube feeding can be stopped if she can tolerate oral diet well.    -Continue rifaximin, folate, and thiamine       Anemia:  -slow decrease ( near stable around 7-8) with no sign of overt bleeding, likely anemia from chronic illness  -GI will follow in case of any sign of bleeding, hemoglobin around 7.7 at this time.    Hyponatremia most likely because of renal failure, and will improve with hemodialysis.  Hemodialysis with ultrafiltrate as per nephrology will benefit from 3 to 4 L of ultrafiltrate today.  OT lymphedema consult today  Speech to initiate diet if passes video swallow evaluation.  If enough oral intake and remove the nasogastric feeding tube  PT and OT to follow.    Diet: Adult Formula Drip Feeding: Continuous Nepro; Nasoduodenal tube; Goal Rate: 40; mL/hr; Medication - Feeding Tube Flush Frequency: At least 15-30 mL water before and after medication administration and with tube clogging; Amount to Send (Nutrition ...  Full Liquid Diet Thin Liquids (water, ice chips, juice, milk, gelatin, ice cream, etc)    DVT Prophylaxis: Pneumatic Compression Devices  Amado Catheter: in place, indication: Strict 1-2 Hour I&O  Code Status: Full Code      Disposition Plan   Expected discharge: 3 to 4 days, most likely will need the rehab.  Entered: Marvin Guillaume MD 02/26/2020, 11:43 AM       The patient's care was discussed with the Bedside Nurse, Patient and ICU Consultant.    Marvin Guillaume MD  Hospitalist Service  [facility]    ______________________________________________________________________    Interval History   Patient seen  after the  video swallow evaluation.  Feeling better.  Denies any chest pain or shortness of breath no nausea vomiting abdominal pain is getting better no dysuria major constipation diarrhea at this time.    Data reviewed today: I reviewed all medications, new labs and imaging results over the last 24 hours. I personally reviewed no images or EKG's today.    Physical Exam   Vital Signs: Temp: 98.7  F (37.1  C) Temp src: Oral BP: 128/66 Pulse: 114 Heart Rate: 110 Resp: 16 SpO2: 95 % O2 Device: None (Room air)    Weight: 278 lbs 0 oz  General Appearance: Awake, alert, NAD, jaundiced. Obese, anasarca. NG tube in place  HEENT: icteric  Respiratory: CTAB  Cardiovascular: tachycardic  GI: soft, NT, mildly distended vs obese  : loaiza with minimal dark brown urine  Skin: jaundice  Other: Moving all extremities.  3-4+ lower extremity edema    Data   Recent Labs   Lab 02/26/20  0550 02/25/20  0415 02/24/20  0430  02/22/20  1150 02/22/20  0555  02/21/20  2350   WBC 21.1* 20.4* 20.8*   < >  --   --    < >  --    HGB 7.7* 7.5* 7.6*   < >  --   --    < >  --    * 103* 102*   < >  --   --    < >  --     147* 152   < >  --   --    < >  --    INR 1.52* 1.67* 1.79*   < >  --   --    < >  --    * 135 133   < >  --   --    < >  --    POTASSIUM 3.6 3.5 3.6   < >  --   --    < >  --    CHLORIDE 97 101 100   < >  --   --    < >  --    CO2 25 27 23   < >  --   --    < >  --    BUN 44* 30 39*   < >  --   --    < >  --    CR 4.31* 3.27* 4.07*   < >  --   --    < >  --    ANIONGAP 9 7 10   < >  --   --    < >  --    EMILY 9.0 8.4* 8.5   < >  --   --    < >  --    GLC 96 103* 107*   < >  --   --    < >  --    ALBUMIN 2.9* 3.0* 2.9*   < >  --   --    < >  --    PROTTOTAL 6.8 6.5* 6.2*   < >  --   --    < >  --    BILITOTAL 17.0* 16.6* 16.8*   < >  --   --    < >  --    ALKPHOS 114 110 109   < >  --   --    < >  --    ALT 39 38 40   < >  --   --    < >  --    * 151* 149*   < >  --   --    < >  --    TROPI  --   --   --    --  2.854* 3.195*  --  3.365*    < > = values in this interval not displayed.     Recent Results (from the past 24 hour(s))   XR Video Swallow with SLP or OT    Narrative    VIDEO SWALLOWING EVALUATION February 26, 2020 10:29 AM     HISTORY: Further assess oropharyngeal swallow function.    COMPARISON: None.    FLUOROSCOPY TIME: .5 minutes.  SPOT IMAGES OR CINE RUNS: Eight.      Impression    IMPRESSION:    Thin: No penetration or aspiration.    Nectar: No penetration or aspiration.    Honey: Not administered.    Pudding: No penetration or aspiration.    Semisolid: No penetration or aspiration.    Solid: No penetration or aspiration.    This study only includes the cervical esophagus.    COLLIN CLIFTON MD     Medications     dextrose         - MEDICATION INSTRUCTIONS for Dialysis Patients -   Does not apply See Admin Instructions     sodium chloride 0.9%  250 mL Intravenous Once in dialysis     sodium chloride 0.9%  300 mL Hemodialysis Machine Once     folic acid  1 mg Oral or Feeding Tube Daily     insulin aspart  1-7 Units Subcutaneous Q4H     meropenem  500 mg Intravenous Q24H     - MEDICATION INSTRUCTIONS -   Does not apply Once     polyethylene glycol  17 g Oral or Feeding Tube Daily     protein modular  1 packet Per Feeding Tube Q12H     psyllium  1 packet Oral Daily     rifaximin  550 mg Oral or Feeding Tube BID     sodium chloride (PF)  10 mL Intracatheter Q7 Days     sodium chloride (PF)  10 mL Intracatheter Q8H     thiamine  100 mg Intravenous Daily

## 2020-02-26 NOTE — PROGRESS NOTES
[facility]  Gastroenterology Progress Note     Pat Delgado MRN# [id number]   YOB: 1990 Age: 29 year old          Assessment and Plan:   Update: Patient mentation significantly improved. Oriented x3.  Bowel sounds normoactive. Denies abdominal pain. Labs continue improve. NG in place. On hemodialysis. Tolerating full liquid diet.     Pat Delgado is a 29 year old female who was admitted on 2/9/2020. GI has been following for severe alcoholic hepatitis complicated by kidney failure w/ ATN vs type II hepatorenal syndrome. Developed hypoxemic respiratory failure on 2/22/2020 and intubated and transferred to ICU- now extubated on 2/14/2020. Currently on HD-creatinine remains elevated.     Acute metabolic encephalopathy  -Probably multifactorial with both liver and kidney injury, and GNR bacteremia of unknown source- currently on vanco/merepenem  -continue rifaximin BID  -having watery stools- will start on metamucil packet- helping  - no evidence of SBP -given no significant fluid pockets on imaging of abdomen  - Advance diet as tolerated  - Appreciate SLP consult     Acute alcoholic hepatitis with Maddrey score >50  Abdominal pain likely due to gaseous distension- improved   Minimal ascites status post 5 days of cefepime for SBP prophylaxis  Maddrey score of > 50s  -started on steroid trial on 2/16 but will stop today given GNR bacteremia if no other indication per ICU service  -Tbili likely peak-now stable around 16-17, INR trending down  -Continue with daily LFT w/ INR      Hyponatremia w/ increase in creatinine and minimal urine output in the setting of anasarca:  -ATN vs hepatorenal syndrome type II  -currently being dialyzed, care per renal team     Anemia:  -slow decrease ( near stable around 7-8) with no sign of overt bleeding, likely anemia from chronic illness  -GI will follow in case of any sign of bleeding            Acute liver failure without hepatic  coma  Hyponatremia  Sepsis, due to unspecified organism, unspecified whether acute organ dysfunction present (H)      Interval History:   no new complaints, doing well, denies chest pain, denies shortness of breath, denies abdominal pain, alert, oriented to person, place and time, has had a bowel movement in the last 24 hours and doing well; no cp, sob, n/v/d, or abd pain.              Review of Systems:   C: NEGATIVE for fever, chills, change in weight  E/M: NEGATIVE for ear, mouth and throat problems  R: NEGATIVE for significant cough or SOB  CV: NEGATIVE for chest pain, palpitations or peripheral edema             Medications:   I have reviewed this patient's current medications    - MEDICATION INSTRUCTIONS for Dialysis Patients -   Does not apply See Admin Instructions     sodium chloride 0.9%  250 mL Intravenous Once in dialysis     sodium chloride 0.9%  300 mL Hemodialysis Machine Once     folic acid  1 mg Oral or Feeding Tube Daily     insulin aspart  1-7 Units Subcutaneous Q4H     meropenem  500 mg Intravenous Q24H     - MEDICATION INSTRUCTIONS -   Does not apply Once     polyethylene glycol  17 g Oral or Feeding Tube Daily     protein modular  1 packet Per Feeding Tube Q12H     psyllium  1 packet Oral Daily     rifaximin  550 mg Oral or Feeding Tube BID     sodium chloride (PF)  10 mL Intracatheter Q7 Days     sodium chloride (PF)  10 mL Intracatheter Q8H     thiamine  100 mg Intravenous Daily                  Physical Exam:   Vitals were reviewed  Vital Signs with Ranges  Temp:  [97.9  F (36.6  C)-99.1  F (37.3  C)] 98.7  F (37.1  C)  Pulse:  [114] 114  Heart Rate:  [108-114] 110  Resp:  [16-18] 16  BP: (111-136)/(49-81) 128/66  SpO2:  [94 %-96 %] 95 %  I/O last 3 completed shifts:  In: 4192 [P.O.:60; NG/GT:3832]  Out: 60 [Urine:10; Emesis/NG output:50]  Constitutional: healthy, alert and no distress   Cardiovascular: negative, PMI normal. No lifts, heaves, or thrills. RRR. No murmurs, clicks gallops or  rub  Respiratory: negative, Percussion normal. Good diaphragmatic excursion. Lungs clear  Head: Normocephalic. No masses, lesions, tenderness or abnormalities  Neck: Neck supple. No adenopathy. Thyroid symmetric, normal size,, Carotids without bruits.  Abdomen: Abdomen soft, non-tender. BS normal. No masses, organomegaly           Data:   I reviewed the patient's new clinical lab test results.   Recent Labs   Lab Test 02/26/20  0550 02/25/20  0415 02/24/20  0430   WBC 21.1* 20.4* 20.8*   HGB 7.7* 7.5* 7.6*   * 103* 102*    147* 152   INR 1.52* 1.67* 1.79*     Recent Labs   Lab Test 02/26/20  0550 02/25/20  0415 02/24/20  0430   POTASSIUM 3.6 3.5 3.6   CHLORIDE 97 101 100   CO2 25 27 23   BUN 44* 30 39*   ANIONGAP 9 7 10     Recent Labs   Lab Test 02/26/20  0550 02/25/20  0415 02/24/20  0430  02/21/20  1430  02/14/20  0653  02/09/20  1755 02/09/20  1547   ALBUMIN 2.9* 3.0* 2.9*   < >  --    < >  --    < >  --  1.6*   BILITOTAL 17.0* 16.6* 16.8*   < >  --    < >  --    < >  --  8.1*   ALT 39 38 40   < >  --    < >  --    < >  --  33   * 151* 149*   < >  --    < >  --    < >  --  203*   PROTEIN  --   --   --   --  100*  --  100*  --  100*  --    LIPASE  --   --   --   --   --   --   --   --   --  119    < > = values in this interval not displayed.       I reviewed the patient's new imaging results.    All laboratory data reviewed  All imaging studies reviewed by me.    Aminata Rangel PA-C,  2/26/2020  [facility]  Office : [phone]  Cell: [phone] (Dr. Hobson)  Cell: [phone] (Aminata Rangel PA-C)

## 2020-02-26 NOTE — PROGRESS NOTES
02/26/20 1036   General Information   Swallowing Evaluation Videofluoroscopic evaluation   VFSS Evaluation   VFSS Additional Documentation Yes   VFSS Eval: Radiology   Radiologist Dr. Mclainic   Views Taken left lateral   Physical Location of Procedure [facility]   VFSS Eval: Thin Liquid Texture Trial   Mode of Presentation, Thin Liquid cup;straw;self-fed   Order of Presentation 3 (cup), 4 (cup), 8 (straw)   Preparatory Phase Poor bolus control   Oral Phase, Thin Liquid Premature pharyngeal entry  (to pyriform sinuses)   Pharyngeal Phase, Thin Liquid Delayed swallow reflex  (reduced epiglottic inversion)   Rosenbek's Penetration Aspiration Scale: Thin Liquid Trial Results 1 - no aspiration, contrast does not enter airway   VFSS Eval: Nectar Thick Liquid Texture Trial   Mode of Presentation, Nectar spoon;cup   Order of Presentation 1 (tsp), 2 (cup)   Preparatory Phase Poor bolus control   Oral Phase, Nectar Premature pharyngeal entry  (to pyriform sinuses)   Pharyngeal Phase, Nectar Delayed swallow reflex;Residue in valleculae  (reduced epiglottic inversion)   Rosenbek's Penetration Aspiration Scale: Nectar-Thick Liquid Trial Results 1 - no aspiration, contrast does not enter airway   Diagnostic Statement minimal vallecular residuals - slow bolus movement through upper esophagus   VFSS Eval: Puree Solid Texture Trial   Mode of Presentation, Puree spoon   Order of Presentation 5   Preparatory Phase WFL   Oral Phase, Puree WFL   Pharyngeal Phase, Puree Residue in valleculae   Rosenbek's Penetration Aspiration Scale: Puree Food Trial Results 1 - no aspiration, contrast does not enter airway   Diagnostic Statement minimal vallecular residuals - slow bolus movement through upper esophagus   VFSS Eval: Semisolid Texture Trial   Mode of Presentation, Semisolid spoon   Order of Presentation 6   Preparatory Phase WFL   Oral Phase, Semisolid WFL   Pharyngeal Phase, Semisolid Residue in valleculae    Rosenbek's Penetration Aspiration Scale: Semisolid Food Trial Results 1 - no aspiration, contrast does not enter airway   Diagnostic Statement minimal vallecular residuals - slow bolus movement through upper esophagus   VFSS Eval: Solid Food Texture Trial   Order of Presentation 7   Preparatory Phase WFL   Oral Phase, Solid WFL   Pharyngeal Phase, Solid Residue in valleculae   Rosenbek's Penetration Aspiration Scale: Solid Food Trial Results 1 - no aspiration, contrast does not enter airway   Diagnostic Statement minimal vallecular residuals - slow bolus movement through upper esophagus   General Therapy Interventions   Planned Therapy Interventions Dysphagia Treatment   Dysphagia treatment Oropharyngeal exercise training;Modified diet education;Instruction of safe swallow strategies   Swallow Eval: Clinical Impressions   Skilled Criteria for Therapy Intervention Skilled criteria met.  Treatment indicated.   Functional Assessment Scale (FAS) 5   Dysphagia Outcome Severity Scale (JANAY) Level 5 - JANAY   Treatment Diagnosis mild oropharyngeal dysphagia   Diet texture recommendations   (pending clinical trials at bedside)   Recommended Feeding/Eating Techniques maintain upright posture during/after eating for 30 mins;alternate between small bites and sips of food/liquid;small sips/bites   Therapy Frequency Daily   Predicted Duration of Therapy Intervention (days/wks) 1 week   Anticipated Discharge Disposition inpatient rehabilitation facility   Risks and Benefits of Treatment have been explained. Yes   Patient, family and/or staff in agreement with Plan of Care Yes   Clinical Impression Comments Video fluoroscopic Swallow Study completed. GI JACKY Galan various barium textures prior to evaluation. Pt assessed with thin liquids, nectar thick liquids, puree solids, semi-solids, regular solids and currently presents with mild oropharyngeal dysphagia. Pt with reduced oral control resulting in premature bolus spillage of  liquids to the pyriform sinuses; timely swallow initiation with solids at base of tongue. Hyolaryngeal elevation/excursion mildly reduced with at times incomplete epiglottic inversion (question if related to NG TF placement). Despite reduced laryngeal closure, no penetration or aspiration noted. No significant pharyngeal residuals (occasional min with solids) but slow bolus transfer throughout upper esophagus noted.    Total Evaluation Time   Total Evaluation Time (Minutes) 25

## 2020-02-26 NOTE — PLAN OF CARE
DATE & TIME: 2300-0730  Cognitive Concerns/ Orientation : A/Ox4, forgetfullness  BEHAVIOR & AGGRESSION TOOL COLOR: green  CIWA SCORE: 0,0   ABNL VS/O2: VSS ex tachy, low 100s  MOBILITY: Ax1, GB walker  PAIN MANAGMENT: PRN oxycodone given x1  DIET: NPO, TF, allowed 6-8 ice chips per hour  BOWEL/BLADDER: loaiza in   ABNL LAB/BG: BGs91, 89. Creat 3.2, GFR 18, Phos 1.8, Bili 16.6  DRAIN/DEVICES: loaiza, R PICC, R chest port  TELEMETRY RHYTHM: NA  SKIN: jaundiced, intact   TESTS/PROCEDURES: Swallow study in the AM and dialysis   D/C DAY/GOALS/PLACE: pending progress, planning on TCU  OTHER IMPORTANT INFO: legs, feet, ankles +3 edema

## 2020-02-26 NOTE — PLAN OF CARE
Discharge Planner SLP   Patient plan for discharge: did not discuss  Current status: Video fluoroscopic Swallow Study completed. LYNETTE RICO Okay'd various barium textures prior to evaluation. Pt assessed with thin liquids, nectar thick liquids, puree solids, semi-solids, regular solids and currently presents with mild oropharyngeal dysphagia. Pt with reduced oral control resulting in premature bolus spillage of liquids to the pyriform sinuses; timely swallow initiation with solids at base of tongue. Hyolaryngeal elevation/excursion mildly reduced with at times incomplete epiglottic inversion (question if related to NG TF placement). Despite reduced laryngeal closure, no penetration or aspiration noted. No significant pharyngeal residuals (occasional min with solids) but slow bolus transfer throughout upper esophagus noted.     Swallow treatment completed in room following test. Pt with reduced cognitive awareness and impulsivity with PO. She clinically tolerated 4 oz thin liquids, puree solids, semi-solids with clear vocal quality and no signs/sx aspiration. Will contact GI re: appropriateness of diet upgrades (they OK'd clears yesterday). From an SLP standpoint, recommend dysphagia diet level 2  with thin liquids (small single sips). 1:1 supervision given impulsivity/decreased awareness. Sit fully upright in chair for meals/30-60 minutes post, alternate between solids/liquids and small bites sophageal clearance.    Addendum: discussed with LYNETTE RICO via phone, recommending starting with fulls with ADAT. Continue with above underlined strategies.     Barriers to return to prior living situation: medical needs; deconditioning/weakness; cognition  Recommendations for discharge: TCU  Rationale for recommendations: patient may require SLP intervention at discharge for dysphagia for safe return to baseline or least restrictive diet       Entered by: Abimbola Pozo 02/26/2020 10:32 AM

## 2020-02-26 NOTE — PROGRESS NOTES
"[facility]     Renal Progress Note                Assessment/Plan:     1.  Oliguric EBEN 2 ATN + HRS 1.  Multiple other insults including septicemia, IV Contrast ( 2/9 and 2/21) , High vanco levels ( 38).   - HD today . Plan for 3-4 L UF. Massive vol overloaded. Plan for UF session tomorrow.     2.  Sepsis syndrome.  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  Source is unclear.  ? Septic pulm emboli.  On vanco/beatris.  - Dose vanc per levels ( per pharmacy)     3.  Resp failure -resolved    4.  EtOHic hepatitis.  Pt's bili remains high .  She is icteric .  Coming off steroids per GI.     5.  Encephalopathy.    6.  Anemia.  Hgb stable.   A.  Follow hb, clinically.  B.  Transfuse prn.          Interval History:     Seen / examined on dialysis. Tired and sleepy but arousable. HD running okay . Targeting 3-4 L UF. TDC working well.          Medications and Allergies:       - MEDICATION INSTRUCTIONS for Dialysis Patients -   Does not apply See Admin Instructions     [START ON 2/27/2020] folic acid  1 mg Oral Daily     insulin aspart  1-7 Units Subcutaneous Q4H     meropenem  500 mg Intravenous Q24H     - MEDICATION INSTRUCTIONS -   Does not apply Once     [START ON 2/27/2020] polyethylene glycol  17 g Oral Daily     protein modular  1 packet Per Feeding Tube Q12H     psyllium  1 packet Oral Daily     rifaximin  550 mg Oral BID     sodium chloride (PF)  10 mL Intracatheter Q7 Days     sodium chloride (PF)  10 mL Intracatheter Q8H     thiamine  100 mg Intravenous Daily     Allergies   Allergen Reactions     Penicillins Unknown     Hives            Physical Exam:     Vitals were reviewed    Heart Rate: 105, Blood pressure 124/59, pulse 107, temperature 98.3  F (36.8  C), temperature source Oral, resp. rate 20, height 1.727 m (5' 8\"), weight 126.1 kg (278 lb), SpO2 90 %, not currently breastfeeding.  Wt Readings from Last 3 Encounters:   02/26/20 126.1 kg (278 lb)   02/09/15 95.3 kg (210 lb)   01/22/15 108.4 kg " (239 lb)       GENERAL APPEARANCE: Tired, seen on dialysis  HEENT: Icterus +, acneiform rash on lower half of face and upper chest   RESP: Clear  CV: Reg, tachy, nl S1/S2  ABDOMEN: o/s/nt, + distended  EXTREMITIES/SKIN: 3+ ble edema  OTHER:  R arm PICC; + loaiza c bloody urine; + RIJ TDC         Data:     CBC RESULTS:     Recent Labs   Lab 02/26/20  0550 02/25/20  0415 02/24/20  0430 02/23/20  0515 02/22/20  0412 02/21/20  1900   WBC 21.1* 20.4* 20.8* 21.7* 23.7* 23.9*   RBC 2.34* 2.22* 2.31* 2.26* 2.39* 1.97*   HGB 7.7* 7.5* 7.6* 7.6* 8.0* 6.8*   HCT 23.6* 22.8* 23.5* 23.1* 24.6* 20.6*    147* 152 165 205 210     Basic Metabolic Panel:  Recent Labs   Lab 02/26/20  0550 02/25/20  0415 02/24/20  0430 02/23/20  0515 02/22/20  0412 02/21/20  1900   * 135 133 133 133 133   POTASSIUM 3.6 3.5 3.6 3.3* 3.7 3.9   CHLORIDE 97 101 100 99 100 99   CO2 25 27 23 24 22 21   BUN 44* 30 39* 31* 40* 38*   CR 4.31* 3.27* 4.07* 3.16* 3.35* 3.13*   GLC 96 103* 107* 98 104* 105*   EMILY 9.0 8.4* 8.5 8.3* 8.3* 8.3*     INR  Recent Labs   Lab 02/26/20  0550 02/25/20  0415 02/24/20  0430 02/23/20  0515   INR 1.52* 1.67* 1.79* 1.90*      Attestation:   I have reviewed today's relevant vital signs, notes, medications, labs and imaging.    Edward Powers MD  [facility] - Nephrology   [phone]

## 2020-02-26 NOTE — PROGRESS NOTES
OT:    Attempted to see pt in PM for OT evaluation however pt was out of room at dialysis. OT will continue to follow.

## 2020-02-26 NOTE — PLAN OF CARE
DATE & TIME: 2/25/2020 3pm-7pm             Cognitive Concerns/ Orientation: A&Ox4  BEHAVIOR & AGGRESSION TOOL COLOR: Green  MOBILITY: Up with Ax1, GB and walker.   CIWA SCORE:5  ABNL VS/O2: VSS on RA , tachy at times  PAIN MANAGMENT: PRN oxycodone x 1 given for abdominal pain.   DIET: NPO (ex. 6-8 ice chips/hr per SLP); TF @ 40mL/hr - BG checks q4hrs  BOWEL/BLADDER: Amado in place, very small urine output; No BM this shift.   ABNL LAB/BG: Creat 3.27, Bili 16.6, , WBC 20.4, Phos 1.6 (MD notified but no response yet), Hgb 7.5  DRAIN/DEVICES: Amado, R PICC  TELEMETRY RHYTHM: N/A  SKIN: Jaundice, rash to face  TESTS/PROCEDURES: Swallow study 2/26  D/C DAY/GOALS/PLACE: Pending progress.  OTHER IMPORTANT INFO: Jaundice throughout. Generalized edema. Asking for anxiety medication and something to help her sleep. Melatonin ordered, but pt is asking for Seroquel or atarax, will ermias LARA.

## 2020-02-26 NOTE — PROGRESS NOTES
Potassium   Date Value Ref Range Status   02/26/2020 3.6 3.4 - 5.3 mmol/L Final     Comment:     Specimen slightly hemolyzed, potassium may be falsely elevated     Hemoglobin   Date Value Ref Range Status   02/26/2020 7.7 (L) 11.7 - 15.7 g/dL Final     Creatinine   Date Value Ref Range Status   02/26/2020 4.31 (H) 0.52 - 1.04 mg/dL Final     Urea Nitrogen   Date Value Ref Range Status   02/26/2020 44 (H) 7 - 30 mg/dL Final     Sodium   Date Value Ref Range Status   02/26/2020 131 (L) 133 - 144 mmol/L Final     INR   Date Value Ref Range Status   02/26/2020 1.52 (H) 0.86 - 1.14 Final       DIALYSIS PROCEDURE NOTE  Hepatitis status of previous patient on machine log was checked and verified ok to use with this patients hepatitis status.  Patient dialyzed for 3.5 hrs. on a K3 bath with a net fluid removal of  4L.  A BFR of 400 ml/min was obtained via a right CVC.      The treatment plan was dicussed with Dr. Powers during the treatment.    Total heparin received during the treatment: 0 units.   Line flushed, clamped and capped with heparin 1:1000 1.6 mL (1600 units) per lumen  Meds  given: none   Complications: none    Procedure/ESRD/access care/potassium/fluid restriction education provided orally/visually to patient, patient verbalized/demonstrated understanding  ICEBOAT? Timeout performed pre-treatment  I: Patient was identified using 2 identifiers  C: Consent obtained/verified current before treatment  E: Equipment preventative maintenance is current and dialysis delivery system OK to use  B: Hepatitis B Surface Antigen: negative; Draw Date: 2/23/20      Hepatitis B Surface Antibody: Immune; Draw Date: 2/23/20  O: Dialysis orders present and complete prior to treatment  A: Vascular access verified and assessed prior to treatment  T: Treatment was performed at a clinically appropriate time  ?: Patient was allowed to ask questions and address concerns prior to treatment  See flowsheet in EPIC for further details and  post assessment.  Machine water alarm in place and functioning. Transducer pods intact and checked every 15min.  Pt returned via wheelchair.  Report received from: CHU Del Angel RN  Report given to: CHU Metcalf RN  Chlorine/Chloramine water system checked every 4 hours.  Outpatient Dialysis at: [facility]

## 2020-02-26 NOTE — PLAN OF CARE
DATE & TIME:2/26/20 0700- 1530  Cognitive Concerns/ Orientation : A/Ox4, forgetfullness  BEHAVIOR & AGGRESSION TOOL COLOR: green  CIWA SCORE: 0      ABNL VS/O2: VSS ex tachy,  100s  MOBILITY: Ax1, GB walker  PAIN MANAGMENT: denied  DIET: Full liquids   BOWEL/BLADDER: loaiza in   ABNL LAB/BG: ,99, 102. Creat 4.31,  Phos 1.2, Bili 17  DRAIN/DEVICES: loaiza, R PICC, R chest port  TELEMETRY RHYTHM: NA  SKIN: jaundiced, intact   TESTS/PROCEDURES:  Had video swallow study, went for dialysis  D/C DAY/GOALS/PLACE: pending progress, planning on TCU  OTHER IMPORTANT INFO: legs, feet, ankles +3 edema, PCDs on, speech changed diet to FL. GI and nephrology following., abdomen distended, BS hypo, NG clamped, plan to pull NG out if tolerates diet. replaced phos, recheck at 2pm.

## 2020-02-27 ENCOUNTER — APPOINTMENT (OUTPATIENT)
Dept: SPEECH THERAPY | Facility: CLINIC | Age: 30
DRG: 871 | End: 2020-02-27
Payer: COMMERCIAL

## 2020-02-27 ENCOUNTER — APPOINTMENT (OUTPATIENT)
Dept: OCCUPATIONAL THERAPY | Facility: CLINIC | Age: 30
DRG: 871 | End: 2020-02-27
Attending: INTERNAL MEDICINE
Payer: COMMERCIAL

## 2020-02-27 LAB
ALBUMIN SERPL-MCNC: 2.8 G/DL (ref 3.4–5)
ALP SERPL-CCNC: 109 U/L (ref 40–150)
ALT SERPL W P-5'-P-CCNC: 41 U/L (ref 0–50)
ANION GAP SERPL CALCULATED.3IONS-SCNC: 8 MMOL/L (ref 3–14)
AST SERPL W P-5'-P-CCNC: 187 U/L (ref 0–45)
BACTERIA SPEC CULT: NO GROWTH
BACTERIA SPEC CULT: NO GROWTH
BILIRUB DIRECT SERPL-MCNC: 13.1 MG/DL (ref 0–0.2)
BILIRUB SERPL-MCNC: 16.1 MG/DL (ref 0.2–1.3)
BUN SERPL-MCNC: 30 MG/DL (ref 7–30)
CALCIUM SERPL-MCNC: 8.7 MG/DL (ref 8.5–10.1)
CHLORIDE SERPL-SCNC: 97 MMOL/L (ref 94–109)
CO2 SERPL-SCNC: 29 MMOL/L (ref 20–32)
CREAT SERPL-MCNC: 3.42 MG/DL (ref 0.52–1.04)
ERYTHROCYTE [DISTWIDTH] IN BLOOD BY AUTOMATED COUNT: 21.3 % (ref 10–15)
GFR SERPL CREATININE-BSD FRML MDRD: 17 ML/MIN/{1.73_M2}
GLUCOSE BLDC GLUCOMTR-MCNC: 114 MG/DL (ref 70–99)
GLUCOSE BLDC GLUCOMTR-MCNC: 81 MG/DL (ref 70–99)
GLUCOSE BLDC GLUCOMTR-MCNC: 86 MG/DL (ref 70–99)
GLUCOSE BLDC GLUCOMTR-MCNC: 87 MG/DL (ref 70–99)
GLUCOSE BLDC GLUCOMTR-MCNC: 88 MG/DL (ref 70–99)
GLUCOSE BLDC GLUCOMTR-MCNC: 96 MG/DL (ref 70–99)
GLUCOSE SERPL-MCNC: 93 MG/DL (ref 70–99)
HCT VFR BLD AUTO: 24.4 % (ref 35–47)
HGB BLD-MCNC: 7.8 G/DL (ref 11.7–15.7)
INR PPP: 1.68 (ref 0.86–1.14)
Lab: NORMAL
MAGNESIUM SERPL-MCNC: 1.9 MG/DL (ref 1.6–2.3)
MCH RBC QN AUTO: 32.5 PG (ref 26.5–33)
MCHC RBC AUTO-ENTMCNC: 32 G/DL (ref 31.5–36.5)
MCV RBC AUTO: 102 FL (ref 78–100)
PHOSPHATE SERPL-MCNC: 3.6 MG/DL (ref 2.5–4.5)
PLATELET # BLD AUTO: 197 10E9/L (ref 150–450)
POTASSIUM SERPL-SCNC: 4 MMOL/L (ref 3.4–5.3)
PROT SERPL-MCNC: 6.5 G/DL (ref 6.8–8.8)
RBC # BLD AUTO: 2.4 10E12/L (ref 3.8–5.2)
SODIUM SERPL-SCNC: 134 MMOL/L (ref 133–144)
SPECIMEN SOURCE: NORMAL
SPECIMEN SOURCE: NORMAL
WBC # BLD AUTO: 18.6 10E9/L (ref 4–11)

## 2020-02-27 PROCEDURE — 90937 HEMODIALYSIS REPEATED EVAL: CPT

## 2020-02-27 PROCEDURE — 25000128 H RX IP 250 OP 636: Performed by: HOSPITALIST

## 2020-02-27 PROCEDURE — 82247 BILIRUBIN TOTAL: CPT | Performed by: STUDENT IN AN ORGANIZED HEALTH CARE EDUCATION/TRAINING PROGRAM

## 2020-02-27 PROCEDURE — 84450 TRANSFERASE (AST) (SGOT): CPT | Performed by: STUDENT IN AN ORGANIZED HEALTH CARE EDUCATION/TRAINING PROGRAM

## 2020-02-27 PROCEDURE — 12000000 ZZH R&B MED SURG/OB

## 2020-02-27 PROCEDURE — 85027 COMPLETE CBC AUTOMATED: CPT | Performed by: STUDENT IN AN ORGANIZED HEALTH CARE EDUCATION/TRAINING PROGRAM

## 2020-02-27 PROCEDURE — 40000257 ZZH STATISTIC CONSULT NO CHARGE VASC ACCESS

## 2020-02-27 PROCEDURE — 97112 NEUROMUSCULAR REEDUCATION: CPT | Mod: GO

## 2020-02-27 PROCEDURE — 82248 BILIRUBIN DIRECT: CPT | Performed by: STUDENT IN AN ORGANIZED HEALTH CARE EDUCATION/TRAINING PROGRAM

## 2020-02-27 PROCEDURE — 84075 ASSAY ALKALINE PHOSPHATASE: CPT | Performed by: STUDENT IN AN ORGANIZED HEALTH CARE EDUCATION/TRAINING PROGRAM

## 2020-02-27 PROCEDURE — 97530 THERAPEUTIC ACTIVITIES: CPT | Mod: GO

## 2020-02-27 PROCEDURE — 40000239 ZZH STATISTIC VAT ROUNDS

## 2020-02-27 PROCEDURE — 97166 OT EVAL MOD COMPLEX 45 MIN: CPT | Mod: GO

## 2020-02-27 PROCEDURE — 25000125 ZZHC RX 250: Performed by: HOSPITALIST

## 2020-02-27 PROCEDURE — 25000132 ZZH RX MED GY IP 250 OP 250 PS 637: Performed by: PHYSICIAN ASSISTANT

## 2020-02-27 PROCEDURE — 92526 ORAL FUNCTION THERAPY: CPT | Mod: GN | Performed by: SPEECH-LANGUAGE PATHOLOGIST

## 2020-02-27 PROCEDURE — 25000125 ZZHC RX 250: Performed by: INTERNAL MEDICINE

## 2020-02-27 PROCEDURE — 84460 ALANINE AMINO (ALT) (SGPT): CPT | Performed by: STUDENT IN AN ORGANIZED HEALTH CARE EDUCATION/TRAINING PROGRAM

## 2020-02-27 PROCEDURE — 25000132 ZZH RX MED GY IP 250 OP 250 PS 637: Performed by: HOSPITALIST

## 2020-02-27 PROCEDURE — 84155 ASSAY OF PROTEIN SERUM: CPT | Performed by: STUDENT IN AN ORGANIZED HEALTH CARE EDUCATION/TRAINING PROGRAM

## 2020-02-27 PROCEDURE — 85610 PROTHROMBIN TIME: CPT | Performed by: STUDENT IN AN ORGANIZED HEALTH CARE EDUCATION/TRAINING PROGRAM

## 2020-02-27 PROCEDURE — 00000146 ZZHCL STATISTIC GLUCOSE BY METER IP

## 2020-02-27 PROCEDURE — 99232 SBSQ HOSP IP/OBS MODERATE 35: CPT | Performed by: INTERNAL MEDICINE

## 2020-02-27 PROCEDURE — 80069 RENAL FUNCTION PANEL: CPT | Performed by: STUDENT IN AN ORGANIZED HEALTH CARE EDUCATION/TRAINING PROGRAM

## 2020-02-27 PROCEDURE — 25800030 ZZH RX IP 258 OP 636: Performed by: INTERNAL MEDICINE

## 2020-02-27 PROCEDURE — 83735 ASSAY OF MAGNESIUM: CPT | Performed by: STUDENT IN AN ORGANIZED HEALTH CARE EDUCATION/TRAINING PROGRAM

## 2020-02-27 RX ORDER — ALBUMIN, HUMAN INJ 5% 5 %
250 SOLUTION INTRAVENOUS
Status: DISCONTINUED | OUTPATIENT
Start: 2020-02-27 | End: 2020-02-27

## 2020-02-27 RX ADMIN — POLYETHYLENE GLYCOL 3350 17 G: 17 POWDER, FOR SOLUTION ORAL at 12:50

## 2020-02-27 RX ADMIN — OXYCODONE HYDROCHLORIDE 2.5 MG: 5 TABLET ORAL at 23:44

## 2020-02-27 RX ADMIN — METOPROLOL TARTRATE 5 MG: 5 INJECTION INTRAVENOUS at 00:48

## 2020-02-27 RX ADMIN — THIAMINE HYDROCHLORIDE 100 MG: 100 INJECTION, SOLUTION INTRAMUSCULAR; INTRAVENOUS at 12:51

## 2020-02-27 RX ADMIN — FOLIC ACID 1 MG: 1 TABLET ORAL at 12:51

## 2020-02-27 RX ADMIN — OXYCODONE HYDROCHLORIDE 2.5 MG: 5 TABLET ORAL at 00:07

## 2020-02-27 RX ADMIN — RIFAXIMIN 550 MG: 550 TABLET ORAL at 21:41

## 2020-02-27 RX ADMIN — SODIUM PHOSPHATE, MONOBASIC, MONOHYDRATE 20 MMOL: 276; 142 INJECTION, SOLUTION INTRAVENOUS at 00:07

## 2020-02-27 RX ADMIN — MEROPENEM 500 MG: 500 INJECTION, POWDER, FOR SOLUTION INTRAVENOUS at 21:42

## 2020-02-27 RX ADMIN — RIFAXIMIN 550 MG: 550 TABLET ORAL at 12:51

## 2020-02-27 RX ADMIN — PSYLLIUM HUSK 1 PACKET: 3.4 POWDER ORAL at 12:50

## 2020-02-27 ASSESSMENT — ACTIVITIES OF DAILY LIVING (ADL)
ADLS_ACUITY_SCORE: 15
PREVIOUS_RESPONSIBILITIES: MEAL PREP;HOUSEKEEPING;SHOPPING;LAUNDRY;MEDICATION MANAGEMENT;FINANCES;DRIVING;WORK
ADLS_ACUITY_SCORE: 15

## 2020-02-27 NOTE — PLAN OF CARE
PT:/lymph: Pt was attempted to be seen by another therapist for lymphedema evaluation and PT treatment session but pt was at dialysis. This writer attempted to see patient for session but pt busy with another therapy.

## 2020-02-27 NOTE — PLAN OF CARE
"SLP: Pt out of the room during time of attempt. Diet ordered per GI. Message left for GI to modify diet based on Video Swallow Study to:     \"From an SLP standpoint, recommend dysphagia diet level 2  with thin liquids (small single sips). 1:1 supervision given impulsivity/decreased awareness. Sit fully upright in chair for meals/30-60 minutes post, alternate between solids/liquids and small bites sophageal clearance.\"    Will continue to follow.   "

## 2020-02-27 NOTE — PLAN OF CARE
DATE & TIME: 2300-0730; 2/27/2020          Cognitive Concerns/ Orientation : A & O x 4; forgetful, anxious at times   BEHAVIOR & AGGRESSION TOOL COLOR: Green   CIWA SCORE: 2    ABNL VS/O2: VSS ex. , PRN Metoprolol given x1 with rebound HR 92, 2L NC overnight  for confort  MOBILITY: A1 gait belt & walker   PAIN MANAGMENT: C/O abd discomfort, Oxycodone given x1   DIET: full liquids; tolerating well   BOWEL/BLADDER: BS active x 4 ; continent of B & B; BM x1  ABNL LAB/BG: Creatinine = 4.31; bilirubin= 17; albumin= 2.9; ATH=568; phosphorous= 1.2, Phosphorus replaced again overnight, RECHECK DUE at 0800.Blood Glucose= 114  DRAIN/DEVICES: PICC, IV Abx given x1; NG tube/clamped; loaiza catheter   TELEMETRY RHYTHM: N/A  SKIN: Yellow sclera and skin; Dry, red rash on lower face; bruising on right/left arms; purple/green bruise on right side of neck; 3-4+ edema on LE bilaterally  TESTS/PROCEDURES: Dialysis completed yesterday; will have dialysis today  D/C DAY/GOALS/PLACE: pending discharge plans    OTHER IMPORTANT INFO: pt. Requesting loaiza to be removed (M.D notified)

## 2020-02-27 NOTE — PLAN OF CARE
Discharge Planner SLP   Patient plan for discharge: did not discuss  Current status:   Swallowing tx provided this PM. Pt trialed thin liquid by straw with no s/sx of aspiration, and a timely swallow. Semi-solids were given and pt had proper mastication and full oral clearance. Education was provided to pt on diet recommendations and what foods shes okay to eat.     Recommend to continue DD2 and thin liquids with safe swallow strategies: sit upright, slow rate, small bites/sips, and straws okay. Plan to follow up to trial for upgrade and educate on safe swallow strategies.   Barriers to return to prior living situation: medical status, dysphagia/not at baseline  Recommendations for discharge: TCU  Rationale for recommendations: patient may require SLP intervention at discharge for dysphagia for safe return to baseline or least restrictive diet       Entered by: Sharona Cabrera 02/27/2020 3:40 PM

## 2020-02-27 NOTE — PLAN OF CARE
DATE & TIME: 2/27/20 0700- 1530         Cognitive Concerns/ Orientation : A & O x 4; forgetful, anxious at times   BEHAVIOR & AGGRESSION TOOL COLOR: Green   CIWA SCORE:  ABNL VS/O2: VSS ex. HR 100s,on RA  MOBILITY: A1 gait belt & walker   PAIN MANAGMENT: denied   DIET: full liquids; tolerating well , advanced to DD2 with thin liquids  BOWEL/BLADDER: continent of B & B;   ABNL LAB/BG:WBC 18.6 Creatinine 3.421; bilirubin 16.1, phos 3.6, Phosphorus replaced overnight, Bg 87, 81  DRAIN/DEVICES: Right arm PICC, right chest port   TELEMETRY RHYTHM: N/A  SKIN: Yellow sclera and skin; Dry, red rash on lower face; bruising on right/left arms; purple/green bruise on right side of neck; 3-4+ edema on LE bilaterally  TESTS/PROCEDURES: Had  Dialysis  today  D/C DAY/GOALS/PLACE: pending discharge plans    OTHER IMPORTANT INFO: GI and Neph following.Abdomen distended, BS positive.BROWNING, needs supervision with meals,ate few bites of lunch,removed loaiza and NG tube.PT, OT , speech following.

## 2020-02-27 NOTE — PROGRESS NOTES
"SPIRITUAL HEALTH SERVICES Progress Note  [facility] 66    F/U visit. Pt spoke reflectively about this hospitalization and states that a key learning is \"I want to live.\" Because pt wants to live, she said she needs to stop drinking.  Pt said her partner (who she met in treatment) shares the desire to be sober and pledges to support pt in that effort now.  SH affirmed pt's motivation/intention to quit drinking, and suggested that [employer] (a 12-Step group) might be helpful in supporting pt's intention.  Pt cites no other concerns at this time.   remains available for f/u per pt need/request.                                                                                                                                            Eliseo Dorado M.Div., [facility]  Staff   Pager [phone]    "

## 2020-02-27 NOTE — PROGRESS NOTES
"SW:  D: Inquired with patient to see if it is okay to speak with her father during her episode of care at [facility]. Patient gave consent to writer to speak with her father during patient's episode of care. Patient reports she is continuing with dialysis and never would like to drink again. 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 Explained patient's father had called writer this morning and would be in to discuss discharge planning at 4pm. Consulted with care coordinator, Carolina and  Ophelia, and it was determined patient would not discharge soon. Explained to this writer that patient's father should speak with doctor for all current medical concerns. Spoke with patient and relayed all of this information.     P: Will continue to follow    SHANNON Norwood     [facility]    "

## 2020-02-27 NOTE — PROGRESS NOTES
[facility]    Medicine Progress Note - Hospitalist Service       Date of Admission:  2/9/2020  Assessment & Plan    Pat Delgado is a 29 year old female history of alcohol abuse, anxiety, depression, ADHD, hypertension, migraine, tobacco abuse and substance abuse who was admitted on 2/9/2020 with  severe alcoholic hepatitis complicated by kidney failure w/ ATN vs type II hepatorenal syndrome, encephalopathy, hypoxic hypercapnic respiratory failure on 2/22/2020 was intubated and transferred to ICU- extubated on 2/24/2020 and transferred to floor 2/25/2020. Currently on HD-creatinine remains elevated.  She also developed bacteremia with fusobacterium nucleatum, with repeat blood culture negative so far.        Acute liver failure 2/2 Alcoholic hepatitis  Acute metabolic encephalopathy, improved  Acute hypoxic respiratory failure, resolved  Acute alcoholic hepatitis with Maddrey score >50  Minimal ascites status post 5 days of cefepime for SBP prophylaxis  Was initially started on prednisolone, stop because of the bacteremia.  - extubated 2/24. No longer on sedation.  - transfer out of MICU 2/25  - continue rifaximin 550 BID  - no evidence of SBP -given no significant fluid pockets on imaging of abdomen  - GI following closely  -Off steroids at this time.  Only get for few days.  -Speech is consulted, had video swallow evaluation done this morning.    - Continue with daily LFT w/ INR  Full liquid diet at this time.  And tolerating it well.  Final diet as tolerated, as per speech recommendation    GNR Bacteremia/Fusobacterium nucleatum, unclear source  - plan for 14 days antibiotic treatment -she is currently on IV Meropenem  - discontinue Vancomycin at this time.     Oliguric renal failure 2/2 ATN and hepato renal syndrome  Multiple other insults including septicemia, IV Contrast ( 2/9 and 2/21) , High vanco levels.   - nephrology following  - tunneled RIJ HD catheter in place  - s/p HD x3, last  run 2/24.  Plan for dialysis today with ultrafiltration as per nephrology.  Significant lower extremity edema, will benefit from ultrafiltration.  She is in dialysis today doing ultrafiltrate mostly about 4 L.     GI/Nutrition:  TFs to goal.  Continue folic acid, thiamine, rifaximin, and PEG for alcohol abuse, liver failure, and bowel regimen.  Narrow antiemetics to PRN ondansetron.  MELD = 39.    -NPO for now but can be started on diet if she clears her video swallow.  Tube feeding can be stopped if she can tolerate oral diet well.    -Continue rifaximin, folate, and thiamine       Anemia:  -slow decrease ( near stable around 7-8) with no sign of overt bleeding, likely anemia from chronic illness  -GI will follow in case of any sign of bleeding, hemoglobin around 7.7 at this time.    Hyponatremia resolved with the dialysis yesterday.  10 filtrate of target 4 L today.  OT lymphedema consulted but have not have a lymphedema wrap yet.  Speech to initiate diet if passes video swallow evaluation.  If enough oral intake and remove the nasogastric feeding tube  PT and OT to follow.  Can remove the tube feeding once adequate oral intake.    Diet: Dysphagia Diet Level 2 [facility] Altered Thin Liquids (water, ice chips, juice, milk gelatin, ice cream, etc)    DVT Prophylaxis: Pneumatic Compression Devices  Amado Catheter: in place, indication: Strict 1-2 Hour I&O  Code Status: Full Code      Disposition Plan   Expected discharge: 3 to 4 days, most likely will need the rehab.  Entered: Marvin Guillaume MD 02/27/2020, 12:43 PM       The patient's care was discussed with the Bedside Nurse, Patient and ICU Consultant.    Marvin Guillaume MD  Hospitalist Service  [facility]    ______________________________________________________________________    Interval History   Patient seen during the dialysis this morning.  Feeling better tolerating this liquid diet well.  No fever chills nausea vomiting at this time.    Data  reviewed today: I reviewed all medications, new labs and imaging results over the last 24 hours. I personally reviewed no images or EKG's today.    Physical Exam   Vital Signs: Temp: 98.2  F (36.8  C) Temp src: Oral BP: 113/56 Pulse: 100 Heart Rate: 102 Resp: 19 SpO2: 100 % O2 Device: None (Room air) Oxygen Delivery: 1 LPM  Weight: 278 lbs 0 oz  General Appearance: Awake, alert, NAD, jaundiced. Obese, anasarca. NG tube in place  HEENT: icteric  Respiratory: CTAB  Cardiovascular: tachycardic  GI: soft, NT, mildly distended vs obese  : loaiza with minimal dark brown urine  Skin: jaundice  Other: Moving all extremities.  3-4+ lower extremity edema    Data   Recent Labs   Lab 02/27/20  0515 02/26/20  0550 02/25/20  0415  02/22/20  1150 02/22/20  0555  02/21/20  2350   WBC 18.6* 21.1* 20.4*   < >  --   --    < >  --    HGB 7.8* 7.7* 7.5*   < >  --   --    < >  --    * 101* 103*   < >  --   --    < >  --     178 147*   < >  --   --    < >  --    INR 1.68* 1.52* 1.67*   < >  --   --    < >  --     131* 135   < >  --   --    < >  --    POTASSIUM 4.0 3.6 3.5   < >  --   --    < >  --    CHLORIDE 97 97 101   < >  --   --    < >  --    CO2 29 25 27   < >  --   --    < >  --    BUN 30 44* 30   < >  --   --    < >  --    CR 3.42* 4.31* 3.27*   < >  --   --    < >  --    ANIONGAP 8 9 7   < >  --   --    < >  --    EMILY 8.7 9.0 8.4*   < >  --   --    < >  --    GLC 93 96 103*   < >  --   --    < >  --    ALBUMIN 2.8* 2.9* 3.0*   < >  --   --    < >  --    PROTTOTAL 6.5* 6.8 6.5*   < >  --   --    < >  --    BILITOTAL 16.1* 17.0* 16.6*   < >  --   --    < >  --    ALKPHOS 109 114 110   < >  --   --    < >  --    ALT 41 39 38   < >  --   --    < >  --    * 162* 151*   < >  --   --    < >  --    TROPI  --   --   --   --  2.854* 3.195*  --  3.365*    < > = values in this interval not displayed.     No results found for this or any previous visit (from the past 24 hour(s)).  Medications     dextrose         -  MEDICATION INSTRUCTIONS for Dialysis Patients -   Does not apply See Admin Instructions     folic acid  1 mg Oral Daily     insulin aspart  1-7 Units Subcutaneous Q4H     meropenem  500 mg Intravenous Q24H     polyethylene glycol  17 g Oral Daily     protein modular  1 packet Per Feeding Tube Q12H     psyllium  1 packet Oral Daily     rifaximin  550 mg Oral BID     sodium chloride (PF)  10 mL Intracatheter Q7 Days     sodium chloride (PF)  10 mL Intracatheter Q8H     thiamine  100 mg Intravenous Daily

## 2020-02-27 NOTE — PROGRESS NOTES
Potassium   Date Value Ref Range Status   02/27/2020 4.0 3.4 - 5.3 mmol/L Final     Hemoglobin   Date Value Ref Range Status   02/27/2020 7.8 (L) 11.7 - 15.7 g/dL Final     Creatinine   Date Value Ref Range Status   02/27/2020 3.42 (H) 0.52 - 1.04 mg/dL Final     Urea Nitrogen   Date Value Ref Range Status   02/27/2020 30 7 - 30 mg/dL Final     Sodium   Date Value Ref Range Status   02/27/2020 134 133 - 144 mmol/L Final     INR   Date Value Ref Range Status   02/27/2020 1.68 (H) 0.86 - 1.14 Final       DIALYSIS PROCEDURE NOTE  Hepatitis status of previous patient on machine log was checked and verified ok to use with this patients hepatitis status.  Patient dialyzed for 3 hrs UF only with a removal of  4L.  A BFR of 400 ml/min was obtained via a RIJ     The treatment plan was dicussed with Dr. Powers during the treatment.  Total heparin received during the treatment: 0 units.   Line flushed, clamped and capped with heparin 1:1000 2 mL (2000 units) per lumen  Meds  given: none Complications: none    Education regarding the procedure was provided and the patient verbalized understanding.  ICEBOAT? Timeout performed pre-treatment  I: Patient was identified using 2 identifiers  C: Consent obtained/verified current before treatment  E: Equipment preventative maintenance is current and dialysis delivery system OK to use  B: Hepatitis B Surface Antigen: Negative; Draw Date: 2/23/20      Hepatitis B Surface Antibody: Immune; Draw Date: 2/23/20  O: Dialysis orders present and complete prior to treatment  A: Vascular access verified and assessed prior to treatment  T: Treatment was performed at a clinically appropriate time  ?: Patient was allowed to ask questions and address concerns prior to treatment  See flowsheet in EPIC for further details and post assessment.  Machine water alarm in place and functioning. Transducer pods intact and checked every 15min.  Pt returned via Wheelchair  Report received from: CHU Del Angel  RN  Report given to: CHU Del Angel RN  Chlorine/Chloramine water system checked every 4 hours.

## 2020-02-27 NOTE — PROGRESS NOTES
02/27/20 1421   Quick Adds   Type of Visit Initial Occupational Therapy Evaluation   Living Environment   Lives With significant other  (works nights)   Living Arrangements apartment   Home Accessibility stairs within home;stairs to enter home   Number of Stairs, Main Entrance 3   Number of Stairs, Within Home, Primary   (3 flights; elevator sometimes broken)   Transportation Anticipated car, drives self;family or friend will provide   Living Environment Comment tub/shower, standard height toilet   Self-Care   Usual Activity Tolerance good   Current Activity Tolerance fair   Equipment Currently Used at Home none;grab bar, tub/shower   Functional Level   Ambulation 0-->independent   Transferring 0-->independent   Toileting 0-->independent   Bathing 0-->independent   Dressing 0-->independent   Eating 0-->independent   Fall history within last six months yes   Number of times patient has fallen within last six months 1   Which of the above functional risks had a recent onset or change? fall history;ambulation;transferring;toileting;bathing;dressing;communication/speech;cognition   General Information   Onset of Illness/Injury or Date of Surgery - Date 02/09/20   Referring Physician Maria Fernanda Elkins NP   Patient/Family Goals Statement improve independence   Additional Occupational Profile Info/Pertinent History of Current Problem  Pt is a 30 y/o female admitted with ETOH withdrawal. During hospital stay, pt had acute liver failure, sepsis, hypoxic respiratory failure, and etabolic encephalopathy. Pt was extubated on 2/24/2020 and transferred from ICU   Precautions/Limitations fall precautions   Cognitive Status Examination   Orientation orientation to person, place and time   Level of Consciousness alert   Follows Commands (Cognition) increased processing time needed;follows two step commands;75-90% accuracy   Memory impaired   Attention Quiet environment required   Organization/Problem Solving Problem solving  impaired   Executive Function Self awareness/monitoring impaired;Impulsive;Planning ability impaired   Visual Perception   Visual Perception No deficits were identified   Visual Field WNL    Sensory Examination   Sensory Comments Very mild numbness in bilateral hands. numbness in bilateral toes   Pain Assessment   Patient Currently in Pain No  (but does report stiffness)   Strength   Strength Comments Grossly 4+/5 in BUEs   Coordination   Upper Extremity Coordination Left UE impaired;Right UE impaired   Gross Motor Coordination mildly slowed finger to thumb opposition'   Transfer Skill: Sit to Stand   Level of [place]: Sit/Stand minimum assist (75% patients effort)   Physical Assist/Nonphysical Assist: Sit/Stand supervision;1 person assist   Assistive Device for Transfer: Sit/Stand rolling walker   Eating/Self Feeding   Level of [place]: Eating stand-by assist   Physical Assist/Nonphysical Assist: Eating set-up required;supervision;verbal cues   Instrumental Activities of Daily Living (IADL)   Previous Responsibilities meal prep;housekeeping;shopping;laundry;medication management;finances;driving;work   Activities of Daily Living Analysis   Impairments Contributing to Impaired Activities of Daily Living balance impaired;cognition impaired;coordination impaired;strength decreased;sensory feedback impaired   General Therapy Interventions   Planned Therapy Interventions ADL retraining;IADL retraining;cognition;home program guidelines;progressive activity/exercise;transfer training;neuromuscular re-education;balance training   Clinical Impression   Criteria for Skilled Therapeutic Interventions Met yes, treatment indicated   OT Diagnosis decline function   Influenced by the following impairments balance impaired;cognition impaired;coordination impaired;strength decreased;sensory feedback impaired   Assessment of Occupational Performance 5 or more Performance Deficits   Identified Performance Deficits dressing,  "grooming, toileting, bathing, functional transfers, IADLs   Clinical Decision Making (Complexity) Moderate complexity   Therapy Frequency Daily   Predicted Duration of Therapy Intervention (days/wks) 3 days   Anticipated Equipment Needs at Discharge   (to be determined)   Anticipated Discharge Disposition Acute Rehabilitation Facility   Risks and Benefits of Treatment have been explained. Yes   Patient, Family & other staff in agreement with plan of care Yes   Clinical Impression Comments Pt functioning below baseline and will benefit from continjie   [facility] AM-PAC TM \"6 Clicks\"   2016, Trustees of [facility], under license to [employer].  All rights reserved.   6 Clicks Short Forms Daily Activity Inpatient Short Form   [facility] AM-PAC  \"6 Clicks\" Daily Activity Inpatient Short Form   1. Putting on and taking off regular lower body clothing? 2 - A Lot   2. Bathing (including washing, rinsing, drying)? 2 - A Lot   3. Toileting, which includes using toilet, bedpan or urinal? 2 - A Lot   4. Putting on and taking off regular upper body clothing? 3 - A Little   5. Taking care of personal grooming such as brushing teeth? 3 - A Little   6. Eating meals? 3 - A Little   Daily Activity Raw Score (Score out of 24.Lower scores equate to lower levels of function) 15   Total Evaluation Time   Total Evaluation Time (Minutes) 15     "

## 2020-02-27 NOTE — PROGRESS NOTES
"[facility]     Renal Progress Note                Assessment/Plan:     1.  Oliguric EBEN 2 ATN + HRS 1.  Multiple other insults including septicemia, IV Contrast ( 2/9 and 2/21) , High vanco levels ( 38).   Remains oligo anuric. No s/o renal recovery .   - HD today - UF only  . Plan for 3-4 L UF. Adm weight of 126 Plan for HD session tomorrow.     2.  Sepsis syndrome.  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  Source is unclear.  ? Septic pulm emboli.  On vanco/beatris.  - Dose vanc per levels ( per pharmacy)     3.  Resp failure -resolved    4.  EtOHic hepatitis.  Pt's bili remains high .  She is icteric .    5.  Encephalopathy.    6.  Anemia.  Hgb stable.   A.  Follow hb, clinically.  B.  Transfuse prn.          Interval History:     Seen / examined on dialysis. Only UF today . BP okay . Targeting 3-4 L UF. TDC working well.          Medications and Allergies:       - MEDICATION INSTRUCTIONS for Dialysis Patients -   Does not apply See Admin Instructions     sodium chloride 0.9%  300 mL Hemodialysis Machine Once     albumin human  250 mL Intravenous Once in dialysis     folic acid  1 mg Oral Daily     insulin aspart  1-7 Units Subcutaneous Q4H     meropenem  500 mg Intravenous Q24H     - MEDICATION INSTRUCTIONS -   Does not apply Once     polyethylene glycol  17 g Oral Daily     protein modular  1 packet Per Feeding Tube Q12H     psyllium  1 packet Oral Daily     rifaximin  550 mg Oral BID     sodium chloride (PF)  10 mL Intracatheter Q7 Days     sodium chloride (PF)  10 mL Intracatheter Q8H     thiamine  100 mg Intravenous Daily     Allergies   Allergen Reactions     Penicillins Unknown     Hives            Physical Exam:     Vitals were reviewed    Heart Rate: 64, Blood pressure 115/70, pulse 100, temperature 98.2  F (36.8  C), temperature source Oral, resp. rate 19, height 1.727 m (5' 8\"), weight 126.1 kg (278 lb), SpO2 (!) 0 %, not currently breastfeeding.  Wt Readings from Last 3 Encounters: "   02/26/20 126.1 kg (278 lb)   02/09/15 95.3 kg (210 lb)   01/22/15 108.4 kg (239 lb)       GENERAL APPEARANCE:  MOre awake, seen on dialysis  HEENT: Icterus +, acneiform rash on lower half of face and upper chest   RESP: Clear  CV: Reg, tachy, nl S1/S2  ABDOMEN: o/s/nt, + distended  EXTREMITIES/SKIN: 3+ ble edema  OTHER:  R arm PICC; + loaiza; + RIJ TDC         Data:     CBC RESULTS:     Recent Labs   Lab 02/27/20  0515 02/26/20  0550 02/25/20  0415 02/24/20  0430 02/23/20  0515 02/22/20  0412   WBC 18.6* 21.1* 20.4* 20.8* 21.7* 23.7*   RBC 2.40* 2.34* 2.22* 2.31* 2.26* 2.39*   HGB 7.8* 7.7* 7.5* 7.6* 7.6* 8.0*   HCT 24.4* 23.6* 22.8* 23.5* 23.1* 24.6*    178 147* 152 165 205     Basic Metabolic Panel:  Recent Labs   Lab 02/27/20 0515 02/26/20  0550 02/25/20 0415 02/24/20  0430 02/23/20  0515 02/22/20  0412    131* 135 133 133 133   POTASSIUM 4.0 3.6 3.5 3.6 3.3* 3.7   CHLORIDE 97 97 101 100 99 100   CO2 29 25 27 23 24 22   BUN 30 44* 30 39* 31* 40*   CR 3.42* 4.31* 3.27* 4.07* 3.16* 3.35*   GLC 93 96 103* 107* 98 104*   EMILY 8.7 9.0 8.4* 8.5 8.3* 8.3*     INR  Recent Labs   Lab 02/27/20 0515 02/26/20  0550 02/25/20  0415 02/24/20  0430   INR 1.68* 1.52* 1.67* 1.79*      Attestation:   I have reviewed today's relevant vital signs, notes, medications, labs and imaging.    Edward Powers MD  [facility] - Nephrology   [phone]

## 2020-02-27 NOTE — PLAN OF CARE
At baseline pt lives with her S.O. in a 3rd floor apartment and is independent in self-cares and IADLs including driving. Pt reports her apartment elevator is frequently broken. Pt reports her S.O. works nights so she will be home alone at times.    Discharge Planner OT   Patient plan for discharge: home  Current status: Pt scored 19/30 on SLUMS cognitive screen indicating cognitive impairment; normal is 27 or higher. She is impulsive and has impaired safety awareness.Pt is requiring supervision and setup to complete eating; maximal assistance to complete lower body dressing. Requiring minimal assistance for sit to stand from bedside chair.  Pt was issued foam block fine motor coordination program.   Barriers to return to prior living situation: impairments in cognition, safety awareness, dynamic standing balance, foot sensation, BUE coordination; generalized weakness; fall risk; home alone at night; level of assist for BADLs/IADLs  Recommendations for discharge: ARU  Rationale for recommendations: Pt functioning below baseline and will benefit from continued skilled OT to maximize safety and independence. Due to pt's age, motivation to participate in therapy, and PLOF, pt would tolerate 3 hours of therapy and be a good candidate for ARU.         Entered by: Leelee Penn 02/27/2020 3:14 PM

## 2020-02-27 NOTE — PLAN OF CARE
DATE & TIME: 8178-7379; 2/26/2020   Cognitive Concerns/ Orientation : A & O x 4; forgetful and sleepy at times    BEHAVIOR & AGGRESSION TOOL COLOR: Green   CIWA SCORE: 0, 0    ABNL VS/O2: VSS on room air   MOBILITY: x 1 assist gait belt & walker   PAIN MANAGMENT: denies   DIET: full liquids and renal; tolerating well   BOWEL/BLADDER: BS active x 4 ; continent of B & B; last bowel movement this evening.   ABNL LAB/BG: Creatinine = 4.31; bilirubin= 17; albumin= 2.9; LLW=898; phosphorous= 1.2 (replaced in previous shift); Blood Glucose= 83, 97  DRAIN/DEVICES: PICC; NG tube/clamped; loaiza catheter   TELEMETRY RHYTHM: N/A  SKIN: Yellow sclera and skin; Dry, red rash on lower face; bruising on right/left arms; purple/green bruise on right side of neck; 2-3 + edema on LE bilaterally  TESTS/PROCEDURES: Dialysis completed today; will have dialysis in the a.m.   D/C DAY/GOALS/PLACE: pending discharge plans    OTHER IMPORTANT INFO: pt. Requesting loaiza to be removed (M.D notified). Antibiotic (Merrem) rescheduled this evening due to pt. being at dialysis (pharmacy notified). family present at bedside this evening.

## 2020-02-28 ENCOUNTER — APPOINTMENT (OUTPATIENT)
Dept: PHYSICAL THERAPY | Facility: CLINIC | Age: 30
DRG: 871 | End: 2020-02-28
Attending: INTERNAL MEDICINE
Payer: COMMERCIAL

## 2020-02-28 ENCOUNTER — APPOINTMENT (OUTPATIENT)
Dept: SPEECH THERAPY | Facility: CLINIC | Age: 30
DRG: 871 | End: 2020-02-28
Payer: COMMERCIAL

## 2020-02-28 ENCOUNTER — APPOINTMENT (OUTPATIENT)
Dept: OCCUPATIONAL THERAPY | Facility: CLINIC | Age: 30
DRG: 871 | End: 2020-02-28
Payer: COMMERCIAL

## 2020-02-28 LAB
ALBUMIN SERPL-MCNC: 2.8 G/DL (ref 3.4–5)
ALP SERPL-CCNC: 111 U/L (ref 40–150)
ALT SERPL W P-5'-P-CCNC: 40 U/L (ref 0–50)
ANION GAP SERPL CALCULATED.3IONS-SCNC: 9 MMOL/L (ref 3–14)
AST SERPL W P-5'-P-CCNC: 176 U/L (ref 0–45)
BACTERIA SPEC CULT: NO GROWTH
BILIRUB DIRECT SERPL-MCNC: 13.3 MG/DL (ref 0–0.2)
BILIRUB SERPL-MCNC: 16 MG/DL (ref 0.2–1.3)
BUN SERPL-MCNC: 43 MG/DL (ref 7–30)
CALCIUM SERPL-MCNC: 9.4 MG/DL (ref 8.5–10.1)
CHLORIDE SERPL-SCNC: 98 MMOL/L (ref 94–109)
CO2 SERPL-SCNC: 28 MMOL/L (ref 20–32)
CREAT SERPL-MCNC: 4.71 MG/DL (ref 0.52–1.04)
ERYTHROCYTE [DISTWIDTH] IN BLOOD BY AUTOMATED COUNT: 21 % (ref 10–15)
GFR SERPL CREATININE-BSD FRML MDRD: 12 ML/MIN/{1.73_M2}
GLUCOSE BLDC GLUCOMTR-MCNC: 81 MG/DL (ref 70–99)
GLUCOSE BLDC GLUCOMTR-MCNC: 83 MG/DL (ref 70–99)
GLUCOSE SERPL-MCNC: 82 MG/DL (ref 70–99)
HCT VFR BLD AUTO: 21.3 % (ref 35–47)
HGB BLD-MCNC: 7 G/DL (ref 11.7–15.7)
INR PPP: 1.76 (ref 0.86–1.14)
MAGNESIUM SERPL-MCNC: 1.9 MG/DL (ref 1.6–2.3)
MCH RBC QN AUTO: 33.2 PG (ref 26.5–33)
MCHC RBC AUTO-ENTMCNC: 32.9 G/DL (ref 31.5–36.5)
MCV RBC AUTO: 101 FL (ref 78–100)
PHOSPHATE SERPL-MCNC: 4.2 MG/DL (ref 2.5–4.5)
PLATELET # BLD AUTO: 225 10E9/L (ref 150–450)
POTASSIUM SERPL-SCNC: 4.5 MMOL/L (ref 3.4–5.3)
PROT SERPL-MCNC: 6.4 G/DL (ref 6.8–8.8)
RBC # BLD AUTO: 2.11 10E12/L (ref 3.8–5.2)
SODIUM SERPL-SCNC: 135 MMOL/L (ref 133–144)
SPECIMEN SOURCE: NORMAL
WBC # BLD AUTO: 17.4 10E9/L (ref 4–11)

## 2020-02-28 PROCEDURE — 84155 ASSAY OF PROTEIN SERUM: CPT | Performed by: STUDENT IN AN ORGANIZED HEALTH CARE EDUCATION/TRAINING PROGRAM

## 2020-02-28 PROCEDURE — 82248 BILIRUBIN DIRECT: CPT | Performed by: STUDENT IN AN ORGANIZED HEALTH CARE EDUCATION/TRAINING PROGRAM

## 2020-02-28 PROCEDURE — 83735 ASSAY OF MAGNESIUM: CPT | Performed by: STUDENT IN AN ORGANIZED HEALTH CARE EDUCATION/TRAINING PROGRAM

## 2020-02-28 PROCEDURE — 40000257 ZZH STATISTIC CONSULT NO CHARGE VASC ACCESS

## 2020-02-28 PROCEDURE — 97140 MANUAL THERAPY 1/> REGIONS: CPT | Mod: GP | Performed by: PHYSICAL THERAPIST

## 2020-02-28 PROCEDURE — 25000128 H RX IP 250 OP 636: Performed by: INTERNAL MEDICINE

## 2020-02-28 PROCEDURE — 85610 PROTHROMBIN TIME: CPT | Performed by: STUDENT IN AN ORGANIZED HEALTH CARE EDUCATION/TRAINING PROGRAM

## 2020-02-28 PROCEDURE — 82247 BILIRUBIN TOTAL: CPT | Performed by: STUDENT IN AN ORGANIZED HEALTH CARE EDUCATION/TRAINING PROGRAM

## 2020-02-28 PROCEDURE — P9041 ALBUMIN (HUMAN),5%, 50ML: HCPCS | Performed by: INTERNAL MEDICINE

## 2020-02-28 PROCEDURE — 00000146 ZZHCL STATISTIC GLUCOSE BY METER IP

## 2020-02-28 PROCEDURE — 25000132 ZZH RX MED GY IP 250 OP 250 PS 637: Performed by: HOSPITALIST

## 2020-02-28 PROCEDURE — 90937 HEMODIALYSIS REPEATED EVAL: CPT

## 2020-02-28 PROCEDURE — 25000128 H RX IP 250 OP 636: Performed by: HOSPITALIST

## 2020-02-28 PROCEDURE — 97535 SELF CARE MNGMENT TRAINING: CPT | Mod: GO

## 2020-02-28 PROCEDURE — 80069 RENAL FUNCTION PANEL: CPT | Performed by: STUDENT IN AN ORGANIZED HEALTH CARE EDUCATION/TRAINING PROGRAM

## 2020-02-28 PROCEDURE — 85027 COMPLETE CBC AUTOMATED: CPT | Performed by: STUDENT IN AN ORGANIZED HEALTH CARE EDUCATION/TRAINING PROGRAM

## 2020-02-28 PROCEDURE — 84075 ASSAY ALKALINE PHOSPHATASE: CPT | Performed by: STUDENT IN AN ORGANIZED HEALTH CARE EDUCATION/TRAINING PROGRAM

## 2020-02-28 PROCEDURE — 40000239 ZZH STATISTIC VAT ROUNDS

## 2020-02-28 PROCEDURE — 97116 GAIT TRAINING THERAPY: CPT | Mod: GP | Performed by: PHYSICAL THERAPIST

## 2020-02-28 PROCEDURE — 97530 THERAPEUTIC ACTIVITIES: CPT | Mod: GO

## 2020-02-28 PROCEDURE — 25800030 ZZH RX IP 258 OP 636: Performed by: INTERNAL MEDICINE

## 2020-02-28 PROCEDURE — 84460 ALANINE AMINO (ALT) (SGPT): CPT | Performed by: STUDENT IN AN ORGANIZED HEALTH CARE EDUCATION/TRAINING PROGRAM

## 2020-02-28 PROCEDURE — 99232 SBSQ HOSP IP/OBS MODERATE 35: CPT | Performed by: INTERNAL MEDICINE

## 2020-02-28 PROCEDURE — 12000000 ZZH R&B MED SURG/OB

## 2020-02-28 PROCEDURE — 84450 TRANSFERASE (AST) (SGOT): CPT | Performed by: STUDENT IN AN ORGANIZED HEALTH CARE EDUCATION/TRAINING PROGRAM

## 2020-02-28 PROCEDURE — 92526 ORAL FUNCTION THERAPY: CPT | Mod: GN | Performed by: SPEECH-LANGUAGE PATHOLOGIST

## 2020-02-28 RX ORDER — ALBUMIN, HUMAN INJ 5% 5 %
250 SOLUTION INTRAVENOUS
Status: COMPLETED | OUTPATIENT
Start: 2020-02-28 | End: 2020-02-28

## 2020-02-28 RX ADMIN — THIAMINE HYDROCHLORIDE 100 MG: 100 INJECTION, SOLUTION INTRAMUSCULAR; INTRAVENOUS at 13:36

## 2020-02-28 RX ADMIN — SODIUM CHLORIDE 300 ML: 9 INJECTION, SOLUTION INTRAVENOUS at 09:30

## 2020-02-28 RX ADMIN — Medication: at 09:31

## 2020-02-28 RX ADMIN — ALBUMIN HUMAN 250 ML: 0.05 INJECTION, SOLUTION INTRAVENOUS at 09:30

## 2020-02-28 RX ADMIN — OXYCODONE HYDROCHLORIDE 2.5 MG: 5 TABLET ORAL at 21:24

## 2020-02-28 RX ADMIN — RIFAXIMIN 550 MG: 550 TABLET ORAL at 13:37

## 2020-02-28 RX ADMIN — RIFAXIMIN 550 MG: 550 TABLET ORAL at 21:24

## 2020-02-28 RX ADMIN — FOLIC ACID 1 MG: 1 TABLET ORAL at 13:37

## 2020-02-28 RX ADMIN — MEROPENEM 500 MG: 500 INJECTION, POWDER, FOR SOLUTION INTRAVENOUS at 21:30

## 2020-02-28 ASSESSMENT — ACTIVITIES OF DAILY LIVING (ADL)
ADLS_ACUITY_SCORE: 15

## 2020-02-28 ASSESSMENT — MIFFLIN-ST. JEOR: SCORE: 1966.01

## 2020-02-28 NOTE — PROGRESS NOTES
CLINICAL NUTRITION SERVICES - REASSESSMENT NOTE    Malnutrition: (2/21)  % Weight Loss: Unable to assess  % Intake:  </= 50% for >/= 5 days (severe malnutrition)  Subcutaneous Fat Loss: None observed  Muscle Loss: Temporal region mild depletion  Fluid Retention: Moderate-Severe 3-4+     Malnutrition Diagnosis: Non-Severe malnutrition  In Context of:  Acute illness or injury  Environmental or social circumstances     EVALUATION OF PROGRESS TOWARD GOALS   Diet:  DD2, thin liquids - per SLP    Intake/Tolerance:  Per flowsheet, 25% of one meal since diet advancement. Per RN note, tolerating diet well.     NEW FINDINGS:   2/27: Enteral nutrition off, FT removed. Diet advanced.    2/28: Wt = 119.3 kg (262 lb 14.4 oz) - Note, lowest wt since admit.  Down 16# from 2/26.  Possibly due to HD run yesterday a.m.    2/28 Labs:   BUN (H) 43  Cr 4.71 (H)   Bili 16.0 (H)   (H)    Previous Goals:   TF + Prosource will meet % estimated needs while NPO status.  Evaluation: Met    Previous Nutrition Diagnosis:   Inadequate energy intake related to hypocaloric TF and Propofol now off as evidenced by TF meeting only 76% energy needs  Evaluation: Updated below. TF off now and diet advanced.     CURRENT NUTRITION DIAGNOSIS  Inadequate oral intake related to recent advancement of diet from Enteral Nutrition as evidenced by diet advanced yesterday and pt intake of 25% of one meal since TF removed.     INTERVENTIONS  Recommendations / Nutrition Prescription  Continue DD2, thin liquids - per SLP    Implementation  No changes today    Goals  Pt to consume 50-75% of meals.     MONITORING AND EVALUATION:  Progress towards goals will be monitored and evaluated per protocol and Practice Guidelines    Ophelia Srinivasan RDN, LD

## 2020-02-28 NOTE — PLAN OF CARE
Discharge Planner OT   Patient plan for discharge: wants to go home with S.O.  Current status: Pt completed toileting with CGA for balance safety using FWW. Sat on toilet and stood at sink with SBA to complete grooming tasks. Pt ambulated in room without walker with Lc. She attempted to locate an additional grooming supply with Lc and cues for logical item searching. Educated regarding memory compensation strategies and was issued handout. Reinforced incentive spirometer use. Encouraged to continue BUE fine motor foam block HEP and ambulation with assist from staff.  Barriers to return to prior living situation: impairments in cognition (19/30 on SLUMS on 2/27/20), safety awareness, dynamic standing balance, foot sensation, BUE coordination; generalized weakness; fall risk; home alone at night; level of assist for BADLs/IADLs  Recommendations for discharge: ARU  Rationale for recommendations: Pt functioning below baseline and will benefit from continued skilled OT to maximize safety and independence. Due to pt's age, motivation to participate in therapy, and PLOF, pt would tolerate 3 hours of therapy and be a good candidate for ARU.       Entered by: Leelee Penn 02/28/2020 1:49 PM

## 2020-02-28 NOTE — PLAN OF CARE
DATE & TIME: 2/28/2020 AM shift        Cognitive Concerns/ Orientation : A & O x 4  BEHAVIOR & AGGRESSION TOOL COLOR: Green   CIWA SCORE:  ABNL VS/O2: VSS on RA but can be tachy at times. BROWNING/SOB with activity. Soft BP 90s/50s after dialysis. ex.  MOBILITY: A1 gait belt & walker/ PT /OT are following.   PAIN MANAGMENT: denied pain  DIET:DD2 with thin liquids, awaiting speech to evaluate today.   BOWEL/BLADDER: continent of B & B.  ABNL LAB/BG:WBC 17.4 Creatinine 4.71; bilirubin 13.3; Hg 7.  DRAIN/DEVICES: Right arm PICC, right chest port   TELEMETRY RHYTHM: N/A  SKIN: Yellow sclera and skin; Dry, red rash on lower face; purple/green bruise on right side of neck; 3-4+ edema on LE bilaterally. Lymphedema wraps ordered, PT will apply.   TESTS/PROCEDURES: Had  Dialysis  Today, 4L was removed. Possibly will need dialysis tomorrow again.    D/C DAY/GOALS/PLACE: pending improvement in kidney/lifer/Gi functions.   OTHER IMPORTANT INFO: GI and Neph are following.

## 2020-02-28 NOTE — PROGRESS NOTES
[facility]    Medicine Progress Note - Hospitalist Service       Date of Admission:  2/9/2020  Assessment & Plan    Pat Delgado is a 29 year old female history of alcohol abuse, anxiety, depression, ADHD, hypertension, migraine, tobacco abuse and substance abuse who was admitted on 2/9/2020 with  severe alcoholic hepatitis complicated by kidney failure w/ ATN vs type II hepatorenal syndrome, encephalopathy, hypoxic hypercapnic respiratory failure on 2/22/2020 was intubated and transferred to ICU- extubated on 2/24/2020 and transferred to floor 2/25/2020. Currently on HD-creatinine remains elevated.  She also developed bacteremia with fusobacterium nucleatum, with repeat blood culture negative so far.        Acute liver failure 2/2 Alcoholic hepatitis  Acute metabolic encephalopathy, improved  Acute hypoxic respiratory failure, resolved  Acute alcoholic hepatitis with Maddrey score >50  Minimal ascites status post 5 days of cefepime for SBP prophylaxis  Was initially started on prednisolone, stop because of the bacteremia.  - extubated 2/24. No longer on sedation.  - transfer out of MICU 2/25  - continue rifaximin 550 BID  - no evidence of SBP -given no significant fluid pockets on imaging of abdomen  - GI following closely  -Off steroids at this time.  Only get for few days.  -Speech is consulted, had video swallow evaluation done on dysphagia level 2 diet with thin liquids..    - Continue with daily LFT w/ INR  Advance diet as tolerated, speech to follow.    GNR Bacteremia/Fusobacterium nucleatum, unclear source  - plan for 14 days antibiotic treatment -she is currently on IV Meropenem day 8th, can switch to oral Flagyl on discharge.  - discontinue Vancomycin at this time.  Only 1 blood culture drawn on 2/19/2020 was positive, all repeat blood cultures remain negative and initial blood cultures remain negative.     Oliguric renal failure 2/2 ATN and hepato renal syndrome  Multiple other  insults including septicemia, IV Contrast ( 2/9 and 2/21) , High vanco levels.   - nephrology following  - tunneled RIJ HD catheter in place  - s/p HD x3, last run 2/24.  Plan for dialysis today with ultrafiltration as per nephrology.  Significant lower extremity edema, will benefit from ultrafiltration.  Repeat dialysis with ultrafiltrate of 4 L today.     GI/Nutrition:  No eating orally tube feeding discontinued.  Continue folic acid, thiamine, rifaximin,   liver failure, and bowel regimen.  Narrow antiemetics to PRN ondansetron.  MELD = 39.      -Continue rifaximin, folate, and thiamine       Anemia:  -slow decrease ( near stable around 7-8) with no sign of overt bleeding, likely anemia from chronic illness  -GI will follow in case of any sign of bleeding, hemoglobin around 7.0,     Hyponatremia resolved with the dialysis   Ultrafiltrate again of 4 L.   lymphedema consulted but have not have a lymphedema wrap yet.  Please put the lymphedema wrap as ordered.  Advance diet as tolerated.  This is a day 8 of IV meropenem, will switch to oral soon.  Appreciate input from GI and nephrology.   for disposition.  PT OT will need to work with her.  Need to see her later in the day as she is in the dialysis in the morning.    Diet: Dysphagia Diet Level 2 Mech Altered Thin Liquids (water, ice chips, juice, milk gelatin, ice cream, etc)    DVT Prophylaxis: Pneumatic Compression Devices  Amado Catheter: not present  Code Status: Full Code      Disposition Plan   Expected discharge: 3 to 4 days, most likely will need the rehab.  Entered: Marvin Guillaume MD 02/28/2020, 10:55 AM       The patient's care was discussed with the Bedside Nurse, Patient and ICU Consultant.    Marvin Guillaume MD  Hospitalist Service  [facility]    ______________________________________________________________________    Interval History   Complaining of leg swelling at this time.  Even the largest slippers not fit.  Appetite  is improving and tolerating dysphagia level 2 diet very well.  Denies any chest pain shortness breath orthopnea PND palpitation.    No other significant event overnight    Data reviewed today: I reviewed all medications, new labs and imaging results over the last 24 hours. I personally reviewed no images or EKG's today.    Physical Exam   Vital Signs: Temp: 98.5  F (36.9  C) Temp src: Oral BP: 110/62   Heart Rate: 102 Resp: 18 SpO2: 100 % O2 Device: None (Room air)    Weight: 262 lbs 14.4 oz  General Appearance: Awake, alert, NAD, jaundiced. Obese, anasarca. NG tube in place  HEENT: icteric  Respiratory: CTAB  Cardiovascular: tachycardic  GI: soft, NT, mildly distended vs obese  : loaiza with minimal dark brown urine  Skin: jaundice  Other: Moving all extremities.  3-4+ lower extremity edema    Data   Recent Labs   Lab 02/28/20  0627 02/27/20  0515 02/26/20  0550  02/22/20  1150 02/22/20  0555  02/21/20  2350   WBC 17.4* 18.6* 21.1*   < >  --   --    < >  --    HGB 7.0* 7.8* 7.7*   < >  --   --    < >  --    * 102* 101*   < >  --   --    < >  --     197 178   < >  --   --    < >  --    INR 1.76* 1.68* 1.52*   < >  --   --    < >  --     134 131*   < >  --   --    < >  --    POTASSIUM 4.5 4.0 3.6   < >  --   --    < >  --    CHLORIDE 98 97 97   < >  --   --    < >  --    CO2 28 29 25   < >  --   --    < >  --    BUN 43* 30 44*   < >  --   --    < >  --    CR 4.71* 3.42* 4.31*   < >  --   --    < >  --    ANIONGAP 9 8 9   < >  --   --    < >  --    EMILY 9.4 8.7 9.0   < >  --   --    < >  --    GLC 82 93 96   < >  --   --    < >  --    ALBUMIN 2.8* 2.8* 2.9*   < >  --   --    < >  --    PROTTOTAL 6.4* 6.5* 6.8   < >  --   --    < >  --    BILITOTAL 16.0* 16.1* 17.0*   < >  --   --    < >  --    ALKPHOS 111 109 114   < >  --   --    < >  --    ALT 40 41 39   < >  --   --    < >  --    * 187* 162*   < >  --   --    < >  --    TROPI  --   --   --   --  2.854* 3.195*  --  3.365*    < > = values  in this interval not displayed.     No results found for this or any previous visit (from the past 24 hour(s)).  Medications     dextrose         - MEDICATION INSTRUCTIONS for Dialysis Patients -   Does not apply See Admin Instructions     folic acid  1 mg Oral Daily     meropenem  500 mg Intravenous Q24H     polyethylene glycol  17 g Oral Daily     psyllium  1 packet Oral Daily     rifaximin  550 mg Oral BID     sodium chloride (PF)  10 mL Intracatheter Q7 Days     sodium chloride (PF)  10 mL Intracatheter Q8H     thiamine  100 mg Intravenous Daily

## 2020-02-28 NOTE — PLAN OF CARE
Discharge Planner PT   Patient plan for discharge: Home  Current status: Patient seen for lymph eval and short stretch bandages applied to bilateral LE's due to 3+ pitting edema bilateral LE's.   Informed nursing and patient about bandage intolerance and and to remove wraps on 2/29 at 1 pm. PT to see at 2 pm.   Patient needs cues for foot placement for sit to stand each time. Tolerated amb 10 feet times 2 (one time without AD) and 60 feet times 1 with ww. Decreased speed and decreased balance due to decreased ankle ROM from edema.   Barriers to return to prior living situation: Significantly decreased activity tolerance, decreased independence with transfers, decreased balance so increased fall risk.   Recommendations for discharge: ARC  Rationale for recommendations: Patient continues to be well below baseline with activity tolerance, decreased independence with transfers and decreased balance noted both with and without AD. Anticipate patient would progress to tolerating 3 hours a day.        Entered by: Waleska Tasng 02/28/2020 3:42 PM

## 2020-02-28 NOTE — PROGRESS NOTES
DATE & TIME: 2/28/2020 2744-6198    Cognitive Concerns/ Orientation : A&Ox4; forgetful at times   BEHAVIOR & AGGRESSION TOOL COLOR: green  CIWA SCORE: na   ABNL VS/O2: VSS on RA ex tachy   MOBILITY: A1 with gb and walker   PAIN MANAGMENT: complained of leg/feet pain, given 2.5mg of oxycodone with relief   DIET: DD2 with thin liquids. Tolerating   BOWEL/BLADDER: continent of B/B, ambulates to restroom  ABNL LAB/BG: WBC 18.6 creat 3.42 bilirubin 16.1 BG 83, 81  DRAIN/DEVICES: R arm PICC R chest port   TELEMETRY RHYTHM: na  SKIN: yellow sclera and skin. Dry, flaky. 3-4+ edema on BLE. Bruise on R side of neck   TESTS/PROCEDURES: dialysis 2/27 and again on 2/28  D/C DAY/GOALS/PLACE: 3-4 days per MD note   OTHER IMPORTANT INFO: GI and Neph following. Abdomen distended, BS positive.BROWNING, needs supervision with meals.

## 2020-02-28 NOTE — PROGRESS NOTES
02/28/20 1400   General Information   Discipline PT   Affected Body Part(s) Left LE;Right LE   Edema Etiology   (Kidney failure)   Etiology Comments Edema started when patient was admitted and started needing dialysis.    Edema Precautions Renal Insufficiency   Edema Precaution Comments Therapist called Dr. Powers, Nephrologist and he is OK with starting lymphadema wraps.    Pain   Patient currently in pain Yes, see Vital Signs flowsheet  (LE's are tingly.)   Edema Examination / Assessment   Skin Condition Pitting;Intact   Skin Condition Comments Around arch of foot on Right: 11 in, left 10 5/8th, at superior aspect of malleoli ritht12, left 11 5/8th, crease of knee on right 18 in and on left 17 1/2 in.    Stemmer Sign Positive   Girth Measurements   Girth Measurements   (See measurements under skin conditions. )   ROM   Range of Motion (WFL) other (describe)   Description of Range of Motion Deficits Hip and knee flexion slightly limited by edema.    Strength   Strength (WFL) no deficits were identified   Assessment/Plan   Patient presents with Edema   Assessment Patient with 3+ pitting edema bilaterally limiting balance    Clinical Presentation Stable/Uncomplicated   Clinical Decision Making (Complexity) Low complexity   Planned Edema Interventions Gradient compression bandaging   Treatment Frequency Daily   Treatment Duration 3 days   Patient, Family and/or Staff in agreement with plan of care. Yes   Risks and benefits of treatment have been explained. Yes   Total Evaluation Time   Total Evaluation Time (Minutes) 12

## 2020-02-28 NOTE — PROGRESS NOTES
Potassium   Date Value Ref Range Status   02/28/2020 4.5 3.4 - 5.3 mmol/L Final     Hemoglobin   Date Value Ref Range Status   02/28/2020 7.0 (L) 11.7 - 15.7 g/dL Final     Creatinine   Date Value Ref Range Status   02/28/2020 4.71 (H) 0.52 - 1.04 mg/dL Final     Urea Nitrogen   Date Value Ref Range Status   02/28/2020 43 (H) 7 - 30 mg/dL Final     Sodium   Date Value Ref Range Status   02/28/2020 135 133 - 144 mmol/L Final     INR   Date Value Ref Range Status   02/28/2020 1.76 (H) 0.86 - 1.14 Final       DIALYSIS PROCEDURE NOTE  Hepatitis status of previous patient on machine log was checked and verified ok to use with this patients hepatitis status.  Patient dialyzed for 3.5 hrs. on a 3 K bath with a net fluid removal of  4L.  A BFR of 400 ml/min was obtained via a RIJ.    The treatment plan was dicussed with Dr. Powers during the treatment.  Total heparin received during the treatment: 0 units.   .  Line flushed, clamped and capped with heparin 1:1000 1.9 mL (1900 units) per lumen  Meds  given: Albumin 5% 250ml Complications: none    Procedure education provided orally to patient, patient verbalized understanding  ICEBOAT? Timeout performed pre-treatment  I: Patient was identified using 2 identifiers  C: Consent obtained/verified current before treatment  E: Equipment preventative maintenance is current and dialysis delivery system OK to use  B: Hepatitis B Surface Antigen: Nonreactive; Draw Date: 2/23/20      Hepatitis B Surface Antibody: immune; Draw Date: 2/23/20  O: Dialysis orders present and complete prior to treatment  A: Vascular access verified and assessed prior to treatment  T: Treatment was performed at a clinically appropriate time  ?: Patient was allowed to ask questions and address concerns prior to treatment  See flowsheet in EPIC for further details and post assessment.  Machine water alarm in place and functioning. Transducer pods intact and checked every 15min.  Pt returned via  wheelchair  Report received from: Nancy Carranza RN  Report given to: Nancy Carranza RN  Chlorine/Chloramine water system checked every 4 hours.

## 2020-02-28 NOTE — PLAN OF CARE
SLP: Swallowing tx attempted, pt currently out of room. Will follow up as able to trial for a regular diet.

## 2020-02-28 NOTE — PROGRESS NOTES
SW:  D:Writer spoke with patient's father, Bebeto and mother Natasha on 02/27. Patient's father reports that patient cannot come home and live with them upon discharge. They cannot take care of patient's needs. Patient's father also reports that patient's boyfriend cannot take care of patient upon discharge. Patient's parents would like to see her in some sort of facility where they can manage patient's medical needs. Patient's parents would like to be involved with the discharge planning for patient and would like medical updates.     P: Will continue to follow     SHANNON Norwood     [facility]

## 2020-02-28 NOTE — PLAN OF CARE
PT-Attempted to see but patient in dialysis. Paged nephrologist questioning whether it is OK to do lymphadema bandages with current kidney function.

## 2020-02-28 NOTE — PROGRESS NOTES
"[facility]     Renal Progress Note                Assessment/Plan:     1.  Oliguric EBEN 2 ATN + HRS 1.  Multiple other insults including septicemia, IV Contrast ( 2/9 and 2/21) , High vanco levels ( 38).   Remains oligo anuric. No s/o renal recovery .   -Had 8 L ultrafiltration over last 2 days.  On track to get 4 more liters removed today.  Still with significant leg edema.  If tolerates ultrafiltration today, will plan for another session tomorrow.  -Need to start looking for outpatient dialysis spot.    2.  Sepsis syndrome.  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  Source is unclear.  ? Septic pulm emboli.    -Antibiotics per primary team.    3.  Resp failure -resolved    4.  EtOHic hepatitis.  Pt's bili remains high .  She is icteric .    5.  Encephalopathy.    6.  Anemia.  Hgb trending down.. Iron stores are adequate.  Will start erythropoietin 5000 units with dialysis starting tomorrow.        Interval History:     Seen / examined on dialysis.  Here from yesterday with 4 L removed.. BP okay . Targeting 3-4 L UF. TDC working well.   Continues to have massive leg swelling.  Breathing is better.         Medications and Allergies:       - MEDICATION INSTRUCTIONS for Dialysis Patients -   Does not apply See Admin Instructions     folic acid  1 mg Oral Daily     meropenem  500 mg Intravenous Q24H     polyethylene glycol  17 g Oral Daily     psyllium  1 packet Oral Daily     rifaximin  550 mg Oral BID     sodium chloride (PF)  10 mL Intracatheter Q7 Days     sodium chloride (PF)  10 mL Intracatheter Q8H     thiamine  100 mg Intravenous Daily     Allergies   Allergen Reactions     Penicillins Unknown     Hives            Physical Exam:     Vitals were reviewed    Heart Rate: 103, Blood pressure 97/51, pulse 100, temperature 98.5  F (36.9  C), temperature source Oral, resp. rate 18, height 1.727 m (5' 8\"), weight 119.3 kg (262 lb 14.4 oz), SpO2 100 %, not currently breastfeeding.  Wt Readings from " Last 3 Encounters:   02/28/20 119.3 kg (262 lb 14.4 oz)   02/09/15 95.3 kg (210 lb)   01/22/15 108.4 kg (239 lb)       GENERAL APPEARANCE:   awake, seen on dialysis  HEENT: Icterus +, acneiform rash on lower half of face and upper chest   RESP: Clear  CV: Reg, tachy, nl S1/S2  ABDOMEN: o/s/nt, + distended  EXTREMITIES/SKIN: 3+ ble edema  OTHER:   + RIJ TDC         Data:     CBC RESULTS:     Recent Labs   Lab 02/28/20 0627 02/27/20  0515 02/26/20  0550 02/25/20  0415 02/24/20  0430 02/23/20  0515   WBC 17.4* 18.6* 21.1* 20.4* 20.8* 21.7*   RBC 2.11* 2.40* 2.34* 2.22* 2.31* 2.26*   HGB 7.0* 7.8* 7.7* 7.5* 7.6* 7.6*   HCT 21.3* 24.4* 23.6* 22.8* 23.5* 23.1*    197 178 147* 152 165     Basic Metabolic Panel:  Recent Labs   Lab 02/28/20 0627 02/27/20  0515 02/26/20  0550 02/25/20  0415 02/24/20  0430 02/23/20  0515    134 131* 135 133 133   POTASSIUM 4.5 4.0 3.6 3.5 3.6 3.3*   CHLORIDE 98 97 97 101 100 99   CO2 28 29 25 27 23 24   BUN 43* 30 44* 30 39* 31*   CR 4.71* 3.42* 4.31* 3.27* 4.07* 3.16*   GLC 82 93 96 103* 107* 98   EMILY 9.4 8.7 9.0 8.4* 8.5 8.3*     INR  Recent Labs   Lab 02/28/20 0627 02/27/20 0515 02/26/20  0550 02/25/20  0415   INR 1.76* 1.68* 1.52* 1.67*      Attestation:   I have reviewed today's relevant vital signs, notes, medications, labs and imaging.    Edward Powers MD  [facility] - Nephrology   [phone]

## 2020-02-28 NOTE — PLAN OF CARE
Discharge Planner SLP   Patient plan for discharge: did not discuss  Current status:   Swallowing tx provided this PM. Pt trialed solid cracker with thin liquids, no overt s/sx of aspiration and proper mastication for solid textures.     Recommend to upgrade to regular diet with thin liquids with safe swallow strategies: slow rate, small bites/sips, and alternate liquids and solids.     No further ST concerns at this time, education was provided on pt on diet recommendations and strategies. Consult ST if any future concerns arise.   Barriers to return to prior living situation: none from SLP   Recommendations for discharge: TCU  Rationale for recommendations: Pt at baseline, no further ST concerns        Entered by: Sharona Cabrera 02/28/2020 4:13 PM      Speech Language Therapy Discharge Summary    Reason for therapy discharge:    All goals and outcomes met, no further needs identified.    Progress towards therapy goal(s). See goals on Care Plan in [facility] electronic health record for goal details.  Goals met    Therapy recommendation(s):    No further therapy is recommended.

## 2020-02-28 NOTE — PLAN OF CARE
DATE & TIME: 2/27/20 3-11pm         Cognitive Concerns/ Orientation : A & O x 4; forgetful, anxious at times   BEHAVIOR & AGGRESSION TOOL COLOR: Green   CIWA SCORE:n/a  ABNL VS/O2: VSS ex. HR 100s, on RA, BROWNING at times  MOBILITY: A1 gait belt & walker   PAIN MANAGMENT: denied pain but endorses stiff feeling  DIET: advanced to DD2 with thin liquids, tolerated well  BOWEL/BLADDER: continent of B & B. Amado removed AM shift.  ABNL LAB/BG:WBC 18.6 Creatinine 3.421; bilirubin 17; phos 3.6, Phosphorus replaced overnight, Bg 88, 96  DRAIN/DEVICES: Right arm PICC, right chest port   TELEMETRY RHYTHM: N/A  SKIN: Yellow sclera and skin; Dry, red rash on lower face; purple/green bruise on right side of neck; 3-4+ edema on LE bilaterally  TESTS/PROCEDURES: Had  Dialysis  Today, plan for dialysis again tomorrow  D/C DAY/GOALS/PLACE: pending discharge plans    OTHER IMPORTANT INFO: GI and Neph following. Abdomen distended, BS positive.BROWNING, needs supervision with meals.NG removed in AM.

## 2020-02-28 NOTE — PROGRESS NOTES
[facility]  Gastroenterology Progress Note     Pat Delgado MRN# [id number]   YOB: 1990 Age: 29 year old          Assessment and Plan:    Update: Oriented x3.  Bowel sounds normoactive. Denies abdominal pain. Labs continue improve. NG in place. On hemodialysis. Tolerating mechanical soft diet.     Pat Delgado is a 29 year old female who was admitted on 2/9/2020. GI has been following for severe alcoholic hepatitis complicated by kidney failure w/ ATN vs type II hepatorenal syndrome. Developed hypoxemic respiratory failure on 2/22/2020 and intubated and transferred to ICU-extubated and transferred to med floor. Currently on HD-creatinine remains elevated.     Acute metabolic encephalopathy  -Probably multifactorial with both liver and kidney injury, and GNR bacteremia of unknown source- currently on merepenem  -continue rifaximin BID  -Stools more formed- will start on metamucil packet- helping  - no evidence of SBP -given no significant fluid pockets on imaging of abdomen  - Appreciate SLP help- diet per SLP     Acute alcoholic hepatitis with Maddrey score >50  Abdominal pain likely due to gaseous distension- improved   Minimal ascites status post 5 days of cefepime for SBP prophylaxis  Maddrey score of > 50s  -started on steroid trial on 2/16 but will stop today given GNR bacteremia if no other indication per ICU service  -Tbili likely peak-now stable around 16-17, INR elevated at 1.76  -Continue with daily LFT w/ INR      Hyponatremia w/ increase in creatinine and minimal urine output in the setting of anasarca:  -ATN vs hepatorenal syndrome type II  -currently being dialyzed, care per renal team     Anemia:  -slow decrease (near stable around 7-8) with no sign of overt bleeding, likely anemia from chronic illness  - Transfuse for hemoglobin below 7.0 ( at 7.0 this a.m.)  -GI will follow in case of any sign of bleeding          Acute liver failure without hepatic  coma  Hyponatremia  Sepsis, due to unspecified organism, unspecified whether acute organ dysfunction present (H)      Interval History:   no new complaints, doing well, denies chest pain, denies shortness of breath, denies abdominal pain, alert, oriented to person, place and time, has had a bowel movement in the last 24 hours and doing well; no cp, sob, n/v/d, or abd pain.              Review of Systems:   C: NEGATIVE for fever, chills, change in weight  E/M: NEGATIVE for ear, mouth and throat problems  R: NEGATIVE for significant cough or SOB  CV: NEGATIVE for chest pain, palpitations or peripheral edema             Medications:   I have reviewed this patient's current medications    - MEDICATION INSTRUCTIONS for Dialysis Patients -   Does not apply See Admin Instructions     sodium chloride 0.9%  300 mL Hemodialysis Machine Once     albumin human  250 mL Intravenous Once in dialysis     folic acid  1 mg Oral Daily     meropenem  500 mg Intravenous Q24H     - MEDICATION INSTRUCTIONS -   Does not apply Once     polyethylene glycol  17 g Oral Daily     psyllium  1 packet Oral Daily     rifaximin  550 mg Oral BID     sodium chloride (PF)  10 mL Intracatheter Q7 Days     sodium chloride (PF)  10 mL Intracatheter Q8H     thiamine  100 mg Intravenous Daily                  Physical Exam:   Vitals were reviewed  Vital Signs with Ranges  Temp:  [98.2  F (36.8  C)-98.7  F (37.1  C)] 98.5  F (36.9  C)  Heart Rate:  [] 102  Resp:  [18-20] 18  BP: (100-121)/(51-70) 116/62  SpO2:  [98 %-100 %] 100 %  I/O last 3 completed shifts:  In: 240 [P.O.:240]  Out: 4000 [Other:4000]  Constitutional: healthy, alert and no distress   Cardiovascular: negative, PMI normal. No lifts, heaves, or thrills. RRR. No murmurs, clicks gallops or rub  Respiratory: negative, Percussion normal. Good diaphragmatic excursion. Lungs clear  Head: Normocephalic. No masses, lesions, tenderness or abnormalities  Neck: Neck supple. No adenopathy. Thyroid  symmetric, normal size,, Carotids without bruits.  Abdomen: Abdomen soft, non-tender. BS normal. No masses, organomegaly           Data:   I reviewed the patient's new clinical lab test results.   Recent Labs   Lab Test 02/28/20 0627 02/27/20  0515 02/26/20  0550   WBC 17.4* 18.6* 21.1*   HGB 7.0* 7.8* 7.7*   * 102* 101*    197 178   INR 1.76* 1.68* 1.52*     Recent Labs   Lab Test 02/28/20 0627 02/27/20  0515 02/26/20  0550   POTASSIUM 4.5 4.0 3.6   CHLORIDE 98 97 97   CO2 28 29 25   BUN 43* 30 44*   ANIONGAP 9 8 9     Recent Labs   Lab Test 02/28/20 0627 02/27/20  0515 02/26/20  0550  02/21/20  1430  02/14/20  0653  02/09/20  1755 02/09/20  1547   ALBUMIN 2.8* 2.8* 2.9*   < >  --    < >  --    < >  --  1.6*   BILITOTAL 16.0* 16.1* 17.0*   < >  --    < >  --    < >  --  8.1*   ALT 40 41 39   < >  --    < >  --    < >  --  33   * 187* 162*   < >  --    < >  --    < >  --  203*   PROTEIN  --   --   --   --  100*  --  100*  --  100*  --    LIPASE  --   --   --   --   --   --   --   --   --  119    < > = values in this interval not displayed.       I reviewed the patient's new imaging results.    All laboratory data reviewed  All imaging studies reviewed by me.    Aminata Rangel PA-C,  2/28/2020  [facility]  Office : [phone]  Cell: [phone] (Dr. Hobson)  Cell: [phone] (Aminata Rangel PA-C)

## 2020-02-29 ENCOUNTER — APPOINTMENT (OUTPATIENT)
Dept: OCCUPATIONAL THERAPY | Facility: CLINIC | Age: 30
DRG: 871 | End: 2020-02-29
Payer: COMMERCIAL

## 2020-02-29 ENCOUNTER — APPOINTMENT (OUTPATIENT)
Dept: GENERAL RADIOLOGY | Facility: CLINIC | Age: 30
DRG: 871 | End: 2020-02-29
Attending: INTERNAL MEDICINE
Payer: COMMERCIAL

## 2020-02-29 LAB
ABO + RH BLD: NORMAL
ALBUMIN SERPL-MCNC: 2.7 G/DL (ref 3.4–5)
ALP SERPL-CCNC: 112 U/L (ref 40–150)
ALT SERPL W P-5'-P-CCNC: 40 U/L (ref 0–50)
ANION GAP SERPL CALCULATED.3IONS-SCNC: 7 MMOL/L (ref 3–14)
AST SERPL W P-5'-P-CCNC: 174 U/L (ref 0–45)
BILIRUB DIRECT SERPL-MCNC: 13.6 MG/DL (ref 0–0.2)
BILIRUB SERPL-MCNC: 17.1 MG/DL (ref 0.2–1.3)
BLD GP AB SCN SERPL QL: NORMAL
BLD PROD TYP BPU: NORMAL
BLD PROD TYP BPU: NORMAL
BLD UNIT ID BPU: 0
BLOOD BANK CMNT PATIENT-IMP: NORMAL
BLOOD PRODUCT CODE: NORMAL
BPU ID: NORMAL
BUN SERPL-MCNC: 27 MG/DL (ref 7–30)
CALCIUM SERPL-MCNC: 9.2 MG/DL (ref 8.5–10.1)
CHLORIDE SERPL-SCNC: 99 MMOL/L (ref 94–109)
CO2 SERPL-SCNC: 29 MMOL/L (ref 20–32)
CREAT SERPL-MCNC: 3.63 MG/DL (ref 0.52–1.04)
ERYTHROCYTE [DISTWIDTH] IN BLOOD BY AUTOMATED COUNT: 20.9 % (ref 10–15)
GFR SERPL CREATININE-BSD FRML MDRD: 16 ML/MIN/{1.73_M2}
GLUCOSE BLDC GLUCOMTR-MCNC: 83 MG/DL (ref 70–99)
GLUCOSE SERPL-MCNC: 88 MG/DL (ref 70–99)
HCT VFR BLD AUTO: 20.9 % (ref 35–47)
HGB BLD-MCNC: 6.7 G/DL (ref 11.7–15.7)
INR PPP: 1.84 (ref 0.86–1.14)
MAGNESIUM SERPL-MCNC: 1.7 MG/DL (ref 1.6–2.3)
MCH RBC QN AUTO: 32.4 PG (ref 26.5–33)
MCHC RBC AUTO-ENTMCNC: 32.1 G/DL (ref 31.5–36.5)
MCV RBC AUTO: 101 FL (ref 78–100)
NUM BPU REQUESTED: 1
PHOSPHATE SERPL-MCNC: 3.1 MG/DL (ref 2.5–4.5)
PLATELET # BLD AUTO: 255 10E9/L (ref 150–450)
POTASSIUM SERPL-SCNC: 4.4 MMOL/L (ref 3.4–5.3)
PROT SERPL-MCNC: 6.5 G/DL (ref 6.8–8.8)
RBC # BLD AUTO: 2.07 10E12/L (ref 3.8–5.2)
SODIUM SERPL-SCNC: 135 MMOL/L (ref 133–144)
SPECIMEN EXP DATE BLD: NORMAL
SPECIMEN EXP DATE BLD: NORMAL
TRANSFUSION STATUS PATIENT QL: NORMAL
TRANSFUSION STATUS PATIENT QL: NORMAL
WBC # BLD AUTO: 17.4 10E9/L (ref 4–11)

## 2020-02-29 PROCEDURE — 90937 HEMODIALYSIS REPEATED EVAL: CPT

## 2020-02-29 PROCEDURE — 83735 ASSAY OF MAGNESIUM: CPT | Performed by: STUDENT IN AN ORGANIZED HEALTH CARE EDUCATION/TRAINING PROGRAM

## 2020-02-29 PROCEDURE — 86850 RBC ANTIBODY SCREEN: CPT | Performed by: HOSPITALIST

## 2020-02-29 PROCEDURE — P9016 RBC LEUKOCYTES REDUCED: HCPCS | Performed by: HOSPITALIST

## 2020-02-29 PROCEDURE — 86923 COMPATIBILITY TEST ELECTRIC: CPT | Performed by: HOSPITALIST

## 2020-02-29 PROCEDURE — 86900 BLOOD TYPING SEROLOGIC ABO: CPT | Performed by: HOSPITALIST

## 2020-02-29 PROCEDURE — 25800030 ZZH RX IP 258 OP 636: Performed by: INTERNAL MEDICINE

## 2020-02-29 PROCEDURE — 85610 PROTHROMBIN TIME: CPT | Performed by: STUDENT IN AN ORGANIZED HEALTH CARE EDUCATION/TRAINING PROGRAM

## 2020-02-29 PROCEDURE — 85027 COMPLETE CBC AUTOMATED: CPT | Performed by: STUDENT IN AN ORGANIZED HEALTH CARE EDUCATION/TRAINING PROGRAM

## 2020-02-29 PROCEDURE — 25000128 H RX IP 250 OP 636: Performed by: HOSPITALIST

## 2020-02-29 PROCEDURE — 25000132 ZZH RX MED GY IP 250 OP 250 PS 637: Performed by: HOSPITALIST

## 2020-02-29 PROCEDURE — 80069 RENAL FUNCTION PANEL: CPT | Performed by: STUDENT IN AN ORGANIZED HEALTH CARE EDUCATION/TRAINING PROGRAM

## 2020-02-29 PROCEDURE — 12000000 ZZH R&B MED SURG/OB

## 2020-02-29 PROCEDURE — 97535 SELF CARE MNGMENT TRAINING: CPT | Mod: GO

## 2020-02-29 PROCEDURE — 25000132 ZZH RX MED GY IP 250 OP 250 PS 637: Performed by: INTERNAL MEDICINE

## 2020-02-29 PROCEDURE — 86900 BLOOD TYPING SEROLOGIC ABO: CPT | Performed by: INTERNAL MEDICINE

## 2020-02-29 PROCEDURE — 84450 TRANSFERASE (AST) (SGOT): CPT | Performed by: STUDENT IN AN ORGANIZED HEALTH CARE EDUCATION/TRAINING PROGRAM

## 2020-02-29 PROCEDURE — 86901 BLOOD TYPING SEROLOGIC RH(D): CPT | Performed by: HOSPITALIST

## 2020-02-29 PROCEDURE — 82248 BILIRUBIN DIRECT: CPT | Performed by: STUDENT IN AN ORGANIZED HEALTH CARE EDUCATION/TRAINING PROGRAM

## 2020-02-29 PROCEDURE — 71046 X-RAY EXAM CHEST 2 VIEWS: CPT

## 2020-02-29 PROCEDURE — 99233 SBSQ HOSP IP/OBS HIGH 50: CPT | Performed by: INTERNAL MEDICINE

## 2020-02-29 PROCEDURE — 84155 ASSAY OF PROTEIN SERUM: CPT | Performed by: STUDENT IN AN ORGANIZED HEALTH CARE EDUCATION/TRAINING PROGRAM

## 2020-02-29 PROCEDURE — 00000146 ZZHCL STATISTIC GLUCOSE BY METER IP

## 2020-02-29 PROCEDURE — 84460 ALANINE AMINO (ALT) (SGPT): CPT | Performed by: STUDENT IN AN ORGANIZED HEALTH CARE EDUCATION/TRAINING PROGRAM

## 2020-02-29 PROCEDURE — 82247 BILIRUBIN TOTAL: CPT | Performed by: STUDENT IN AN ORGANIZED HEALTH CARE EDUCATION/TRAINING PROGRAM

## 2020-02-29 PROCEDURE — 84075 ASSAY ALKALINE PHOSPHATASE: CPT | Performed by: STUDENT IN AN ORGANIZED HEALTH CARE EDUCATION/TRAINING PROGRAM

## 2020-02-29 RX ADMIN — RIFAXIMIN 550 MG: 550 TABLET ORAL at 21:02

## 2020-02-29 RX ADMIN — SODIUM CHLORIDE 250 ML: 9 INJECTION, SOLUTION INTRAVENOUS at 14:26

## 2020-02-29 RX ADMIN — FOLIC ACID 1 MG: 1 TABLET ORAL at 08:13

## 2020-02-29 RX ADMIN — SODIUM CHLORIDE 300 ML: 9 INJECTION, SOLUTION INTRAVENOUS at 14:25

## 2020-02-29 RX ADMIN — Medication 1 LOZENGE: at 03:26

## 2020-02-29 RX ADMIN — MEROPENEM 500 MG: 500 INJECTION, POWDER, FOR SOLUTION INTRAVENOUS at 21:32

## 2020-02-29 RX ADMIN — RIFAXIMIN 550 MG: 550 TABLET ORAL at 08:15

## 2020-02-29 RX ADMIN — MELATONIN 3 MG: 3 TAB ORAL at 22:40

## 2020-02-29 RX ADMIN — THIAMINE HYDROCHLORIDE 100 MG: 100 INJECTION, SOLUTION INTRAMUSCULAR; INTRAVENOUS at 08:13

## 2020-02-29 RX ADMIN — Medication 1 LOZENGE: at 22:40

## 2020-02-29 RX ADMIN — Medication 1 LOZENGE: at 20:41

## 2020-02-29 ASSESSMENT — ACTIVITIES OF DAILY LIVING (ADL)
ADLS_ACUITY_SCORE: 12
ADLS_ACUITY_SCORE: 15
ADLS_ACUITY_SCORE: 12
ADLS_ACUITY_SCORE: 12
ADLS_ACUITY_SCORE: 15
ADLS_ACUITY_SCORE: 15

## 2020-02-29 NOTE — PROGRESS NOTES
DATE & TIME: 2/29/2020 2300-0730     Cognitive Concerns/ Orientation : A&O x 4  BEHAVIOR & AGGRESSION TOOL COLOR: Green   CIWA SCORE: na   ABNL VS/O2: VSS on RA except tachy at times. SOB w/activity.  MOBILITY: Ax1 gb & walker  PAIN MANAGMENT: denies  DIET: Regular, tolerating   BOWEL/BLADDER: continent of B&B.  ABNL LAB/BG:Hgb 6.7 WBC 17.4 INR 1.84  DRAIN/DEVICES: R. Arm PICC, right chest port   TELEMETRY RHYTHM: NA  SKIN: Yellow sclera and skin; Dry, red rash on lower face; purple/green bruise on right side of neck; 3-4+ edema on LE bilaterally.   TESTS/PROCEDURES: Dialysis yesterday 2/28- 4L removed. Possibly dialysis today 2/29.  D/C DAY/GOALS/PLACE: pending improvement in kidney/lifer/GI functions.   OTHER IMPORTANT INFO: PT/OT/GI/Nephrology following. Needs encouragement for ambulating in halls.

## 2020-02-29 NOTE — PROVIDER NOTIFICATION
MD Notification    Notified Person: MD    Notified Person Name: Dr. Guillaume    Notification Date/Time: 02/29/2020 4798    Notification Interaction: Web based paging    Purpose of Notification: Patient received one unit of blood. Patient now at dialysis, do you want to order another unit or have them order from down there? Also do you want hgb recheck?    Orders Received: Will contact nephrology and have them see what they want to do    Comments:

## 2020-02-29 NOTE — PROGRESS NOTES
[facility]    Medicine Progress Note - Hospitalist Service       Date of Admission:  2/9/2020  Assessment & Plan    Pat Delgado is a 29 year old female history of alcohol abuse, anxiety, depression, ADHD, hypertension, migraine, tobacco abuse and substance abuse who was admitted on 2/9/2020 with  severe alcoholic hepatitis complicated by kidney failure w/ ATN vs type II hepatorenal syndrome, encephalopathy, hypoxic hypercapnic respiratory failure on 2/22/2020 was intubated and transferred to ICU- extubated on 2/24/2020 and transferred to floor 2/25/2020. Currently on HD-creatinine remains elevated.  She also developed bacteremia with fusobacterium nucleatum, with repeat blood culture negative so far.        Acute liver failure 2/2 Alcoholic hepatitis  Acute metabolic encephalopathy, improved  Acute hypoxic respiratory failure, resolved  Acute alcoholic hepatitis with Maddrey score >50  Minimal ascites status post 5 days of cefepime for SBP prophylaxis  Was initially started on prednisolone, stop because of the bacteremia.  - extubated 2/24. No longer on sedation.  - transfer out of MICU 2/25  - continue rifaximin 550 BID  - no evidence of SBP -given no significant fluid pockets on imaging of abdomen  - GI following closely  -Off steroids at this time.  Only get for few days.  -Speech is consulted, had video swallow evaluation done on dysphagia level 2 diet with thin liquids..    - Continue with daily LFT w/ INR  Advance diet as tolerated, speech to follow.    GNR Bacteremia/Fusobacterium nucleatum, unclear source  - plan for 14 days antibiotic treatment -she is currently on IV Meropenem day 8th, can switch to oral Flagyl on discharge.  - discontinue Vancomycin at this time.  Only 1 blood culture drawn on 2/19/2020 was positive, all repeat blood cultures remain negative and initial blood cultures remain negative.    Multifocal pneumonia/pleural effusion/anasarca  CT chest done in the ICU  shows, multifocal airspace opacities scattered throughout both lungs  concerning for multifocal pneumonia on CT chest. Septic emboli could have a  similar appearance, Small to moderate right pleural effusion,  Hepatosplenomegaly.  She is on IV meropenem, has been feeling better now.  She is doing dialysis daily with ultrafiltrate of 4 L, about 12 L out in last 3 days.  Lower extremity edema is improving.  We will go out and repeat the chest x-ray today.     Oliguric renal failure 2/2 ATN and hepato renal syndrome  Multiple other insults including septicemia, IV Contrast ( 2/9 and 2/21) , High vanco levels.   - nephrology following  - tunneled RIJ HD catheter in place  - s/p HD x3, last run 2/24.  Plan for dialysis today with ultrafiltration as per nephrology.  Significant lower extremity edema, will benefit from ultrafiltration.  Repeat dialysis with ultrafiltrate of 4 L today.     GI/Nutrition:  On regular diet at this time.    -Continue rifaximin, folate, and thiamine       Anemia:  -slow decrease ( near stable around 7-8) with no sign of overt bleeding, likely anemia from chronic illness  -GI will follow in case of any sign of bleeding, hemoglobin dropped down to 6.7 today.  I will transfuse her 1 unit packed RBCs today.  If she go for dialysis today 1 unit can be given during the dialysis if okay with nephrology.  Hyponatremia  Because of renal failure, fluid overload.  Is improved with dialysis ultrafiltrate.        Hyponatremia resolved with the dialysis   Nephrology is following, aggressive ultrafiltrate, weight is coming down.  12 L in the last 3 days out.  Lymphedema lower extremity wrap placed by PT yesterday.    This is a day 9 of IV meropenem, will do chest x-ray today.  Appreciate input from GI and nephrology.   for disposition.  Patient need to continue working with the PT overall improving at this time.    Diet: Regular Diet Adult    DVT Prophylaxis: Pneumatic Compression Devices  Amado  Catheter: not present  Code Status: Full Code      Disposition Plan   Expected discharge: 3 to 4 days, most likely will need the rehab.  Entered: Marvin Guillaume MD 02/29/2020, 11:46 AM       The patient's care was discussed with the Bedside Nurse, Patient and ICU Consultant.    Marvin Guillaume MD  Hospitalist Service  [facility]    ______________________________________________________________________    Interval History   Overall feeling better.  Denies any chest pain or shortness of breath no nausea vomiting.  Able to walk some with therapy yesterday.  Lymphedema wrap was placed yesterday for 8 hours and remove for discomfort.    No other significant event overnight    Data reviewed today: I reviewed all medications, new labs and imaging results over the last 24 hours. I personally reviewed no images or EKG's today.    Physical Exam   Vital Signs: Temp: 98.7  F (37.1  C) Temp src: Oral BP: 92/51 Pulse: 102 Heart Rate: 107 Resp: 18 SpO2: 97 % O2 Device: None (Room air)    Weight: 262 lbs 14.4 oz  General Appearance: Awake, alert, NAD, jaundiced. Obese, anasarca.   HEENT: icteric  Respiratory: CTAB  Cardiovascular: tachycardic  GI: soft, NT, mildly distended vs obese  Skin: jaundice  Other: Moving all extremities.  2+ lower extremity edema    Data   Recent Labs   Lab 02/29/20  0550 02/28/20  0627 02/27/20  0515  02/22/20  1150   WBC 17.4* 17.4* 18.6*   < >  --    HGB 6.7* 7.0* 7.8*   < >  --    * 101* 102*   < >  --     225 197   < >  --    INR 1.84* 1.76* 1.68*   < >  --     135 134   < >  --    POTASSIUM 4.4 4.5 4.0   < >  --    CHLORIDE 99 98 97   < >  --    CO2 29 28 29   < >  --    BUN 27 43* 30   < >  --    CR 3.63* 4.71* 3.42*   < >  --    ANIONGAP 7 9 8   < >  --    EMILY 9.2 9.4 8.7   < >  --    GLC 88 82 93   < >  --    ALBUMIN 2.7* 2.8* 2.8*   < >  --    PROTTOTAL 6.5* 6.4* 6.5*   < >  --    BILITOTAL 17.1* 16.0* 16.1*   < >  --    ALKPHOS 112 111 109   < >  --    ALT 40 40  41   < >  --    * 176* 187*   < >  --    TROPI  --   --   --   --  2.854*    < > = values in this interval not displayed.     No results found for this or any previous visit (from the past 24 hour(s)).  Medications     dextrose         - MEDICATION INSTRUCTIONS for Dialysis Patients -   Does not apply See Admin Instructions     folic acid  1 mg Oral Daily     meropenem  500 mg Intravenous Q24H     polyethylene glycol  17 g Oral Daily     psyllium  1 packet Oral Daily     rifaximin  550 mg Oral BID     sodium chloride (PF)  10 mL Intracatheter Q7 Days     sodium chloride (PF)  10 mL Intracatheter Q8H     thiamine  100 mg Intravenous Daily

## 2020-02-29 NOTE — PLAN OF CARE
DATE & TIME: 2/28/2020 1163-9488       Cognitive Concerns/ Orientation : A&O x 4  BEHAVIOR & AGGRESSION TOOL COLOR: Green   CIWA SCORE: 2, 2 for tremors.   ABNL VS/O2: VSS on RA except tachy at times. SOB w/activity.  MOBILITY: Ax1 gb & walker  PAIN MANAGMENT: denies  DIET:advanced to regular diet this shift per speech recommendations.   BOWEL/BLADDER: continent of B&B.  ABNL LAB/BG:WBC 17.4 Creatinine 4.71; bilirubin 13.3; Hg 7.  DRAIN/DEVICES: R. Arm PICC, right chest port   TELEMETRY RHYTHM: NA  SKIN: Yellow sclera and skin; Dry, red rash on lower face; purple/green bruise on right side of neck; 3-4+ edema on LE bilaterally. Lymphedema wraps in place- to be removed at 1pm tomorrow if pt asymptomatic of irritation.   TESTS/PROCEDURES: Dialysis Today- 4L removed. Possibly dialysis again astrid 2/29.    D/C DAY/GOALS/PLACE: pending improvement in kidney/lifer/GI functions.   OTHER IMPORTANT INFO: PT/OT/GI/Nephrology following. Needs encouragement for ambulating in halls.

## 2020-02-29 NOTE — PROVIDER NOTIFICATION
MD Notification    Notified Person: MD    Notified Person Name: Dr. Guillaume    Notification Date/Time: 02/29/2020 0804    Notification Interaction: Web based paging    Purpose of Notification: Pt hgb 6.7 this am. On call was paged and recommends giving blood with dialysis currently unknown if pt has dialysis or not. Please advise, thanks.    Orders Received: give one unit now and one unit with dialysis later this afternonon    Comments:

## 2020-02-29 NOTE — PLAN OF CARE
"Discharge Planner OT   Patient plan for discharge: \"go to a facility for a couple of months\"    Current status: bed mobility- modified independent using bed rail. Static seated balance independent. Patient c/o fatigue, currently getting transfused due to low hgb. Therapist administered cognitive screen- MOCA. Patient scored 26/30 indicating normal/average. This is an improvement from [facility] assessment administered 02/27/2020 ( 19/30). Patient reports her cognition has improved though still observes deficits in short term memory. Therapist and patient reviewed \"tips to aide memory\" handout.     Barriers to return to prior living situation: deconditioning, increased level of A for ADLs/IADLs, medical status    Recommendations for discharge: ARC    Rationale for recommendations: Patient is below baseline for ADLs/IADLs, fatigues easily due to significant deconditioning.        Entered by: Chloe Morin 02/29/2020 11:55 AM       "

## 2020-02-29 NOTE — PROGRESS NOTES
Potassium   Date Value Ref Range Status   02/29/2020 4.4 3.4 - 5.3 mmol/L Final     Hemoglobin   Date Value Ref Range Status   02/29/2020 6.7 (LL) 11.7 - 15.7 g/dL Final     Comment:     This result has been called to [facility] [address] [address] by David Vargas on 02 29 2020 at 0607, and has been read back.        Creatinine   Date Value Ref Range Status   02/29/2020 3.63 (H) 0.52 - 1.04 mg/dL Final     Urea Nitrogen   Date Value Ref Range Status   02/29/2020 27 7 - 30 mg/dL Final     Sodium   Date Value Ref Range Status   02/29/2020 135 133 - 144 mmol/L Final     INR   Date Value Ref Range Status   02/29/2020 1.84 (H) 0.86 - 1.14 Final       DIALYSIS PROCEDURE NOTE  Hepatitis status of previous patient on machine log was checked and verified ok to use with this patients hepatitis status.  Patient dialyzed for 3 hrs. For a UF only tx. with a net fluid removal of  3L.    A BFR of 400 ml/min was obtained via a right CVC. New dressing applied.      The treatment plan was dicussed with Dr. Powers during the treatment.    Total heparin received during the treatment: 0 units.   Line flushed, clamped and capped with heparin 1:1000 1.9 mL (1900 units) per lumen  Meds  given: none   Complications: pt c/o feeling light headed with 30 minutes left of treatment, BP's soft (see flowsheet); lowered HOB and decreased UF goal to net 2.5L. MD aware.     Procedure/ESRD/access care/potassium/fluid restriction education provided orally/visually to patient, patient verbalized/demonstrated understanding  ICEBOAT? Timeout performed pre-treatment  I: Patient was identified using 2 identifiers  C: Consent obtained/verified current before treatment  E: Equipment preventative maintenance is current and dialysis delivery system OK to use  B: Results for MARCUS LUEVANO (MRN [id number]) as of 2/29/2020 13:46   Ref. Range 2/23/2020 05:15   Hep B Surface Agn Latest Ref Range: NR^Nonreactive  Nonreactive   Hepatitis B Surface Antibody Latest Ref  Range: <8.00 m[IU]/mL 569.55 (H)     O: Dialysis orders present and complete prior to treatment  A: Vascular access verified and assessed prior to treatment  T: Treatment was performed at a clinically appropriate time  ?: Patient was allowed to ask questions and address concerns prior to treatment  See flowsheet in EPIC for further details and post assessment.  Machine water alarm in place and functioning. Transducer pods intact and checked every 15min.  Pt returned via wheelchair.  Report received from: CASH Stahl RN  Report given to: WILLIAM Barber RN  Chlorine/Chloramine water system checked every 4 hours.  Outpatient Dialysis at [facility].

## 2020-02-29 NOTE — PROVIDER NOTIFICATION
Brief update    Paged re: Hgb <7    Type and screen sent, prior is out of date    When type and screen returns page for instructions on transfusion now or with dialysis (uncertain if patient is dialyzing today, would prefer to administer blood on dialysis if plan is for a run today). May need clarification from nephrology    Stuart Poole MD  6:39 AM

## 2020-02-29 NOTE — PLAN OF CARE
DATE & TIME: 02/29/2020 2930-1747   Cognitive Concerns/ Orientation : AO X4  BEHAVIOR & AGGRESSION TOOL COLOR: Green, anxious at times  CIWA SCORE: N/A   ABNL VS/O2: VSS on room air ex soft bps  MOBILITY: Independent up in room with walker  PAIN MANAGMENT: Denies pain this shift  DIET: Tolerating regular diet and fluids  BOWEL/BLADDER: Continent of bowel and bladder, up to bathroom, voided and had BM this shift  ABNL LAB/BG: N/A  DRAIN/DEVICES: Port for dialysis, R PICC SL  TELEMETRY RHYTHM: N/A  SKIN: Jaundice, yellow sclera, red rash on lower face, bruising, edema on BLE  TESTS/PROCEDURES: Chest x-ray completed this shift, results pending. Patient currently at dialysis  D/C DAY/GOALS/PLACE: Discharge pending, planning to discharge to TCU or rehab depending on how well patient is doing  OTHER IMPORTANT INFO: Patient currently on mensus, PT/OT/Nephorlogy following, recheck Hgb in AM.

## 2020-02-29 NOTE — PROGRESS NOTES
MD Notification    Notified Person: MD    Notified Person Name: Dr. Poole    Notification Date/Time: 2/29/2020 0630    Notification Interaction: Phone     Purpose of Notification: Hgb 6.7    Orders Received: Collect type and screen. Defer to primary as unsure if pt will dialyzes today.

## 2020-02-29 NOTE — PROGRESS NOTES
"[facility]     Renal Progress Note                Assessment/Plan:     1.  Oliguric EBEN 2 ATN + HRS 1.  Multiple other insults including septicemia, IV Contrast ( 2/9 and 2/21) , High vanco levels ( 38).   Remains oligo anuric. No s/o renal recovery .   -15 L UF over last 5 days. UF only run today   -Need to start looking for outpatient dialysis spot.    2.  Sepsis syndrome.  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  Source is unclear.  ? Septic pulm emboli.    -Antibiotics per primary team.    3.  Resp failure -resolved    4.  EtOHic hepatitis.  Pt's bili remains high .  She is icteric .    5.  Encephalopathy.    6.  Anemia.  Hgb trending down.. Iron stores are adequate.  S/p PRBC today . Plan on starting ASHLEY with next HD.     Next HD on Monday        Interval History:     Seen / examined on dialysis.   4 L UF for last 3 days. Planning 3-4 L UF today . Vol status better but still with significant edema. Breathing better.          Medications and Allergies:       - MEDICATION INSTRUCTIONS for Dialysis Patients -   Does not apply See Admin Instructions     folic acid  1 mg Oral Daily     meropenem  500 mg Intravenous Q24H     - MEDICATION INSTRUCTIONS -   Does not apply Once     polyethylene glycol  17 g Oral Daily     psyllium  1 packet Oral Daily     rifaximin  550 mg Oral BID     sodium chloride (PF)  10 mL Intracatheter Q7 Days     sodium chloride (PF)  10 mL Intracatheter Q8H     thiamine  100 mg Intravenous Daily     Allergies   Allergen Reactions     Penicillins Unknown     Hives            Physical Exam:     Vitals were reviewed    Heart Rate: 104, Blood pressure 94/47, pulse 104, temperature 98.2  F (36.8  C), temperature source Oral, resp. rate 16, height 1.727 m (5' 8\"), weight 119.3 kg (262 lb 14.4 oz), SpO2 97 %, not currently breastfeeding.  Wt Readings from Last 3 Encounters:   02/28/20 119.3 kg (262 lb 14.4 oz)   02/09/15 95.3 kg (210 lb)   01/22/15 108.4 kg (239 lb)       GENERAL " APPEARANCE:   awake, seen on dialysis  HEENT: Icterus +, acneiform rash on lower half of face and upper chest   RESP: Clear  CV: Reg, tachy, nl S1/S2  ABDOMEN: o/s/nt, + distended  EXTREMITIES/SKIN: 3+ ble edema  OTHER:   + RIJ TDC         Data:     CBC RESULTS:     Recent Labs   Lab 02/29/20  0550 02/28/20  0627 02/27/20  0515 02/26/20  0550 02/25/20  0415 02/24/20  0430   WBC 17.4* 17.4* 18.6* 21.1* 20.4* 20.8*   RBC 2.07* 2.11* 2.40* 2.34* 2.22* 2.31*   HGB 6.7* 7.0* 7.8* 7.7* 7.5* 7.6*   HCT 20.9* 21.3* 24.4* 23.6* 22.8* 23.5*    225 197 178 147* 152     Basic Metabolic Panel:  Recent Labs   Lab 02/29/20  0550 02/28/20  0627 02/27/20  0515 02/26/20  0550 02/25/20  0415 02/24/20  0430    135 134 131* 135 133   POTASSIUM 4.4 4.5 4.0 3.6 3.5 3.6   CHLORIDE 99 98 97 97 101 100   CO2 29 28 29 25 27 23   BUN 27 43* 30 44* 30 39*   CR 3.63* 4.71* 3.42* 4.31* 3.27* 4.07*   GLC 88 82 93 96 103* 107*   EMILY 9.2 9.4 8.7 9.0 8.4* 8.5     INR  Recent Labs   Lab 02/29/20  0550 02/28/20  0627 02/27/20  0515 02/26/20  0550   INR 1.84* 1.76* 1.68* 1.52*      Attestation:   I have reviewed today's relevant vital signs, notes, medications, labs and imaging.    Edward Powers MD  [facility] Nephrology   [phone]

## 2020-03-01 ENCOUNTER — APPOINTMENT (OUTPATIENT)
Dept: PHYSICAL THERAPY | Facility: CLINIC | Age: 30
DRG: 871 | End: 2020-03-01
Payer: COMMERCIAL

## 2020-03-01 LAB
ALBUMIN SERPL-MCNC: 2.8 G/DL (ref 3.4–5)
ALP SERPL-CCNC: 121 U/L (ref 40–150)
ALT SERPL W P-5'-P-CCNC: 38 U/L (ref 0–50)
ANION GAP SERPL CALCULATED.3IONS-SCNC: 11 MMOL/L (ref 3–14)
AST SERPL W P-5'-P-CCNC: 166 U/L (ref 0–45)
BILIRUB DIRECT SERPL-MCNC: 14.4 MG/DL (ref 0–0.2)
BILIRUB SERPL-MCNC: 18.8 MG/DL (ref 0.2–1.3)
BUN SERPL-MCNC: 36 MG/DL (ref 7–30)
CALCIUM SERPL-MCNC: 9.7 MG/DL (ref 8.5–10.1)
CHLORIDE SERPL-SCNC: 95 MMOL/L (ref 94–109)
CO2 SERPL-SCNC: 25 MMOL/L (ref 20–32)
CREAT SERPL-MCNC: 4.92 MG/DL (ref 0.52–1.04)
ERYTHROCYTE [DISTWIDTH] IN BLOOD BY AUTOMATED COUNT: 21.9 % (ref 10–15)
GFR SERPL CREATININE-BSD FRML MDRD: 11 ML/MIN/{1.73_M2}
GLUCOSE SERPL-MCNC: 83 MG/DL (ref 70–99)
HCT VFR BLD AUTO: 24.7 % (ref 35–47)
HGB BLD-MCNC: 8 G/DL (ref 11.7–15.7)
INR PPP: 1.8 (ref 0.86–1.14)
MAGNESIUM SERPL-MCNC: 1.8 MG/DL (ref 1.6–2.3)
MCH RBC QN AUTO: 31.5 PG (ref 26.5–33)
MCHC RBC AUTO-ENTMCNC: 32.4 G/DL (ref 31.5–36.5)
MCV RBC AUTO: 97 FL (ref 78–100)
PHOSPHATE SERPL-MCNC: 3.6 MG/DL (ref 2.5–4.5)
PLATELET # BLD AUTO: 290 10E9/L (ref 150–450)
POTASSIUM SERPL-SCNC: 4.9 MMOL/L (ref 3.4–5.3)
PROT SERPL-MCNC: 6.5 G/DL (ref 6.8–8.8)
RBC # BLD AUTO: 2.54 10E12/L (ref 3.8–5.2)
SODIUM SERPL-SCNC: 131 MMOL/L (ref 133–144)
WBC # BLD AUTO: 17.1 10E9/L (ref 4–11)

## 2020-03-01 PROCEDURE — 82248 BILIRUBIN DIRECT: CPT | Performed by: STUDENT IN AN ORGANIZED HEALTH CARE EDUCATION/TRAINING PROGRAM

## 2020-03-01 PROCEDURE — 25000132 ZZH RX MED GY IP 250 OP 250 PS 637: Performed by: INTERNAL MEDICINE

## 2020-03-01 PROCEDURE — 25000128 H RX IP 250 OP 636: Performed by: INTERNAL MEDICINE

## 2020-03-01 PROCEDURE — 83735 ASSAY OF MAGNESIUM: CPT | Performed by: STUDENT IN AN ORGANIZED HEALTH CARE EDUCATION/TRAINING PROGRAM

## 2020-03-01 PROCEDURE — 12000000 ZZH R&B MED SURG/OB

## 2020-03-01 PROCEDURE — 80069 RENAL FUNCTION PANEL: CPT | Performed by: STUDENT IN AN ORGANIZED HEALTH CARE EDUCATION/TRAINING PROGRAM

## 2020-03-01 PROCEDURE — 25000128 H RX IP 250 OP 636: Performed by: HOSPITALIST

## 2020-03-01 PROCEDURE — 84075 ASSAY ALKALINE PHOSPHATASE: CPT | Performed by: STUDENT IN AN ORGANIZED HEALTH CARE EDUCATION/TRAINING PROGRAM

## 2020-03-01 PROCEDURE — 97140 MANUAL THERAPY 1/> REGIONS: CPT | Mod: GP

## 2020-03-01 PROCEDURE — 82247 BILIRUBIN TOTAL: CPT | Performed by: STUDENT IN AN ORGANIZED HEALTH CARE EDUCATION/TRAINING PROGRAM

## 2020-03-01 PROCEDURE — 99233 SBSQ HOSP IP/OBS HIGH 50: CPT | Performed by: INTERNAL MEDICINE

## 2020-03-01 PROCEDURE — 97530 THERAPEUTIC ACTIVITIES: CPT | Mod: GP

## 2020-03-01 PROCEDURE — 40000257 ZZH STATISTIC CONSULT NO CHARGE VASC ACCESS

## 2020-03-01 PROCEDURE — 85610 PROTHROMBIN TIME: CPT | Performed by: STUDENT IN AN ORGANIZED HEALTH CARE EDUCATION/TRAINING PROGRAM

## 2020-03-01 PROCEDURE — 40000239 ZZH STATISTIC VAT ROUNDS

## 2020-03-01 PROCEDURE — 25000132 ZZH RX MED GY IP 250 OP 250 PS 637: Performed by: HOSPITALIST

## 2020-03-01 PROCEDURE — 85027 COMPLETE CBC AUTOMATED: CPT | Performed by: STUDENT IN AN ORGANIZED HEALTH CARE EDUCATION/TRAINING PROGRAM

## 2020-03-01 PROCEDURE — 84155 ASSAY OF PROTEIN SERUM: CPT | Performed by: STUDENT IN AN ORGANIZED HEALTH CARE EDUCATION/TRAINING PROGRAM

## 2020-03-01 PROCEDURE — 84450 TRANSFERASE (AST) (SGOT): CPT | Performed by: STUDENT IN AN ORGANIZED HEALTH CARE EDUCATION/TRAINING PROGRAM

## 2020-03-01 PROCEDURE — 84460 ALANINE AMINO (ALT) (SGPT): CPT | Performed by: STUDENT IN AN ORGANIZED HEALTH CARE EDUCATION/TRAINING PROGRAM

## 2020-03-01 RX ORDER — METHYLPREDNISOLONE SODIUM SUCCINATE 40 MG/ML
20 INJECTION, POWDER, LYOPHILIZED, FOR SOLUTION INTRAMUSCULAR; INTRAVENOUS ONCE
Status: COMPLETED | OUTPATIENT
Start: 2020-03-01 | End: 2020-03-01

## 2020-03-01 RX ORDER — METRONIDAZOLE 500 MG/1
500 TABLET ORAL 4 TIMES DAILY
Status: DISPENSED | OUTPATIENT
Start: 2020-03-01 | End: 2020-03-02

## 2020-03-01 RX ORDER — DIPHENHYDRAMINE HCL 25 MG
25 CAPSULE ORAL EVERY 6 HOURS PRN
Status: DISPENSED | OUTPATIENT
Start: 2020-03-01 | End: 2020-03-03

## 2020-03-01 RX ADMIN — FOLIC ACID 1 MG: 1 TABLET ORAL at 07:53

## 2020-03-01 RX ADMIN — METHYLPREDNISOLONE SODIUM SUCCINATE 20 MG: 40 INJECTION, POWDER, FOR SOLUTION INTRAMUSCULAR; INTRAVENOUS at 10:01

## 2020-03-01 RX ADMIN — RIFAXIMIN 550 MG: 550 TABLET ORAL at 22:26

## 2020-03-01 RX ADMIN — METRONIDAZOLE 500 MG: 500 TABLET ORAL at 17:33

## 2020-03-01 RX ADMIN — RIFAXIMIN 550 MG: 550 TABLET ORAL at 07:53

## 2020-03-01 RX ADMIN — METRONIDAZOLE 500 MG: 500 TABLET ORAL at 10:53

## 2020-03-01 RX ADMIN — DIPHENHYDRAMINE HYDROCHLORIDE 25 MG: 25 CAPSULE ORAL at 22:26

## 2020-03-01 RX ADMIN — METRONIDAZOLE 500 MG: 500 TABLET ORAL at 14:05

## 2020-03-01 RX ADMIN — Medication 1 LOZENGE: at 05:08

## 2020-03-01 RX ADMIN — DIPHENHYDRAMINE HYDROCHLORIDE 25 MG: 25 CAPSULE ORAL at 10:01

## 2020-03-01 RX ADMIN — METRONIDAZOLE 500 MG: 500 TABLET ORAL at 22:26

## 2020-03-01 RX ADMIN — THIAMINE HYDROCHLORIDE 100 MG: 100 INJECTION, SOLUTION INTRAMUSCULAR; INTRAVENOUS at 07:54

## 2020-03-01 ASSESSMENT — ACTIVITIES OF DAILY LIVING (ADL)
ADLS_ACUITY_SCORE: 13
ADLS_ACUITY_SCORE: 12
ADLS_ACUITY_SCORE: 13
ADLS_ACUITY_SCORE: 12
ADLS_ACUITY_SCORE: 13
ADLS_ACUITY_SCORE: 13

## 2020-03-01 NOTE — PROGRESS NOTES
DATE & TIME: 3/1/2020 2703-2303    Cognitive Concerns/ Orientation : A&Ox4   BEHAVIOR & AGGRESSION TOOL COLOR: green  CIWA SCORE: na   ABNL VS/O2: VSS on RA, blood pressure remaining soft, on dialysis   MOBILITY: independent in room with walker  PAIN MANAGMENT: denied on shift   DIET: regular, tolerating well  BOWEL/BLADDER: continent on B/B  ABNL LAB/BG: hgb 6.7 on day shift, received unit of blood recheck this AM  DRAIN/DEVICES: R PICC SL. R chest port for dialysis  TELEMETRY RHYTHM: na  SKIN: jaundice, yellow sclera. Scattered bruising. Edema 2-3+ on BLE  TESTS/PROCEDURES: dialysis 2/29 with 2.5L removed   D/C DAY/GOALS/PLACE: pending, likely to TCU or rehab pending pt's improvements. Needs out patient clinic for dialysis   OTHER IMPORTANT INFO: PT/OT/Nephrology following. Pt currently on menses. Lymphedema following- wraps not in place due to pt being in dialysis on 2/29.

## 2020-03-01 NOTE — PLAN OF CARE
DATE & TIME: 02/29/2020 9308-8540         Cognitive Concerns/ Orientation : A&O X4  BEHAVIOR & AGGRESSION TOOL COLOR: Green, anxious at times  CIWA SCORE: N/A      ABNL VS/O2: VSS on room air- soft BP's following dialysis.   MOBILITY: Indep. in room with walker  PAIN MANAGMENT: Denies pain this shift  DIET: Tolerating regular diet and fluids  BOWEL/BLADDER: Continent of B/B.   ABNL LAB/BG: N/A  DRAIN/DEVICES: Port for dialysis, R PICC SL  TELEMETRY RHYTHM: N/A  SKIN: Jaundiced, yellow sclera, red rash on lower face/chest, bruising, edema on BLE 2-3+  TESTS/PROCEDURES: dialysis today 2.5L removed  D/C DAY/GOALS/PLACE: Discharge pending; planning to discharge to TCU or rehab depending on patients progress- will need to find outpt clinic for dialysis.   OTHER IMPORTANT INFO: PT/OT/Nephrology following. Pt currently on menses. Lymphedema following- OT unable to see today due to pt being at dialysis.

## 2020-03-01 NOTE — PROVIDER NOTIFICATION
MD Notification    Notified Person: MD    Notified Person Name:  Guillaume    Notification Date/Time: 03/01/2020 0810     Notification Interaction: Web based paging    Purpose of Notification: Patient having redness and warmth on chest/back. Temp 99. Do you want antihistamine/cream please advise. Also FYI bilirubin is 18.8 critical value.      Orders Received: Rounded with MD. Bruna ordered PRN.     Comments:

## 2020-03-01 NOTE — CONSULTS
LISETTE  D: Met with pt, pt.'s mom (Natasha) and dad (Bebeto) to begin to discuss discharge planning. Per therapy pt will continue to require therapy upon discharge from [facility]. Pt asked about TCU's and this writer provided a list of TCU's and also notified pt that not all of them are able to accept young people. This writer did not have a list of TCU's that accept young people so will look into this and then notify pt.   P: Pt appears agreeable to whatever the medical recommendation is for discharge and rehab. So  will follow to assist with discharge as appropriate.

## 2020-03-01 NOTE — PROGRESS NOTES
[facility]  Gastroenterology Progress Note     Pat Delgado MRN# [id number]   YOB: 1990 Age: 29 year old          Assessment and Plan:     Alcoholic hepatitis    Acute kidney failure with tubular necrosis (H)  Patient clinically appears to be stable patient's labs have fairly unchanged her bilirubin is still in the range of 17 patient white count is also in the 17,000 range patient had gram-negative sepsis from unclear source patient is currently on hemodialysis patient is tolerating her treatment well patient appears to be fairly more awake alert.  Patient is getting blood transfusion.  No signs of active GI bleed.  Continue on current treatment with supportive care continue on IV antibiotics.  Patient's diagnosis findings and her overall condition were discussed in detail with patient and family.            Acute liver failure without hepatic coma  Hyponatremia  Sepsis, due to unspecified organism, unspecified whether acute organ dysfunction present (H)      Interval History:   doing well; no cp, sob, n/v/d, or abd pain.              Review of Systems:   C: NEGATIVE for fever, chills, change in weight  E/M: NEGATIVE for ear, mouth and throat problems  R: NEGATIVE for significant cough or SOB  CV: NEGATIVE for chest pain, palpitations or peripheral edema             Medications:   I have reviewed this patient's current medications    - MEDICATION INSTRUCTIONS for Dialysis Patients -   Does not apply See Admin Instructions     folic acid  1 mg Oral Daily     meropenem  500 mg Intravenous Q24H     polyethylene glycol  17 g Oral Daily     psyllium  1 packet Oral Daily     rifaximin  550 mg Oral BID     sodium chloride (PF)  10 mL Intracatheter Q7 Days     sodium chloride (PF)  10 mL Intracatheter Q8H     thiamine  100 mg Intravenous Daily                  Physical Exam:   Vitals were reviewed  Vital Signs with Ranges  Temp:  [97.2  F (36.2  C)-98.7  F (37.1  C)] 98.5  F (36.9   C)  Pulse:  [102-106] 106  Heart Rate:  [100-109] 106  Resp:  [16-18] 16  BP: ()/(40-62) 91/52  SpO2:  [94 %-100 %] 96 %  I/O last 3 completed shifts:  In: 1080 [P.O.:480]  Out: 150 [Urine:150]  Constitutional: healthy, alert and no distress   Cardiovascular: negative, PMI normal. No lifts, heaves, or thrills. RRR. No murmurs, clicks gallops or rub  Respiratory: negative, Percussion normal. Good diaphragmatic excursion. Lungs clear  Head: Normocephalic. No masses, lesions, tenderness or abnormalities  Neck: Neck supple. No adenopathy. Thyroid symmetric, normal size,, Carotids without bruits.  Abdomen: Abdomen soft, non-tender. BS normal. No masses, organomegaly  NEURO: Gait normal. Reflexes normal and symmetric. Sensation grossly WNL.           Data:   I reviewed the patient's new clinical lab test results.   Recent Labs   Lab Test 02/29/20  0550 02/28/20  0627 02/27/20  0515   WBC 17.4* 17.4* 18.6*   HGB 6.7* 7.0* 7.8*   * 101* 102*    225 197   INR 1.84* 1.76* 1.68*     Recent Labs   Lab Test 02/29/20  0550 02/28/20 0627 02/27/20  0515   POTASSIUM 4.4 4.5 4.0   CHLORIDE 99 98 97   CO2 29 28 29   BUN 27 43* 30   ANIONGAP 7 9 8     Recent Labs   Lab Test 02/29/20  0550 02/28/20  0627 02/27/20  0515  02/21/20  1430  02/14/20  0653  02/09/20  1755 02/09/20  1547   ALBUMIN 2.7* 2.8* 2.8*   < >  --    < >  --    < >  --  1.6*   BILITOTAL 17.1* 16.0* 16.1*   < >  --    < >  --    < >  --  8.1*   ALT 40 40 41   < >  --    < >  --    < >  --  33   * 176* 187*   < >  --    < >  --    < >  --  203*   PROTEIN  --   --   --   --  100*  --  100*  --  100*  --    LIPASE  --   --   --   --   --   --   --   --   --  119    < > = values in this interval not displayed.       I reviewed the patient's new imaging results.    All laboratory data reviewed  All imaging studies reviewed by me.    Liban Hobson MD,  2/29/2020  [facility]  Office : [phone]  Cell: [phone]

## 2020-03-01 NOTE — PLAN OF CARE
"Discharge Planner PT   Patient plan for discharge: rehab  Current status: Pt demonstrates independence with bed mobility, transfers including toileting, and gait in the room without a device. She declined hallway ambulation at this time due to increased fatigue after taking Benadryl for a rash.     Pt reports that she was able to tolerate wearing the lymph wraps for 8 hours after applied 2 days ago. Requested to have wraps removed due to feeling \"too tight.\" Curently demonstrates 2+ pitting in B feet and shins. Skin intact but jaundiced. Washed and dried lower legs. Applied lotion. Completed quick wrap using TG Soft tube bandage layer from base of toes to just below the knee, then an 10 cm SS bandage from base of toes to ankle, and a 12 cm SS bandage from ankle to just below the knee. Bandages applied to BLEs with appropriate very mild stretch and spacing to allow gradient compression. Reviewed signs/symptoms of wearing intolerance. Encouraged pt let nursing staff know if she needed the wraps removed.   Barriers to return to prior living situation: medical status, limited activity tolerance  Recommendations for discharge: TCU  Rationale for recommendations: Pt would benefit from PT at TCU to progress strength, balance and mobility so pt can return home safely. Pt does not have a PT need for 3 hours of therapy/day.       Entered by: Kristina Cardona 03/01/2020 2:32 PM       "

## 2020-03-01 NOTE — PLAN OF CARE
DATE & TIME: 03/01/2020 5041-8822  Cognitive Concerns/ Orientation : AOX4   BEHAVIOR & AGGRESSION TOOL COLOR: Green but anxious   CIWA SCORE: N/A   ABNL VS/O2: VSS on room ex tachy and soft bps  MOBILITY: Independent with walker  PAIN MANAGMENT: Denies pain  DIET: Tolerating regular diet and fluids well  BOWEL/BLADDER: Continent bowel and bladder, did not have BM this shift  ABNL LAB/BG: Bilirubin 18.8,   DRAIN/DEVICES: R PICC SL and R Port for dialysis  TELEMETRY RHYTHM: N/A  SKIN: Jaundice, bruising on neck, rash on face, body is red/warm with benedryl PRN available  TESTS/PROCEDURES: N/A  D/C DAY/GOALS/PLACE: Discharge pending depending on patient status, possibly rehab  OTHER IMPORTANT INFO: Patient picked at scab with patient's home supplies this shift (see note), patient very anxious, parents came by to see patient today and patient was frustrated with parents trying to figure out discharge plan and patient was tearful. Lymphedema wraps in place.

## 2020-03-01 NOTE — PROGRESS NOTES
RN entered room to answer call light, pt was found sitting in the chair with gloves on and using alcohol swabs with tweezers, digging at her skin on her L upper forearm. Area looked open with redness surrounding. Pt educated on proper skin/wound care, not receptive, demanded a bandage. Assigned RN notified.

## 2020-03-01 NOTE — PROGRESS NOTES
[facility]    Medicine Progress Note - Hospitalist Service       Date of Admission:  2/9/2020  Assessment & Plan    Pat Delgado is a 29 year old female history of alcohol abuse, anxiety, depression, ADHD, hypertension, migraine, tobacco abuse and substance abuse who was admitted on 2/9/2020 with  severe alcoholic hepatitis complicated by kidney failure w/ ATN vs type II hepatorenal syndrome, encephalopathy, hypoxic hypercapnic respiratory failure on 2/22/2020 was intubated and transferred to ICU- extubated on 2/24/2020 and transferred to floor 2/25/2020. Currently on HD-creatinine remains elevated.  She also developed bacteremia with fusobacterium nucleatum, with repeat blood culture negative so far.        Acute liver failure 2/2 Alcoholic hepatitis  Acute metabolic encephalopathy, improved  Acute hypoxic respiratory failure, resolved  Acute alcoholic hepatitis with Maddrey score >50  Minimal ascites status post 5 days of cefepime for SBP prophylaxis  Was initially started on prednisolone, stop because of the bacteremia.  - extubated 2/24. No longer on sedation.  - transfer out of MICU 2/25  - continue rifaximin 550 BID  - no evidence of SBP -given no significant fluid pockets on imaging of abdomen  - GI following closely  -Off steroids at this time.  Only get for few days.  -Speech is consulted, had video swallow evaluation done on dysphagia level 2 diet with thin liquids..    - Continue with daily LFT w/ INR  Advance diet as tolerated, speech to follow.    GNR Bacteremia/Fusobacterium nucleatum, unclear source/possible contamination  - plan for 14 days antibiotic treatment -she is currently on IV Meropenem, current 90s already will switch  to oral Flagyl today to complete a 14-day course.  All other blood cultures and repeat blood cultures remain negative.  Possibility of contamination as well.  - discontinue Vancomycin at this time.  Only 1 blood culture drawn on 2/19/2020 was positive,  all repeat blood cultures remain negative and initial blood cultures remain negative.  Giving possible drug reaction, I will stop the meropenem and complete the course with oral Flagyl at this time.    Multifocal pneumonia/pleural effusion/anasarca  CT chest done in the ICU shows, multifocal airspace opacities scattered throughout both lungs  concerning for multifocal pneumonia on CT chest. Septic emboli could have a  similar appearance, Small to moderate right pleural effusion,  Hepatosplenomegaly.  She is on IV meropenem, has been feeling better now, received 9 days of IV meropenem..   She is doing dialysis daily with ultrafiltrate of 4 L, about 12 L out in last 3 days.  Lower extremity edema is improving.  Chest x-ray finding consistent with pulmonary edema, no pneumonia.  Stop IV meropenem at this time.     Oliguric renal failure 2/2 ATN and hepato renal syndrome  Multiple other insults including septicemia, IV Contrast ( 2/9 and 2/21) , High vanco levels.   - nephrology following  - tunneled RIJ HD catheter in place  - s/p HD x3, last run 2/24.  Plan for dialysis today with ultrafiltration as per nephrology.  Significant lower extremity edema, will benefit from ultrafiltration.  Dialysis ultrafiltrate as per nephrology.  About 10 L negative balance at this time.  Making some urine, may benefit from giving her diuretics well, but will defer it to the nephrology.     GI/Nutrition:  On regular diet at this time.    -Continue rifaximin, folate, and thiamine       Anemia:  -slow decrease ( near stable around 7-8) with no sign of overt bleeding, likely anemia from chronic illness  -GI will follow in case of any sign of bleeding, hemoglobin dropped down to 6.7 today.  Received 2 units of packed RBC yesterday.  Hemoglobin improved to 8.0.    Hyponatremia  Because of renal failure, fluid overload.  Is improved with dialysis ultrafiltrate.      Maculopapular rash on the abdomen  This is most likely drug reaction, I will  stop the IV meropenem at this time.  She already got 8 days of IV antibiotics, switch it to oral Flagyl.  Give a dose of IV Solu-Medrol 20 mg times once and Benadryl as needed.      Stop IV meropenem   Started on oral Flagyl to complete a 14-day course  Likely will progress most likely duration to meropenem.  IV Solu-Medrol 20 mg x1 dose and Benadryl as needed  Will benefit from diuretic trial but will defer it to nephrology.  Lower extremity edema improving  Appreciate input from GI and nephrology.   for disposition.  Patient need to continue working with the PT overall improving at this time.    Diet: Regular Diet Adult    DVT Prophylaxis: Pneumatic Compression Devices  Amado Catheter: not present  Code Status: Full Code      Disposition Plan   Expected discharge: 3 to 4 days, most likely will need the rehab.  Entered: Marvin Guillaume MD 03/01/2020, 11:35 AM       The patient's care was discussed with the Bedside Nurse, Patient and ICU Consultant.    Marvin Guillaume MD  Hospitalist Service  [facility]    ______________________________________________________________________    Interval History   Complaint of rash on her abdomen and back and itchy overnight.  Feeling somewhat tired this morning.  Denies any fever chills nausea vomiting dysuria hematuria constipation diarrhea.    No other significant event overnight    Data reviewed today: I reviewed all medications, new labs and imaging results over the last 24 hours. I personally reviewed no images or EKG's today.    Physical Exam   Vital Signs: Temp: 99  F (37.2  C) Temp src: Oral BP: 92/52 Pulse: 106 Heart Rate: 104 Resp: 18 SpO2: 97 % O2 Device: None (Room air)    Weight: 262 lbs 14.4 oz  General Appearance: Awake, alert, NAD, jaundiced. Obese, anasarca.   HEENT: icteric  Respiratory: CTAB  Cardiovascular: tachycardic  GI: soft, NT, mildly distended vs obese  Skin: jaundice, maculopapular rash on the abdomen, consistent with  urticaria.  Other: Moving all extremities.  2+ lower extremity edema    Data   Recent Labs   Lab 03/01/20  0633 02/29/20  0550 02/28/20  0627   WBC 17.1* 17.4* 17.4*   HGB 8.0* 6.7* 7.0*   MCV 97 101* 101*    255 225   INR 1.80* 1.84* 1.76*   * 135 135   POTASSIUM 4.9 4.4 4.5   CHLORIDE 95 99 98   CO2 25 29 28   BUN 36* 27 43*   CR 4.92* 3.63* 4.71*   ANIONGAP 11 7 9   EMILY 9.7 9.2 9.4   GLC 83 88 82   ALBUMIN 2.8* 2.7* 2.8*   PROTTOTAL 6.5* 6.5* 6.4*   BILITOTAL 18.8* 17.1* 16.0*   ALKPHOS 121 112 111   ALT 38 40 40   * 174* 176*     Recent Results (from the past 24 hour(s))   XR Chest 2 Views    Narrative    CHEST TWO VIEWS    2/29/2020 1:00 PM     HISTORY: Follow-up for pneumonia    COMPARISON: 2/23/2020.      Impression    IMPRESSION: Removal of ET tube and nasogastric tube. Right neck large  bore central venous line again identified. Its tip is within the right  atrium. If relevant, this could be retracted by approximately 6 cm for  placement at the cavoatrial level. Hypoinflated lungs. Bilateral  pulmonary edema pattern with some vascular congestion appears stable.  Stable cardiac silhouette.    LAISHA TOMLINSON MD     Medications     dextrose         - MEDICATION INSTRUCTIONS for Dialysis Patients -   Does not apply See Admin Instructions     folic acid  1 mg Oral Daily     metroNIDAZOLE  500 mg Oral 4x Daily     polyethylene glycol  17 g Oral Daily     psyllium  1 packet Oral Daily     rifaximin  550 mg Oral BID     sodium chloride (PF)  10 mL Intracatheter Q7 Days     sodium chloride (PF)  10 mL Intracatheter Q8H     thiamine  100 mg Intravenous Daily

## 2020-03-01 NOTE — PROGRESS NOTES
Chart reviewed. Labs and vitals reviewed.   Plan for HD tomorrow.     Recent Labs   Lab Test 03/01/20  0633 02/29/20  0550   * 135   POTASSIUM 4.9 4.4   CHLORIDE 95 99   CO2 25 29   ANIONGAP 11 7   GLC 83 88   BUN 36* 27   CR 4.92* 3.63*   EMILY 9.7 9.2       Edward Powers MD  [facility]phrology   [phone]

## 2020-03-02 ENCOUNTER — APPOINTMENT (OUTPATIENT)
Dept: PHYSICAL THERAPY | Facility: CLINIC | Age: 30
DRG: 871 | End: 2020-03-02
Payer: COMMERCIAL

## 2020-03-02 LAB
ALBUMIN SERPL-MCNC: 2.8 G/DL (ref 3.4–5)
ALP SERPL-CCNC: 123 U/L (ref 40–150)
ALT SERPL W P-5'-P-CCNC: 32 U/L (ref 0–50)
ANION GAP SERPL CALCULATED.3IONS-SCNC: 8 MMOL/L (ref 3–14)
AST SERPL W P-5'-P-CCNC: 125 U/L (ref 0–45)
BILIRUB DIRECT SERPL-MCNC: 14.4 MG/DL (ref 0–0.2)
BILIRUB SERPL-MCNC: 17.9 MG/DL (ref 0.2–1.3)
BUN SERPL-MCNC: 50 MG/DL (ref 7–30)
CALCIUM SERPL-MCNC: 9.8 MG/DL (ref 8.5–10.1)
CHLORIDE SERPL-SCNC: 97 MMOL/L (ref 94–109)
CO2 SERPL-SCNC: 26 MMOL/L (ref 20–32)
CREAT SERPL-MCNC: 5.96 MG/DL (ref 0.52–1.04)
ERYTHROCYTE [DISTWIDTH] IN BLOOD BY AUTOMATED COUNT: 21.2 % (ref 10–15)
GFR SERPL CREATININE-BSD FRML MDRD: 9 ML/MIN/{1.73_M2}
GLUCOSE SERPL-MCNC: 101 MG/DL (ref 70–99)
HCT VFR BLD AUTO: 24.6 % (ref 35–47)
HGB BLD-MCNC: 7.9 G/DL (ref 11.7–15.7)
INR PPP: 1.84 (ref 0.86–1.14)
MAGNESIUM SERPL-MCNC: 2 MG/DL (ref 1.6–2.3)
MCH RBC QN AUTO: 30.9 PG (ref 26.5–33)
MCHC RBC AUTO-ENTMCNC: 32.1 G/DL (ref 31.5–36.5)
MCV RBC AUTO: 96 FL (ref 78–100)
PHOSPHATE SERPL-MCNC: 5 MG/DL (ref 2.5–4.5)
PLATELET # BLD AUTO: 299 10E9/L (ref 150–450)
POTASSIUM SERPL-SCNC: 5.1 MMOL/L (ref 3.4–5.3)
PROT SERPL-MCNC: 6.6 G/DL (ref 6.8–8.8)
RBC # BLD AUTO: 2.56 10E12/L (ref 3.8–5.2)
SODIUM SERPL-SCNC: 131 MMOL/L (ref 133–144)
WBC # BLD AUTO: 19.6 10E9/L (ref 4–11)

## 2020-03-02 PROCEDURE — 97140 MANUAL THERAPY 1/> REGIONS: CPT | Mod: GP

## 2020-03-02 PROCEDURE — 84075 ASSAY ALKALINE PHOSPHATASE: CPT | Performed by: STUDENT IN AN ORGANIZED HEALTH CARE EDUCATION/TRAINING PROGRAM

## 2020-03-02 PROCEDURE — 40000239 ZZH STATISTIC VAT ROUNDS

## 2020-03-02 PROCEDURE — 25000132 ZZH RX MED GY IP 250 OP 250 PS 637: Performed by: INTERNAL MEDICINE

## 2020-03-02 PROCEDURE — 85610 PROTHROMBIN TIME: CPT | Performed by: STUDENT IN AN ORGANIZED HEALTH CARE EDUCATION/TRAINING PROGRAM

## 2020-03-02 PROCEDURE — 25800030 ZZH RX IP 258 OP 636: Performed by: INTERNAL MEDICINE

## 2020-03-02 PROCEDURE — 82248 BILIRUBIN DIRECT: CPT | Performed by: STUDENT IN AN ORGANIZED HEALTH CARE EDUCATION/TRAINING PROGRAM

## 2020-03-02 PROCEDURE — 84460 ALANINE AMINO (ALT) (SGPT): CPT | Performed by: STUDENT IN AN ORGANIZED HEALTH CARE EDUCATION/TRAINING PROGRAM

## 2020-03-02 PROCEDURE — 25000128 H RX IP 250 OP 636: Performed by: HOSPITALIST

## 2020-03-02 PROCEDURE — 82247 BILIRUBIN TOTAL: CPT | Performed by: STUDENT IN AN ORGANIZED HEALTH CARE EDUCATION/TRAINING PROGRAM

## 2020-03-02 PROCEDURE — 25000132 ZZH RX MED GY IP 250 OP 250 PS 637: Performed by: HOSPITALIST

## 2020-03-02 PROCEDURE — 99232 SBSQ HOSP IP/OBS MODERATE 35: CPT | Performed by: HOSPITALIST

## 2020-03-02 PROCEDURE — 80069 RENAL FUNCTION PANEL: CPT | Performed by: STUDENT IN AN ORGANIZED HEALTH CARE EDUCATION/TRAINING PROGRAM

## 2020-03-02 PROCEDURE — 83735 ASSAY OF MAGNESIUM: CPT | Performed by: STUDENT IN AN ORGANIZED HEALTH CARE EDUCATION/TRAINING PROGRAM

## 2020-03-02 PROCEDURE — 90937 HEMODIALYSIS REPEATED EVAL: CPT

## 2020-03-02 PROCEDURE — 84155 ASSAY OF PROTEIN SERUM: CPT | Performed by: STUDENT IN AN ORGANIZED HEALTH CARE EDUCATION/TRAINING PROGRAM

## 2020-03-02 PROCEDURE — 85027 COMPLETE CBC AUTOMATED: CPT | Performed by: STUDENT IN AN ORGANIZED HEALTH CARE EDUCATION/TRAINING PROGRAM

## 2020-03-02 PROCEDURE — 84450 TRANSFERASE (AST) (SGOT): CPT | Performed by: STUDENT IN AN ORGANIZED HEALTH CARE EDUCATION/TRAINING PROGRAM

## 2020-03-02 PROCEDURE — 12000000 ZZH R&B MED SURG/OB

## 2020-03-02 RX ORDER — LANOLIN ALCOHOL/MO/W.PET/CERES
100 CREAM (GRAM) TOPICAL DAILY
Status: DISCONTINUED | OUTPATIENT
Start: 2020-03-03 | End: 2020-03-05 | Stop reason: HOSPADM

## 2020-03-02 RX ORDER — LANOLIN ALCOHOL/MO/W.PET/CERES
100 CREAM (GRAM) TOPICAL DAILY
Status: DISCONTINUED | OUTPATIENT
Start: 2020-03-02 | End: 2020-03-02

## 2020-03-02 RX ORDER — METRONIDAZOLE 500 MG/1
500 TABLET ORAL 4 TIMES DAILY
Status: DISCONTINUED | OUTPATIENT
Start: 2020-03-02 | End: 2020-03-05 | Stop reason: HOSPADM

## 2020-03-02 RX ADMIN — DIPHENHYDRAMINE HYDROCHLORIDE 25 MG: 25 CAPSULE ORAL at 20:42

## 2020-03-02 RX ADMIN — OXYCODONE HYDROCHLORIDE 2.5 MG: 5 TABLET ORAL at 17:19

## 2020-03-02 RX ADMIN — METRONIDAZOLE 500 MG: 500 TABLET ORAL at 17:19

## 2020-03-02 RX ADMIN — THIAMINE HYDROCHLORIDE 100 MG: 100 INJECTION, SOLUTION INTRAMUSCULAR; INTRAVENOUS at 12:27

## 2020-03-02 RX ADMIN — RIFAXIMIN 550 MG: 550 TABLET ORAL at 12:24

## 2020-03-02 RX ADMIN — SODIUM CHLORIDE 300 ML: 9 INJECTION, SOLUTION INTRAVENOUS at 09:29

## 2020-03-02 RX ADMIN — METRONIDAZOLE 500 MG: 500 TABLET ORAL at 12:24

## 2020-03-02 RX ADMIN — METRONIDAZOLE 500 MG: 500 TABLET ORAL at 21:17

## 2020-03-02 RX ADMIN — RIFAXIMIN 550 MG: 550 TABLET ORAL at 21:17

## 2020-03-02 RX ADMIN — SODIUM CHLORIDE 250 ML: 9 INJECTION, SOLUTION INTRAVENOUS at 09:30

## 2020-03-02 RX ADMIN — Medication: at 09:29

## 2020-03-02 RX ADMIN — Medication 1 LOZENGE: at 03:33

## 2020-03-02 RX ADMIN — FOLIC ACID 1 MG: 1 TABLET ORAL at 12:25

## 2020-03-02 RX ADMIN — OXYCODONE HYDROCHLORIDE 2.5 MG: 5 TABLET ORAL at 23:24

## 2020-03-02 ASSESSMENT — ACTIVITIES OF DAILY LIVING (ADL)
ADLS_ACUITY_SCORE: 12

## 2020-03-02 ASSESSMENT — MIFFLIN-ST. JEOR: SCORE: 1925.64

## 2020-03-02 NOTE — PROGRESS NOTES
[facility]    Hospitalist Progress Note  Interval History   Doing better w/ lots of more energy. Father at the bedside was really concern about her discharge which I spent lots of time to explain the discharge purpose including pt meeting PT/OT, HD as outpatient, and to follow up liver enzyme as outpatient.    All other review of systems are negative.    Assessment & Plan   Pat Delgado is a 29 year old female who was admitted on 2/9/2020. GI consulted for severe Etoh hepatitis which currently LFT stable and mental status improved but ESRD on HD    Problem: Etoh hepatitis:  Outpatient follow up   -  Explained to pt and father that LFT may not get normal months after the attack hence no need to stay in the hospital for this    -  Will recommend discharge w/ rifaximin 550mg BID and lactulose to titrate to 2 BM per day  -  To finish current Abx course and follow up in clinic    Code Status: Full Code    -Data reviewed today: I reviewed all new labs and imaging results over the last 24 hours.     Physical Exam   Temp: 98  F (36.7  C) Temp src: Oral BP: 100/59   Heart Rate: 104 Resp: 18 SpO2: 98 % O2 Device: None (Room air)    Vitals:    02/26/20 0508 02/28/20 0639 03/02/20 0637   Weight: 126.1 kg (278 lb) 119.3 kg (262 lb 14.4 oz) 115.2 kg (254 lb)     Vital Signs with Ranges  Temp:  [97.7  F (36.5  C)-98.6  F (37  C)] 98  F (36.7  C)  Heart Rate:  [] 104  Resp:  [18] 18  BP: (100-150)/(50-86) 100/59  SpO2:  [97 %-99 %] 98 %  I/O last 3 completed shifts:  In: 0   Out: 4000 [Other:4000]    Constitutional: Awake, alert, cooperative, no apparent distress, grossly jaundice  Respiratory: Clear to auscultation bilaterally, no crackles or wheezing  Cardiovascular: Regular rate and rhythm, normal S1 and S2, and no murmur noted  GI: Normal bowel sounds, soft, non-distended, non-tender  Skin/Integumen: No rashes, no cyanosis, no edema  Other:     Aida Winter MD  ([facility]  Office: [phone]  Cell: [phone], please feel free to call the cell    Medications     dextrose         - MEDICATION INSTRUCTIONS for Dialysis Patients -   Does not apply See Admin Instructions     folic acid  1 mg Oral Daily     metroNIDAZOLE  500 mg Oral 4x Daily     metroNIDAZOLE  500 mg Oral 4x Daily     polyethylene glycol  17 g Oral Daily     psyllium  1 packet Oral Daily     rifaximin  550 mg Oral BID     sodium chloride (PF)  10 mL Intracatheter Q7 Days     sodium chloride (PF)  10 mL Intracatheter Q8H     [START ON 3/3/2020] vitamin B1  100 mg Oral Daily       Data   Recent Labs   Lab 03/02/20  0631 03/01/20  0633 02/29/20  0550   WBC 19.6* 17.1* 17.4*   HGB 7.9* 8.0* 6.7*   MCV 96 97 101*    290 255   INR 1.84* 1.80* 1.84*   * 131* 135   POTASSIUM 5.1 4.9 4.4   CHLORIDE 97 95 99   CO2 26 25 29   BUN 50* 36* 27   CR 5.96* 4.92* 3.63*   ANIONGAP 8 11 7   EMILY 9.8 9.7 9.2   * 83 88   ALBUMIN 2.8* 2.8* 2.7*   PROTTOTAL 6.6* 6.5* 6.5*   BILITOTAL 17.9* 18.8* 17.1*   ALKPHOS 123 121 112   ALT 32 38 40   * 166* 174*       No results found for this or any previous visit (from the past 24 hour(s)).     Aida Winter MD (Richard)  [facility]  Office: [phone]  Cell: [phone]

## 2020-03-02 NOTE — PROGRESS NOTES
Potassium   Date Value Ref Range Status   03/02/2020 5.1 3.4 - 5.3 mmol/L Final     Comment:     Specimen slightly hemolyzed, potassium may be falsely elevated     Hemoglobin   Date Value Ref Range Status   03/02/2020 7.9 (L) 11.7 - 15.7 g/dL Final     Creatinine   Date Value Ref Range Status   03/02/2020 5.96 (H) 0.52 - 1.04 mg/dL Final     Urea Nitrogen   Date Value Ref Range Status   03/02/2020 50 (H) 7 - 30 mg/dL Final     Sodium   Date Value Ref Range Status   03/02/2020 131 (L) 133 - 144 mmol/L Final     INR   Date Value Ref Range Status   03/02/2020 1.84 (H) 0.86 - 1.14 Final       DIALYSIS PROCEDURE NOTE  Hepatitis status of previous patient on machine log was checked and verified ok to use with this patients hepatitis status.  Patient dialyzed for 3.5 hrs. on a 2 K bath with a net fluid removal of  4L.  A BFR of 400 ml/min was obtained via a RIJ.    The treatment plan was dicussed with Dr. Bronson during the treatment.  Total heparin received during the treatment: 0 units.   .  Line flushed, clamped and capped with heparin 1:1000 1.9 mL (1900 units) per lumen  Meds  given: none Complications: none    Procedure education provided orally to patient, patient verbalized understanding  ICEBOAT? Timeout performed pre-treatment  I: Patient was identified using 2 identifiers  C: Consent obtained/verified current before treatment  E: Equipment preventative maintenance is current and dialysis delivery system OK to use  B: Hepatitis B Surface Antigen: nonreactive; Draw Date: 2/23/20      Hepatitis B Surface Antibody: immune; Draw Date: 2/23/20  O: Dialysis orders present and complete prior to treatment  A: Vascular access verified and assessed prior to treatment  T: Treatment was performed at a clinically appropriate time  ?: Patient was allowed to ask questions and address concerns prior to treatment  See flowsheet in EPIC for further details and post assessment.  Machine water alarm in place and functioning. Transducer  pods intact and checked every 15min.  Pt returned via wheelchair  Report received from: Nancy Carranza Rn  Report given to: Nancy Carranza RN  Chlorine/Chloramine water system checked every 4 hours.

## 2020-03-02 NOTE — PLAN OF CARE
DATE & TIME: 3/2/2020 AM shift            Cognitive Concerns/ Orientation : A&Ox4   BEHAVIOR & AGGRESSION TOOL COLOR: green  CIWA SCORE: na      ABNL VS/O2: VSS on RA  MOBILITY: independent in room with walker, SBA in halls   PAIN MANAGMENT: denies pain  DIET: regular, tolerating   BOWEL/BLADDER: continent of B/B   ABNL LAB/BG: Bilirubin Total 17.9; Crea 5.96 trending up; Hgb 7.9.   DRAIN/DEVICES: R PICC SL ; R Chest port for dialysis   TELEMETRY RHYTHM: na  SKIN: jaundice, yellow sclera. Acne on face.  TESTS/PROCEDURES: dialysis today, tolerated it well. 4 L was taken off.   D/C DAY/GOALS/PLACE:TBD, TCU recommended by PT.   OTHER IMPORTANT INFO: SW following. Lymphedema wraps ordered by pt has been having hard time tolerating it. OT is following.

## 2020-03-02 NOTE — PROGRESS NOTES
" Renal Medicine Progress Note            Assessment/Plan:     29 y.o woman with alcoholic hepatitis, following for acute kidney injury.     # Patient with baseline Scr ~ 0.3-0.5 mg/dl.     # Acute kidney injury: Presume multifactorial ( ATN, HRS-1, contrast and septicemia).    -dialysis dependent    # Sepsis with fusobacterium: Resolved.    -on Flagyl    # Alcoholic hepatitis:   -manage per GI    # Hypervolemia. 2+ pitting edema.     # Anemia    Plan:  # 3.5 hrs HD, UF 4 liters, no heparin, RIJ tunneled, Qb 400, 2K  # Start midodrine 10 mg tid  # Coordinator to help with outpatient dialysis placement        Interval History:     Patient is getting hemodialysis. No particular complaints. She denies SOB, N/V. She is anuric. No abdominal pain.           Medications and Allergies:       - MEDICATION INSTRUCTIONS for Dialysis Patients -   Does not apply See Admin Instructions     sodium chloride 0.9%  250 mL Intravenous Once in dialysis     folic acid  1 mg Oral Daily     metroNIDAZOLE  500 mg Oral 4x Daily     polyethylene glycol  17 g Oral Daily     psyllium  1 packet Oral Daily     rifaximin  550 mg Oral BID     sodium chloride (PF)  10 mL Intracatheter Q7 Days     sodium chloride (PF)  10 mL Intracatheter Q8H     thiamine  100 mg Intravenous Daily        Allergies   Allergen Reactions     Penicillins Unknown     Hives              Physical Exam:   Vitals were reviewed  Heart Rate: 98, Blood pressure 128/70, pulse 106, temperature 97.7  F (36.5  C), temperature source Oral, resp. rate 18, height 1.727 m (5' 8\"), weight 115.2 kg (254 lb), SpO2 99 %, not currently breastfeeding.    Wt Readings from Last 3 Encounters:   03/02/20 115.2 kg (254 lb)   02/09/15 95.3 kg (210 lb)   01/22/15 108.4 kg (239 lb)     No intake or output data in the 24 hours ending 03/02/20 0929    GENERAL APPEARANCE: NAD  HEENT:  EOMI. PERR. Scler icterus  RESP: lungs cta b c good efforts, no crackles, rhonchi or wheezes  CV: RRR, nl S1/S2, no " m/r/g   ABDOMEN: obese, soft, NT  EXTREMITIES/SKIN: Jaundice, 2+ edema  Neuro: a/o x3, nonfocal.   RIJ tunneled CVC cdi         Data:     CBC RESULTS:     Recent Labs   Lab 03/02/20  0631 03/01/20  0633 02/29/20  0550 02/28/20  0627 02/27/20  0515 02/26/20  0550   WBC 19.6* 17.1* 17.4* 17.4* 18.6* 21.1*   RBC 2.56* 2.54* 2.07* 2.11* 2.40* 2.34*   HGB 7.9* 8.0* 6.7* 7.0* 7.8* 7.7*   HCT 24.6* 24.7* 20.9* 21.3* 24.4* 23.6*    290 255 225 197 178       Basic Metabolic Panel:  Recent Labs   Lab 03/02/20  0631 03/01/20  0633 02/29/20  0550 02/28/20  0627 02/27/20  0515 02/26/20  0550   * 131* 135 135 134 131*   POTASSIUM 5.1 4.9 4.4 4.5 4.0 3.6   CHLORIDE 97 95 99 98 97 97   CO2 26 25 29 28 29 25   BUN 50* 36* 27 43* 30 44*   CR 5.96* 4.92* 3.63* 4.71* 3.42* 4.31*   * 83 88 82 93 96   EMILY 9.8 9.7 9.2 9.4 8.7 9.0       INR  Recent Labs   Lab 03/02/20  0631 03/01/20  0633 02/29/20  0550 02/28/20  0627   INR 1.84* 1.80* 1.84* 1.76*      Attestation:   I have reviewed today's relevant vital signs, notes, medications, labs and imaging.    Brian Bronson MD  [facility] - Nephrology  Office phone :[phone]  Pager: [phone]

## 2020-03-02 NOTE — PROGRESS NOTES
CLINICAL NUTRITION SERVICES - REASSESSMENT NOTE    Recommendations Ordered by Registered Dietitian (RD):   - PRN smoothie supplements    Malnutrition:(2/21)  % Weight Loss: Unable to assess  % Intake:  </= 50% for >/= 5 days (severe malnutrition)  Subcutaneous Fat Loss: None observed  Muscle Loss: Temporal region mild depletion  Fluid Retention: Moderate-Severe 3-4+     Malnutrition Diagnosis: Non-Severe malnutrition  In Context of:  Acute illness or injury  Environmental or social circumstances     EVALUATION OF PROGRESS TOWARD GOALS   Diet: Regular  Intake/Tolerance:   - Appetite is improving, however Pat is not fond of the food here. She tolerated breakfast fine (bagel, yogurt, fruit, milk) but was unable to eat much of her lunch. Her dad is bringing in tacos from [employer] for dinner. Flowsheet shows 100% intake for at least 3 meals over the past ~2 days.     ASSESSED NUTRITION NEEDS:  Dosing Weight: 79 kg - adjusted for overweight  Estimated Energy Needs: 5153-9411 kcals (20-25 Kcal/Kg)  Justification:  Overweight  Estimated Protein Needs:  grams protein (1.2-1.5 g pro/Kg)  Justification: HD, repletion    NEW FINDINGS:   - Weight trending: 15# down since admission, likely majority fluid. Pt had weight gain PTA with fluid accumulation.   Vitals:    02/09/20 1900 02/10/20 0440 02/11/20 0605 02/12/20 0639   Weight: 122 kg (268 lb 15.4 oz) 123.9 kg (273 lb 3.2 oz) 126.9 kg (279 lb 12.8 oz) 129.2 kg (284 lb 13.4 oz)    02/13/20 0835 02/14/20 0050 02/15/20 0500 02/16/20 1500   Weight: 129.8 kg (286 lb 3.2 oz) 129.8 kg (286 lb 3.2 oz) 132.2 kg (291 lb 6.4 oz) 133.9 kg (295 lb 3.2 oz)    02/17/20 0456 02/18/20 0634 02/19/20 0400 02/20/20 0650   Weight: 133 kg (293 lb 4.8 oz) 129.5 kg (285 lb 8 oz) 129.3 kg (285 lb) 129.4 kg (285 lb 4.8 oz)    02/21/20 0452 02/21/20 1745 02/22/20 0020 02/22/20 1000   Weight: 129.7 kg (286 lb) 128.6 kg (283 lb 8.2 oz) 129.8 kg (286 lb 2.5 oz) 129.8 kg (286 lb 2.5 oz)     02/23/20 0137 02/24/20 0148 02/24/20 0800 02/25/20 0358   Weight: 126.7 kg (279 lb 5.2 oz) 128.2 kg (282 lb 10.1 oz) 128.2 kg (282 lb 10.1 oz) 123.5 kg (272 lb 4.3 oz)    02/26/20 0508 02/28/20 0639 03/02/20 0637   Weight: 126.1 kg (278 lb) 119.3 kg (262 lb 14.4 oz) 115.2 kg (254 lb)     Previous Goals:   Pt to consume 50-75% of meals.   Evaluation: Met    Previous Nutrition Diagnosis:   Inadequate oral intake related to recent advancement of diet from Enteral Nutrition as evidenced by diet advanced yesterday and pt intake of 25% of one meal since TF removed.   Evaluation: completed    CURRENT NUTRITION DIAGNOSIS  Predicted inadequate nutrient intake (Protein/Calorie) related to improving appetite post extubation on 2/24 with prior diet restrictions limiting choices, now with reported improved appetite.     INTERVENTIONS  Recommendations / Nutrition Prescription  Regular diet    PRN smoothies     Implementation  Medical Food Supplement - as above    Goals  Intake of at least 75% meals BID.     MONITORING AND EVALUATION:  Progress towards goals will be monitored and evaluated per protocol and Practice Guidelines    Nicole Larios RD, LD  [facility]   Pager: [phone]  Weekend Pager: [phone]

## 2020-03-02 NOTE — PLAN OF CARE
"PT/Lymph:  Discharge Planner PT   Patient plan for discharge: Return to her apartment or stay with family  Current status: Pt has not tolerated lymph wraps due to pain and states her \"feet go numb.\" Attempted to use tubigrip for lighter compression and pt did not tolerate this amount of compression either. Pt was observed to be independent with all mobility and is steady on her feet. Pt states she is very tired from a long dialysis run.  Barriers to return to prior living situation: Multiple stairs to climb if elevator is not functioning (which happens frequently per pt)  Recommendations for discharge: Return home with OPPT  Rationale for recommendations: Pt is moving well but is below her baseline mobility status as she was active prior to admit and working. Would benefit from OPPT for improvement in general strengthening, higher level balance.       Entered by: Leelee Gilmore 03/02/2020 2:29 PM       "

## 2020-03-02 NOTE — PROGRESS NOTES
[facility]  Medicine Progress Note - Hospitalist Service       Date of Admission:  2/9/2020  Assessment & Plan    Pat Delgado is a 29 year old female history of alcohol abuse, anxiety, depression, ADHD, hypertension, migraine, tobacco abuse and substance abuse who was admitted on 2/9/2020 with  severe alcoholic hepatitis complicated by kidney failure w/ ATN vs type II hepatorenal syndrome, encephalopathy, hypoxic hypercapnic respiratory failure on 2/22/2020 was intubated and transferred to ICU- extubated on 2/24/2020 and transferred to floor 2/25/2020. Currently on HD-creatinine remains elevated.  She also developed bacteremia with fusobacterium nucleatum, with repeat blood culture negative.    Acute liver failure 2/2 Alcoholic hepatitis  Acute metabolic encephalopathy, improved  Acute hypoxic respiratory failure, resolved  Acute alcoholic hepatitis with Maddrey score >50. Minimal ascites, received 5 days of cefepime for SBP prophylaxis. Was initiated on prednisolone, discontinued due to bacteremia. Extubated 2/24 and transferred out of MICU 2/25.  - continue rifaximin 550 BID  - GI following, LFTs stable,    - Continue with daily LFT w/ INR  - 2 gm sodium diet. Miralax.   - was evaluated by psychiatry earlier this stay, will re consult to assess discharge planning, may need commitment given grave medical condition.     Fusobacterium bacteremia, unclear source   - Initially received 5 days of cefepime for SBP ppx, subsequently spiked fever, blood culture showed GNR identified as fusobacterium, pansensitive. IV Meropenem and vancomycin was started, and with identification of organism, Vancomycin discontinued.  - Was on meropenem, developed rash, seems like it was presumed to be related to antibiotic, though appears atypical for drug reaction. Abx transitioned to flagyl. Plan is total 14 days given bacteremia.    - continues to have marked leucocytosis but stable count, afebrile. Suspect due to  hepatitis. Immune to hepatitis B, core ab negative, hep A and C negative.      Multifocal pneumonia/pleural effusion/anasarca: CT chest done in the ICU showed multifocal airspace  - completed Meorpenem for bacteremia as above.  - subsequent CXR showed pulm vasc congestion.   - Fluid removal with HD/UF.  -weaned off O2.      Oliguric renal failure 2/2 ATN and hepato renal syndrome: Multiple other insults including septicemia, IV Contrast ( 2/9 and 2/21) , High vanco levels.   - nephrology following  - tunneled RIJ HD catheter in place  - HD/UF per nephrology, consulted and following.  - Significant lower extremity edema- using lymphedema wraps as well.      GI/Nutrition:  On regular diet at this time.  -Continue supplements including folate and thiamine    Anemia acute on chronic: presented with Hb 10.   -slow decrease then stable around 7-8, no sign of overt bleeding, likely anemia from acute illness.   -GI following, has received PRBC earlier this stay, Hb stable, monitor daily.    Hyponatremia  - Because of renal failure, fluid overload.  -  monitor    Maculopapular rash : face, upper chest and flanks.   - this was presumed to be drug rash due to meropenem per chart review. Rash seems somewhat atypical though.  IV Abx was discontinued and received steroid and benadryl.   - If does not improve, recommend dermatology outpatient.     Diet: Regular Diet Adult  Snacks/Supplements Adult: Other; Smoothie; With Meals    DVT Prophylaxis: Pneumatic Compression Devices  Amado Catheter: not present  Code Status: Full Code      Disposition Plan   Expected discharge: Possibly 2 days, to TBD (? Rehab) , noted Therapy eval, recommending outpatient, but parents worried about her living situation. Will re consult psychiatry as well.     Entered: Emily Aabrca MD 03/02/2020, 3:49 PM       The patient's care was discussed with the Bedside Nurse, Patient and her parents Bebeto and Natasha over the phone.    Emily Abarca,  MD  Hospitalist Service  [facility]    ______________________________________________________________________    Interval History   Afebrile.   Patient was evaluated after HD, felt tired.   Bilateral feet pain due to lymphedema wraps  No nausea, abd pain, dyspnea. Had BM this am. No hematochezia/melena.     Data reviewed today: I reviewed all medications, new labs results over the last 24 hours. I personally reviewed no images or EKG's today.    Physical Exam   Vital Signs: Temp: 98  F (36.7  C) Temp src: Oral BP: 100/59   Heart Rate: 104 Resp: 18 SpO2: 98 % O2 Device: None (Room air)    Weight: 254 lbs 0 oz     General Appearance: Awake, alert, NAD, jaundiced. Obese, anasarca.   HEENT: icteric  Respiratory: CTAB, normal work of breathing.   Cardiovascular: tachycardic  GI: soft, NT, mildly distended vs obese  Skin: jaundice, maculopapular rash on the face, upper chest and flanks   Other: Moving all extremities.  2+ lower extremity edema    Data   Recent Labs   Lab 03/02/20  0631 03/01/20  0633 02/29/20  0550   WBC 19.6* 17.1* 17.4*   HGB 7.9* 8.0* 6.7*   MCV 96 97 101*    290 255   INR 1.84* 1.80* 1.84*   * 131* 135   POTASSIUM 5.1 4.9 4.4   CHLORIDE 97 95 99   CO2 26 25 29   BUN 50* 36* 27   CR 5.96* 4.92* 3.63*   ANIONGAP 8 11 7   EMILY 9.8 9.7 9.2   * 83 88   ALBUMIN 2.8* 2.8* 2.7*   PROTTOTAL 6.6* 6.5* 6.5*   BILITOTAL 17.9* 18.8* 17.1*   ALKPHOS 123 121 112   ALT 32 38 40   * 166* 174*     No results found for this or any previous visit (from the past 24 hour(s)).  Medications     dextrose         - MEDICATION INSTRUCTIONS for Dialysis Patients -   Does not apply See Admin Instructions     folic acid  1 mg Oral Daily     metroNIDAZOLE  500 mg Oral 4x Daily     metroNIDAZOLE  500 mg Oral 4x Daily     polyethylene glycol  17 g Oral Daily     psyllium  1 packet Oral Daily     rifaximin  550 mg Oral BID     sodium chloride (PF)  10 mL Intracatheter Q7 Days     sodium  chloride (PF)  10 mL Intracatheter Q8H     [START ON 3/3/2020] vitamin B1  100 mg Oral Daily

## 2020-03-02 NOTE — PLAN OF CARE
DATE & TIME: 03/01/2020 0841-2661  Cognitive Concerns/ Orientation : A&OX4   BEHAVIOR & AGGRESSION TOOL COLOR: Green but anxious   CIWA SCORE: NA      ABNL VS/O2: VSS on RA  MOBILITY: Independent with walker  PAIN MANAGMENT: bilat foot pain- lymphedema wraps removed.   DIET: Regular   BOWEL/BLADDER: Continent B/B.  ABNL LAB/BG: Bilirubin 18.8,   DRAIN/DEVICES: R PICC SL and R Port for dialysis  TELEMETRY RHYTHM: NA  SKIN: Jaundice/yellow sclera. bruising on neck, rash on face, body is red/warm with benedryl PRN available  TESTS/PROCEDURES: plan for dialysis astrid. 3/2.   D/C DAY/GOALS/PLACE: Discharge pending depending on patient status, possibly rehab  OTHER IMPORTANT INFO: SW following. Lymphedema wraps removed due to bilat LE pain.

## 2020-03-02 NOTE — PROVIDER NOTIFICATION
MD Notification    Notified Person: MD    Notified Person Name: Tevin    Notification Date/Time: 3/2/2020 0725    Notification Interaction: web-paged    Purpose of Notification: FYI Bilirubin down to 17.9.    Orders Received:    Comments:

## 2020-03-02 NOTE — PROGRESS NOTES
DATE & TIME: 3/2/2020 4347-2390    Cognitive Concerns/ Orientation : A&Ox4   BEHAVIOR & AGGRESSION TOOL COLOR: green  CIWA SCORE: na   ABNL VS/O2: VSS on RA  MOBILITY: independent in room with walker, SBA in halls   PAIN MANAGMENT: denied on shift   DIET: regular, tolerating   BOWEL/BLADDER: continent of B/B   ABNL LAB/BG: Bilirubin 18.8 Crea 4.92 trending up Hgb 8  DRAIN/DEVICES: R PICC SL R Chest port for dialysis   TELEMETRY RHYTHM: na  SKIN: jaundice, yellow sclera.Rash on face, body is warm and red. Pt had complaints of itching on previous shift - PRN benedryl available.   TESTS/PROCEDURES: likely having dialysis today 3/2  D/C DAY/GOALS/PLACE: 3-4 days likely needing rehab per MD note   OTHER IMPORTANT INFO: SW following. Lymphedema wraps continuing to be taken off due to pt not tolerating.

## 2020-03-03 ENCOUNTER — APPOINTMENT (OUTPATIENT)
Dept: OCCUPATIONAL THERAPY | Facility: CLINIC | Age: 30
DRG: 871 | End: 2020-03-03
Payer: COMMERCIAL

## 2020-03-03 ENCOUNTER — APPOINTMENT (OUTPATIENT)
Dept: GENERAL RADIOLOGY | Facility: CLINIC | Age: 30
DRG: 871 | End: 2020-03-03
Attending: HOSPITALIST
Payer: COMMERCIAL

## 2020-03-03 LAB
ALBUMIN SERPL-MCNC: 2.7 G/DL (ref 3.4–5)
ALP SERPL-CCNC: 120 U/L (ref 40–150)
ALT SERPL W P-5'-P-CCNC: 31 U/L (ref 0–50)
ANION GAP SERPL CALCULATED.3IONS-SCNC: 6 MMOL/L (ref 3–14)
AST SERPL W P-5'-P-CCNC: 159 U/L (ref 0–45)
BILIRUB DIRECT SERPL-MCNC: 14.2 MG/DL (ref 0–0.2)
BILIRUB SERPL-MCNC: 17.3 MG/DL (ref 0.2–1.3)
BUN SERPL-MCNC: 31 MG/DL (ref 7–30)
CALCIUM SERPL-MCNC: 9.3 MG/DL (ref 8.5–10.1)
CHLORIDE SERPL-SCNC: 99 MMOL/L (ref 94–109)
CO2 SERPL-SCNC: 29 MMOL/L (ref 20–32)
CREAT SERPL-MCNC: 4.32 MG/DL (ref 0.52–1.04)
ERYTHROCYTE [DISTWIDTH] IN BLOOD BY AUTOMATED COUNT: 21.2 % (ref 10–15)
GFR SERPL CREATININE-BSD FRML MDRD: 13 ML/MIN/{1.73_M2}
GLUCOSE SERPL-MCNC: 88 MG/DL (ref 70–99)
HCT VFR BLD AUTO: 22.8 % (ref 35–47)
HGB BLD-MCNC: 7.4 G/DL (ref 11.7–15.7)
INR PPP: 1.83 (ref 0.86–1.14)
MAGNESIUM SERPL-MCNC: 1.7 MG/DL (ref 1.6–2.3)
MCH RBC QN AUTO: 31.5 PG (ref 26.5–33)
MCHC RBC AUTO-ENTMCNC: 32.5 G/DL (ref 31.5–36.5)
MCV RBC AUTO: 97 FL (ref 78–100)
PHOSPHATE SERPL-MCNC: 3.9 MG/DL (ref 2.5–4.5)
PLATELET # BLD AUTO: 286 10E9/L (ref 150–450)
POTASSIUM SERPL-SCNC: 3.9 MMOL/L (ref 3.4–5.3)
PROT SERPL-MCNC: 6.6 G/DL (ref 6.8–8.8)
RBC # BLD AUTO: 2.35 10E12/L (ref 3.8–5.2)
SODIUM SERPL-SCNC: 134 MMOL/L (ref 133–144)
WBC # BLD AUTO: 15.6 10E9/L (ref 4–11)

## 2020-03-03 PROCEDURE — 83735 ASSAY OF MAGNESIUM: CPT | Performed by: HOSPITALIST

## 2020-03-03 PROCEDURE — 99232 SBSQ HOSP IP/OBS MODERATE 35: CPT | Performed by: HOSPITALIST

## 2020-03-03 PROCEDURE — 85027 COMPLETE CBC AUTOMATED: CPT | Performed by: HOSPITALIST

## 2020-03-03 PROCEDURE — 82247 BILIRUBIN TOTAL: CPT | Performed by: HOSPITALIST

## 2020-03-03 PROCEDURE — 25000132 ZZH RX MED GY IP 250 OP 250 PS 637: Performed by: INTERNAL MEDICINE

## 2020-03-03 PROCEDURE — 25000132 ZZH RX MED GY IP 250 OP 250 PS 637: Performed by: HOSPITALIST

## 2020-03-03 PROCEDURE — 97530 THERAPEUTIC ACTIVITIES: CPT | Mod: GO | Performed by: OCCUPATIONAL THERAPY ASSISTANT

## 2020-03-03 PROCEDURE — 99232 SBSQ HOSP IP/OBS MODERATE 35: CPT | Performed by: PSYCHIATRY & NEUROLOGY

## 2020-03-03 PROCEDURE — 84450 TRANSFERASE (AST) (SGOT): CPT | Performed by: HOSPITALIST

## 2020-03-03 PROCEDURE — 85610 PROTHROMBIN TIME: CPT | Performed by: HOSPITALIST

## 2020-03-03 PROCEDURE — 71046 X-RAY EXAM CHEST 2 VIEWS: CPT

## 2020-03-03 PROCEDURE — 97535 SELF CARE MNGMENT TRAINING: CPT | Mod: GO | Performed by: OCCUPATIONAL THERAPY ASSISTANT

## 2020-03-03 PROCEDURE — 82248 BILIRUBIN DIRECT: CPT | Performed by: HOSPITALIST

## 2020-03-03 PROCEDURE — 84460 ALANINE AMINO (ALT) (SGPT): CPT | Performed by: HOSPITALIST

## 2020-03-03 PROCEDURE — 40000239 ZZH STATISTIC VAT ROUNDS

## 2020-03-03 PROCEDURE — 12000000 ZZH R&B MED SURG/OB

## 2020-03-03 PROCEDURE — 80069 RENAL FUNCTION PANEL: CPT | Performed by: HOSPITALIST

## 2020-03-03 PROCEDURE — 84155 ASSAY OF PROTEIN SERUM: CPT | Performed by: HOSPITALIST

## 2020-03-03 PROCEDURE — 84075 ASSAY ALKALINE PHOSPHATASE: CPT | Performed by: HOSPITALIST

## 2020-03-03 RX ORDER — LACTULOSE 10 G/15ML
10 SOLUTION ORAL 2 TIMES DAILY
Status: DISCONTINUED | OUTPATIENT
Start: 2020-03-03 | End: 2020-03-05 | Stop reason: HOSPADM

## 2020-03-03 RX ORDER — SIMETHICONE 80 MG
80 TABLET,CHEWABLE ORAL EVERY 6 HOURS PRN
Status: DISCONTINUED | OUTPATIENT
Start: 2020-03-03 | End: 2020-03-05 | Stop reason: HOSPADM

## 2020-03-03 RX ORDER — MIDODRINE HYDROCHLORIDE 5 MG/1
10 TABLET ORAL
Status: DISCONTINUED | OUTPATIENT
Start: 2020-03-03 | End: 2020-03-05 | Stop reason: HOSPADM

## 2020-03-03 RX ADMIN — FOLIC ACID 1 MG: 1 TABLET ORAL at 08:06

## 2020-03-03 RX ADMIN — SIMETHICONE CHEW TAB 80 MG 80 MG: 80 TABLET ORAL at 12:51

## 2020-03-03 RX ADMIN — DIPHENHYDRAMINE HYDROCHLORIDE AND ZINC ACETATE: 10; 1 CREAM TOPICAL at 10:36

## 2020-03-03 RX ADMIN — METRONIDAZOLE 500 MG: 500 TABLET ORAL at 12:51

## 2020-03-03 RX ADMIN — LACTULOSE 10 G: 10 SOLUTION ORAL at 21:10

## 2020-03-03 RX ADMIN — METRONIDAZOLE 500 MG: 500 TABLET ORAL at 21:10

## 2020-03-03 RX ADMIN — DIPHENHYDRAMINE HYDROCHLORIDE 25 MG: 25 CAPSULE ORAL at 02:45

## 2020-03-03 RX ADMIN — THIAMINE HCL TAB 100 MG 100 MG: 100 TAB at 08:06

## 2020-03-03 RX ADMIN — LACTULOSE 10 G: 10 SOLUTION ORAL at 10:36

## 2020-03-03 RX ADMIN — MIDODRINE HYDROCHLORIDE 10 MG: 5 TABLET ORAL at 18:03

## 2020-03-03 RX ADMIN — MIDODRINE HYDROCHLORIDE 10 MG: 5 TABLET ORAL at 12:51

## 2020-03-03 RX ADMIN — RIFAXIMIN 550 MG: 550 TABLET ORAL at 10:36

## 2020-03-03 RX ADMIN — METRONIDAZOLE 500 MG: 500 TABLET ORAL at 08:06

## 2020-03-03 RX ADMIN — RIFAXIMIN 550 MG: 550 TABLET ORAL at 21:10

## 2020-03-03 RX ADMIN — OXYCODONE HYDROCHLORIDE 2.5 MG: 5 TABLET ORAL at 21:10

## 2020-03-03 RX ADMIN — METRONIDAZOLE 500 MG: 500 TABLET ORAL at 18:03

## 2020-03-03 ASSESSMENT — ACTIVITIES OF DAILY LIVING (ADL)
ADLS_ACUITY_SCORE: 13

## 2020-03-03 ASSESSMENT — MIFFLIN-ST. JEOR: SCORE: 1897.97

## 2020-03-03 NOTE — CONSULTS
Care Transition Initial Assessment - RN        Met with: Patient again today and conversed with pt and her father yesterday afternoon  DATA   Active Problems:    Alcoholic hepatitis    Acute kidney failure with tubular necrosis (H)       Cognitive Status: awake, alert and oriented.        Contact information and PCP information verified: Yes  Lives With: significant other(works nights)   Living Arrangements: apartment     Insurance concerns: No Insurance issues identified  ASSESSMENT  Patient currently receives the following services:  NA     Identified issues/concerns regarding health management: Pt has improved to a point she no longer is requiring TCU placement.  Yesterday, her father was somewhat frustrated when writer told her father that with steady improvement, that therapies were recommending outpt.  He is looking for extra support for his daughter and this is very understandable, given the circumstances.  -Pt does not have a PCP, so will work on this tomorrow.      -Have confirmation of outpt dialysis at [facility] at 12:30PM   Pt is thinking her boyfriend that she lives with, will be able to provide transportation to  dialysis    -Pt was started on Lactulose that will most likely prove to be problematic on dialysis days.    Hopefully she will be able to take this early and then after dialysis.    -Psychiatry has seen, please see their recommendations    -Awaiting further discussion with CD concerning outpt CD    Pt will discuss further with her parents this law.          Care  (CTS) will continue to follow as needed.

## 2020-03-03 NOTE — PLAN OF CARE
DATE & TIME: 3/3/2020 AM shift            Cognitive Concerns/ Orientation : A&Ox4   BEHAVIOR & AGGRESSION TOOL COLOR: green  CIWA SCORE: na      ABNL VS/O2: VSS on RA, started on Midodrine this afternoon per nephrology recommendation.   MOBILITY: independent in room, ambulated in the casillas x 1 with OT.   PAIN MANAGMENT: denies pain  DIET: regular, tolerating   BOWEL/BLADDER: continent of B/B. Reports one BM today, was started on lactulose per GI, goal two BM s per day.   ABNL LAB/BG: Bilirubin Total 17.3; Crea 4.32; Hgb 7.4; WBC 15.6 trending down.  DRAIN/DEVICES: R PICC SL ; R Chest port for dialysis   TELEMETRY RHYTHM: na  SKIN: jaundice, yellow sclera. Acne on face. Pt thinks that she is allergic to hospital sheets/detergent, benadryl topical used by pt for face itching.   TESTS/PROCEDURES: n/a  D/C DAY/GOALS/PLACE:TBD, awaiting psych eval. OT recommends home with the help from pt's boyfriend. PT is following.   OTHER IMPORTANT INFO:naty LE edema +2/+3, unable to tolerate lymph wraps.

## 2020-03-03 NOTE — PLAN OF CARE
DATE & TIME: 3/2/2020 law       Cognitive Concerns/ Orientation : A&Ox4   BEHAVIOR & AGGRESSION TOOL COLOR: green, calm/coop  ABNL VS/O2: VSS on RA  MOBILITY: independent in room with walker, SBA in halls   PAIN MANAGMENT: c/o naty leg/feet pain-medicated with Oxy #1  DIET: regular, good po  BOWEL/BLADDER: continent of B/B   ABNL LAB/BG: Bilirubin Total 17.9; Crea 5.96 trending up; Hgb 7.9, K-5.1   DRAIN/DEVICES: R PICC SL ; R Chest port for dialysis   TELEMETRY RHYTHM: na  SKIN: jaundiced/dry, yellow sclera  TESTS/PROCEDURES: s/p dialysis today- tolerated it well  D/C DAY/GOALS/PLACE:TBD, TCU recommended by PT  OTHER IMPORTANT INFO: PT/OT/SW following for safe dispo, lymphedema wraps off-pt declined, Benadryl #1 given for itching, pt's father updated with POC.

## 2020-03-03 NOTE — PROGRESS NOTES
DATE & TIME: 3/2/2020 7135-0797     Cognitive Concerns/ Orientation : A&Ox4   BEHAVIOR & AGGRESSION TOOL COLOR: green, calm/coop  ABNL VS/O2: VSS on RA  MOBILITY: independent in room with walker, SBA in halls   PAIN MANAGMENT: c/o R foot pain-medicated with Oxy #1  DIET: regular, good po  BOWEL/BLADDER: continent of B/B   ABNL LAB/BG: Bilirubin Total 17.9; Crea 5.96 trending up; Hgb 7.9, K-5.1   DRAIN/DEVICES: R PICC SL ; R Chest port for dialysis   TELEMETRY RHYTHM: na  SKIN: jaundiced/dry, yellow sclera  TESTS/PROCEDURES: s/p dialysis today- tolerated it well  D/C DAY/GOALS/PLACE:TBD, TCU recommended by PT  OTHER IMPORTANT INFO: PT/OT/SW following for safe dispo, lymphedema wraps off-pt declined, Benadryl #1 given for itching.

## 2020-03-03 NOTE — PLAN OF CARE
Discharge Planner OT   Patient plan for discharge: home  Current status: Pt with nursing at time of arrival, pleasant and agreeable to working with therapy. Pt completed FB dressing, pt was able to doff pants, shirt, bra with supervision while standing stating she stands to dress majority of the time. Pt able to don underwear, pants and socks independent while seated, with supervision while standing to pull pants up, pt able to don bra and shirt with supervision while standing. Pt completed g/h tasks at EOS with supervision for safety and balance, pt amb in the hallway with no assistive device for 200' with SBA, pt demo SOB during tasks but denying feeling SOB.   Barriers to return to prior living situation: deconditioning, increased level of A for ADLs/IADLs, medical status  Recommendations for discharge: After collaboration with the OTR, recommending home with assist from boyfriend   Rationale for recommendations: Pt is making good progress while in this setting, after collaboration with OTR will decrease frequency to 3x/week to keep progressing endurance/strength for ADL/IADLs.        Entered by: Lizzeth Hidalgo 03/03/2020 12:45 PM

## 2020-03-03 NOTE — PROGRESS NOTES
[facility]  Medicine Progress Note - Hospitalist Service       Date of Admission:  2/9/2020  Assessment & Plan    Pat Delgado is a 29 year old female history of alcohol abuse, anxiety, depression, ADHD, hypertension, migraine, tobacco abuse and substance abuse who was admitted on 2/9/2020 with  severe alcoholic hepatitis complicated by kidney failure w/ ATN vs type II hepatorenal syndrome, encephalopathy, hypoxic hypercapnic respiratory failure on 2/22/2020 was intubated and transferred to ICU- extubated on 2/24/2020 and transferred to floor 2/25/2020. Currently on HD-creatinine remains elevated.  She also developed bacteremia with fusobacterium nucleatum, with repeat blood culture negative.    Acute liver failure 2/2 Alcoholic hepatitis  Acute metabolic encephalopathy, improved  Acute hypoxic respiratory failure, resolved  Acute alcoholic hepatitis with Maddrey score >50. Minimal ascites, received 5 days of cefepime for SBP prophylaxis. Was initiated on prednisolone, discontinued due to bacteremia. Extubated 2/24 and transferred out of MICU 2/25.  - continue rifaximin 550 BID  - GI following, LFTs stable, lactulose added.    - 2 gm sodium diet.    - Was evaluated by psychiatry earlier this stay, re consulted to assess discharge planning, may need commitment given grave medical condition with drinking.     Fusobacterium bacteremia, unclear source.   - Initially received 5 days of cefepime for SBP ppx, subsequently spiked fever, blood culture showed GNR identified as fusobacterium, pansensitive. IV Meropenem and vancomycin were started, and with identification of organism, Vancomycin discontinued.  - Was on meropenem, developed rash, seems like it was presumed allergic reaction to meropenem, though appears atypical for drug reaction. Abx transitioned to flagyl. Plan is total 14 days given bacteremia.    - continues to have marked leucocytosis but stable count/slightly better today, remains  afebrile. Suspect due to hepatitis. Immune to hepatitis B, core ab negative, hep A and C negative.      Multifocal pneumonia/pleural effusion/anasarca: CT chest done in the ICU showed multifocal airspace opacity.   - completed Meorpenem for bacteremia as above.  - subsequent CXR including one from today showing pulm vasc congestion. Making scanty urine, ? Lasix, defer to nephrology.   - Fluid removal with HD/UF.  - weaned off O2.     Oliguric renal failure 2/2 ATN and hepato renal syndrome: Multiple other insults including septicemia, IV Contrast ( 2/9 and 2/21), High vanco levels.   - nephrology following  - tunneled RIJ HD catheter in place  - HD/UF per nephrology, consulted and following.  - Significant lower extremity edema- using lymphedema wraps as well.      GI/Nutrition:  On regular diet at this time.  -Continue supplements including folate and thiamine    Anemia acute on chronic: presented with Hb 10.   -Slow decrease in Hb, 8.0--7.9--7.4, no sign of overt bleeding, likely anemia from acute illness. Transfuse if <7  -GI following, has received PRBC earlier this stay, Hb daily.    Hyponatremia  - Because of renal failure, fluid overload.  - monitor    Maculopapular rash : face, upper chest and flanks.   - this was presumed to be drug rash due to meropenem per chart review. IV Abx was discontinued and received steroid and benadryl.   -  Still itchy. Offered benadryl, declined.   - If does not improve, recommend dermatology outpatient.     Diet: Regular Diet Adult  Snacks/Supplements Adult: Other; Smoothie; With Meals    DVT Prophylaxis: Pneumatic Compression Devices  Amado Catheter: not present  Code Status: Full Code      Disposition Plan   Expected discharge: Possibly 1-2 days, to TBD (? Rehab) , noted Therapy eval, recommending outpatient, but parents worried about her living situation. Also concern for relapse. Psychiatry has been consulted for further eval, ? Needs to be committed. ? Inpatient rehab. SW  following to arrange HD after discharge.     Entered: Emily Abarca MD 03/03/2020, 2:41 PM       The patient's care was discussed with the Bedside Nurse, Patient and her parents Bebeto and Natasha over the phone 3/2.    Emily Abarca MD  Hospitalist Service  [facility]    ______________________________________________________________________    Interval History   Was evaluated this am, patient was quite restless, c/o she did not get enough sleep and wanted to take a nap. No new issues. Rash stable (present over the face upper chest and flanks), still itchy.    No nausea, abd pain, dyspnea. No hematochezia/melena.   Having BM daily.     Data reviewed today: I reviewed all medications, new labs results over the last 24 hours. I personally reviewed no images or EKG's today.    Physical Exam   Vital Signs: Temp: 97.4  F (36.3  C) Temp src: Oral BP: 111/77 Pulse: 102 Heart Rate: 107 Resp: 16 SpO2: 97 % O2 Device: None (Room air)    Weight: 247 lbs 14.4 oz     General Appearance: Awake, alert, NAD, jaundiced. Obese, anasarca.   HEENT: icteric  Respiratory: CTAB, normal work of breathing.   Cardiovascular: tachycardic  GI: soft, NT, mildly distended vs obese  Skin: jaundice, maculopapular rash on the face, upper chest and flanks   Other: Restless, no agitation or hallucination, moving all extremities. 2+ lower extremity edema.    Data   Recent Labs   Lab 03/03/20  0535 03/02/20  0631 03/01/20  0633   WBC 15.6* 19.6* 17.1*   HGB 7.4* 7.9* 8.0*   MCV 97 96 97    299 290   INR 1.83* 1.84* 1.80*    131* 131*   POTASSIUM 3.9 5.1 4.9   CHLORIDE 99 97 95   CO2 29 26 25   BUN 31* 50* 36*   CR 4.32* 5.96* 4.92*   ANIONGAP 6 8 11   EMILY 9.3 9.8 9.7   GLC 88 101* 83   ALBUMIN 2.7* 2.8* 2.8*   PROTTOTAL 6.6* 6.6* 6.5*   BILITOTAL 17.3* 17.9* 18.8*   ALKPHOS 120 123 121   ALT 31 32 38   * 125* 166*     Recent Results (from the past 24 hour(s))   XR Chest 2 Views    Narrative    CHEST TWO VIEWS  3/3/2020 9:56 AM     HISTORY: Hypoxia, possible fluid overload    COMPARISON: 2/29/2020       Impression    IMPRESSION: A right PICC likely terminates in the mid to lower SVC,  though the tip is not definitively identified. A tunneled dual lumen  right IJ catheter terminates in the right atrium. Lower lobe opacities  on the lateral view are concerning for airspace disease. There is mild  interstitial edema. The cardiomediastinal silhouette is normal in  size.    LESTER J FAHRNER, MD     Medications     dextrose         - MEDICATION INSTRUCTIONS for Dialysis Patients -   Does not apply See Admin Instructions     folic acid  1 mg Oral Daily     lactulose  10 g Oral BID     metroNIDAZOLE  500 mg Oral 4x Daily     midodrine  10 mg Oral TID w/meals     polyethylene glycol  17 g Oral Daily     psyllium  1 packet Oral Daily     rifaximin  550 mg Oral BID     sodium chloride (PF)  10 mL Intracatheter Q7 Days     sodium chloride (PF)  10 mL Intracatheter Q8H     vitamin B1  100 mg Oral Daily

## 2020-03-03 NOTE — CONSULTS
Psychiatry consult:  Patient seen; full note to follow    Alcohol use disorder, severe  Alcoholic hepatitis and hepatic failure  Renal failure on dialysis     Recommendations:  Noting the progress she has made since admission, willingness to maintain sobriety from alcohol, while verbalizing a reasonable plan to promote and maintain sobriety outside the hospital environment, she currently does not meet criteria to pursue involuntary commitment. She is agreeable to utilize an outpatient treatment program however is no longer seeking residential treatment services.    We discussed medications that may also promote sobriety. She expressed interest in Antabuse. Once her hepatic and renal functions have improved, she would be a good candidate for treatment with antabuse. She may follow up with her outpatient provider to further discuss this treatment mode.     Please reconsult as needed.   Dimitris Wallace MD   Text Page

## 2020-03-03 NOTE — PROGRESS NOTES
[facility]  Gastroenterology Progress Note     Pat Delgado MRN# [id number]   YOB: 1990 Age: 29 year old          Assessment and Plan:   Update: Patient appears well and has increased energy. Currently receiving HD for EBEN and ESRD.    Alcoholic hepatiits   Pat Delgado is a 29 year old female who was admitted on 2/9/2020. GI consulted for severe Etoh hepatitis which currently LFT stable and mental status improved but ESRD on HD    - Will need outpatient follow up   -  Explained to pt that LFT may not get normal months   -  Will recommend discharge w/ rifaximin 550mg BID and lactulose to titrate to 2 BM per day  -  To finish current Abx course and follow up in clinic          Acute liver failure without hepatic coma  Hyponatremia  Sepsis, due to unspecified organism, unspecified whether acute organ dysfunction present (H)      Interval History:   no new complaints, doing well, denies chest pain, denies shortness of breath, denies abdominal pain, alert, oriented to person, place and time and doing well; no cp, sob, n/v/d, or abd pain.              Review of Systems:   C: NEGATIVE for fever, chills, change in weight  E/M: NEGATIVE for ear, mouth and throat problems  R: NEGATIVE for significant cough or SOB  CV: NEGATIVE for chest pain, palpitations or peripheral edema             Medications:   I have reviewed this patient's current medications    - MEDICATION INSTRUCTIONS for Dialysis Patients -   Does not apply See Admin Instructions     folic acid  1 mg Oral Daily     metroNIDAZOLE  500 mg Oral 4x Daily     polyethylene glycol  17 g Oral Daily     psyllium  1 packet Oral Daily     rifaximin  550 mg Oral BID     sodium chloride (PF)  10 mL Intracatheter Q7 Days     sodium chloride (PF)  10 mL Intracatheter Q8H     vitamin B1  100 mg Oral Daily                  Physical Exam:   Vitals were reviewed  Vital Signs with Ranges  Temp:  [97.6  F (36.4  C)-98.6  F (37  C)] 98.6  F  (37  C)  Pulse:  [102] 102  Heart Rate:  [] 103  Resp:  [16-18] 16  BP: (100-150)/(50-85) 106/61  SpO2:  [95 %-98 %] 95 %  I/O last 3 completed shifts:  In: 850 [P.O.:850]  Out: 4075 [Urine:75; Other:4000]  Constitutional: healthy, alert and no distress   Cardiovascular: negative, PMI normal. No lifts, heaves, or thrills. RRR. No murmurs, clicks gallops or rub  Respiratory: negative, Percussion normal. Good diaphragmatic excursion. Lungs clear  Head: Normocephalic. No masses, lesions, tenderness or abnormalities  Neck: Neck supple. No adenopathy. Thyroid symmetric, normal size,, Carotids without bruits.  Abdomen: Abdomen soft, non-tender. BS normal. No masses, organomegaly           Data:   I reviewed the patient's new clinical lab test results.   Recent Labs   Lab Test 03/03/20 0535 03/02/20 0631 03/01/20  0633   WBC 15.6* 19.6* 17.1*   HGB 7.4* 7.9* 8.0*   MCV 97 96 97    299 290   INR 1.83* 1.84* 1.80*     Recent Labs   Lab Test 03/03/20 0535 03/02/20  0631 03/01/20  0633   POTASSIUM 3.9 5.1 4.9   CHLORIDE 99 97 95   CO2 29 26 25   BUN 31* 50* 36*   ANIONGAP 6 8 11     Recent Labs   Lab Test 03/03/20 0535 03/02/20 0631 03/01/20  0633  02/21/20  1430  02/14/20  0653  02/09/20  1755 02/09/20  1547   ALBUMIN 2.7* 2.8* 2.8*   < >  --    < >  --    < >  --  1.6*   BILITOTAL 17.3* 17.9* 18.8*   < >  --    < >  --    < >  --  8.1*   ALT 31 32 38   < >  --    < >  --    < >  --  33   * 125* 166*   < >  --    < >  --    < >  --  203*   PROTEIN  --   --   --   --  100*  --  100*  --  100*  --    LIPASE  --   --   --   --   --   --   --   --   --  119    < > = values in this interval not displayed.       I reviewed the patient's new imaging results.    All laboratory data reviewed  All imaging studies reviewed by me.    Aminata Rangel PA-C,  3/3/2020  [facility]  Office : [phone]  Cell: [phone] (Dr. Hobson)  Cell: [phone] (Aminata aRngel PA-C)

## 2020-03-04 LAB
ALBUMIN SERPL-MCNC: 2.7 G/DL (ref 3.4–5)
ALP SERPL-CCNC: 129 U/L (ref 40–150)
ALT SERPL W P-5'-P-CCNC: 29 U/L (ref 0–50)
ANION GAP SERPL CALCULATED.3IONS-SCNC: 9 MMOL/L (ref 3–14)
AST SERPL W P-5'-P-CCNC: 159 U/L (ref 0–45)
BILIRUB DIRECT SERPL-MCNC: 14.2 MG/DL (ref 0–0.2)
BILIRUB SERPL-MCNC: 17.2 MG/DL (ref 0.2–1.3)
BUN SERPL-MCNC: 44 MG/DL (ref 7–30)
CALCIUM SERPL-MCNC: 9.6 MG/DL (ref 8.5–10.1)
CHLORIDE SERPL-SCNC: 96 MMOL/L (ref 94–109)
CO2 SERPL-SCNC: 27 MMOL/L (ref 20–32)
CREAT SERPL-MCNC: 5.54 MG/DL (ref 0.52–1.04)
ERYTHROCYTE [DISTWIDTH] IN BLOOD BY AUTOMATED COUNT: 20.5 % (ref 10–15)
GFR SERPL CREATININE-BSD FRML MDRD: 10 ML/MIN/{1.73_M2}
GLUCOSE SERPL-MCNC: 117 MG/DL (ref 70–99)
HCT VFR BLD AUTO: 22.4 % (ref 35–47)
HGB BLD-MCNC: 7.5 G/DL (ref 11.7–15.7)
INR PPP: 1.97 (ref 0.86–1.14)
MAGNESIUM SERPL-MCNC: 1.8 MG/DL (ref 1.6–2.3)
MCH RBC QN AUTO: 32.1 PG (ref 26.5–33)
MCHC RBC AUTO-ENTMCNC: 33.5 G/DL (ref 31.5–36.5)
MCV RBC AUTO: 96 FL (ref 78–100)
PHOSPHATE SERPL-MCNC: 4.8 MG/DL (ref 2.5–4.5)
PLATELET # BLD AUTO: 273 10E9/L (ref 150–450)
POTASSIUM SERPL-SCNC: 3.9 MMOL/L (ref 3.4–5.3)
PROT SERPL-MCNC: 6.5 G/DL (ref 6.8–8.8)
RBC # BLD AUTO: 2.34 10E12/L (ref 3.8–5.2)
SODIUM SERPL-SCNC: 132 MMOL/L (ref 133–144)
WBC # BLD AUTO: 16 10E9/L (ref 4–11)

## 2020-03-04 PROCEDURE — 84460 ALANINE AMINO (ALT) (SGPT): CPT | Performed by: HOSPITALIST

## 2020-03-04 PROCEDURE — 25000128 H RX IP 250 OP 636: Performed by: INTERNAL MEDICINE

## 2020-03-04 PROCEDURE — 84450 TRANSFERASE (AST) (SGOT): CPT | Performed by: HOSPITALIST

## 2020-03-04 PROCEDURE — 63400005 ZZH RX 634: Performed by: INTERNAL MEDICINE

## 2020-03-04 PROCEDURE — 85027 COMPLETE CBC AUTOMATED: CPT | Performed by: HOSPITALIST

## 2020-03-04 PROCEDURE — 25000132 ZZH RX MED GY IP 250 OP 250 PS 637: Performed by: INTERNAL MEDICINE

## 2020-03-04 PROCEDURE — 99232 SBSQ HOSP IP/OBS MODERATE 35: CPT | Performed by: INTERNAL MEDICINE

## 2020-03-04 PROCEDURE — 25000132 ZZH RX MED GY IP 250 OP 250 PS 637: Performed by: HOSPITALIST

## 2020-03-04 PROCEDURE — 25000128 H RX IP 250 OP 636: Performed by: HOSPITALIST

## 2020-03-04 PROCEDURE — 85610 PROTHROMBIN TIME: CPT | Performed by: HOSPITALIST

## 2020-03-04 PROCEDURE — 12000000 ZZH R&B MED SURG/OB

## 2020-03-04 PROCEDURE — 84075 ASSAY ALKALINE PHOSPHATASE: CPT | Performed by: HOSPITALIST

## 2020-03-04 PROCEDURE — 83735 ASSAY OF MAGNESIUM: CPT | Performed by: HOSPITALIST

## 2020-03-04 PROCEDURE — 82248 BILIRUBIN DIRECT: CPT | Performed by: HOSPITALIST

## 2020-03-04 PROCEDURE — 80069 RENAL FUNCTION PANEL: CPT | Performed by: HOSPITALIST

## 2020-03-04 PROCEDURE — 84155 ASSAY OF PROTEIN SERUM: CPT | Performed by: HOSPITALIST

## 2020-03-04 PROCEDURE — 82247 BILIRUBIN TOTAL: CPT | Performed by: HOSPITALIST

## 2020-03-04 PROCEDURE — 90937 HEMODIALYSIS REPEATED EVAL: CPT

## 2020-03-04 RX ORDER — ALBUMIN (HUMAN) 12.5 G/50ML
50 SOLUTION INTRAVENOUS
Status: DISCONTINUED | OUTPATIENT
Start: 2020-03-04 | End: 2020-03-04

## 2020-03-04 RX ORDER — ALBUMIN, HUMAN INJ 5% 5 %
250 SOLUTION INTRAVENOUS
Status: DISCONTINUED | OUTPATIENT
Start: 2020-03-04 | End: 2020-03-04

## 2020-03-04 RX ADMIN — THIAMINE HCL TAB 100 MG 100 MG: 100 TAB at 09:21

## 2020-03-04 RX ADMIN — ONDANSETRON 4 MG: 2 INJECTION INTRAMUSCULAR; INTRAVENOUS at 11:33

## 2020-03-04 RX ADMIN — RIFAXIMIN 550 MG: 550 TABLET ORAL at 21:58

## 2020-03-04 RX ADMIN — OXYCODONE HYDROCHLORIDE 2.5 MG: 5 TABLET ORAL at 21:57

## 2020-03-04 RX ADMIN — MIDODRINE HYDROCHLORIDE 10 MG: 5 TABLET ORAL at 19:10

## 2020-03-04 RX ADMIN — DIPHENHYDRAMINE HYDROCHLORIDE AND ZINC ACETATE: 10; 1 CREAM TOPICAL at 19:12

## 2020-03-04 RX ADMIN — IRON SUCROSE 100 MG: 20 INJECTION, SOLUTION INTRAVENOUS at 14:20

## 2020-03-04 RX ADMIN — MIDODRINE HYDROCHLORIDE 10 MG: 5 TABLET ORAL at 11:41

## 2020-03-04 RX ADMIN — METRONIDAZOLE 500 MG: 500 TABLET ORAL at 21:58

## 2020-03-04 RX ADMIN — OXYCODONE HYDROCHLORIDE 2.5 MG: 5 TABLET ORAL at 03:09

## 2020-03-04 RX ADMIN — EPOETIN ALFA 4000 UNITS: 4000 SOLUTION INTRAVENOUS; SUBCUTANEOUS at 14:21

## 2020-03-04 RX ADMIN — MIDODRINE HYDROCHLORIDE 10 MG: 5 TABLET ORAL at 09:21

## 2020-03-04 RX ADMIN — ONDANSETRON 4 MG: 2 INJECTION INTRAMUSCULAR; INTRAVENOUS at 18:26

## 2020-03-04 RX ADMIN — MELATONIN 3 MG: 3 TAB ORAL at 00:46

## 2020-03-04 RX ADMIN — RIFAXIMIN 550 MG: 550 TABLET ORAL at 09:21

## 2020-03-04 RX ADMIN — LACTULOSE 10 G: 10 SOLUTION ORAL at 09:20

## 2020-03-04 RX ADMIN — METRONIDAZOLE 500 MG: 500 TABLET ORAL at 19:10

## 2020-03-04 RX ADMIN — METRONIDAZOLE 500 MG: 500 TABLET ORAL at 09:21

## 2020-03-04 ASSESSMENT — ACTIVITIES OF DAILY LIVING (ADL)
ADLS_ACUITY_SCORE: 12
ADLS_ACUITY_SCORE: 12
ADLS_ACUITY_SCORE: 13
ADLS_ACUITY_SCORE: 12

## 2020-03-04 ASSESSMENT — MIFFLIN-ST. JEOR: SCORE: 1897.5

## 2020-03-04 NOTE — PLAN OF CARE
DATE & TIME: 3/3/20, 5590 - 5100   Cognitive Concerns/ Orientation : A&O x 4   BEHAVIOR & AGGRESSION TOOL COLOR: Green  CIWA SCORE: N/a   ABNL VS/O2: VSS on room air  MOBILITY: Independent in room, SBA in casillas way  PAIN MANAGMENT: Prn Oxycodone given for pain both feet with some decrease in pain  DIET: Regular  BOWEL/BLADDER: Continent  ABNL LAB/BG: Labs pending  DRAIN/DEVICES: Right PICC, Right chest port for hemodialysis  TELEMETRY RHYTHM: N/a  SKIN: Sclera and skin jaundiced. Rash.acne. Patient uses own linen due to being allergic to hospital detergent  TESTS/PROCEDURES: For hemodialysis today  D/C DAY/GOALS/PLACE: Home vs TCU pending psych eval and OT/PT recommendations  OTHER IMPORTANT INFO: +3 edema to BLE. Lymph wraps off.

## 2020-03-04 NOTE — PLAN OF CARE
"PT: Attempted to see pt, pt & family state she recently returned from dialysis & is fatigued; family states she has had a \"rough day\", pt tearful. Prefers to work with PT tomorrow. Will reschedule for tomorrow AM.   "

## 2020-03-04 NOTE — PROGRESS NOTES
Inpatient Dialysis Progress Note        Assessment and Plan:     29 y.o woman with alcoholic hepatitis, following for acute kidney injury.      # Patient with baseline Scr ~ 0.3-0.5 mg/dl.      # Acute kidney injury: Presume multifactorial ( ATN, HRS-1, contrast and septicemia).                -dialysis dependent     # Sepsis with fusobacterium: Resolved.                -on Flagyl     # Alcoholic hepatitis:               -manage per GI     # Hypervolemia. 1-2+ pitting edema.      # Anemia     Plan:  # 3.5 hrs HD, UF 3 liters, no heparin, RIJ tunneled, Qb 400, 3K  # Dialysis order called to [facility] in [place].          Interval History:     Pt is getting HD tx. She has some nausea. She denies chest and abdominal pain. Denies SOB.          Dialysis Parameters:     Vitals:    03/02/20 0637 03/03/20 0702 03/04/20 1048   Weight: 115.2 kg (254 lb) 112.4 kg (247 lb 14.4 oz) 112.4 kg (247 lb 12.8 oz)     I/O last 3 completed shifts:  In: 240 [P.O.:240]  Out: -   Temp:  [97.3  F (36.3  C)-97.9  F (36.6  C)] 97.7  F (36.5  C)  Pulse:  [] 94  Heart Rate:  [] 97  Resp:  [16-18] 18  BP: (100-116)/(56-70) 112/67  SpO2:  [96 %-99 %] 96 %    Current Weight: 112.4  Dry Weight: TBD  Dialysis Temp: 36.5  C  Access Device: CVC  Access Site: RIJ tunneled  Dialyzer: Revaclera  Dialysis Bath: 3K  Sodium Profile: none  UF Goal: 3  Blood Flow Rate (mL/min): 400  Total Treatment Time (hrs): 3.5  Heparin: as needed    Review of Systems:       Medications:     EPO dose: 4000 units  Venofer: 100  mg       Physical Exam:     Vitals were reviewed  Patient Vitals for the past 24 hrs:   BP Temp Temp src Pulse Heart Rate Resp SpO2 Weight   03/04/20 1300 112/67 -- -- 94 -- -- -- --   03/04/20 1245 110/67 -- -- 90 -- -- -- --   03/04/20 1231 108/65 -- -- 94 -- -- -- --   03/04/20 1215 100/60 -- -- 95 -- -- -- --   03/04/20 1145 104/58 -- -- 98 -- -- -- --   03/04/20 1130 102/60 -- -- 95 -- -- -- --   03/04/20 1115 104/56 -- -- 90 -- --  -- --   20 1052 110/68 97.7  F (36.5  C) Oral -- -- 18 96 % --   20 1048 -- -- -- -- -- -- -- 112.4 kg (247 lb 12.8 oz)   20 0754 104/65 97.7  F (36.5  C) Oral -- 97 18 96 % --   20 0403 109/63 97.9  F (36.6  C) Oral -- 99 16 97 % --   20 0309 -- -- -- -- -- 16 -- --   20 0004 101/63 97.3  F (36.3  C) Oral -- 100 18 98 % --   20 2200 -- -- -- -- -- 18 -- --   20 2100 116/70 -- -- 101 -- 18 99 % --   20 1523 102/66 97.5  F (36.4  C) Oral 100 100 16 98 % --       Temperatures:  Current - Temp: 97.7  F (36.5  C); Max - Temp  Av.6  F (36.4  C)  Min: 97.3  F (36.3  C)  Max: 97.9  F (36.6  C)  Respiration range: Resp  Av.3  Min: 16  Max: 18  Pulse range: Pulse  Av.2  Min: 90  Max: 101  Blood pressure range: Systolic (24hrs), Av , Min:100 , Max:116   ; Diastolic (24hrs), Av, Min:56, Max:70    Pulse oximetry range: SpO2  Av.3 %  Min: 96 %  Max: 99 %  I/O last 3 completed shifts:  In: 240 [P.O.:240]  Out: -     Intake/Output Summary (Last 24 hours) at 3/4/2020 1307  Last data filed at 3/4/2020 0700  Gross per 24 hour   Intake 200 ml   Output --   Net 200 ml     GENERAL APPEARANCE: NAD  HEENT:  EOMI. PERR. Scler icterus  RESP: lungs cta b c good efforts, no crackles, rhonchi or wheezes  CV: RRR, nl S1/S2, no m/r/g   ABDOMEN: obese, soft, NT  EXTREMITIES/SKIN: Jaundice,1-2+ edema  Neuro: a/o x3, nonfocal.   RIJ tunneled CVC cdi        Data:     Recent Labs   Lab Test 20  0614 20  0535   * 134   POTASSIUM 3.9 3.9   CHLORIDE 96 99   CO2 27 29   ANIONGAP 9 6   * 88   BUN 44* 31*   CR 5.54* 4.32*   EMILY 9.6 9.3       Hemoglobin   Date Value Ref Range Status   2020 7.5 (L) 11.7 - 15.7 g/dL Final              Brian Bronson MD

## 2020-03-04 NOTE — PLAN OF CARE
DATE & TIME: 3/3/2020 law   Cognitive Concerns/ Orientation : A&Ox4   BEHAVIOR & AGGRESSION TOOL COLOR: green, calm/cooperative  ABNL VS/O2: VSS on RA  MOBILITY: independent in room  PAIN MANAGMENT: c/o naty foot pain-medicated with Oxy #1 with good relief  DIET: regular, tolerating well  BOWEL/BLADDER: continent of B/B, pt reports 3 loose BMs tonight-started on Lactulose   ABNL LAB/BG: Bilirubin Total 17.3; Crea 4.32; Hgb 7.4; WBC 15.6/trending down.  DRAIN/DEVICES: R PICC SL ; R Chest port for dialysis   TELEMETRY RHYTHM: na  SKIN: jaundice, yellow sclera, body rash, acne on face. Pt uses own linens due being allergic to hospital detergent   TESTS/PROCEDURES: n/a  D/C DAY/GOALS/PLACE TCU vs home pending Psych eval and PT/OT recommendations  OTHER IMPORTANT INFO: Dialysis tomorrow, naty LE edema +2-3 edema, lymph wraps off per pt.

## 2020-03-04 NOTE — DISCHARGE INSTRUCTIONS
Outpatient dialysis  Every Monday/Wednesday and Friday at 12:30PM  Your first outpatient dialysis on Friday 3/6, you will need to arrive at 12:00noon  [facility]   [address] [place] [id number] [phone]

## 2020-03-04 NOTE — PLAN OF CARE
DATE & TIME: 3/4/2020 AM shift            Cognitive Concerns/ Orientation : A&Ox4   BEHAVIOR & AGGRESSION TOOL COLOR: green  CIWA SCORE: na      ABNL VS/O2: VSS on RA, continues on Midodrine. Dialysis today.   MOBILITY: independent in room, PT/OT are following.   PAIN MANAGMENT: denies pain  DIET: regular, tolerating   BOWEL/BLADDER: continent of B/B. Reports two BMs yesterday, was started on lactulose per GI yesterday. Pt dislikes the taste, takes it with popsicles.   ABNL LAB/BG: Bilirubin Total 17.2; Crea 5.54; Hgb 7.5; WBC 16.0  DRAIN/DEVICES: R PICC SL ; R Chest port for dialysis   TELEMETRY RHYTHM: na  SKIN: jaundice, yellow sclera. Face Acne, improving. Continues to itch, declined any interventions.   TESTS/PROCEDURES:dialysis.   D/C DAY/GOALS/PLACE:TBD, home with assistance from family mainly pt's boyfriend. Discharge home possibly today.   OTHER IMPORTANT INFO:naty LE edema +2/+3, unable to tolerate lymph wraps.

## 2020-03-04 NOTE — PROGRESS NOTES
[facility]  Medicine Progress Note - Hospitalist Service       Date of Admission:  2/9/2020    Assessment & Plan    Pat Delgado is a 29 year old female history of alcohol abuse, anxiety, depression, ADHD, hypertension, migraine, tobacco abuse and substance abuse who was admitted on 2/9/2020 with  severe alcoholic hepatitis complicated by kidney failure w/ ATN vs type II hepatorenal syndrome, encephalopathy, hypoxic hypercapnic respiratory failure on 2/22/2020 was intubated and transferred to ICU- extubated on 2/24/2020 and transferred to floor 2/25/2020. Currently on HD-creatinine remains elevated.  She also developed bacteremia with fusobacterium nucleatum, with repeat blood culture negative.    Acute liver failure 2/2 Alcoholic hepatitis  Acute metabolic encephalopathy, improved  Acute hypoxic respiratory failure, resolved  Acute alcoholic hepatitis with Maddrey score >50. Minimal ascites, received 5 days of cefepime for SBP prophylaxis. Was initiated on prednisolone, discontinued due to bacteremia. Extubated 2/24 and transferred out of MICU 2/25.  - continue rifaximin 550 BID  - GI following, LFTs stable, lactulose added.    - 2 gm sodium diet.    - Was evaluated by psychiatry earlier this stay, re consulted to assess discharge planning and does not think she needs involuntary commitment, as the patient has good plan.  Recommend outpatient treatment.  Chemical dependency we consulted, evaluation pending at this time.      Fusobacterium bacteremia, unclear source possible contamination.   - Initially received 5 days of cefepime for SBP ppx, subsequently spiked fever, blood culture showed GNR identified as fusobacterium, pansensitive. IV Meropenem and vancomycin were started, and with identification of organism, Vancomycin discontinued.  - Was on meropenem, developed rash, seems like it was presumed allergic reaction to meropenem,  Abx transitioned to flagyl. Plan is total 14 days given  bacteremia.    - continues to have marked leucocytosis but stable count/slightly better today, remains afebrile. Suspect due to hepatitis. Immune to hepatitis B, core ab negative, hep A and C negative.      Multifocal pneumonia/pleural effusion/anasarca: CT chest done in the ICU showed multifocal airspace opacity.   - completed Meorpenem for bacteremia as above.  - subsequent CXR including one from today showing pulm vasc congestion. Making scanty urine, ? Lasix, defer to nephrology.   - Fluid removal with HD/UF.  - weaned off O2.     Oliguric renal failure 2/2 ATN and hepato renal syndrome: Multiple other insults including septicemia, IV Contrast ( 2/9 and 2/21), High vanco levels.   - nephrology following  - tunneled RIJ HD catheter in place  - HD/UF per nephrology, consulted and following.  - Significant lower extremity edema- using lymphedema wraps as well.      GI/Nutrition:  On regular diet at this time.  -Continue supplements including folate and thiamine    Anemia acute on chronic: presented with Hb 10.   -Slow decrease in Hb, 8.0--7.9--7.4, no sign of overt bleeding, likely anemia from acute illness. Transfuse if <7  -GI following, has received PRBC earlier this stay, Hb daily.    Hyponatremia  - Because of renal failure, fluid overload.    - monitor, improved with dialysis.    Maculopapular rash : On abdomen resolved at this time.  - this was presumed to be drug rash due to meropenem per chart review. IV Abx was discontinued and received steroid and benadryl.  Patient her abdomen is resolved at this time.  She does have pimples on her face that need outpatient follow-up.      She is not a candidate for Antabuse at this time giving severe hepatic impairment   CD evaluation tomorrow  Dialysis today.      Diet: Regular Diet Adult  Snacks/Supplements Adult: Other; Smoothie; With Meals    DVT Prophylaxis: Pneumatic Compression Devices  Amado Catheter: not present  Code Status: Full Code      Disposition Plan    Expected discharge: Possibly tomorrow after CD evaluation.  Patient is planning to go home with her boyfriend.  Psychiatry has cleared her.    Entered: Marvin Guillaume MD 03/04/2020, 2:13 PM       The patient's care was discussed with the Bedside Nurse, Patient and her parents Bebeto and Natasha over the phone 3/2.    Marvin Guillaume MD  Hospitalist Service  [facility]    ______________________________________________________________________    Interval History   Was evaluated this am, patient was quite restless, c/o she did not get enough sleep and wanted to take a nap. No new issues. Rash stable (present over the face upper chest and flanks), still itchy.    No nausea, abd pain, dyspnea. No hematochezia/melena.   Having BM daily.     Data reviewed today: I reviewed all medications, new labs results over the last 24 hours. I personally reviewed no images or EKG's today.    Physical Exam   Vital Signs: Temp: 97.7  F (36.5  C) Temp src: Oral BP: 122/64 Pulse: 90 Heart Rate: 97 Resp: 18 SpO2: 96 % O2 Device: None (Room air)    Weight: 247 lbs 12.75 oz     General Appearance: Awake, alert, NAD, jaundiced. Obese, anasarca.   HEENT: icteric  Respiratory: CTAB, normal work of breathing.   Cardiovascular: tachycardic  GI: soft, NT, mildly distended vs obese  Skin: jaundice, allergic rash on the abdomen is resolved, she continued to have maculopapular rash on her face  Other: Restless, no agitation or hallucination, moving all extremities. 2+ lower extremity edema.    Data   Recent Labs   Lab 03/04/20  0614 03/03/20  0535 03/02/20  0631   WBC 16.0* 15.6* 19.6*   HGB 7.5* 7.4* 7.9*   MCV 96 97 96    286 299   INR 1.97* 1.83* 1.84*   * 134 131*   POTASSIUM 3.9 3.9 5.1   CHLORIDE 96 99 97   CO2 27 29 26   BUN 44* 31* 50*   CR 5.54* 4.32* 5.96*   ANIONGAP 9 6 8   EMILY 9.6 9.3 9.8   * 88 101*   ALBUMIN 2.7* 2.7* 2.8*   PROTTOTAL 6.5* 6.6* 6.6*   BILITOTAL 17.2* 17.3* 17.9*   ALKPHOS 129 120 123    ALT 29 31 32   * 159* 125*     No results found for this or any previous visit (from the past 24 hour(s)).  Medications     dextrose         - MEDICATION INSTRUCTIONS for Dialysis Patients -   Does not apply See Admin Instructions     sodium chloride 0.9%  250 mL Intravenous Once in dialysis     sodium chloride 0.9%  300 mL Hemodialysis Machine Once     epoetin tashia (EPOGEN,PROCRIT) inj ESRD  4,000 Units Intravenous Once in dialysis     folic acid  1 mg Oral Daily     heparin  3 mL Intracatheter During Hemodialysis (from stock)     heparin  3 mL Intracatheter During Hemodialysis (from stock)     iron sucrose  100 mg Intravenous Once in dialysis     lactulose  10 g Oral BID     metroNIDAZOLE  500 mg Oral 4x Daily     midodrine  10 mg Oral TID w/meals     - MEDICATION INSTRUCTIONS -   Does not apply Once     polyethylene glycol  17 g Oral Daily     psyllium  1 packet Oral Daily     rifaximin  550 mg Oral BID     sodium chloride (PF)  10 mL Intracatheter Q7 Days     sodium chloride (PF)  10 mL Intracatheter Q8H     vitamin B1  100 mg Oral Daily

## 2020-03-04 NOTE — PROGRESS NOTES
Please see CC consult note on 3/3 for more information.  Pt unable to be discharged until CD sees her.  Have been in e-mail communication with the CD , Dariana, the past two days.  Due to volume of pts, she is not able to see pt until tomorrow.  Conversed with Dr Guillaume this AM. Pt will remain here until tomorrow.  Pt was updated and in agreement.  She was really hoping to be discharged today.  In discussion with her today, she seems more forgetful.  Have confirmation of dialysis chair time, M/W/F at 12:30PM [facility].    Pt noted she left [facility] CD treatment in a bad way, so she would not want to go back there.  She met her boyfriend that she now lives with, in treatment.    Pt is very high risk for readmission.  She was enthusiastic about going home and reported she planned to be on Antebuse along with her boyfriend.  Explained with her liver failure that she would not be able to be on this.  Hopefully her friend will somehow be supportive without alcohol, but it may be a challenge.  Pt has a [employer] [place] [employer] [place].  An appointment [place] been made with Dr Chavis, Family Practice at [facility] on 3/10/20.  There was no Internal Medicine accepting new pts, have requested Dr MARTIN Tsai, if she will accept pt after this initial appointment.  Therapies are recommending outpt PT/OT.  So will need orderes for this at discharge.

## 2020-03-04 NOTE — CONSULTS
"[facility]  Psychiatry Consultation - Follow-up note      Interim History:   Reason for consultation: Assess whether patient needs to be committed for chemical dependency treatment.    Prior psychiatric consultations noted from myself and Dr. Lyon.  Since my last meeting with the patient, she has been cooperating with her medical cares and gradually regaining medical stability.  There was initially discussion of residential versus outpatient chemical dependency treatment for which she had met with the chemical dependency consultant.  It was later thought that she may require admission to a transitional care unit however she has been progressing well to where that level of care may no longer be needed.  She is no longer seeking residential chemical dependency treatment however is open to pursuing outpatient treatment.  Given the severity of her medical compromise, the question of involuntary commitment was presented for which psychiatry was reconsulted.    On meeting with the patient today, she was laying in bed and enjoying a plate with several types of cup fruit.  She politely put her food away and sat up in bed for the interview.  She spent some time explaining to me that her recent hospital course has reinstated the importance of maintaining sobriety from alcohol.  She adds \"I know I could die if I drink again and I do not want to die so I am not going to do that.\"  She tells me that her boyfriend has been sober for at least 1 year and will continue to be a sober support for her.  She does not wish to proceed with residential chemical dependency treatment at this time as she feels confident in her ability to maintain sobriety outside the hospital with the support of her boyfriend and family.  She is open to attending an outpatient treatment program, recalling [facility] however did leave the program sooner than anticipated when she started to date another client in " the program.  After leaving the treatment program, she lost her sober housing which was connected with the treatment program.  She currently resides with her boyfriend and reports having a good relationship with him.    She reports that her mood is stable without significant depressed or anxious mood.  She denied suicidal and homicidal thoughts.  She denied psychotic symptoms.  She denied vegetative symptoms.  She reports sleeping well, maintaining appropriate appetite, and interprets maintaining adequate energy and concentration.  She denied any cravings for alcohol.  We reviewed medication options that can help promote sobriety and she seemed most interested in Antabuse.              Medications:       - MEDICATION INSTRUCTIONS for Dialysis Patients -   Does not apply See Admin Instructions     folic acid  1 mg Oral Daily     lactulose  10 g Oral BID     metroNIDAZOLE  500 mg Oral 4x Daily     midodrine  10 mg Oral TID w/meals     polyethylene glycol  17 g Oral Daily     psyllium  1 packet Oral Daily     rifaximin  550 mg Oral BID     sodium chloride (PF)  10 mL Intracatheter Q7 Days     sodium chloride (PF)  10 mL Intracatheter Q8H     vitamin B1  100 mg Oral Daily          Allergies:     Allergies   Allergen Reactions     Penicillins Unknown     Hives            Labs:     Recent Results (from the past 24 hour(s))   CBC (AM Draw)    Collection Time: 03/03/20  5:35 AM   Result Value Ref Range    WBC 15.6 (H) 4.0 - 11.0 10e9/L    RBC Count 2.35 (L) 3.8 - 5.2 10e12/L    Hemoglobin 7.4 (L) 11.7 - 15.7 g/dL    Hematocrit 22.8 (L) 35.0 - 47.0 %    MCV 97 78 - 100 fl    MCH 31.5 26.5 - 33.0 pg    MCHC 32.5 31.5 - 36.5 g/dL    RDW 21.2 (H) 10.0 - 15.0 %    Platelet Count 286 150 - 450 10e9/L   INR (AM Draw)    Collection Time: 03/03/20  5:35 AM   Result Value Ref Range    INR 1.83 (H) 0.86 - 1.14   Magnesium    Collection Time: 03/03/20  5:35 AM   Result Value Ref Range    Magnesium 1.7 1.6 - 2.3 mg/dL   Renal panel     "Collection Time: 03/03/20  5:35 AM   Result Value Ref Range    Sodium 134 133 - 144 mmol/L    Potassium 3.9 3.4 - 5.3 mmol/L    Chloride 99 94 - 109 mmol/L    Carbon Dioxide 29 20 - 32 mmol/L    Anion Gap 6 3 - 14 mmol/L    Glucose 88 70 - 99 mg/dL    Urea Nitrogen 31 (H) 7 - 30 mg/dL    Creatinine 4.32 (H) 0.52 - 1.04 mg/dL    GFR Estimate 13 (L) >60 mL/min/[1.73_m2]    GFR Estimate If Black 15 (L) >60 mL/min/[1.73_m2]    Calcium 9.3 8.5 - 10.1 mg/dL    Phosphorus 3.9 2.5 - 4.5 mg/dL    Albumin 2.7 (L) 3.4 - 5.0 g/dL   Alkaline phosphatase    Collection Time: 03/03/20  5:35 AM   Result Value Ref Range    Alkaline Phosphatase 120 40 - 150 U/L   ALT    Collection Time: 03/03/20  5:35 AM   Result Value Ref Range    ALT 31 0 - 50 U/L   AST    Collection Time: 03/03/20  5:35 AM   Result Value Ref Range     (H) 0 - 45 U/L   Bilirubin  total    Collection Time: 03/03/20  5:35 AM   Result Value Ref Range    Bilirubin Total 17.3 (HH) 0.2 - 1.3 mg/dL   Bilirubin direct    Collection Time: 03/03/20  5:35 AM   Result Value Ref Range    Bilirubin Direct 14.2 (H) 0.0 - 0.2 mg/dL   Protein total    Collection Time: 03/03/20  5:35 AM   Result Value Ref Range    Protein Total 6.6 (L) 6.8 - 8.8 g/dL          Psychiatric Examination:     /63 (BP Location: Left arm)   Pulse 101   Temp 97.3  F (36.3  C) (Oral)   Resp 16   Ht 1.727 m (5' 8\")   Wt 112.4 kg (247 lb 14.4 oz)   SpO2 98%   BMI 37.69 kg/m    Weight is 247 lbs 14.4 oz  Body mass index is 37.69 kg/m .  Orthostatic Vitals     None            Appearance: awake, alert  Attitude:  cooperative  Eye Contact:  fair  Mood:  better  Affect:  appropriate and in normal range  Speech:  clear, coherent  Psychomotor Behavior:  no evidence of tardive dyskinesia, dystonia, or tics  Throught Process:  logical and linear  Associations:  no loose associations  Thought Content:  no evidence of suicidal ideation or homicidal ideation and no evidence of psychotic " thought  Insight:  fair  Judgement:  fair  Oriented to:  time, person, and place  Attention Span and Concentration:  fair  Recent and Remote Memory:  fair           DIagnoses:     Alcohol use disorder, severe  Alcoholic hepatitis and hepatic failure  Renal failure on dialysis       Recommendations:     Noting the progress she has made since admission, willingness to maintain sobriety from alcohol, while verbalizing a reasonable plan to promote and maintain sobriety outside the hospital environment, she currently does not meet criteria to pursue involuntary commitment. She is agreeable to utilize an outpatient treatment program however is no longer seeking residential treatment services.     We discussed medications that may also promote sobriety. She expressed interest in Antabuse. Once her hepatic and renal functions have improved, she would be a good candidate for treatment with antabuse. She may follow up with her outpatient provider to further discuss this treatment mode.      Please reconsult as needed.   Dimitris Wallace MD   Text Page

## 2020-03-04 NOTE — PROGRESS NOTES
Potassium   Date Value Ref Range Status   03/04/2020 3.9 3.4 - 5.3 mmol/L Final     Hemoglobin   Date Value Ref Range Status   03/04/2020 7.5 (L) 11.7 - 15.7 g/dL Final     Creatinine   Date Value Ref Range Status   03/04/2020 5.54 (H) 0.52 - 1.04 mg/dL Final     Urea Nitrogen   Date Value Ref Range Status   03/04/2020 44 (H) 7 - 30 mg/dL Final     Sodium   Date Value Ref Range Status   03/04/2020 132 (L) 133 - 144 mmol/L Final     INR   Date Value Ref Range Status   03/04/2020 1.97 (H) 0.86 - 1.14 Final       DIALYSIS PROCEDURE NOTE  Hepatitis status of previous patient on machine log was checked and verified ok to use with this patients hepatitis status.  Patient dialyzed for 3.5 hrs. on a 3 K bath with a net fluid removal of  3L.  A BFR of 400 ml/min was obtained via a RIJ    The treatment plan was dicussed with Dr. Bronson during the treatment.  Total heparin received during the treatment: 0 units.     Line flushed, clamped and capped with heparin 1:1000 2.0 mL (2000 units) per lumen  Meds  given: Epo and Venofer No Heparin given during the run Complications: None      Procedure/ESRD/access care  education provided orally to patient patient verbalized understanding  ICEBOAT? Timeout performed pre-treatment  I: Patient was identified using 2 identifiers  C: Consent obtained/verified current before treatment  E: Equipment preventative maintenance is current and dialysis delivery system OK to use  B: Hepatitis B Surface Antigen: Negative ; Draw Date: 2.23.20      Hepatitis B Surface Antibody: Immune; Draw Date: 2.23.20  O: Dialysis orders present and complete prior to treatment  A: Vascular access verified and assessed prior to treatment  T: Treatment was performed at a clinically appropriate time  ?: Patient was allowed to ask questions and address concerns prior to treatment  See flowsheet in EPIC for further details and post assessment.  Machine water alarm in place and functioning. Transducer pods intact and  checked every 15min.  Pt returned via Wheelchair  Report received from:   Report received from and given to post: Nancy Miguel RN   Chlorine/Chloramine water system checked every 4 hours.

## 2020-03-05 ENCOUNTER — APPOINTMENT (OUTPATIENT)
Dept: PHYSICAL THERAPY | Facility: CLINIC | Age: 30
DRG: 871 | End: 2020-03-05
Payer: COMMERCIAL

## 2020-03-05 VITALS
DIASTOLIC BLOOD PRESSURE: 67 MMHG | SYSTOLIC BLOOD PRESSURE: 107 MMHG | OXYGEN SATURATION: 93 % | HEART RATE: 69 BPM | TEMPERATURE: 98.3 F | HEIGHT: 68 IN | RESPIRATION RATE: 18 BRPM | BODY MASS INDEX: 36.53 KG/M2 | WEIGHT: 241 LBS

## 2020-03-05 LAB
ALBUMIN SERPL-MCNC: 2.6 G/DL (ref 3.4–5)
ALP SERPL-CCNC: 137 U/L (ref 40–150)
ALT SERPL W P-5'-P-CCNC: 26 U/L (ref 0–50)
ANION GAP SERPL CALCULATED.3IONS-SCNC: 7 MMOL/L (ref 3–14)
AST SERPL W P-5'-P-CCNC: 166 U/L (ref 0–45)
BILIRUB DIRECT SERPL-MCNC: 13.8 MG/DL (ref 0–0.2)
BILIRUB SERPL-MCNC: 16.9 MG/DL (ref 0.2–1.3)
BUN SERPL-MCNC: 26 MG/DL (ref 7–30)
CALCIUM SERPL-MCNC: 9 MG/DL (ref 8.5–10.1)
CHLORIDE SERPL-SCNC: 97 MMOL/L (ref 94–109)
CO2 SERPL-SCNC: 29 MMOL/L (ref 20–32)
CREAT SERPL-MCNC: 3.97 MG/DL (ref 0.52–1.04)
ERYTHROCYTE [DISTWIDTH] IN BLOOD BY AUTOMATED COUNT: 20.8 % (ref 10–15)
GFR SERPL CREATININE-BSD FRML MDRD: 14 ML/MIN/{1.73_M2}
GLUCOSE SERPL-MCNC: 89 MG/DL (ref 70–99)
HCT VFR BLD AUTO: 25.6 % (ref 35–47)
HGB BLD-MCNC: 8.2 G/DL (ref 11.7–15.7)
INR PPP: 1.93 (ref 0.86–1.14)
MAGNESIUM SERPL-MCNC: 1.8 MG/DL (ref 1.6–2.3)
MCH RBC QN AUTO: 30.8 PG (ref 26.5–33)
MCHC RBC AUTO-ENTMCNC: 32 G/DL (ref 31.5–36.5)
MCV RBC AUTO: 96 FL (ref 78–100)
PHOSPHATE SERPL-MCNC: 4.1 MG/DL (ref 2.5–4.5)
PLATELET # BLD AUTO: 297 10E9/L (ref 150–450)
POTASSIUM SERPL-SCNC: 4.1 MMOL/L (ref 3.4–5.3)
PROT SERPL-MCNC: 6.6 G/DL (ref 6.8–8.8)
RBC # BLD AUTO: 2.66 10E12/L (ref 3.8–5.2)
SODIUM SERPL-SCNC: 133 MMOL/L (ref 133–144)
WBC # BLD AUTO: 18.4 10E9/L (ref 4–11)

## 2020-03-05 PROCEDURE — 84460 ALANINE AMINO (ALT) (SGPT): CPT | Performed by: HOSPITALIST

## 2020-03-05 PROCEDURE — 25000132 ZZH RX MED GY IP 250 OP 250 PS 637: Performed by: HOSPITALIST

## 2020-03-05 PROCEDURE — H0001 ALCOHOL AND/OR DRUG ASSESS: HCPCS

## 2020-03-05 PROCEDURE — 82247 BILIRUBIN TOTAL: CPT | Performed by: HOSPITALIST

## 2020-03-05 PROCEDURE — 85027 COMPLETE CBC AUTOMATED: CPT | Performed by: HOSPITALIST

## 2020-03-05 PROCEDURE — 99239 HOSP IP/OBS DSCHRG MGMT >30: CPT | Performed by: INTERNAL MEDICINE

## 2020-03-05 PROCEDURE — 84155 ASSAY OF PROTEIN SERUM: CPT | Performed by: HOSPITALIST

## 2020-03-05 PROCEDURE — 85610 PROTHROMBIN TIME: CPT | Performed by: HOSPITALIST

## 2020-03-05 PROCEDURE — 82248 BILIRUBIN DIRECT: CPT | Performed by: HOSPITALIST

## 2020-03-05 PROCEDURE — 80069 RENAL FUNCTION PANEL: CPT | Performed by: HOSPITALIST

## 2020-03-05 PROCEDURE — 83735 ASSAY OF MAGNESIUM: CPT | Performed by: HOSPITALIST

## 2020-03-05 PROCEDURE — 84075 ASSAY ALKALINE PHOSPHATASE: CPT | Performed by: HOSPITALIST

## 2020-03-05 PROCEDURE — 84450 TRANSFERASE (AST) (SGOT): CPT | Performed by: HOSPITALIST

## 2020-03-05 PROCEDURE — 97530 THERAPEUTIC ACTIVITIES: CPT | Mod: GP

## 2020-03-05 RX ORDER — METRONIDAZOLE 500 MG/1
500 TABLET ORAL 4 TIMES DAILY
Qty: 8 TABLET | Refills: 0 | Status: SHIPPED | OUTPATIENT
Start: 2020-03-05 | End: 2020-03-05

## 2020-03-05 RX ORDER — MIDODRINE HYDROCHLORIDE 10 MG/1
10 TABLET ORAL
Qty: 90 TABLET | Refills: 0 | Status: SHIPPED | OUTPATIENT
Start: 2020-03-05 | End: 2020-03-05

## 2020-03-05 RX ORDER — LACTULOSE 10 G/15ML
10 SOLUTION ORAL 2 TIMES DAILY
Qty: 900 ML | Refills: 0 | Status: SHIPPED | OUTPATIENT
Start: 2020-03-05 | End: 2020-03-05

## 2020-03-05 RX ORDER — LANOLIN ALCOHOL/MO/W.PET/CERES
100 CREAM (GRAM) TOPICAL DAILY
Qty: 30 TABLET | Refills: 0 | Status: ON HOLD | OUTPATIENT
Start: 2020-03-06 | End: 2020-04-06

## 2020-03-05 RX ORDER — METRONIDAZOLE 500 MG/1
500 TABLET ORAL 4 TIMES DAILY
Qty: 8 TABLET | Refills: 0 | Status: SHIPPED | OUTPATIENT
Start: 2020-03-05 | End: 2020-06-10

## 2020-03-05 RX ORDER — ONDANSETRON 4 MG/1
4 TABLET, ORALLY DISINTEGRATING ORAL EVERY 6 HOURS PRN
Qty: 20 TABLET | Refills: 0 | Status: SHIPPED | OUTPATIENT
Start: 2020-03-05 | End: 2020-05-22

## 2020-03-05 RX ORDER — FOLIC ACID 1 MG/1
1 TABLET ORAL DAILY
Qty: 30 TABLET | Refills: 0 | Status: SHIPPED | OUTPATIENT
Start: 2020-03-06 | End: 2020-03-05

## 2020-03-05 RX ORDER — LACTULOSE 10 G/15ML
10 SOLUTION ORAL 2 TIMES DAILY
Qty: 900 ML | Refills: 0 | Status: SHIPPED | OUTPATIENT
Start: 2020-03-05 | End: 2020-06-10

## 2020-03-05 RX ORDER — ONDANSETRON 4 MG/1
4 TABLET, ORALLY DISINTEGRATING ORAL EVERY 6 HOURS PRN
Qty: 20 TABLET | Refills: 0 | Status: SHIPPED | OUTPATIENT
Start: 2020-03-05 | End: 2020-03-05

## 2020-03-05 RX ORDER — LANOLIN ALCOHOL/MO/W.PET/CERES
100 CREAM (GRAM) TOPICAL DAILY
Qty: 30 TABLET | Refills: 0 | Status: SHIPPED | OUTPATIENT
Start: 2020-03-06 | End: 2020-03-05

## 2020-03-05 RX ORDER — FOLIC ACID 1 MG/1
1 TABLET ORAL DAILY
Qty: 30 TABLET | Refills: 0 | Status: ON HOLD | OUTPATIENT
Start: 2020-03-06 | End: 2020-04-06

## 2020-03-05 RX ORDER — MIDODRINE HYDROCHLORIDE 10 MG/1
10 TABLET ORAL
Qty: 90 TABLET | Refills: 0 | Status: SHIPPED | OUTPATIENT
Start: 2020-03-05 | End: 2020-06-10

## 2020-03-05 RX ADMIN — DIPHENHYDRAMINE HYDROCHLORIDE AND ZINC ACETATE: 10; 1 CREAM TOPICAL at 00:21

## 2020-03-05 RX ADMIN — MIDODRINE HYDROCHLORIDE 10 MG: 5 TABLET ORAL at 12:57

## 2020-03-05 RX ADMIN — THIAMINE HCL TAB 100 MG 100 MG: 100 TAB at 08:43

## 2020-03-05 RX ADMIN — FOLIC ACID 1 MG: 1 TABLET ORAL at 08:42

## 2020-03-05 RX ADMIN — RIFAXIMIN 550 MG: 550 TABLET ORAL at 10:07

## 2020-03-05 RX ADMIN — LACTULOSE 10 G: 10 SOLUTION ORAL at 08:43

## 2020-03-05 RX ADMIN — METRONIDAZOLE 500 MG: 500 TABLET ORAL at 12:57

## 2020-03-05 RX ADMIN — METRONIDAZOLE 500 MG: 500 TABLET ORAL at 08:42

## 2020-03-05 RX ADMIN — MIDODRINE HYDROCHLORIDE 10 MG: 5 TABLET ORAL at 08:42

## 2020-03-05 ASSESSMENT — ACTIVITIES OF DAILY LIVING (ADL)
ADLS_ACUITY_SCORE: 14
ADLS_ACUITY_SCORE: 14
ADLS_ACUITY_SCORE: 12
ADLS_ACUITY_SCORE: 14

## 2020-03-05 ASSESSMENT — MIFFLIN-ST. JEOR: SCORE: 1866.67

## 2020-03-05 NOTE — PROGRESS NOTES
Rule 25 Assessment  Background Information   1. Date of Assessment Request 3/2/10 2. Date of Assessment  3/5/2020 3. Date Service Authorized     4.   RICKY Li   5.  Phone Number   [phone] 6. Referent  [facility] 7. Assessment Site  [place] [address] [facility]     8. Client Name   Pat Delgado 9. Date of Birth  1990 Age  29 year old 10. Gender  female  11. PMI/ Insurance No.  [id number]   12. Client's Primary Language:  English 13. Do you require special accommodations, such as an  or assistance with written material? No   14. Current Address: [address] [place] [address]   15. Client Phone Numbers: [phone] (home)      16. Tell me what has happened to bring you here today.    The history was obtained from reviewing medical records and staff ordered cd consult:    Pat Delgado is a 29 year old female with a history of alcoholism who presented on 2/9/20 to the emergency department with her significant other for evaluation of abdominal pain. Patient reports on and off abdominal distension and constipation for the past month that worsened this week in conjunction with nausea, abdominal pain, and yellow eyes. Patient also states she feels short of breath on exertion. Patient lost her job several months ago and since then has been drinking more than usual. Her last drink was at noon today. She denies blood in emesis and dark/tarry stools.     17. Have you had other rule 25 assessments?     Yes. When, Where, and What circumstances: 3+ years ago    DIMENSION I - Acute Intoxication /Withdrawal Potential   1. Chemical use most recent 12 months outside a facility and other significant use history (client self-report)              X = Primary Drug Used   Age of First Use Most Recent Pattern of Use and Duration   Need enough information to show pattern (both frequency and amounts) and to show tolerance for each chemical that has  a diagnosis   Date of last use and time, if needed   Withdrawal Potential? Requiring special care Method of use  (oral, smoked, snort, IV, etc)     x Alcohol     unsp Past 3 mos: daily, 750mL/day (vodka)  Prior: 1-2x/month, social  Hx daily drinking 20  12pm no oral      Marijuana/  Hashish   unsp Denied any regular use unknown no smoke      Cocaine/Crack     No use          Meth/  Amphetamines   No use          Heroin     No use          Other Opiates/  Synthetics   No use          Inhalants     No use          Benzodiazepines     No use          Hallucinogens     No use          Barbiturates/  Sedatives/  Hypnotics No use          Over-the-Counter Drugs   No use          Other     No use          Nicotine     No use         2. Do you use greater amounts of alcohol/other drugs to feel intoxicated or achieve the desired effect?  Yes.  Or use the same amount and get less of an effect?  Yes.  Example: The patient reported having increased use and tolerance issues with alcohol.    3A. Have you ever been to detox?     Yes    3B. When was the first time?     1x many, many years ago    3C. How many times since then?     The patient denied ever having a detoxification admission.    3D. Date of most recent detox:     The patient denied ever having a detoxification admission.    4.  Withdrawal symptoms: Have you had any of the following withdrawal symptoms?  Past 12 months Recent (past 30 days)   Shaky / Jittery / Tremors  Nausea / Vomiting None     's Visual Observations and Symptoms: No visible withdrawal symptoms at this time    Based on the above information, is withdrawal likely to require attention as part of treatment participation?  No    Dimension I Ratings   Acute intoxication/Withdrawal potential - The placing authority must use the criteria in Dimension I to determine a client s acute intoxication and withdrawal potential.    RISK DESCRIPTIONS - Severity ratin Client displays full functioning with  good ability to tolerate and cope with withdrawal discomfort. No signs or symptoms of intoxication or withdrawal or resolving signs or symptoms.    REASONS SEVERITY WAS ASSIGNED (What about the amount of the person s use and date of most recent use and history of withdrawal problems suggests the potential of withdrawal symptoms requiring professional assistance? )     Patient had a BAL of 0.14 and urine drug screen positive for amphetamines and cannabis on admission.  She was placed on withdrawal protocol initially.  This day was alert and oriented and ready for discharge.         DIMENSION II - Biomedical Complications and Conditions   1a. Do you have any current health/medical conditions?(Include any infectious diseases, allergies, or chronic or acute pain, history of chronic conditions)       Yes.   Illnesses/Medical Conditions you are receiving care for:   Past Medical History:   Diagnosis Date     ADHD (attention deficit hyperactivity disorder)      Alcohol dependence (H) 1/20/2015     Anxiety      Chemical dependency (H) 1/22/2015     Depressive disorder, not elsewhere classified 8/20/2015    Per 1/22/15 visit with Batool Gonzalez NP      Hypertension goal BP (blood pressure) < 150/90 11/10/2015     Migraine headache      Substance abuse (H)      Tobacco abuse    .    1b. On a scale of mild, moderate to severe please specify the severity of the patient's diabetes and/or neuropathy.    The patient denied having a history of being diagnosed with diabetes or neuropathy.    2. Do you have a health care provider? When was your most recent appointment? What concerns were identified?     The care coordinator is setting me up with a primary care.    3. If indicated by answers to items 1 or 2: How do you deal with these concerns? Is that working for you? If you are not receiving care for this problem, why not?      The patient reported taking prescription medications as prescribed for the above medical issues.  Patient  will need dialysis 3x/week.    4A. List current medication(s) including over-the-counter or herbal supplements--including pain management:     No current facility-administered medications for this encounter.      Current Outpatient Medications   Medication     amphetamine-dextroamphetamine (ADDERALL XR) 30 MG 24 hr capsule     amphetamine-dextroamphetamine (ADDERALL) 20 MG tablet     famotidine (PEPCID) 20 MG tablet     folic acid (FOLVITE) 1 MG tablet     lactulose (CHRONULAC) 10 GM/15ML solution     metroNIDAZOLE (FLAGYL) 500 MG tablet     midodrine (PROAMATINE) 10 MG tablet     ondansetron (ZOFRAN-ODT) 4 MG ODT tab     rifaximin (XIFAXAN) 550 MG TABS tablet     vitamin B1 (THIAMINE) 100 MG tablet       4B. Do you follow current medical recommendations/take medications as prescribed?     Yes    4C. When did you last take your medication?     3/5/20    4D. Do you need a referral to have a follow up with a primary care physician?    No.    5. Has a health care provider/healer ever recommended that you reduce or quit alcohol/drug use?     Yes    6. Are you pregnant?     No    7. Have you had any injuries, assaults/violence towards you, accidents, health related issues, overdose(s) or hospitalizations related to your use of alcohol or other drugs:     Yes, explain: currently hospitalized due to liver issues after being jaundiced.  Denies any previous hospitalizations.     8. Do you have any specific physical needs/accommodations? No    Dimension II Ratings   Biomedical Conditions and Complications - The placing authority must use the criteria in Dimension II to determine a client s biomedical conditions and complications.   RISK DESCRIPTIONS - Severity ratin Client tolerates and sakina with physical discomfort and is able to get the services that the client needs.    REASONS SEVERITY WAS ASSIGNED (What physical/medical problems does this person have that would inhibit his or her ability to participate in treatment?  What issues does he or she have that require assistance to address?)    See physician H&P for full medical history and current medications administered.         DIMENSION III - Emotional, Behavioral, Cognitive Conditions and Complications   1. (Optional) Tell me what it was like growing up in your family. (substance use, mental health, discipline, abuse, support)     Raised by biological parents, 1 sister.  No immediate family substance use issues.  One uncle was a drinker.  Mom has anxiety.    2. When was the last time that you had significant problems...  A. with feeling very trapped, lonely, sad, blue, depressed or hopeless  about the future? 2 - 12 months ago    B. with sleep trouble, such as bad dreams, sleeping restlessly, or falling  asleep during the day? Past Month    C. with feeling very anxious, nervous, tense, scared, panicked, or like  something bad was going to happen? Past Month    D. with becoming very distressed and upset when something reminded  you of the past? Past Month    E. with thinking about ending your life or committing suicide? Never    3. When was the last time that you did the following things two or more times?  A. Lied or conned to get things you wanted or to avoid having to do  something? 1+ years ago    B. Had a hard time paying attention at school, work, or home? 2 - 12 months ago    C. Had a hard time listening to instructions at school, work, or home? 2 - 12 months ago    D. Were a bully or threatened other people? Never    E. Started physical fights with other people? Never    Note: These questions are from the Global Appraisal of Individual Needs--Short Screener. Any item marked  past month  or  2 to 12 months ago  will be scored with a severity rating of at least 2.     For each item that has occurred in the past month or past year ask follow up questions to determine how often the person has felt this way or has the behavior occurred? How recently? How has it affected their  daily living? And, whether they were using or in withdrawal at the time?    Past 3 months had just been cowardly and just drinking.  Now all health concerns and remorse about what I did to my body.    4A. If the person has answered item 2E with  in the past year  or  the past month , ask about frequency and history of suicide in the family or someone close and whether they were under the influence.     Denies    Any history of suicide in your family? Or someone close to you?     The patient denied any family member or someone close to the patient had ever completed suicide.    4B. If the person answered item 2E  in the past month  ask about  intent, plan, means and access and any other follow-up information  to determine imminent risk. Document any actions taken to intervene  on any identified imminent risk.      The patient denied having any suicide ideation within the past month.    5A. Have you ever been diagnosed with a mental health problem?     Yes, explain: anxiety      5B. Are you receiving care for any mental health issues? If yes, what is the focus of that care or treatment?  Are you satisfied with the service? Most recent appointment?  How has it been helpful?     No current outpatient providers    6. Have you been prescribed medications for emotional/psychological problems?     Yes, see Dim2    7. Does your MH provider know about your use?     The patient does not currently have any mental health providers.    8A. Have you ever been verbally, emotionally, physically or sexually abused?      No     Follow up questions to learn current risk, continuing emotional impact.      The patient denied having any history of being verbally, emotionally, physically or sexually abused.    8B. Have you received counseling for abuse?      The patient denied having any history of being verbally, emotionally, physically or sexually abused.    9. Have you ever experienced or been part of a group that experienced community  violence, historical trauma, rape or assault?     No    10A. :    No    11. Do you have problems with any of the following things in your daily life?    Concentration      Note: If the person has any of the above problems, follow up with items 12, 13, and 14. If none of the issues in item 11 are a problem for the person, skip to item 15.    ADHD    12. Have you been diagnosed with traumatic brain injury or Alzheimer s?  No    13. If the answer to #12 is no, ask the following questions:    Have you ever hit your head or been hit on the head? Yes    Were you ever seen in the Emergency Room, hospital or by a doctor because of an injury to your head? No    Have you had any significant illness that affected your brain (brain tumor, meningitis, West Nile Virus, stroke or seizure, heart attack, near drowning or near suffocation)? No    14. If the answer to #12 is yes, ask if any of the problems identified in #11 occurred since the head injury or loss of oxygen. The patient had never had a head injury or a loss of oxygen.    15A. Highest grade of school completed:     Some college, but no degree    15B. Do you have a learning disability? Yes    15C. Did you ever have tutoring in Math or English? No    15D. Have you ever been diagnosed with Fetal Alcohol Effects or Fetal Alcohol Syndrome? No    16. If yes to item 15 B, C, or D: How has this affected your use or been affected by your use?     The patient denied having any history of a learning disability, tutoring in math or English or being diagnosed with Fetal Alcohol Effects or Fetal Alcohol Syndrome.    Dimension III Ratings   Emotional/Behavioral/Cognitive - The placing authority must use the criteria in Dimension III to determine a client s emotional, behavioral, and cognitive conditions and complications.   RISK DESCRIPTIONS - Severity ratin Client has difficulty with impulse control and lacks coping skills. Client has thoughts of suicide or harm to others  without means; however, the thoughts may interfere with participation in some treatment activities. Client has difficulty functioning in significant life areas. Client has moderate symptoms of emotional, behavioral, or cognitive problems. Client is able to participate in most treatment activities.    REASONS SEVERITY WAS ASSIGNED - What current issues might with thinking, feelings or behavior pose barriers to participation in a treatment program? What coping skills or other assets does the person have to offset those issues? Are these problems that can be initially accommodated by a treatment provider? If not, what specialized skills or attributes must a provider have?    Patient reports a history of anxiety and denies any recent outpatient mental health providers.  Patient reports this past month having some guilt regarding her drinking and stress from her health issues.  She denies any suicidal ideation.  Patient was seen by psychiatry, please refer to psychiatrist notes for further mental health assessment information.         DIMENSION IV - Readiness for Change   1. You ve told me what brought you here today. (first section) What do you think the problem really is?     I understand the concept that if I drink again I will die.    2. Tell me how things are going. Ask enough questions to determine whether the person has use related problems or assets that can be built upon in the following areas: Family/friends/relationships; Legal; Financial; Emotional; Educational; Recreational/ leisure; Vocational/employment; Living arrangements (DSM)      Not working, health issues.    3. What activities have you engaged in when using alcohol/other drugs that could be hazardous to you or others (i.e. driving a car/motorcycle/boat, operating machinery, unsafe sex, sharing needles for drugs or tattoos, etc     Driving    4. How much time do you spend getting, using or getting over using alcohol or drugs? (DSM)     daily    5.  Reasons for drinking/drug use (Use the space below to record answers. It may not be necessary to ask each item.)  Like the feeling Yes   Trying to forget problems Yes   To cope with stress Yes   To relieve physical pain No   To cope with anxiety Yes   To cope with depression Yes   To relax or unwind Yes   Makes it easier to talk with people Yes   Partner encourages use Yes   Most friends drink or use No   To cope with family problems No   Afraid of withdrawal symptoms/to feel better Yes   Other (specify)  No     A. What concerns other people about your alcohol or drug use/Has anyone told you that you use too much? What did they say? (DSM)     Concerns have been drinking and driving, that I am so young and have done this much damage.    B. What did you think about that/ do you think you have a problem with alcohol or drug use?     I understand the harm I did to my body.    6. What changes are you willing to make? What substance are you willing to stop using? How are you going to do that? Have you tried that before? What interfered with your success with that goal?      Once you almost die it really puts things in perspective.  I am willing to do some outpatient.  I am doing it for me this time.  I need to get out of the house to do stuff, go to the coffee shop and work on stuff.    7. What would be helpful to you in making this change?     Outpatient support    Dimension IV Ratings   Readiness for Change - The placing authority must use the criteria in Dimension IV to determine a client s readiness for change.   RISK DESCRIPTIONS - Severity ratin Client is cooperative, motivated, ready to change, admits problems, committed to change, and engaged in treatment as a responsible participant.    REASONS SEVERITY WAS ASSIGNED - (What information did the person provide that supports your assessment of his or her readiness to change? How aware is the person of problems caused by continued use? How willing is she or he to  make changes? What does the person feel would be helpful? What has the person been able to do without help?)      Patient is willing to seek treatment and recognizes a need to make changes.         DIMENSION V - Relapse, Continued Use, and Continued Problem Potential   1A. In what ways have you tried to control, cut-down or quit your use? If you have had periods of sobriety, how did you accomplish that? What was helpful? What happened to prevent you from continuing your sobriety? (DSM)     Longest sobriety was 1.5 years.  Having a support system, outpatient, making new friends and AA.    1B. What were the circumstances of your most recent relapse with mood altering chemicals?    I just don't think I knew how bad it was.    2. Have you experienced cravings? If yes, ask follow up questions to determine if the person recognizes triggers and if the person has had any success in dealing with them.     The patient denied having any current cravings to use mood altering chemicals.    3. Have you been treated for alcohol/other drug abuse/dependence? Yes.  3B. Number of times(lifetime) (over what period) Couple.  3C. Number of times completed treatment (lifetime) Not all of them.  3D. During the past three years have you participated in outpatient and/or residential?  No  Araceli, Katelynn, [facility]    4. Support group participation: Have you/do you attend support group meetings to reduce/stop your alcohol/drug use? How recently? What was your experience? Are you willing to restart? If the person has not participated, is he or she willing?     We do some AA together.    5. What would assist you in staying sober/straight?     Outpatient support    Dimension V Ratings   Relapse/Continued Use/Continued problem potential - The placing authority must use the criteria in Dimension V to determine a client s relapse, continued use, and continued problem potential.   RISK DESCRIPTIONS - Severity rating: 3 Client has poor recognition  and understanding of relapse and recidivism issues and displays moderately high vulnerability for further substance use or mental health problems. Client has few coping skills and rarely applies coping skills.    REASONS SEVERITY WAS ASSIGNED - (What information did the person provide that indicates his or her understanding of relapse issues? What about the person s experience indicates how prone he or she is to relapse? What coping skills does the person have that decrease relapse potential?)      Patient has had previous treatment and does report having over a year of sobriety in the past.  Patient's drinking had significantly escalated and she had been unable to stop until medical intervention.  She lacks insight into her relapse potential and risks, and needs to develop daily sober living skills.         DIMENSION VI - Recovery Environment   1. Are you employed/attending school? Tell me about that.     Not working    2A. Describe a typical day; evening for you. Work, school, social, leisure, volunteer, spiritual practices. Include time spent obtaining, using, recovering from drugs or alcohol. (DSM)     Had been drinking all day.    Please describe what leisure activities have been associated with your substance abuse:     The patient denied having any leisure activities which had been associated with her substance abuse.    2B. How often do you spend more time than you planned using or use more than you planned? (DSM)     Past 3 months.    3. How important is using to your social connections? Do many of your family or friends use?     Have a few friends who don't drink at all.    4A. Are you currently in a significant relationship?     Yes.  4B. How long? 3 years, on/off            Please describe your significant other's use of mood altering chemicals? He has been sober for about a year    4C. Sexual Orientation:     Heterosexual    5A. Who do you live with?      boyfriend    5B. Tell me about their  alcohol/drug use and mental health issues.     He will take Antabuse and he has been back to treatment, sober now.    5C. Are you concerned for your safety there? No    5D. Are you concerned about the safety of anyone else who lives with you? No    6A. Do you have children who live with you?     No    6B. Do you have children who do not live with you?     No    7A. Who supports you in making changes in your alcohol or drug use? What are they willing to do to support you? Who is upset or angry about you making changes in your alcohol or drug use? How big a problem is this for you?      Boyfriend, parents, sister    7B. This table is provided to record information about the person s relationships and available support It is not necessary to ask each item; only to get a comprehensive picture of their support system.  How often can you count on the following people when you need someone?   Partner / Spouse Always supportive  Willing to stop using?  Yes   Parent(s)/Aunt(s)/Uncle(s)/Grandparents Always supportive   Sibling(s)/Cousin(s) Always supportive   Child(vamsi) The patient doesn't have any children.   Other relative(s) The patient doesn't have any other relatives.   Friend(s)/neighbor(s) Usually supportive   Child(vamsi) s father(s)/mother(s) The patient doesn't have any children.   Support group member(s) Always supportive   Community of ines members Always supportive   /counselor/therapist/healer The patient denied having any current involvement with a , counselor, therapist or healer.   Other (specify) No     8A. What is your current living situation?     Independent living    8B. What is your long term plan for where you will be living?     No change    8C. Tell me about your living environment/neighborhood? Ask enough follow up questions to determine safety, criminal activity, availability of alcohol and drugs, supportive or antagonistic to the person making changes.      No  concerns    9. Criminal justice history: Gather current/recent history and any significant history related to substance use--Arrests? Convictions? Circumstances? Alcohol or drug involvement? Sentences? Still on probation or parole? Expectations of the court? Current court order? Any sex offenses - lifetime? What level? (DSM)    Denies any current probation or legal  Hx DWI (over 2 years ago)    10. What obstacles exist to participating in treatment? (Time off work, childcare, funding, transportation, pending halfway time, living situation)     Medical appointment times    Dimension VI Ratings   Recovery environment - The placing authority must use the criteria in Dimension VI to determine a client s recovery environment.   RISK DESCRIPTIONS - Severity ratin Client is engaged in structured, meaningful activity, but peers, family, significant other, and living environment are unsupportive, or there is criminal justice involvement by the client or among the client's peers, significant others, or in the client's living environment.    REASONS SEVERITY WAS ASSIGNED - (What support does the person have for making changes? What structure/stability does the person have in his or her daily life that will increase the likelihood that changes can be sustained? What problems exist in the person s environment that will jeopardize getting/staying clean and sober?)     Patient reports she is living with her boyfriend, and he had been drinking with patient but she reports he is willing to support her.  She has family support but also social support.  She is not working.  She denies any legal issues.  She lacks any structured activity.         Client Choice/Exceptions   Would you like services specific to language, age, gender, culture, Religion preference, race, ethnicity, sexual orientation or disability?  No    What particular treatment choices and options would you like to have? outpatient    Do you have a preference for a  particular treatment program? open    Criteria for Diagnosis     Criteria for Diagnosis  DSM-5 Criteria for Substance Use Disorder  Instructions: Determine whether the client currently meets the criteria for Substance Use Disorder using the diagnostic criteria in the DSM-V pp.488-833. Current means during the most recent 12 months outside a facility that controls access to substances    Category of Substance Severity (ICD-10 Code / DSM 5 Code)     Alcohol Use Disorder Severe  (10.20) (303.90)   Cannabis Use Disorder The patient does not meet the criteria for a Cannabis use disorder.   Hallucinogen Use Disorder The patient does not meet the criteria for a Hallucinogen use disorder.   Inhalant Use Disorder The patient does not meet the criteria for an Inhalant use disorder.   Opioid Use Disorder The patient does not meet the criteria for an Opioid use disorder.   Sedative, Hypnotic, or Anxiolytic Use Disorder The patient does not meet the criteria for a Sedative/Hypnotic use disorder.   Stimulant Related Disorder The patient does not meet the criteria for a Stimulant use disorder.   Tobacco Use Disorder The patient does not meet the criteria for a Tobacco use disorder.   Other (or unknown) Substance Use Disorder The patient does not meet the criteria for a Other (or unknown) Substance use disorder.       Collateral Contact Summary   Number of contacts made: 1    Contact with referring person:  Yes    If court related records were reviewed, summarize here: No court records had been reviewed at the time of this documentation.    Information from collateral contacts supported/largely agreed with information from the client and associated risk ratings.      Rule 25 Assessment Summary and Plan   's Recommendation    Patient is recommended to attend an IOP treatment program.      Collateral Contacts     Name:    [facility]   Relationship:    Medical records   Phone Number:    [phone]  Releases:    No     EHR was reviewed and discussed care plan with staff.      Collateral Contacts     Name:    none   Relationship:    none   Phone Number:    none   Releases:    No         ollateral Contacts      A problematic pattern of alcohol/drug use leading to clinically significant impairment or distress, as manifested by at least two of the following, occurring within a 12-month period:    1.) Alcohol/drug is often taken in larger amounts or over a longer period than was intended.  2.) There is a persistent desire or unsuccessful efforts to cut down or control alcohol/drug use  3.) A great deal of time is spent in activities necessary to obtain alcohol, use alcohol, or recover from its effects.  7.) Important social, occupational, or recreational activities are given up or reduced because of alcohol/drug use.  9.) Alcohol/drug use is continued despite knowledge of having a persistent or recurrent physical or psychological problem that is likely to have been caused or exacerbated by alcohol.  10.) Tolerance, as defined by either of the following: A need for markedly increased amounts of alcohol/drug to achieve intoxication or desired effect. and A markedly diminished effect with continued use of the same amount of alcohol/drug.  11.) Withdrawal, as manifested by either of the following: The characteristic withdrawal syndrome for alcohol/drug (refer to Criteria A and B of the criteria set for alcohol/drug withdrawal). and Alcohol/drug (or a closely related substance, such as a benzodiazepine) is taken to relieve or avoid withdrawal symptoms.      Specify if: In early remission:  After full criteria for alcohol/drug use disorder were previously met, none of the criteria for alcohol/drug use disorder have been met for at least 3 months but for less than 12 months (with the exception that Criterion A4,  Craving or a strong desire or urge to use alcohol/drug  may be met).     In sustained remission:   After full  criteria for alcohol use disorder were previously met, non of the criteria for alcohol/drug use disorder have been met at any time during a period of 12 months or longer (with the exception that Criterion A4,  Craving or strong desire or urge to use alcohol/drug  may be met).   Specify if:   This additional specifier is used if the individual is in an environment where access to alcohol is restricted.    Mild: Presence of 2-3 symptoms  Moderate: Presence of 4-5 symptoms  Severe: Presence of 6 or more symptoms

## 2020-03-05 NOTE — PLAN OF CARE
Discharge    Patient discharged to home via car with pt's boyfriend  Care plan note    DATE & TIME: 3/5/2020 AM shift            Cognitive Concerns/ Orientation : A&Ox4   BEHAVIOR & AGGRESSION TOOL COLOR: green  CIWA SCORE: na      ABNL VS/O2: VSS on RA, continues on Midodrine. Dialysis MWF  MOBILITY: independent in room, PT/OT are following.   PAIN MANAGMENT: denies pain  DIET: regular, tolerating   BOWEL/BLADDER: continent of B/B. Reports 3 BMs so far, continues on lactulose, advise to hold evening dose of lactulose. Goal of two BMs a day.  ABNL LAB/BG: Bilirubin Total 16.9; Creat 3.97; Hgb 8.2; WBC 18.4  DRAIN/DEVICES: R PICC SL ; R Chest port for dialysis   TELEMETRY RHYTHM: na  SKIN: jaundice, yellow sclera. Face Acne, improving. TESTS/PROCEDURES:dialysis.   D/C DAY/GOALS/PLACE:discharge home today with Home PT/OT. Follow up with PCP/GI and dialysis MWF.  OTHER IMPORTANT INFO:naty LE edema +2/+3, unable to tolerate lymph wraps.     Listed belongings gathered and returned to patient. Yes  Care Plan and Patient education resolved: Yes  Prescriptions if needed, hard copies sent with patient  NA  Home and hospital acquired medications returned to patient: NA  Medication Bin checked and emptied on discharge Yes  Follow up appointment made for patient: Yes

## 2020-03-05 NOTE — DISCHARGE SUMMARY
[facility]    Discharge Summary  Hospitalist    Date of Admission:  2/9/2020  Date of Discharge:  3/5/2020  Discharging Provider: Marvin Guillaume MD  Date of Service (when I saw the patient): 03/05/20    Discharge Diagnoses   Please refer below    History of Present Illness   Pat Delgado is an 29 year old female who presented with alcoholic hepatitis    Hospital Course       Pat Delgado is a 29 year old female history of alcohol abuse, anxiety, depression, ADHD, hypertension, migraine, tobacco abuse and substance abuse who was admitted on 2/9/2020 with  severe alcoholic hepatitis complicated by kidney failure w/ ATN vs type II hepatorenal syndrome, encephalopathy, hypoxic hypercapnic respiratory failure on 2/22/2020 was intubated and transferred to ICU- extubated on 2/24/2020 and transferred to floor 2/25/2020. Currently on HD-creatinine remains elevated.  She also developed bacteremia with fusobacterium nucleatum, with repeat blood culture negative, she was treated with IV meropenem and then switch to oral Flagyl to complete the course.    During her stay she was seen by GI, psychiatry intensivist and the hospitalist service.  At this time she is tolerating her dialysis well.  She has extensive ultrafiltrate with improvement in the volume status, low extremity swelling is much improved abdominal ascites improved as well.  With regard to deconditioning she is improving with that as well.  Initially thought that he she will need rehab or TCU, but subsequently her strength improved and she is able to walk with the PT and OT without any difficulty.  At this time rehab is recommending discharging home with outpatient PT at this time.    Patient was seen by the psychiatrist as well and they cleared her to be discharged home, CD evaluated her again the day of discharge and agree with outpatient treatment.  Patient requested Antabuse, giving severe hepatic impairment she is not a candidate for  Antabuse at this time.  She is been given resources for outpatient treatment by the CD and .    At this time the patient will be discharged home in stable condition.  She denies any chest pain shortness of breath orthopnea PND palpitation no fever chills or cough no dysuria hematuria constipation at this time.    At the discussed with the patient father Bebeto Delgado in detail at the time of discharge.    Final discharge diagnoses and hospital course     Acute liver failure 2/2 Alcoholic hepatitis  Acute metabolic encephalopathy, improved  Acute hypoxic respiratory failure, resolved  Acute alcoholic hepatitis with Maddrey score >50. Minimal ascites, received 5 days of cefepime for SBP prophylaxis. Was initiated on prednisolone, discontinued due to bacteremia. Extubated 2/24 and transferred out of MICU 2/25.  - continue rifaximin 550 BID  - GI following, LFTs stable, lactulose added.    - 2 gm sodium diet.    - Was evaluated by psychiatry earlier this stay, re consulted to assess discharge planning and does not think she needs involuntary commitment, as the patient has good plan.  Recommend outpatient treatment.  Chemical dependency we consulted, they agree with outpatient treatment at this time.    Patient need to abstain from alcohol, liver enzymes are still elevated.  She needs to follow with the GI as outpatient.        Fusobacterium bacteremia, unclear source possible contamination.   - Initially received 5 days of cefepime for SBP ppx, subsequently spiked fever, blood culture showed GNR identified as fusobacterium, pansensitive. IV Meropenem and vancomycin were started, and with identification of organism, Vancomycin discontinued.  - Was on meropenem, developed rash, seems like it was presumed allergic reaction to meropenem,  Abx transitioned to flagyl. Plan is total 14 days given bacteremia.    - continues to have marked leucocytosis but stable count/slightly better today, remains afebrile. Suspect  due to hepatitis. Immune to hepatitis B, core ab negative, hep A and C negative.       Multifocal pneumonia/pleural effusion/anasarca: CT chest done in the ICU showed multifocal airspace opacity.   - completed Meorpenem for bacteremia as above.  - subsequent CXR includin showing pulm vasc congestion. Making scanty urine, ? Lasix, defer to nephrology.   - Fluid removal with HD/UF.  - weaned off O2.  Currently on room air at this time.     Oliguric renal failure 2/2 ATN and hepato renal syndrome: Multiple other insults including septicemia, IV Contrast ( 2/9 and 2/21), High vanco levels.   - nephrology following  - tunneled RIJ HD catheter in place  - HD/UF per nephrology, consulted and following.  - Significant lower extremity edema- using lymphedema wraps as well.  Now improved.     GI/Nutrition:  On regular diet at this time.  -Continue supplements including folate and thiamine     Anemia acute on chronic: presented with Hb 10.   -Slow decrease in Hb, 8.0--7.9--7.4, no sign of overt bleeding, likely anemia from acute illness. Transfuse if <7  -GI following, has received PRBC earlier this stay, Hb daily.  Globin stable at the time of discharge improved to 8.2.  She did receive Epogen during the dialysis on 3/4/2020.     Hyponatremia  - Because of renal failure, fluid overload.    - monitor, improved with dialysis.     Maculopapular rash : On abdomen resolved at this time.  - this was presumed to be drug rash due to meropenem per chart review. IV Abx was discontinued and received steroid and benadryl.  Patient her abdomen is resolved at this time.  She does have pimples on her face that need outpatient follow-up.        She is discharged home in a stable condition at this time.  Strongly advised to abstain from alcohol completely.  Outpatient treatment of alcohol abuse is recommended by CT and psych at this time.  Patient clinically improved and strength improved at this time will discharge home.  Continue outpatient  dialysis.  To follow-up with the PCP in 1 week  Follow with the GI as scheduled       Marvin Guillaume MD, MD    Significant Results and Procedures       Pending Results   These results will be followed up by PCP  Unresulted Labs Ordered in the Past 30 Days of this Admission     No orders found from 1/10/2020 to 2/10/2020.          Code Status   Full Code       Primary Care Physician   [facility]    Physical Exam   Temp: 98.3  F (36.8  C) Temp src: Oral BP: 107/67 Pulse: 69 Heart Rate: 99 Resp: 18 SpO2: 93 % O2 Device: None (Room air)    Vitals:    03/03/20 0702 03/04/20 1048 03/05/20 0600   Weight: 112.4 kg (247 lb 14.4 oz) 112.4 kg (247 lb 12.8 oz) 109.3 kg (241 lb)     Vital Signs with Ranges  Temp:  [97.5  F (36.4  C)-98.3  F (36.8  C)] 98.3  F (36.8  C)  Pulse:  [69-98] 69  Heart Rate:  [69-99] 99  Resp:  [18] 18  BP: (100-122)/(32-68) 107/67  SpO2:  [92 %-97 %] 93 %  I/O last 3 completed shifts:  In: 200 [P.O.:200]  Out: 3000 [Other:3000]    Constitutional: awake, alert, cooperative, no apparent distress, and appears stated age  Eyes: Lids and lashes normal, pupils equal, round and reactive to light, extra ocular muscles intact, sclera icteric, conjunctiva normal  Respiratory: No increased work of breathing, good air exchange, clear to auscultation bilaterally, no crackles or wheezing  Cardiovascular: Normal apical impulse, regular rate and rhythm, normal S1 and S2, no S3 or S4, and no murmur noted  GI: No scars, normal bowel sounds, soft, non-distended, non-tender, no masses palpated, no hepatosplenomegally  Musculoskeletal: 2+ lower extremity pitting edema present  Neurologic: No focal deficit    Discharge Disposition   Discharged to home  Condition at discharge: Stable    Consultations This Hospital Stay   PSYCHIATRY IP CONSULT  CHEMICAL DEPENDENCY IP CONSULT  GASTROENTEROLOGY IP CONSULT  PSYCHIATRY IP CONSULT  SPEECH LANGUAGE PATH ADULT IP CONSULT  PHYSICAL THERAPY ADULT IP  CONSULT  CARDIOLOGY IP CONSULT  NEPHROLOGY IP CONSULT  VASCULAR ACCESS ADULT IP CONSULT  VASCULAR ACCESS ADULT IP CONSULT  NUTRITION SERVICES ADULT IP CONSULT  VASCULAR ACCESS ADULT IP CONSULT  PHARMACY IP CONSULT  INTENSIVIST IP CONSULT  PHARMACY TO DOSE VANCO  NUTRITION SERVICES ADULT IP CONSULT  PHYSICAL THERAPY ADULT IP CONSULT  OCCUPATIONAL THERAPY ADULT IP CONSULT  SWALLOW EVAL SPEECH PATH AT BEDSIDE IP CONSULT  SPEECH LANGUAGE PATH ADULT IP CONSULT  LYMPHEDEMA THERAPY IP CONSULT  LYMPHEDEMA THERAPY IP CONSULT  PSYCHIATRY IP CONSULT  CHEMICAL DEPENDENCY IP CONSULT  CARE COORDINATOR IP CONSULT    Time Spent on this Encounter   IMarvin MD, personally saw the patient today and spent greater than 30 minutes discharging this patient.    Discharge Orders      Physical Therapy Referral      Occupational Therapy Referral      Follow-up and recommended labs and tests     Follow up with new primary care provider, Dr Cheryl Becerril at [facility], as scheduled for you on Tuesday 3/10/20 at 1:00PM for hospital follow- up and to initiate primary care. Please arrive at 12:45PM  Address: [facility] [address], [place] [address]  Phone: [phone]    Follow up with Liver Specialist, Dr Hobson as scheduled for you on Thursday 3/12/20 at 1:00PM  [facility]  [address] [place], [place] [address]hav[address]. [address]  [phone])    Dialysis Mondays/Wednesdays and Fridays.  Please see below for dialysis information     Reason for your hospital stay    Acute liver failure 2/2 Alcoholic hepatitis  Acute metabolic encephalopathy, improved  Acute hypoxic respiratory failure, resolved     Activity    Your activity upon discharge: activity as tolerated     Full Code     Diet    Follow this diet upon discharge: Orders Placed This Encounter      Snacks/Supplements Adult: Other; Smoothie; With Meals      Regular Diet Adult     Discharge Medications   Current Discharge Medication List       START taking these medications    Details   folic acid (FOLVITE) 1 MG tablet Take 1 tablet (1 mg) by mouth daily  Qty: 30 tablet, Refills: 0    Associated Diagnoses: Alcoholic hepatitis with ascites      lactulose (CHRONULAC) 10 GM/15ML solution Take 15 mLs (10 g) by mouth 2 times daily  Qty: 900 mL, Refills: 0    Associated Diagnoses: Alcoholic hepatitis with ascites      metroNIDAZOLE (FLAGYL) 500 MG tablet Take 1 tablet (500 mg) by mouth 4 times daily  Qty: 8 tablet, Refills: 0    Associated Diagnoses: Sepsis, due to unspecified organism, unspecified whether acute organ dysfunction present (H)      midodrine (PROAMATINE) 10 MG tablet Take 1 tablet (10 mg) by mouth 3 times daily (with meals)  Qty: 90 tablet, Refills: 0    Associated Diagnoses: Alcoholic hepatitis with ascites      ondansetron (ZOFRAN-ODT) 4 MG ODT tab Take 1 tablet (4 mg) by mouth every 6 hours as needed for nausea or vomiting  Qty: 20 tablet, Refills: 0    Associated Diagnoses: Alcoholic hepatitis with ascites      rifaximin (XIFAXAN) 550 MG TABS tablet Take 1 tablet (550 mg) by mouth 2 times daily  Qty: 60 tablet, Refills: 0    Associated Diagnoses: Alcoholic hepatitis with ascites      vitamin B1 (THIAMINE) 100 MG tablet Take 1 tablet (100 mg) by mouth daily  Qty: 30 tablet, Refills: 0    Associated Diagnoses: Alcoholic hepatitis with ascites         CONTINUE these medications which have NOT CHANGED    Details   amphetamine-dextroamphetamine (ADDERALL XR) 30 MG 24 hr capsule Take 30 mg by mouth every morning      amphetamine-dextroamphetamine (ADDERALL) 20 MG tablet Take 20 mg by mouth every evening      famotidine (PEPCID) 20 MG tablet Take 20 mg by mouth 2 times daily as needed         STOP taking these medications       calcium carbonate (TUMS) 500 MG chewable tablet Comments:   Reason for Stopping:         magnesium oxide 200 MG TABS Comments:   Reason for Stopping:         naproxen sodium (ANAPROX) 220 MG tablet Comments:   Reason for  Stopping:             Allergies   Allergies   Allergen Reactions     Penicillins Unknown     Hives       Data   Most Recent 3 CBC's:  Recent Labs   Lab Test 03/05/20  0545 03/04/20  0614 03/03/20  0535   WBC 18.4* 16.0* 15.6*   HGB 8.2* 7.5* 7.4*   MCV 96 96 97    273 286      Most Recent 3 BMP's:  Recent Labs   Lab Test 03/05/20  0545 03/04/20  0614 03/03/20  0535    132* 134   POTASSIUM 4.1 3.9 3.9   CHLORIDE 97 96 99   CO2 29 27 29   BUN 26 44* 31*   CR 3.97* 5.54* 4.32*   ANIONGAP 7 9 6   EMILY 9.0 9.6 9.3   GLC 89 117* 88     Most Recent 2 LFT's:  Recent Labs   Lab Test 03/05/20  0545 03/04/20  0614   * 159*   ALT 26 29   ALKPHOS 137 129   BILITOTAL 16.9* 17.2*     Most Recent INR's and Anticoagulation Dosing History:  Anticoagulation Dose History     Recent Dosing and Labs Latest Ref Rng & Units 2/28/2020 2/29/2020 3/1/2020 3/2/2020 3/3/2020 3/4/2020 3/5/2020    INR 0.86 - 1.14 1.76(H) 1.84(H) 1.80(H) 1.84(H) 1.83(H) 1.97(H) 1.93(H)        Most Recent 3 Troponin's:  Recent Labs   Lab Test 02/22/20  1150 02/22/20  0555 02/21/20  2350   TROPI 2.854* 3.195* 3.365*     Most Recent Cholesterol Panel:No lab results found.  Most Recent 6 Bacteria Isolates From Any Culture (See EPIC Reports for Culture Details):  Recent Labs   Lab Test 02/23/20  1650 02/22/20  0607 02/21/20  1817 02/21/20  1450 02/21/20  1430 02/19/20  1811   CULT Moderate growth  Normal mirielle   No growth No growth No growth 50,000 to 100,000 colonies/mL  Candida albicans / dubliniensis  Candida albicans and Candida dubliniensis are not routinely speciated  Susceptibility testing not routinely done  * Cultured on the 2nd day of incubation:  Fusobacterium nucleatum  *  Critical Value/Significant Value, preliminary result only, called to and read back by  Ophelia Barrera RN. @1228. 2.21.20. BS.     (Note)  NEGATIVE for the following organisms and resistance markers:  Acinetobacter sp., Citrobacter sp., Enterobacter sp., Proteus  sp., E.  coli, K. pneumoniae/oxytoca, P. aeruginosa, CTX-M, KPC, NDM, VIM, IMP  and OXA by Airizuigene multiplex nucleic acid test. Final identification  and antimicrobial susceptibility testing will be verified by standard  methods.    Specimen tested with Verigene multiplex, gram-negative blood culture  nucleic acid test for the following targets: Acinetobacter sp.,  Citrobacter sp., Enterobacter sp., Proteus sp., E. coli, K.  pneumoniae/oxytoca, P. aeruginosa, and the following resistance  markers: CTXM, KPC, NDM, VIM, IMP and OXA.    Critical Value/Significant Value called to and read back by Ophelia Barrera RN @1418 02/21/2020 hdp        Most Recent TSH, T4 and A1c Labs:  Recent Labs   Lab Test 02/09/20 2022   A1C 5.1     Results for orders placed or performed during the hospital encounter of 02/09/20   Abdomen US, limited (RUQ only)    Narrative    LIMITED ABDOMINAL ULTRASOUND  2/9/2020 5:42 PM     HISTORY:  Severe RUQ pain.    COMPARISON: None.    FINDINGS: Study is limited due to patient body habitus, bowel gas, and  hard distended abdomen.   Gallbladder: Normal with no cholelithiasis, wall thickening or focal  tenderness.      Bile ducts:  Common bile duct is not seen and cannot be evaluated. No  definite intrahepatic biliary ductal dilatation is seen.    Liver: Liver is hyperechoic and very difficult to penetrate most  consistent with diffuse fatty infiltration. Liver is enlarged  measuring 21 cm in length. No obvious focal intrahepatic lesion or  biliary ductal dilatation is seen but could be obscured by the  difficulty in penetration.    Pancreas: Completely obscured by bowel gas and patient body habitus.  Pancreas could not be evaluated.    Right kidney:  Normal.       Impression    IMPRESSION:  1. Steatosis and hepatomegaly.   2. Limited study due to patient body habitus, bowel gas, and distended  abdomen.  3. Pancreas is completely obscured and could not be evaluated. Bile  ducts are not seen due to  difficulty in penetrating the liver.  4. No evidence for cholelithiasis or acute cholecystitis.    GERALDO GARZON MD   XR Chest Port 1 View    Narrative    EXAM: XR CHEST PORT 1 VW  LOCATION: [facility]  DATE/TIME: 2/9/2020 7:49 PM    INDICATION: SIRS  COMPARISON: None.      Impression    IMPRESSION: Heart size and pulmonary vascularity normal. The lungs are clear. Old right seventh rib fracture.   CT Abdomen Pelvis w Contrast    Narrative    EXAM: CT ABDOMEN PELVIS W CONTRAST  LOCATION: [facility]  DATE/TIME: 2/9/2020 10:55 PM    INDICATION: Abd distension. Generalized abdominal pain.  COMPARISON: None.  TECHNIQUE: CT scan of the abdomen and pelvis was performed following injection of IV contrast. Multiplanar reformats were obtained. Dose reduction techniques were used.  CONTRAST: 135mL Isovue-370    FINDINGS:   LOWER CHEST: Normal.    HEPATOBILIARY: Marked hepatomegaly measuring 30 cm in length with diffuse fatty infiltration. Small amount of ascites along the margin of the liver.    PANCREAS: Normal.    SPLEEN: Normal.    ADRENAL GLANDS: Normal.    KIDNEYS/BLADDER: Normal.    BOWEL: Wall thickening of the cecum and proximal ascending colon. The rest of the colon is unremarkable. No evidence for obstruction.    LYMPH NODES: Normal.    VASCULATURE: Unremarkable.    PELVIC ORGANS: Mild ascites.    MUSCULOSKELETAL: Edematous changes in the subcutaneous tissues.      Impression    IMPRESSION:   1.  Marked hepatomegaly and hepatic steatosis.  2.  Small amount of ascites along the liver margin and in the pelvis.  3.  Wall thickening of the cecum and proximal ascending colon possibly related to incomplete distention. Colitis could have a similar appearance. No evidence for obstruction.  4.  Edema subcutaneous tissues.     XR Chest Port 1 View    Narrative    CHEST ONE VIEW February 11, 2020 4:00 PM     HISTORY: Shortness of breath.    COMPARISON: February 9, 2020.      Impression    IMPRESSION:  Low lung volumes, no definite infiltrates.    GINO RYAN MD   XR Chest Port 1 View    Narrative    EXAM: XR CHEST PORT 1 VW  LOCATION: [facility]  DATE/TIME: 2/11/2020 10:07 PM    INDICATION: Tachypnea.  COMPARISON: None.      Impression    IMPRESSION: Low lung volumes. Heart size within normal limits for portable technique. Pulmonary vascularity normal. The lungs are clear.   US Abdomen Limited    Narrative    ULTRASOUND ABDOMEN LIMITED February 12, 2020 8:18 AM     HISTORY: Ascites evaluation.    FINDINGS: There are fluid-filled bowel loops.      Impression    IMPRESSION: No significant ascites identified.    JAMA HWANG MD   US Renal Complete    Addendum: 2/24/2020    MARCUS LUEVANO  Accession # [id number]    The original report on this patient was dictated by me. Additional  clinical history provided from the electronic medical record states  that this exam was performed related to worsening biochemical renal  function and to evaluate obstruction. Decreased urine output.    LAISHA TOMLINSON MD (Date of Addendum: 2/24/2020 7:50 AM)        LAISHA TOMLINSON MD      Narrative    US RENAL COMPLETE   2/12/2020 6:54 PM     HISTORY: Rule out obstruction.    COMPARISON: CT abdomen and pelvis 2/9/2020.    FINDINGS:  Right Kidney: No hydronephrosis. Kidney measures 10.7 x 7.5 x 5.3 cm.  Cortical thickness is 2.3 cm.    Left Kidney: No hydronephrosis. Kidney measures 13.5 x 5.9 x 6.6 cm.  Cortical thickness is 2 cm.    Bladder: Decompressed bladder limits assessment.    Minimal ascites of the pelvis.      Impression    IMPRESSION:   1. No hydronephrosis.  2. Renal measurements as above.  3. Trace ascites.    LAISHA TOMLINSON MD   XR Chest Port 1 View    Narrative    XR PORTABLE CHEST ONE VIEW   2/12/2020 5:56 PM     HISTORY: Dyspnea, new oxygen requirement.    COMPARISON: 2/11/2020.      Impression    IMPRESSION: Hypoinflated lungs. Mild bilateral vascular prominence  could represent increased edema. Borderline  cardiomegaly.    LAISHA TOMLINSON MD   XR Abdomen Port 1 View    Narrative    ABDOMEN ONE VIEW PORTABLE    2/12/2020 5:58 PM     HISTORY: Abdominal distention    COMPARISON: None.      Impression    IMPRESSION: Multiple gas dilated central small bowel loops worrisome  for potential obstruction.    LAISHA TOMLINSON MD   XR Chest Port 1 View    Narrative    CHEST ONE VIEW PORTABLE  2/16/2020 7:45 AM     HISTORY: Shortness of breath.    COMPARISON: 2/12/2020      Impression    IMPRESSION: There is vascular congestion and interstitial thickening.  No pneumothorax or pleural effusion. No lobar consolidation. Heart  size similar to prior. Findings suggestive of CHF.    CLIFFORD HUGHES MD   XR Feeding Tube Placement    Narrative    FEEDING TUBE PLACEMENT   2/18/2020 10:51 AM    HISTORY: Nutritional needs.    COMPARISON: None    FINDINGS: 0.6 minutes of fluoroscopy were utilized for placement of a  feeding tube.  At the final position, the feeding tube tip was at the  ligament of Treitz.  45 mL of contrast was injected to confirm  placement.  The tube was marked at the level of the nose at the 100 cm  length.  There were no complications of the procedure.    Medications used: 6 mL Lidocaine jelly, 45 mL Omnipaque 240    Number of Images: 1    Impression    IMPRESSION: Successful feeding tube placement.    BOB HYATT PA-C   IR Feeding Tube Placement w Corinna/MD    Narrative    INTERVENTIONAL RADIOLOGY PICC EXCHANGE RIGHT, INTERVENTIONAL RADIOLOGY  FEEDING TUBE PLACEMENT WITH CORINNA/MD 2/19/2020 3:15 PM    1. IR PICC exchange right.  2. IR feeding tube placement with fluoroscopy/M.D.      HISTORY: 29-year-old female requires secure long-term central venous  access for blood draws, medications. An attempt was made at placing a  PICC 2 days ago however, the catheter could not be advanced centrally  and therefore was left in the left axillary vein. Patient also is not  adequately taking oral nutrition. An NJ tube was  requested.    DESCRIPTION OF PROCEDURE: After obtaining informed consent, the  patient was placed in a supine position on the fluoroscopy table. The  right arm including external portions of existing midline were prepped  and draped in the usual sterile manner. 1% lidocaine was injected for  local anesthesia. The midline was removed over a wire. The wire was  advanced into the superior vena cava. A 5 Indonesian peel-away sheath was  placed. Through the peel-away sheath, a 5 Indonesian triple lumen PICC was  placed. The tip was placed at the atrial caval junction. A  fluoroscopic image was saved to document catheter tip position. All 3  ports were aspirated and flushed with saline. The catheter was secured  to the patient's arm. A dressing was applied.    Next, under fluoroscopic guidance, a standard Keofeed nasojejunal tube  was placed with tip positioned in the distal duodenum just proximal to  the ligament of Treitz. A fluoroscopic image was saved to document  catheter position. The catheter was secured to the patient's right  cheek. A dressing was applied.    I determined this patient to be an appropriate candidate for the  planned sedation and procedure and reassessed the patient immediately  prior to sedation and procedure. Moderate intravenous conscious  sedation was supervised by me. The patient was independently monitored  by a registered nurse assigned to the Department of radiology using  automated blood pressure, EKG and pulse oximetry. The patient  tolerated the procedure well. There were no immediate postprocedure  complications. The patient's vital signs were monitored by radiology  nursing staff under my supervision and remained stable throughout the  study. Radiation dose for this scan was reduced using automated  exposure control, adjustment of the mA and/or kV according to patient  size, or iterative reconstruction technique. Maximal Sterile Barrier  Technique Utilized: Cap AND mask AND sterile gown AND  sterile gloves  AND sterile full body drape AND hand hygiene AND skin preparation 2%  chlorhexidine for cutaneous antisepsis (or acceptable alternative  antiseptics).     Sterile Ultrasound Technique Utilized Sterile gel AND sterile probe  covers.    MEDICATIONS: 0.5 mg Versed, 25 mcg fentanyl.    Sedation time: 23 minutes    Fluoroscopy time: 1.2 minutes    Total fluoroscopy dose: 16.99 mGy Air Kerma    Contrast: None    Local anesthetic: 6mL 1% lidocaine      Impression    IMPRESSION:  1. Midline venous catheter exchanged for a PICC as described above.  2. Nasojejunal tube placed as above.    SYDNI ARREOLA MD   IR PICC Exchange Right    Narrative    INTERVENTIONAL RADIOLOGY PICC EXCHANGE RIGHT, INTERVENTIONAL RADIOLOGY  FEEDING TUBE PLACEMENT WITH FLUORO/MD 2/19/2020 3:15 PM    1. IR PICC exchange right.  2. IR feeding tube placement with fluoroscopy/M.D.      HISTORY: 29-year-old female requires secure long-term central venous  access for blood draws, medications. An attempt was made at placing a  PICC 2 days ago however, the catheter could not be advanced centrally  and therefore was left in the left axillary vein. Patient also is not  adequately taking oral nutrition. An NJ tube was requested.    DESCRIPTION OF PROCEDURE: After obtaining informed consent, the  patient was placed in a supine position on the fluoroscopy table. The  right arm including external portions of existing midline were prepped  and draped in the usual sterile manner. 1% lidocaine was injected for  local anesthesia. The midline was removed over a wire. The wire was  advanced into the superior vena cava. A 5 Frisian peel-away sheath was  placed. Through the peel-away sheath, a 5 Frisian triple lumen PICC was  placed. The tip was placed at the atrial caval junction. A  fluoroscopic image was saved to document catheter tip position. All 3  ports were aspirated and flushed with saline. The catheter was secured  to the patient's arm. A  dressing was applied.    Next, under fluoroscopic guidance, a standard Keofeed nasojejunal tube  was placed with tip positioned in the distal duodenum just proximal to  the ligament of Treitz. A fluoroscopic image was saved to document  catheter position. The catheter was secured to the patient's right  cheek. A dressing was applied.    I determined this patient to be an appropriate candidate for the  planned sedation and procedure and reassessed the patient immediately  prior to sedation and procedure. Moderate intravenous conscious  sedation was supervised by me. The patient was independently monitored  by a registered nurse assigned to the Department of radiology using  automated blood pressure, EKG and pulse oximetry. The patient  tolerated the procedure well. There were no immediate postprocedure  complications. The patient's vital signs were monitored by radiology  nursing staff under my supervision and remained stable throughout the  study. Radiation dose for this scan was reduced using automated  exposure control, adjustment of the mA and/or kV according to patient  size, or iterative reconstruction technique. Maximal Sterile Barrier  Technique Utilized: Cap AND mask AND sterile gown AND sterile gloves  AND sterile full body drape AND hand hygiene AND skin preparation 2%  chlorhexidine for cutaneous antisepsis (or acceptable alternative  antiseptics).     Sterile Ultrasound Technique Utilized Sterile gel AND sterile probe  covers.    MEDICATIONS: 0.5 mg Versed, 25 mcg fentanyl.    Sedation time: 23 minutes    Fluoroscopy time: 1.2 minutes    Total fluoroscopy dose: 16.99 mGy Air Kerma    Contrast: None    Local anesthetic: 6mL 1% lidocaine      Impression    IMPRESSION:  1. Midline venous catheter exchanged for a PICC as described above.  2. Nasojejunal tube placed as above.    SYDNI ARREOLA MD   IR CVC Tunnel Placement > 5 Yrs of Age    Narrative    IR CVC TUNNEL PLACEMENT > 5 YRS OF AGE   2/21/2020  11:11 AM     CLINICAL HISTORY/INDICATION: Patient with history of hepatorenal  syndrome requiring intermediate term hemodialysis. Patient has  bleeding diathesis, however, has been optimized for placement of  tunneled catheter.    PROCEDURES PERFORMED: Right internal jugular approach tunneled  dialysis catheter placement    MEDICATIONS: 10 mL 1% lidocaine, 1.5 mg Versed IV, 75 mcg Fentanyl IV  Cleocin 900 mg IV    CONTRAST: None    FLUORO: 0.3 minutes    IMAGES/DOSE: 7 mGy    CONSENT: Following a discussion of the risks, benefits, indications  and alternatives to treatment, appropriate informed consent was  obtained.      TIMEOUT: A timeout was performed per universal protocol policy to  ensure correct patient, site, and procedure to be performed.     CENTRAL LINE STATEMENT: The patient was brought to the interventional  radiology suite and placed supine on the table. The patients right  neck and anterior chest region were prepped and draped in a sterile  fashion for tunneled line placement.  The procedure was performed  using maximal Sterile Barrier Technique Utilized: Cap AND mask AND  sterile gown AND sterile gloves AND sterile full body drape AND hand  hygiene AND skin preparation 2% chlorhexidine for cutaneous antisepsis  (or acceptable alternative antiseptics).  Sterile Ultrasound Technique  Utilized ?Sterile gel AND sterile probe covers.     PROCEDURE AND FINDINGS:   This central line was performed in accordance with the central line  bundle with the exception of vein selection. Following a discussion of  the risks, benefits, indications and alternatives to treatment,  appropriate informed consent was obtained.  The patient was brought to  the interventional radiology suite and placed supine on the table. The  patients right neck and anterior chest region were prepped and draped  in a sterile fashion for tunneled line placement.  A timeout was  performed per universal protocol policy to ensure correct  patient,  site, and procedure to be performed.      Ultrasound was utilized to evaluate the veins of the right neck and a  permanent record of the image was obtained which demonstrates the  internal jugular vein to be patent. Under direct ultrasound guidance,  1% Lidocaine was infiltrated and access was gained easily into the low  lateral aspect of the internal jugular vein utilizing micropuncture  technique. The wire was advanced easily under fluoroscopic guidance  and the tract was serially dilated and a peel away sheath placed. A  subcutaneous tunnel was then created over the anterior/superior chest  wall using local anesthesia and blunt dissection. Under fluoroscopic  guidance, a 14.5 Danish double lumen 23 cm cuffed catheter was then  pulled through the tunnel and was advanced through the peel away  sheath. A final placement film demonstrates the catheter tip at the  cavoatrial junction with the patient supine.  All of the ports flush  and aspirate without difficulty following placement.  The catheter was  secured in position. The small incision at the base of the neck had  persistent using despite manual hemostasis. Patient was moved to her  transport bed and head of bed was elevated to 45 degrees. Hemostasis  was obtained with manual pressure, Dermabond, and placement of a 2-0  silk suture.  A sterile dressing was applied.     Throughout the procedure, the patient was monitored by a radiology  nurse for cardiac rhythm which remained stable. Total sedation time  was 27 minutes. The patient tolerated the procedure well and left the  interventional radiology suite in stable condition.      Impression    IMPRESSION:  Successful placement of a right internal jugular vein 14.5 Danish  double lumen 23 cm tunneled palindrome catheter, as described using  ultrasound and fluoroscopic guidance. This catheter is indicated to be  use for hemodialysis or pheresis.    Persistent bleeding at the right neck skin entry site.  Successful  hemostasis with Dermabond and a 2-0 silk suture. Silk suture can be  removed on Monday, 2/24/2020.    STELLA HWANG MD   XR Chest Port 1 View    Narrative    CHEST PORTABLE ONE VIEW  2/21/2020 4:15 PM     HISTORY: Increased tachypnea after dialysis cath placement.    COMPARISON: 2/16/2020 at 0740.      Impression    IMPRESSION: Interval placement of a right internal jugular large-bore  catheter with its tip in the expected region of the right atrium.  Interval placement of a feeding tube coursing into the stomach. A  pulmonary edema pattern is again seen, not significant changed. Heart  size is unchanged.    MAYA MARSHALL MD   CT Chest w Contrast    Narrative    CT CHEST WITH CONTRAST 2/21/2020 7:40 PM    CLINICAL HISTORY: Pleural effusion. New GNR in blood. Concern for  occult abscess/intra-thoracic source.    TECHNIQUE: CT chest with IV contrast. Multiplanar reformats were  obtained. Dose reduction techniques were used.    CONTRAST: 100mL Isovue-370    COMPARISON: None.    FINDINGS:   LUNGS AND PLEURA: Patchy bilateral airspace opacities, most pronounced  in the right lower lobe. There is a moderate size right pleural  effusion. No pleural fluid on the left. No evidence of pulmonary  abscess.    MEDIASTINUM/AXILLAE: There is a right-sided PICC line and a  right-sided dialysis catheter. A nasogastric tube extends to at least  the stomach. No thoracic or axillary adenopathy.    UPPER ABDOMEN: The liver appears enlarged, but is not completely  included on this study. The spleen is also enlarged.    MUSCULOSKELETAL: No worrisome bone lesions.      Impression    IMPRESSION:   1.  Multifocal airspace opacities scattered throughout both lungs  concerning for multifocal pneumonia. Septic emboli could have a  similar appearance.  2.  Small to moderate right pleural effusion.  3.  Hepatosplenomegaly.    CLIFFORD HUGHES MD   CTA Abdomen Pelvis with Contrast    Narrative    CTA ABDOMEN AND PELVIS WITH  CONTRAST 2/21/2020 7:40 PM    CLINICAL HISTORY: GNR in blood. Concern for occult intra-abdominal  process in setting of new hepatic failure.    TECHNIQUE: CT scan of the abdomen and pelvis was performed in the  arterial phase following injection of IV contrast. Multiplanar  reformats were obtained. Dose reduction techniques were used.    CONTRAST: 100mL Isovue-370    COMPARISON: 2/9/2019    FINDINGS:   LOWER CHEST: Abnormal bibasilar airspace opacities, right worse than  left. Small to moderate right pleural effusion.    HEPATOBILIARY: The liver is markedly enlarged, measuring up to 27.5 cm  in length. There is diffuse decreased attenuation of the liver.  Moderate volume of ascites is present. No focal liver lesions are  identified. Gallbladder unremarkable.    PANCREAS: Normal.    SPLEEN: Mildly enlarged at 15.5 cm in AP dimension.    ADRENAL GLANDS: Normal.    KIDNEYS/BLADDER: Normal.    BOWEL: Normal. Feeding tube extends to the third portion of the  duodenum. No obstruction or free air.    PELVIC ORGANS: Normal.    ADDITIONAL FINDINGS: None.    MUSCULOSKELETAL: Normal.    Vasculature: Aortic caliber is normal. Visceral branches of the  abdominal aorta are normal and patent. Specifically, the hepatic  arteries are patent. There is early opacification of the portal vein,  although incompletely evaluated on this arterial phase study. The  hepatic veins are not yet opacified, but there is no secondary  evidence to suggest occlusion.      Impression    IMPRESSION:   1.  Markedly enlarged and fatty infiltrated liver.  2.  Moderate volume of ascites.  3.  Splenomegaly.  4.  Bibasilar opacities, likely multifocal pneumonia or septic emboli.  5.  Small to moderate right pleural effusion.  6.  No vascular abnormality demonstrated on this arterial phase study.  Limited evaluation of the hepatic and portal venous systems in the  arterial phase, although grossly normal.    CLIFFORD HUGHES MD   US Abdomen Limited     Narrative    US ABDOMEN LIMITED 2/21/2020 6:55 PM    CLINICAL HISTORY: Concern for cholecystitis.    TECHNIQUE: Abdominal pain and jaundice.    COMPARISON: None.    FINDINGS:    GALLBLADDER: Sludge in the gallbladder. Mild gallbladder wall  thickening measures 4 mm. No pericholecystic fluid.    BILE DUCTS: There is no biliary dilatation. The common duct measures 5  mm.    LIVER: Normal where seen.    RIGHT KIDNEY: No hydronephrosis.    PANCREAS: The liver is enlarged at 21.4 cm in length. There is diffuse  increased echogenicity without focal lesion. No ascites.      Impression    IMPRESSION:  1.  Enlarged, fatty infiltrated liver.  2.  Sludge in the gallbladder. Mild gallbladder wall thickening may be  related to hepatitis/cirrhosis. Cholecystitis considered unlikely  given lack of overt distention.    CLIFFORD HUGHES MD   XR Chest Port 1 View    Narrative    CHEST ONE VIEW PORTABLE   2/21/2020 9:08 PM     HISTORY: ETT placement.    COMPARISON: CT 2/21/2020.      Impression    IMPRESSION: Tip of the endotracheal tube is 3.3 cm above the chai.  There appear to be 2 enteric catheters that extend below the left  hemidiaphragm. There is a right internal jugular dialysis catheter.  There are multifocal airspace opacities. No pneumothorax.    CLIFFORD HUGHES MD   US Lower Extremity Venous Duplex Bilateral    Narrative    US LOWER EXTREMITY VENOUS DUPLEX BILATERAL 2/22/2020 10:36 AM    CLINICAL HISTORY/INDICATION: Hepatorenal syndrome. New evidence of  septic emboli on chest CT.    COMPARISON: None relevant    TECHNIQUE:   Grayscale, color-flow, and spectral waveform analysis were performed  of the deep veins of the bilateral lower extremities    FINDINGS:   The right common femoral vein, femoral vein, popliteal vein and tibial  veins demonstrate normal compressibility, spectral waveform, color  flow and augmentation.    The left common femoral vein, femoral vein, popliteal vein and tibial  veins demonstrate normal  compressibility, spectral waveform, color  flow and augmentation.      Impression    IMPRESSION:   1. No evidence of deep venous thrombosis in the right lower extremity.  2. No evidence of deep venous thrombosis in the left lower extremity.    STELLA HWANG MD   US Upper Extremity Venous Duplex Bilat    Narrative    US UPPER EXTREMITY VENOUS DUPLEX BILATERAL 2/22/2020 10:36 AM    CLINICAL HISTORY/INDICATION: Hepatorenal syndrome. New septic emboli  suspected on CT of the chest.     COMPARISON: CT of the chest from 2/21/2020.    TECHNIQUE:   Grayscale, color-flow, and spectral waveform analysis were performed  of the bilateral upper extremities    FINDINGS:   The right internal jugular, subclavian, axillary, cephalic, basilic,  and brachial veins demonstrate normal compressibility, spectral  waveform, color flow and augmentation.    The left internal jugular, subclavian, axillary, cephalic, basilic,  and brachial veins demonstrate normal compressibility, spectral  waveform, color flow and augmentation.      Impression    IMPRESSION:   1. No evidence of deep venous thrombosis in the right upper extremity.  2. No evidence of deep venous thrombosis in the left upper extremity.    STELLA HWANG MD   XR Chest Port 1 View    Narrative    EXAM: XR CHEST PORTABLE 1 VIEW  LOCATION: [facility]  DATE/TIME: 02/23/2020, 4:28 AM    INDICATION: Respiratory failure.  COMPARISON: 02/21/2020.      Impression    IMPRESSION: Endotracheal tube in place with tip 2.5 cm above the chai. There may be a couple enteric tubes present. Tips are below the diaphragm. Right central venous catheter tip projects over the right atrium, unchanged. Cardiac enlargement with   increased pulmonary vascularity. Findings are suggestive of fluid overload or CHF. Mild hazy bilateral perihilar infiltrates versus edema. Old right rib fracture. Compared with the prior study, bilateral mid lung infiltrates have improved.       XR Abdomen  Port 1 View    Narrative    ABDOMEN ONE VIEW PORTABLE  2/24/2020 3:21 PM     HISTORY: NG/NJ tube placement    COMPARISON: 2/23/2020       Impression    IMPRESSION: Enteric tube takes an esophageal course, traverses the  stomach, and terminates with the tip in the region of the third  portion of the duodenum.    LESTER J FAHRNER, MD   XR Video Swallow with SLP or OT    Narrative    VIDEO SWALLOWING EVALUATION February 26, 2020 10:29 AM     HISTORY: Further assess oropharyngeal swallow function.    COMPARISON: None.    FLUOROSCOPY TIME: .5 minutes.  SPOT IMAGES OR CINE RUNS: Eight.      Impression    IMPRESSION:    Thin: No penetration or aspiration.    Nectar: No penetration or aspiration.    Honey: Not administered.    Pudding: No penetration or aspiration.    Semisolid: No penetration or aspiration.    Solid: No penetration or aspiration.    This study only includes the cervical esophagus.    COLLIN CLIFTON MD   XR Chest 2 Views    Narrative    CHEST TWO VIEWS    2/29/2020 1:00 PM     HISTORY: Follow-up for pneumonia    COMPARISON: 2/23/2020.      Impression    IMPRESSION: Removal of ET tube and nasogastric tube. Right neck large  bore central venous line again identified. Its tip is within the right  atrium. If relevant, this could be retracted by approximately 6 cm for  placement at the cavoatrial level. Hypoinflated lungs. Bilateral  pulmonary edema pattern with some vascular congestion appears stable.  Stable cardiac silhouette.    LAISHA TOMLINSON MD   XR Chest 2 Views    Narrative    CHEST TWO VIEWS 3/3/2020 9:56 AM     HISTORY: Hypoxia, possible fluid overload    COMPARISON: 2/29/2020       Impression    IMPRESSION: A right PICC likely terminates in the mid to lower SVC,  though the tip is not definitively identified. A tunneled dual lumen  right IJ catheter terminates in the right atrium. Lower lobe opacities  on the lateral view are concerning for airspace disease. There is mild  interstitial edema. The  cardiomediastinal silhouette is normal in  size.    LESTER J FAHRNER, MD   Echocardiogram Complete    Narrative    [id number]  [id number]  [id number]  [id number]^[id number]^JACQUE           [facility]  Echocardiography Laboratory  [address] [place] [id number]        Name: MARCUS LUEVANO  MRN: [id number]  : 1990  Study Date: 2020 09:45 AM  Age: 29 yrs  Gender: Female  Patient Location: [facility]  Reason For Study: SOB  Ordering Physician: TJ NICHOLE  Referring Physician: [place] PARK NICOLLET  Performed By: Ciro Hughes RDCS     BSA: 2.4 m2  Height: 68 in  Weight: 286 lb  HR: 93  BP: 124/57 mmHg  _____________________________________________________________________________  __        Procedure  Complete Portable Echo Adult. Optison (NDC #9292-4292) given intravenously.  _____________________________________________________________________________  __        Interpretation Summary     The left ventricle is normal in structure, function and size.  The visual ejection fraction is estimated at 60-65%.  The right ventricle is normal in structure, function and size.  No significant valve dysfunction.  The inferior vena cava was normal in size with preserved respiratory  variability.  IVC diameter <2.1 cm collapsing >50% with sniff suggests a normal RA pressure  of 3 mmHg.  There is no pericardial effusion.  Pericardium appears normal however there is significant abnormal septal shift  and mitral velocity inflow variation which is suggestive of constrictive  physiology.     No prior for comparison. Recommend cardiac MRI to evaluate for constriction.  _____________________________________________________________________________  __        Left Ventricle  The left ventricle is normal in structure, function and size. There is normal  left ventricular wall thickness. The visual ejection fraction is estimated at  60-65%. Left ventricular systolic function is normal. Left  ventricular  diastolic function is normal. Normal left ventricular wall motion.     Right Ventricle  The right ventricle is normal in structure, function and size.     Atria  The left atrium is mildly dilated. Right atrial size is normal.     Mitral Valve  The mitral valve is normal in structure and function. There is mild (1+)  mitral regurgitation.        Tricuspid Valve  The tricuspid valve is normal in structure and function. The right ventricular  systolic pressure is approximated at 24mmHg plus the right atrial pressure.  Right ventricular systolic pressure is normal.     Aortic Valve  The aortic valve is normal in structure and function.     Pulmonic Valve  The pulmonic valve is normal in structure and function.     Vessels  Normal size aorta. The aortic root is normal size. The inferior vena cava was  normal in size with preserved respiratory variability. IVC diameter <2.1 cm  collapsing >50% with sniff suggests a normal RA pressure of 3 mmHg.     Pericardium  There is no pericardial effusion. Pericardium appears normal however there is  significant abnormal septal shift and mitral velocity inflow variation which  is suggestive of constrictive physiology.     _____________________________________________________________________________  __  MMode/2D Measurements & Calculations  IVSd: 0.86 cm  LVIDd: 5.4 cm  LVIDs: 3.0 cm  LVPWd: 0.82 cm  FS: 43.3 %  LV mass(C)d: 163.3 grams  LV mass(C)dI: 68.6 grams/m2     Ao root diam: 3.0 cm  LA dimension: 4.2 cm  asc Aorta Diam: 2.7 cm  LA/Ao: 1.4  LA Volume (BP): 90.9 ml  LA Volume Index (BP): 38.2 ml/m2  RWT: 0.30        Doppler Measurements & Calculations  MV E max martha: 141.0 cm/sec  MV A max martha: 113.8 cm/sec  MV E/A: 1.2  MV dec time: 0.19 sec     Ao V2 max: 160.2 cm/sec  Ao max PG: 10.0 mmHg  Ao V2 mean: 111.4 cm/sec  Ao mean P.6 mmHg  Ao V2 VTI: 33.5 cm  PA acc time: 0.12 sec  TR max martha: 242.4 cm/sec  TR max P.5 mmHg  E/E' avg: 10.8  Lateral E/e':  10.1  [facility] E/e': 11.5           _____________________________________________________________________________  __           Report approved by: Roxana Bass 2020 11:59 AM      Echocardiogram Limited    Narrative    [id number]  [id number]  [id number]  [id number]^[email or web address]^LAKESHIA           [facility]  Echocardiography Laboratory  [address] [place] [address], [place] [address]        Name: MARCUS LUEVANO  MRN: [id number]  : 1990  Study Date: 2020 04:19 PM  Age: 29 yrs  Gender: Female  Patient Location: [facility]  Reason For Study: Dyspnea  Ordering Physician: NATALY MARQUEZ  Referring Physician: [facility], [place]  Performed By: Kai Burk RDCS     BSA: 2.4 m2  Height: 68 in  Weight: 286 lb  HR: 106  BP: 99/61 mmHg  _____________________________________________________________________________  __        Procedure  Limited Portable Echo Adult. Optison (NDC #[id number]) given intravenously.  _____________________________________________________________________________  __        Interpretation Summary     The visual ejection fraction is estimated at 60-65%.  Left ventricular systolic function is normal.  Right ventricular function cannot be assessed due to poor image quality.  The study was technically difficult.  _____________________________________________________________________________  __        Left Ventricle  The visual ejection fraction is estimated at 60-65%. Left ventricular systolic  function is normal.     Right Ventricle  The right ventricle is normal size. Right ventricular function cannot be  assessed due to poor image quality.     Mitral Valve  There is mild (1+) mitral regurgitation.     Tricuspid Valve  There is mild (1+) tricuspid regurgitation. The right ventricular systolic  pressure is approximated at 27.8 mmHg plus the right atrial pressure.        Aortic Valve  The aortic valve is not well visualized. There is trace aortic regurgitation.  No  hemodynamically significant valvular aortic stenosis.     Pulmonic Valve  There is trace pulmonic valvular regurgitation.     Pericardium  There is no pericardial effusion.     Rhythm  The rhythm was sinus tachycardia.     _____________________________________________________________________________  __        Doppler Measurements & Calculations  TR max martha: 263.7 cm/sec  TR max P.8 mmHg              _____________________________________________________________________________  __        Report approved by: Roxana Ruano 2020 04:46 PM          Most Recent 3 CBC's:  Recent Labs   Lab Test 20  0545 20  0614 20  0535   WBC 18.4* 16.0* 15.6*   HGB 8.2* 7.5* 7.4*   MCV 96 96 97    273 286     Most Recent 3 BMP's:  Recent Labs   Lab Test 20  0545 20  0614 20  0535    132* 134   POTASSIUM 4.1 3.9 3.9   CHLORIDE 97 96 99   CO2 29 27 29   BUN 26 44* 31*   CR 3.97* 5.54* 4.32*   ANIONGAP 7 9 6   EMILY 9.0 9.6 9.3   GLC 89 117* 88     Most Recent 2 LFT's:  Recent Labs   Lab Test 20  0545 20  0614   * 159*   ALT 26 29   ALKPHOS 137 129   BILITOTAL 16.9* 17.2*

## 2020-03-05 NOTE — PROGRESS NOTES
[facility]  Gastroenterology Progress Note     Pat Delgado MRN# [id number]   YOB: 1990 Age: 29 year old          Assessment and Plan:     Alcoholic hepatitis    Acute kidney failure with tubular necrosis (H)  Very pleasant 29-year-old female with complicated prolonged recent hospitalization with severe alcoholic hepatitis decompensation of liver disease with fluid overload leading to acute tubular necrosis and kidney failure patient is doing better from liver standpoint patient is still significantly jaundiced patient is currently on dialysis patient overall condition is stable patient is being discharged with follow-up plan and need for outpatient drug dependence treatment dialysis.  Patient can be discharged on her current medication patient can skip the dose of lactulose till after her dialysis on the dialysis days patient will need to follow-up with routine GI and hepatology.  I will schedule patient for follow-up visit in 1 to 2 weeks.            Acute liver failure without hepatic coma  Hyponatremia  Sepsis, due to unspecified organism, unspecified whether acute organ dysfunction present (H)  Alcoholic hepatitis with ascites  Alcoholic hepatitis, unspecified whether ascites present      Interval History:   doing well; no cp, sob, n/v/d, or abd pain.              Review of Systems:   C: NEGATIVE for fever, chills, change in weight  E/M: NEGATIVE for ear, mouth and throat problems  R: NEGATIVE for significant cough or SOB  CV: NEGATIVE for chest pain, palpitations or peripheral edema             Medications:   I have reviewed this patient's current medications    - MEDICATION INSTRUCTIONS for Dialysis Patients -   Does not apply See Admin Instructions     folic acid  1 mg Oral Daily     lactulose  10 g Oral BID     metroNIDAZOLE  500 mg Oral 4x Daily     midodrine  10 mg Oral TID w/meals     polyethylene glycol  17 g Oral Daily     psyllium  1 packet Oral Daily      rifaximin  550 mg Oral BID     sodium chloride (PF)  10 mL Intracatheter Q7 Days     sodium chloride (PF)  10 mL Intracatheter Q8H     vitamin B1  100 mg Oral Daily                  Physical Exam:   Vitals were reviewed  Vital Signs with Ranges  Temp:  [97.5  F (36.4  C)-98.3  F (36.8  C)] 98.3  F (36.8  C)  Pulse:  [69-98] 69  Heart Rate:  [69-99] 99  Resp:  [18] 18  BP: (100-122)/(32-68) 107/67  SpO2:  [92 %-97 %] 93 %  I/O last 3 completed shifts:  In: 200 [P.O.:200]  Out: 3000 [Other:3000]  Constitutional: healthy, alert and no distress   Cardiovascular: negative, PMI normal. No lifts, heaves, or thrills. RRR. No murmurs, clicks gallops or rub  Respiratory: negative, Percussion normal. Good diaphragmatic excursion. Lungs clear  Head: Normocephalic. No masses, lesions, tenderness or abnormalities  Neck: Neck supple. No adenopathy. Thyroid symmetric, normal size,, Carotids without bruits.  Abdomen: Abdomen soft, non-tender. BS normal. No masses, organomegaly  SKIN: no suspicious lesions or rashes           Data:   I reviewed the patient's new clinical lab test results.   Recent Labs   Lab Test 03/05/20 0545 03/04/20 0614 03/03/20  0535   WBC 18.4* 16.0* 15.6*   HGB 8.2* 7.5* 7.4*   MCV 96 96 97    273 286   INR 1.93* 1.97* 1.83*     Recent Labs   Lab Test 03/05/20 0545 03/04/20  0614 03/03/20  0535   POTASSIUM 4.1 3.9 3.9   CHLORIDE 97 96 99   CO2 29 27 29   BUN 26 44* 31*   ANIONGAP 7 9 6     Recent Labs   Lab Test 03/05/20  0545 03/04/20  0614 03/03/20  0535  02/21/20  1430  02/14/20  0653  02/09/20  1755 02/09/20  1547   ALBUMIN 2.6* 2.7* 2.7*   < >  --    < >  --    < >  --  1.6*   BILITOTAL 16.9* 17.2* 17.3*   < >  --    < >  --    < >  --  8.1*   ALT 26 29 31   < >  --    < >  --    < >  --  33   * 159* 159*   < >  --    < >  --    < >  --  203*   PROTEIN  --   --   --   --  100*  --  100*  --  100*  --    LIPASE  --   --   --   --   --   --   --   --   --  119    < > = values in this  interval not displayed.       I reviewed the patient's new imaging results.    All laboratory data reviewed  All imaging studies reviewed by me.    Liban Hobson MD,  3/5/2020  [facility]  Office : [phone]  Cell: [phone]

## 2020-03-05 NOTE — PLAN OF CARE
Discharge Planner PT   Patient plan for discharge: home when medically ready  Current status: Pt requires SBA for ambulation without device 200ft, CGA and 2 rails for ascending stairs x5 with significant use of UEs, then descends x5 steps with 2 rails and CGA.  PT reports feeling too weak to complete any additional steps.  SBA for static standing with WBOS and for sit<>stands.  She requires increased assist to CGA and was only able to complete 1 rep of sit>stand with hands on knees rather than use of arm rests.   Barriers to return to prior living situation: Multiple stairs to climb if elevator is not functioning (which happens frequently per pt)  Recommendations for discharge: Return home with OPPT  Rationale for recommendations: Pt is moving well but is below her baseline mobility status as she was active prior to admit and working. Would benefit from OPPT for improvement in general strengthening, higher level balance.       Entered by: Ophelia Eaton 03/05/2020 8:49 AM

## 2020-03-05 NOTE — PLAN OF CARE
DATE & TIME: 3/4/20 3-7p       Cognitive Concerns/ Orientation : A&O x 4   BEHAVIOR & AGGRESSION TOOL COLOR: Green, anxious/tearful  CIWA SCORE: N/a    ABNL VS/O2: VSS on room air  MOBILITY: Independent in room, SBA in casillas way  PAIN MANAGMENT: denies  DIET: Regular, poor appetite today, c/o nausea, no emesis  BOWEL/BLADDER: Continent, loose BM  ABNL LAB/BG: Bili-17.2, cr-5.54, WBC-16.0, Hgb-7.5  DRAIN/DEVICES: Right PICC, Right chest port for hemodialysis  TELEMETRY RHYTHM: N/a  SKIN: Sclera and skin jaundiced, rash/acne. Patient uses own linen due to being allergic to hospital detergent  TESTS/PROCEDURES: dialysis today  D/C DAY/GOALS/PLACE: Home vs TCU pending psych eval and OT/PT recommendations  OTHER IMPORTANT INFO: BLEs +3 edema, Lymph wraps off. Pt had dialysis today, feeling weak, c/o nausea-medicated with Zofran, poor appetite, up w/SBA/GB to BR, had nose bleed #2, family here/updated.

## 2020-03-05 NOTE — PROGRESS NOTES
Care Coordinator received call from [facility].  The requested Internal Medicine physician is not able to accept pt. Due to closed practice and is at her maximum.  We were able to schedule with another Internal Med, Dr Cheryl Becerril, on 3/10/20.  Will cancel the previous appt made with Family Practice.  Will need to ask [facility] what the follow up is.  [facility] has a Liver clinic, unsure if [facility] has Liver Specialist.  Pt will need Outpt PT/OT ordered at discharge also.

## 2020-03-05 NOTE — PLAN OF CARE
DATE & TIME: 3/4 from 1900 to 0730    Cognitive Concerns/ Orientation : A&O x 4, forgetful at times   BEHAVIOR & AGGRESSION TOOL COLOR: Green, anxious at times  CIWA SCORE: N/A   ABNL VS/O2: VSS on RA  MOBILITY: Up x 1 assist to bathroom, feeling weak  PAIN MANAGMENT: c/o BLE/Back pain, given oxycodone x 1  DIET: Regular   BOWEL/BLADDER: Continent to bowel and bladder  ABNL LAB/BG:Na 132, phosphorous 4.8 Cr 5.54 had dialysis today. Bilirubin total 17.2, , , WBC 16.0 Hgb 7.5, INR 1.97,   DRAIN/DEVICES: PICC on right AC, Dialysis tunnel cath on right chest  TELEMETRY RHYTHM: N/A  SKIN: Sclera and skin jaundice, scattered bruises, rash/acne mainly on face. BLE 3+ edema, removed lymph wrap am shift.   TESTS/PROCEDURES: Had dialysis 3/4  D/C DAY/GOALS/PLACE: Discharge pending in progress home vs TCU per PT/OT recommendation.   OTHER IMPORTANT INFO: CD evaluation pending, 3+ BLE, Lymph wrap off.

## 2020-03-05 NOTE — PROGRESS NOTES
[facility]  ADULT CD ASSESSMENT ADDENDUM      Patient Name: Pat Delgado  Cell Phone:   Home: [phone] (home)    Mobile:   Telephone Information:   Mobile [phone]       Email:  [email or web address]  Emergency Contact: Alexis Sales   Tel: [phone]    The patient reported being:  Living with a partner    With which race do you identify? White    Initial Screening Questions     1. Are you currently having severe withdrawal symptoms that are putting yourself or others in danger?  No    2. Are you currently having severe medical problems that require immediate attention?  No    3. Are you currently having severe emotional or behavioral problems that are putting yourself or others at risk of harm?  No    4. Do you have sufficient reading skills that will enable you to understand written materials, including the program rules and client rights materials?  Yes     Family History and other additional information     Who raised you? (parents, grandparents, adoptive parents, step-parents, etc.)    Both Parents    Please tell me what it was like growing up in your family. (please include any history of substance abuse, mental health issues, emotional/physical/sexual abuse, forms of discipline, and support)     No family hx substance use    Do you have any children or Stepchildren? No    Are you being investigated by Child Protection Services? No    Do you have a child protection worker, probation office or ?  No    How would you describe your current finances?  No income    If you are having problems, (unpaid bills, bankruptcy, IRS problems) please explain:  No    If working or a student are you able to function appropriately in that setting? Not working     Describe your preferred learning style:  by hands-on practice    What are your some of your personal strengths?  Motivated    Do you currently participate in community ines activities, such as attending Baptist,  temple, Uatsdin or Buddhist services?  Yes, explain: [facility]    How does your spirituality impact your recovery?  AA and higher power    Do you currently self-administer your medications?  Yes    Have you ever had to lie to people important to you about how much you dixon?   No   Have you ever felt the need to bet more and more money?   No   Have you ever attempted treatment for a gambling problem?   No   Have you ever touched or fondled someone else inappropriately or forced them to have sex with you against their will?   No   Are you or have you ever been a registered sex offender?   No   Is there any history of sexual abuse in your family? No   Have you ever felt obsessed by your sexual behavior, such as having sex with many partners, masturbating often, using pornography often?   No     Have you ever received therapy or stayed in the hospital for mental health problems?   No     Have you ever hurt yourself, such as cutting, burning or hitting yourself?   Yes, explain: like at age 11     Have you ever purged, binged or restricted yourself as a way to control your weight?   No     Are you on a special diet?   No     Do you have any concerns regarding your nutritional status?   No     Have you had any appetite changes in the last 3 months?   No   Have you had weight loss or weight gain of more than 10 lbs in the last 3 months?   If patient gained or lost more than 10 lbs, then refer to program RN / attending Physician for assessment.   No   Was the patient informed of BMI?       No   Have you engaged in any risk-taking behavior that would put you at risk for exposure to blood-borne or sexually transmitted diseases?   No   Do you have any dental problems?   No   Have you ever lived through any trauma or stressful life events?   No   In the past month, have you had any of the following symptoms related to the trauma listed above? (dreams, intense memories, flashbacks, physical reactions, etc.)   No    Have you ever believed people were spying on you, or that someone was plotting against you or trying to hurt you?   No   Have you ever believed someone was reading your mind or could hear your thoughts or that you could actually read someone's mind or hear what another person was thinking?   No   Have you ever believed that someone of some force outside of yourself was putting thoughts into your mind or made you act in a way that was not your usual self?  Have you ever though you were possessed?   No   Have you ever believed you were being sent special messages through the TV, radio or newspaper?   No   Have you ever heard things other people couldn't hear, such as voices or other noises?   No   Have you ever had visions when you were awake?  Or have you ever seen things other people couldn't see?   No   Do you have a valid 's license?    Yes     PHQ-9, AMPARO-7 and Suicide Risk Assessment   PHQ-9 on 3/5/2020 AMPARO-7 on 3/5/2020   The patient's PHQ-9 score was not assessed   The patient's AMPARO-7 score was not assessed       Charles Mix-Suicide Severity Rating Scale   Suicide Ideation   1.) Have you ever wished you were dead or that you could go to sleep and not wake up?     Lifetime:  No   Past Month:  No     2.) Have you actually had any thoughts of killing yourself?   Lifetime:  No   Past Month:  No     3.) Have you been thinking about how you might do this?     Lifetime:  No   Past Month:  No     4.) Have you had these thoughts and had some intention of acting on them?     Lifetime:  No   Past Month:  No     5.) Have you started to work out the details of how to kill yourself?   Lifetime:  No   Past Month:  No     6.) Do you intend to carry out this plan?      Lifetime:  No   Past Month:  No     Intensity of Ideation   Intensity of ideation (1 being least severe, 5 being most severe):     Lifetime:  The patient denied ever having any suicidal thoughts in life.   Past Month:  The patient denied having any suicidal  thoughts within the past month.     How often do you have these thoughts?  The patient denied ever having any suicidal thoughts in life.     When you have the thoughts how long do they last?  The patient denied ever having any suicidal thoughts in life.     Can you stop thinking about killing yourself or wanting to die if you want to?  The patient denied ever having any suicidal thoughts in life.     Are there things - anyone or anything (i.e. family, Gnosticism, pain of death) that stopped you from wanting to die or acting on thoughts of suicide?  Does not apply     What sort of reasons did you have for thinking about wanting to die or killing yourself (ie end pain, stop how you were feeling, get attention or reaction, revenge)?  Does not apply     Suicidal Behavior   (Suicide Attempt) - Have you made a suicide attempt?     Lifetime:  The patient had never made a suicide attempt.   Past Month:  The patient had not made a suicide attempt within the past month.     Have you engaged in self-harm (non-suicidal self-injury)?  The patient denied having any history of engaging in self-harm (non-suicidal self-injury).     (Interrupted Attempt) - Has there been a time when you started to do something to end your life but someone or something stopped you before you actually did anything?  The patient denied having any history of an interrupted suicide attempt.     (Aborted or Self-Interrupted Attempt) - Has there been a time when you started to do something to try to end your life but you stopped yourself before you actually did anything?  The patient denied having any history of an aborted or self-interrupted suicide attempt.     (Preparatory Acts of Behavior) - Have you taken any steps towards making suicide attempt or preparing to kill yourself (such as collecting pills, getting a gun, giving valuables away or writing a suicide note)?  The patient denied having any history of taking any steps towards making a suicide  "attempt or preparing to kill self.     Actual Lethality/Medical Damage:  The patient denied ever making a suicidal attempt.       2008  [employer] [facility] [employer] [facility] [employer]  Used with permission by Sandra Mcgowan, PhD.       Guide to C-SSRS Risk Ratings   NO IDEATION:  with no active thoughts IDEATION: with a wish to die. IDEATION: with active thoughts. Risk Ratings   If Yes No No 0 - Very Low Risk   If NA Yes No 1 - Low Risk   If NA Yes Yes 2 - Low/moderate risk   IDEATION: associated thoughts of methods without intent or plan INTENT: Intent to follow through on suicide PLAN: Plan to follow through on suicide Risk Ratings cont...   If Yes No No 3 - Moderate Risk   If Yes Yes No 4 - High Risk   If Yes Yes Yes 5 - High Risk   The patient's ADDITIONAL RISK FACTORS and lack of PROTECTIVE FACTORS may increase their overall suicide risk ratings.     Additional Risk Factors:    Alcohol use   Protective Factors:    Having people in his/her life that would prevent the patient from considering a suicide attempt (i.e. young children, spouse, parents, etc.)     Having easy access to supportive family members     Risk Status   Past month: 0. - Very Low Risk:  Evaluation Counselors:  Document in Epic / [employer] to counselor \"Very Low Risk\".      Treatment Counselors:  Reassess upon admission as applicable, assess weekly in progress notes under Dimension 3 and summarize in Discharge / Treatment summary under Dimension 3.    Past 24 hours: 0. - Very Low Risk:  Evaluation Counselors:  Document in Epic / [employer] to counselor \"Very Low Risk\".      Treatment Counselors:  Reassess upon admission as applicable, assess weekly in progress notes under Dimension 3 and summarize in Discharge / Treatment summary under Dimension 3.   Additional information to support suicide risk rating: There was no additional information to provide at this time.     Mental Health Status   Physical Appearance/Attire: Attire appropriate to age/situation "   Hygiene: well groomed   Eye Contact: at examiner   Speech Rate:  regular   Speech Volume: regular   Speech Quality: fluid   Cognitive/Perceptual:  reality based   Cognition: memory intact    Judgment: intact   Insight: intact   Orientation:  time, place, person and situation   Thought: logical    Hallucinations:  none   General Behavioral Tone: cooperative   Psychomotor Activity: no problem noted   Gait:  no problem   Mood: appropriate   Affect: congruence/appropriate   Counselor Notes: NA     Criteria for Diagnosis: DSM-5 Criteria for Substance Use Disorders      Alcohol Use Disorder Severe - 303.90 (F10.20)    Level of Care   I.) Intoxication and Withdrawal: 0   II.) Biomedical:  1   III.) Emotional and Behavioral:  2   IV.) Readiness to Change:  0   V.) Relapse Potential: 3   VI.) Recovery Environmental: 2     Initial Problem List     The patient lacks relapse prevention skills    Patient/Client is willing to follow treatment recommendations.  Yes    Counselor: RICKY Li

## 2020-03-05 NOTE — CONSULTS
3/5/20 chem dep consult completed.      Met with patient, and she is agreeable to outpatient services.  We discussed her dialysis schedule going forward and were in agreement I would email her some outpatient program referrals.  She will let me know which she decides to pursue to fit around her other appointments and schedule and I will initiate referral.    Dariana Francisco, [facility] [place][note 2]  [phone]

## 2020-03-05 NOTE — PROGRESS NOTES
Pt is ready for discharge.    Dariana LEBRON, CD  has seen pt and will be in contact with pt to help get into an outpt CD program.  PCP and GI appointments are scheduled.  Conversed with both pt and her Boyfriend Alexis today.  He is in agreement with helping her with transportation to dialysis.  He has to work at 4:30PM, so pt is planning to use Uber for transportation home or her parents.  Unfortunately, her scripts were sent to [facility].  She is discharging on Rifaxamin, and this always needs a PA.  Conversed with Kaylah, Pharmacy Liaison that is able to help with PA.  Appreciate help from pt's Nurse Olivia in contacting [facility] and the Hospitalist to change to the Discharge Pharmacy.  This way it enables us to know she is able to get filled, what has been prescribed.  Dr Bronson has called in dialysis orders.  Will send the Discharge Summary to [facility] when it is available.

## 2020-03-06 NOTE — PROGRESS NOTES
Occupational Therapy Discharge Summary    Reason for therapy discharge:    Discharged to home with outpatient therapy.    Progress towards therapy goal(s). See goals on Care Plan in [facility] electronic health record for goal details.  Goals partially met.  Barriers to achieving goals:   discharge on same date as initial evaluation.    Therapy recommendation(s):    Em with assist from boyfriend

## 2020-03-09 NOTE — PLAN OF CARE
Physical Therapy Discharge Summary    Reason for therapy discharge:    Discharged to home with outpatient therapy.    Progress towards therapy goal(s). See goals on Care Plan in [facility] electronic health record for goal details.  Goals partially met.  Barriers to achieving goals:   discharge from facility.    Therapy recommendation(s):    Continued therapy is recommended.  Rationale/Recommendations:  To further increase independence and endurance.

## 2020-03-14 ENCOUNTER — APPOINTMENT (OUTPATIENT)
Dept: ULTRASOUND IMAGING | Facility: CLINIC | Age: 30
End: 2020-03-14
Attending: EMERGENCY MEDICINE
Payer: COMMERCIAL

## 2020-03-14 ENCOUNTER — HOSPITAL ENCOUNTER (EMERGENCY)
Facility: CLINIC | Age: 30
Discharge: HOME | End: 2020-03-14
Attending: EMERGENCY MEDICINE | Admitting: EMERGENCY MEDICINE
Payer: COMMERCIAL

## 2020-03-14 VITALS
WEIGHT: 240 LBS | RESPIRATION RATE: 20 BRPM | BODY MASS INDEX: 36.37 KG/M2 | SYSTOLIC BLOOD PRESSURE: 96 MMHG | HEIGHT: 68 IN | HEART RATE: 105 BPM | TEMPERATURE: 98.5 F | OXYGEN SATURATION: 100 % | DIASTOLIC BLOOD PRESSURE: 61 MMHG

## 2020-03-14 DIAGNOSIS — G62.9 PERIPHERAL POLYNEUROPATHY: ICD-10-CM

## 2020-03-14 LAB
ALBUMIN SERPL-MCNC: 2.2 G/DL (ref 3.4–5)
ALP SERPL-CCNC: 144 U/L (ref 40–150)
ALT SERPL W P-5'-P-CCNC: 18 U/L (ref 0–50)
ANION GAP SERPL CALCULATED.3IONS-SCNC: 11 MMOL/L (ref 3–14)
AST SERPL W P-5'-P-CCNC: 137 U/L (ref 0–45)
BASOPHILS # BLD AUTO: 0.2 10E9/L (ref 0–0.2)
BASOPHILS NFR BLD AUTO: 1 %
BILIRUB SERPL-MCNC: 10.4 MG/DL (ref 0.2–1.3)
BUN SERPL-MCNC: 43 MG/DL (ref 7–30)
CALCIUM SERPL-MCNC: 8.4 MG/DL (ref 8.5–10.1)
CHLORIDE SERPL-SCNC: 96 MMOL/L (ref 94–109)
CO2 SERPL-SCNC: 28 MMOL/L (ref 20–32)
CREAT SERPL-MCNC: 5.3 MG/DL (ref 0.52–1.04)
DIFFERENTIAL METHOD BLD: ABNORMAL
EOSINOPHIL # BLD AUTO: 1.4 10E9/L (ref 0–0.7)
EOSINOPHIL NFR BLD AUTO: 7.7 %
ERYTHROCYTE [DISTWIDTH] IN BLOOD BY AUTOMATED COUNT: 22.9 % (ref 10–15)
GFR SERPL CREATININE-BSD FRML MDRD: 10 ML/MIN/{1.73_M2}
GLUCOSE SERPL-MCNC: 98 MG/DL (ref 70–99)
HCT VFR BLD AUTO: 25.5 % (ref 35–47)
HGB BLD-MCNC: 8.1 G/DL (ref 11.7–15.7)
IMM GRANULOCYTES # BLD: 0.1 10E9/L (ref 0–0.4)
IMM GRANULOCYTES NFR BLD: 0.7 %
LYMPHOCYTES # BLD AUTO: 2.3 10E9/L (ref 0.8–5.3)
LYMPHOCYTES NFR BLD AUTO: 13.2 %
MCH RBC QN AUTO: 31.3 PG (ref 26.5–33)
MCHC RBC AUTO-ENTMCNC: 31.8 G/DL (ref 31.5–36.5)
MCV RBC AUTO: 99 FL (ref 78–100)
MONOCYTES # BLD AUTO: 1.8 10E9/L (ref 0–1.3)
MONOCYTES NFR BLD AUTO: 10.5 %
NEUTROPHILS # BLD AUTO: 11.6 10E9/L (ref 1.6–8.3)
NEUTROPHILS NFR BLD AUTO: 66.9 %
PLATELET # BLD AUTO: 198 10E9/L (ref 150–450)
POTASSIUM SERPL-SCNC: 3.7 MMOL/L (ref 3.4–5.3)
PROT SERPL-MCNC: 6.9 G/DL (ref 6.8–8.8)
RBC # BLD AUTO: 2.59 10E12/L (ref 3.8–5.2)
SODIUM SERPL-SCNC: 135 MMOL/L (ref 133–144)
WBC # BLD AUTO: 17.4 10E9/L (ref 4–11)

## 2020-03-14 PROCEDURE — 87040 BLOOD CULTURE FOR BACTERIA: CPT | Mod: XS | Performed by: EMERGENCY MEDICINE

## 2020-03-14 PROCEDURE — 93970 EXTREMITY STUDY: CPT

## 2020-03-14 PROCEDURE — 36415 COLL VENOUS BLD VENIPUNCTURE: CPT

## 2020-03-14 PROCEDURE — 25000132 ZZH RX MED GY IP 250 OP 250 PS 637: Performed by: EMERGENCY MEDICINE

## 2020-03-14 PROCEDURE — 85025 COMPLETE CBC W/AUTO DIFF WBC: CPT | Performed by: EMERGENCY MEDICINE

## 2020-03-14 PROCEDURE — 80053 COMPREHEN METABOLIC PANEL: CPT | Performed by: EMERGENCY MEDICINE

## 2020-03-14 PROCEDURE — 99284 EMERGENCY DEPT VISIT MOD MDM: CPT | Mod: 25

## 2020-03-14 RX ORDER — OXYCODONE HYDROCHLORIDE 5 MG/1
5 TABLET ORAL EVERY 4 HOURS PRN
Status: DISCONTINUED | OUTPATIENT
Start: 2020-03-14 | End: 2020-03-14 | Stop reason: HOSPADM

## 2020-03-14 RX ORDER — GABAPENTIN 100 MG/1
100 CAPSULE ORAL DAILY
Qty: 30 CAPSULE | Refills: 0 | Status: SHIPPED | OUTPATIENT
Start: 2020-03-14 | End: 2020-07-15

## 2020-03-14 RX ADMIN — OXYCODONE HYDROCHLORIDE 5 MG: 5 TABLET ORAL at 08:21

## 2020-03-14 ASSESSMENT — ENCOUNTER SYMPTOMS
COLOR CHANGE: 1
FEVER: 0

## 2020-03-14 ASSESSMENT — MIFFLIN-ST. JEOR: SCORE: 1862.13

## 2020-03-14 NOTE — ED TRIAGE NOTES
recent discharge from [facility], noted foot pain during hsopitalization.  Assessment yesterday by PT and RN, foot pain caused by nerve pain

## 2020-03-14 NOTE — ED PROVIDER NOTES
"  History     Chief Complaint:  Foot Pain     HPI   Pat Delgado is a 29 year old female with a history of alcohol abuse, anxiety, alcoholic hepatitis, and is currently on hemodialysis who presents for evaluation of bilateral foot and calf pain. The patient was recently admitted for a month secondary to alcoholic hepatitis complicated by kidney failure, encephalopathy, respiratory failure, and bacteremia with fusobacterium nucleatum for which she was treated with IV meropenum and oral Flagyl. She was discharged on 3/5; while hospitalized, she started hemodialysis which she is continuing as an outpatient and is due for today. She notes the bilateral foot and calf pain started while in the hospital, along with swelling; she states they managed her pains while here with hydrocodone and Benadryl. Since coming home, the swelling has improved but the pain has continued and only progressively worsened. Her care team has referred to it as nerve pain, but the patient has not been able to control it at home as she does not want to take too much Advil given her kidney failure. Last night, her parents note she was \"crying all night\" due to the pain, thus prompting presentation to the ED for evaluation. Here, she describes the pains as sharp and sensitive to touch; on both legs, it radiates up from her feet in a \"sock-like distribution\" but radiates higher on the left. She is not currently on gabapentin. Mom notes that her jaundiced skin fluctuates from day to day, and today does not appear drastically worse than previous. The patient currently lives with her boyfriend, and occasionally stays with her parents, but both her parents and significant other work during the day. She did meet with social work while hospitalized and they agreed upon a plan for outpatient treatment with home health PT and OT. Her care team currently includes Dr. Hobson for hepatology, Dr. Reynolds for nephrology with [facility], and Dr. Becerril - her PCP. " "    Hydrocodone   Benadryl     Allergies:  Penicillins    Medications:    Propranolol   Thiamine  Rifaximin  Zofran ODT  ProAmatine  Lactulose  Folic acid  Pepcid     Past Medical History:    ADHD  Panic disorder  Anxiety  Alcohol dependence  Migraines  Hypertension   Tobacco abuse  Kidney failure     Past Surgical History:    Dental extractions   CVC tunnel placement   Feeding tube placement, PICC exchange    Family History:    Anxiety   Diabetes  Hypertension    Social History:  Marital Status:  Single [1]  Presents with her parents.   Former smoker.   Current smokeless tobacco use.   Negative for current alcohol use. Recently sober.   Negative for drug use.      Review of Systems   Constitutional: Negative for fever.   Musculoskeletal:        Bilateral foot/calf pain   Skin: Positive for color change (jaundice, baseline currently).   All other systems reviewed and are negative.      Physical Exam     Patient Vitals for the past 24 hrs:   BP Temp Temp src Pulse Heart Rate Resp SpO2 Height Weight   03/14/20 0550 96/61 98.5  F (36.9  C) Oral 105 105 20 100 % 1.727 m (5' 8\") 108.9 kg (240 lb)      Physical Exam  GENERAL: well developed, tearful at times  HEAD: atraumatic  EYES: pupils reactive, extraocular muscles intact, conjunctivae normal  ENT:  mucus membranes moist  NECK:  trachea midline, normal range of motion  RESPIRATORY: no tachypnea, breath sounds clear to auscultation   CVS: normal S1/S2, no murmurs, intact distal pulses  ABDOMEN: soft, nontender, nondistention  MUSCULOSKELETAL: no deformities  SKIN: warm and dry, no acute rashes or ulceration, jaundiced  NEURO: GCS 15, cranial nerves intact, alert and oriented x3  PSYCH:  Tearful at times    Emergency Department Course   Imaging:  Radiographic findings were communicated with the patient and family who voiced understanding of the findings.  US Lower Extremity Venous Duplex, bilateral:  No evidence for deep venous thrombosis in the bilateral   lower " extremity veins, as per radiology.     Laboratory:  CBC: WBC: 17.4 (H), HGB: 8.1 (L), PLT: 198  CMP: BUN: 43 (H), Creatinine: 5.30 (H), GFR: 10 (L), Ca: 8.4 (L), Bilirubin: 10.4 (H), Albumin: 2.2 (L), AST: 137 (H), o/w WNL     Blood cultures x2: Pending    Procedures:  None    Interventions:   0821 Oxycodone, 5 mg, PO    Emergency Department Course:  Nursing notes and vitals reviewed.   0609: I performed an exam of the patient as documented above.      IV was inserted and blood was drawn for laboratory testing, results above.    The patient was sent for a lower extremity ultrasound while in the emergency department, results above.     0756: I consulted with Dr. Santos, nephrology, regarding the patient's history and presentation here in the emergency department. He does report the patient can be started on a low dose of gabapentin - max dose 300 mg.     0759: I rechecked the patient and discussed the results of her workup thus far.     Findings and plan explained to the Patient. Patient discharged home with instructions regarding supportive care, medications, and reasons to return. The importance of close follow-up was reviewed. The patient was prescribed gabapentin.     I personally reviewed the laboratory and imaging results with the Patient and answered all related questions prior to discharge.    Impression & Plan      Medical Decision Making:  Pat Delgado is a 29 year old female who presents with lower extremity pain.  She notes recent admission from February 9 through March 5 for renal failure requiring dialysis and liver failure both of which are new.  Notes pain in the feet were an ongoing issue when she was hospitalized and was given narcotics inpatient.  She is currently living with her boyfriend and is here with both parents.  She has a substance abuse history.  She gets tearful when talking about her feet although and we start changing subjects the intensity lessons.  She is supposed to dialyze  today.  She describes pain tingling and burning to both lower legs in a stocking glove distribution.  Certainly peripheral neuropathy seems most likely.  She has good dorsalis pedis pulse bilaterally.  She been fairly sedentary and ultrasound is negative for DVT.  White count shows a persistent leukocytosis.  She not having fevers or chills.  Seems to be chronic but blood cultures were added on given that she grew bacteria in the past.  She had blood cultures subsequently that did not grow anything.  Did speak with nephrology regarding starting Neurontin for her peripheral neuropathy and underlying renal failure and dialysis.  Suggestion was Neurontin 100 mg daily and to take it after dialysis on the days of dialysis as it would be washed out.  She could increase to 200 mg in the future if needed with a max dose of 300, once daily.    Diagnosis:    ICD-10-CM    1. Peripheral polyneuropathy  G62.9        Disposition:  discharged to home    Discharge Medications:  New Prescriptions    GABAPENTIN (NEURONTIN) 100 MG CAPSULE    Take 1 capsule (100 mg) by mouth daily     Scribe Disclosure:  I, Kaylah Perkins, am serving as a scribe on 3/14/2020 at 6:09 AM to personally document services performed by Aman Herman MD based on my observations and the provider's statements to me.       3/14/2020    EMERGENCY DEPARTMENT       Aman Herman MD  03/14/20 5819

## 2020-03-20 LAB
BACTERIA SPEC CULT: NO GROWTH
BACTERIA SPEC CULT: NO GROWTH
Lab: NORMAL
SPECIMEN SOURCE: NORMAL
SPECIMEN SOURCE: NORMAL

## 2020-04-03 ENCOUNTER — TELEPHONE (OUTPATIENT)
Dept: MEDSURG UNIT | Facility: CLINIC | Age: 30
End: 2020-04-03

## 2020-04-06 ENCOUNTER — HOSPITAL ENCOUNTER (OUTPATIENT)
Facility: CLINIC | Age: 30
Discharge: HOME | End: 2020-04-06
Attending: RADIOLOGY | Admitting: RADIOLOGY
Payer: COMMERCIAL

## 2020-04-06 ENCOUNTER — APPOINTMENT (OUTPATIENT)
Dept: INTERVENTIONAL RADIOLOGY/VASCULAR | Facility: CLINIC | Age: 30
End: 2020-04-06
Attending: NURSE PRACTITIONER
Payer: COMMERCIAL

## 2020-04-06 VITALS
SYSTOLIC BLOOD PRESSURE: 103 MMHG | HEART RATE: 100 BPM | DIASTOLIC BLOOD PRESSURE: 73 MMHG | TEMPERATURE: 98.6 F | RESPIRATION RATE: 18 BRPM

## 2020-04-06 DIAGNOSIS — N19 RENAL FAILURE, UNSPECIFIED CHRONICITY: ICD-10-CM

## 2020-04-06 PROCEDURE — 36589 REMOVAL TUNNELED CV CATH: CPT | Mod: RT

## 2020-04-06 PROCEDURE — 40000854 ZZH STATISTIC SIMPLE TUBE INSERTION/CHARGE, PORT, CATH, FISTULOGRAM

## 2020-04-06 RX ORDER — LIDOCAINE HYDROCHLORIDE 10 MG/ML
INJECTION, SOLUTION INFILTRATION; PERINEURAL
Status: DISCONTINUED
Start: 2020-04-06 | End: 2020-04-06 | Stop reason: HOSPADM

## 2020-04-06 NOTE — PROGRESS NOTES
Care Suites Post Procedure Note    Patient Information  Name: Pat Delgado  Age: 29 year old    Post Procedure  Procedure: Tunnel Cath removal  Time patient returned to Care Suites: done at bedside in room, at 10:00 am  Site CDI  Concerns/abnormal assessment: None  Plan/Other: Discharge in thirty minutes    Care Suites Discharge Nursing Note    Patient Information  Name: Pat Delgado  Age: 29 year old    Discharge Education:  Discharge instructions reviewed: Yes  Patient/patient representative verbalizes understanding: Yes  Patient discharging on new medications: No  Medication education completed: Yes    Discharge Plans:   Discharge location: home  Discharge ride contacted: Yes  Approximate discharge time: 10:42    Discharge Criteria:  Discharge criteria met and vital signs stable: Yes    Patient Belongs:  Patient belongings returned to patient: Yes

## 2020-04-06 NOTE — DISCHARGE INSTRUCTIONS
Tunnel Cath Removal Discharge Instructions     After you go home:      You may resume your normal diet    Care of Puncture Site:      Keep the dressing clean & dry for 3 days    You may use a bandaid on the site after 3 days until the wound has healed    No tub baths, whirlpools or swimming until your puncture site has fully healed     Activity       You may go back to normal activity in 24 hours     Avoid heavy lifting (greater than 10 pounds), strenuous activity or the overuse of your shoulder for 3 days    Bleeding:      If you start bleeding from the incision site in your chest or have swelling in your neck, sit down and press on the site for 5-10 minutes.     If bleeding has not stopped after 10 minutes, call your provider.        Call 911 right away if you have heavy bleeding or bleeding that does not stop.      Medicines:      You may resume all your medicines today    For minor pain, you may take Acetaminophen (Tylenol) or Ibuprofen (Advil)                 Call the provider who ordered this procedure if:      The site is red, swollen, hot or tender or there is drainage at the site    You have chills or a fever greater than 100 F (37.8 C)    Swelling in your neck    Any questions or concerns      If you have questions call:          [facility] Radiology Dept @ [phone]        The provider who performed your procedure was CARLOS Gómez.

## 2020-04-06 NOTE — PROCEDURES
[facility]    Procedure: Right sided tunnel dialysis catheter removal    Date/Time: 4/6/2020 10:45 AM  Performed by: Thierry Germain PA-C  Authorized by: Thierry Germain PA-C   IR Fellow Physician: Thierry Germain PA-C    UNIVERSAL PROTOCOL   Site Marked: Yes  Prior Images Obtained and Reviewed:  Yes  Required items: Required blood products, implants, devices and special equipment available    Patient identity confirmed:  Verbally with patient  Patient was reevaluated immediately before administering moderate or deep sedation or anesthesia  Confirmation Checklist:  Patient's identity using two indicators, relevant allergies, procedure was appropriate and matched the consent or emergent situation and correct equipment/implants were available  Time out: Immediately prior to the procedure a time out was called    Universal Protocol: the Joint Commission Universal Protocol was followed    Preparation: Patient was prepped and draped in usual sterile fashion           ANESTHESIA    Local Anesthetic: Lidocaine 1% without epinephrine      SEDATION    Patient Sedated: No    See dictated procedure note for full details.  PROCEDURE   Patient Tolerance:  Patient tolerated the procedure well with no immediate complications    Length of time physician/provider present for 1:1 monitoring during sedation: 0

## 2020-04-06 NOTE — PROGRESS NOTES
PATIENT WELLNESS SCREENING    Step 1: Answer all screening questions 1-3.    1. In the last month, have you been in contact with someone who was confirmed or suspected to have Coronavirus/COVID-19? No    2. Do you have the following symptoms?   Fever? No   Cough? Yes: has a cold   Shortness of breath? No   Skin rash? No    3. Have you traveled internationally in the last month? No       Step 2: Refer to logic grid below for actions    NO SYMPTOM(S)    ACTIONS:  1. Standard rooming process  2. Provider to assess per normal protocol    POSITIVE SYMPTOM(S)  If positive for ANY of the following symptoms: fever, cough, shortness of breath, rash    ACTIONS  1. Mask patient  2. Room patient as soon as possible  3. Don appropriate PPE when entering room  4. Provider evaluation

## 2020-04-06 NOTE — PROGRESS NOTES
Care Suites Admission Nursing Note    Patient Information  Name: Pat Delgado  Age: 29 year old  Reason for admission: Tunnel Cath removal  Care Suites arrival time: 9:15    Patient Admission/Assessment   Pre-procedure assessment complete: Yes  If abnormal assessment/labs, provider notified: N/A  NPO: N/A  Medications held per instructions/orders: Yes  Consent: obtained  If applicable, pregnancy test status: declined  Patient oriented to room: Yes  Education/questions answered: Yes  Plan/other: Tunnel Cath removal at 10:00 am or earlier    Discharge Planning  Accompanied by: Paula Tate, waiting in car  Discharge name: Paula  Discharge location: home

## 2020-05-22 ENCOUNTER — HOSPITAL ENCOUNTER (EMERGENCY)
Facility: CLINIC | Age: 30
Discharge: HOME | End: 2020-05-22
Attending: EMERGENCY MEDICINE | Admitting: EMERGENCY MEDICINE
Payer: COMMERCIAL

## 2020-05-22 VITALS
TEMPERATURE: 98.8 F | DIASTOLIC BLOOD PRESSURE: 81 MMHG | HEIGHT: 68 IN | SYSTOLIC BLOOD PRESSURE: 133 MMHG | OXYGEN SATURATION: 97 % | WEIGHT: 220 LBS | BODY MASS INDEX: 33.34 KG/M2 | HEART RATE: 101 BPM | RESPIRATION RATE: 18 BRPM

## 2020-05-22 DIAGNOSIS — Z20.822 SUSPECTED COVID-19 VIRUS INFECTION: ICD-10-CM

## 2020-05-22 DIAGNOSIS — R11.2 NAUSEA AND VOMITING, INTRACTABILITY OF VOMITING NOT SPECIFIED, UNSPECIFIED VOMITING TYPE: ICD-10-CM

## 2020-05-22 LAB
ALBUMIN SERPL-MCNC: 2.2 G/DL (ref 3.4–5)
ALP SERPL-CCNC: 93 U/L (ref 40–150)
ALT SERPL W P-5'-P-CCNC: 13 U/L (ref 0–50)
ANION GAP SERPL CALCULATED.3IONS-SCNC: 8 MMOL/L (ref 3–14)
AST SERPL W P-5'-P-CCNC: 39 U/L (ref 0–45)
BASOPHILS # BLD AUTO: 0 10E9/L (ref 0–0.2)
BASOPHILS NFR BLD AUTO: 0.1 %
BILIRUB SERPL-MCNC: 3.8 MG/DL (ref 0.2–1.3)
BUN SERPL-MCNC: 2 MG/DL (ref 7–30)
CALCIUM SERPL-MCNC: 8.9 MG/DL (ref 8.5–10.1)
CHLORIDE SERPL-SCNC: 105 MMOL/L (ref 94–109)
CO2 SERPL-SCNC: 24 MMOL/L (ref 20–32)
CREAT SERPL-MCNC: 0.55 MG/DL (ref 0.52–1.04)
DIFFERENTIAL METHOD BLD: ABNORMAL
EOSINOPHIL # BLD AUTO: 0 10E9/L (ref 0–0.7)
EOSINOPHIL NFR BLD AUTO: 0.1 %
ERYTHROCYTE [DISTWIDTH] IN BLOOD BY AUTOMATED COUNT: 17.7 % (ref 10–15)
GFR SERPL CREATININE-BSD FRML MDRD: >90 ML/MIN/{1.73_M2}
GLUCOSE SERPL-MCNC: 103 MG/DL (ref 70–99)
HCG SERPL QL: NEGATIVE
HCT VFR BLD AUTO: 27.8 % (ref 35–47)
HGB BLD-MCNC: 9.1 G/DL (ref 11.7–15.7)
IMM GRANULOCYTES # BLD: 0.1 10E9/L (ref 0–0.4)
IMM GRANULOCYTES NFR BLD: 0.5 %
LYMPHOCYTES # BLD AUTO: 2.1 10E9/L (ref 0.8–5.3)
LYMPHOCYTES NFR BLD AUTO: 14.9 %
MCH RBC QN AUTO: 28.3 PG (ref 26.5–33)
MCHC RBC AUTO-ENTMCNC: 32.7 G/DL (ref 31.5–36.5)
MCV RBC AUTO: 86 FL (ref 78–100)
MONOCYTES # BLD AUTO: 0.9 10E9/L (ref 0–1.3)
MONOCYTES NFR BLD AUTO: 6.4 %
NEUTROPHILS # BLD AUTO: 11 10E9/L (ref 1.6–8.3)
NEUTROPHILS NFR BLD AUTO: 78 %
PLATELET # BLD AUTO: 234 10E9/L (ref 150–450)
POTASSIUM SERPL-SCNC: 3.8 MMOL/L (ref 3.4–5.3)
PROT SERPL-MCNC: 7.2 G/DL (ref 6.8–8.8)
RBC # BLD AUTO: 3.22 10E12/L (ref 3.8–5.2)
SODIUM SERPL-SCNC: 137 MMOL/L (ref 133–144)
WBC # BLD AUTO: 14.1 10E9/L (ref 4–11)

## 2020-05-22 PROCEDURE — 85025 COMPLETE CBC W/AUTO DIFF WBC: CPT | Performed by: EMERGENCY MEDICINE

## 2020-05-22 PROCEDURE — 96376 TX/PRO/DX INJ SAME DRUG ADON: CPT

## 2020-05-22 PROCEDURE — 99284 EMERGENCY DEPT VISIT MOD MDM: CPT | Mod: 25

## 2020-05-22 PROCEDURE — 84703 CHORIONIC GONADOTROPIN ASSAY: CPT | Performed by: EMERGENCY MEDICINE

## 2020-05-22 PROCEDURE — 25800030 ZZH RX IP 258 OP 636: Performed by: EMERGENCY MEDICINE

## 2020-05-22 PROCEDURE — 25000128 H RX IP 250 OP 636: Performed by: EMERGENCY MEDICINE

## 2020-05-22 PROCEDURE — 87635 SARS-COV-2 COVID-19 AMP PRB: CPT | Performed by: EMERGENCY MEDICINE

## 2020-05-22 PROCEDURE — 96374 THER/PROPH/DIAG INJ IV PUSH: CPT

## 2020-05-22 PROCEDURE — 80053 COMPREHEN METABOLIC PANEL: CPT | Performed by: EMERGENCY MEDICINE

## 2020-05-22 RX ORDER — ONDANSETRON 4 MG/1
4 TABLET, ORALLY DISINTEGRATING ORAL EVERY 8 HOURS PRN
Qty: 21 TABLET | Refills: 0 | Status: ON HOLD | OUTPATIENT
Start: 2020-05-22 | End: 2020-06-25

## 2020-05-22 RX ORDER — ONDANSETRON 2 MG/ML
4 INJECTION INTRAMUSCULAR; INTRAVENOUS ONCE
Status: COMPLETED | OUTPATIENT
Start: 2020-05-22 | End: 2020-05-22

## 2020-05-22 RX ADMIN — ONDANSETRON 4 MG: 2 INJECTION INTRAMUSCULAR; INTRAVENOUS at 10:38

## 2020-05-22 RX ADMIN — SODIUM CHLORIDE 500 ML: 9 INJECTION, SOLUTION INTRAVENOUS at 10:05

## 2020-05-22 RX ADMIN — ONDANSETRON 4 MG: 2 INJECTION INTRAMUSCULAR; INTRAVENOUS at 09:43

## 2020-05-22 ASSESSMENT — ENCOUNTER SYMPTOMS
COUGH: 0
MYALGIAS: 1
VOMITING: 1
DIARRHEA: 1
DYSURIA: 0
FREQUENCY: 0
ABDOMINAL DISTENTION: 1
NAUSEA: 1
FEVER: 0

## 2020-05-22 ASSESSMENT — MIFFLIN-ST. JEOR: SCORE: 1771.41

## 2020-05-22 NOTE — DISCHARGE INSTRUCTIONS
Discharge Instructions  COVID-19    Your Provider has determined that you should practice self-isolation and self-monitoring in order to protect yourself and your community from COVID-19, which is the disease caused by a new coronavirus. The virus spreads from person to person primarily by droplets when an infected person coughs or sneezes and the droplet either lands on another person or that other person touches a surface with the droplet on it. Diagnosis of COVID-19 can be made with a test but many times the test is unavailable or not necessary. There is no specific treatment or medicine for the disease.    Symptoms of COVID-19  Many people have no symptoms or mild symptoms.  Symptoms may usually appear 4 to 5 days (up to 14 days) after contact with another ill person. Some people will get severe symptoms and pneumonia. Usual symptoms are:     Fever    Cough   Trouble breathing   Less common symptoms are: Headache, body aches, sore    throat, sneezing, diarrhea.    How to Care for Yourself    Stay home.  Most people will recover from illness with mild symptoms.  Isolation by staying home is the best method to prevent the spread of the illness. Do not go to work or school. Have a friend or relative do your shopping. Do not use public transportation (bus, train) or ridesharing (Lyft, Uber).    How long should I stay home?  If you have symptoms of a respiratory disease (fever, cough), you should stay home for at least 7 days, and for 3 days with no fever and improvement of respiratory symptoms--whichever is longer. (Your fever should be gone for 3 days without using fever-reducing medicine.)    For example, if you have a fever and coughing for 4 days, you need to stay home 3 more days with no fever for a total of 7 days. Or, if you have a fever and coughing for 5 days, you need to stay home 3 more days with no fever for a total of 8 days.    Separate yourself from other people in your home.?As much as possible, you  should stay in one room and away from other people in your home. Also, use a separate bathroom, if possible. Avoid handling pets or other animals while sick.     Wear a facemask if you need to be around other people and cover your mouth and nose with a tissue when you cough or sneeze.     Avoid sharing personal household items. You should not share dishes, drinking glasses, forks/knives/spoons, towels, or bedding with other people in your home. After using these items, they should be washed with soap and water. Clean parts of your home that are touched often (doorknobs, faucets, countertops, etc.) daily.     Wash your hands often with soap and water for at least 20 seconds or use an alcohol-based hand  containing at least 60% alcohol.     Avoid touching your face.    Treat your symptoms: Take Acetaminophen (Tylenol) to treat body aches and fever as needed for comfort. Ibuprofen (Advil or Motrin) can be used as well if you still have symptoms after taking Tylenol.  Drink fluids. Rest.    Watch for worsening symptoms, shortness of breath, or difficulty   Breathing.    Employers/workplaces are being asked by the Centers for Disease Control (CDC) to not request notes/documentation for you to return to work or prove that you were ill. You may choose to show your employer this paperwork.    Return to the Emergency Department if:    If you are developing worsening breathing, shortness of breath, or feel worse you should seek medical attention.  If you are uncertain, contact your health care provider/clinic. If you need emergency medical attention, call 911 and tell them you have been ill.

## 2020-05-22 NOTE — ED PROVIDER NOTES
History   Chief Complaint:  Nausea, Vomiting, & Diarrhea and Generalized Body Aches    HPI   Pat Delgado is a 29 year old female with history of hypertension, tobacco abuse, alcohol dependence, and alcoholic hepatitis who presents with nausea, vomiting, diarrhea, and generalized body aches.  The patient reports that she was hospitalized for about a month from February to March related to alcoholic cirrhosis. She was placed on dialysis which she reports she finished about a month ago. However, since discharge and finishing dialysis she has felt progressively more nauseated, and yesterday she became more nauseated and vomiting. She also reports associated diarrhea and generalized body aches. She has been unable to keep any fluid or her medication down. She is prescribed Zofran when she was discharged from the hospital but has since run out.  She denies dysuria, urinary frequency, or urinary urgency.  She does feel bloated but states that this is somewhat chronic since her cirrhosis started, but states is continues to slowly be getting better. She follows with Dr. Hobson for GI. She did have a cough for a two weeks about one week ago but no chest pain or fever.  She has irregular periods at baseline.     Allergies:  Penicillins    Medications:   Pepcid   Gabapentin  Lactulose  Midodrine  Rifaximin   Thiamine   Zofran   Midodrine  Folic acid   Effexor   Spironolactone     Past Medical History:    ADHD  Alcohol dependence   Generalized anxiety disorder   Chemical dependence   Depressive disorder   Hypertension   Migraine headache   Tobacco abuse   Acute kidney failure with tubular necrosis   Alcoholic hepatitis      Past Surgical History:    Tooth extraction  CVC Tunnel placement x2  Feeding tube placement   PIIC exchange      Family History:    Mother: anxiety disorder   Maternal grandmother: dementia    Social History:  Smoking Status: former smoker, last quit in 01/30/2020  Smokeless Tobacco: current  "user  Alcohol Use: Yes, 750 mL of Whiskey a day   Drug Use: No  PCP: Clinic, [facility] [place]     Review of Systems   Constitutional: Negative for fever.   Respiratory: Negative for cough.    Cardiovascular: Negative for chest pain.   Gastrointestinal: Positive for abdominal distention (chronic ), diarrhea, nausea and vomiting.   Genitourinary: Negative for dysuria, frequency and urgency.   Musculoskeletal: Positive for myalgias.   All other systems reviewed and are negative.    Physical Exam     Patient Vitals for the past 24 hrs:   BP Temp Temp src Pulse Resp SpO2 Height Weight   05/22/20 1040 133/81 -- -- 101 18 97 % -- --   05/22/20 0933 131/83 98.8  F (37.1  C) Oral 104 20 98 % 1.727 m (5' 8\") 99.8 kg (220 lb)       Physical Exam  Physical Exam   General:  Sitting on bed by self.   HENT:  No obvious trauma to head  Right Ear:  External ear normal.   Left Ear:  External ear normal.   Nose:  Nose normal.   Eyes:  Conjunctivae and EOM are normal. Pupils are equal, round, and reactive.   Neck: Normal range of motion. Neck supple. No tracheal deviation present.   CV:  Radial pulse +2 and regular   Pulm/Chest: Effort normal and no respiratory distress    Abd: Soft. Mild distension. There is no tenderness.  Liver palpable in the right upper quadrant.  There is no rigidity, no rebound and no guarding.   M/S: Normal range of motion.   Neuro: Alert. GCS 15.  Skin: Skin is warm and dry. No rash noted. Not diaphoretic.   Psych: Normal mood and affect. Behavior is normal.     Emergency Department Course   Laboratory:  Laboratory findings were communicated with the patient who voiced understanding of the findings.    Symptomatic COVID19 Virus PCR by nasopharyngeal swab pending     CBC: WBC 14.1(H), HGB 9.1(L),   CMP: glucose 103(), BUN 2(L), bilirubin 3.8(H), albumin 2.2(L) o/w WNL (Creatinine 0.55)  HCG qualitative pregnancy: negative    Interventions:  0943 Zofran 4 mg IV  1005  ml IV  1038 Zofran 4 " mg IV    Emergency Department Course:  Nursing notes and vitals reviewed.    0953 I performed an exam of the patient as documented above.     IV was inserted and blood was drawn for laboratory testing, results above.     COVID19 Virus PCR obtained, results above.       1037 I rechecked the patient. Explained findings to the Patient.    Findings and plan explained to the Patient. Patient discharged home with instructions regarding supportive care, medications, and reasons to return. The importance of close follow-up was reviewed. The patient was prescribed Zofran.      Impression & Plan    Covid-19  Pat Delgado was evaluated during a global COVID-19 pandemic, which necessitated consideration that the patient might be at risk for infection with the SARS-CoV-2 virus that causes COVID-19.   Applicable protocols for evaluation were followed during the patient's care.   COVID-19 was considered as part of the patient's evaluation. The plan for testing is:  a test was obtained during this visit.    Medical Decision Making:  Pat Delgado is a very pleasant 29 year old female who presents to the emergency department today for evaluation of nausea and vomiting and body aches.  The patient has a complicated history of liver cirrhosis.  She has been sober.  She spent a month in the hospital.  She had been on dialysis at that time.  She follows with Dr. Hobson with GI.  The patient is no longer on dialysis.  She had a cough that she had for 2 weeks that completed its course 2 weeks ago.  The patient does have slight body aches.  She is mainly concerned about her nausea.  She had Zofran that was effective for her but she is currently out of this.  She reports her jaundice is slowly getting better.  She does have very slight scleral icterus, but per her report that she is starting to feel better and this is continuing to improve.  The patient has had no fever at home and is afebrile here.  I doubt spontaneous bacterial  peritonitis.  The patient's total bilirubin is slightly elevated but is much improved compared to a peak of 17 during her hospitalization.  Her creatinine is back to normal.  She is provided a small fluid bolus along with Zofran and felt better after this.  She has no urinary symptoms to suggest UTI.  She is mainly desiring a prescription for Zofran.  We discussed the possibility of COVID-19 disease as well.  A swab was obtained.  She is not hypoxic and can be safely discharged at this time.  She will return with any shortness of breath.  She also agrees to do a follow telem-visit follow-up with GI, .    The treatment plan was discussed with the patient and they expressed understanding of this plan and consented to the plan.  In addition, the patient will return to the emergency department if their symptoms persist, worsen, if new symptoms arise or if there is any concern as other pathology may be present that is not evident at this time. They also understand the importance of close follow up in the clinic and if unable to do so will return to the emergency department for a reevaluation. All questions were answered.    Diagnosis:    ICD-10-CM    1. Nausea and vomiting, intractability of vomiting not specified, unspecified vomiting type  R11.2 Comprehensive metabolic panel   2. Suspected Covid-19 Virus Infection  Z20.828        Disposition:   discharged to home    Discharge Medications:  Discharge Medication List as of 5/22/2020 10:36 AM          Scribe Disclosure:  Ame OLIVIA, am serving as a scribe at 9:27 AM on 5/22/2020 to document services personally performed by Kody Irving DO based on my observations and the provider's statements to me.   [facility] EMERGENCY DEPARTMENT       Kody Irving DO  05/22/20 1052

## 2020-05-22 NOTE — ED AVS SNAPSHOT
Emergency Department  [address] [facility] [address]  Phone:  [phone]  Fax:  [phone]                                    Pat Delgado   MRN: [id number]    Department:   Emergency Department   Date of Visit:  5/22/2020           After Visit Summary Signature Page    I have received my discharge instructions, and my questions have been answered. I have discussed any challenges I see with this plan with the nurse or doctor.    ..........................................................................................................................................  Patient/Patient Representative Signature      ..........................................................................................................................................  Patient Representative Print Name and Relationship to Patient    ..................................................               ................................................  Date                                   Time    ..........................................................................................................................................  Reviewed by Signature/Title    ...................................................              ..............................................  Date                                               Time          22EPIC Rev 08/18

## 2020-05-23 LAB
SARS-COV-2 RNA SPEC QL NAA+PROBE: NOT DETECTED
SPECIMEN SOURCE: NORMAL

## 2020-05-24 ENCOUNTER — HOSPITAL ENCOUNTER (EMERGENCY)
Facility: CLINIC | Age: 30
Discharge: HOME | End: 2020-05-24
Attending: EMERGENCY MEDICINE | Admitting: EMERGENCY MEDICINE
Payer: COMMERCIAL

## 2020-05-24 ENCOUNTER — APPOINTMENT (OUTPATIENT)
Dept: GENERAL RADIOLOGY | Facility: CLINIC | Age: 30
End: 2020-05-24
Attending: EMERGENCY MEDICINE
Payer: COMMERCIAL

## 2020-05-24 VITALS
WEIGHT: 220 LBS | HEART RATE: 104 BPM | DIASTOLIC BLOOD PRESSURE: 76 MMHG | OXYGEN SATURATION: 98 % | BODY MASS INDEX: 33.34 KG/M2 | TEMPERATURE: 98.8 F | SYSTOLIC BLOOD PRESSURE: 137 MMHG | RESPIRATION RATE: 18 BRPM | HEIGHT: 68 IN

## 2020-05-24 DIAGNOSIS — R11.2 NON-INTRACTABLE VOMITING WITH NAUSEA, UNSPECIFIED VOMITING TYPE: ICD-10-CM

## 2020-05-24 LAB
ALBUMIN SERPL-MCNC: 2.4 G/DL (ref 3.4–5)
ALBUMIN UR-MCNC: 10 MG/DL
ALP SERPL-CCNC: 85 U/L (ref 40–150)
ALT SERPL W P-5'-P-CCNC: 14 U/L (ref 0–50)
ANION GAP SERPL CALCULATED.3IONS-SCNC: 6 MMOL/L (ref 3–14)
APPEARANCE UR: ABNORMAL
AST SERPL W P-5'-P-CCNC: 40 U/L (ref 0–45)
BACTERIA #/AREA URNS HPF: ABNORMAL /HPF
BASOPHILS # BLD AUTO: 0.1 10E9/L (ref 0–0.2)
BASOPHILS NFR BLD AUTO: 0.4 %
BILIRUB SERPL-MCNC: 3.3 MG/DL (ref 0.2–1.3)
BILIRUB UR QL STRIP: ABNORMAL
BUN SERPL-MCNC: 4 MG/DL (ref 7–30)
CALCIUM SERPL-MCNC: 8.9 MG/DL (ref 8.5–10.1)
CHLORIDE SERPL-SCNC: 108 MMOL/L (ref 94–109)
CO2 SERPL-SCNC: 26 MMOL/L (ref 20–32)
COLOR UR AUTO: ABNORMAL
CREAT SERPL-MCNC: 0.57 MG/DL (ref 0.52–1.04)
DIFFERENTIAL METHOD BLD: ABNORMAL
EOSINOPHIL # BLD AUTO: 0.2 10E9/L (ref 0–0.7)
EOSINOPHIL NFR BLD AUTO: 1.5 %
ERYTHROCYTE [DISTWIDTH] IN BLOOD BY AUTOMATED COUNT: 17.9 % (ref 10–15)
GFR SERPL CREATININE-BSD FRML MDRD: >90 ML/MIN/{1.73_M2}
GLUCOSE SERPL-MCNC: 108 MG/DL (ref 70–99)
GLUCOSE UR STRIP-MCNC: NEGATIVE MG/DL
HCG SERPL QL: NEGATIVE
HCT VFR BLD AUTO: 27.1 % (ref 35–47)
HGB BLD-MCNC: 8.8 G/DL (ref 11.7–15.7)
HGB UR QL STRIP: NEGATIVE
HYALINE CASTS #/AREA URNS LPF: 3 /LPF (ref 0–2)
IMM GRANULOCYTES # BLD: 0.1 10E9/L (ref 0–0.4)
IMM GRANULOCYTES NFR BLD: 0.7 %
KETONES UR STRIP-MCNC: NEGATIVE MG/DL
LACTATE BLD-SCNC: 1.8 MMOL/L (ref 0.7–2)
LEUKOCYTE ESTERASE UR QL STRIP: ABNORMAL
LIPASE SERPL-CCNC: 112 U/L (ref 73–393)
LYMPHOCYTES # BLD AUTO: 2.9 10E9/L (ref 0.8–5.3)
LYMPHOCYTES NFR BLD AUTO: 24.3 %
MCH RBC QN AUTO: 28.7 PG (ref 26.5–33)
MCHC RBC AUTO-ENTMCNC: 32.5 G/DL (ref 31.5–36.5)
MCV RBC AUTO: 88 FL (ref 78–100)
MONOCYTES # BLD AUTO: 1.2 10E9/L (ref 0–1.3)
MONOCYTES NFR BLD AUTO: 10.5 %
MUCOUS THREADS #/AREA URNS LPF: PRESENT /LPF
NEUTROPHILS # BLD AUTO: 7.4 10E9/L (ref 1.6–8.3)
NEUTROPHILS NFR BLD AUTO: 62.6 %
NITRATE UR QL: NEGATIVE
NRBC # BLD AUTO: 0 10*3/UL
NRBC BLD AUTO-RTO: 0 /100
PH UR STRIP: 6.5 PH (ref 5–7)
PLATELET # BLD AUTO: 263 10E9/L (ref 150–450)
POTASSIUM SERPL-SCNC: 3.1 MMOL/L (ref 3.4–5.3)
PROT SERPL-MCNC: 7.4 G/DL (ref 6.8–8.8)
RBC # BLD AUTO: 3.07 10E12/L (ref 3.8–5.2)
RBC #/AREA URNS AUTO: 1 /HPF (ref 0–2)
SODIUM SERPL-SCNC: 140 MMOL/L (ref 133–144)
SOURCE: ABNORMAL
SP GR UR STRIP: 1.02 (ref 1–1.03)
SQUAMOUS #/AREA URNS AUTO: 4 /HPF (ref 0–1)
UROBILINOGEN UR STRIP-MCNC: 4 MG/DL (ref 0–2)
WBC # BLD AUTO: 11.8 10E9/L (ref 4–11)
WBC #/AREA URNS AUTO: 8 /HPF (ref 0–5)

## 2020-05-24 PROCEDURE — 96374 THER/PROPH/DIAG INJ IV PUSH: CPT

## 2020-05-24 PROCEDURE — 99284 EMERGENCY DEPT VISIT MOD MDM: CPT | Mod: 25

## 2020-05-24 PROCEDURE — 80053 COMPREHEN METABOLIC PANEL: CPT | Performed by: EMERGENCY MEDICINE

## 2020-05-24 PROCEDURE — 83690 ASSAY OF LIPASE: CPT | Performed by: EMERGENCY MEDICINE

## 2020-05-24 PROCEDURE — 85025 COMPLETE CBC W/AUTO DIFF WBC: CPT | Performed by: EMERGENCY MEDICINE

## 2020-05-24 PROCEDURE — 74019 RADEX ABDOMEN 2 VIEWS: CPT

## 2020-05-24 PROCEDURE — 83605 ASSAY OF LACTIC ACID: CPT | Performed by: EMERGENCY MEDICINE

## 2020-05-24 PROCEDURE — 84703 CHORIONIC GONADOTROPIN ASSAY: CPT | Performed by: EMERGENCY MEDICINE

## 2020-05-24 PROCEDURE — 81001 URINALYSIS AUTO W/SCOPE: CPT | Performed by: EMERGENCY MEDICINE

## 2020-05-24 PROCEDURE — 25000128 H RX IP 250 OP 636: Performed by: EMERGENCY MEDICINE

## 2020-05-24 PROCEDURE — 25800030 ZZH RX IP 258 OP 636: Performed by: EMERGENCY MEDICINE

## 2020-05-24 PROCEDURE — 25000132 ZZH RX MED GY IP 250 OP 250 PS 637: Performed by: EMERGENCY MEDICINE

## 2020-05-24 RX ORDER — METOCLOPRAMIDE 10 MG/1
10 TABLET ORAL 4 TIMES DAILY PRN
Qty: 21 TABLET | Refills: 0 | Status: SHIPPED | OUTPATIENT
Start: 2020-05-24 | End: 2020-10-04

## 2020-05-24 RX ORDER — POTASSIUM CHLORIDE 1500 MG/1
40 TABLET, EXTENDED RELEASE ORAL ONCE
Status: COMPLETED | OUTPATIENT
Start: 2020-05-24 | End: 2020-05-24

## 2020-05-24 RX ORDER — METOCLOPRAMIDE 10 MG/1
10 TABLET ORAL 4 TIMES DAILY PRN
Qty: 20 TABLET | Refills: 0 | Status: SHIPPED | OUTPATIENT
Start: 2020-05-24 | End: 2020-05-24

## 2020-05-24 RX ORDER — METOCLOPRAMIDE HYDROCHLORIDE 5 MG/ML
5 INJECTION INTRAMUSCULAR; INTRAVENOUS ONCE
Status: COMPLETED | OUTPATIENT
Start: 2020-05-24 | End: 2020-05-24

## 2020-05-24 RX ADMIN — METOCLOPRAMIDE 5 MG: 5 INJECTION, SOLUTION INTRAMUSCULAR; INTRAVENOUS at 04:33

## 2020-05-24 RX ADMIN — SODIUM CHLORIDE 500 ML: 9 INJECTION, SOLUTION INTRAVENOUS at 04:32

## 2020-05-24 RX ADMIN — POTASSIUM CHLORIDE 40 MEQ: 1500 TABLET, EXTENDED RELEASE ORAL at 05:28

## 2020-05-24 ASSESSMENT — ENCOUNTER SYMPTOMS
VOMITING: 1
ABDOMINAL PAIN: 1
NAUSEA: 1
FEVER: 0
COUGH: 0
DIARRHEA: 1

## 2020-05-24 ASSESSMENT — MIFFLIN-ST. JEOR: SCORE: 1771.41

## 2020-05-24 NOTE — ED PROVIDER NOTES
History     Chief Complaint:  Nausea, Vomiting, & Diarrhea      The history is provided by the patient and medical records.      Pat Delgado is a 29 year old female with a history of alcoholic cirrhosis (now sober for >100 days) who presents for evaluation of nausea, vomiting, and diarrhea. She was seen in this ER two days ago with similar symptoms with work up as below. She felt improved with Zofran and was discharged with this prescription. However, despite use of Zofran at home, she continues to have nausea and non-bloody emesis any time she tries to eat or drink anything. She has had some improvement in her diarrhea which she attributes to decreased PO intake. She does feel her abdomen is more distended and is painful, particularly when resting supine. She has never had a paracentesis. She has had no fever or other concerns with initial cough now resolved.    5/22/20 ER Visit Results:  Symptomatic COVID19 Virus PCR: not detected  CBC: WBC 14.1(H), HGB 9.1(L),   CMP: glucose 103(), BUN 2(L), bilirubin 3.8(H), albumin 2.2(L) o/w WNL (Creatinine 0.55)  HCG qualitative pregnancy: negative    Allergies:  Amoxicillin  Penicillins    Medications:    Pepcid  Gabapentin  Lactulose  Flagyl  Proamatine  Zofran ODT  Rifaximin     Past Medical History:    ADHD  Alcohol dependence  Anxiety  Depression  HTN  Migraine  Substance abuse  Tobacco abuse  Acute kidney failure with tubular necrosis  Alcoholic hepatitis   Alcoholic cirrhosis    Past Surgical History:    CVC tunnel placement and removal  Feeding tube placement     Family History:    Anxiety - mother    Social History:  Smoking status: former smoker.   Smokeless tobacco: current user. Vaping device  Alcohol use: yes. 750 mL whiskey per day - sober for > 100 days  Drug use: yes. Marijuana   The patient presents to the emergency department by herself.  PCP: [facility], [facility]  Marital Status:  Single     Review of Systems   Constitutional:  "Positive for appetite change (due to nausea and vomiting). Negative for fever.   Respiratory: Negative for cough.    Gastrointestinal: Positive for abdominal distention, abdominal pain, diarrhea, nausea and vomiting.   All other systems reviewed and are negative.    Physical Exam     Patient Vitals for the past 24 hrs:   BP Temp Pulse Heart Rate Resp SpO2 Height Weight   05/24/20 0621 (!) 141/96 -- -- 109 -- 95 % -- --   05/24/20 0528 -- -- -- -- -- 99 % -- --   05/24/20 0515 -- 98.8  F (37.1  C) -- -- 20 -- -- --   05/24/20 0457 -- -- -- -- -- 98 % -- --   05/24/20 0454 130/85 -- 104 -- -- -- -- --   05/24/20 0352 (!) 140/79 -- 109 -- -- 95 % 1.727 m (5' 8\") 99.8 kg (220 lb)     Physical Exam  General: Well-developed and well-nourished. Well appearing young  woman. Cooperative.  Head:  Atraumatic.  Eyes:  Conjunctivae and lids are normal.  Mild scleral icterus.  ENT:    Normal nose. Moist mucous membranes.  Neck:  Supple. Normal range of motion.  CV:  Mildly tachycardic rate and regular rhythm. Normal heart sounds with no murmurs, rubs, or gallops detected.  Resp:  No respiratory distress. Clear to auscultation bilaterally without decreased breath sounds, wheezing, rales, or rhonchi.  GI:  Soft.  Mildly distended, non-tender.    MS:  Normal ROM.  Trace to 1+ bilateral lower extremity edema.  Skin:  Warm. Non-diaphoretic. No pallor.  Minimal jaundice.  Neuro:  Awake. A&Ox3. Normal strength.  Psych: Normal mood and affect. Normal speech.  Vitals reviewed.    Emergency Department Course   Imaging:  Radiographic findings were communicated with the patient who voiced understanding of the findings.  XR Abdomen 2 Views, flat and upright:   Pending    Laboratory:  CBC: WBC: 11.8 (H), HGB: 8.8 (L), PLT: 263  CMP: Glucose 108 (H), Potassium 3.1 (L), Urea nitrogen 4 (L), Bilirubin total 3.3 (H), Albumin 2.4 (L), o/w WNL (Creatinine: 0.57)  419 Lactic acid: 1.8  HCG qualitative: Negative  Lipase: 112  UA: Bilirubin " small, Protein albumin 10, Urobilinogen 4.0 (H), Leukocyte esterase small, WBC 8 (H), Bacteira few, Squamous epithelial 4 (H), Mucous present, Hyaline casts 3 (H), o/w Negative    Interventions:  432  mL IV  433 Reglan 5 mg IV  528 potassium chloride 40 mEq Oral    Emergency Department Course:  Nursing notes and vitals reviewed. (408) I performed an exam of the patient as documented above.     IV inserted. Medicine administered as documented above. Blood drawn. Urine sample obtained. This was sent to the lab for further testing, results above.     The patient was sent for xray while in the emergency department.    516 I rechecked the patient and discussed the results of her workup thus far. She is starting PO challenge.    558 I rechecked the patient. She tolerated PO and has no pain but continues to feel nauseous.     Patient signed out the the oncoming physician, Dr. Irving, with final disposition pending.     Impression & Plan    Medical Decision Making:  Pat is a 29-year-old female with history of alcoholic cirrhosis who was seen in this emergency department 2 days ago for nausea, vomiting, and diarrhea.  Her diarrhea has somewhat improved which she attributes to decreased PO intake though her nausea and vomiting has persisted stating she has emesis every time she tries to eat or drink anything or even take her medications.  She denies all other concerns with some mild abdominal pain/distention that is worse when she is supine.  She has had no fever.  She appears well on exam with minimal jaundice and scleral icterus.  She has no reproducible abdominal tenderness and SBP is considered unlikely, particularly as she has no fever.  Her laboratory studies are similar to or improved from her recent visit.  She does have hypokalemia to 3.1 which is likely reduced to decreased PO intake and was repleted with 40 mEq potassium chloride by mouth.  Bilirubin of 3.3 is improved from last visit and significantly  improved from prior hospitalizations.  There is no evidence of pancreatitis and lactic acid is also normal.  She has a minimal leukocytosis of 11.8 which is also an improvement from recent visit value of 14.1. UA is without evidence of infection.     Pat was treated with a small 500 cc bolus of IV fluids and Reglan.  After these interventions she was able to tolerate PO without emesis though she had persistent feelings of nausea. To practice with an abundance of caution I would like to obtain an abdominal x-ray to rule out obstruction.  At the end of my shift this study is pending and she was endorsed my partner, Dr. Irving.  She understands if this is unremarkable and she has no further vomiting she will be discharged with Reglan. She is comfortable with this plan and would like to avoid hospitalization. She will follow-up with Dr. Hobson, her GI, as soon as possible and continue eating very bland foods.  She understands a low threshold for return with worsening symptoms, fever, or any other new concerns.    Covid-19  Pat Delgado was evaluated during a global COVID-19 pandemic, which necessitated consideration that the patient might be at risk for infection with the SARS-CoV-2 virus that causes COVID-19.   Applicable protocols for evaluation were followed during the patient's care.   COVID-19 was considered as part of the patient's evaluation. The plan for testing is:  a test was obtained at a previous visit and reviewed & considered today.      Diagnosis:    ICD-10-CM    1. Non-intractable vomiting with nausea, unspecified vomiting type  R11.2 UA with Microscopic       Disposition:  Pending    Discharge Medications:  New Prescriptions    METOCLOPRAMIDE (REGLAN) 10 MG TABLET    Take 1 tablet (10 mg) by mouth 4 times daily as needed (Nausea or Vomiting)       Nathaniel Ashford  5/24/2020    EMERGENCY DEPARTMENT  Scribe Disclosure:  Nathaniel OLIVIA, am serving as a scribe at 4:08 AM on 5/24/2020 to document  services personally performed by Martina Luo MD based on my observations and the provider's statements to me.        Martina Luo MD  05/27/20 2889

## 2020-05-24 NOTE — ED PROVIDER NOTES
Sign Out Note    Pt accepted in sign out from: Dr. Luo    Briefly pt presented to the ED for: n/v. Seen here 2 days ago. rx Zofran. Still with n/v. Patient received reglan. Feeling better now. Paper rx proivded by Dr. Luo for this as patient kept down crackers and water without concern.    Plan at time of sign out: await abdominal xray. Labs had shown improvement. Plan to discharge patient if xray shows no obstruction.    Care of patient during my shift: I talked with patient. She still has desire to go home. Offered obs admit, but she kept down crackers and water and wants to go home. Abdominal x-ray shows no obstruction.    Plan for patient at this time: discharge to home.     Kody Irving, DO  05/24/20 1407

## 2020-05-24 NOTE — DISCHARGE INSTRUCTIONS
Use Reglan as needed for nausea and vomiting.  Continue a bland diet.  Call Dr. Hobson first thing Tuesday to discuss ER visits, symptoms, and any outpatient work-up or treatment that is needed.  Return immediately with fever of 100.4  F or greater, severe abdominal pain, or any other concerns.

## 2020-05-24 NOTE — ED TRIAGE NOTES
Patient was seen here 2 days ago for same symptoms. She says she is unable to keep any food/fluids down x 4 days.

## 2020-05-24 NOTE — ED AVS SNAPSHOT
Emergency Department  [address] [facility] [address]  Phone:  [phone]  Fax:  [phone]                                    Pat Delgado   MRN: [id number]    Department:   Emergency Department   Date of Visit:  5/24/2020           After Visit Summary Signature Page    I have received my discharge instructions, and my questions have been answered. I have discussed any challenges I see with this plan with the nurse or doctor.    ..........................................................................................................................................  Patient/Patient Representative Signature      ..........................................................................................................................................  Patient Representative Print Name and Relationship to Patient    ..................................................               ................................................  Date                                   Time    ..........................................................................................................................................  Reviewed by Signature/Title    ...................................................              ..............................................  Date                                               Time          22EPIC Rev 08/18

## 2020-05-27 ASSESSMENT — ENCOUNTER SYMPTOMS
APPETITE CHANGE: 1
ABDOMINAL DISTENTION: 1

## 2020-06-07 ENCOUNTER — APPOINTMENT (OUTPATIENT)
Dept: ULTRASOUND IMAGING | Facility: CLINIC | Age: 30
End: 2020-06-07
Attending: EMERGENCY MEDICINE
Payer: COMMERCIAL

## 2020-06-07 ENCOUNTER — HOSPITAL ENCOUNTER (EMERGENCY)
Facility: CLINIC | Age: 30
Discharge: HOME | End: 2020-06-07
Attending: EMERGENCY MEDICINE | Admitting: EMERGENCY MEDICINE
Payer: COMMERCIAL

## 2020-06-07 VITALS
DIASTOLIC BLOOD PRESSURE: 75 MMHG | TEMPERATURE: 98.3 F | OXYGEN SATURATION: 97 % | HEIGHT: 68 IN | WEIGHT: 200 LBS | HEART RATE: 92 BPM | SYSTOLIC BLOOD PRESSURE: 135 MMHG | BODY MASS INDEX: 30.31 KG/M2 | RESPIRATION RATE: 18 BRPM

## 2020-06-07 DIAGNOSIS — R11.2 NAUSEA AND VOMITING, INTRACTABILITY OF VOMITING NOT SPECIFIED, UNSPECIFIED VOMITING TYPE: ICD-10-CM

## 2020-06-07 DIAGNOSIS — K80.20 CALCULUS OF GALLBLADDER WITHOUT CHOLECYSTITIS WITHOUT OBSTRUCTION: ICD-10-CM

## 2020-06-07 LAB
ALBUMIN SERPL-MCNC: 2.4 G/DL (ref 3.4–5)
ALBUMIN UR-MCNC: 10 MG/DL
ALP SERPL-CCNC: 77 U/L (ref 40–150)
ALT SERPL W P-5'-P-CCNC: 15 U/L (ref 0–50)
ANION GAP SERPL CALCULATED.3IONS-SCNC: 7 MMOL/L (ref 3–14)
APPEARANCE UR: CLEAR
AST SERPL W P-5'-P-CCNC: 32 U/L (ref 0–45)
B-HCG FREE SERPL-ACNC: <5 IU/L
BACTERIA #/AREA URNS HPF: ABNORMAL /HPF
BASOPHILS # BLD AUTO: 0 10E9/L (ref 0–0.2)
BASOPHILS NFR BLD AUTO: 0.3 %
BILIRUB SERPL-MCNC: 2.7 MG/DL (ref 0.2–1.3)
BILIRUB UR QL STRIP: NEGATIVE
BUN SERPL-MCNC: 8 MG/DL (ref 7–30)
CALCIUM SERPL-MCNC: 8.4 MG/DL (ref 8.5–10.1)
CHLORIDE SERPL-SCNC: 106 MMOL/L (ref 94–109)
CO2 SERPL-SCNC: 25 MMOL/L (ref 20–32)
COLOR UR AUTO: ABNORMAL
CREAT SERPL-MCNC: 0.44 MG/DL (ref 0.52–1.04)
DIFFERENTIAL METHOD BLD: ABNORMAL
EOSINOPHIL # BLD AUTO: 0.3 10E9/L (ref 0–0.7)
EOSINOPHIL NFR BLD AUTO: 2.7 %
ERYTHROCYTE [DISTWIDTH] IN BLOOD BY AUTOMATED COUNT: 17 % (ref 10–15)
GFR SERPL CREATININE-BSD FRML MDRD: >90 ML/MIN/{1.73_M2}
GLUCOSE SERPL-MCNC: 102 MG/DL (ref 70–99)
GLUCOSE UR STRIP-MCNC: NEGATIVE MG/DL
HCT VFR BLD AUTO: 30.1 % (ref 35–47)
HGB BLD-MCNC: 9.6 G/DL (ref 11.7–15.7)
HGB UR QL STRIP: NEGATIVE
HYALINE CASTS #/AREA URNS LPF: 4 /LPF (ref 0–2)
IMM GRANULOCYTES # BLD: 0 10E9/L (ref 0–0.4)
IMM GRANULOCYTES NFR BLD: 0.2 %
KETONES UR STRIP-MCNC: NEGATIVE MG/DL
LEUKOCYTE ESTERASE UR QL STRIP: ABNORMAL
LIPASE SERPL-CCNC: 191 U/L (ref 73–393)
LYMPHOCYTES # BLD AUTO: 1.9 10E9/L (ref 0.8–5.3)
LYMPHOCYTES NFR BLD AUTO: 19.8 %
MCH RBC QN AUTO: 27.7 PG (ref 26.5–33)
MCHC RBC AUTO-ENTMCNC: 31.9 G/DL (ref 31.5–36.5)
MCV RBC AUTO: 87 FL (ref 78–100)
MONOCYTES # BLD AUTO: 0.8 10E9/L (ref 0–1.3)
MONOCYTES NFR BLD AUTO: 8.2 %
MUCOUS THREADS #/AREA URNS LPF: PRESENT /LPF
NEUTROPHILS # BLD AUTO: 6.5 10E9/L (ref 1.6–8.3)
NEUTROPHILS NFR BLD AUTO: 68.8 %
NITRATE UR QL: NEGATIVE
NRBC # BLD AUTO: 0 10*3/UL
NRBC BLD AUTO-RTO: 0 /100
PH UR STRIP: 6.5 PH (ref 5–7)
PLATELET # BLD AUTO: 261 10E9/L (ref 150–450)
POTASSIUM SERPL-SCNC: 3.7 MMOL/L (ref 3.4–5.3)
PROT SERPL-MCNC: 7.3 G/DL (ref 6.8–8.8)
RBC # BLD AUTO: 3.46 10E12/L (ref 3.8–5.2)
RBC #/AREA URNS AUTO: 1 /HPF (ref 0–2)
SODIUM SERPL-SCNC: 138 MMOL/L (ref 133–144)
SOURCE: ABNORMAL
SP GR UR STRIP: 1.02 (ref 1–1.03)
SQUAMOUS #/AREA URNS AUTO: 2 /HPF (ref 0–1)
UROBILINOGEN UR STRIP-MCNC: 2 MG/DL (ref 0–2)
WBC # BLD AUTO: 9.5 10E9/L (ref 4–11)
WBC #/AREA URNS AUTO: 2 /HPF (ref 0–5)

## 2020-06-07 PROCEDURE — 25000128 H RX IP 250 OP 636: Performed by: EMERGENCY MEDICINE

## 2020-06-07 PROCEDURE — 76705 ECHO EXAM OF ABDOMEN: CPT

## 2020-06-07 PROCEDURE — 96375 TX/PRO/DX INJ NEW DRUG ADDON: CPT

## 2020-06-07 PROCEDURE — 99285 EMERGENCY DEPT VISIT HI MDM: CPT | Mod: 25

## 2020-06-07 PROCEDURE — 96376 TX/PRO/DX INJ SAME DRUG ADON: CPT

## 2020-06-07 PROCEDURE — 96361 HYDRATE IV INFUSION ADD-ON: CPT

## 2020-06-07 PROCEDURE — 96374 THER/PROPH/DIAG INJ IV PUSH: CPT

## 2020-06-07 PROCEDURE — 83690 ASSAY OF LIPASE: CPT | Performed by: EMERGENCY MEDICINE

## 2020-06-07 PROCEDURE — 85025 COMPLETE CBC W/AUTO DIFF WBC: CPT | Performed by: EMERGENCY MEDICINE

## 2020-06-07 PROCEDURE — 81001 URINALYSIS AUTO W/SCOPE: CPT | Performed by: EMERGENCY MEDICINE

## 2020-06-07 PROCEDURE — 84702 CHORIONIC GONADOTROPIN TEST: CPT

## 2020-06-07 PROCEDURE — 80053 COMPREHEN METABOLIC PANEL: CPT | Performed by: EMERGENCY MEDICINE

## 2020-06-07 PROCEDURE — 25800030 ZZH RX IP 258 OP 636: Performed by: EMERGENCY MEDICINE

## 2020-06-07 RX ORDER — ONDANSETRON 2 MG/ML
4 INJECTION INTRAMUSCULAR; INTRAVENOUS EVERY 30 MIN PRN
Status: DISCONTINUED | OUTPATIENT
Start: 2020-06-07 | End: 2020-06-07 | Stop reason: HOSPADM

## 2020-06-07 RX ORDER — DIPHENHYDRAMINE HYDROCHLORIDE 50 MG/ML
50 INJECTION INTRAMUSCULAR; INTRAVENOUS ONCE
Status: COMPLETED | OUTPATIENT
Start: 2020-06-07 | End: 2020-06-07

## 2020-06-07 RX ORDER — PROCHLORPERAZINE 25 MG
25 SUPPOSITORY, RECTAL RECTAL EVERY 12 HOURS PRN
Qty: 10 SUPPOSITORY | Refills: 0 | Status: SHIPPED | OUTPATIENT
Start: 2020-06-07 | End: 2020-07-15

## 2020-06-07 RX ORDER — ONDANSETRON 4 MG/1
4 TABLET, ORALLY DISINTEGRATING ORAL EVERY 4 HOURS PRN
Qty: 10 TABLET | Refills: 0 | Status: ON HOLD | OUTPATIENT
Start: 2020-06-07 | End: 2020-06-25

## 2020-06-07 RX ORDER — SODIUM CHLORIDE 9 MG/ML
1000 INJECTION, SOLUTION INTRAVENOUS CONTINUOUS
Status: DISCONTINUED | OUTPATIENT
Start: 2020-06-07 | End: 2020-06-07 | Stop reason: HOSPADM

## 2020-06-07 RX ADMIN — SODIUM CHLORIDE 1000 ML: 9 INJECTION, SOLUTION INTRAVENOUS at 14:35

## 2020-06-07 RX ADMIN — SODIUM CHLORIDE 1000 ML: 9 INJECTION, SOLUTION INTRAVENOUS at 15:35

## 2020-06-07 RX ADMIN — DIPHENHYDRAMINE HYDROCHLORIDE 50 MG: 50 INJECTION, SOLUTION INTRAMUSCULAR; INTRAVENOUS at 15:28

## 2020-06-07 RX ADMIN — ONDANSETRON 4 MG: 2 INJECTION INTRAMUSCULAR; INTRAVENOUS at 14:37

## 2020-06-07 RX ADMIN — PROCHLORPERAZINE EDISYLATE 10 MG: 5 INJECTION INTRAMUSCULAR; INTRAVENOUS at 15:28

## 2020-06-07 RX ADMIN — ONDANSETRON 4 MG: 2 INJECTION INTRAMUSCULAR; INTRAVENOUS at 15:28

## 2020-06-07 ASSESSMENT — ENCOUNTER SYMPTOMS
CHILLS: 0
NAUSEA: 1
VOMITING: 1
FEVER: 0
SORE THROAT: 0
COUGH: 0
DIARRHEA: 1

## 2020-06-07 ASSESSMENT — MIFFLIN-ST. JEOR: SCORE: 1680.69

## 2020-06-07 NOTE — ED AVS SNAPSHOT
Emergency Department  [address] [facility] [address]  Phone:  [phone]  Fax:  [phone]                                    Pat Delgado   MRN: [id number]    Department:   Emergency Department   Date of Visit:  6/7/2020           After Visit Summary Signature Page    I have received my discharge instructions, and my questions have been answered. I have discussed any challenges I see with this plan with the nurse or doctor.    ..........................................................................................................................................  Patient/Patient Representative Signature      ..........................................................................................................................................  Patient Representative Print Name and Relationship to Patient    ..................................................               ................................................  Date                                   Time    ..........................................................................................................................................  Reviewed by Signature/Title    ...................................................              ..............................................  Date                                               Time          22EPIC Rev 08/18

## 2020-06-07 NOTE — ED PROVIDER NOTES
History     Chief Complaint:  Nausea and Vomiting        HPI  Pat Delgado is a 29 year old year old female who present with nausea, vomiting, and loose stool. Patient states that she has been having these symptoms intermittently after being discharged from the hospital in March for alcoholic hepatitis and had a megan course including intubation and renal failure. She is now sober for months and is no longer on dialysis. She states she occasionally uses marijuana and vapes. She states this nausea and vomiting seems to come and go. She did have a muffin and coffee then started having the an and vomiting. Her mother has had gallbladder problems and she is worried about that. She denies fever, cough, sore throat, being shaky, or chills.         Allergies:  Amoxicillin  Penicillins       Medications:   Pepcid   Gabapentin   Lactulose   Promanide   Xifaxan    Medical History:   ADHD   Alcohol dependence  Chemical dependency   Anxiety   Depressive disorder   Hypertension   Migraine headache  Substance abuse   Acute kidney failure     Surgical History   HC tooth extraction   Tunnel placement and removal   Feeding tub e placement   PICC exchange right    Family History:   Mother: Anxiety disorder    Social History:  Patient was not accompanied to the ED.   Smoking Status: Current Smoker   Vape  Smokeless Tobacco: Never Used  Alcohol Use: Not currently  Drug Use: Positive     Marijuana   [facility]       Review of Systems   Constitutional: Negative for chills and fever.   HENT: Negative for sore throat.    Respiratory: Negative for cough.    Gastrointestinal: Positive for diarrhea, nausea and vomiting.   All other systems reviewed and are negative.      Physical Exam     Patient Vitals for the past 24 hrs:   BP Temp Temp src Pulse Heart Rate Resp SpO2 Height Weight   06/07/20 1647 135/75 -- -- 92 -- -- -- -- --   06/07/20 1646 -- -- -- -- -- -- 97 % -- --   06/07/20 1630 -- -- -- -- -- --  "95 % -- --   06/07/20 1600 -- -- -- -- -- -- 92 % -- --   06/07/20 1412 (!) 140/92 98.3  F (36.8  C) Oral -- 96 18 98 % 1.727 m (5' 8\") 90.7 kg (200 lb)          Physical Exam  Constitutional: She is sitting holding an emesis bag and trying to vomit. Heavy set white female.  HENT: No signs of trauma.   Eyes: EOM are normal. Pupils are equal, round, and reactive to light.   Neck: Normal range of motion. No JVD present. No cervical adenopathy.  Cardiovascular: Regular rhythm.  Exam reveals no gallop and no friction rub.    No murmur heard.  Pulmonary/Chest: Bilateral breath sounds normal. No wheezes, rhonchi or rales.  Abdominal: Soft. No rebound or guarding. Some mild tenderness mostly in the right upper abdomen.  1+ femoral pulses are present.   Musculoskeletal: No edema. No tenderness.   Lymphadenopathy: No lymphadenopathy.   Neurological: Alert and oriented to person, place, and time. Normal strength. Coordination normal.   Skin: Skin is warm and dry. No rash noted. No erythema.     Emergency Department Course     Imaging:  Radiology results were communicated with the patient who voiced understanding of the findings.    Abdomen US, limited (RUQ only)    1. Cholelithiasis, sludge in the gallbladder and gallbladder wall  thickening without pericholecystic fluid or sonographic Agarwal sign to  confirm acute cholecystitis.  2. Steatosis and mild hepatomegaly.  3. Small amount of ascites.    Reading per radiology     Laboratory:  Laboratory findings were communicated with the patient who voiced understanding of the findings.    UA with Microscopic: Leukocyte esterase trace, Bacteria few, Squamous Epithelial 2, Mucous present, Hyalin casts 4, WBC 2, RBC 1    ISTAT pregnancy: <5.0  Lipase: 191    CBC: WBC 9.5, HGB 9.6,   CMP: Glucose 102, Bilirubin 2.7, Calcium 8.4, Albumin 2.4 (Creatinine 0.44)    Interventions:   1435 NS 1000 mL IV   1437 Zofran 4 mg IV   1528 Zofran 4 mg IV    1528 Compazine 10 mg " IV  1528 Benadryl 50 mg IV  1535 NS 1000 mL IV     Emergency Department Course:    1411 Nursing notes and vitals reviewed.   I performed an exam of the patient as documented above.     1436 IV was inserted and blood was drawn for laboratory testing, results above.    1448 The patient provided a urine sample here in the emergency department. This was sent for laboratory testing, findings above.     1501 The patient was sent for US while in the emergency department, results above.     1625 Findings and plan explained to the Patient. Patient discharged home with instructions regarding supportive care, medications, and reasons to return. The importance of close follow-up was reviewed. The patient was prescribed as below.    Impression & Plan     Medical Decision Making:  Patient states that she has been having these symptoms intermittently after being discharged from the hospital in February for alcoholic hepatitis and had a megan course including intubation and short term dialysis. She has been having some intermittent nausea, vomiting, and abdominal tenderness. She denies any fever, cough, or chills. Upon examination there is mild tenderness to the right upper abdomen without rebound or guarding. Liver functions reveal slightly elevated bilirubin. She had an IV started and received Zofran and fluids and this is helping. US reveals sludge in the gallbladder and some gallbladder wall thickening, and coleitis fluid. Patient's symptoms may yet be due to gallbladder disease. It could also be related to her marijuana usage. At this point she is able to drink fluid. I will provide her nausea medications compazine and Zofran and recommend her to general surgery to evaluate her gallbladder. She should avoid marijuana use, incae there is a cyclic vomiting aspect. If there is increase pain or fevers she should.return to the ED.       Diagnosis:     ICD-10-CM    1. Nausea and vomiting, intractability of vomiting not specified,  unspecified vomiting type  R11.2    2. Calculus of gallbladder without cholecystitis without obstruction  K80.20        Disposition:  Discharged to home.    Discharge Medications:  New Prescriptions    ONDANSETRON (ZOFRAN ODT) 4 MG ODT TAB    Take 1 tablet (4 mg) by mouth every 4 hours as needed for nausea    PROCHLORPERAZINE (COMPAZINE) 25 MG SUPPOSITORY    Place 1 suppository (25 mg) rectally every 12 hours as needed for nausea       Scribe Disclosure:  I, Damon Crockett, am serving as a scribe at 3:08 PM on 6/7/2020 to document services personally performed by Jeremías Glass MD based on my observations and the provider's statements to me.          Jeremías Glass MD  06/07/20 1804

## 2020-06-07 NOTE — ED TRIAGE NOTES
Patient complaining of nausea, vomiting and diarrhea.  States this has been happening since she was discharged from the hospital with liver issues

## 2020-06-10 ENCOUNTER — VIRTUAL VISIT (OUTPATIENT)
Dept: SURGERY | Facility: CLINIC | Age: 30
End: 2020-06-10
Payer: COMMERCIAL

## 2020-06-10 ENCOUNTER — TELEPHONE (OUTPATIENT)
Dept: SURGERY | Facility: CLINIC | Age: 30
End: 2020-06-10

## 2020-06-10 ENCOUNTER — PREP FOR PROCEDURE (OUTPATIENT)
Dept: SURGERY | Facility: CLINIC | Age: 30
End: 2020-06-10

## 2020-06-10 VITALS — WEIGHT: 190 LBS | HEIGHT: 68 IN | BODY MASS INDEX: 28.79 KG/M2

## 2020-06-10 DIAGNOSIS — Z11.59 ENCOUNTER FOR SCREENING FOR OTHER VIRAL DISEASES: Primary | ICD-10-CM

## 2020-06-10 DIAGNOSIS — K80.20 CHOLELITHIASIS: Primary | ICD-10-CM

## 2020-06-10 DIAGNOSIS — K80.10 CALCULUS OF GALLBLADDER WITH CHRONIC CHOLECYSTITIS WITHOUT OBSTRUCTION: Primary | ICD-10-CM

## 2020-06-10 PROCEDURE — 99204 OFFICE O/P NEW MOD 45 MIN: CPT | Mod: GT | Performed by: SURGERY

## 2020-06-10 RX ORDER — VENLAFAXINE 75 MG/1
75 TABLET ORAL DAILY
Status: ON HOLD | COMMUNITY
End: 2020-06-23

## 2020-06-10 ASSESSMENT — MIFFLIN-ST. JEOR: SCORE: 1635.33

## 2020-06-10 NOTE — PROGRESS NOTES
"Pat Delgado is a 29 year old female who is being evaluated via a billable video visit.  Of late the patient has been suffering significantly with nausea and vomiting.  She reports that this primarily takes place after meals.  This resulted in 3 separate emergency department visits.  Most recent was on 7 June.  During the course of that visit she had an abdominal ultrasound obtained.  This showed cholelithiasis with biliary sludge and some gallbladder wall thickening.  She has had no fevers or chills.  Since being in the hospital she is felt reasonably well.  She has been adhering out to a low-fat diet and has had limited symptoms.  Her bowel function has been normal.  She does have a past history of significant alcohol abuse with chronic liver disease.  This resulted in hepatorenal syndrome and temporary dialysis that has since stopped.  She has been sober now for 4 months.  Her lab work was all reviewed.  I also reviewed her imaging studies.  I think she is likely having symptomatic biliary colic.  We discussed the management options.  Think she is appropriate to proceed with cholecystectomy.  This procedure was described in great detail.  The risks, benefits and outcomes were reviewed.  Her questions were answered.  She is interested in proceeding.  This will be scheduled at her earliest convenience.  Total time spent in visit was 30 minutes with greater than 50% in face-to-face video conferencing.    The patient has been notified of following:     \"This video visit will be conducted via a call between you and your physician/provider. We have found that certain health care needs can be provided without the need for an in-person physical exam.  This service lets us provide the care you need with a video conversation.  If a prescription is necessary we can send it directly to your pharmacy.  If lab work is needed we can place an order for that and you can then stop by our lab to have the test done at a later " "time.    Video visits are billed at different rates depending on your insurance coverage.  Please reach out to your insurance provider with any questions.    If during the course of the call the physician/provider feels a video visit is not appropriate, you will not be charged for this service.\"    Patient has given verbal consent for Video visit? Yes    How would you like to obtain your AVS? Mail a copy    Patient would like the video invitation sent by: Send to e-mail at: [email or web address]    Will anyone else be joining your video visit? No        Video-Visit Details    Type of service:  Video Visit    Total video time: 21 minutes    Originating Location (pt. Location): Home    Distant Location (provider location):  [facility]     Platform used for Video Visit: Roland Reynoso MD    Please route or send letter to:  Primary Care Provider (PCP) and Referring Provider          "

## 2020-06-10 NOTE — TELEPHONE ENCOUNTER
Type of surgery: Lap eddie  Location of surgery: [facility]  Date and time of surgery: 6/23/20 at 9:50am  Surgeon: Dr. Alan Reynoso  Pre-Op Appt Date: Patient to schedule  Post-Op Appt Date: Patient to schedule   Packet sent out: Yes  Pre-cert/Authorization completed:  Not Applicable  Date: 6/10/20

## 2020-06-22 DIAGNOSIS — Z11.59 ENCOUNTER FOR SCREENING FOR OTHER VIRAL DISEASES: ICD-10-CM

## 2020-06-22 LAB
SARS-COV-2 PCR COMMENT: NORMAL
SARS-COV-2 RNA SPEC QL NAA+PROBE: NEGATIVE
SARS-COV-2 RNA SPEC QL NAA+PROBE: NORMAL
SPECIMEN SOURCE: NORMAL
SPECIMEN SOURCE: NORMAL

## 2020-06-22 PROCEDURE — U0003 INFECTIOUS AGENT DETECTION BY NUCLEIC ACID (DNA OR RNA); SEVERE ACUTE RESPIRATORY SYNDROME CORONAVIRUS 2 (SARS-COV-2) (CORONAVIRUS DISEASE [COVID-19]), AMPLIFIED PROBE TECHNIQUE, MAKING USE OF HIGH THROUGHPUT TECHNOLOGIES AS DESCRIBED BY CMS-2020-01-R: HCPCS | Performed by: SURGERY

## 2020-06-23 ENCOUNTER — ANESTHESIA (OUTPATIENT)
Dept: SURGERY | Facility: CLINIC | Age: 30
End: 2020-06-23
Payer: COMMERCIAL

## 2020-06-23 ENCOUNTER — ANESTHESIA EVENT (OUTPATIENT)
Dept: SURGERY | Facility: CLINIC | Age: 30
End: 2020-06-23
Payer: COMMERCIAL

## 2020-06-23 ENCOUNTER — HOSPITAL ENCOUNTER (OUTPATIENT)
Facility: CLINIC | Age: 30
Setting detail: OBSERVATION
Discharge: HOME | End: 2020-06-25
Attending: SURGERY | Admitting: SURGERY
Payer: COMMERCIAL

## 2020-06-23 ENCOUNTER — SURGERY (OUTPATIENT)
Age: 30
End: 2020-06-23
Payer: COMMERCIAL

## 2020-06-23 ENCOUNTER — APPOINTMENT (OUTPATIENT)
Dept: SURGERY | Facility: PHYSICIAN GROUP | Age: 30
End: 2020-06-23
Payer: COMMERCIAL

## 2020-06-23 DIAGNOSIS — K80.20 CHOLELITHIASIS: ICD-10-CM

## 2020-06-23 DIAGNOSIS — G89.18 POSTOPERATIVE PAIN: Primary | ICD-10-CM

## 2020-06-23 LAB
ABO + RH BLD: NORMAL
ABO + RH BLD: NORMAL
ALBUMIN SERPL-MCNC: 2.3 G/DL (ref 3.4–5)
ALP SERPL-CCNC: 51 U/L (ref 40–150)
ALT SERPL W P-5'-P-CCNC: 16 U/L (ref 0–50)
ANION GAP SERPL CALCULATED.3IONS-SCNC: 8 MMOL/L (ref 3–14)
AST SERPL W P-5'-P-CCNC: 30 U/L (ref 0–45)
BILIRUB SERPL-MCNC: 2.1 MG/DL (ref 0.2–1.3)
BLD GP AB SCN SERPL QL: NORMAL
BLD PROD TYP BPU: NORMAL
BLD UNIT ID BPU: 0
BLOOD BANK CMNT PATIENT-IMP: NORMAL
BLOOD PRODUCT CODE: NORMAL
BPU ID: NORMAL
BUN SERPL-MCNC: 3 MG/DL (ref 7–30)
CALCIUM SERPL-MCNC: 8.3 MG/DL (ref 8.5–10.1)
CHLORIDE SERPL-SCNC: 108 MMOL/L (ref 94–109)
CO2 SERPL-SCNC: 24 MMOL/L (ref 20–32)
CREAT SERPL-MCNC: 0.55 MG/DL (ref 0.52–1.04)
GFR SERPL CREATININE-BSD FRML MDRD: >90 ML/MIN/{1.73_M2}
GLUCOSE BLDC GLUCOMTR-MCNC: 123 MG/DL (ref 70–99)
GLUCOSE SERPL-MCNC: 123 MG/DL (ref 70–99)
HCG UR QL: NEGATIVE
HGB BLD-MCNC: 5.8 G/DL (ref 11.7–15.7)
HGB BLD-MCNC: 6.2 G/DL (ref 11.7–15.7)
HGB BLD-MCNC: 7.1 G/DL (ref 11.7–15.7)
HGB BLD-MCNC: 7.7 G/DL (ref 11.7–15.7)
HGB BLD-MCNC: 7.8 G/DL (ref 11.7–15.7)
INR PPP: 2.06 (ref 0.86–1.14)
INR PPP: 2.61 (ref 0.86–1.14)
NUM BPU REQUESTED: 2
PLATELET # BLD AUTO: 144 10E9/L (ref 150–450)
POTASSIUM SERPL-SCNC: 3.8 MMOL/L (ref 3.4–5.3)
PROT SERPL-MCNC: 5.6 G/DL (ref 6.8–8.8)
SODIUM SERPL-SCNC: 140 MMOL/L (ref 133–144)
SPECIMEN EXP DATE BLD: NORMAL
TRANSFUSION STATUS PATIENT QL: NORMAL

## 2020-06-23 PROCEDURE — P9016 RBC LEUKOCYTES REDUCED: HCPCS | Performed by: SURGERY

## 2020-06-23 PROCEDURE — 47562 LAPAROSCOPIC CHOLECYSTECTOMY: CPT | Performed by: SURGERY

## 2020-06-23 PROCEDURE — 81025 URINE PREGNANCY TEST: CPT | Performed by: SURGERY

## 2020-06-23 PROCEDURE — 71000014 ZZH RECOVERY PHASE 1 LEVEL 2 FIRST HR: Performed by: SURGERY

## 2020-06-23 PROCEDURE — 85610 PROTHROMBIN TIME: CPT | Performed by: SURGERY

## 2020-06-23 PROCEDURE — 36000058 ZZH SURGERY LEVEL 3 EA 15 ADDTL MIN: Performed by: SURGERY

## 2020-06-23 PROCEDURE — P9041 ALBUMIN (HUMAN),5%, 50ML: HCPCS | Performed by: ANESTHESIOLOGY

## 2020-06-23 PROCEDURE — 40000170 ZZH STATISTIC PRE-PROCEDURE ASSESSMENT II: Performed by: SURGERY

## 2020-06-23 PROCEDURE — 36415 COLL VENOUS BLD VENIPUNCTURE: CPT | Performed by: PHYSICIAN ASSISTANT

## 2020-06-23 PROCEDURE — 25000566 ZZH SEVOFLURANE, EA 15 MIN: Performed by: SURGERY

## 2020-06-23 PROCEDURE — 27210794 ZZH OR GENERAL SUPPLY STERILE: Performed by: SURGERY

## 2020-06-23 PROCEDURE — 40000344 ZZHCL STATISTIC THAWING COMPONENT: Performed by: SURGERY

## 2020-06-23 PROCEDURE — 88304 TISSUE EXAM BY PATHOLOGIST: CPT | Mod: 26 | Performed by: SURGERY

## 2020-06-23 PROCEDURE — 25800030 ZZH RX IP 258 OP 636: Performed by: NURSE ANESTHETIST, CERTIFIED REGISTERED

## 2020-06-23 PROCEDURE — 25000125 ZZHC RX 250: Performed by: NURSE ANESTHETIST, CERTIFIED REGISTERED

## 2020-06-23 PROCEDURE — 80053 COMPREHEN METABOLIC PANEL: CPT | Performed by: ANESTHESIOLOGY

## 2020-06-23 PROCEDURE — 86850 RBC ANTIBODY SCREEN: CPT | Performed by: SURGERY

## 2020-06-23 PROCEDURE — 47562 LAPAROSCOPIC CHOLECYSTECTOMY: CPT | Mod: AS | Performed by: PHYSICIAN ASSISTANT

## 2020-06-23 PROCEDURE — 36415 COLL VENOUS BLD VENIPUNCTURE: CPT | Mod: 91 | Performed by: ANESTHESIOLOGY

## 2020-06-23 PROCEDURE — 86923 COMPATIBILITY TEST ELECTRIC: CPT | Performed by: SURGERY

## 2020-06-23 PROCEDURE — 88304 TISSUE EXAM BY PATHOLOGIST: CPT | Performed by: SURGERY

## 2020-06-23 PROCEDURE — 86901 BLOOD TYPING SEROLOGIC RH(D): CPT | Performed by: SURGERY

## 2020-06-23 PROCEDURE — 37000009 ZZH ANESTHESIA TECHNICAL FEE, EACH ADDTL 15 MIN: Performed by: SURGERY

## 2020-06-23 PROCEDURE — 36415 COLL VENOUS BLD VENIPUNCTURE: CPT | Performed by: SURGERY

## 2020-06-23 PROCEDURE — 71000015 ZZH RECOVERY PHASE 1 LEVEL 2 EA ADDTL HR: Performed by: SURGERY

## 2020-06-23 PROCEDURE — 86900 BLOOD TYPING SEROLOGIC ABO: CPT | Performed by: SURGERY

## 2020-06-23 PROCEDURE — P9041 ALBUMIN (HUMAN),5%, 50ML: HCPCS | Performed by: NURSE ANESTHETIST, CERTIFIED REGISTERED

## 2020-06-23 PROCEDURE — 25000128 H RX IP 250 OP 636: Performed by: NURSE ANESTHETIST, CERTIFIED REGISTERED

## 2020-06-23 PROCEDURE — 36430 TRANSFUSION BLD/BLD COMPNT: CPT | Mod: 59

## 2020-06-23 PROCEDURE — 85018 HEMOGLOBIN: CPT | Performed by: ANESTHESIOLOGY

## 2020-06-23 PROCEDURE — 25000125 ZZHC RX 250: Performed by: SURGERY

## 2020-06-23 PROCEDURE — 37000008 ZZH ANESTHESIA TECHNICAL FEE, 1ST 30 MIN: Performed by: SURGERY

## 2020-06-23 PROCEDURE — 85018 HEMOGLOBIN: CPT | Performed by: PHYSICIAN ASSISTANT

## 2020-06-23 PROCEDURE — 85018 HEMOGLOBIN: CPT | Performed by: SURGERY

## 2020-06-23 PROCEDURE — 85610 PROTHROMBIN TIME: CPT | Performed by: PHYSICIAN ASSISTANT

## 2020-06-23 PROCEDURE — 85018 HEMOGLOBIN: CPT | Mod: 91 | Performed by: PHYSICIAN ASSISTANT

## 2020-06-23 PROCEDURE — P9059 PLASMA, FRZ BETWEEN 8-24HOUR: HCPCS | Performed by: SURGERY

## 2020-06-23 PROCEDURE — 36000056 ZZH SURGERY LEVEL 3 1ST 30 MIN: Performed by: SURGERY

## 2020-06-23 PROCEDURE — 25000132 ZZH RX MED GY IP 250 OP 250 PS 637: Performed by: NURSE ANESTHETIST, CERTIFIED REGISTERED

## 2020-06-23 PROCEDURE — 85049 AUTOMATED PLATELET COUNT: CPT | Performed by: PHYSICIAN ASSISTANT

## 2020-06-23 PROCEDURE — 25000132 ZZH RX MED GY IP 250 OP 250 PS 637: Performed by: PHYSICIAN ASSISTANT

## 2020-06-23 PROCEDURE — 25800025 ZZH RX 258: Performed by: SURGERY

## 2020-06-23 PROCEDURE — 25000128 H RX IP 250 OP 636: Performed by: ANESTHESIOLOGY

## 2020-06-23 PROCEDURE — 00000146 ZZHCL STATISTIC GLUCOSE BY METER IP

## 2020-06-23 PROCEDURE — 25000564 ZZH DESFLURANE, EA 15 MIN: Performed by: SURGERY

## 2020-06-23 PROCEDURE — G0378 HOSPITAL OBSERVATION PER HR: HCPCS

## 2020-06-23 RX ORDER — ONDANSETRON 4 MG/1
4 TABLET, ORALLY DISINTEGRATING ORAL EVERY 6 HOURS PRN
Status: DISCONTINUED | OUTPATIENT
Start: 2020-06-23 | End: 2020-06-25 | Stop reason: HOSPADM

## 2020-06-23 RX ORDER — VENLAFAXINE HYDROCHLORIDE 75 MG/1
75 CAPSULE, EXTENDED RELEASE ORAL DAILY
COMMUNITY
End: 2020-10-04

## 2020-06-23 RX ORDER — ONDANSETRON 2 MG/ML
4 INJECTION INTRAMUSCULAR; INTRAVENOUS ONCE
Status: COMPLETED | OUTPATIENT
Start: 2020-06-23 | End: 2020-06-23

## 2020-06-23 RX ORDER — ONDANSETRON 2 MG/ML
4 INJECTION INTRAMUSCULAR; INTRAVENOUS EVERY 30 MIN PRN
Status: DISCONTINUED | OUTPATIENT
Start: 2020-06-23 | End: 2020-06-23 | Stop reason: HOSPADM

## 2020-06-23 RX ORDER — GABAPENTIN 100 MG/1
100 CAPSULE ORAL DAILY
Status: DISCONTINUED | OUTPATIENT
Start: 2020-06-23 | End: 2020-06-25 | Stop reason: HOSPADM

## 2020-06-23 RX ORDER — LIDOCAINE 40 MG/G
CREAM TOPICAL
Status: DISCONTINUED | OUTPATIENT
Start: 2020-06-23 | End: 2020-06-24

## 2020-06-23 RX ORDER — PROCHLORPERAZINE MALEATE 10 MG
10 TABLET ORAL EVERY 6 HOURS PRN
Status: DISCONTINUED | OUTPATIENT
Start: 2020-06-23 | End: 2020-06-25 | Stop reason: HOSPADM

## 2020-06-23 RX ORDER — SODIUM CHLORIDE, SODIUM LACTATE, POTASSIUM CHLORIDE, CALCIUM CHLORIDE 600; 310; 30; 20 MG/100ML; MG/100ML; MG/100ML; MG/100ML
INJECTION, SOLUTION INTRAVENOUS CONTINUOUS PRN
Status: DISCONTINUED | OUTPATIENT
Start: 2020-06-23 | End: 2020-06-23

## 2020-06-23 RX ORDER — NALOXONE HYDROCHLORIDE 0.4 MG/ML
.1-.4 INJECTION, SOLUTION INTRAMUSCULAR; INTRAVENOUS; SUBCUTANEOUS
Status: DISCONTINUED | OUTPATIENT
Start: 2020-06-23 | End: 2020-06-23

## 2020-06-23 RX ORDER — NEOSTIGMINE METHYLSULFATE 1 MG/ML
VIAL (ML) INJECTION PRN
Status: DISCONTINUED | OUTPATIENT
Start: 2020-06-23 | End: 2020-06-23

## 2020-06-23 RX ORDER — MAGNESIUM HYDROXIDE 1200 MG/15ML
LIQUID ORAL PRN
Status: DISCONTINUED | OUTPATIENT
Start: 2020-06-23 | End: 2020-06-23 | Stop reason: HOSPADM

## 2020-06-23 RX ORDER — FAMOTIDINE 20 MG/1
20 TABLET, FILM COATED ORAL 2 TIMES DAILY
Status: DISCONTINUED | OUTPATIENT
Start: 2020-06-23 | End: 2020-06-25 | Stop reason: HOSPADM

## 2020-06-23 RX ORDER — GLYCOPYRROLATE 0.2 MG/ML
INJECTION, SOLUTION INTRAMUSCULAR; INTRAVENOUS PRN
Status: DISCONTINUED | OUTPATIENT
Start: 2020-06-23 | End: 2020-06-23

## 2020-06-23 RX ORDER — ONDANSETRON 2 MG/ML
4 INJECTION INTRAMUSCULAR; INTRAVENOUS EVERY 6 HOURS PRN
Status: DISCONTINUED | OUTPATIENT
Start: 2020-06-23 | End: 2020-06-25 | Stop reason: HOSPADM

## 2020-06-23 RX ORDER — BUPIVACAINE HYDROCHLORIDE AND EPINEPHRINE 2.5; 5 MG/ML; UG/ML
INJECTION, SOLUTION INFILTRATION; PERINEURAL PRN
Status: DISCONTINUED | OUTPATIENT
Start: 2020-06-23 | End: 2020-06-23 | Stop reason: HOSPADM

## 2020-06-23 RX ORDER — ONDANSETRON 2 MG/ML
INJECTION INTRAMUSCULAR; INTRAVENOUS PRN
Status: DISCONTINUED | OUTPATIENT
Start: 2020-06-23 | End: 2020-06-23

## 2020-06-23 RX ORDER — HYDROMORPHONE HYDROCHLORIDE 1 MG/ML
.3-.5 INJECTION, SOLUTION INTRAMUSCULAR; INTRAVENOUS; SUBCUTANEOUS EVERY 5 MIN PRN
Status: DISCONTINUED | OUTPATIENT
Start: 2020-06-23 | End: 2020-06-23 | Stop reason: HOSPADM

## 2020-06-23 RX ORDER — SODIUM CHLORIDE, SODIUM LACTATE, POTASSIUM CHLORIDE, CALCIUM CHLORIDE 600; 310; 30; 20 MG/100ML; MG/100ML; MG/100ML; MG/100ML
INJECTION, SOLUTION INTRAVENOUS CONTINUOUS
Status: DISCONTINUED | OUTPATIENT
Start: 2020-06-23 | End: 2020-06-25 | Stop reason: HOSPADM

## 2020-06-23 RX ORDER — ALBUTEROL SULFATE 90 UG/1
AEROSOL, METERED RESPIRATORY (INHALATION) PRN
Status: DISCONTINUED | OUTPATIENT
Start: 2020-06-23 | End: 2020-06-23

## 2020-06-23 RX ORDER — DEXAMETHASONE SODIUM PHOSPHATE 4 MG/ML
INJECTION, SOLUTION INTRA-ARTICULAR; INTRALESIONAL; INTRAMUSCULAR; INTRAVENOUS; SOFT TISSUE PRN
Status: DISCONTINUED | OUTPATIENT
Start: 2020-06-23 | End: 2020-06-23

## 2020-06-23 RX ORDER — NALOXONE HYDROCHLORIDE 0.4 MG/ML
.1-.4 INJECTION, SOLUTION INTRAMUSCULAR; INTRAVENOUS; SUBCUTANEOUS
Status: DISCONTINUED | OUTPATIENT
Start: 2020-06-23 | End: 2020-06-25 | Stop reason: HOSPADM

## 2020-06-23 RX ORDER — VENLAFAXINE HYDROCHLORIDE 75 MG/1
75 CAPSULE, EXTENDED RELEASE ORAL DAILY
Status: DISCONTINUED | OUTPATIENT
Start: 2020-06-23 | End: 2020-06-25 | Stop reason: HOSPADM

## 2020-06-23 RX ORDER — ALBUMIN, HUMAN INJ 5% 5 %
SOLUTION INTRAVENOUS CONTINUOUS PRN
Status: DISCONTINUED | OUTPATIENT
Start: 2020-06-23 | End: 2020-06-23

## 2020-06-23 RX ORDER — LIDOCAINE HYDROCHLORIDE 20 MG/ML
INJECTION, SOLUTION INFILTRATION; PERINEURAL PRN
Status: DISCONTINUED | OUTPATIENT
Start: 2020-06-23 | End: 2020-06-23

## 2020-06-23 RX ORDER — ONDANSETRON 4 MG/1
4 TABLET, ORALLY DISINTEGRATING ORAL EVERY 30 MIN PRN
Status: DISCONTINUED | OUTPATIENT
Start: 2020-06-23 | End: 2020-06-23 | Stop reason: HOSPADM

## 2020-06-23 RX ORDER — SODIUM CHLORIDE, SODIUM LACTATE, POTASSIUM CHLORIDE, CALCIUM CHLORIDE 600; 310; 30; 20 MG/100ML; MG/100ML; MG/100ML; MG/100ML
INJECTION, SOLUTION INTRAVENOUS CONTINUOUS
Status: DISCONTINUED | OUTPATIENT
Start: 2020-06-23 | End: 2020-06-23 | Stop reason: HOSPADM

## 2020-06-23 RX ORDER — NITROGLYCERIN 0.4 MG/1
0.4 TABLET SUBLINGUAL EVERY 5 MIN PRN
Status: DISCONTINUED | OUTPATIENT
Start: 2020-06-23 | End: 2020-06-24

## 2020-06-23 RX ORDER — SENNOSIDES 8.6 MG
8.6 TABLET ORAL 2 TIMES DAILY PRN
Status: DISCONTINUED | OUTPATIENT
Start: 2020-06-23 | End: 2020-06-25 | Stop reason: HOSPADM

## 2020-06-23 RX ORDER — FENTANYL CITRATE 0.05 MG/ML
25-50 INJECTION, SOLUTION INTRAMUSCULAR; INTRAVENOUS
Status: DISCONTINUED | OUTPATIENT
Start: 2020-06-23 | End: 2020-06-23 | Stop reason: HOSPADM

## 2020-06-23 RX ORDER — PROPOFOL 10 MG/ML
INJECTION, EMULSION INTRAVENOUS PRN
Status: DISCONTINUED | OUTPATIENT
Start: 2020-06-23 | End: 2020-06-23

## 2020-06-23 RX ORDER — FENTANYL CITRATE 50 UG/ML
INJECTION, SOLUTION INTRAMUSCULAR; INTRAVENOUS PRN
Status: DISCONTINUED | OUTPATIENT
Start: 2020-06-23 | End: 2020-06-23

## 2020-06-23 RX ORDER — HYDROMORPHONE HYDROCHLORIDE 1 MG/ML
.3-.5 INJECTION, SOLUTION INTRAMUSCULAR; INTRAVENOUS; SUBCUTANEOUS
Status: DISCONTINUED | OUTPATIENT
Start: 2020-06-23 | End: 2020-06-25 | Stop reason: HOSPADM

## 2020-06-23 RX ORDER — CLINDAMYCIN PHOSPHATE 900 MG/50ML
900 INJECTION, SOLUTION INTRAVENOUS
Status: DISCONTINUED | OUTPATIENT
Start: 2020-06-23 | End: 2020-06-23 | Stop reason: HOSPADM

## 2020-06-23 RX ORDER — ALBUMIN, HUMAN INJ 5% 5 %
12.5 SOLUTION INTRAVENOUS ONCE
Status: COMPLETED | OUTPATIENT
Start: 2020-06-23 | End: 2020-06-23

## 2020-06-23 RX ORDER — CLINDAMYCIN PHOSPHATE 900 MG/50ML
900 INJECTION, SOLUTION INTRAVENOUS SEE ADMIN INSTRUCTIONS
Status: DISCONTINUED | OUTPATIENT
Start: 2020-06-23 | End: 2020-06-23 | Stop reason: HOSPADM

## 2020-06-23 RX ORDER — BUPIVACAINE HYDROCHLORIDE AND EPINEPHRINE 2.5; 5 MG/ML; UG/ML
INJECTION, SOLUTION EPIDURAL; INFILTRATION; INTRACAUDAL; PERINEURAL
Status: DISCONTINUED
Start: 2020-06-23 | End: 2020-06-23 | Stop reason: HOSPADM

## 2020-06-23 RX ORDER — OXYCODONE HYDROCHLORIDE 5 MG/1
5-10 TABLET ORAL
Status: DISCONTINUED | OUTPATIENT
Start: 2020-06-23 | End: 2020-06-25 | Stop reason: HOSPADM

## 2020-06-23 RX ADMIN — RIFAXIMIN 550 MG: 550 TABLET ORAL at 22:33

## 2020-06-23 RX ADMIN — PROCHLORPERAZINE EDISYLATE 10 MG: 5 INJECTION INTRAMUSCULAR; INTRAVENOUS at 14:07

## 2020-06-23 RX ADMIN — PHENYLEPHRINE HYDROCHLORIDE 100 MCG: 10 INJECTION INTRAVENOUS at 10:45

## 2020-06-23 RX ADMIN — NEOSTIGMINE METHYLSULFATE 5 MG: 1 INJECTION, SOLUTION INTRAVENOUS at 12:03

## 2020-06-23 RX ADMIN — SODIUM CHLORIDE 1000 ML: 900 IRRIGANT IRRIGATION at 10:49

## 2020-06-23 RX ADMIN — SODIUM CHLORIDE 1000 ML: 900 IRRIGANT IRRIGATION at 11:40

## 2020-06-23 RX ADMIN — ONDANSETRON 4 MG: 2 INJECTION INTRAMUSCULAR; INTRAVENOUS at 11:23

## 2020-06-23 RX ADMIN — FAMOTIDINE 20 MG: 20 TABLET ORAL at 22:33

## 2020-06-23 RX ADMIN — VENLAFAXINE HYDROCHLORIDE 75 MG: 75 CAPSULE, EXTENDED RELEASE ORAL at 22:33

## 2020-06-23 RX ADMIN — ONDANSETRON 4 MG: 2 INJECTION INTRAMUSCULAR; INTRAVENOUS at 09:18

## 2020-06-23 RX ADMIN — SODIUM CHLORIDE, POTASSIUM CHLORIDE, SODIUM LACTATE AND CALCIUM CHLORIDE: 600; 310; 30; 20 INJECTION, SOLUTION INTRAVENOUS at 10:26

## 2020-06-23 RX ADMIN — HYDROMORPHONE HYDROCHLORIDE 0.5 MG: 1 INJECTION, SOLUTION INTRAMUSCULAR; INTRAVENOUS; SUBCUTANEOUS at 13:14

## 2020-06-23 RX ADMIN — FENTANYL CITRATE 50 MCG: 50 INJECTION, SOLUTION INTRAMUSCULAR; INTRAVENOUS at 11:57

## 2020-06-23 RX ADMIN — DEXAMETHASONE SODIUM PHOSPHATE 4 MG: 4 INJECTION, SOLUTION INTRA-ARTICULAR; INTRALESIONAL; INTRAMUSCULAR; INTRAVENOUS; SOFT TISSUE at 10:45

## 2020-06-23 RX ADMIN — PHENYLEPHRINE HYDROCHLORIDE 100 MCG: 10 INJECTION INTRAVENOUS at 10:52

## 2020-06-23 RX ADMIN — DEXMEDETOMIDINE HYDROCHLORIDE 4 MCG: 100 INJECTION, SOLUTION INTRAVENOUS at 12:00

## 2020-06-23 RX ADMIN — GABAPENTIN 100 MG: 100 CAPSULE ORAL at 22:33

## 2020-06-23 RX ADMIN — DEXMEDETOMIDINE HYDROCHLORIDE 8 MCG: 100 INJECTION, SOLUTION INTRAVENOUS at 10:41

## 2020-06-23 RX ADMIN — GLYCOPYRROLATE 0.8 MG: 0.2 INJECTION, SOLUTION INTRAMUSCULAR; INTRAVENOUS at 12:03

## 2020-06-23 RX ADMIN — ONDANSETRON 4 MG: 2 INJECTION INTRAMUSCULAR; INTRAVENOUS at 13:14

## 2020-06-23 RX ADMIN — OXYCODONE HYDROCHLORIDE 10 MG: 5 TABLET ORAL at 23:59

## 2020-06-23 RX ADMIN — PHENYLEPHRINE HYDROCHLORIDE 200 MCG: 10 INJECTION INTRAVENOUS at 11:40

## 2020-06-23 RX ADMIN — HYDROMORPHONE HYDROCHLORIDE 0.5 MG: 1 INJECTION, SOLUTION INTRAMUSCULAR; INTRAVENOUS; SUBCUTANEOUS at 12:49

## 2020-06-23 RX ADMIN — PHENYLEPHRINE HYDROCHLORIDE 100 MCG: 10 INJECTION INTRAVENOUS at 11:04

## 2020-06-23 RX ADMIN — MIDAZOLAM 2 MG: 1 INJECTION INTRAMUSCULAR; INTRAVENOUS at 10:26

## 2020-06-23 RX ADMIN — SUCCINYLCHOLINE CHLORIDE 100 MG: 20 INJECTION, SOLUTION INTRAMUSCULAR; INTRAVENOUS; PARENTERAL at 10:32

## 2020-06-23 RX ADMIN — ALBUMIN HUMAN 12.5 G: 0.05 INJECTION, SOLUTION INTRAVENOUS at 14:33

## 2020-06-23 RX ADMIN — BUPIVACAINE HYDROCHLORIDE AND EPINEPHRINE BITARTRATE 20 ML: 2.5; .005 INJECTION, SOLUTION EPIDURAL; INFILTRATION; INTRACAUDAL; PERINEURAL at 10:49

## 2020-06-23 RX ADMIN — DEXMEDETOMIDINE HYDROCHLORIDE 8 MCG: 100 INJECTION, SOLUTION INTRAVENOUS at 11:25

## 2020-06-23 RX ADMIN — PROPOFOL 100 MG: 10 INJECTION, EMULSION INTRAVENOUS at 10:32

## 2020-06-23 RX ADMIN — FENTANYL CITRATE 50 MCG: 50 INJECTION, SOLUTION INTRAMUSCULAR; INTRAVENOUS at 10:49

## 2020-06-23 RX ADMIN — SODIUM CHLORIDE, POTASSIUM CHLORIDE, SODIUM LACTATE AND CALCIUM CHLORIDE: 600; 310; 30; 20 INJECTION, SOLUTION INTRAVENOUS at 11:32

## 2020-06-23 RX ADMIN — HYDROMORPHONE HYDROCHLORIDE 0.5 MG: 1 INJECTION, SOLUTION INTRAMUSCULAR; INTRAVENOUS; SUBCUTANEOUS at 13:40

## 2020-06-23 RX ADMIN — ALBUTEROL SULFATE 4 PUFF: 90 AEROSOL, METERED RESPIRATORY (INHALATION) at 10:59

## 2020-06-23 RX ADMIN — CLINDAMYCIN PHOSPHATE 900 MG: 900 INJECTION, SOLUTION INTRAVENOUS at 10:42

## 2020-06-23 RX ADMIN — PHENYLEPHRINE HYDROCHLORIDE 200 MCG: 10 INJECTION INTRAVENOUS at 11:37

## 2020-06-23 RX ADMIN — ROCURONIUM BROMIDE 10 MG: 10 INJECTION INTRAVENOUS at 11:31

## 2020-06-23 RX ADMIN — LIDOCAINE HYDROCHLORIDE 100 MG: 20 INJECTION, SOLUTION INFILTRATION; PERINEURAL at 10:32

## 2020-06-23 RX ADMIN — ALBUTEROL SULFATE 4 PUFF: 90 AEROSOL, METERED RESPIRATORY (INHALATION) at 11:24

## 2020-06-23 RX ADMIN — ALBUMIN HUMAN: 0.05 INJECTION, SOLUTION INTRAVENOUS at 11:41

## 2020-06-23 RX ADMIN — ROCURONIUM BROMIDE 30 MG: 10 INJECTION INTRAVENOUS at 10:45

## 2020-06-23 SDOH — HEALTH STABILITY: MENTAL HEALTH: CURRENT SMOKER: 1

## 2020-06-23 ASSESSMENT — MIFFLIN-ST. JEOR: SCORE: 1733.31

## 2020-06-23 ASSESSMENT — ENCOUNTER SYMPTOMS: SEIZURES: 0

## 2020-06-23 ASSESSMENT — LIFESTYLE VARIABLES: TOBACCO_USE: 1

## 2020-06-23 NOTE — ANESTHESIA CARE TRANSFER NOTE
Patient: Pat Delgado    Procedure(s):  LAPAROSCOPIC CHOLECYSTECTOMY    Diagnosis: Cholelithiasis [K80.20]  Diagnosis Additional Information: No value filed.    Anesthesia Type:   General     Note:  Airway :Face Mask  Patient transferred to:PACU  Comments: To PACU with face mask, 8l/min O2, spontaneous ventilations. VSS. Report to RNHandoff Report: Identifed the Patient, Identified the Reponsible Provider, Reviewed the pertinent medical history, Discussed the surgical course, Reviewed Intra-OP anesthesia mangement and issues during anesthesia, Set expectations for post-procedure period and Allowed opportunity for questions and acknowledgement of understanding      Vitals: (Last set prior to Anesthesia Care Transfer)    CRNA VITALS  6/23/2020 1159 - 6/23/2020 1238      6/23/2020             Pulse:  106    SpO2:  --    Resp Rate (observed):  22                Electronically Signed By: NELIDA Gonzalez CRNA  June 23, 2020  12:38 PM

## 2020-06-23 NOTE — ANESTHESIA PREPROCEDURE EVALUATION
Anesthesia Pre-Procedure Evaluation    Patient: Pat Delgado   MRN: [id number] : 1990          Preoperative Diagnosis: Cholelithiasis [K80.20]    Procedure(s):  LAPAROSCOPIC CHOLECYSTECTOMY    Past Medical History:   Diagnosis Date     ADHD (attention deficit hyperactivity disorder)      Alcohol dependence (H) 2015     Anxiety      Chemical dependency (H) 2015     Depressive disorder, not elsewhere classified 2015    Per 1/22/15 visit with Batool Gonzalez NP      Hypertension goal BP (blood pressure) < 150/90 11/10/2015     Migraine headache      Substance abuse (H)      Tobacco abuse      Past Surgical History:   Procedure Laterality Date     HC TOOTH EXTRACTION W/FORCEP       IR CVC TUNNEL PLACEMENT > 5 YRS OF AGE  2020     IR CVC TUNNEL REMOVAL RIGHT  2020     IR FEEDING TUBE PLACEMENT W FLUORO/MD  2020     IR PICC EXCHANGE RIGHT  2020       Anesthesia Evaluation     . Pt has had prior anesthetic.     No history of anesthetic complications          ROS/MED HX    ENT/Pulmonary:     (+)tobacco use, , . .    Neurologic:     (+)migraines,    (-) seizures and CVA   Cardiovascular:     (+) hypertension----. : . . . :. .      (-) CAD   METS/Exercise Tolerance:  >4 METS   Hematologic:         Musculoskeletal:         GI/Hepatic:     (+) hepatitis type Alcoholic, liver disease,       Renal/Genitourinary:         Endo:      (-) Type I DM, Type II DM and thyroid disease   Psychiatric:     (+) psychiatric history anxiety, depression and other (comment) (Substance abuse)      Infectious Disease:         Malignancy:         Other:                          Physical Exam      Airway   Mallampati: III  TM distance: >3 FB  Neck ROM: full    Dental   Comment: Poor carious teeth.     Cardiovascular   Rhythm and rate: regular      Pulmonary    breath sounds clear to auscultation            Lab Results   Component Value Date    WBC 9.5 2020    HGB 9.6 (L) 2020    HCT 30.1 (L)  "06/07/2020     06/07/2020    CRP 57.7 (H) 02/09/2020    SED 9 12/20/2007     06/07/2020    POTASSIUM 3.7 06/07/2020    CHLORIDE 106 06/07/2020    CO2 25 06/07/2020    BUN 8 06/07/2020    CR 0.44 (L) 06/07/2020     (H) 06/07/2020    EMILY 8.4 (L) 06/07/2020    PHOS 4.1 03/05/2020    MAG 1.8 03/05/2020    ALBUMIN 2.4 (L) 06/07/2020    PROTTOTAL 7.3 06/07/2020    ALT 15 06/07/2020    AST 32 06/07/2020    ALKPHOS 77 06/07/2020    BILITOTAL 2.7 (H) 06/07/2020    LIPASE 191 06/07/2020    AMYLASE 55 12/20/2007    SRINIVAS 44 02/24/2020    PTT 42 (H) 02/09/2020    INR 1.93 (H) 03/05/2020    HCG Negative 03/31/2017    HCGS Negative 05/24/2020       Preop Vitals  BP Readings from Last 3 Encounters:   06/07/20 135/75   05/24/20 137/76   05/22/20 133/81    Pulse Readings from Last 3 Encounters:   06/07/20 92   05/24/20 104   05/22/20 101      Resp Readings from Last 3 Encounters:   06/07/20 18   05/24/20 18   05/22/20 18    SpO2 Readings from Last 3 Encounters:   06/07/20 97%   05/24/20 98%   05/22/20 97%      Temp Readings from Last 1 Encounters:   06/07/20 36.8  C (98.3  F) (Oral)    Ht Readings from Last 1 Encounters:   06/23/20 1.727 m (5' 8\")      Wt Readings from Last 1 Encounters:   06/10/20 86.2 kg (190 lb)    Estimated body mass index is 28.89 kg/m  as calculated from the following:    Height as of this encounter: 1.727 m (5' 8\").    Weight as of 6/10/20: 86.2 kg (190 lb).       Anesthesia Plan      History & Physical Review  History and physical reviewed and following examination; no interval change.    ASA Status:  2 .    NPO Status:  > 8 hours    Plan for General with Intravenous induction. Maintenance will be Balanced.    PONV prophylaxis:  Ondansetron (or other 5HT-3) and Dexamethasone or Solumedrol    The patient is a current Smoker and Patient was instructed to abstain from smoking on day of procedure     Postoperative Care  Postoperative pain management:  Multi-modal analgesia.  "     Consents  Anesthetic plan, risks, benefits and alternatives discussed with:  Patient.  Use of blood products discussed: No .   .                 Tian Benitez MD

## 2020-06-23 NOTE — PROGRESS NOTES
General Surgery    Asked by peer to see patient for concerns of hematoma at umbilical site.  I was not present in OR at time of case but was aware that there was 500mL EBL in a challenging cholecystectomy.      On presentation, pt very pail and somnolent.  She answers Y/N questions.  RN reports she was more alert but also just gave her narcotics for pain.  Pt tender near incisions as expected and most tender at umbilicus.  There is a small deep hematoma superior to this incision that can be felt on exam; still fairly flat and deep, about ?4 cm in diameter.  Other incisions feel soft though still uncomfortable.  Ice was on umbilical incision and there was minimal blood pooling in the bottom of the umbilicus.  Removed the bandaid to assess further, but unremarkable and unable to express any extra blood from this.  Reapplied bandaid using sterile technique and did not take down steri strips.  Will have abdominal binder applied and obviously will hold any blood thinning medications.    When updating surgeon, patients O2 sats and blood pressure both dropped.  MDA was called to bedside by PACU nurse and assessed.  Albumin was ordered with plan to recheck hgb after that was in.  Pending that hgb, plan to transfuse if hgb less than 7.  Started discussion on IMC bed as well; will hold on that until hgb back.  Dr. Ryenoso aware and in agreement with plan.  No CT needed from his perspective.    Asked PACU Charge RN to page surgeon directly with updates as I was going to be in OR for next 2 hours.    Jose Arcos PA-C

## 2020-06-23 NOTE — OR NURSING
Upon assessment RN noted approx 2 inch firm area above umbilicus.  Called PA to update.  PA to send someone by to assess.    Pt c/o pain to that area.  VSS.

## 2020-06-23 NOTE — ANESTHESIA POSTPROCEDURE EVALUATION
Patient: Pat Delgado    Procedure(s):  LAPAROSCOPIC CHOLECYSTECTOMY    Diagnosis:Cholelithiasis [K80.20]  Diagnosis Additional Information: No value filed.    Anesthesia Type:  General    Note:  Anesthesia Post Evaluation    Patient location during evaluation: PACU  Patient participation: Able to fully participate in evaluation  Level of consciousness: lethargic  Pain management: adequate  Airway patency: patent  Cardiovascular status: acceptable and stable  Respiratory status: acceptable, spontaneous ventilation, unassisted and nonlabored ventilation  Hydration status: acceptable  PONV: none       Comments: Patient had significant Hb drop post op presumably due to intra-op losses and not ongoing losses.  Responded well to 2 units PRBCs.   Dr Reynoso admitting patient to [facility] for higher level monitoring.         Last vitals:  Vitals:    06/23/20 1550 06/23/20 1600 06/23/20 1603   BP:   104/54   Pulse:   84   Resp: 20  12   Temp: 36.6  C (97.9  F) 36.3  C (97.3  F)    SpO2:            Electronically Signed By: Tian Benitez MD  June 23, 2020  4:32 PM

## 2020-06-23 NOTE — OR NURSING
Demetrius PA by to see and assess area.  MURPHY Osman at bedside as well.  Abdominal binder placed on patient and new dressing to umbiliicus area.  Pt blood pressures reviewed by Dr. Osman and PA.  Order for albumin 500 ml now.    Recheck hemoglobin after albumin.  PA talking to Dr. Reynoso.

## 2020-06-23 NOTE — OR NURSING
Family updated and blood consent obtained from mother.  CHarge RN spoke to Dr. Reynoso.  Pt to go to [facility] after 2 units of blood.

## 2020-06-23 NOTE — OP NOTE
General Surgery Operative Note    PREOPERATIVE DIAGNOSIS:  Cholelithiasis [K80.20]    POSTOPERATIVE DIAGNOSIS: same, portal hypertension, re-canalized umbilical vein    PROCEDURE:  LAPAROSCOPIC CHOLECYSTECTOMY (D2 modifier for portal hypertension, obesity, re-canalized umbilical vein)    ANESTHESIA:  General.    PREOPERATIVE MEDICATIONS:  Cleocin iv    SURGEON:  Alan Reynoso MD    ASSISTANT:  Ada Otero PA-C, Physician assistant first assistant was necessary during the performance of this procedure for expertise in patient positioning, prepping, draping, trocar placement, camera management, retraction and exposure, and suctioning.    INDICATIONS: Patient presented with signs and symptoms consistent with biliary colic and symptomatic stones.  She has known liver disease due to alcohol abuse but has been sober for several months.  Extensive discussion was undertaken regarding the procedure of cholecystectomy.  The potential risks of bleeding, infection, bile duct or visceral injury were thoroughly reviewed.  All of the patient's questions were answered.  The patient wishes to proceed with cholecystectomy.    PROCEDURE:  After informed consent was obtained the patient was taken to the operating suite and uneventfully endotracheally intubated.  The abdomen was prepped and draped in a sterile fashion.  Surgeon initiated timeout was acknowledged.   After infiltration with local anesthestic a curvilinear incision was made in the infraumbilical position and through this a Verees needle was passed intraperitoneally.  Position was confirmed using the saline drop test. A CO2 pneumoperitoneum was then created.  After adequate insufflation the needle was removed and replaced with an 11mm trocar .  Two other trocars were placed under laparoscopic visualization following the infiltration of local anesthetic.  We elevated the liver and were able to identify the gallbladder.  Liver itself showed significant nodularity  over its entire surface and was markedly enlarged.  The liver was also quite stiff making exposure much more difficult and challenging.  In order to get adequate exposure an additional 5 mm port was placed in the right lateral abdomen for superior retraction.  The gallbladder was grasped and used to elevate the liver further.  I began dissecting out some fatty adhesions down near the neck of the gallbladder until a cystic duct was encountered.  The overall quality of the tissues was quite poor.  We could see significant collateralization through the omental vessels and question if the falciform ligament was recanalized.  One bleeder on the surface of the omentum was controlled with clips.  I continued the dissection using combination of sharp and blunt dissection until the cystic duct was largely dissected out.  I continued the dissection up along the sides of the gallbladder, both medially and laterally, until I had created a space between the gallbladder and the liver.  At this point, I encountered the cystic artery, just posterior and lateral to the cystic duct.  This again was dissected out.  Once I had created a window where only the cystic artery and duct were noted to be entering the gallbladder, I felt that this represented our critical view.  The cystic artery and duct were then doubly clipped and divided.  I continued the dissection up along the body of the gallbladder, freeing all attachments and adhesions of the gallbladder to the liver.  Gallbladder was removed from the liver in an atraumatic fashion.  The gallbladder was then placed in a retrieval pouch and removed from the abdomen.  The gallbladder fossa was reinspected, and all areas of bleeding were managed with electrocautery.  I also placed a piece of Surgicel within the gallbladder fossa.  I irrigated the area with normal saline and aspirated it out.  The trocars were removed and carbon dioxide was massaged from the abdomen.  Upon removal of the  11 mm port at the umbilicus there was significant bleeding from what I presume to have been a recanalization of the umbilical vein.  I had to extend my incision in order to get down to the fascia to try and better control this.  I controlled the bleeding with direct pressure and then regained access to the abdominal cavity through a stab incision in the left upper quadrant utilizing an optical trocar.  Carbon dioxide pneumoperitoneum was then reestablished.  The recanalized umbilical vein was then controlled with multiple 0 Vicryl sutures and a fascial closure device.  This provided good hemostasis.  There was a moderate amount of bleeding and blood into the abdominal cavity and care was taken to thoroughly irrigate out and remove any clots as best as possible.  I then again removed the trochars with no signs of ongoing bleeding.  Skin incisions were closed with subcuticular 4-0 Vicryl sutures.  Steri-Strips and dressings were applied.   The patient tolerated this procedure without difficulty. Needle and sponge counts were correct. The patient was taken from the operating room To the recovery room in a stable condition.      ESTIMATED BLOOD LOSS:  500cc    Alan Reynoso MD

## 2020-06-23 NOTE — BRIEF OP NOTE
[facility]    Brief Operative Note    Pre-operative diagnosis: Cholelithiasis [K80.20]  Post-operative diagnosis Same as pre-operative diagnosis    Procedure: Procedure(s):  LAPAROSCOPIC CHOLECYSTECTOMY  Surgeon: Surgeon(s) and Role:     * Alan Reynoso MD - Primary     * Ada Otero PA-C - Assisting  Anesthesia: General   Estimated blood loss: 500 ml  Drains: None    Specimens:   ID Type Source Tests Collected by Time Destination   A : gallbladder and contents  Tissue Gallbladder and Contents SURGICAL PATHOLOGY EXAM Alan Reynoso MD 6/23/2020 11:13 AM      Findings:   Portal hypertension, re-canalized umbilical vein  Complications:  Bleeding from re-canalized umbilical vein which required extension of umbilical port site site. Atypical bleeding was inherent to the operation and was controlled with vicryl sutures and fascial closure device. No ongoing signs of bleeding in subcutaneous tissue or intra abdominally at the end of case.  Implants: * No implants in log *

## 2020-06-23 NOTE — OR NURSING
Pt had 500 ml blood loss in OR.  Order by PA for stat hemoglboin.   PA also informed that no beds on 33 available.  Pt to transfer to .

## 2020-06-23 NOTE — PROGRESS NOTES
Have been kept well updated by PACU staff  Appreciate anesthesia assistance and resuscitation of patient  Has now received 2 units of packed red blood cells and hemoglobin responded appropriately  Found to have an INR of 2.6 and is now receiving second unit of fresh frozen plasma with recheck of INR thereafter, may need more plasma if remains coagulopathic  Hemodynamics look good, heart rate is 81 systolic blood pressure is 101  Abdomen is soft and nondistended    Patient seems to be responding well to resuscitation, chronic anemia due to underlying disease as well as coagulopathic likely related to blood loss and underlying liver disease.  Will be admitted to [facility] status for ongoing observation.  No indication that reexploration is necessary.  Continue with resuscitation and following of labs.

## 2020-06-23 NOTE — OR NURSING
Spoke to Dr. Redd about lab status.  Dr. Reynoso updated and ordered 2 units of FFP.    Awaiting recheck hemoglobin.  Aneudy wallace.

## 2020-06-23 NOTE — OR NURSING
Hemoglobin back at 5.9.  Critical value.  Called to PA and Dr. Benitez.  Emergent blood order consent obtianed.  Pt to be transfused 2 units stat.  PA to call Dr. Reynoso.

## 2020-06-23 NOTE — OR NURSING
Spoke to PA about patients hemoglobin results of 7.1.  Order to change serial hemoglobin start time to 1600.  Stat type and screen order obtained.  No visible bleeding from dressings.  VSS.  Will monitor.

## 2020-06-23 NOTE — OR NURSING
Notified Dr. Reynoso of INR 2.61 and platelet 144.  2 units of fresh frozen plasma ordered and recheck INR after both units transfused.

## 2020-06-23 NOTE — OR NURSING
Spoke with Dr. Reynoso regarding Hgb 5.9. MD aware of pts status in PACU.  pt is getting 2 units of PRBC. MD ordered INR and Platelet.  MD would like HGB 30 minutes after 2 units of blood given.  IMC ordered.

## 2020-06-23 NOTE — PROGRESS NOTES
[facility]    General Surgery Chart Note    Received call from PACU nurse regarding patient's Hgb drop from 7.1 to 5.8 post-procedure. Transfusion of 2 units of blood ordered. Called Dr. Reynoso who is the on-call physician this evening to update. Hgb checks changed to q 4 hours. Platelet check and INR ordered.     Ada Otero PA-C

## 2020-06-23 NOTE — OR NURSING
Procedure Date: 05/06/2019      PREOPERATIVE DIAGNOSES:     1.  Bilateral chronic maxillary and ethmoid sinusitis.     2.  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     3.  Pharyngeal soft tissue swelling on the right.      POSTOPERATIVE DIAGNOSES:   1.  Bilateral chronic maxillary and ethmoid sinusitis.     2.  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     3.  Pharyngeal soft tissue swelling on the right.      PROCEDURE:  Bilateral endoscopic maxillary and ethmoid sinus surgery, direct laryngoscopy and biopsy of lingual tonsillar tissue, attempted needle aspiration of right pharynx.      SURGEON:  Sandra Irene MD      ANESTHESIA:  General endotracheal.      ESTIMATED BLOOD LOSS:  40 mL.      COMPLICATIONS:  None.      INDICATIONS FOR PROCEDURE:  Ms. Juliana Leal is a 89-year-old female who I have been seeing since 08/2018 with chronic throat pain, nasal congestion, postnasal drainage with tenacious secretions and generalized weakness.  Her symptoms have persisted despite multiple trials of oral antibiotics with culture-directed therapy, nasal sprays and rinses, and periodic steroid administration.  She has been in and out of the hospital.  She has had a hard time clearing pharyngeal secretions, has throat pain and has lost 14 pounds.  Most recently she was admitted to the hospital when she had dysphagia severe enough she was not able to swallow her pills including diltiazem and presented to her primary care physician's office with a tachyarrhythmia.  She had imaging upon admission including a neck CT scan which shows an increased prominence of lymphoid tissue of Waldeyer's ring compared with her last scan approximately 2-1/2 weeks ago done at the time of admission at [facility].  Progressive lymphoid swelling has occurred despite culture-directed therapy with doxycycline for her sinusitis.  Flexible fiberoptic laryngoscopy was accomplished while in the [facility]  Pt received 2 units of blood.  Stat hemoglobin order obtained.   showing thick adherent obstructing nasal secretions with postnasal drainage that is tenacious and difficult for her to clear.  She has diffuse swelling of the tongue base, though her epiglottis and endolarynx are normal in appearance.  She has also been noted to have progressive soft tissue swelling of the right peritonsillar tissues and soft palate.  She is scheduled to undergo biopsy of lymphoid hyperplasia of her lingual tonsil that will be submitted fresh so that lymphoma workup may be performed.  It is planned to attempt to needle aspirate any fluid in her right peritonsillar region while she is under anesthesia.  Bilateral maxillary and ethmoid endoscopic sinus surgery are to be performed.      DESCRIPTION OF PROCEDURE:  The patient was taken to the operating room where she was placed in supine position.  General endotracheal anesthesia was administered.  The table was turned, and she was draped in standard fashion.  A timeout was performed.  A McIvor retractor was inserted in her oral cavity and an 18 gauge needle with a 10 mL syringe was used with 2 punctures into the peritonsillar tissues in an area of maximal induration with an attempt to aspirate any fluid collection.  No fluid collection was encountered.  The McIvor was removed and a dental guard placed over her upper dentition and the laryngoscope was gently introduced to bring the lymphoid tissue of her tongue base into view.  Multiple biopsies were taken with upbiting cups and are submitted fresh to pathology.  Cocaine-saturated cottonoid was placed over the biopsy site for hemostasis and anesthetic purposes.  The laryngoscope and dental guard were removed and Afrin-soaked pledgets were placed in her nasal cavities bilaterally.        The 0-degree endoscope was used for visualization and the middle turbinates and lateral nasal wall were injected with a total of 8 mL of 1% lidocaine with 1:100,000 epinephrine.  Attention was first directed to the right  nasal cavity where the middle turbinate was gently medialized and the maxillary sinus entered using a sinus seeker.  A straight biting milly cut was used to enlarge the maxillary sinusotomy posteriorly and the microdebrider was used to remove the uncinate process and open the sinusotomy anteriorly.  Copious thick mucoid secretions were found filling the sinus cavity and irrigation and aspiration was performed until the sinus cavity was free from secretions.  Anterior and posterior ethmoid cells were opened and exenterated and likewise were found to be filled with tenacious mucoid secretions.  Mucoid secretions were suctioned from her sphenoethmoidal recess as well.  Afrin-soaked pledgets were placed to fill the nasal cavity and middle meatus and attention was turned to the left side where the procedure was repeated with similar findings.  Afrin-soaked pledgets were placed and removed.  The 0-degree endoscope was used again for visualization and anterior and high ethmoid cells were exenterated and this brought the frontal recess into view with a clear view into the left frontal sinus.  Mucoid secretions were suctioned and the sinusotomy was found to be nicely patent after simply exenterating ethmoid cells and no further manipulation of tissues was performed.  A thin layer of bacitracin ointment was applied in her nares bilaterally.        She had some light bleeding during wakeup from anesthesia and Afrin spray was applied directly to the nasal cavities bilaterally and pressure held with gauze over her nares.  Once reversal of anesthesia, she had no further active bleeding and she was transported to the recovery room with a mustache dressing in place, having tolerated the procedure well.  At the time of the sinus surgery, a right middle meatal culture was obtained reflective of maxillary sinus secretions and this was sent for aerobic and anaerobic bacteria.         MIRYAM CURTIS MD             D: 05/06/2019   T:  2019   MT: WT      Name:     KEITH LACY   MRN:      [id number]        Account:        [id number]   :      02/10/1923           Procedure Date: 2019      Document: [id number]       cc: Sandra Irene MD

## 2020-06-24 LAB
ALBUMIN SERPL-MCNC: 2.5 G/DL (ref 3.4–5)
ALP SERPL-CCNC: 55 U/L (ref 40–150)
ALT SERPL W P-5'-P-CCNC: 19 U/L (ref 0–50)
ANION GAP SERPL CALCULATED.3IONS-SCNC: 4 MMOL/L (ref 3–14)
AST SERPL W P-5'-P-CCNC: 36 U/L (ref 0–45)
BILIRUB SERPL-MCNC: 2.1 MG/DL (ref 0.2–1.3)
BUN SERPL-MCNC: 5 MG/DL (ref 7–30)
CALCIUM SERPL-MCNC: 8.6 MG/DL (ref 8.5–10.1)
CHLORIDE SERPL-SCNC: 106 MMOL/L (ref 94–109)
CO2 SERPL-SCNC: 29 MMOL/L (ref 20–32)
COPATH REPORT: NORMAL
CREAT SERPL-MCNC: 0.61 MG/DL (ref 0.52–1.04)
ERYTHROCYTE [DISTWIDTH] IN BLOOD BY AUTOMATED COUNT: 16.5 % (ref 10–15)
GFR SERPL CREATININE-BSD FRML MDRD: >90 ML/MIN/{1.73_M2}
GLUCOSE SERPL-MCNC: 112 MG/DL (ref 70–99)
HCT VFR BLD AUTO: 22.4 % (ref 35–47)
HGB BLD-MCNC: 7.3 G/DL (ref 11.7–15.7)
HGB BLD-MCNC: 7.3 G/DL (ref 11.7–15.7)
HGB BLD-MCNC: 7.6 G/DL (ref 11.7–15.7)
INR PPP: 1.87 (ref 0.86–1.14)
MCH RBC QN AUTO: 28.4 PG (ref 26.5–33)
MCHC RBC AUTO-ENTMCNC: 32.6 G/DL (ref 31.5–36.5)
MCV RBC AUTO: 87 FL (ref 78–100)
PLATELET # BLD AUTO: 161 10E9/L (ref 150–450)
POTASSIUM SERPL-SCNC: 3.8 MMOL/L (ref 3.4–5.3)
PROT SERPL-MCNC: 6 G/DL (ref 6.8–8.8)
RBC # BLD AUTO: 2.57 10E12/L (ref 3.8–5.2)
SODIUM SERPL-SCNC: 139 MMOL/L (ref 133–144)
WBC # BLD AUTO: 6.4 10E9/L (ref 4–11)

## 2020-06-24 PROCEDURE — 25800030 ZZH RX IP 258 OP 636: Performed by: PHYSICIAN ASSISTANT

## 2020-06-24 PROCEDURE — 25800030 ZZH RX IP 258 OP 636: Performed by: SURGERY

## 2020-06-24 PROCEDURE — 25000132 ZZH RX MED GY IP 250 OP 250 PS 637: Performed by: PHYSICIAN ASSISTANT

## 2020-06-24 PROCEDURE — 25000128 H RX IP 250 OP 636: Performed by: SURGERY

## 2020-06-24 PROCEDURE — 80053 COMPREHEN METABOLIC PANEL: CPT | Performed by: SURGERY

## 2020-06-24 PROCEDURE — 25000128 H RX IP 250 OP 636: Performed by: PHYSICIAN ASSISTANT

## 2020-06-24 PROCEDURE — G0378 HOSPITAL OBSERVATION PER HR: HCPCS

## 2020-06-24 PROCEDURE — 36415 COLL VENOUS BLD VENIPUNCTURE: CPT | Mod: 91 | Performed by: SURGERY

## 2020-06-24 PROCEDURE — 85018 HEMOGLOBIN: CPT | Performed by: SURGERY

## 2020-06-24 PROCEDURE — 85610 PROTHROMBIN TIME: CPT | Performed by: SURGERY

## 2020-06-24 PROCEDURE — 85027 COMPLETE CBC AUTOMATED: CPT | Performed by: SURGERY

## 2020-06-24 PROCEDURE — 96374 THER/PROPH/DIAG INJ IV PUSH: CPT

## 2020-06-24 PROCEDURE — 96375 TX/PRO/DX INJ NEW DRUG ADDON: CPT

## 2020-06-24 RX ORDER — FUROSEMIDE 10 MG/ML
10 INJECTION INTRAMUSCULAR; INTRAVENOUS ONCE
Status: DISCONTINUED | OUTPATIENT
Start: 2020-06-24 | End: 2020-06-24

## 2020-06-24 RX ORDER — FUROSEMIDE 10 MG/ML
10 INJECTION INTRAMUSCULAR; INTRAVENOUS ONCE
Status: COMPLETED | OUTPATIENT
Start: 2020-06-24 | End: 2020-06-24

## 2020-06-24 RX ADMIN — FUROSEMIDE 10 MG: 10 INJECTION, SOLUTION INTRAMUSCULAR; INTRAVENOUS at 06:42

## 2020-06-24 RX ADMIN — GABAPENTIN 100 MG: 100 CAPSULE ORAL at 08:10

## 2020-06-24 RX ADMIN — OXYCODONE HYDROCHLORIDE 10 MG: 5 TABLET ORAL at 16:13

## 2020-06-24 RX ADMIN — RIFAXIMIN 550 MG: 550 TABLET ORAL at 20:07

## 2020-06-24 RX ADMIN — FAMOTIDINE 20 MG: 20 TABLET ORAL at 20:07

## 2020-06-24 RX ADMIN — OXYCODONE HYDROCHLORIDE 10 MG: 5 TABLET ORAL at 12:13

## 2020-06-24 RX ADMIN — OXYCODONE HYDROCHLORIDE 10 MG: 5 TABLET ORAL at 05:19

## 2020-06-24 RX ADMIN — FAMOTIDINE 20 MG: 20 TABLET ORAL at 08:08

## 2020-06-24 RX ADMIN — PHYTONADIONE 10 MG: 10 INJECTION, EMULSION INTRAMUSCULAR; INTRAVENOUS; SUBCUTANEOUS at 12:14

## 2020-06-24 RX ADMIN — HYDROMORPHONE HYDROCHLORIDE 0.5 MG: 1 INJECTION, SOLUTION INTRAMUSCULAR; INTRAVENOUS; SUBCUTANEOUS at 01:18

## 2020-06-24 RX ADMIN — RIFAXIMIN 550 MG: 550 TABLET ORAL at 08:08

## 2020-06-24 RX ADMIN — OXYCODONE HYDROCHLORIDE 10 MG: 5 TABLET ORAL at 20:07

## 2020-06-24 RX ADMIN — SODIUM CHLORIDE, POTASSIUM CHLORIDE, SODIUM LACTATE AND CALCIUM CHLORIDE: 600; 310; 30; 20 INJECTION, SOLUTION INTRAVENOUS at 12:09

## 2020-06-24 RX ADMIN — OXYCODONE HYDROCHLORIDE 10 MG: 5 TABLET ORAL at 23:12

## 2020-06-24 RX ADMIN — OXYCODONE HYDROCHLORIDE 10 MG: 5 TABLET ORAL at 08:18

## 2020-06-24 RX ADMIN — VENLAFAXINE HYDROCHLORIDE 75 MG: 75 CAPSULE, EXTENDED RELEASE ORAL at 20:07

## 2020-06-24 RX ADMIN — SODIUM CHLORIDE, POTASSIUM CHLORIDE, SODIUM LACTATE AND CALCIUM CHLORIDE: 600; 310; 30; 20 INJECTION, SOLUTION INTRAVENOUS at 22:15

## 2020-06-24 NOTE — PROGRESS NOTES
Spoke with MD Reynoso about INR results following FFP infusion in PACU, received orders for 2 additional units of FFP with TORB.

## 2020-06-24 NOTE — PLAN OF CARE
AVSS on RA while awake; ex hypotensive at times in high 90s. Pt alert and oriented and far less lethargic than earlier 6/23. Pain controlled with PO oxy and IV dilaudid for breakthrough pain. ABD incision shadowing; small marked area unchanged. LS dim. Diet Regular tolerating well. Up with assist of 1. BS hypo. Stood at bedside. Pt states that she feels subjectively better.

## 2020-06-24 NOTE — PROGRESS NOTES
[facility]    General Surgery  Daily Post-Op Note       Assessment and Plan:   Pat Delgado is a 29 year old female S/P Procedure(s):  LAPAROSCOPIC CHOLECYSTECTOMY, 1 Day Post-Op. Intraoperative findings of portal hypertension and recanalized umbilical vein in the setting of liver disease. 500 ml blood loss from umbilical vein bleeding- this was controlled with vicryl sutures and fascial closure device.    Acute blood loss anemia secondary to elevated INR at 2.61 (due to underlying liver disease). Chronic anemia at baseline. Hgb dropped to 5.8 and now up to 7.3. Patient responding appropriately to resuscitation with 2 unit(s) pRBCs and 4 unit(s) of FFP.  BP ranging from 99/69-11/68 this morning. Heart rates 73-79.     Labs: CBC with platelets, INR, and CMP at 9:00am this morning.  Remain IMC until Hgb recheck this morning.    Pain: transition to oral analgesia  Bowel: senokot BID  Diet: advance diet as tolerated  IVFs: saline lock once adequate oral intake  Activity: encourage ambulation  DVT prophylaxis: lovenox on hold due to anemia  Dispo: pending Hgb stabilization        Interval History:   Pat Delgado is seen this morning on surgical rounds. She feels incisional pain but this is improved from yesterday. Pat has not felt light-headed but does feel tired. Pat tolerated liquids yesterday evening and denies nausea and vomiting. She is eager to have toast and juice for breakfast this morning. She has not passed flatus yet. Pat is hoping to return home today.         Physical Exam:   Temp: 98  F (36.7  C) Temp src: Temporal BP: 111/68 Pulse: 77 Heart Rate: 71 Resp: 12 SpO2: 95 % O2 Device: Nasal cannula Oxygen Delivery: 4 LPM    I/O last 3 completed shifts:  In: 4243 [P.O.:960; I.V.:1700]  Out: 1585 [Urine:1085; Blood:500]    PHYSICAL EXAM:  GENERAL: VS reviewed, alert, oriented, no acute distress  LUNGS: Normal respiratory effort, no wheezing  ABDOMEN:  Soft, nontender,  non-distended  INCISION: Clean, dry, and intact. No surrounding erythema. No visible hematoma deep to umbilical incision.  EXTREMITIES: Moving all extremities, no gross deformities, well perfused, no lower extremity edema or tenderness  NEUROLOGICAL: Grossly non-focal, mood & affect appropriate      Data   Recent Labs   Lab 06/24/20  0440 06/23/20  2226 06/23/20  1932 06/23/20  1706 06/23/20  1555   HGB 7.3* 7.8*  --  7.7* 6.2*   PLT  --   --   --   --  144*   INR  --   --  2.06*  --  2.61*   NA  --   --   --   --  140   POTASSIUM  --   --   --   --  3.8   CHLORIDE  --   --   --   --  108   CO2  --   --   --   --  24   BUN  --   --   --   --  3*   CR  --   --   --   --  0.55   ANIONGAP  --   --   --   --  8   EMILY  --   --   --   --  8.3*   GLC  --   --   --   --  123*   ALBUMIN  --   --   --   --  2.3*   PROTTOTAL  --   --   --   --  5.6*   BILITOTAL  --   --   --   --  2.1*   ALKPHOS  --   --   --   --  51   ALT  --   --   --   --  16   AST  --   --   --   --  30       Ada Otero PA-C

## 2020-06-24 NOTE — DISCHARGE INSTRUCTIONS
[facility]  Discharge Instructions: Post-Operative Laparoscopic Cholecystectomy    ACTIVITY    Expect to feel tired after your surgery. This will gradually resolve.     Due to anemia during hospitalization, if you feel lightheaded, you should call your PCP for sooner Hgb and INR check.      Take frequent, short walks and increase your activity gradually.      Avoid strenuous physical activity or heavy lifting greater than 15-20 lbs. for 2-3 weeks.  You may climb stairs.    You may drive without restrictions when you are not using any prescription pain medication and feel comfortable in a car.    You may return to work/school when you are comfortable without any prescription pain medication.    WOUND CARE    Re-apply dressing (Band-Aids or gauze/tape) as needed for comfort or drainage.    You have steri-strips (looks like white tape) on your incision.  You may peel off the steri-strips 2 weeks after your surgery if they have not peeled off on their own.     Do not soak your incisions in a tub or pool for 2 weeks.     Do not apply any lotions, creams, or ointments to your incisions.    A ridge under your incisions is normal and will gradually resolve.    DIET    Start with liquids, then gradually resume your regular diet as tolerated.  Avoid heavy, spicy, and greasy meals for 2-3 days.    Drink plenty of fluids to stay hydrated.    It is not uncommon to experience some loose stools or diarrhea after surgery.  This is your body s way of adapting to the bile which will slowly drain into your intestine.  A low fat diet may help with this.  This should improve over 1-2 months.    PAIN    Expect some tenderness and discomfort at the incision sites.  Use the prescribed pain medication at your discretion.  Expect gradual resolution of your pain over several days.    Speak with your PCP before starting tylenol for pain due to liver disease.    Do not drink alcohol or drive while you are  taking pain medications.    You may apply ice to your incisions in 20 minute intervals as needed for the next 48 hours.  After that time, consider switching to heat if you prefer.    EXPECTATIONS    Pain medications can cause constipation.  Limit use when possible.  Take over the counter stool softener/stimulant, such as Colace or Senna, 1-2 times a day with plenty of water.  You may take a mild over the counter laxative, such as Miralax or a suppository, as needed.  You may discontinue these medications once you are having regular bowel movements and/or are no longer taking your narcotic pain medication.       You may have shoulder or upper back discomfort due to the gas used in surgery.  This is temporary and should resolve in 48-72 hours.  Short, frequent walks may help with this.    FOLLOW UP    Our office will contact you in approximately 2 weeks to check on your progress and answer any questions you may have.  If you are doing well, you will not need to return for a follow up appointment.  If any concerns are identified over the phone, we will help you make an appointment to see a provider.     If you have not received a phone call, have any questions or concerns, or would like to be seen, please call us at [phone] and ask to speak with our nurse.  We are located at [address] [place] [address].    CALL OUR OFFICE [phone] IF YOU HAVE:     Chills or fever above 101 F.    Increased redness, warmth, or drainage at your incisions.    Significant bleeding.    Pain not relieved by your pain medication or rest.    Increasing pain after the first 48 hours.    Any other concerns or questions.    Revised July 2018

## 2020-06-24 NOTE — PLAN OF CARE
VSS, pain controlled with PRN oxycodone. Up ambulating in hallways, abdominal binder in place, incisions WNL. Tolerating meals. +BS, denies passing gas.    ADMIT

## 2020-06-25 VITALS
WEIGHT: 211.6 LBS | OXYGEN SATURATION: 92 % | HEART RATE: 87 BPM | TEMPERATURE: 97.8 F | HEIGHT: 68 IN | RESPIRATION RATE: 14 BRPM | BODY MASS INDEX: 32.07 KG/M2 | DIASTOLIC BLOOD PRESSURE: 64 MMHG | SYSTOLIC BLOOD PRESSURE: 109 MMHG

## 2020-06-25 LAB
GLUCOSE BLDC GLUCOMTR-MCNC: 97 MG/DL (ref 70–99)
HGB BLD-MCNC: 7.9 G/DL (ref 11.7–15.7)

## 2020-06-25 PROCEDURE — 00000146 ZZHCL STATISTIC GLUCOSE BY METER IP

## 2020-06-25 PROCEDURE — 25000128 H RX IP 250 OP 636: Performed by: PHYSICIAN ASSISTANT

## 2020-06-25 PROCEDURE — 25000132 ZZH RX MED GY IP 250 OP 250 PS 637: Performed by: PHYSICIAN ASSISTANT

## 2020-06-25 PROCEDURE — 85018 HEMOGLOBIN: CPT | Performed by: PHYSICIAN ASSISTANT

## 2020-06-25 PROCEDURE — G0378 HOSPITAL OBSERVATION PER HR: HCPCS

## 2020-06-25 PROCEDURE — 36415 COLL VENOUS BLD VENIPUNCTURE: CPT | Performed by: PHYSICIAN ASSISTANT

## 2020-06-25 RX ORDER — OXYCODONE HYDROCHLORIDE 5 MG/1
5 TABLET ORAL EVERY 4 HOURS PRN
Qty: 20 TABLET | Refills: 0 | Status: SHIPPED | OUTPATIENT
Start: 2020-06-25 | End: 2020-07-15

## 2020-06-25 RX ORDER — SENNOSIDES 8.6 MG
1 TABLET ORAL 2 TIMES DAILY
Qty: 28 TABLET | Refills: 0 | Status: SHIPPED | OUTPATIENT
Start: 2020-06-25 | End: 2020-07-15

## 2020-06-25 RX ADMIN — RIFAXIMIN 550 MG: 550 TABLET ORAL at 07:47

## 2020-06-25 RX ADMIN — SENNOSIDES 8.6 MG: 8.6 TABLET, FILM COATED ORAL at 07:51

## 2020-06-25 RX ADMIN — FAMOTIDINE 20 MG: 20 TABLET ORAL at 07:47

## 2020-06-25 RX ADMIN — GABAPENTIN 100 MG: 100 CAPSULE ORAL at 07:47

## 2020-06-25 RX ADMIN — OXYCODONE HYDROCHLORIDE 5 MG: 5 TABLET ORAL at 07:47

## 2020-06-25 RX ADMIN — ONDANSETRON 4 MG: 4 TABLET, ORALLY DISINTEGRATING ORAL at 08:22

## 2020-06-25 RX ADMIN — OXYCODONE HYDROCHLORIDE 10 MG: 5 TABLET ORAL at 04:14

## 2020-06-25 NOTE — DISCHARGE SUMMARY
[facility] Discharge Summary    Pat Delgado MRN# [id number]   Age: 29 year old YOB: 1990     Date of Admission:  6/23/2020  Date of Discharge:  6/25/2020 11:52 AM  Admitting Provider:  Alan Reynoso MD  Discharge Provider:  Ada Otero PA-C  Discharging Service: General Surgery     Primary Provider: Clinic, [facility]  Primary Care Physician Phone Number: [phone]          Admission Diagnoses:   Principle Diagnosis: Cholelithiasis [K80.20]  Secondary Diagnoses: Acute blood loss anemia     Coagulopathic, chronic liver disease     Portal hypertension     Re-canalized umbilical vein          Discharge Diagnosis:     Cholelithiasis [K80.20]          Procedures:     Procedure(s): LAPAROSCOPIC CHOLECYSTECTOMY (D2 modifier for portal hypertension, obesity, re-canalized umbilical vein)            Discharge Medications:     Discharge Medication List as of 6/25/2020 11:12 AM      START taking these medications    Details   oxyCODONE (ROXICODONE) 5 MG tablet Take 1 tablet (5 mg) by mouth every 4 hours as needed for moderate to severe pain, Disp-20 tablet,R-0, E-Prescribe      sennosides (SENOKOT) 8.6 MG tablet Take 1 tablet by mouth 2 times daily for 14 days, Disp-28 tablet,R-0, E-Prescribe         CONTINUE these medications which have NOT CHANGED    Details   gabapentin (NEURONTIN) 100 MG capsule Take 1 capsule (100 mg) by mouth daily, Disp-30 capsule,R-0, E-Prescribe      melatonin 3 MG CAPS Take 1 capsule by mouth At Bedtime, Historical      metoclopramide (REGLAN) 10 MG tablet Take 1 tablet (10 mg) by mouth 4 times daily as needed (Nausea or Vomiting), Disp-21 tablet,R-0, E-Prescribe      Multiple Vitamin (ONE-A-DAY ADULT VITACRAVES+DHA PO) Take 1 tablet by mouth daily, Historical      rifaximin (XIFAXAN) 550 MG TABS tablet Take 1 tablet (550 mg) by mouth 2 times daily, Disp-60 tablet, R-0, E-Prescribe      venlafaxine (EFFEXOR-XR) 75 MG 24 hr capsule Take 75  mg by mouth daily, Historical      famotidine (PEPCID) 20 MG tablet Take 20 mg by mouth 2 times daily as needed, Historical      prochlorperazine (COMPAZINE) 25 MG suppository Place 1 suppository (25 mg) rectally every 12 hours as needed for nausea, Disp-10 suppository,R-0, Local Print         STOP taking these medications       ondansetron (ZOFRAN ODT) 4 MG ODT tab Comments:   Reason for Stopping:         ondansetron (ZOFRAN ODT) 4 MG ODT tab Comments:   Reason for Stopping:         venlafaxine (EFFEXOR) 75 MG tablet Comments:   Reason for Stopping:                   Allergies:         Allergies   Allergen Reactions     Amoxicillin      Penicillins Unknown     Hives               Brief History of Illness:    Reason for your hospital stay      Laparoscopic cholecystectomy and acute blood loss anemia           Pat Delgado is a 29-year-old female with chronic liver disease due to alcohol abuse/dependence, substance abuse, ADHD, and anxiety. She was seen in consultation for elective laparoscopic cholecystectomy with Dr. Reynoso on 6/10/2020 for symptomatic cholelithiasis.     After discussing the risks, benefits, and possible complications, informed consent was obtained and the patient underwent laparoscopic cholecystectomy on 6/23. Intraoperative findings were that of portal hypertension and recanalized umbilical vein in the setting of chronic liver disease. There was 500 ml blood loss from umbilical vein bleeding which was controlled with vicryl sutures and fascial closure device. Please see the Operative Report for full details.           Hospital Course:   Pat Delgado was admitted for observation post-operatively due to acute blood loss anemia secondary to surgical procedure in the setting of coagulopathy from chronic liver disease and chronic anemia. She was found to have an INR of 2.61 post-operatively. Hgb dropped from 7.1 to 5.8. Patient responded appropriately to resuscitation with 2 unit(s)  "pRBCs, 4 unit(s) of FFP, and Vitamin K. Blood pressures and pulse were unremarkable and no events while in IMC. Hgb continues to improve and is 7.9 this morning. Pat is tolerating diet, pain controlled with oral analgesia, and has return of bowel function. Pat will follow up with PCP in 1 week due for recheck INR and Hgb. She will not be taking any tylenol due to her chronic liver disease. General Surgery clinic will call Pat in approximately 2 weeks for telephone follow-up. If any concerns are identified over the phone or prior to this call, an in-person appointment to see a provider will be scheduled.    On the date of discharge, the patient was discharged to home in stable condition and afebrile.  She verbalized understanding of all discharge instructions and felt comfortable with the discharge plan.  She was asked to call with any further questions or concerns.         Condition on Discharge:        Discharge condition: Stable   Discharge vitals: Blood pressure 109/64, pulse 87, temperature 97.8  F (36.6  C), temperature source Axillary, resp. rate 14, height 1.727 m (5' 8\"), weight 96 kg (211 lb 9.6 oz), SpO2 92 %, not currently breastfeeding.           Discharge Disposition:     Discharged to home          Discharge Instructions and Follow-Up:      Pat Delgado was asked to have telephone follow up with surgical team in 2 weeks and PCP follow-up in 1 week for INR and Hgb check.      Ada Otero PA-C  [facility]  [phone]       "

## 2020-06-25 NOTE — PROGRESS NOTES
Observation goals: PRIOR TO DISCHARGE    -Hgb stable: PARTIALLY MET; current hgb 7.6    -Vitals stable: MET

## 2020-06-25 NOTE — PLAN OF CARE
POD#2 Laparoscopic cholecystectomy. A&Ox4. VSS on RA. Up with Independently. Regular diet. C/o abdominal pain, managed with PRN oxycodone. % lap sites, WDL ex umbilicus incision dried shadowing drainage covered with gauze. Abdominal binder in place.BS hypoactive, started passing flatus overnight. Amado patent; Voiding bathroom. IVF LR infusing @100mL/hr.. Discharge pending. Continue to monitor.       Observation goals: PRIOR TO DISCHARGE  -Hgb stable: PARTIALLY MET; current hgb 7.6    -Vitals stable: MET

## 2020-06-25 NOTE — PROGRESS NOTES
[facility]    General Surgery  Daily Post-Op Note       Assessment and Plan:   Pat Delgado is a 29 year old female S/P Procedure(s):  LAPAROSCOPIC CHOLECYSTECTOMY, 2 Day Post-Op. Intraoperative findings of portal hypertension and recanalized umbilical vein in the setting of liver disease. 500 ml blood loss from umbilical vein bleeding- this was controlled with vicryl sutures and fascial closure device.    Acute blood loss anemia secondary to elevated INR at 2.61 (due to underlying liver disease). Chronic anemia at baseline. Hgb dropped to 5.8 and now up to 7.9. Patient responding appropriately to resuscitation with 2 unit(s) pRBCs and 4 unit(s) of FFP.  BP and heart rate WNL, Hgb stabilized.    Discharge today.        Interval History:   Pat Delgado is seen this morning on surgical rounds. She has been tolerating regular diet and passing flatus. She feels like she'll have a bowel movement this morning. Her abdominal pain is controlled with oxycodone. It is located at the incisions, worse at the umbilical incision. The abdominal binder has been helping with this pain. Her BP's and heart rates have been WNL overnight and this morning. Hgb check improved to 7.9.         Physical Exam:   Temp: 97.8  F (36.6  C) Temp src: Axillary BP: 109/64 Pulse: 87 Heart Rate: 81 Resp: 14 SpO2: 92 % O2 Device: None (Room air)      I/O last 3 completed shifts:  In: 2200 [P.O.:500; I.V.:1700]  Out: 700 [Urine:700]    PHYSICAL EXAM:  GENERAL: VS reviewed, alert, oriented, no acute distress  LUNGS: Normal respiratory effort, no wheezing  ABDOMEN:  Soft, nontender, non-distended  INCISION: Clean, dry, and intact. No surrounding erythema. No visible hematoma deep to umbilical incision.  EXTREMITIES: Moving all extremities, no gross deformities, well perfused, no lower extremity edema or tenderness  NEUROLOGICAL: Grossly non-focal, mood & affect appropriate      Data   Recent Labs   Lab 06/25/20  0708  06/24/20  1447 06/24/20  0759  06/23/20  1932  06/23/20  1555   WBC  --   --  6.4  --   --   --   --    HGB 7.9* 7.6* 7.3*   < >  --    < > 6.2*   MCV  --   --  87  --   --   --   --    PLT  --   --  161  --   --   --  144*   INR  --   --  1.87*  --  2.06*  --  2.61*   NA  --   --  139  --   --   --  140   POTASSIUM  --   --  3.8  --   --   --  3.8   CHLORIDE  --   --  106  --   --   --  108   CO2  --   --  29  --   --   --  24   BUN  --   --  5*  --   --   --  3*   CR  --   --  0.61  --   --   --  0.55   ANIONGAP  --   --  4  --   --   --  8   EMILY  --   --  8.6  --   --   --  8.3*   GLC  --   --  112*  --   --   --  123*   ALBUMIN  --   --  2.5*  --   --   --  2.3*   PROTTOTAL  --   --  6.0*  --   --   --  5.6*   BILITOTAL  --   --  2.1*  --   --   --  2.1*   ALKPHOS  --   --  55  --   --   --  51   ALT  --   --  19  --   --   --  16   AST  --   --  36  --   --   --  30    < > = values in this interval not displayed.       Ada Otero PA-C

## 2020-06-25 NOTE — PROGRESS NOTES
Patient discharged. All teaching complete follow up instructions reviewed. Rx filled and given to the patient. IV x 2 discontinued. Patient verbalized understanding. Discharged home with mother.

## 2020-06-29 ENCOUNTER — APPOINTMENT (OUTPATIENT)
Dept: CT IMAGING | Facility: CLINIC | Age: 30
End: 2020-06-29
Attending: EMERGENCY MEDICINE
Payer: COMMERCIAL

## 2020-06-29 ENCOUNTER — HOSPITAL ENCOUNTER (EMERGENCY)
Facility: CLINIC | Age: 30
Discharge: HOME | End: 2020-06-29
Attending: EMERGENCY MEDICINE | Admitting: EMERGENCY MEDICINE
Payer: COMMERCIAL

## 2020-06-29 VITALS
WEIGHT: 218 LBS | OXYGEN SATURATION: 96 % | TEMPERATURE: 98 F | HEART RATE: 89 BPM | HEIGHT: 68 IN | SYSTOLIC BLOOD PRESSURE: 103 MMHG | RESPIRATION RATE: 20 BRPM | DIASTOLIC BLOOD PRESSURE: 60 MMHG | BODY MASS INDEX: 33.04 KG/M2

## 2020-06-29 DIAGNOSIS — R10.84 ABDOMINAL PAIN, GENERALIZED: ICD-10-CM

## 2020-06-29 DIAGNOSIS — D64.9 ANEMIA, UNSPECIFIED TYPE: ICD-10-CM

## 2020-06-29 DIAGNOSIS — T14.8XXA WOUND DRAINAGE: ICD-10-CM

## 2020-06-29 DIAGNOSIS — G89.18 ACUTE POST-OPERATIVE PAIN: ICD-10-CM

## 2020-06-29 LAB
ABO + RH BLD: NORMAL
ABO + RH BLD: NORMAL
ALBUMIN SERPL-MCNC: 2.4 G/DL (ref 3.4–5)
ALP SERPL-CCNC: 66 U/L (ref 40–150)
ALT SERPL W P-5'-P-CCNC: 15 U/L (ref 0–50)
ANION GAP SERPL CALCULATED.3IONS-SCNC: 6 MMOL/L (ref 3–14)
AST SERPL W P-5'-P-CCNC: 26 U/L (ref 0–45)
BASOPHILS # BLD AUTO: 0 10E9/L (ref 0–0.2)
BASOPHILS NFR BLD AUTO: 0.1 %
BILIRUB SERPL-MCNC: 2.7 MG/DL (ref 0.2–1.3)
BLD GP AB SCN SERPL QL: NORMAL
BLOOD BANK CMNT PATIENT-IMP: NORMAL
BUN SERPL-MCNC: 3 MG/DL (ref 7–30)
CALCIUM SERPL-MCNC: 8.6 MG/DL (ref 8.5–10.1)
CHLORIDE SERPL-SCNC: 100 MMOL/L (ref 94–109)
CO2 SERPL-SCNC: 27 MMOL/L (ref 20–32)
CREAT SERPL-MCNC: 0.49 MG/DL (ref 0.52–1.04)
DIFFERENTIAL METHOD BLD: ABNORMAL
EOSINOPHIL # BLD AUTO: 0.3 10E9/L (ref 0–0.7)
EOSINOPHIL NFR BLD AUTO: 4.1 %
ERYTHROCYTE [DISTWIDTH] IN BLOOD BY AUTOMATED COUNT: 16.3 % (ref 10–15)
GFR SERPL CREATININE-BSD FRML MDRD: >90 ML/MIN/{1.73_M2}
GLUCOSE SERPL-MCNC: 88 MG/DL (ref 70–99)
HCT VFR BLD AUTO: 23.3 % (ref 35–47)
HGB BLD-MCNC: 7.7 G/DL (ref 11.7–15.7)
IMM GRANULOCYTES # BLD: 0 10E9/L (ref 0–0.4)
IMM GRANULOCYTES NFR BLD: 0.3 %
LIPASE SERPL-CCNC: 126 U/L (ref 73–393)
LYMPHOCYTES # BLD AUTO: 1.8 10E9/L (ref 0.8–5.3)
LYMPHOCYTES NFR BLD AUTO: 26 %
MCH RBC QN AUTO: 28.5 PG (ref 26.5–33)
MCHC RBC AUTO-ENTMCNC: 33 G/DL (ref 31.5–36.5)
MCV RBC AUTO: 86 FL (ref 78–100)
MONOCYTES # BLD AUTO: 0.9 10E9/L (ref 0–1.3)
MONOCYTES NFR BLD AUTO: 12.3 %
NEUTROPHILS # BLD AUTO: 4 10E9/L (ref 1.6–8.3)
NEUTROPHILS NFR BLD AUTO: 57.2 %
NRBC # BLD AUTO: 0 10*3/UL
NRBC BLD AUTO-RTO: 0 /100
PLATELET # BLD AUTO: 231 10E9/L (ref 150–450)
POTASSIUM SERPL-SCNC: 3.4 MMOL/L (ref 3.4–5.3)
PROT SERPL-MCNC: 6.3 G/DL (ref 6.8–8.8)
RBC # BLD AUTO: 2.7 10E12/L (ref 3.8–5.2)
SODIUM SERPL-SCNC: 133 MMOL/L (ref 133–144)
SPECIMEN EXP DATE BLD: NORMAL
WBC # BLD AUTO: 7.1 10E9/L (ref 4–11)

## 2020-06-29 PROCEDURE — 80053 COMPREHEN METABOLIC PANEL: CPT | Performed by: EMERGENCY MEDICINE

## 2020-06-29 PROCEDURE — 74177 CT ABD & PELVIS W/CONTRAST: CPT

## 2020-06-29 PROCEDURE — 25000128 H RX IP 250 OP 636: Performed by: EMERGENCY MEDICINE

## 2020-06-29 PROCEDURE — 96361 HYDRATE IV INFUSION ADD-ON: CPT

## 2020-06-29 PROCEDURE — 86850 RBC ANTIBODY SCREEN: CPT | Performed by: EMERGENCY MEDICINE

## 2020-06-29 PROCEDURE — 96374 THER/PROPH/DIAG INJ IV PUSH: CPT | Mod: 59

## 2020-06-29 PROCEDURE — 99285 EMERGENCY DEPT VISIT HI MDM: CPT | Mod: 25

## 2020-06-29 PROCEDURE — 83690 ASSAY OF LIPASE: CPT | Performed by: EMERGENCY MEDICINE

## 2020-06-29 PROCEDURE — 86900 BLOOD TYPING SEROLOGIC ABO: CPT | Performed by: EMERGENCY MEDICINE

## 2020-06-29 PROCEDURE — 25800030 ZZH RX IP 258 OP 636: Performed by: EMERGENCY MEDICINE

## 2020-06-29 PROCEDURE — 86901 BLOOD TYPING SEROLOGIC RH(D): CPT | Performed by: EMERGENCY MEDICINE

## 2020-06-29 PROCEDURE — 85025 COMPLETE CBC W/AUTO DIFF WBC: CPT | Performed by: EMERGENCY MEDICINE

## 2020-06-29 PROCEDURE — 25000125 ZZHC RX 250: Performed by: EMERGENCY MEDICINE

## 2020-06-29 PROCEDURE — 96376 TX/PRO/DX INJ SAME DRUG ADON: CPT

## 2020-06-29 RX ORDER — SODIUM CHLORIDE 9 MG/ML
1000 INJECTION, SOLUTION INTRAVENOUS CONTINUOUS
Status: DISCONTINUED | OUTPATIENT
Start: 2020-06-29 | End: 2020-06-29 | Stop reason: HOSPADM

## 2020-06-29 RX ORDER — OXYCODONE HYDROCHLORIDE 5 MG/1
5 TABLET ORAL EVERY 6 HOURS PRN
Qty: 9 TABLET | Refills: 0 | Status: SHIPPED | OUTPATIENT
Start: 2020-06-29 | End: 2020-07-15

## 2020-06-29 RX ORDER — IOPAMIDOL 755 MG/ML
110 INJECTION, SOLUTION INTRAVASCULAR ONCE
Status: COMPLETED | OUTPATIENT
Start: 2020-06-29 | End: 2020-06-29

## 2020-06-29 RX ORDER — MORPHINE SULFATE 4 MG/ML
4 INJECTION, SOLUTION INTRAMUSCULAR; INTRAVENOUS
Status: DISCONTINUED | OUTPATIENT
Start: 2020-06-29 | End: 2020-06-29 | Stop reason: HOSPADM

## 2020-06-29 RX ADMIN — SODIUM CHLORIDE 500 ML: 9 INJECTION, SOLUTION INTRAVENOUS at 11:33

## 2020-06-29 RX ADMIN — MORPHINE SULFATE 4 MG: 4 INJECTION INTRAVENOUS at 11:50

## 2020-06-29 RX ADMIN — MORPHINE SULFATE 4 MG: 4 INJECTION INTRAVENOUS at 12:23

## 2020-06-29 RX ADMIN — SODIUM CHLORIDE 1000 ML: 9 INJECTION, SOLUTION INTRAVENOUS at 13:19

## 2020-06-29 RX ADMIN — SODIUM CHLORIDE 71 ML: 9 INJECTION, SOLUTION INTRAVENOUS at 12:07

## 2020-06-29 RX ADMIN — IOPAMIDOL 110 ML: 755 INJECTION, SOLUTION INTRAVENOUS at 12:07

## 2020-06-29 ASSESSMENT — ENCOUNTER SYMPTOMS
NAUSEA: 0
SHORTNESS OF BREATH: 0
ABDOMINAL PAIN: 1
VOMITING: 0
FEVER: 0
COUGH: 0
DIARRHEA: 0

## 2020-06-29 ASSESSMENT — MIFFLIN-ST. JEOR: SCORE: 1762.34

## 2020-06-29 NOTE — ED AVS SNAPSHOT
Emergency Department  [address] [facility] [address]  Phone:  [phone]  Fax:  [phone]                                    Pat Delgado   MRN: [id number]    Department:   Emergency Department   Date of Visit:  6/29/2020           After Visit Summary Signature Page    I have received my discharge instructions, and my questions have been answered. I have discussed any challenges I see with this plan with the nurse or doctor.    ..........................................................................................................................................  Patient/Patient Representative Signature      ..........................................................................................................................................  Patient Representative Print Name and Relationship to Patient    ..................................................               ................................................  Date                                   Time    ..........................................................................................................................................  Reviewed by Signature/Title    ...................................................              ..............................................  Date                                               Time          22EPIC Rev 08/18

## 2020-06-29 NOTE — ED PROVIDER NOTES
"  History   Chief Complaint:  Abdominal Pain    HPI   Pat Delgado is a 29 year old female, who was admitted six days ago and had a cholecystectomy (performed by Dr. Reynoso), discharged two days later with oxycodone and sennosides. She has a history of substance abuse, chemical abuse, alcohol dependence, and hypertension, who presents to the ED for evaluation of abdominal pain. She states that she has experienced a consistent pain since being discharged. The pain has not truly been relieved, but has been progressing. The pain extends from the right upper quadrant of the abdomen to the lower middle quadrant. The patient describes the pain as \"burning and aching.\" She also notes that she has abnormal swelling near her right lateral side. She has had slight difficulty ambulating. She denies any fever, nausea, vomiting, diarrhea, or any other acute symptoms. She also denies any COVID-related symptoms and has been socially isolating herself.    Allergies:  Amoxicillin  Penicillins    Medications:    Pepcid  Neurontin  Reglan  Compazine  Rifaximin  Sennosides  Effexor    Past Medical History:    ADHD  Alcohol dependence  Chemical dependency  Depressive disorder  Hypertension  Migraine  Substance abuse  Tobacco abuse  Alcoholic hepatitis    Past Surgical History:    Dental extraction  Tunnel replacement   Tunnel removal  Feeding tube placement  Cholecystectomy    Family History:    Anxiety  Substance    Social History:  Smoking status: Former  Alcohol use: Yes-750ml a day  Drug use: Yes- Marijuana  PCP: [facility]  Marital Status:  Single [1]    Review of Systems   Constitutional: Negative for fever.   Respiratory: Negative for cough and shortness of breath.    Gastrointestinal: Positive for abdominal pain. Negative for diarrhea, nausea and vomiting.        Abdominal swelling s/p cholecystectomy   All other systems reviewed and are negative.    Physical Exam     Patient Vitals for the past 24 " "hrs:   BP Temp Temp src Pulse Resp SpO2 Height Weight   06/29/20 1220 104/64 -- -- 91 -- 100 % -- --   06/29/20 1218 104/64 -- -- 91 -- 100 % -- --   06/29/20 1145 96/65 -- -- 95 -- 100 % -- --   06/29/20 1045 116/56 -- -- 97 -- 99 % -- --   06/29/20 1041 116/56 98  F (36.7  C) Oral 97 20 99 % 1.727 m (5' 8\") 98.9 kg (218 lb)       Physical Exam  General: Well-nourished, appears to be in pain when moving  Eyes: PERRL, conjunctivae pale no scleral icterus or conjunctival injection  ENT:  Moist mucus membranes, posterior oropharynx clear without erythema or exudates  Respiratory:  Lungs clear to auscultation bilaterally, no crackles/rubs/wheezes.  Good air movement  CV: Normal rate and rhythm, no murmurs/rubs/gallops  GI:  Abdomen soft and protuberant.  Normoactive BS.  Diffuse moderate tenderness but worse in the right upper quadrant.  No guarding or rebound  Skin: Warm, dry.  No rashes or petechiae.  Serous drainage without purulence or foul smell around the umbilicus staining the dressing.  No surrounding redness or warmth.  No crepitus.  No bulla.  Musculoskeletal: Doughy symmetric peripheral edema or calf tenderness  Neuro: Alert and oriented to person/place/time  Psychiatric: Normal affect      Emergency Department Course   Imaging:  Radiology findings were communicated with the patient who voiced understanding of the findings.    CT Abd/pelvis with contrast:  1.  Six days status post cholecystectomy. There is fluid and a small   amount of air in the gallbladder fossa as would be expected   postoperatively. No formed abscess seen.   2.  Mild cardiomegaly more prominent than on the prior CT scan.   3.  Significant increase in splenic size/splenomegaly.   4.  Marked improvement in fatty infiltration of the liver.   5.  Prominent subcutaneous edema in the abdomen and pelvis suspicious   for anasarca.   6.  Fairly stable mild ascites.     Imaging independently reviewed and agree with radiologist interpretation. "     Laboratory:  Laboratory findings were communicated with the patient who voiced understanding of the findings.    CBC: HGB 7.7 (L), o/w WNL (WBC 7.1, )    CMP: BUN 3 (L), Creatinine 0.49 (L), Bilirubin 2.7 (H), Albumin 2.4 (L), Protein Total 6.3 (L), o/w WNL    Lipase: 126     Interventions:  1133: Morphine 4 mg IV  1150: NS 500mL IV Bolus   1223: Morphine 4 mg IV  1319: NS 1L IV Bolus     Emergency Department Course:  Past medical records, nursing notes, and vitals reviewed.    1130 I performed an exam of the patient as documented above.     IV was inserted and blood was drawn for laboratory testing, results above.  The patient was sent for an abdominal CT while in the emergency department, results above.     1415 I rechecked the patient and discussed the results of her workup thus far.     I spoke with Dr. Carter of general surgery.  Dr. Carter came to the emergency department and evaluated the patient and we discussed follow-up plans.    Findings and plan explained to the Patient. Patient discharged home with instructions regarding supportive care, medications, and reasons to return. The importance of close follow-up was reviewed.    I personally reviewed the laboratory and imaging results with the Patient and answered all related questions prior to discharge.     Impression & Plan   Covid-19  Pat Delgado was evaluated during a global COVID-19 pandemic, which necessitated consideration that the patient might be at risk for infection with the SARS-CoV-2 virus that causes COVID-19.   Applicable protocols for evaluation were followed during the patient's care.   COVID-19 was considered as part of the patient's evaluation.    Medical Decision Making:  Pat Delgado is a 29 year old female who comes on postop day 6 with complaint of abdominal pain and generalized edema.  Her surgery was complicated by acute blood loss anemia secondary to coagulopathy and she was transfused.  Her edema seems to be  secondary to anasarca with fluid overload as well as hypoalbuminemia and anemia.  She is saturating well and her breathing is normal.  I do not believe this represents congestive heart failure.  Her hemoglobin today is stable.  The remainder of her labs are essentially stable and reassuring.  CT scan was obtained and shows no definite abscess, obstruction or other findings concerning for a new postoperative complication. Dr. Carter of general surgery reviewed her CT as well as her laboratory studies and examined her in the emergency department and concurred that she is safe for discharge home.  I suspect her pain is secondary to normal postoperative pain in the setting of running out of her oxycodone.  She was given a brief prescription of oxycodone and told to use this sparingly.  She is given precautions to return if signs or symptoms of fever or other acute infection.  She was given instructions on care of the drainage from the umbilical wound.  With reasonable clinical confidence, I believe she safe for discharge home at this time.  She was updated and in agreement with the plan.    Diagnosis:    ICD-10-CM    1. Acute post-operative pain  G89.18    2. Abdominal pain, generalized  R10.84    3. Anemia, unspecified type  D64.9    4. Wound drainage  T14.8XXA        Disposition:  Discharged to home.    Discharge Medications:  New Prescriptions    OXYCODONE (ROXICODONE) 5 MG TABLET    Take 1 tablet (5 mg) by mouth every 6 hours as needed for pain       Scribe Disclosure:  Jatin OLIVIA, am serving as a scribe at 11:30 AM on 6/29/2020 to document services personally performed by Isadora Escobar MD based on my observations and the provider's statements to me.      Isadora Escobar MD  06/30/20 [id number]

## 2020-06-29 NOTE — DISCHARGE INSTRUCTIONS
*Get plenty of rest and avoid strenuous activities.  Wash with soap and water and keep the area dry.  *Oxycodone as directed as needed for severe pain.    *Follow-up with Dr. Reynoso in the next 3-5 days.  *Return to the ER if you develop fever, worsening pain, vomiting, spreading redness or warmth around the wound, faint or feel like you will faint or become worse in any way.    Discharge Instructions  Abdominal Pain    Abdominal pain can be caused by many things. Your evaluation today does not show the exact cause for your pain. Your doctor today has decided that it is unlikely your pain is due to a life threatening problem, or a problem requiring surgery or hospital admission. Sometimes those problems cannot be found right away, so it is very important that you follow up as directed.  Sometimes only the changes which occur over time allow the cause of your pain to be found.    Return to the Emergency Department for a recheck in 8-12 hours if your pain continues.  If your pain gets worse, changes in location, or feels different, return to the Emergency Department right away.    ADULTS:  Return to the Emergency Department right away if:    You get an oral temperature above 102oF or as directed by your doctor.  You have blood in your stools (bright red or black, tarry stools).  You keep throwing up or can t drink liquids.  You see blood when you throw up.  You can t have a bowel movement or you can t pass gas.  Your stomach gets bloated or bigger.  Your skin or the whites of your eyes look yellow.  You faint.  You have bloody, frequent or painful urination.  You have new symptoms or anything that worries you.    CHILDREN:  Return to the Emergency Department right away if your child has any of the above-listed symptoms or the following:    Pushes your hand away or screams/cries when his/her belly is touched.  You notice your child is very fussy or weak.  Your child is very tired and is too tired to eat or drink.  Your  child is dehydrated.  Signs of dehydration can be:  Your infant has had no wet diapers in 4-5 hours.  Your older child has not passed urine in 6-8 hours.  Your infant or child starts to have dry mouth and lips, or no saliva or tears.    PREGNANT WOMEN:  Return to the Emergency Department right away if you have any of the above-listed symptoms or the following:    You have bleeding, leaking fluid or passing tissue from the vagina.  You have worse pain or cramping, or pain in your shoulder or back.  You have vomiting that will not stop.  You have painful or bloody urination.  You have a temperature of 100oF or more.  Your baby is not moving as much as usual.  You faint.  You get a bad headache with or without eye problems and abdominal pain.  You have a convulsion or seizure.  You have unusual discharge from your vagina and abdominal pain.    Abdominal pain is pretty common during pregnancy.  Your pain may or may not be related to your pregnancy. You should follow-up closely with your OB doctor so they can evaluate you and your baby.  Until you follow-up with your regular doctor, do the following:     Avoid sex and do not put anything in your vagina.  Drink clear fluids.  Only take medications approved by your doctor.    MORE INFORMATION:    Appendicitis:  A possible cause of abdominal pain in any person who still has their appendix is acute appendicitis. Appendicitis is often hard to diagnose.  Testing does not always rule out early appendicitis or other causes of abdominal pain. Close follow-up with your doctor and re-evaluations may be needed to figure out the reason for your abdominal pain.    Follow-up:  It is very important that you make an appointment with your clinic and go to the appointment.  If you do not follow-up with your primary doctor, it may result in missing an important development which could result in permanent injury or disability and/or lasting pain.  If there is any problem keeping your  "appointment, call your doctor or return to the Emergency Department.    Medications:  Take your medications as directed by your doctor today.  Before using over-the-counter medications, ask your doctor and make sure to take the medications as directed.  If you have any questions about medications, ask your doctor.    Diet:  Resume your normal diet as much as possible, but do not eat fried, fatty or spicy foods while you have pain.  Do not drink alcohol or have caffeine.  Do not smoke tobacco.    Probiotics: If you have been given an antibiotic, you may want to also take a probiotic pill or eat yogurt with live cultures. Probiotics have \"good bacteria\" to help your intestines stay healthy. Studies have shown that probiotics help prevent diarrhea and other intestine problems (including C. diff infection) when you take antibiotics. You can buy these without a prescription in the pharmacy section of the store.     If you were given a prescription for medicine here today, be sure to read all of the information (including the package insert) that comes with your prescription.  This will include important information about the medicine, its side effects, and any warnings that you need to know about.  The pharmacist who fills the prescription can provide more information and answer questions you may have about the medicine.  If you have questions or concerns that the pharmacist cannot address, please call or return to the Emergency Department.         Opioid Medication Information    Pain medications are among the most commonly prescribed medicines, so we are including this information for all our patients. If you did not receive pain medication or get a prescription for pain medicine, you can ignore it.     You may have been given a prescription for an opioid (narcotic) pain medicine and/or have received a pain medicine while here in the Emergency Department. These medicines can make you drowsy or impaired. You must not " drive, operate dangerous equipment, or engage in any other dangerous activities while taking these medications. If you drive while taking these medications, you could be arrested for DUI, or driving under the influence. Do not drink any alcohol while you are taking these medications.     Opioid pain medications can cause addiction. If you have a history of chemical dependency of any type, you are at a higher risk of becoming addicted to pain medications.  Only take these prescribed medications to treat your pain when all other options have been tried. Take it for as short a time and as few doses as possible. Store your pain pills in a secure place, as they are frequently stolen and provide a dangerous opportunity for children or visitors in your house to start abusing these powerful medications. We will not replace any lost or stolen medicine.  As soon as your pain is better, you should flush all your remaining medication.     Many prescription pain medications contain Tylenol  (acetaminophen), including Vicodin , Tylenol #3 , Norco , Lortab , and Percocet .  You should not take any extra pills of Tylenol  if you are using these prescription medications or you can get very sick.  Do not ever take more than 3000 mg of acetaminophen in any 24 hour period.    All opioids tend to cause constipation. Drink plenty of water and eat foods that have a lot of fiber, such as fruits, vegetables, prune juice, apple juice and high fiber cereal.  Take a laxative if you don t move your bowels at least every other day. Miralax , Milk of Magnesia, Colace , or Senna  can be used to keep you regular.      Remember that you can always come back to the Emergency Department if you are not able to see your regular doctor in the amount of time listed above, if you get any new symptoms, or if there is anything that worries you.

## 2020-07-02 ENCOUNTER — HOSPITAL ENCOUNTER (EMERGENCY)
Facility: CLINIC | Age: 30
Discharge: HOME | End: 2020-07-02
Attending: EMERGENCY MEDICINE | Admitting: EMERGENCY MEDICINE
Payer: COMMERCIAL

## 2020-07-02 VITALS
TEMPERATURE: 98.7 F | OXYGEN SATURATION: 99 % | SYSTOLIC BLOOD PRESSURE: 99 MMHG | RESPIRATION RATE: 20 BRPM | HEART RATE: 88 BPM | DIASTOLIC BLOOD PRESSURE: 54 MMHG | HEIGHT: 68 IN | BODY MASS INDEX: 31.83 KG/M2 | WEIGHT: 210 LBS

## 2020-07-02 DIAGNOSIS — R10.9 POSTOPERATIVE ABDOMINAL PAIN: ICD-10-CM

## 2020-07-02 DIAGNOSIS — R11.2 INTRACTABLE VOMITING WITH NAUSEA, UNSPECIFIED VOMITING TYPE: ICD-10-CM

## 2020-07-02 DIAGNOSIS — G89.18 POSTOPERATIVE ABDOMINAL PAIN: ICD-10-CM

## 2020-07-02 LAB
ALBUMIN SERPL-MCNC: 2.5 G/DL (ref 3.4–5)
ALP SERPL-CCNC: 60 U/L (ref 40–150)
ALT SERPL W P-5'-P-CCNC: 13 U/L (ref 0–50)
ANION GAP SERPL CALCULATED.3IONS-SCNC: 10 MMOL/L (ref 3–14)
AST SERPL W P-5'-P-CCNC: 25 U/L (ref 0–45)
BASOPHILS # BLD AUTO: 0 10E9/L (ref 0–0.2)
BASOPHILS NFR BLD AUTO: 0.3 %
BILIRUB SERPL-MCNC: 2.6 MG/DL (ref 0.2–1.3)
BUN SERPL-MCNC: 3 MG/DL (ref 7–30)
CALCIUM SERPL-MCNC: 8.8 MG/DL (ref 8.5–10.1)
CHLORIDE SERPL-SCNC: 106 MMOL/L (ref 94–109)
CO2 SERPL-SCNC: 22 MMOL/L (ref 20–32)
CREAT SERPL-MCNC: 0.43 MG/DL (ref 0.52–1.04)
DIFFERENTIAL METHOD BLD: ABNORMAL
EOSINOPHIL # BLD AUTO: 0.5 10E9/L (ref 0–0.7)
EOSINOPHIL NFR BLD AUTO: 6.1 %
ERYTHROCYTE [DISTWIDTH] IN BLOOD BY AUTOMATED COUNT: 16 % (ref 10–15)
GFR SERPL CREATININE-BSD FRML MDRD: >90 ML/MIN/{1.73_M2}
GLUCOSE SERPL-MCNC: 83 MG/DL (ref 70–99)
HCT VFR BLD AUTO: 24.7 % (ref 35–47)
HGB BLD-MCNC: 8 G/DL (ref 11.7–15.7)
IMM GRANULOCYTES # BLD: 0 10E9/L (ref 0–0.4)
IMM GRANULOCYTES NFR BLD: 0.1 %
LIPASE SERPL-CCNC: 143 U/L (ref 73–393)
LYMPHOCYTES # BLD AUTO: 1.4 10E9/L (ref 0.8–5.3)
LYMPHOCYTES NFR BLD AUTO: 18.4 %
MAGNESIUM SERPL-MCNC: 1.8 MG/DL (ref 1.6–2.3)
MCH RBC QN AUTO: 27.8 PG (ref 26.5–33)
MCHC RBC AUTO-ENTMCNC: 32.4 G/DL (ref 31.5–36.5)
MCV RBC AUTO: 86 FL (ref 78–100)
MONOCYTES # BLD AUTO: 0.8 10E9/L (ref 0–1.3)
MONOCYTES NFR BLD AUTO: 9.9 %
NEUTROPHILS # BLD AUTO: 4.9 10E9/L (ref 1.6–8.3)
NEUTROPHILS NFR BLD AUTO: 65.2 %
NRBC # BLD AUTO: 0 10*3/UL
NRBC BLD AUTO-RTO: 0 /100
PLATELET # BLD AUTO: 266 10E9/L (ref 150–450)
POTASSIUM SERPL-SCNC: 3.6 MMOL/L (ref 3.4–5.3)
PROT SERPL-MCNC: 6.2 G/DL (ref 6.8–8.8)
RBC # BLD AUTO: 2.88 10E12/L (ref 3.8–5.2)
SODIUM SERPL-SCNC: 138 MMOL/L (ref 133–144)
WBC # BLD AUTO: 7.6 10E9/L (ref 4–11)

## 2020-07-02 PROCEDURE — 96361 HYDRATE IV INFUSION ADD-ON: CPT

## 2020-07-02 PROCEDURE — 83690 ASSAY OF LIPASE: CPT | Performed by: EMERGENCY MEDICINE

## 2020-07-02 PROCEDURE — 83735 ASSAY OF MAGNESIUM: CPT | Performed by: EMERGENCY MEDICINE

## 2020-07-02 PROCEDURE — 96375 TX/PRO/DX INJ NEW DRUG ADDON: CPT

## 2020-07-02 PROCEDURE — 96374 THER/PROPH/DIAG INJ IV PUSH: CPT

## 2020-07-02 PROCEDURE — 85025 COMPLETE CBC W/AUTO DIFF WBC: CPT | Performed by: EMERGENCY MEDICINE

## 2020-07-02 PROCEDURE — 96376 TX/PRO/DX INJ SAME DRUG ADON: CPT

## 2020-07-02 PROCEDURE — 80053 COMPREHEN METABOLIC PANEL: CPT | Performed by: EMERGENCY MEDICINE

## 2020-07-02 PROCEDURE — 25000128 H RX IP 250 OP 636: Performed by: EMERGENCY MEDICINE

## 2020-07-02 PROCEDURE — 25800030 ZZH RX IP 258 OP 636: Performed by: EMERGENCY MEDICINE

## 2020-07-02 PROCEDURE — 99285 EMERGENCY DEPT VISIT HI MDM: CPT | Mod: 25

## 2020-07-02 RX ORDER — ONDANSETRON 2 MG/ML
4 INJECTION INTRAMUSCULAR; INTRAVENOUS EVERY 30 MIN PRN
Status: COMPLETED | OUTPATIENT
Start: 2020-07-02 | End: 2020-07-02

## 2020-07-02 RX ORDER — ONDANSETRON 4 MG/1
4-8 TABLET, ORALLY DISINTEGRATING ORAL EVERY 8 HOURS PRN
Qty: 30 TABLET | Refills: 0 | Status: SHIPPED | OUTPATIENT
Start: 2020-07-02 | End: 2020-07-15

## 2020-07-02 RX ORDER — PROMETHAZINE HYDROCHLORIDE 25 MG/1
25 SUPPOSITORY RECTAL EVERY 6 HOURS PRN
Qty: 15 SUPPOSITORY | Refills: 0 | Status: SHIPPED | OUTPATIENT
Start: 2020-07-02 | End: 2020-07-15

## 2020-07-02 RX ORDER — SODIUM CHLORIDE 9 MG/ML
1000 INJECTION, SOLUTION INTRAVENOUS CONTINUOUS
Status: DISCONTINUED | OUTPATIENT
Start: 2020-07-02 | End: 2020-07-02 | Stop reason: HOSPADM

## 2020-07-02 RX ORDER — MORPHINE SULFATE 4 MG/ML
4 INJECTION, SOLUTION INTRAMUSCULAR; INTRAVENOUS
Status: DISCONTINUED | OUTPATIENT
Start: 2020-07-02 | End: 2020-07-02 | Stop reason: HOSPADM

## 2020-07-02 RX ADMIN — SODIUM CHLORIDE 1000 ML: 9 INJECTION, SOLUTION INTRAVENOUS at 11:59

## 2020-07-02 RX ADMIN — ONDANSETRON 4 MG: 2 INJECTION INTRAMUSCULAR; INTRAVENOUS at 14:07

## 2020-07-02 RX ADMIN — ONDANSETRON 4 MG: 2 INJECTION INTRAMUSCULAR; INTRAVENOUS at 12:34

## 2020-07-02 RX ADMIN — MORPHINE SULFATE 4 MG: 4 INJECTION INTRAVENOUS at 12:01

## 2020-07-02 RX ADMIN — ONDANSETRON 4 MG: 2 INJECTION INTRAMUSCULAR; INTRAVENOUS at 12:01

## 2020-07-02 RX ADMIN — MORPHINE SULFATE 4 MG: 4 INJECTION INTRAVENOUS at 13:12

## 2020-07-02 ASSESSMENT — ENCOUNTER SYMPTOMS
DIARRHEA: 1
ABDOMINAL PAIN: 1
SHORTNESS OF BREATH: 0
COUGH: 0
NAUSEA: 1
FEVER: 0

## 2020-07-02 ASSESSMENT — MIFFLIN-ST. JEOR: SCORE: 1726.05

## 2020-07-02 NOTE — DISCHARGE INSTRUCTIONS
*Get plenty of rest and avoid strenuous activities.  *Take medications as prescribed.   Zofran for nausea. Phenergan as directed a needed for nausea not relieved by zofran.   *Follow-up with your doctor for a recheck within 12-36 hours.  *Return to the ER if you develop fever, worsening pain, pain that moves to the right lower abdomen, faint or feel like you will faint or become worse in any way.      Discharge Instructions  Vomiting    You have been seen today for vomiting. This is usually caused by a virus, but some bacteria, parasites, medicines or other medical conditions can cause similar symptoms. At this time your doctor does not find that your vomiting is a sign of anything dangerous or life-threatening. However, sometimes the signs of serious illness do not show up right away. If you have new or worse symptoms, you may need to be seen again in the emergency department or by your primary doctor. Remember that serious problems like appendicitis can start as vomiting.     Return to the Emergency Department if:  You keep throwing up and you are not able to keep liquids down.   You feel you are getting dehydrated, such as being very thirsty, not urinating at least every 8-12 hours, or feeling faint or lightheaded.   You develop a new fever, or your fever continues for more than 2 days.   You have belly pain that seems worse than cramps, is in one spot, or is getting worse over time.   You have blood in your vomit or stools.   You feel very weak.  You are not starting to improve within 24 hours of your visit here.     What can I do to help myself?  The most important thing to do is to drink clear liquids. If you have been vomiting a lot, it is best to have only small, frequent sips of liquids. Drinking too much at once may cause more vomiting. If you are vomiting often, you must replace minerals, sodium and potassium lost with your illness. Pedialyte  and sports drinks can help you replace these minerals. You can  also drink clear liquids such as water, weak tea, apple juice, and 7-Up . Avoid acid liquids (orange), caffeine (coffee) or alcohol. Do not drink milk until you no longer have diarrhea.   After liquids are staying down, you may start eating mild foods. Soda crackers, toast, plain noodles, gelatin, applesauce and bananas are good first choices. Avoid foods that have acid, are spicy, fatty or have a lot of fiber (such as meats, coarse grains, vegetables). You may start eating these foods again in about 3 days when you are better.   Sometimes treatment includes prescription medicine to prevent nausea and vomiting. If your doctor prescribes these for you, take them as directed.   Don t take ibuprofen, or other nonsteroidal anti-inflammatory medicines without checking with your healthcare provider.   If you were given a prescription for medicine here today, be sure to read all of the information (including the package insert) that comes with your prescription.  This will include important information about the medicine, its side effects, and any warnings that you need to know about.  The pharmacist who fills the prescription can provide more information and answer questions you may have about the medicine.  If you have questions or concerns that the pharmacist cannot address, please call or return to the Emergency Department.       Opioid Medication Information    Pain medications are among the most commonly prescribed medicines, so we are including this information for all our patients. If you did not receive pain medication or get a prescription for pain medicine, you can ignore it.     You may have been given a prescription for an opioid (narcotic) pain medicine and/or have received a pain medicine while here in the Emergency Department. These medicines can make you drowsy or impaired. You must not drive, operate dangerous equipment, or engage in any other dangerous activities while taking these medications. If you  drive while taking these medications, you could be arrested for DUI, or driving under the influence. Do not drink any alcohol while you are taking these medications.     Opioid pain medications can cause addiction. If you have a history of chemical dependency of any type, you are at a higher risk of becoming addicted to pain medications.  Only take these prescribed medications to treat your pain when all other options have been tried. Take it for as short a time and as few doses as possible. Store your pain pills in a secure place, as they are frequently stolen and provide a dangerous opportunity for children or visitors in your house to start abusing these powerful medications. We will not replace any lost or stolen medicine.  As soon as your pain is better, you should flush all your remaining medication.     Many prescription pain medications contain Tylenol  (acetaminophen), including Vicodin , Tylenol #3 , Norco , Lortab , and Percocet .  You should not take any extra pills of Tylenol  if you are using these prescription medications or you can get very sick.  Do not ever take more than 3000 mg of acetaminophen in any 24 hour period.    All opioids tend to cause constipation. Drink plenty of water and eat foods that have a lot of fiber, such as fruits, vegetables, prune juice, apple juice and high fiber cereal.  Take a laxative if you don t move your bowels at least every other day. Miralax , Milk of Magnesia, Colace , or Senna  can be used to keep you regular.      Remember that you can always come back to the Emergency Department if you are not able to see your regular doctor in the amount of time listed above, if you get any new symptoms, or if there is anything that worries you.

## 2020-07-02 NOTE — ED PROVIDER NOTES
History   Chief Complaint:  Abdominal Pain     HPI   Pat Delgado is a 29 year old female history of hypertension, alcoholic hepatitis, and chemical dependence status post cholecystectomy on 06/23 with Dr. Reynoso who presents with abdominal pain. The patient had a cholecystectomy on 06/23 and was discharged two days later. Since that time, she has had pain from the right upper quadrant down to the mid abdomen as well as nausea and diarrhea. This pain has been relatively constant since discharge. She was seen for similar symptoms on 06/29 where a CT Abdomen and laboratory work was done as noted below, and she was discharged with a short course of Oxycodone. She has not had recent fevers, cough, shortness of breath, or other COVID like symptoms. She has not contracted Dr. Reynoso since her last ED visit.  She does report that she has some prescriptions including a prescription for an antiemetic at home but she has not filled this.    CT Abd/pelvis with contrast 06/29/2020:  1.  Six days status post cholecystectomy. There is fluid and a small   amount of air in the gallbladder fossa as would be expected   postoperatively. No formed abscess seen.   2.  Mild cardiomegaly more prominent than on the prior CT scan.   3.  Significant increase in splenic size/splenomegaly.   4.  Marked improvement in fatty infiltration of the liver.   5.  Prominent subcutaneous edema in the abdomen and pelvis suspicious   for anasarca.   6.  Fairly stable mild ascites.     CBC: HGB 7.7 (L), o/w WNL (WBC 7.1, )  CMP: BUN 3 (L), Creatinine 0.49 (L), Bilirubin 2.7 (H), Albumin 2.4 (L), Protein Total 6.3 (L), o/w WNL  Lipase: 126     Allergies:  Amoxicillin  Penicillins    Medications:   Pepcid   Gabapentin   Midodrine  Effexor   Adderall    Past Medical History:    ADHD  Alcohol dependence   anxiety   Chemical dependence   Depressive disorder   Hypertension  Migraine headache   Tobacco abuse   chololithiasis   Alcoholic hepatitis  "    Past Surgical History:    Tooth extraction   Tunnel placement and removal   PIIC exchange  Cholecystectomy      Family History:    Mother: anxiety disorder     Social History:  Smoking Status: former smoker  Smokeless Tobacco: current user  Alcohol Use: Yes  Drug Use: Yes, marijuana  PCP: [facility], [facility]     Review of Systems   Constitutional: Negative for fever.   Respiratory: Negative for cough and shortness of breath.    Gastrointestinal: Positive for abdominal pain, diarrhea and nausea.   All other systems reviewed and are negative.    Physical Exam     Patient Vitals for the past 24 hrs:   BP Temp Temp src Pulse Heart Rate Resp SpO2 Height Weight   07/02/20 1330 102/63 -- -- 82 -- -- 96 % -- --   07/02/20 1300 99/67 -- -- 131 -- -- 97 % -- --   07/02/20 1230 111/58 -- -- 87 -- -- 97 % -- --   07/02/20 1200 104/61 -- -- -- -- -- 98 % -- --   07/02/20 1145 127/71 -- -- 101 -- -- -- -- --   07/02/20 1135 123/51 98.7  F (37.1  C) Oral -- 101 20 97 % 1.727 m (5' 8\") 95.3 kg (210 lb)     Physical Exam  General: Well-nourished, pain with movement  Eyes: PERRL, conjunctivae pale  ENT:  Moist mucus membranes, posterior oropharynx clear without erythema or exudates  Respiratory:  Lungs clear to auscultation bilaterally, no crackles/rubs/wheezes.  Good air movement  CV: Normal rate and rhythm, no murmurs/rubs/gallops  GI:  Abdomen soft and protuberant.  Mild small serous drainage from the umbilicus that is unchanged from previously.  No signs of redness, warmth or cellulitis.  No bulla or crepitus.  Normoactive BS.  Mildly tender in the right upper quadrant.  No generalized tenderness, guarding or rebound  Skin: Warm, dry.  Incisions are clean and intact without signs of cellulitis   Musculoskeletal: No peripheral edema or calf tenderness  Neuro: Alert and oriented to person/place/time  Psychiatric: Normal affect      Emergency Department Course   Laboratory:  Laboratory findings were communicated " with the patient who voiced understanding of the findings.    CBC: WBC 7.6, HGB 8.0(L),   CMP: BUN 3(L), Creatinine 0.43(L), bilirubin 2.6(H), albumin 2.5(L), protein total 6.2(L) o/w WNL  Lipase: 143  Magnesium: 1.8    Interventions:  1159 NS 1000 mL IV  1201 Zofran 4 mg IV  1201 Morphine 4 mg IV  1234 Zofran 4 mg IV  1312 Morphine 4 mg IV    Emergency Department Course:  Nursing notes and vitals reviewed.    1147 I performed an exam of the patient as documented above.     IV was inserted and blood was drawn for laboratory testing, results above.     1350 I spoke with Dr. Carter of the General Surgery service from [facility] regarding patient's presentation, findings, and plan of care.     1400 I rechecked the patient. Explained findings to the Patient.    Findings and plan explained to the Patient. Patient discharged home with instructions regarding supportive care, medications, and reasons to return. The importance of close follow-up was reviewed. The patient was prescribed Zofran and Phenergan.      Impression & Plan    Medical Decision Making:  Pat Delgado is a very pleasant 29 year old female who presents to the emergency department today for evaluation of recurrent abdominal pain in the setting of new nausea and vomiting with some mild diarrhea.  I saw her for recurrent postoperative abdominal pain 3 days ago.  Today she actually appears to have less tenderness on examination and no signs of infection at the operative sites.  Laboratory studies were stable.  I spoke with Dr. Carter, of general surgery, who had seen the patient 3 days ago as well in the emergency department.  He came to the ED and evaluated the patient.  He did not recommend any repeat imaging as her CT scan several days ago was reassuring.  He recommended discharge with antiemetics including a backup antiemetic but no new prescription for opiate pain medications.  The patient was comfortable with this plan.  She did not wish  for another liter of fluids given that she had developed significant edema on her hospitalization.  She was given his prescriptions for Zofran and rectal Phenergan for breakthrough nausea.  She was able to tolerate ice chips.  She and her mom were comfortable with the plan for discharge home.  They are asked to return should she develop a fever or become worse in any way.  With reasonable clinical confidence, I believe she safe for discharge home at this time.    Diagnosis:    ICD-10-CM    1. Intractable vomiting with nausea, unspecified vomiting type  R11.2    2. Postoperative abdominal pain  R10.9     G89.18        Disposition:   discharged to home    Discharge Medications:  New Prescriptions    ONDANSETRON (ZOFRAN ODT) 4 MG ODT TAB    Take 1-2 tablets (4-8 mg) by mouth every 8 hours as needed for nausea or vomiting    PROMETHAZINE (PHENERGAN) 25 MG SUPPOSITORY    Place 1 suppository (25 mg) rectally every 6 hours as needed for nausea       Scribe Disclosure:  I, Ame Pearson, am serving as a scribe at 11:41 AM on 7/2/2020 to document services personally performed by Isadora Escobar MD based on my observations and the provider's statements to me.   [facility] EMERGENCY DEPARTMENT       Isadora Escobar MD  07/02/20 2018

## 2020-07-02 NOTE — ED AVS SNAPSHOT
Emergency Department  [address] [facility] [address]  Phone:  [phone]  Fax:  [phone]                                    Pat Delgado   MRN: [id number]    Department:   Emergency Department   Date of Visit:  7/2/2020           After Visit Summary Signature Page    I have received my discharge instructions, and my questions have been answered. I have discussed any challenges I see with this plan with the nurse or doctor.    ..........................................................................................................................................  Patient/Patient Representative Signature      ..........................................................................................................................................  Patient Representative Print Name and Relationship to Patient    ..................................................               ................................................  Date                                   Time    ..........................................................................................................................................  Reviewed by Signature/Title    ...................................................              ..............................................  Date                                               Time          22EPIC Rev 08/18

## 2020-07-02 NOTE — ED NOTES
Bed: ED01  Expected date: 7/2/20  Expected time: 11:33 AM  Means of arrival:   Comments:  Triage

## 2020-07-08 ENCOUNTER — TELEPHONE (OUTPATIENT)
Facility: CLINIC | Age: 30
End: 2020-07-08

## 2020-07-08 NOTE — TELEPHONE ENCOUNTER
[facility]  Post op call note  July 8, 2020       Pat Delgado was called for an update regarding her recovery.  There was no answer and a message was left encouraging her to call us back with any questions, concerns, or to provide an update.        Ada Otero PA-C  [facility]  [phone]

## 2020-07-12 ENCOUNTER — HOSPITAL ENCOUNTER (EMERGENCY)
Facility: CLINIC | Age: 30
Discharge: HOME | End: 2020-07-12
Attending: EMERGENCY MEDICINE | Admitting: EMERGENCY MEDICINE
Payer: COMMERCIAL

## 2020-07-12 VITALS
HEIGHT: 68 IN | HEART RATE: 85 BPM | WEIGHT: 210 LBS | TEMPERATURE: 96.9 F | DIASTOLIC BLOOD PRESSURE: 68 MMHG | RESPIRATION RATE: 16 BRPM | OXYGEN SATURATION: 92 % | SYSTOLIC BLOOD PRESSURE: 108 MMHG | BODY MASS INDEX: 31.83 KG/M2

## 2020-07-12 DIAGNOSIS — R19.7 VOMITING AND DIARRHEA: ICD-10-CM

## 2020-07-12 DIAGNOSIS — R11.10 VOMITING AND DIARRHEA: ICD-10-CM

## 2020-07-12 LAB
ALBUMIN SERPL-MCNC: 2.7 G/DL (ref 3.4–5)
ALP SERPL-CCNC: 72 U/L (ref 40–150)
ALT SERPL W P-5'-P-CCNC: 14 U/L (ref 0–50)
ANION GAP SERPL CALCULATED.3IONS-SCNC: 8 MMOL/L (ref 3–14)
AST SERPL W P-5'-P-CCNC: 31 U/L (ref 0–45)
BASOPHILS # BLD AUTO: 0 10E9/L (ref 0–0.2)
BASOPHILS NFR BLD AUTO: 0.4 %
BILIRUB SERPL-MCNC: 2.4 MG/DL (ref 0.2–1.3)
BUN SERPL-MCNC: 3 MG/DL (ref 7–30)
CALCIUM SERPL-MCNC: 9.1 MG/DL (ref 8.5–10.1)
CHLORIDE SERPL-SCNC: 108 MMOL/L (ref 94–109)
CO2 SERPL-SCNC: 22 MMOL/L (ref 20–32)
CREAT SERPL-MCNC: 0.47 MG/DL (ref 0.52–1.04)
DIFFERENTIAL METHOD BLD: ABNORMAL
EOSINOPHIL # BLD AUTO: 0.2 10E9/L (ref 0–0.7)
EOSINOPHIL NFR BLD AUTO: 2.4 %
ERYTHROCYTE [DISTWIDTH] IN BLOOD BY AUTOMATED COUNT: 15.9 % (ref 10–15)
GFR SERPL CREATININE-BSD FRML MDRD: >90 ML/MIN/{1.73_M2}
GLUCOSE SERPL-MCNC: 104 MG/DL (ref 70–99)
HCT VFR BLD AUTO: 27.5 % (ref 35–47)
HGB BLD-MCNC: 9 G/DL (ref 11.7–15.7)
IMM GRANULOCYTES # BLD: 0 10E9/L (ref 0–0.4)
IMM GRANULOCYTES NFR BLD: 0.1 %
LIPASE SERPL-CCNC: 137 U/L (ref 73–393)
LYMPHOCYTES # BLD AUTO: 1.4 10E9/L (ref 0.8–5.3)
LYMPHOCYTES NFR BLD AUTO: 20.6 %
MCH RBC QN AUTO: 27.4 PG (ref 26.5–33)
MCHC RBC AUTO-ENTMCNC: 32.7 G/DL (ref 31.5–36.5)
MCV RBC AUTO: 84 FL (ref 78–100)
MONOCYTES # BLD AUTO: 0.5 10E9/L (ref 0–1.3)
MONOCYTES NFR BLD AUTO: 6.7 %
NEUTROPHILS # BLD AUTO: 4.9 10E9/L (ref 1.6–8.3)
NEUTROPHILS NFR BLD AUTO: 69.8 %
NRBC # BLD AUTO: 0 10*3/UL
NRBC BLD AUTO-RTO: 0 /100
PLATELET # BLD AUTO: 295 10E9/L (ref 150–450)
POTASSIUM SERPL-SCNC: 3.6 MMOL/L (ref 3.4–5.3)
PROT SERPL-MCNC: 7 G/DL (ref 6.8–8.8)
RBC # BLD AUTO: 3.29 10E12/L (ref 3.8–5.2)
SODIUM SERPL-SCNC: 138 MMOL/L (ref 133–144)
WBC # BLD AUTO: 7 10E9/L (ref 4–11)

## 2020-07-12 PROCEDURE — 80053 COMPREHEN METABOLIC PANEL: CPT | Performed by: EMERGENCY MEDICINE

## 2020-07-12 PROCEDURE — 99285 EMERGENCY DEPT VISIT HI MDM: CPT | Mod: 25

## 2020-07-12 PROCEDURE — 83690 ASSAY OF LIPASE: CPT | Performed by: EMERGENCY MEDICINE

## 2020-07-12 PROCEDURE — 25000128 H RX IP 250 OP 636: Performed by: EMERGENCY MEDICINE

## 2020-07-12 PROCEDURE — 25800030 ZZH RX IP 258 OP 636: Performed by: EMERGENCY MEDICINE

## 2020-07-12 PROCEDURE — 85025 COMPLETE CBC W/AUTO DIFF WBC: CPT | Performed by: EMERGENCY MEDICINE

## 2020-07-12 PROCEDURE — 96374 THER/PROPH/DIAG INJ IV PUSH: CPT

## 2020-07-12 PROCEDURE — 96375 TX/PRO/DX INJ NEW DRUG ADDON: CPT

## 2020-07-12 PROCEDURE — 96361 HYDRATE IV INFUSION ADD-ON: CPT

## 2020-07-12 RX ORDER — PROCHLORPERAZINE MALEATE 10 MG
10 TABLET ORAL EVERY 6 HOURS PRN
Qty: 30 TABLET | Refills: 0 | Status: SHIPPED | OUTPATIENT
Start: 2020-07-12 | End: 2020-07-13

## 2020-07-12 RX ORDER — ONDANSETRON 2 MG/ML
4 INJECTION INTRAMUSCULAR; INTRAVENOUS
Status: COMPLETED | OUTPATIENT
Start: 2020-07-12 | End: 2020-07-12

## 2020-07-12 RX ORDER — HYDROMORPHONE HYDROCHLORIDE 1 MG/ML
0.5 INJECTION, SOLUTION INTRAMUSCULAR; INTRAVENOUS; SUBCUTANEOUS
Status: DISCONTINUED | OUTPATIENT
Start: 2020-07-12 | End: 2020-07-12 | Stop reason: HOSPADM

## 2020-07-12 RX ORDER — SODIUM CHLORIDE 9 MG/ML
INJECTION, SOLUTION INTRAVENOUS CONTINUOUS
Status: DISCONTINUED | OUTPATIENT
Start: 2020-07-12 | End: 2020-07-12 | Stop reason: HOSPADM

## 2020-07-12 RX ORDER — DIPHENHYDRAMINE HYDROCHLORIDE 50 MG/ML
25 INJECTION INTRAMUSCULAR; INTRAVENOUS ONCE
Status: COMPLETED | OUTPATIENT
Start: 2020-07-12 | End: 2020-07-12

## 2020-07-12 RX ORDER — METOCLOPRAMIDE HYDROCHLORIDE 5 MG/ML
10 INJECTION INTRAMUSCULAR; INTRAVENOUS ONCE
Status: COMPLETED | OUTPATIENT
Start: 2020-07-12 | End: 2020-07-12

## 2020-07-12 RX ADMIN — SODIUM CHLORIDE 1000 ML: 9 INJECTION, SOLUTION INTRAVENOUS at 03:53

## 2020-07-12 RX ADMIN — ONDANSETRON 4 MG: 2 INJECTION INTRAMUSCULAR; INTRAVENOUS at 03:40

## 2020-07-12 RX ADMIN — DIPHENHYDRAMINE HYDROCHLORIDE 25 MG: 50 INJECTION, SOLUTION INTRAMUSCULAR; INTRAVENOUS at 04:07

## 2020-07-12 RX ADMIN — HYDROMORPHONE HYDROCHLORIDE 0.5 MG: 1 INJECTION, SOLUTION INTRAMUSCULAR; INTRAVENOUS; SUBCUTANEOUS at 03:40

## 2020-07-12 RX ADMIN — METOCLOPRAMIDE 10 MG: 5 INJECTION, SOLUTION INTRAMUSCULAR; INTRAVENOUS at 04:07

## 2020-07-12 ASSESSMENT — MIFFLIN-ST. JEOR: SCORE: 1726.05

## 2020-07-12 NOTE — ED TRIAGE NOTES
Patient states ate french fries yesterday.  2 episodes of diarrhea.  Nausea, vomiting.  Recent surgery & hx of liver problems.  Took Zofran earlier.

## 2020-07-12 NOTE — ED PROVIDER NOTES
History     Chief Complaint:  Abdominal Pain and Nausea, Vomiting, & Diarrhea      The history is provided by the patient.      Pat Delgado is a 29 year old female status post laparoscopic cholecystectomy with a history of alcoholic hepatitis, substance abuse, acute kidney failure, and hypertension who presents for evaluation of abdominal pain, nausea, vomiting, and diarrhea. The patient had a laparoscopic cholecystectomy on 6/10/2020 and has been seen in the emergency department three times since then for post operative pain, nausea, and vomiting.    Allergies:  Amoxicillin  Penicillins    Medications:    Famotidine   Gabapentin   Melatonin   Metoclopramide   Multiple Vitamin  Oxycodone   Prochlorperazine   Promethazine   Rifaximin   Venlafaxine     Past Medical History:    Acute kidney failure with tubular necrosis   ADHD  Alcohol dependence  Alcoholic hepatitis   Anxiety  Chemical dependency  Cholelithiasis   Depression  Hypertension  Migraine headache  Substance abuse  Tobacco abuse    Past Surgical History:    CVC tunnel placement  CVC tunnel removal  Feeding tube placement w/ fluoro/md  Laparoscopic cholecystectomy   PICC exchange right  Tooth extraction with forcep    Family History:    Anxiety  Cataract   Dementia  Diabetes   Hypertension   Substance abuse    Social History:  Marital Status:  Single [1]  PCP: [facility]  Positive for tobacco use. Vaping device  Positive for smokeless tobacco use.  Positive for alcohol use. 750 mL whiskey/day  Positive for drug use. Marijuana     Review of Systems  Pertinent positives and negatives as above.  A 14-point review of systems was performed with all other systems reviewed as negative.    Physical Exam     Patient Vitals for the past 24 hrs:   BP Temp Temp src Pulse Resp SpO2 Height Weight   07/12/20 0515 108/68 -- -- 85 -- 92 % -- --   07/12/20 0500 113/69 -- -- 87 -- 92 % -- --   07/12/20 0445 113/72 -- -- 88 -- 92 % -- --  "  07/12/20 0430 115/72 -- -- 86 -- 92 % -- --   07/12/20 0415 121/70 -- -- 89 -- 95 % -- --   07/12/20 0400 -- -- -- 83 -- 94 % -- --   07/12/20 0345 129/84 -- -- 90 -- 98 % -- --   07/12/20 0330 (!) 125/97 -- -- 77 -- 98 % -- --   07/12/20 0323 125/76 96.9  F (36.1  C) Temporal 81 16 97 % 1.727 m (5' 8\") 95.3 kg (210 lb)       Physical Exam  Eye:  Pupils are equal, round, and reactive.  Extraocular movements intact.    ENT:  No rhinorrhea.  Moist mucus membranes.  Normal tongue and tonsil.    Cardiac:  Regular rate and rhythm.  No murmurs, gallops, or rubs.    Pulmonary:  Clear to auscultation bilaterally.  No wheezes, rales, or rhonchi.    Abdomen:  Laparoscopic incisions are healing well without evidence for infection. Diffuse abdominal pain, maximal in the right upper quadrant though improving per patient.    Musculoskeletal:  Normal movement of all extremities without evidence for deficit.    Skin:  Warm and dry without rashes.    Neurologic:  Non-focal exam without asymmetric weakness or numbness.     Psychiatric:  Normal affect with appropriate interaction with examiner.    Emergency Department Course     Laboratory:  Laboratory findings were communicated with the patient who voiced understanding of the findings.    CBC: WBC: 7.0, HGB: 9.0(L), PLT: 295  CMP: Glucose 104(H), BUN: 3(L), Bilirubin: 2.4(H), Albumin: 2.7(L), o/w WNL (Creatinine: 0.47(L))    Lipase: 137     Procedures:  None    Interventions:  0340 Dilaudid, 0.5 mg, IV  0340 Zofran, 4 mg, IV  0353 0.9% sodium chloride bolus, 1,000 ml, IV   0407 Reglan, 10 mg, IV  0407 Benadryl, 25 mg, IV    Emergency Department Course:  Past medical records, nursing notes, and vitals reviewed.    0320 I performed an exam of the patient as documented above.     IV was inserted and blood was drawn for laboratory testing, results above.    0600 I rechecked the patient and discussed the results of her workup thus far.     Findings and plan explained to the Patient. " Patient discharged home with instructions regarding supportive care, medications, and reasons to return. The importance of close follow-up was reviewed. The patient was prescribed compazine.    I personally reviewed the laboratory results with the Patient and answered all related questions prior to discharge.     Impression & Plan     Medical Decision Making:  This unfortunate young woman returns to the emergency department with ongoing issues of intermittent vomiting and diarrhea.  I invite the reader to review her extensive chart where this is been an issue over the last several months.  No clear cause of the spells of vomiting and diarrhea have been found, though it has been speculated that it may be due to prior alcohol use (she is now 5 months sober) or that it may be from marijuana abuse.  She recently underwent a laparoscopic cholecystectomy as this was thought to possibly be the source of her symptoms, though post surgery, she continues to have issues.  She has had 2 or 3 visits to our emergency department since surgery for postoperative pain.  She notes that she feels as though her abdomen is improving appropriately postsurgically, and that she was feeling much better yesterday before the symptoms again returned.    On exam, she is actively retching.  Her abdomen is otherwise soft and benign.  Her surgical incisions are healing well.  There is no focal tenderness to the abdomen that would have me greatly concerned that this is a postoperative complication, especially considering she is already undergone imaging post surgery which did not show any sign of significant concern.  Laboratory investigation is reviewed and is unremarkable.  She feels improved after small dose of pain medication and 2 different rounds of antiemetics.  The Reglan and Benadryl seemed to help more than the Zofran.    On my reassessment, the patient feels improved and would like to be discharged.  I do feel this is reasonable.  I  understand her frustration that we cannot determine the exact cause of why she keeps having spells of vomiting and diarrhea.  She notes that she has appointments scheduled with her gastroenterologist in the near future and I advised her to continue to work through her GI physician and primary physician to figure this out, whether it represents gastroparesis or other motility issue.  Otherwise, I do not feel she requires admission or further imaging at this point and I feel she is safe for discharge home.  She was advised to return to us immediately for any worsening of her condition or other emergent concerns.      Diagnosis:    ICD-10-CM    1. Vomiting and diarrhea  R11.10     R19.7        Disposition:  Discharged to home.    Discharge Medications:  New Prescriptions    PROCHLORPERAZINE (COMPAZINE) 10 MG TABLET    Take 1 tablet (10 mg) by mouth every 6 hours as needed for nausea or vomiting       Scribe Disclosure:  Zayda OLIVIA, am serving as a scribe at 3:20 AM on 7/12/2020 to document services personally performed by Trierweiler, Chad A, MD based on my observations and the provider's statements to me.      EMERGENCY DEPARTMENT       Trierweiler, Chad A, MD  07/12/20 2086

## 2020-07-12 NOTE — ED AVS SNAPSHOT
Emergency Department  [address] [facility] [address]  Phone:  [phone]  Fax:  [phone]                                    Pat Delgado   MRN: [id number]    Department:   Emergency Department   Date of Visit:  7/12/2020           After Visit Summary Signature Page    I have received my discharge instructions, and my questions have been answered. I have discussed any challenges I see with this plan with the nurse or doctor.    ..........................................................................................................................................  Patient/Patient Representative Signature      ..........................................................................................................................................  Patient Representative Print Name and Relationship to Patient    ..................................................               ................................................  Date                                   Time    ..........................................................................................................................................  Reviewed by Signature/Title    ...................................................              ..............................................  Date                                               Time          22EPIC Rev 08/18

## 2020-07-13 ENCOUNTER — HOSPITAL ENCOUNTER (EMERGENCY)
Facility: CLINIC | Age: 30
Discharge: HOME | End: 2020-07-13
Attending: EMERGENCY MEDICINE | Admitting: EMERGENCY MEDICINE
Payer: COMMERCIAL

## 2020-07-13 VITALS
OXYGEN SATURATION: 98 % | TEMPERATURE: 97.9 F | WEIGHT: 190 LBS | HEART RATE: 77 BPM | SYSTOLIC BLOOD PRESSURE: 127 MMHG | BODY MASS INDEX: 28.89 KG/M2 | RESPIRATION RATE: 24 BRPM | DIASTOLIC BLOOD PRESSURE: 71 MMHG

## 2020-07-13 DIAGNOSIS — R11.2 NON-INTRACTABLE VOMITING WITH NAUSEA, UNSPECIFIED VOMITING TYPE: ICD-10-CM

## 2020-07-13 LAB
ALBUMIN SERPL-MCNC: 2.8 G/DL (ref 3.4–5)
ALP SERPL-CCNC: 74 U/L (ref 40–150)
ALT SERPL W P-5'-P-CCNC: 14 U/L (ref 0–50)
ANION GAP SERPL CALCULATED.3IONS-SCNC: 7 MMOL/L (ref 3–14)
ANISOCYTOSIS BLD QL SMEAR: SLIGHT
AST SERPL W P-5'-P-CCNC: 25 U/L (ref 0–45)
BASOPHILS # BLD AUTO: 0 10E9/L (ref 0–0.2)
BASOPHILS NFR BLD AUTO: 0.3 %
BILIRUB SERPL-MCNC: 2.2 MG/DL (ref 0.2–1.3)
BUN SERPL-MCNC: 6 MG/DL (ref 7–30)
CALCIUM SERPL-MCNC: 9.3 MG/DL (ref 8.5–10.1)
CHLORIDE SERPL-SCNC: 107 MMOL/L (ref 94–109)
CO2 SERPL-SCNC: 26 MMOL/L (ref 20–32)
CREAT SERPL-MCNC: 0.56 MG/DL (ref 0.52–1.04)
DIFFERENTIAL METHOD BLD: ABNORMAL
EOSINOPHIL # BLD AUTO: 0 10E9/L (ref 0–0.7)
EOSINOPHIL NFR BLD AUTO: 0.3 %
ERYTHROCYTE [DISTWIDTH] IN BLOOD BY AUTOMATED COUNT: 16 % (ref 10–15)
GFR SERPL CREATININE-BSD FRML MDRD: >90 ML/MIN/{1.73_M2}
GLUCOSE SERPL-MCNC: 104 MG/DL (ref 70–99)
HCG SERPL QL: NEGATIVE
HCT VFR BLD AUTO: 25.8 % (ref 35–47)
HGB BLD-MCNC: 8.2 G/DL (ref 11.7–15.7)
IMM GRANULOCYTES # BLD: 0 10E9/L (ref 0–0.4)
IMM GRANULOCYTES NFR BLD: 0.3 %
LYMPHOCYTES # BLD AUTO: 1.7 10E9/L (ref 0.8–5.3)
LYMPHOCYTES NFR BLD AUTO: 22.7 %
MCH RBC QN AUTO: 26.9 PG (ref 26.5–33)
MCHC RBC AUTO-ENTMCNC: 31.8 G/DL (ref 31.5–36.5)
MCV RBC AUTO: 85 FL (ref 78–100)
MONOCYTES # BLD AUTO: 0.5 10E9/L (ref 0–1.3)
MONOCYTES NFR BLD AUTO: 6.5 %
NEUTROPHILS # BLD AUTO: 5.1 10E9/L (ref 1.6–8.3)
NEUTROPHILS NFR BLD AUTO: 69.9 %
NRBC # BLD AUTO: 0 10*3/UL
NRBC BLD AUTO-RTO: 0 /100
PLATELET # BLD AUTO: 271 10E9/L (ref 150–450)
PLATELET # BLD EST: ABNORMAL 10*3/UL
POTASSIUM SERPL-SCNC: 3.6 MMOL/L (ref 3.4–5.3)
PROT SERPL-MCNC: 7.1 G/DL (ref 6.8–8.8)
RBC # BLD AUTO: 3.05 10E12/L (ref 3.8–5.2)
SODIUM SERPL-SCNC: 140 MMOL/L (ref 133–144)
WBC # BLD AUTO: 7.3 10E9/L (ref 4–11)

## 2020-07-13 PROCEDURE — 96374 THER/PROPH/DIAG INJ IV PUSH: CPT

## 2020-07-13 PROCEDURE — 80053 COMPREHEN METABOLIC PANEL: CPT | Performed by: EMERGENCY MEDICINE

## 2020-07-13 PROCEDURE — 84703 CHORIONIC GONADOTROPIN ASSAY: CPT | Performed by: EMERGENCY MEDICINE

## 2020-07-13 PROCEDURE — C9113 INJ PANTOPRAZOLE SODIUM, VIA: HCPCS | Performed by: EMERGENCY MEDICINE

## 2020-07-13 PROCEDURE — 96361 HYDRATE IV INFUSION ADD-ON: CPT

## 2020-07-13 PROCEDURE — 96376 TX/PRO/DX INJ SAME DRUG ADON: CPT

## 2020-07-13 PROCEDURE — 99285 EMERGENCY DEPT VISIT HI MDM: CPT | Mod: 25

## 2020-07-13 PROCEDURE — 85025 COMPLETE CBC W/AUTO DIFF WBC: CPT | Performed by: EMERGENCY MEDICINE

## 2020-07-13 PROCEDURE — 25800030 ZZH RX IP 258 OP 636: Performed by: EMERGENCY MEDICINE

## 2020-07-13 PROCEDURE — 25000128 H RX IP 250 OP 636

## 2020-07-13 PROCEDURE — 25000128 H RX IP 250 OP 636: Performed by: EMERGENCY MEDICINE

## 2020-07-13 PROCEDURE — 96375 TX/PRO/DX INJ NEW DRUG ADDON: CPT

## 2020-07-13 RX ORDER — ONDANSETRON 2 MG/ML
INJECTION INTRAMUSCULAR; INTRAVENOUS
Status: COMPLETED
Start: 2020-07-13 | End: 2020-07-13

## 2020-07-13 RX ORDER — ONDANSETRON 2 MG/ML
4 INJECTION INTRAMUSCULAR; INTRAVENOUS EVERY 30 MIN PRN
Status: DISCONTINUED | OUTPATIENT
Start: 2020-07-13 | End: 2020-07-13 | Stop reason: HOSPADM

## 2020-07-13 RX ORDER — METOCLOPRAMIDE HYDROCHLORIDE 5 MG/ML
10 INJECTION INTRAMUSCULAR; INTRAVENOUS ONCE
Status: COMPLETED | OUTPATIENT
Start: 2020-07-13 | End: 2020-07-13

## 2020-07-13 RX ORDER — LORAZEPAM 2 MG/ML
0.5 INJECTION INTRAMUSCULAR ONCE
Status: COMPLETED | OUTPATIENT
Start: 2020-07-13 | End: 2020-07-13

## 2020-07-13 RX ORDER — ONDANSETRON 2 MG/ML
4 INJECTION INTRAMUSCULAR; INTRAVENOUS ONCE
Status: DISCONTINUED | OUTPATIENT
Start: 2020-07-13 | End: 2020-07-13 | Stop reason: HOSPADM

## 2020-07-13 RX ORDER — PROCHLORPERAZINE MALEATE 10 MG
10 TABLET ORAL EVERY 6 HOURS PRN
Qty: 15 TABLET | Refills: 0 | Status: ON HOLD | OUTPATIENT
Start: 2020-07-13 | End: 2020-07-17

## 2020-07-13 RX ORDER — DIPHENHYDRAMINE HYDROCHLORIDE 50 MG/ML
25 INJECTION INTRAMUSCULAR; INTRAVENOUS ONCE
Status: COMPLETED | OUTPATIENT
Start: 2020-07-13 | End: 2020-07-13

## 2020-07-13 RX ADMIN — SODIUM CHLORIDE 1000 ML: 9 INJECTION, SOLUTION INTRAVENOUS at 15:54

## 2020-07-13 RX ADMIN — METOCLOPRAMIDE 10 MG: 5 INJECTION, SOLUTION INTRAMUSCULAR; INTRAVENOUS at 15:54

## 2020-07-13 RX ADMIN — LORAZEPAM 0.5 MG: 2 INJECTION INTRAMUSCULAR; INTRAVENOUS at 17:27

## 2020-07-13 RX ADMIN — ONDANSETRON 4 MG: 2 INJECTION INTRAMUSCULAR; INTRAVENOUS at 16:29

## 2020-07-13 RX ADMIN — DIPHENHYDRAMINE HYDROCHLORIDE 25 MG: 50 INJECTION, SOLUTION INTRAMUSCULAR; INTRAVENOUS at 15:54

## 2020-07-13 RX ADMIN — PANTOPRAZOLE SODIUM 40 MG: 40 INJECTION, POWDER, FOR SOLUTION INTRAVENOUS at 17:27

## 2020-07-13 RX ADMIN — ONDANSETRON 4 MG: 2 INJECTION INTRAMUSCULAR; INTRAVENOUS at 17:27

## 2020-07-13 ASSESSMENT — ENCOUNTER SYMPTOMS
NAUSEA: 1
CONSTIPATION: 0
VOMITING: 1
SORE THROAT: 0
SHORTNESS OF BREATH: 0
COUGH: 0
HEADACHES: 0
DIARRHEA: 1
HEMATURIA: 0
FEVER: 0
CHILLS: 0
BLOOD IN STOOL: 0
ABDOMINAL PAIN: 1

## 2020-07-13 NOTE — ED AVS SNAPSHOT
Emergency Department  [address] [facility] [address]  Phone:  [phone]  Fax:  [phone]                                    Pat Delgado   MRN: [id number]    Department:   Emergency Department   Date of Visit:  7/13/2020           After Visit Summary Signature Page    I have received my discharge instructions, and my questions have been answered. I have discussed any challenges I see with this plan with the nurse or doctor.    ..........................................................................................................................................  Patient/Patient Representative Signature      ..........................................................................................................................................  Patient Representative Print Name and Relationship to Patient    ..................................................               ................................................  Date                                   Time    ..........................................................................................................................................  Reviewed by Signature/Title    ...................................................              ..............................................  Date                                               Time          22EPIC Rev 08/18

## 2020-07-13 NOTE — DISCHARGE INSTRUCTIONS
Stop the Pepcid/Famotidine    Take a Benadryl/Diphenhydramine 25 mg tablet at the same time you take the Compazine

## 2020-07-13 NOTE — ED PROVIDER NOTES
History     Chief Complaint:  Nausea, Vomiting, & Diarrhea, and Abdominal Pain      HPI   Pat Delgado is a 29 year old female diagnosed with anxiety, ADHD, depression, and substance abuse who presents for an evaluation of vomiting after discharge from the ED yesterday after continued nausea and vomiting. She notes that upon arriving home, she tried to eat something and started vomiting immediately after. She also notes an achy pain in her central and lateral abdomen. The patient has been to the ER four times since she had a laparoscopic cholecystectomy on 6/10/2020 for a chief complaint of post operative pain, nausea, vomiting, and diarrhea.     Here, patient notes that she is vomiting and having diarrhea 1-2 times per week, but she notes that is it more vomiting than diarrhea and attributes that to vomiting up most of her food before it can be excreted. She also notes that she is seeing Dr. Hobson of GI tomorrow, 7/14/20. Patient denies any fever, hematuria, hematochezia, cough, overall body pain, and COVID symptoms, or history of diabetes.     HPI from 7/12/20  Pat Delgado is a 29 year old female status post laparoscopic cholecystectomy with a history of alcoholic hepatitis, substance abuse, acute kidney failure, and hypertension who presents for evaluation of abdominal pain, nausea, vomiting, and diarrhea. The patient had a laparoscopic cholecystectomy on 6/10/2020 and has been seen in the emergency department three times since then for post operative pain, nausea, and vomiting.     Allergies:  Amoxicillin  Penicillins     Medications:    Phenergan  Oxycodone  Adderall  Venlafaxine    Past Medical History:    ADHD  Alcohol dependence  Anxiety  Chemical dependence  Depressive disorder  Hypertension  Substance abuse  Tobacco abuse   Cholelithiasis  Alcoholic hepatitis    Past Surgical History:    Tooth Extraction  CVC Tunnel placement  CVC Tunnel removal; right  Feeding tube placement  PICC  exchange; right  Laparoscopic Chelecysectomy    Family History:    Mother - anxiety disorder  Father - diabetes, hypertension    Social History:  The patient was unaccompanied to the ED.  Smoking Status: Former smoker - Vaping; quit 1/30/2020  Smokeless Tobacco: Current user  Alcohol Use: Not currently  Drug Use: yes - marijuana  Marital Status:  Single [1]       Review of Systems   Constitutional: Negative for chills and fever.   HENT: Negative for sore throat.    Respiratory: Negative for cough and shortness of breath.    Cardiovascular: Negative for chest pain.   Gastrointestinal: Positive for abdominal pain, diarrhea, nausea and vomiting. Negative for blood in stool and constipation.   Genitourinary: Negative for hematuria.   Neurological: Negative for headaches.   All other systems reviewed and are negative.      Physical Exam     Patient Vitals for the past 24 hrs:   BP Temp Temp src Pulse Heart Rate Resp SpO2 Weight   07/13/20 1424 127/71 97.9  F (36.6  C) Temporal 77 77 24 98 % 86.2 kg (190 lb)       Physical Exam  Nursing note and vitals reviewed.  Constitutional:  Appears well-developed and well-nourished.   HENT:   Head:    Atraumatic.   Mouth/Throat:   Oropharynx is clear and moist. No oropharyngeal exudate.   Eyes:    Pupils are equal, round, and reactive to light.   Neck:    Normal range of motion. Neck supple.      No tracheal deviation present. No thyromegaly present.   Cardiovascular:  Normal rate, regular rhythm, no murmur   Pulmonary/Chest: Breath sounds are clear and equal without wheezes or crackles.  Abdominal:   Soft. Bowel sounds are normal. Exhibits no distension and      no mass. There is no tenderness.      There is no rebound and no guarding.      Tiny well-healing appearing steri striped wound at incision of     cholecystectomy. Small amount of blood-tinged discharge at umbilicus    but no active drainage.  Musculoskeletal:  Exhibits no edema.   Lymphadenopathy:  No cervical adenopathy.    Neurological:   Alert and oriented to person, place, and time.   Skin:    Skin is warm and dry. No rash noted. No pallor.     Emergency Department Course   Laboratory:  Laboratory findings were communicated with the patient who voiced understanding of the findings.    CBC: HGB 8.2 (L) o/w WNL (WBC 7.3, )  CMP: Glucose 104 (H), BUN 6 (L), Bilirubin 2.2 (H), Albumin 2.8 (L) o/w WNL (Creatinine 0.56)  HCG qual: Negative     Interventions:  1554: NS 1L IV Bolus   1554: Benadryl 25 mg IV  1554: Reglan 10 mg IV  1629: Zofran 4 mg IV  1727: Protonix 40 mg in IV Infusion  1727: Ativan 0.5 mg IV  1727: Zofran 4 mg IV    Emergency Department Course:  Past medical records, nursing notes, and vitals reviewed.    1511 I performed an exam of the patient as documented above.   IV was inserted and blood was drawn for laboratory testing, results above.    1704 I rechecked the patient and discussed the results of her workup thus far.     Findings and plan explained to the Patient. Patient discharged home with instructions regarding supportive care, medications, and reasons to return. The importance of close follow-up was reviewed.     I personally reviewed the laboratory  and imaging results with the Patient and answered all related questions prior to discharge.     Impression & Plan     Medical Decision Making:  Pat Delgado is a 29 year old female who presents to the emergency department today for evaluation of nausea, vomiting, and diarrhea with associated abdominal pain. This patient has persistent intermittent vomiting which could be due to post-op reaction from her cholecystectomy that she had a month ago. I also considered the possibility of cannabis hyperemesis and she was told to stop using cannabis. She does not appear significantly dehydrated Seh has a benign abdominal exam, normal WBC and no fever. She had a recent CT abdomen and pelvis which was 2.5 weeks after her surgery, and did not show any sign of  abscess, so I do not feel repeat CT imaging was indicated, since it would be quite a bit of radiation. She has an appointment with gastroenterology tomorrow and her Pepcid was switched to pilosec to see if this helps her symptoms. She had improvement of her symptoms after the above treatment. I prescribed her compazine for her nausea. She was instructed to return for worsening symptoms.       Diagnosis:    ICD-10-CM    1. Non-intractable vomiting with nausea, unspecified vomiting type  R11.2      Disposition:  Discharged to home.    Discharge Medications:   Details   omeprazole (PRILOSEC) 20 MG DR capsule Take 2 capsules (40 mg) by mouth daily, Disp-60 capsule,R-0, E-Prescribe     Scribe Disclosure:  I, Sahara Burton, am serving as a scribe at 6:54 PM on 7/13/2020 to document services personally performed by Annamaria Martinez MD based on my observations and the provider's statements to me.     I, Giuseppe Christianson, am serving as a scribe at 8:36 PM on 7/13/2020 to document services personally performed by Annamaria Martinez MD based on my observations and the provider's statements to me.     7/13/2020   EMERGENCY DEPARTMENT       Annamaria Martinez MD  07/13/20 5425

## 2020-07-15 ENCOUNTER — HOSPITAL ENCOUNTER (OUTPATIENT)
Facility: CLINIC | Age: 30
Setting detail: OBSERVATION
Discharge: HOME | End: 2020-07-17
Attending: EMERGENCY MEDICINE | Admitting: HOSPITALIST
Payer: COMMERCIAL

## 2020-07-15 DIAGNOSIS — R11.2 NON-INTRACTABLE VOMITING WITH NAUSEA, UNSPECIFIED VOMITING TYPE: ICD-10-CM

## 2020-07-15 DIAGNOSIS — E86.0 DEHYDRATION: ICD-10-CM

## 2020-07-15 DIAGNOSIS — K70.10 ALCOHOLIC HEPATITIS, UNSPECIFIED WHETHER ASCITES PRESENT (H): ICD-10-CM

## 2020-07-15 DIAGNOSIS — R11.15 CYCLIC VOMITING SYNDROME: Primary | ICD-10-CM

## 2020-07-15 LAB
ALBUMIN SERPL-MCNC: 2.8 G/DL (ref 3.4–5)
ALBUMIN UR-MCNC: 30 MG/DL
ALP SERPL-CCNC: 71 U/L (ref 40–150)
ALT SERPL W P-5'-P-CCNC: 16 U/L (ref 0–50)
AMPHETAMINES UR QL SCN: NEGATIVE
ANION GAP SERPL CALCULATED.3IONS-SCNC: 6 MMOL/L (ref 3–14)
APPEARANCE UR: ABNORMAL
AST SERPL W P-5'-P-CCNC: 32 U/L (ref 0–45)
BACTERIA #/AREA URNS HPF: ABNORMAL /HPF
BARBITURATES UR QL: NEGATIVE
BASOPHILS # BLD AUTO: 0 10E9/L (ref 0–0.2)
BASOPHILS NFR BLD AUTO: 0.4 %
BENZODIAZ UR QL: NEGATIVE
BILIRUB SERPL-MCNC: 2.3 MG/DL (ref 0.2–1.3)
BILIRUB UR QL STRIP: ABNORMAL
BUN SERPL-MCNC: 5 MG/DL (ref 7–30)
CALCIUM SERPL-MCNC: 9.2 MG/DL (ref 8.5–10.1)
CANNABINOIDS UR QL SCN: POSITIVE
CHLORIDE SERPL-SCNC: 109 MMOL/L (ref 94–109)
CO2 SERPL-SCNC: 27 MMOL/L (ref 20–32)
COCAINE UR QL: NEGATIVE
COLOR UR AUTO: ABNORMAL
CREAT SERPL-MCNC: 0.62 MG/DL (ref 0.52–1.04)
DIFFERENTIAL METHOD BLD: ABNORMAL
EOSINOPHIL # BLD AUTO: 0.5 10E9/L (ref 0–0.7)
EOSINOPHIL NFR BLD AUTO: 6.5 %
ERYTHROCYTE [DISTWIDTH] IN BLOOD BY AUTOMATED COUNT: 16.1 % (ref 10–15)
GFR SERPL CREATININE-BSD FRML MDRD: >90 ML/MIN/{1.73_M2}
GLUCOSE SERPL-MCNC: 96 MG/DL (ref 70–99)
GLUCOSE UR STRIP-MCNC: NEGATIVE MG/DL
HCG UR QL: NEGATIVE
HCT VFR BLD AUTO: 27.9 % (ref 35–47)
HGB BLD-MCNC: 8.7 G/DL (ref 11.7–15.7)
HGB UR QL STRIP: NEGATIVE
HYALINE CASTS #/AREA URNS LPF: 7 /LPF (ref 0–2)
IMM GRANULOCYTES # BLD: 0 10E9/L (ref 0–0.4)
IMM GRANULOCYTES NFR BLD: 0.1 %
KETONES UR STRIP-MCNC: 10 MG/DL
LEUKOCYTE ESTERASE UR QL STRIP: ABNORMAL
LIPASE SERPL-CCNC: 105 U/L (ref 73–393)
LYMPHOCYTES # BLD AUTO: 1.8 10E9/L (ref 0.8–5.3)
LYMPHOCYTES NFR BLD AUTO: 26 %
MAGNESIUM SERPL-MCNC: 1.8 MG/DL (ref 1.6–2.3)
MCH RBC QN AUTO: 26.6 PG (ref 26.5–33)
MCHC RBC AUTO-ENTMCNC: 31.2 G/DL (ref 31.5–36.5)
MCV RBC AUTO: 85 FL (ref 78–100)
MONOCYTES # BLD AUTO: 0.7 10E9/L (ref 0–1.3)
MONOCYTES NFR BLD AUTO: 9.4 %
MUCOUS THREADS #/AREA URNS LPF: PRESENT /LPF
NEUTROPHILS # BLD AUTO: 4.1 10E9/L (ref 1.6–8.3)
NEUTROPHILS NFR BLD AUTO: 57.6 %
NITRATE UR QL: NEGATIVE
NRBC # BLD AUTO: 0 10*3/UL
NRBC BLD AUTO-RTO: 0 /100
OPIATES UR QL SCN: NEGATIVE
PCP UR QL SCN: NEGATIVE
PH UR STRIP: 6.5 PH (ref 5–7)
PLATELET # BLD AUTO: 298 10E9/L (ref 150–450)
POTASSIUM SERPL-SCNC: 3.3 MMOL/L (ref 3.4–5.3)
PROT SERPL-MCNC: 7.2 G/DL (ref 6.8–8.8)
RBC # BLD AUTO: 3.27 10E12/L (ref 3.8–5.2)
RBC #/AREA URNS AUTO: 2 /HPF (ref 0–2)
SODIUM SERPL-SCNC: 142 MMOL/L (ref 133–144)
SOURCE: ABNORMAL
SP GR UR STRIP: 1.02 (ref 1–1.03)
SQUAMOUS #/AREA URNS AUTO: 14 /HPF (ref 0–1)
UROBILINOGEN UR STRIP-MCNC: >12 MG/DL (ref 0–2)
WBC # BLD AUTO: 7.1 10E9/L (ref 4–11)
WBC #/AREA URNS AUTO: 14 /HPF (ref 0–5)

## 2020-07-15 PROCEDURE — 83735 ASSAY OF MAGNESIUM: CPT | Performed by: EMERGENCY MEDICINE

## 2020-07-15 PROCEDURE — G0378 HOSPITAL OBSERVATION PER HR: HCPCS

## 2020-07-15 PROCEDURE — 25000128 H RX IP 250 OP 636: Performed by: HOSPITALIST

## 2020-07-15 PROCEDURE — U0003 INFECTIOUS AGENT DETECTION BY NUCLEIC ACID (DNA OR RNA); SEVERE ACUTE RESPIRATORY SYNDROME CORONAVIRUS 2 (SARS-COV-2) (CORONAVIRUS DISEASE [COVID-19]), AMPLIFIED PROBE TECHNIQUE, MAKING USE OF HIGH THROUGHPUT TECHNOLOGIES AS DESCRIBED BY CMS-2020-01-R: HCPCS | Performed by: EMERGENCY MEDICINE

## 2020-07-15 PROCEDURE — 99220 ZZC INITIAL OBSERVATION CARE,LEVL III: CPT | Performed by: HOSPITALIST

## 2020-07-15 PROCEDURE — 80053 COMPREHEN METABOLIC PANEL: CPT | Performed by: EMERGENCY MEDICINE

## 2020-07-15 PROCEDURE — 87086 URINE CULTURE/COLONY COUNT: CPT | Performed by: INTERNAL MEDICINE

## 2020-07-15 PROCEDURE — 25800030 ZZH RX IP 258 OP 636: Performed by: EMERGENCY MEDICINE

## 2020-07-15 PROCEDURE — 99285 EMERGENCY DEPT VISIT HI MDM: CPT | Mod: 25

## 2020-07-15 PROCEDURE — 96375 TX/PRO/DX INJ NEW DRUG ADDON: CPT

## 2020-07-15 PROCEDURE — 81001 URINALYSIS AUTO W/SCOPE: CPT | Performed by: EMERGENCY MEDICINE

## 2020-07-15 PROCEDURE — 96372 THER/PROPH/DIAG INJ SC/IM: CPT | Mod: 59

## 2020-07-15 PROCEDURE — 25000128 H RX IP 250 OP 636: Performed by: EMERGENCY MEDICINE

## 2020-07-15 PROCEDURE — 80307 DRUG TEST PRSMV CHEM ANLYZR: CPT | Performed by: HOSPITALIST

## 2020-07-15 PROCEDURE — 85025 COMPLETE CBC W/AUTO DIFF WBC: CPT | Performed by: EMERGENCY MEDICINE

## 2020-07-15 PROCEDURE — 96361 HYDRATE IV INFUSION ADD-ON: CPT

## 2020-07-15 PROCEDURE — 83690 ASSAY OF LIPASE: CPT | Performed by: EMERGENCY MEDICINE

## 2020-07-15 PROCEDURE — 25000132 ZZH RX MED GY IP 250 OP 250 PS 637: Performed by: HOSPITALIST

## 2020-07-15 PROCEDURE — 81025 URINE PREGNANCY TEST: CPT | Performed by: EMERGENCY MEDICINE

## 2020-07-15 PROCEDURE — C9113 INJ PANTOPRAZOLE SODIUM, VIA: HCPCS | Performed by: EMERGENCY MEDICINE

## 2020-07-15 PROCEDURE — 96374 THER/PROPH/DIAG INJ IV PUSH: CPT

## 2020-07-15 RX ORDER — ACETAMINOPHEN 325 MG/1
650 TABLET ORAL EVERY 4 HOURS PRN
Status: DISCONTINUED | OUTPATIENT
Start: 2020-07-15 | End: 2020-07-17 | Stop reason: HOSPADM

## 2020-07-15 RX ORDER — AMOXICILLIN 250 MG
2 CAPSULE ORAL 2 TIMES DAILY PRN
Status: DISCONTINUED | OUTPATIENT
Start: 2020-07-15 | End: 2020-07-17 | Stop reason: HOSPADM

## 2020-07-15 RX ORDER — SODIUM CHLORIDE 9 MG/ML
INJECTION, SOLUTION INTRAVENOUS CONTINUOUS
Status: DISCONTINUED | OUTPATIENT
Start: 2020-07-15 | End: 2020-07-15

## 2020-07-15 RX ORDER — PROCHLORPERAZINE 25 MG
25 SUPPOSITORY, RECTAL RECTAL EVERY 12 HOURS PRN
Status: DISCONTINUED | OUTPATIENT
Start: 2020-07-15 | End: 2020-07-17 | Stop reason: HOSPADM

## 2020-07-15 RX ORDER — PROCHLORPERAZINE MALEATE 5 MG
10 TABLET ORAL EVERY 6 HOURS PRN
Status: DISCONTINUED | OUTPATIENT
Start: 2020-07-15 | End: 2020-07-17 | Stop reason: HOSPADM

## 2020-07-15 RX ORDER — ONDANSETRON 4 MG/1
4 TABLET, ORALLY DISINTEGRATING ORAL EVERY 6 HOURS PRN
Status: DISCONTINUED | OUTPATIENT
Start: 2020-07-15 | End: 2020-07-16

## 2020-07-15 RX ORDER — ONDANSETRON 2 MG/ML
4 INJECTION INTRAMUSCULAR; INTRAVENOUS EVERY 6 HOURS PRN
Status: DISCONTINUED | OUTPATIENT
Start: 2020-07-15 | End: 2020-07-16

## 2020-07-15 RX ORDER — AMOXICILLIN 250 MG
1 CAPSULE ORAL 2 TIMES DAILY PRN
Status: DISCONTINUED | OUTPATIENT
Start: 2020-07-15 | End: 2020-07-17 | Stop reason: HOSPADM

## 2020-07-15 RX ORDER — SODIUM CHLORIDE AND POTASSIUM CHLORIDE 150; 900 MG/100ML; MG/100ML
INJECTION, SOLUTION INTRAVENOUS CONTINUOUS
Status: DISCONTINUED | OUTPATIENT
Start: 2020-07-15 | End: 2020-07-16

## 2020-07-15 RX ORDER — ONDANSETRON 2 MG/ML
8 INJECTION INTRAMUSCULAR; INTRAVENOUS ONCE
Status: COMPLETED | OUTPATIENT
Start: 2020-07-15 | End: 2020-07-15

## 2020-07-15 RX ORDER — NALOXONE HYDROCHLORIDE 0.4 MG/ML
.1-.4 INJECTION, SOLUTION INTRAMUSCULAR; INTRAVENOUS; SUBCUTANEOUS
Status: DISCONTINUED | OUTPATIENT
Start: 2020-07-15 | End: 2020-07-17 | Stop reason: HOSPADM

## 2020-07-15 RX ORDER — POTASSIUM CHLORIDE 1500 MG/1
40 TABLET, EXTENDED RELEASE ORAL ONCE
Status: DISCONTINUED | OUTPATIENT
Start: 2020-07-15 | End: 2020-07-16

## 2020-07-15 RX ORDER — OLANZAPINE 10 MG/2ML
10 INJECTION, POWDER, FOR SOLUTION INTRAMUSCULAR ONCE
Status: COMPLETED | OUTPATIENT
Start: 2020-07-15 | End: 2020-07-15

## 2020-07-15 RX ORDER — METOCLOPRAMIDE HYDROCHLORIDE 5 MG/ML
10 INJECTION INTRAMUSCULAR; INTRAVENOUS ONCE
Status: COMPLETED | OUTPATIENT
Start: 2020-07-15 | End: 2020-07-15

## 2020-07-15 RX ORDER — METOCLOPRAMIDE 5 MG/1
10 TABLET ORAL 4 TIMES DAILY PRN
Status: DISCONTINUED | OUTPATIENT
Start: 2020-07-15 | End: 2020-07-17 | Stop reason: HOSPADM

## 2020-07-15 RX ORDER — DIPHENHYDRAMINE HYDROCHLORIDE 50 MG/ML
25 INJECTION INTRAMUSCULAR; INTRAVENOUS ONCE
Status: COMPLETED | OUTPATIENT
Start: 2020-07-15 | End: 2020-07-15

## 2020-07-15 RX ADMIN — SODIUM CHLORIDE 1000 ML: 9 INJECTION, SOLUTION INTRAVENOUS at 16:43

## 2020-07-15 RX ADMIN — OLANZAPINE 10 MG: 10 INJECTION, POWDER, FOR SOLUTION INTRAMUSCULAR at 18:13

## 2020-07-15 RX ADMIN — PROCHLORPERAZINE EDISYLATE 10 MG: 5 INJECTION INTRAMUSCULAR; INTRAVENOUS at 20:57

## 2020-07-15 RX ADMIN — METOCLOPRAMIDE 10 MG: 5 TABLET ORAL at 22:28

## 2020-07-15 RX ADMIN — DIPHENHYDRAMINE HYDROCHLORIDE 25 MG: 50 INJECTION, SOLUTION INTRAMUSCULAR; INTRAVENOUS at 16:43

## 2020-07-15 RX ADMIN — ONDANSETRON 8 MG: 2 INJECTION INTRAMUSCULAR; INTRAVENOUS at 20:12

## 2020-07-15 RX ADMIN — METOCLOPRAMIDE 10 MG: 5 INJECTION, SOLUTION INTRAMUSCULAR; INTRAVENOUS at 16:43

## 2020-07-15 RX ADMIN — POTASSIUM CHLORIDE AND SODIUM CHLORIDE: 900; 150 INJECTION, SOLUTION INTRAVENOUS at 20:54

## 2020-07-15 RX ADMIN — PANTOPRAZOLE SODIUM 40 MG: 40 INJECTION, POWDER, FOR SOLUTION INTRAVENOUS at 17:36

## 2020-07-15 ASSESSMENT — MIFFLIN-ST. JEOR: SCORE: 1658.01

## 2020-07-15 NOTE — ED PROVIDER NOTES
"  History     Chief Complaint:    Vomiting      The history is provided by the patient.      Pat Delgado is a 29 year old female with a history of intractable vomiting who presents with cyclical vomiting. On 06/23/2020, patient underwent a cholecystectomy, note below. Today, she went to see Dr. Hobson for imaging due to \"bile in her abdomen\". She endorses fatigue, weakness, diarrhea, and abdominal pain. She \"wakes up every hour to vomit\". Some days she will feel better and try to eat but vomits again. She had taken Zofran, Compazine, and 1 Benadryl with no relief. She has not taken Reglan today. She denies fever, urinary symptoms, cough, shortness of breath, or using weed/drinking alcohol.     Procedure Note from 06/23/2020:  Specimens:       ID Type Source Tests Collected by Time Destination   A : gallbladder and contents  Tissue Gallbladder and Contents SURGICAL PATHOLOGY EXAM Alan Reynoso MD 6/23/2020 11:13 AM        Findings: Portal hypertension, re-canalized umbilical vein    Complications: Bleeding from re-canalized umbilical vein which required extension of umbilical port site site. Atypical bleeding was inherent to the operation and was controlled with vicryl sutures and fascial closure device. No ongoing signs of bleeding in subcutaneous tissue or intra abdominally at the end of case.  Implants:         * No implants in log *      Allergies:  Amoxicillin  Penicillins    Medications:    Phenergan  Oxycodone  Adderall  Venlafaxine  Reglan  Compazine  Prilosec  Zofran    Past Medical History:    ADHD  Alcohol dependence  Anxiety  Chemical dependency  Depression  Hypertension  Migraine  Substance abuse  Tobacco abuse  Alcoholic hepatitis  Acute kidney failure with tubular necrosis  Cholelithiasis    Past Surgical History:    Tooth extraction  IR CVC Tunnel placement and removal  IR feeding tube placement   IR PICC exchange right  Cholecystectomy    Family History:    Mother: Anxiety    Social " "History:  The patient was accompanied to the ED by mother.  Smoking Status: Former Smoker  Smokeless Tobacco: Current User  Alcohol Use: Negative  Drug Use: Positive   Type: Marijuana  PCP: Clinic, [facility]    Marital Status:  Single     Review of Systems   All other systems reviewed and are negative.    Physical Exam     Patient Vitals for the past 24 hrs:   BP Temp Temp src Pulse Resp SpO2 Height Weight   07/15/20 1612 132/87 98.9  F (37.2  C) Oral 83 18 98 % 1.727 m (5' 8\") 88.5 kg (195 lb)       Physical Exam  General: Resting on the bed.  Appears to be uncomfortable   Head: No obvious trauma to head.  Ears, Nose, Throat:  External ears normal.  Nose normal.    Eyes:  Conjunctivae clear.  Pupils are equal, round, and reactive.   Neck: Normal range of motion.  Neck supple.   CV: Regular rate and rhythm.  No murmurs.      Respiratory: Effort normal and breath sounds normal.  No wheezing or crackles.   Gastrointestinal: Soft.  No distension. There is no tenderness.  There is no rigidity, no rebound and no guarding.   Skin: Skin is warm and dry.  No rash noted.      Emergency Department Course     Laboratory:  Laboratory findings were communicated with the patient and Admitting MD who voiced understanding of the findings.    CMP: Glucose 96, Potassium: 3.3 (L), Urea: 5 (L), Bilirubin: 2.3 (H), Albumin: 2.8 (L), o/w WNL (Creatinine: 0.62)    CBC: WBC: 7.1, HGB: 8.7 (L), PLT: 298    Lipase: 105    Magnesium: 1.8    Drug Abuse Screen: Cannabinoids: Positive o/w Negative    HCG Qualitative: Negative    UA with Microscopic: Bilirubin: Small (A), Ketones: 10 (A), Protein Albumin: 30 (A), Urobilinogen: >12.0 (H), Leukocyte Esterase: Large (A), WBC: 14 (H), Bacteria: Few (A), Squamous Epithelial: 14 (H), Mucous: Present (A), Hyaline Casts: 7 (H) o/w Negative     Interventions:  1643 NS 1L IV  1643 Reglan 10 mg IV  1643 Benadryl 25 mg IV  1736 Protonix 40 mg IV  1813 Zyprexa 10 mg IM  2012 Zofran 8 mg " IV  2019 Klor-Con 40 mEq PO    Emergency Department Course:  Past medical records, nursing notes, and vitals reviewed.    1628 I performed an exam of the patient as documented above.     IV was inserted and blood was drawn for laboratory testing, results above.    The patient provided a urine sample here in the emergency department. This was sent for laboratory testing, findings above.    171 I consulted with Dr. Hobson, GI, regarding the patient's history and presentation here in the emergency department.     I consulted with Dr. Morgan, Hospitalist, regarding the patient's history and presentation here in the emergency department.     I rechecked the patient and discussed the results of her workup thus far.     Findings and plan explained to the Patient and mother who consents to admission. Discussed the patient with Dr. Morgan, who will admit the patient to a Adult Med/Surg bed for further monitoring, evaluation, and treatment.    I personally reviewed the laboratory and imaging results with the Patient and mother and answered all related questions prior to discharge.     Impression & Plan     Medical Decision Makin-year-old female with history of cyclical vomiting, alcoholic hepatitis presents with vomiting.  Vital signs reassuring.  Broad differential was pursued including but not limited to electrolyte, metabolic, renal dysfunction, pancreatitis, hepatitis, dehydration, drug or alcohol abuse, etc.  CBC shows no leukocytosis and mild ongoing anemia.  Unchanged from prior.  BMP shows no acute electrolyte, metabolic, renal dysfunction.  Bilirubin is elevated but this is unchanged from prior.  Lipase is normal.  No suggestion of pancreatitis.  Patient has known alcoholic hepatitis and cirrhosis.  Do not suspect decompensation from this aspect.  Patient does have ascites but low suspicion for SBP with normal white count, afebrile, no significant tenderness on exam.  Magnesium levels normal.  Drug  screen is positive for marijuana but otherwise no evidence of acute infection.  Pregnancy test negative.  At this point patient is failing outpatient management.  I spoke with GI and they recommend admission for symptom management.  Patient has a benign abdominal exam.  Do not suspect acute intra-abdominal process.  Do not think that imaging is required at this time.  Patient was admitted to the hospitalist.      Diagnosis:    ICD-10-CM    1. Non-intractable vomiting with nausea, unspecified vomiting type  R11.2 Asymptomatic COVID-19 Virus (Coronavirus) by PCR     Drug abuse screen 77 urine ([place], [place], [place])     Magnesium     Magnesium     CANCELED: Cannabinoids qualitative urine     CANCELED: Magnesium   2. Dehydration  E86.0        Disposition:  Admitted to Dr. Morgan.    Scribe Disclosure:  I, Gio Hughes, am serving as a scribe at 5:22 PM on 7/15/2020 to document services personally performed by Ophelia Caraballo MD based on my observations and the provider's statements to me.        Ophelia Caraballo MD  07/16/20 0031

## 2020-07-15 NOTE — ED NOTES
"[facility]  ED Nurse Handoff Report    ED Chief complaint: Vomiting      ED Diagnosis:   Final diagnoses:   Non-intractable vomiting with nausea, unspecified vomiting type   Dehydration       Code Status: Full Code    Allergies:   Allergies   Allergen Reactions     Amoxicillin      Penicillins Unknown     Hives         Patient Story: Pt yajaira morgan in June, since then has had multiple ED visits for cyclical vomiting. Today had scope done with GI and developed cyclical vomiting.   Focused Assessment:  Resp: WNL Cardiac: WNL Neuro: anxious, restless, tearful. GI: frequent dry heaving, continuous nausea.    Treatments and/or interventions provided: Protonix, reglan and benadryl without relief. Zyprexa IM with relief.   Patient's response to treatments and/or interventions:     To be done/followed up on inpatient unit:      Does this patient have any cognitive concerns?: None    Activity level - Baseline/Home:  Independent  Activity Level - Current:   Independent    Patient's Preferred language: English   Needed?: No    Isolation: None  Infection: Not Applicable  Bariatric?: No    Vital Signs:   Vitals:    07/15/20 1612   BP: 132/87   Pulse: 83   Resp: 18   Temp: 98.9  F (37.2  C)   TempSrc: Oral   SpO2: 98%   Weight: 88.5 kg (195 lb)   Height: 1.727 m (5' 8\")       Cardiac Rhythm:     Was the PSS-3 completed:   Yes  What interventions are required if any?               Family Comments: Mom at bedside  OBS brochure/video discussed/provided to patient/family: Yes              Name of person given brochure if not patient:               Relationship to patient:     For the majority of the shift this patient's behavior was Yellow.   Behavioral interventions performed were Reassurance, active listening.    ED NURSE PHONE NUMBER:          "

## 2020-07-16 LAB
ANION GAP SERPL CALCULATED.3IONS-SCNC: 9 MMOL/L (ref 3–14)
BUN SERPL-MCNC: 4 MG/DL (ref 7–30)
CALCIUM SERPL-MCNC: 8.8 MG/DL (ref 8.5–10.1)
CHLORIDE SERPL-SCNC: 109 MMOL/L (ref 94–109)
CO2 SERPL-SCNC: 23 MMOL/L (ref 20–32)
CREAT SERPL-MCNC: 0.51 MG/DL (ref 0.52–1.04)
ERYTHROCYTE [DISTWIDTH] IN BLOOD BY AUTOMATED COUNT: 16.1 % (ref 10–15)
GFR SERPL CREATININE-BSD FRML MDRD: >90 ML/MIN/{1.73_M2}
GLUCOSE SERPL-MCNC: 82 MG/DL (ref 70–99)
HCT VFR BLD AUTO: 25.5 % (ref 35–47)
HGB BLD-MCNC: 8.1 G/DL (ref 11.7–15.7)
INR PPP: 2.05 (ref 0.86–1.14)
MCH RBC QN AUTO: 26.9 PG (ref 26.5–33)
MCHC RBC AUTO-ENTMCNC: 31.8 G/DL (ref 31.5–36.5)
MCV RBC AUTO: 85 FL (ref 78–100)
PLATELET # BLD AUTO: 270 10E9/L (ref 150–450)
POTASSIUM SERPL-SCNC: 3.5 MMOL/L (ref 3.4–5.3)
RBC # BLD AUTO: 3.01 10E12/L (ref 3.8–5.2)
SARS-COV-2 RNA SPEC QL NAA+PROBE: NOT DETECTED
SODIUM SERPL-SCNC: 141 MMOL/L (ref 133–144)
SPECIMEN SOURCE: NORMAL
WBC # BLD AUTO: 8.5 10E9/L (ref 4–11)

## 2020-07-16 PROCEDURE — 99226 ZZC SUBSEQUENT OBSERVATION CARE,LEVEL III: CPT | Performed by: INTERNAL MEDICINE

## 2020-07-16 PROCEDURE — 25000132 ZZH RX MED GY IP 250 OP 250 PS 637: Performed by: HOSPITALIST

## 2020-07-16 PROCEDURE — 96361 HYDRATE IV INFUSION ADD-ON: CPT

## 2020-07-16 PROCEDURE — 96376 TX/PRO/DX INJ SAME DRUG ADON: CPT

## 2020-07-16 PROCEDURE — 80048 BASIC METABOLIC PNL TOTAL CA: CPT | Performed by: HOSPITALIST

## 2020-07-16 PROCEDURE — 36415 COLL VENOUS BLD VENIPUNCTURE: CPT | Performed by: HOSPITALIST

## 2020-07-16 PROCEDURE — 85027 COMPLETE CBC AUTOMATED: CPT | Performed by: HOSPITALIST

## 2020-07-16 PROCEDURE — G0378 HOSPITAL OBSERVATION PER HR: HCPCS

## 2020-07-16 PROCEDURE — 85610 PROTHROMBIN TIME: CPT | Performed by: HOSPITALIST

## 2020-07-16 PROCEDURE — 25000128 H RX IP 250 OP 636: Performed by: HOSPITALIST

## 2020-07-16 RX ORDER — ONDANSETRON 2 MG/ML
8 INJECTION INTRAMUSCULAR; INTRAVENOUS 3 TIMES DAILY
Status: DISCONTINUED | OUTPATIENT
Start: 2020-07-16 | End: 2020-07-17

## 2020-07-16 RX ADMIN — PROCHLORPERAZINE MALEATE 10 MG: 5 TABLET ORAL at 09:58

## 2020-07-16 RX ADMIN — PROCHLORPERAZINE MALEATE 10 MG: 5 TABLET ORAL at 03:14

## 2020-07-16 RX ADMIN — ONDANSETRON 8 MG: 2 INJECTION INTRAMUSCULAR; INTRAVENOUS at 09:11

## 2020-07-16 RX ADMIN — ONDANSETRON 8 MG: 2 INJECTION INTRAMUSCULAR; INTRAVENOUS at 22:06

## 2020-07-16 RX ADMIN — PROCHLORPERAZINE MALEATE 10 MG: 5 TABLET ORAL at 18:05

## 2020-07-16 RX ADMIN — ONDANSETRON 8 MG: 2 INJECTION INTRAMUSCULAR; INTRAVENOUS at 15:20

## 2020-07-16 RX ADMIN — METOCLOPRAMIDE 10 MG: 5 TABLET ORAL at 06:58

## 2020-07-16 RX ADMIN — ACETAMINOPHEN 650 MG: 325 TABLET, FILM COATED ORAL at 20:08

## 2020-07-16 RX ADMIN — METOCLOPRAMIDE 10 MG: 5 TABLET ORAL at 01:42

## 2020-07-16 NOTE — H&P
[facility]     History and Physical - Hospitalist Service         Name: Pat Delgado    MRN: [id number]  YOB: 1990    Age: 29 year old  Date of Admission:  7/15/2020  Physician: Lizzeth Morgan MD  Text Page        Assessment & Plan   Pat Delgado is a 29 year old female with PMH alcohol abuse in remission, anxiety/depression, HTN, prolonged admission this spring for alcoholic liver failure with hepatorenal syndrome, prior episodes of cyclic vomiting who presents on 07/15/20 with intractable nausea and vomiting.    Intractable vomiting, presumed cyclic vomiting syndrome:   - zofran and compazine prn. If more meds needed, zyprexa in IM was helpful in ED.  - judicious IVF  - heat to abdomen if it helps with symptoms. Warm showers may also help.  - NPO overnight. Ice chips okay.  - EKG in am given multiple QT prolonging meds  - THC in urine - this can often be a trigger for symptoms. I did not discuss results with patient as her mother was in the room and there was not a good opportunity to ask her to leave.    Recent cholecystectomy:   - per review of notes, no further surgical follow up necessary.    Cirrhosis with ascitis 2/2 Alcoholic hepatitis  - continue rifaximin 550 BID once pt tolerating po (held for now)  - GI (Joce) will follow   - NPO for now. Should advance to 2 gm sodium diet with fluid restriction.     Alcohol dependence in remission: sober for 5 months    Tobacco abuse: patient vapes. Declined NRT.      Diet: Orders Placed This Encounter      NPO for Medical/Clinical Reasons Except for: Ice Chips, Meds     DVT Prophylaxis: Pneumatic Compression Devices  Code Status: Full Code  Amado Catheter: not present    Asymptomatic COVID PCR pending. Low suspicion PUI. Would not retest.    Disposition Plan    Expected discharge: 2 - 3 days, recommended to prior living arrangement once nausea/vomiting managed/tolerating po enough to maintain  hydration.    Lizzeth Morgan MD  07/15/2020 7:35 PM       Primary Care Physician   [facility], [phone]    Chief Complaint   Intractable nausea and vomiting    History is obtained from the patient.  I also spoke with the ER provider about the history.       History of Present Illness   Pat Delgado is a 29 year old female who presents with intractable nausea and vomiting. She has had these symptoms on and off for weeks and underwent cholecystectomy 6/23/20 in attempt to relieve them with no improvement. She was feeling better yesterday and was able to tolerate some po, then went for EGD with Dr Hobson today and has had intractable N/V and abdominal pain since the procedure. Her mother is bedside and endorses that they have been unable to control symptoms at home despite zofran, compazine, and benadryl. She was initially on opioid pain meds after surgery but has been off those for some time. She denies fever. Says she has some chills. No cough or SOB. Denies alcohol for 5 mos. I did not confront her about substance use.      Past Medical History    Past Medical History:   Diagnosis Date     ADHD (attention deficit hyperactivity disorder)      Alcohol dependence (H) 1/20/2015     Anxiety      Chemical dependency (H) 1/22/2015     Depressive disorder, not elsewhere classified 8/20/2015    Per 1/22/15 visit with Batool Gonzalez NP      Hypertension goal BP (blood pressure) < 150/90 11/10/2015     Migraine headache      Substance abuse (H)      Tobacco abuse          Past Surgical History   Past Surgical History:   Procedure Laterality Date     HC TOOTH EXTRACTION W/FORCEP       IR CVC TUNNEL PLACEMENT > 5 YRS OF AGE  2/21/2020     IR CVC TUNNEL REMOVAL RIGHT  4/6/2020     IR FEEDING TUBE PLACEMENT W RADHA/MD  2/19/2020     IR PICC EXCHANGE RIGHT  2/19/2020     LAPAROSCOPIC CHOLECYSTECTOMY N/A 6/23/2020    Procedure: LAPAROSCOPIC CHOLECYSTECTOMY;  Surgeon: Alan Reynoso MD;   Location:  OR        Prior to Admission Medications   Prior to Admission Medications   Prescriptions Last Dose Informant Patient Reported? Taking?   Multiple Vitamin (ONE-A-DAY ADULT VITACRAVES+DHA PO) Past Week at Unknown time  Yes Yes   Sig: Take 1 tablet by mouth daily   famotidine (PEPCID) 20 MG tablet  Self Yes Yes   Sig: Take 20 mg by mouth 2 times daily as needed   melatonin 3 MG CAPS Past Week at Unknown time  Yes Yes   Sig: Take 1 capsule by mouth At Bedtime   metoclopramide (REGLAN) 10 MG tablet 7/15/2020 at Unknown time  No Yes   Sig: Take 1 tablet (10 mg) by mouth 4 times daily as needed (Nausea or Vomiting)   omeprazole (PRILOSEC) 20 MG DR capsule new  No Yes   Sig: Take 2 capsules (40 mg) by mouth daily   prochlorperazine (COMPAZINE) 10 MG tablet   No Yes   Sig: Take 1 tablet (10 mg) by mouth every 6 hours as needed for nausea or vomiting   rifaximin (XIFAXAN) 550 MG TABS tablet 7/15/2020 at am  No Yes   Sig: Take 1 tablet (550 mg) by mouth 2 times daily   venlafaxine (EFFEXOR-XR) 75 MG 24 hr capsule 2020 at pm  Yes Yes   Sig: Take 75 mg by mouth daily      Facility-Administered Medications: None     Allergies   Allergies   Allergen Reactions     Amoxicillin      Penicillins Unknown     Hives         Social History   Social History     Tobacco Use     Smoking status: Former Smoker     Types: Vaping Device     Last attempt to quit: 2020     Years since quittin.4     Smokeless tobacco: Current User   Substance Use Topics     Alcohol use: Not Currently       Family History   Family history reviewed with patient and is noncontributory.  Family History   Problem Relation Age of Onset     Anxiety Disorder Mother      Dementia Maternal Grandfather      Substance Abuse Other      Unknown/Adopted No family hx of      Depression No family hx of      Schizophrenia No family hx of      Bipolar Disorder No family hx of      Suicide No family hx of      [place] Disease No family hx of       Parkinsonism No family hx of      Autism Spectrum Disorder No family hx of      Intellectual Disability (Mental Retardation) No family hx of      Mental Illness No family hx of          Review of Systems   A Comprehensive greater than 10 system review of systems was carried out.  Pertinent positives and negatives are noted above.  Otherwise negative for contributory information.    Physical Exam   Temp: 98.9  F (37.2  C) Temp src: Oral BP: 132/87 Pulse: 83   Resp: 18 SpO2: 98 % O2 Device: None (Room air)    Vital Signs with Ranges  Temp:  [98.9  F (37.2  C)] 98.9  F (37.2  C)  Pulse:  [83] 83  Resp:  [18] 18  BP: (132)/(87) 132/87  SpO2:  [98 %] 98 %  195 lbs 0 oz    GEN:  Alert, oriented x 3, appears nauseated. Jaundiced.  HEENT:  Normocephalic/atraumatic  CV:  Regular rate and rhythm, no murmur or JVD.  S1 + S2 noted, no S3 or S4.  LUNGS:  Clear to auscultation bilaterally without rales/rhonchi/wheezing/retractions.  Symmetric chest rise on inhalation noted.  ABD:  Ascites present. Abdomen diffusely tender  EXT:  No edema.  No cyanosis.  No joint synovitis noted.  SKIN:  Dry to touch, no exanthems noted in the visualized areas.  NEURO:  Symmetric muscle strength, sensation to touch grossly intact.  Coordination symmetric on general exam.  No new focal deficits appreciated.    Data   Data reviewed today:  I personally reviewed no images or EKG's today.    Lab Results   Component Value Date    WBC 7.1 07/15/2020    HGB 8.7 (L) 07/15/2020    HCT 27.9 (L) 07/15/2020     07/15/2020     07/15/2020    POTASSIUM 3.3 (L) 07/15/2020    CHLORIDE 109 07/15/2020    CO2 27 07/15/2020    BUN 5 (L) 07/15/2020    CR 0.62 07/15/2020    GLC 96 07/15/2020    SED 9 12/20/2007    NTBNPI 142 02/11/2020    TROPI 2.854 (HH) 02/22/2020    AST 32 07/15/2020    ALT 16 07/15/2020    ALKPHOS 71 07/15/2020    BILITOTAL 2.3 (H) 07/15/2020    SRINIVAS 44 02/24/2020    INR 1.87 (H) 06/24/2020

## 2020-07-16 NOTE — PROGRESS NOTES
RECEIVING UNIT ED HANDOFF REVIEW    ED Nurse Handoff Report was reviewed by: Marycarmen Johnson RN on July 15, 2020 at 7:53 PM

## 2020-07-16 NOTE — PROGRESS NOTES
"DATE & TIME: 7/16/2020; [id number]  Cognitive Concerns/ Orientation : A&Ox4   BEHAVIOR & AGGRESSION TOOL COLOR: Green  CIWA SCORE: N/A  ABNL VS/O2: VSS on RA  MOBILITY: Independent  PAIN MANAGMENT: 5-6/10 abdominal pain, cold pack applied  DIET: Clear liq diet, per , if tolerating for lunch -- notify MD to advance to fulls for dinner. Pt has apple juice and Popcycle for breakfast and felt more nauseated afterwards, PRN PO compazine administered. On schedule IV zofran TID. 1400 Tolerated 8oz applejuice and 1/2 fruit ice with no nausea. Advanced to full liquid for dinner. 1745 pt ate bites of chocolate ice cream, and chicken broth with a cup of apple juice.  BOWEL/BLADDER: BR  ABNL LAB/BG: Hbg 8.1  DRAIN/DEVICES: PIV SL  TELEMETRY RHYTHM: NA  SKIN: WDL ex pale, incisions healing from Lap Elena, dressing CDI  TESTS/PROCEDURES: EKG ordered for 7/17 am  D/C DAY/GOALS/PLACE: Possibly 7/17 pending tolerating diet.    Pt c/o nausea at 1800, PRN PO compazine administered. Pt vomited 200cc at 1815 but stated she \"felt better\".  "

## 2020-07-16 NOTE — PLAN OF CARE
DATE & TIME: 7/16/2020; 3598-0382  Cognitive Concerns/ Orientation : A&Ox4   BEHAVIOR & AGGRESSION TOOL COLOR: Green  CIWA SCORE: N/A  ABNL VS/O2: VSS on RA  MOBILITY: Independent  PAIN MANAGMENT: Stated to have pain center of abdomen and R side from lap eddie 3 done weeks ago, denied intervention.  DIET: NPO ex ice chips ok  BOWEL/BLADDER: Continent, pt stated to have had loose watery stools. No BM on shift.  ABNL LAB/BG: K 3.3, replacement not indicated. Large WBC count and THC found in urine.   DRAIN/DEVICES: PIV SL  TELEMETRY RHYTHM: N/A  SKIN: WDL ex pale and jaundiced. Incisions healing from Lap Eddie, dressing CDI, others open to air.   TESTS/PROCEDURES: EKG to be completed this AM.  D/C DAY/GOALS/PLACE: Pending.   OTHER IMPORTANT INFO: Intermittent nausea throughout shift, no episodes of emesis. Gave PRN Reglan and Compazine. Zofran and Protonix scheduled. Q4h VS.     MD/RN ROUNDING SIGNED OFF D/E SHIFT: N/A  COMMIT TO SIT DONE AND SIGNED OFF: Completed    OBS GOALS:  -diagnostic tests and consults completed and resulted - NOT MET  -vital signs normal or at patient baseline - MET  -tolerating oral intake to maintain hydration - NOT MET

## 2020-07-16 NOTE — PROGRESS NOTES
OBS GOALS:  -diagnostic tests and consults completed and resulted - NOT MET  -vital signs normal or at patient baseline - MET  -tolerating oral intake to maintain hydration - NOT MET

## 2020-07-16 NOTE — PROGRESS NOTES
Admission    Patient arrives to room 637-2 via cart from ED.  Care plan note: Pt A&Ox4, ambulating independently.    Inpatient nursing criteria listed below were met:    PCD's Documented: Yes  Skin issues/needs documented :Yes  Isolation education started/completed NA  Patient allergies verified with patient: No  Verified completion of [employer]yer Risk Assessment Tool:  Yes  Verified completion of Guardianship screening tool: Yes  Fall Prevention: Care plan updated, Education given and documented NA  Care Plan initiated: Yes  Home medications documented in belongings flowsheet: NA  Patient belongings documented in belongings flowsheet: Yes  Reminder note (belongings/ medications) placed in discharge instructions:No  Admission profile/ required documentation complete: No  Bedside Report Letter given and explained to patient Yes

## 2020-07-16 NOTE — PLAN OF CARE
Summary:     DATE & TIME: 7/15/2020 7902-2423  Cognitive Concerns/ Orientation : A&Ox4   BEHAVIOR & AGGRESSION TOOL COLOR: green  CIWA SCORE: n/a   ABNL VS/O2: vss on RA  MOBILITY: ind  PAIN MANAGMENT: pain in abdomen center and right from lap eddie 3 weeks ago  DIET: NPO ice chips ok  BOWEL/BLADDER: continent  ABNL LAB/BG: K+ 3.3- on IVF w/ K+  DRAIN/DEVICES: PIV infusing NaCl w/ K+ @ 125mL/hr  TELEMETRY RHYTHM: n/a  SKIN: WDL ex color- jaundiced  TESTS/PROCEDURES: none  D/C DAY/GOALS/PLACE: pending improvement of nausea and vomiting   OTHER IMPORTANT INFO:

## 2020-07-16 NOTE — CONSULTS
[facility]  Gastroenterology Consultation         Pat Delgado  [address]   [place] [address]  29 year old female    Admission Date/Time: 7/15/2020  Primary Care Provider: Clinic, [facility]  Referring / Attending Physician:  Dr. Lizzeth Morgan    We were asked to see the patient in consultation by Dr. Lizzeth Morgan for evaluation of nausea and vomiting.      CC: nausea and vomiting    HPI:  Pat Delgado is a 29 year old female who has a past medical history of alcoholic liver cirrhosis with ascites. Has had 5 months sober and over last 1.5 months has had nausea and vomiting that recurs and is intractable. Thought that cause was initially related to gallbladder dyskinesia and underwent laparoscopic cholecystectomy 3 weeks ago and symptoms did not improve and continued. Zofran prn has not helped. Underwent EGD and E U/S at outside facility noting bile acid gastritis and portable gastropathy with no evidence of dilated CBD.    VSS. Labs stable. Noted to have Cannabis positive in urine.    ROS: A comprehensive ten point review of systems was negative aside from those in mentioned in the HPI.      PAST MED HX:  I have reviewed this patient's medical history and updated it with pertinent information if needed.   Past Medical History:   Diagnosis Date     ADHD (attention deficit hyperactivity disorder)      Alcohol dependence (H) 1/20/2015     Anxiety      Chemical dependency (H) 1/22/2015     Depressive disorder, not elsewhere classified 8/20/2015    Per 1/22/15 visit with Batool Gonzalez NP      Hypertension goal BP (blood pressure) < 150/90 11/10/2015     Migraine headache      Substance abuse (H)      Tobacco abuse        MEDICATIONS:   Prior to Admission Medications   Prescriptions Last Dose Informant Patient Reported? Taking?   Multiple Vitamin (ONE-A-DAY ADULT VITACRAVES+DHA PO) Past Week at Unknown time  Yes Yes   Sig: Take 1 tablet by mouth daily    famotidine (PEPCID) 20 MG tablet  Self Yes Yes   Sig: Take 20 mg by mouth 2 times daily as needed   melatonin 3 MG CAPS Past Week at Unknown time  Yes Yes   Sig: Take 1 capsule by mouth At Bedtime   metoclopramide (REGLAN) 10 MG tablet 7/15/2020 at Unknown time  No Yes   Sig: Take 1 tablet (10 mg) by mouth 4 times daily as needed (Nausea or Vomiting)   omeprazole (PRILOSEC) 20 MG DR capsule new  No Yes   Sig: Take 2 capsules (40 mg) by mouth daily   prochlorperazine (COMPAZINE) 10 MG tablet   No Yes   Sig: Take 1 tablet (10 mg) by mouth every 6 hours as needed for nausea or vomiting   rifaximin (XIFAXAN) 550 MG TABS tablet 7/15/2020 at am  No Yes   Sig: Take 1 tablet (550 mg) by mouth 2 times daily   venlafaxine (EFFEXOR-XR) 75 MG 24 hr capsule 2020 at pm  Yes Yes   Sig: Take 75 mg by mouth daily      Facility-Administered Medications: None       ALLERGIES:   Allergies   Allergen Reactions     Amoxicillin      Penicillins Unknown     Hives         SOCIAL HISTORY:  Social History     Tobacco Use     Smoking status: Former Smoker     Types: Vaping Device     Last attempt to quit: 2020     Years since quittin.4     Smokeless tobacco: Current User   Substance Use Topics     Alcohol use: Not Currently     Drug use: Yes     Types: Marijuana     Comment: occ       FAMILY HISTORY:  Family History   Problem Relation Age of Onset     Anxiety Disorder Mother      Dementia Maternal Grandfather      Substance Abuse Other      Unknown/Adopted No family hx of      Depression No family hx of      Schizophrenia No family hx of      Bipolar Disorder No family hx of      Suicide No family hx of      Bath Disease No family hx of      Parkinsonism No family hx of      Autism Spectrum Disorder No family hx of      Intellectual Disability (Mental Retardation) No family hx of      Mental Illness No family hx of        PHYSICAL EXAM:   General  Alert, oriented and comfortable  Vital Signs with Ranges  Temp: 99.1  F  (37.3  C) Temp src: Oral BP: 121/78 Pulse: 109   Resp: 20 SpO2: 92 % O2 Device: None (Room air)    I/O last 3 completed shifts:  In: 718 [I.V.:718]  Out: -     Constitutional: healthy, alert, mild distress, cooperative and pale   Cardiovascular: negative, PMI normal. No lifts, heaves, or thrills. RRR. No murmurs, clicks gallops or rub  Respiratory: negative, Percussion normal. Good diaphragmatic excursion. Lungs clear  Head: Normocephalic. No masses, lesions, tenderness or abnormalities  Neck: Neck supple. No adenopathy. Thyroid symmetric, normal size,, Carotids without bruits.  Abdomen: Abdomen soft-- mildly distended and noted ascites, non-tender. BS normal. No masses, organomegaly, positive findings: tenderness mild epigastric      ADDITIONAL COMMENTS:   I reviewed the patient's new clinical lab test results.   Recent Labs   Lab Test 07/16/20  0829 07/15/20  1641 07/13/20  1549  06/24/20  0759  06/23/20  1932   WBC 8.5 7.1 7.3   < > 6.4  --   --    HGB 8.1* 8.7* 8.2*   < > 7.3*   < >  --    MCV 85 85 85   < > 87  --   --     298 271   < > 161  --   --    INR 2.05*  --   --   --  1.87*  --  2.06*    < > = values in this interval not displayed.     Recent Labs   Lab Test 07/16/20  0829 07/15/20  1641 07/13/20  1549   POTASSIUM 3.5 3.3* 3.6   CHLORIDE 109 109 107   CO2 23 27 26   BUN 4* 5* 6*   ANIONGAP 9 6 7     Recent Labs   Lab Test 07/15/20  1700 07/15/20  1641 07/13/20  1549 07/12/20  0333 07/02/20  1155  06/07/20  1448  05/24/20  0618   ALBUMIN  --  2.8* 2.8* 2.7* 2.5*   < >  --    < >  --    BILITOTAL  --  2.3* 2.2* 2.4* 2.6*   < >  --    < >  --    ALT  --  16 14 14 13   < >  --    < >  --    AST  --  32 25 31 25   < >  --    < >  --    PROTEIN 30*  --   --   --   --   --  10*  --  10*   LIPASE  --  105  --  137 143   < >  --    < >  --     < > = values in this interval not displayed.       I reviewed the patient's new imaging results.        CONSULTATION ASSESSMENT AND PLAN:    Pat Delgado is a  pleasant 29 year old female with history of decompensated alcoholic liver cirrhosis with nausea and vomiting starting 6 weeks ago, not improved with lap eddie. GI consulted for evaluation of n/v.    Nausea and vomiting  Alcoholic Liver cirrhosis with ascites  Alcoholic gastritis  Patient has had 6 weeks of recurrent and intractable nausea and vomiting. Has had an EGD with E U/S noting alcoholic gastritis and gastropathy with no evidence of choledocholithiasis. Smoking cannabis. Has ascites and not currently on diuretics.    Cause of nausea and vomiting likely related to use of cannabis as well as possibly decompensated liver cirrhosis post lap eddie.     Recommend that patient treated with around the clock antiemetics. Will need to avoid cannabis and alcohol.     - Continue with zofran injection TID  - Continue with IV pantoprazole IV 40mg BID  - Try clear liquid diet  - Once tolerating oral can start diuretics furosemide 20 mg and aldactone 100 mg  - Avoid cannabis    [facility].  Office: [phone]  Cell : [phone] (Dr. Hobson)  Cell: [phone] (Aminata Rangel PA-C)

## 2020-07-16 NOTE — PROGRESS NOTES
[facility]    HOSPITALIST PROGRESS NOTE :   --------------------------------------------------    Date of Admission:  7/15/2020    Cumulative Summary: Pat Delgado is a 29 year old female with past medical history significant for alcohol abuse in remission for last 5 months, alcoholic liver failure with recent prolonged hospitalization in the spring due to hepatorenal syndrome, history of anxiety and depression, essential hypertension, prior episodes of cyclic vomiting who was admitted from the emergency room on July 15 with intractable nausea and vomiting which was thought to be associated with her marijuana use.    Assessment & Plan     Active Problems:  Cyclic vomiting syndrome (7/15/2020), most likely secondary to cannabinoid use:  in the past 6 weeks patient is having intractable episodes of nausea and vomiting that recurs.  Initially it was thought to be related to gallbladder dyskinesia and she underwent laparoscopic cholecystectomy 3 weeks ago but symptoms did not improve.  She also underwent EGD and endoscopic ultrasound at outside facility which noted bile acid gastritis and probable gastropathy with no evidence of dilated common bile duct.  Her urine analysis is positive for cannabinoids and most likely her symptoms are secondary to cannabinoid intake.  She was admitted for further evaluation and management, IV diphenhydramine, Reglan, Compazine and Zyprexa was also ordered PRN for nausea and vomiting control.  Judicious amount of IV fluid was given initially due to her history of alcoholic liver disease and ascites.    --Continue to monitor patient closely.  --Started patient on clear liquid diet this morning, patient was able to tolerate that although required IV Zofran after the breakfast intake.  --Patient would like to stay on clear liquid diet for now, plan to advance diet to full liquid diet for dinner if patient is able to tolerate.  --As patient is taking clears, will not  give any IV fluids at this time to avoid salt load.  --Continue IV Reglan 10 mg every 4 hours as needed, continue Compazine 10 mg every 6 hours as needed for nausea  --We will continue patient on Zofran 8 mg 3 times daily IV routine for now.  --If needed then dose of olanzapine 10 mg IM can be given for nausea and vomiting which is not controlled with antiemetics as above.  -- counseled regarding avoiding cannabis , patient was receptive to the information     History of alcohol dependence: Now sober for 5 months.  Currently patient has not been on any diuretics.  Patient is also on rifaximin 550 mg p.o. twice daily.  Alcoholic liver cirrhosis with ascites  Alcoholic gastritis:  Recent cholecystectomy:     --Continue to monitor patient closely.  --As soon as patient is able to tolerate the medications will recommend starting patient back on rifaximin 550 mg p.o. twice daily to avoid hepatic encephalopathy.  --Continue patient on Protonix IV at this time.  --Agree with starting patient back on diuretics tomorrow as patient is able to tolerate oral medications.  --We will keep patient off IV fluids as patient is able to tolerate clears to avoid salt load.  --Patient should be continued on 2 g sodium diet at all time.    History of depression and anxiety:  --Restart Effexor extended release 75 mg p.o. daily once patient is able to take oral.    Diet: Clear Liquid Diet    Amado Catheter: not present  DVT Prophylaxis: Low Risk/Ambulatory with no VTE prophylaxis indicated  Code Status: Full Code    The patient's care was discussed with the Bedside Nurse, Patient and Patient's Family.( Mother was updated as per request)    Disposition Plan   Expected discharge: Tomorrow, recommended to prior living arrangement once able to tolerate oral diet and medications .  Entered: Shruti Long MD 07/16/2020, 10:28 AM     Shruti Long MD, FACP  Text Page (7am -  6pm)    ----------------------------------------------------------------------------------------------------------------------    Interval History   Patient care was assumed this morning , seen and examined , feeling minimally better this morning and although develop nausea after trying clears this morning , but she did not vomit after receiving IV Zofran, she does not think that her diet can be advanced further , we also had long discussion regarding most likely Marijuana being the precipitating factor for her cyclical vomiting syndrome.     -Data reviewed today: I reviewed all new labs and imaging results over the last 24 hours.    I personally reviewed no images or EKG's today.    Physical Exam   Temp: 99.1  F (37.3  C) Temp src: Oral BP: 121/78 Pulse: 109   Resp: 20 SpO2: 92 % O2 Device: None (Room air)    Vitals:    07/15/20 1612   Weight: 88.5 kg (195 lb)     Vital Signs with Ranges  Temp:  [97.9  F (36.6  C)-99.1  F (37.3  C)] 99.1  F (37.3  C)  Pulse:  [] 109  Resp:  [18-20] 20  BP: (121-137)/(75-88) 121/78  SpO2:  [92 %-98 %] 92 %  I/O last 3 completed shifts:  In: 718 [I.V.:718]  Out: -     GENERAL: Alert , awake and oriented. NAD. Conversational, appropriate.   HEENT: Normocephalic. EOMI. No icterus or injection. Nares normal.   LUNGS: Clear to auscultation. No dyspnea at rest.   HEART: Regular rate. Extremities perfused.   ABDOMEN: Soft, nontender, and nondistended. Positive bowel sounds.   EXTREMITIES: No LE edema noted.   NEUROLOGIC: Moves extremities x4 on command. No acute focal neurologic abnormalities noted.     Medications       ondansetron  8 mg Intravenous TID     [START ON 7/17/2020] pantoprazole (PROTONIX) IV  40 mg Intravenous BID       Data   Recent Labs   Lab 07/16/20  0829 07/15/20  1641 07/13/20  1549 07/12/20  0333   WBC 8.5 7.1 7.3 7.0   HGB 8.1* 8.7* 8.2* 9.0*   MCV 85 85 85 84    298 271 295   INR 2.05*  --   --   --     142 140 138   POTASSIUM 3.5 3.3* 3.6 3.6    CHLORIDE 109 109 107 108   CO2 23 27 26 22   BUN 4* 5* 6* 3*   CR 0.51* 0.62 0.56 0.47*   ANIONGAP 9 6 7 8   EMILY 8.8 9.2 9.3 9.1   GLC 82 96 104* 104*   ALBUMIN  --  2.8* 2.8* 2.7*   PROTTOTAL  --  7.2 7.1 7.0   BILITOTAL  --  2.3* 2.2* 2.4*   ALKPHOS  --  71 74 72   ALT  --  16 14 14   AST  --  32 25 31   LIPASE  --  105  --  137       Imaging:   No results found for this or any previous visit (from the past 24 hour(s)).

## 2020-07-17 VITALS
SYSTOLIC BLOOD PRESSURE: 125 MMHG | WEIGHT: 195 LBS | HEART RATE: 98 BPM | RESPIRATION RATE: 16 BRPM | HEIGHT: 68 IN | TEMPERATURE: 97.8 F | OXYGEN SATURATION: 96 % | DIASTOLIC BLOOD PRESSURE: 68 MMHG | BODY MASS INDEX: 29.55 KG/M2

## 2020-07-17 LAB
BACTERIA SPEC CULT: NORMAL
MAGNESIUM SERPL-MCNC: 1.9 MG/DL (ref 1.6–2.3)
SPECIMEN SOURCE: NORMAL

## 2020-07-17 PROCEDURE — 83735 ASSAY OF MAGNESIUM: CPT | Performed by: HOSPITALIST

## 2020-07-17 PROCEDURE — 25000132 ZZH RX MED GY IP 250 OP 250 PS 637: Performed by: PHYSICIAN ASSISTANT

## 2020-07-17 PROCEDURE — 25000132 ZZH RX MED GY IP 250 OP 250 PS 637: Performed by: HOSPITALIST

## 2020-07-17 PROCEDURE — 93010 ELECTROCARDIOGRAM REPORT: CPT | Performed by: INTERNAL MEDICINE

## 2020-07-17 PROCEDURE — 25000128 H RX IP 250 OP 636: Performed by: PHYSICIAN ASSISTANT

## 2020-07-17 PROCEDURE — 36415 COLL VENOUS BLD VENIPUNCTURE: CPT | Performed by: HOSPITALIST

## 2020-07-17 PROCEDURE — G0378 HOSPITAL OBSERVATION PER HR: HCPCS

## 2020-07-17 PROCEDURE — 99217 ZZC OBSERVATION CARE DISCHARGE: CPT | Performed by: INTERNAL MEDICINE

## 2020-07-17 PROCEDURE — 93005 ELECTROCARDIOGRAM TRACING: CPT

## 2020-07-17 RX ORDER — SPIRONOLACTONE 100 MG/1
100 TABLET, FILM COATED ORAL DAILY
Status: DISCONTINUED | OUTPATIENT
Start: 2020-07-17 | End: 2020-07-17 | Stop reason: HOSPADM

## 2020-07-17 RX ORDER — FUROSEMIDE 20 MG
20 TABLET ORAL DAILY
Status: DISCONTINUED | OUTPATIENT
Start: 2020-07-17 | End: 2020-07-17 | Stop reason: HOSPADM

## 2020-07-17 RX ORDER — SPIRONOLACTONE 100 MG/1
100 TABLET, FILM COATED ORAL DAILY
Qty: 30 TABLET | Refills: 0 | Status: SHIPPED | OUTPATIENT
Start: 2020-07-18 | End: 2020-10-04

## 2020-07-17 RX ORDER — ONDANSETRON 4 MG/1
4 TABLET, ORALLY DISINTEGRATING ORAL EVERY 6 HOURS PRN
Status: DISCONTINUED | OUTPATIENT
Start: 2020-07-17 | End: 2020-07-17

## 2020-07-17 RX ORDER — PANTOPRAZOLE SODIUM 40 MG/1
40 TABLET, DELAYED RELEASE ORAL
Status: DISCONTINUED | OUTPATIENT
Start: 2020-07-17 | End: 2020-07-17 | Stop reason: HOSPADM

## 2020-07-17 RX ORDER — ONDANSETRON 8 MG/1
8 TABLET, ORALLY DISINTEGRATING ORAL EVERY 8 HOURS PRN
Qty: 30 TABLET | Refills: 0 | Status: SHIPPED | OUTPATIENT
Start: 2020-07-17 | End: 2020-10-04

## 2020-07-17 RX ORDER — FUROSEMIDE 20 MG
20 TABLET ORAL DAILY
Qty: 30 TABLET | Refills: 0 | Status: SHIPPED | OUTPATIENT
Start: 2020-07-18 | End: 2020-10-04

## 2020-07-17 RX ORDER — ONDANSETRON 4 MG/1
8 TABLET, ORALLY DISINTEGRATING ORAL EVERY 6 HOURS PRN
Status: DISCONTINUED | OUTPATIENT
Start: 2020-07-17 | End: 2020-07-17 | Stop reason: HOSPADM

## 2020-07-17 RX ORDER — PANTOPRAZOLE SODIUM 40 MG/1
40 TABLET, DELAYED RELEASE ORAL
Qty: 60 TABLET | Refills: 0 | Status: SHIPPED | OUTPATIENT
Start: 2020-07-17

## 2020-07-17 RX ADMIN — PANTOPRAZOLE SODIUM 40 MG: 40 TABLET, DELAYED RELEASE ORAL at 08:31

## 2020-07-17 RX ADMIN — PROCHLORPERAZINE MALEATE 10 MG: 5 TABLET ORAL at 12:21

## 2020-07-17 RX ADMIN — SPIRONOLACTONE 100 MG: 100 TABLET ORAL at 10:56

## 2020-07-17 RX ADMIN — FUROSEMIDE 20 MG: 20 TABLET ORAL at 09:36

## 2020-07-17 RX ADMIN — ONDANSETRON 4 MG: 4 TABLET, ORALLY DISINTEGRATING ORAL at 08:31

## 2020-07-17 NOTE — PLAN OF CARE
Cognitive Concerns/ Orientation : A&Ox4   BEHAVIOR & AGGRESSION TOOL COLOR: Green  CIWA SCORE: N/A  ABNL VS/O2: VSS 92% room air   MOBILITY: Independent  PAIN MANAGMENT: 5/10 abdominal pain, lap eddie 3 wks ago, Tylenol given   DIET: Advanced to full liquid diet for dinner, had emesis after dinner felt better afterwards.  Intermittent nausea.   BOWEL/BLADDER: Continent   ABNL LAB/BG: Hbg 8.1 Positive for cannabinoids   DRAIN/DEVICES: Peripheral IV SL right arm   TELEMETRY RHYTHM: NA  SKIN: WDL ex pale, incisions healing from Lap Eddie, dressing CDI  TESTS/PROCEDURES: EKG ordered for 7/17 am  D/C DAY/GOALS/PLACE: Possibly 7/17 pending tolerating diet.  OTHER IMPORTANT INFO: Intermittent nausea, no emesis this shift, on scheduled Zofran

## 2020-07-17 NOTE — DISCHARGE SUMMARY
[facility]  Hospitalist Discharge Summary      Date of Admission:  7/15/2020  Date of Discharge:  7/17/2020  Discharging Provider: Shruti Long MD, FACP    Discharge Diagnoses   Cyclic vomiting syndrome, most likely secondary to cannabinoid use.  Alcoholic liver disease.  Alcohol abuse in remission for last 5 months.  History of hepatorenal syndrome earlier this year.  History of anxiety and depression, stable.  Essential hypertension.    Follow-ups Needed After Discharge   Follow-up Appointments     Follow-up and recommended labs and tests      Follow up with primary care provider, [facility],   within 7 days to evaluate treatment change and for hospital follow- up.    The following labs/tests are recommended: BMP.           Unresulted Labs Ordered in the Past 30 Days of this Admission     Date and Time Order Name Status Description    6/23/2020 2013 Plasma prepare order unit In process     6/23/2020 1633 Plasma prepare order unit In process       These results will be followed up by PCP.    Discharge Disposition   Discharged to home  Condition at discharge: Stable    Hospital Course   Pat Delgado is a 29 year old female with past medical history significant for alcohol abuse in remission for last 5 months, alcoholic liver failure with recent prolonged hospitalization in the spring due to hepatorenal syndrome, history of anxiety and depression, essential hypertension, prior episodes of cyclic vomiting who was admitted from the emergency room on July 15 with intractable nausea and vomiting which was thought to be associated with her marijuana use.  Here are further details regarding her current hospitalization     Cyclic vomiting syndrome (7/15/2020), most likely secondary to cannabinoid use:  in the past 6 weeks patient is having intractable episodes of nausea and vomiting that recurs.  Initially it was thought to be related to gallbladder dyskinesia and she underwent  laparoscopic cholecystectomy 3 weeks ago but symptoms did not improve.  She also underwent EGD and endoscopic ultrasound at outside facility which noted bile acid gastritis and probable gastropathy with no evidence of dilated common bile duct.  Her urine analysis is positive for cannabinoids and most likely her symptoms are secondary to cannabinoid intake.  She was admitted for further evaluation and management, IV diphenhydramine, Reglan, Compazine and Zyprexa was also ordered PRN for nausea and vomiting control.  Judicious amount of IV fluid was given initially due to her history of alcoholic liver disease and ascites.  Patient.s IV fluids were discontinued early as soon as she was able to take some clears , she was also evaluated by GI . She was started on clears yesterday morning with scheduled Zofran which helped her and she was advanced to full liquid last evening , she had one episode of emesis last evening but this morning was able to keep the cream of wheat down.she is hoping that she can go home later today this afternoon.    She is advised to stay on full liquid diet for few days and take Zofran on scheduled bases for the next few days. She is also discharged on Protonix BID due to the concern for gastritis . She also needs script for Lasix and Aldactone which was given, she does have Rifaximin at home . She was once again advised to stay off from Cannabinoids , patient told me that she was not aware that it was causing her the symptoms and she is planning to stay abstinent from it .       History of alcohol dependence: Now sober for 5 months.  Currently patient has not been on any diuretics.  Patient is also on rifaximin 550 mg p.o. twice daily.  Alcoholic liver cirrhosis with ascites  Alcoholic gastritis:  Recent cholecystectomy:      --Continue Rifaximin 550 mg p.o. twice daily to avoid hepatic encephalopathy.  --Continue Protonix on discharge   --Agree with starting patient back on diuretics, scripts  given as above.  -- discussed with patient to advance her diet to low salt diet     History of depression and anxiety:  --Restarted Effexor extended release 75 mg p.o. daily on discharge     Patient was seen and examined on the day of discharge ,she is feeling well, does not have any complaints , I did review the discharge medications and instructions with the patient and plan for her to follow up with the PCP after the hospitalization .patient was in agreement , she is discharged in stable condition to her home.    Consultations This Hospital Stay   GASTROENTEROLOGY IP CONSULT    Code Status   Full Code    Time Spent on this Encounter   I, Shruti Long MD, personally saw the patient today and spent less than or equal to 30 minutes discharging this patient.     Shruti Long MD, FACP  [facility]  ______________________________________________________________________    Physical Exam   Vital Signs: Temp: 97.8  F (36.6  C) Temp src: Oral BP: 125/68 Pulse: 98   Resp: 16 SpO2: 96 % O2 Device: None (Room air)    Weight: 195 lbs 0 oz    Physical Exam  Vitals signs and nursing note reviewed.   Constitutional:       Appearance: She is well-developed.   HENT:      Head: Normocephalic and atraumatic.   Eyes:      Conjunctiva/sclera: Conjunctivae normal.      Pupils: Pupils are equal, round, and reactive to light.   Neck:      Musculoskeletal: Normal range of motion and neck supple.      Thyroid: No thyromegaly.   Cardiovascular:      Rate and Rhythm: Normal rate and regular rhythm.      Heart sounds: Normal heart sounds. No murmur.   Pulmonary:      Effort: Pulmonary effort is normal. No respiratory distress.      Breath sounds: Normal breath sounds. No wheezing.   Abdominal:      General: Bowel sounds are normal.      Palpations: Abdomen is soft.      Tenderness: There is no abdominal tenderness. There is no guarding or rebound.   Musculoskeletal: Normal range of motion.         General: No deformity.    Skin:     General: Skin is warm and dry.   Neurological:      Mental Status: She is alert and oriented to person, place, and time.   Psychiatric:         Behavior: Behavior normal.          Primary Care Physician   [facility]    Discharge Orders      Reason for your hospital stay    You were admitted to the hospital secondary to cyclic vomiting syndrome     Follow-up and recommended labs and tests    Follow up with primary care provider, [facility], within 7 days to evaluate treatment change and for hospital follow- up.  The following labs/tests are recommended: BMP.     Activity    Your activity upon discharge: activity as tolerated and no driving for today     Discharge Instructions    Please take Protonix 40 mg two times a day , you can stop taking Prilosec you should also take Zofran 8 mg three times a day half an hour before your meal for next few days and then when your nausea is improved, only use it as needed .  You were also discharged on Lasix 20 mg p.o. daily and Aldactone 100 mg p.o. daily, you are advised to get your electrolytes and renal functions get checked in a week with your primary care physician.  Keep your outpatient follow-up appointment with gastroenterology as recommended     Full Code     Diet    Follow this diet upon discharge: Orders Placed This Encounter      Full Liquid Diet         Significant Results and Procedures   Results for orders placed or performed during the hospital encounter of 06/29/20   CT Abdomen Pelvis w Contrast    Narrative    CT ABDOMEN/PELVIS WITH CONTRAST June 29, 2020 12:21 PM    CLINICAL HISTORY: Abdominal pain and distention status post  laparoscopic cholecystectomy.    TECHNIQUE: CT scan of the abdomen and pelvis was performed following  injection of IV contrast. Multiplanar reformats were obtained. Dose  reduction techniques were used.  CONTRAST: 110mL Isovue-370    COMPARISON: CT abdomen/pelvis from  2/9/2020.    FINDINGS:   LOWER CHEST: Mild cardiomegaly more prominent than on the prior study.  New small right pleural effusion with associated atelectasis.    HEPATOBILIARY: Significant improvement in fatty infiltration of the  liver. Liver size has decreased to more normal size as well. No  intrahepatic biliary dilatation.    Evidence of recent cholecystectomy with two clips in the gallbladder  bed. Small amount of air and fluid here not unexpected six days  postcholecystectomy. This has the appearance of a postoperative fluid  collection and not that of a formed abscess as yet.    PANCREAS: Normal.    SPLEEN: Mild to moderate splenomegaly. This is significantly more  prominent than on the prior study. No significant venous collaterals  seen.    ADRENAL GLANDS: Normal.    KIDNEYS/BLADDER: Normal.    BOWEL: No dilated bowel or thickened bowel wall. No evidence of  diverticulitis or appendicitis.    PELVIC ORGANS: Unremarkable.    ADDITIONAL FINDINGS: Small to moderate amount of ascites in the pelvis  similar to the prior study. No free air. Large amount of subcutaneous  edema in the abdomen and pelvis can be seen in anasarca. There appears  to be fluid and some air in the region of the umbilicus likely a  laparoscopic port.    MUSCULOSKELETAL: Unremarkable.      Impression    IMPRESSION:   1.  Six days status post cholecystectomy. There is fluid and a small  amount of air in the gallbladder fossa as would be expected  postoperatively. No formed abscess seen.  2.  Mild cardiomegaly more prominent than on the prior CT scan.  3.  Significant increase in splenic size/splenomegaly.  4.  Marked improvement in fatty infiltration of the liver.  5.  Prominent subcutaneous edema in the abdomen and pelvis suspicious  for anasarca.  6.  Fairly stable mild ascites.    AG NEWMAN MD       Discharge Medications   Current Discharge Medication List      START taking these medications    Details   furosemide (LASIX) 20 MG  tablet Take 1 tablet (20 mg) by mouth daily  Qty: 30 tablet, Refills: 0    Comments: Future refills by PCP Dr[facility] with phone number [phone].  Associated Diagnoses: Cyclic vomiting syndrome; Alcoholic hepatitis, unspecified whether ascites present      ondansetron (ZOFRAN-ODT) 8 MG ODT tab Take 1 tablet (8 mg) by mouth every 8 hours as needed for nausea or vomiting  Qty: 30 tablet, Refills: 0    Comments: Future refills by PCP [facility] with phone number [phone].  Associated Diagnoses: Cyclic vomiting syndrome; Alcoholic hepatitis, unspecified whether ascites present      pantoprazole (PROTONIX) 40 MG EC tablet Take 1 tablet (40 mg) by mouth 2 times daily (before meals)  Qty: 60 tablet, Refills: 0    Comments: Future refills by PCP [facility] with phone number [phone].  Associated Diagnoses: Cyclic vomiting syndrome; Alcoholic hepatitis, unspecified whether ascites present      spironolactone (ALDACTONE) 100 MG tablet Take 1 tablet (100 mg) by mouth daily  Qty: 30 tablet, Refills: 0    Comments: Future refills by PCP Dr[facility] with phone number [phone].  Associated Diagnoses: Cyclic vomiting syndrome; Alcoholic hepatitis, unspecified whether ascites present         CONTINUE these medications which have NOT CHANGED    Details   famotidine (PEPCID) 20 MG tablet Take 20 mg by mouth 2 times daily as needed      melatonin 3 MG CAPS Take 1 capsule by mouth At Bedtime      metoclopramide (REGLAN) 10 MG tablet Take 1 tablet (10 mg) by mouth 4 times daily as needed (Nausea or Vomiting)  Qty: 21 tablet, Refills: 0      Multiple Vitamin (ONE-A-DAY ADULT VITACRAVES+DHA PO) Take 1 tablet by mouth daily      rifaximin (XIFAXAN) 550 MG TABS tablet Take 1 tablet (550 mg) by mouth 2 times daily  Qty: 60 tablet, Refills: 0    Associated Diagnoses: Alcoholic hepatitis with ascites      venlafaxine  (EFFEXOR-XR) 75 MG 24 hr capsule Take 75 mg by mouth daily         STOP taking these medications       omeprazole (PRILOSEC) 20 MG DR capsule Comments:   Reason for Stopping:         prochlorperazine (COMPAZINE) 10 MG tablet Comments:   Reason for Stopping:             Allergies   Allergies   Allergen Reactions     Amoxicillin      Penicillins Unknown     Hives

## 2020-07-17 NOTE — PLAN OF CARE
Discharge    Patient discharged to home with dad  Care plan note  Discharge instructions reviewed. Questions answered. IV removed. Belongings accounted for. Tolerated full liquid diet  Listed belongings gathered and returned to patient. yes  Care Plan and Patient education resolved: yes  Prescriptions if needed, hard copies sent with patient  yes, given to pt  Home and hospital acquired medications returned to patient: yes  Medication Bin checked and emptied on discharge yes  Follow up appointment made for patient: will make PMD appointment and contact pt with date/time and location    Contacted patient with MD appointment on July 22 nd at 11 am with Dr Sera Saab.

## 2020-07-17 NOTE — PROGRESS NOTES
[facility]  Gastroenterology Progress Note     Pat Delgado MRN# [id number]   YOB: 1990 Age: 29 year old          Assessment and Plan:   Interval History: Patient tolerated clear liquids overnight. Feeling less nauseated, still requiring antiemetics.     Nausea and vomiting  Alcoholic Liver cirrhosis with ascites  Alcoholic gastritis  Pat Delgado is a pleasant 29 year old female with history of decompensated alcoholic liver cirrhosis with nausea and vomiting starting 6 weeks ago, not improved with lap eddie. GI consulted for evaluation of n/v.Patient has had 6 weeks of recurrent and intractable nausea and vomiting. Has had an EGD with [facility] noting alcoholic gastritis and gastropathy with no evidence of choledocholithiasis. Smoking cannabis. Has ascites and not currently on diuretics.     Cause of nausea and vomiting likely related to use of cannabis as well as possibly decompensated liver cirrhosis post lap eddie.      Recommend that patient treated with around the clock antiemetics. Will need to avoid cannabis and alcohol.      - Switch to oral zofran 8 mg TID  - Switch to oral pantoprazole 40mg BID  - Advance to full liquids  - Once tolerating oral can start diuretics furosemide 20 mg and aldactone 100 mg  - Avoid cannabis  - If tolerates full liquid diet, can consider discharge today per GI          Non-intractable vomiting with nausea, unspecified vomiting type  Dehydration      Interval History:   no new complaints, doing well, denies chest pain, denies shortness of breath, denies abdominal pain and doing well; no cp, sob, n/v/d, or abd pain.              Review of Systems:   C: NEGATIVE for fever, chills, change in weight  E/M: NEGATIVE for ear, mouth and throat problems  R: NEGATIVE for significant cough or SOB  CV: NEGATIVE for chest pain, palpitations or peripheral edema             Medications:   I have reviewed this patient's current medications    pantoprazole   40 mg Oral BID AC                  Physical Exam:   Vitals were reviewed  Vital Signs with Ranges  Temp:  [97.8  F (36.6  C)-99.7  F (37.6  C)] 97.8  F (36.6  C)  Pulse:  [] 98  Resp:  [16-20] 16  BP: ()/(53-68) 125/68  SpO2:  [92 %-96 %] 96 %  I/O last 3 completed shifts:  In: 1020 [P.O.:1020]  Out: 200 [Emesis/NG output:200]  Constitutional: healthy, alert and no distress            Data:   I reviewed the patient's new clinical lab test results.   Recent Labs   Lab Test 07/16/20  0829 07/15/20  1641 07/13/20  1549  06/24/20  0759  06/23/20  1932   WBC 8.5 7.1 7.3   < > 6.4  --   --    HGB 8.1* 8.7* 8.2*   < > 7.3*   < >  --    MCV 85 85 85   < > 87  --   --     298 271   < > 161  --   --    INR 2.05*  --   --   --  1.87*  --  2.06*    < > = values in this interval not displayed.     Recent Labs   Lab Test 07/16/20  0829 07/15/20  1641 07/13/20  1549   POTASSIUM 3.5 3.3* 3.6   CHLORIDE 109 109 107   CO2 23 27 26   BUN 4* 5* 6*   ANIONGAP 9 6 7     Recent Labs   Lab Test 07/15/20  1700 07/15/20  1641 07/13/20  1549 07/12/20  0333 07/02/20  1155  06/07/20  1448  05/24/20  0618   ALBUMIN  --  2.8* 2.8* 2.7* 2.5*   < >  --    < >  --    BILITOTAL  --  2.3* 2.2* 2.4* 2.6*   < >  --    < >  --    ALT  --  16 14 14 13   < >  --    < >  --    AST  --  32 25 31 25   < >  --    < >  --    PROTEIN 30*  --   --   --   --   --  10*  --  10*   LIPASE  --  105  --  137 143   < >  --    < >  --     < > = values in this interval not displayed.       I reviewed the patient's new imaging results.    All laboratory data reviewed  All imaging studies reviewed by me.    Aminata Rangel PA-C,  7/17/2020  [facility]  Office : [phone]  Cell: [phone] (Dr. Hobson)  Cell: [phone] (Aminata Rangel PA-C)

## 2020-07-22 LAB — INTERPRETATION ECG - MUSE: NORMAL

## 2020-08-05 NOTE — ED TRIAGE NOTES
Called pt's cell, but no answer. Left Voice Message reminding him about his lab apt today at noon, and about his Telephone/video apt with Dr Prieto on Friday at 2:00.  Dalila Wilson RN     abd pain - pt had gallbladder removed week ago - denies n/v/d  Pain across abd area and at incision sites

## 2020-10-04 ENCOUNTER — APPOINTMENT (OUTPATIENT)
Dept: GENERAL RADIOLOGY | Facility: CLINIC | Age: 30
DRG: 866 | End: 2020-10-04
Attending: EMERGENCY MEDICINE
Payer: COMMERCIAL

## 2020-10-04 ENCOUNTER — HOSPITAL ENCOUNTER (INPATIENT)
Facility: CLINIC | Age: 30
LOS: 1 days | Discharge: HOME | DRG: 866 | End: 2020-10-05
Attending: EMERGENCY MEDICINE | Admitting: STUDENT IN AN ORGANIZED HEALTH CARE EDUCATION/TRAINING PROGRAM
Payer: COMMERCIAL

## 2020-10-04 DIAGNOSIS — D64.9 ANEMIA, UNSPECIFIED TYPE: ICD-10-CM

## 2020-10-04 DIAGNOSIS — M62.81 MUSCLE WEAKNESS (GENERALIZED): ICD-10-CM

## 2020-10-04 LAB
ABO + RH BLD: NORMAL
ABO + RH BLD: NORMAL
ACANTHOCYTES BLD QL SMEAR: SLIGHT
ALBUMIN SERPL-MCNC: 3.2 G/DL (ref 3.4–5)
ALBUMIN UR-MCNC: NEGATIVE MG/DL
ALP SERPL-CCNC: 68 U/L (ref 40–150)
ALT SERPL W P-5'-P-CCNC: 18 U/L (ref 0–50)
ANION GAP SERPL CALCULATED.3IONS-SCNC: 5 MMOL/L (ref 3–14)
APPEARANCE UR: CLEAR
AST SERPL W P-5'-P-CCNC: 23 U/L (ref 0–45)
BASOPHILS # BLD AUTO: 0 10E9/L (ref 0–0.2)
BASOPHILS NFR BLD AUTO: 0 %
BILIRUB SERPL-MCNC: 0.7 MG/DL (ref 0.2–1.3)
BILIRUB UR QL STRIP: NEGATIVE
BLD GP AB SCN SERPL QL: NORMAL
BLD PROD TYP BPU: NORMAL
BLD PROD TYP BPU: NORMAL
BLD UNIT ID BPU: 0
BLOOD BANK CMNT PATIENT-IMP: NORMAL
BLOOD PRODUCT CODE: NORMAL
BPU ID: NORMAL
BUN SERPL-MCNC: 15 MG/DL (ref 7–30)
CALCIUM SERPL-MCNC: 8.8 MG/DL (ref 8.5–10.1)
CHLORIDE SERPL-SCNC: 107 MMOL/L (ref 94–109)
CO2 SERPL-SCNC: 27 MMOL/L (ref 20–32)
COLOR UR AUTO: ABNORMAL
CREAT SERPL-MCNC: 0.52 MG/DL (ref 0.52–1.04)
DEPRECATED S PYO AG THROAT QL EIA: NEGATIVE
DIFFERENTIAL METHOD BLD: ABNORMAL
EOSINOPHIL # BLD AUTO: 0.2 10E9/L (ref 0–0.7)
EOSINOPHIL NFR BLD AUTO: 2 %
ERYTHROCYTE [DISTWIDTH] IN BLOOD BY AUTOMATED COUNT: 14.8 % (ref 10–15)
GFR SERPL CREATININE-BSD FRML MDRD: >90 ML/MIN/{1.73_M2}
GLUCOSE SERPL-MCNC: 97 MG/DL (ref 70–99)
GLUCOSE UR STRIP-MCNC: NEGATIVE MG/DL
HCG UR QL: NEGATIVE
HCT VFR BLD AUTO: 22.8 % (ref 35–47)
HEMOCCULT STL QL: NEGATIVE
HGB BLD-MCNC: 6.8 G/DL (ref 11.7–15.7)
HGB BLD-MCNC: 7.3 G/DL (ref 11.7–15.7)
HGB UR QL STRIP: NEGATIVE
HYPOCHROMIA BLD QL: PRESENT
INR PPP: 1.38 (ref 0.86–1.14)
KETONES UR STRIP-MCNC: NEGATIVE MG/DL
LACTATE BLD-SCNC: 0.8 MMOL/L (ref 0.7–2)
LEUKOCYTE ESTERASE UR QL STRIP: NEGATIVE
LIPASE SERPL-CCNC: 218 U/L (ref 73–393)
LYMPHOCYTES # BLD AUTO: 1.4 10E9/L (ref 0.8–5.3)
LYMPHOCYTES NFR BLD AUTO: 18 %
MCH RBC QN AUTO: 21.5 PG (ref 26.5–33)
MCHC RBC AUTO-ENTMCNC: 29.8 G/DL (ref 31.5–36.5)
MCV RBC AUTO: 72 FL (ref 78–100)
MONOCYTES # BLD AUTO: 0.5 10E9/L (ref 0–1.3)
MONOCYTES NFR BLD AUTO: 6 %
MUCOUS THREADS #/AREA URNS LPF: PRESENT /LPF
NEUTROPHILS # BLD AUTO: 5.8 10E9/L (ref 1.6–8.3)
NEUTROPHILS NFR BLD AUTO: 74 %
NITRATE UR QL: NEGATIVE
NRBC # BLD AUTO: 0.1 10*3/UL
NRBC BLD AUTO-RTO: 1 /100
NUM BPU REQUESTED: 1
OVALOCYTES BLD QL SMEAR: SLIGHT
PH UR STRIP: 6.5 PH (ref 5–7)
PLATELET # BLD AUTO: 277 10E9/L (ref 150–450)
PLATELET # BLD EST: ABNORMAL 10*3/UL
POTASSIUM SERPL-SCNC: 3.3 MMOL/L (ref 3.4–5.3)
PROT SERPL-MCNC: 7.2 G/DL (ref 6.8–8.8)
RBC # BLD AUTO: 3.16 10E12/L (ref 3.8–5.2)
RBC #/AREA URNS AUTO: 1 /HPF (ref 0–2)
SODIUM SERPL-SCNC: 139 MMOL/L (ref 133–144)
SOURCE: ABNORMAL
SP GR UR STRIP: 1 (ref 1–1.03)
SPECIMEN EXP DATE BLD: NORMAL
SPECIMEN SOURCE: NORMAL
SPECIMEN SOURCE: NORMAL
SQUAMOUS #/AREA URNS AUTO: <1 /HPF (ref 0–1)
STREP GROUP A PCR: NOT DETECTED
TARGETS BLD QL SMEAR: SLIGHT
TRANSFUSION STATUS PATIENT QL: NORMAL
TRANSFUSION STATUS PATIENT QL: NORMAL
UROBILINOGEN UR STRIP-MCNC: NORMAL MG/DL (ref 0–2)
WBC # BLD AUTO: 7.9 10E9/L (ref 4–11)
WBC #/AREA URNS AUTO: <1 /HPF (ref 0–5)

## 2020-10-04 PROCEDURE — 86901 BLOOD TYPING SEROLOGIC RH(D): CPT | Performed by: EMERGENCY MEDICINE

## 2020-10-04 PROCEDURE — G0378 HOSPITAL OBSERVATION PER HR: HCPCS

## 2020-10-04 PROCEDURE — 83605 ASSAY OF LACTIC ACID: CPT | Performed by: EMERGENCY MEDICINE

## 2020-10-04 PROCEDURE — 85018 HEMOGLOBIN: CPT | Performed by: STUDENT IN AN ORGANIZED HEALTH CARE EDUCATION/TRAINING PROGRAM

## 2020-10-04 PROCEDURE — 120N000004 HC R&B MS OVERFLOW

## 2020-10-04 PROCEDURE — 85610 PROTHROMBIN TIME: CPT | Performed by: EMERGENCY MEDICINE

## 2020-10-04 PROCEDURE — 258N000003 HC RX IP 258 OP 636: Performed by: STUDENT IN AN ORGANIZED HEALTH CARE EDUCATION/TRAINING PROGRAM

## 2020-10-04 PROCEDURE — C9113 INJ PANTOPRAZOLE SODIUM, VIA: HCPCS | Performed by: EMERGENCY MEDICINE

## 2020-10-04 PROCEDURE — 80053 COMPREHEN METABOLIC PANEL: CPT | Performed by: EMERGENCY MEDICINE

## 2020-10-04 PROCEDURE — 250N000011 HC RX IP 250 OP 636: Performed by: EMERGENCY MEDICINE

## 2020-10-04 PROCEDURE — 82272 OCCULT BLD FECES 1-3 TESTS: CPT | Performed by: EMERGENCY MEDICINE

## 2020-10-04 PROCEDURE — C9803 HOPD COVID-19 SPEC COLLECT: HCPCS

## 2020-10-04 PROCEDURE — U0003 INFECTIOUS AGENT DETECTION BY NUCLEIC ACID (DNA OR RNA); SEVERE ACUTE RESPIRATORY SYNDROME CORONAVIRUS 2 (SARS-COV-2) (CORONAVIRUS DISEASE [COVID-19]), AMPLIFIED PROBE TECHNIQUE, MAKING USE OF HIGH THROUGHPUT TECHNOLOGIES AS DESCRIBED BY CMS-2020-01-R: HCPCS | Performed by: EMERGENCY MEDICINE

## 2020-10-04 PROCEDURE — 99223 1ST HOSP IP/OBS HIGH 75: CPT | Mod: AI | Performed by: STUDENT IN AN ORGANIZED HEALTH CARE EDUCATION/TRAINING PROGRAM

## 2020-10-04 PROCEDURE — P9016 RBC LEUKOCYTES REDUCED: HCPCS | Performed by: EMERGENCY MEDICINE

## 2020-10-04 PROCEDURE — 999N001174 HC STATISTIC STREP A RAPID: Performed by: EMERGENCY MEDICINE

## 2020-10-04 PROCEDURE — 81001 URINALYSIS AUTO W/SCOPE: CPT | Performed by: EMERGENCY MEDICINE

## 2020-10-04 PROCEDURE — 87651 STREP A DNA AMP PROBE: CPT | Performed by: EMERGENCY MEDICINE

## 2020-10-04 PROCEDURE — 99285 EMERGENCY DEPT VISIT HI MDM: CPT | Mod: 25

## 2020-10-04 PROCEDURE — 71045 X-RAY EXAM CHEST 1 VIEW: CPT

## 2020-10-04 PROCEDURE — 250N000011 HC RX IP 250 OP 636: Performed by: STUDENT IN AN ORGANIZED HEALTH CARE EDUCATION/TRAINING PROGRAM

## 2020-10-04 PROCEDURE — 85025 COMPLETE CBC W/AUTO DIFF WBC: CPT | Performed by: EMERGENCY MEDICINE

## 2020-10-04 PROCEDURE — 81025 URINE PREGNANCY TEST: CPT | Performed by: EMERGENCY MEDICINE

## 2020-10-04 PROCEDURE — 96374 THER/PROPH/DIAG INJ IV PUSH: CPT

## 2020-10-04 PROCEDURE — 36415 COLL VENOUS BLD VENIPUNCTURE: CPT | Performed by: STUDENT IN AN ORGANIZED HEALTH CARE EDUCATION/TRAINING PROGRAM

## 2020-10-04 PROCEDURE — 86850 RBC ANTIBODY SCREEN: CPT | Performed by: EMERGENCY MEDICINE

## 2020-10-04 PROCEDURE — 83690 ASSAY OF LIPASE: CPT | Performed by: EMERGENCY MEDICINE

## 2020-10-04 PROCEDURE — 86923 COMPATIBILITY TEST ELECTRIC: CPT | Performed by: EMERGENCY MEDICINE

## 2020-10-04 PROCEDURE — 86900 BLOOD TYPING SEROLOGIC ABO: CPT | Performed by: EMERGENCY MEDICINE

## 2020-10-04 PROCEDURE — 250N000013 HC RX MED GY IP 250 OP 250 PS 637: Performed by: STUDENT IN AN ORGANIZED HEALTH CARE EDUCATION/TRAINING PROGRAM

## 2020-10-04 PROCEDURE — C9113 INJ PANTOPRAZOLE SODIUM, VIA: HCPCS | Performed by: STUDENT IN AN ORGANIZED HEALTH CARE EDUCATION/TRAINING PROGRAM

## 2020-10-04 RX ORDER — POTASSIUM CL/LIDO/0.9 % NACL 10MEQ/0.1L
10 INTRAVENOUS SOLUTION, PIGGYBACK (ML) INTRAVENOUS
Status: DISCONTINUED | OUTPATIENT
Start: 2020-10-04 | End: 2020-10-05 | Stop reason: HOSPADM

## 2020-10-04 RX ORDER — GABAPENTIN 100 MG/1
100 CAPSULE ORAL DAILY PRN
COMMUNITY

## 2020-10-04 RX ORDER — DEXTROAMPHETAMINE SACCHARATE, AMPHETAMINE ASPARTATE MONOHYDRATE, DEXTROAMPHETAMINE SULFATE AND AMPHETAMINE SULFATE 7.5; 7.5; 7.5; 7.5 MG/1; MG/1; MG/1; MG/1
30 CAPSULE, EXTENDED RELEASE ORAL EVERY MORNING
COMMUNITY

## 2020-10-04 RX ORDER — SODIUM CHLORIDE, SODIUM LACTATE, POTASSIUM CHLORIDE, CALCIUM CHLORIDE 600; 310; 30; 20 MG/100ML; MG/100ML; MG/100ML; MG/100ML
INJECTION, SOLUTION INTRAVENOUS CONTINUOUS
Status: DISCONTINUED | OUTPATIENT
Start: 2020-10-04 | End: 2020-10-05 | Stop reason: HOSPADM

## 2020-10-04 RX ORDER — HYDROXYZINE HYDROCHLORIDE 25 MG/1
50 TABLET, FILM COATED ORAL EVERY 6 HOURS PRN
Status: DISCONTINUED | OUTPATIENT
Start: 2020-10-04 | End: 2020-10-05 | Stop reason: HOSPADM

## 2020-10-04 RX ORDER — DEXTROAMPHETAMINE SACCHARATE, AMPHETAMINE ASPARTATE, DEXTROAMPHETAMINE SULFATE AND AMPHETAMINE SULFATE 5; 5; 5; 5 MG/1; MG/1; MG/1; MG/1
20 TABLET ORAL DAILY
COMMUNITY

## 2020-10-04 RX ORDER — VENLAFAXINE HYDROCHLORIDE 37.5 MG/1
37.5 TABLET, EXTENDED RELEASE ORAL DAILY
COMMUNITY

## 2020-10-04 RX ORDER — ONDANSETRON 2 MG/ML
4 INJECTION INTRAMUSCULAR; INTRAVENOUS EVERY 6 HOURS PRN
Status: DISCONTINUED | OUTPATIENT
Start: 2020-10-04 | End: 2020-10-05 | Stop reason: HOSPADM

## 2020-10-04 RX ORDER — POTASSIUM CHLORIDE 7.45 MG/ML
10 INJECTION INTRAVENOUS
Status: DISCONTINUED | OUTPATIENT
Start: 2020-10-04 | End: 2020-10-05 | Stop reason: HOSPADM

## 2020-10-04 RX ORDER — PROCHLORPERAZINE MALEATE 10 MG
10 TABLET ORAL EVERY 6 HOURS PRN
Status: DISCONTINUED | OUTPATIENT
Start: 2020-10-04 | End: 2020-10-05 | Stop reason: HOSPADM

## 2020-10-04 RX ORDER — POTASSIUM CHLORIDE 1500 MG/1
20-40 TABLET, EXTENDED RELEASE ORAL
Status: DISCONTINUED | OUTPATIENT
Start: 2020-10-04 | End: 2020-10-05 | Stop reason: HOSPADM

## 2020-10-04 RX ORDER — POTASSIUM CHLORIDE 29.8 MG/ML
20 INJECTION INTRAVENOUS
Status: DISCONTINUED | OUTPATIENT
Start: 2020-10-04 | End: 2020-10-04

## 2020-10-04 RX ORDER — ONDANSETRON 4 MG/1
4 TABLET, ORALLY DISINTEGRATING ORAL EVERY 6 HOURS PRN
Status: DISCONTINUED | OUTPATIENT
Start: 2020-10-04 | End: 2020-10-05 | Stop reason: HOSPADM

## 2020-10-04 RX ORDER — PROCHLORPERAZINE 25 MG
25 SUPPOSITORY, RECTAL RECTAL EVERY 12 HOURS PRN
Status: DISCONTINUED | OUTPATIENT
Start: 2020-10-04 | End: 2020-10-05 | Stop reason: HOSPADM

## 2020-10-04 RX ORDER — ACETAMINOPHEN 325 MG/1
650 TABLET ORAL EVERY 4 HOURS PRN
Status: DISCONTINUED | OUTPATIENT
Start: 2020-10-04 | End: 2020-10-05 | Stop reason: HOSPADM

## 2020-10-04 RX ORDER — HYDROXYZINE HYDROCHLORIDE 25 MG/1
25 TABLET, FILM COATED ORAL EVERY 6 HOURS PRN
Status: DISCONTINUED | OUTPATIENT
Start: 2020-10-04 | End: 2020-10-05 | Stop reason: HOSPADM

## 2020-10-04 RX ORDER — POTASSIUM CHLORIDE 1.5 G/1.58G
20-40 POWDER, FOR SOLUTION ORAL
Status: DISCONTINUED | OUTPATIENT
Start: 2020-10-04 | End: 2020-10-05 | Stop reason: HOSPADM

## 2020-10-04 RX ORDER — LIDOCAINE 40 MG/G
CREAM TOPICAL
Status: DISCONTINUED | OUTPATIENT
Start: 2020-10-04 | End: 2020-10-05 | Stop reason: HOSPADM

## 2020-10-04 RX ORDER — NALOXONE HYDROCHLORIDE 0.4 MG/ML
.1-.4 INJECTION, SOLUTION INTRAMUSCULAR; INTRAVENOUS; SUBCUTANEOUS
Status: DISCONTINUED | OUTPATIENT
Start: 2020-10-04 | End: 2020-10-05 | Stop reason: HOSPADM

## 2020-10-04 RX ADMIN — POTASSIUM CHLORIDE 20 MEQ: 1500 TABLET, EXTENDED RELEASE ORAL at 22:42

## 2020-10-04 RX ADMIN — HYDROXYZINE HYDROCHLORIDE 25 MG: 25 TABLET, FILM COATED ORAL at 21:08

## 2020-10-04 RX ADMIN — POTASSIUM CHLORIDE 40 MEQ: 1500 TABLET, EXTENDED RELEASE ORAL at 21:09

## 2020-10-04 RX ADMIN — Medication 1 MG: at 21:08

## 2020-10-04 RX ADMIN — SODIUM CHLORIDE 8 MG/HR: 9 INJECTION, SOLUTION INTRAVENOUS at 20:01

## 2020-10-04 RX ADMIN — SODIUM CHLORIDE, POTASSIUM CHLORIDE, SODIUM LACTATE AND CALCIUM CHLORIDE: 600; 310; 30; 20 INJECTION, SOLUTION INTRAVENOUS at 20:02

## 2020-10-04 RX ADMIN — PANTOPRAZOLE SODIUM 40 MG: 40 INJECTION, POWDER, FOR SOLUTION INTRAVENOUS at 16:42

## 2020-10-04 ASSESSMENT — ACTIVITIES OF DAILY LIVING (ADL): ADLS_ACUITY_SCORE: 11

## 2020-10-04 NOTE — PHARMACY-ADMISSION MEDICATION HISTORY
Pharmacy Medication History  Admission medication history interview status for the 10/4/2020  admission is complete. See EPIC admission navigator for prior to admission medications       Medication history sources: Patient  Location of interview: Phone   Medication history source reliability: Good  Adherence assessment: Good    Significant changes made to the medication list:  Removed Lasix, Pepcid, Reglan, Melatonin, Xifaxan, Aldactone   Changed the dose of venlafaxine   Added Gabapentin and adderall         Additional medication history information:   None     Medication reconciliation completed by provider prior to medication history? No    Time spent in this activity: 15 minutes       Prior to Admission medications    Medication Sig Last Dose Taking? Auth Provider   amphetamine-dextroamphetamine (ADDERALL XR) 30 MG 24 hr capsule Take 30 mg by mouth every morning 10/4/2020 at Unknown time Yes Unknown, Entered By History   amphetamine-dextroamphetamine (ADDERALL) 20 MG tablet Take 20 mg by mouth daily In the afternoon 10/3/2020 at Unknown time Yes Unknown, Entered By History   gabapentin (NEURONTIN) 100 MG capsule Take 100 mg by mouth daily as needed  at PRN Yes Unknown, Entered By History   Multiple Vitamin (ONE-A-DAY ADULT VITACRAVES+DHA PO) Take 1 tablet by mouth daily Past Month at Unknown time Yes Reported, Patient   pantoprazole (PROTONIX) 40 MG EC tablet Take 1 tablet (40 mg) by mouth 2 times daily (before meals) Past Week at Unknown time Yes Shruti Long MD   venlafaxine (EFFEXOR-ER) 37.5 MG 24 hr tablet Take 37.5 mg by mouth daily 10/3/2020 at Unknown time Yes Unknown, Entered By History     Milli Lay   Student Pharmacist

## 2020-10-04 NOTE — H&P
[facility]    History and Physical - Hospitalist Service       Date of Admission:  10/4/2020    Assessment & Plan   Pat Delgado is a 30 year old female admitted on 10/4/2020. She presents with fever and generalized weakness.       Acute blood Loss Anemia, unspecified type    Muscle weakness (generalized)    Hx of Alcoholic hepatitis    Assessment: presents with Increasing generalized fatigue hgb on admisison 6.8, not on anticoagulation. Transfused 1U RBC in ED. reports that she has had endoscopy in the past, and that she has varices.  She is currently hemodynamically stable, vital signs are stable.    Plan:   - admit to inpatient  - Trend hgb   - Protonix gtt  - GI consult  - NPO @ midnight  - Follow vitals/temp      Hypertension goal BP (blood pressure) < 150/90    Assessment/Plan: no BP meds PTA.       Recurrent major depressive disorder (H)    Anxiety    Attention deficit hyperactivity disorder (ADHD), combined type    Panic disorder    Assessment: PTA on Adderall / Neurontin / Effexor    Plan:   - Resume PTA Neurontin and Effexor  - Atarax as needed for anxiety       Diet: Clear Liquid Diet    DVT Prophylaxis: Pneumatic Compression Devices  Amado Catheter: not present  Code Status: Full Code    Rule Out COVID-19 Handoff:  Pat is a LOW SUSPICION PUI.  Follow these instructions:    If COVID test positive -> continue isolation precautions    If COVID test negative -> discontinue COVID-specific isolation precautions       Disposition Plan   Expected discharge: 2 - 3 days, recommended to prior living arrangement once hemoglobin stable.  Entered: Ac Farah MD 10/04/2020, 7:18 PM     The patient's care was discussed with the Patient and ED Provider.    Ac Farah MD  [facility]  Contact information available via [facility] Paging/Directory      ______________________________________________________________________    Chief Complaint     Fever   Generalized  Weakness    History is obtained from the patient    History of Present Illness      Pat Delgado is a 30 year old female with past medical history of alcoholic hepatitis, sobriety for the last year, hypertension, panic disorder, anxiety, major depression who presents for evaluation of generalized weakness and low-grade fevers.    Patient ports that for the past 2 weeks, she has had intermittent low-grade fevers around 99  F but mostly occurs at night.  She does endorse some swelling of her lymph nodes in her neck, a postnasal drip, and a sore throat.  She also reports feeling increasingly fatigued over the past week as well.  She  reports that she is not taking her omeprazole for the past couple weeks.  She endorsed seeing occasional blood in her stool, but no consistent melena or hematochezia.  She denies any weight loss or night sweats.  She denies any chest pain or shortness of breath.  She denies any COVID 19 exposures.  She otherwise denies any vomiting, no significant abdominal pain.  She denies any headaches, vision changes, localized weakness or numbness of her extremities.  She is currently not consuming alcohol.  She denies any urinary urgency or frequency or hematuria.  At this time she has no other complaints.    Review of Systems      The 10 point Review of Systems is negative other than noted in the HPI or here.     Past Medical History    I have reviewed this patient's medical history and updated it with pertinent information if needed.   Past Medical History:   Diagnosis Date     ADHD (attention deficit hyperactivity disorder)      Alcohol dependence (H) 1/20/2015     Anxiety      Chemical dependency (H) 1/22/2015     Depressive disorder, not elsewhere classified 8/20/2015    Per 1/22/15 visit with Batool Gonzalez NP      Hypertension goal BP (blood pressure) < 150/90 11/10/2015     Migraine headache      Substance abuse (H)      Tobacco abuse        Past Surgical History   I have reviewed this  patient's surgical history and updated it with pertinent information if needed.  Past Surgical History:   Procedure Laterality Date     HC TOOTH EXTRACTION W/FORCEP       IR CVC TUNNEL PLACEMENT > 5 YRS OF AGE  2020     IR CVC TUNNEL REMOVAL RIGHT  2020     IR FEEDING TUBE PLACEMENT W RADHA/MD  2020     IR PICC EXCHANGE RIGHT  2020     LAPAROSCOPIC CHOLECYSTECTOMY N/A 2020    Procedure: LAPAROSCOPIC CHOLECYSTECTOMY;  Surgeon: Alan Reynoso MD;  Location:  OR       Social History   I have reviewed this patient's social history and updated it with pertinent information if needed.  Social History     Tobacco Use     Smoking status: Former Smoker     Types: Vaping Device     Quit date: 2020     Years since quittin.6     Smokeless tobacco: Current User   Substance Use Topics     Alcohol use: Not Currently     Drug use: Yes     Types: Marijuana     Comment: occ       Family History   I have reviewed this patient's family history and updated it with pertinent information if needed.  Family History   Problem Relation Age of Onset     Anxiety Disorder Mother      Dementia Maternal Grandfather      Substance Abuse Other      Unknown/Adopted No family hx of      Depression No family hx of      Schizophrenia No family hx of      Bipolar Disorder No family hx of      Suicide No family hx of      Reno Disease No family hx of      Parkinsonism No family hx of      Autism Spectrum Disorder No family hx of      Intellectual Disability (Mental Retardation) No family hx of      Mental Illness No family hx of        Prior to Admission Medications   Prior to Admission Medications   Prescriptions Last Dose Informant Patient Reported? Taking?   Multiple Vitamin (ONE-A-DAY ADULT VITACRAVES+DHA PO) Past Month at Unknown time Self Yes Yes   Sig: Take 1 tablet by mouth daily   amphetamine-dextroamphetamine (ADDERALL XR) 30 MG 24 hr capsule 10/4/2020 at Unknown time Self Yes Yes   Sig: Take  30 mg by mouth every morning   amphetamine-dextroamphetamine (ADDERALL) 20 MG tablet 10/3/2020 at Unknown time Self Yes Yes   Sig: Take 20 mg by mouth daily In the afternoon   gabapentin (NEURONTIN) 100 MG capsule  at PRN Self Yes Yes   Sig: Take 100 mg by mouth daily as needed   pantoprazole (PROTONIX) 40 MG EC tablet Past Week at Unknown time Self No Yes   Sig: Take 1 tablet (40 mg) by mouth 2 times daily (before meals)   venlafaxine (EFFEXOR-ER) 37.5 MG 24 hr tablet 10/3/2020 at Unknown time Self Yes Yes   Sig: Take 37.5 mg by mouth daily      Facility-Administered Medications: None     Allergies   Allergies   Allergen Reactions     Amoxicillin      Penicillins Unknown     Hives         Physical Exam   Vital Signs: Temp: 98.9  F (37.2  C) Temp src: Oral BP: 133/76 Pulse: 112   Resp: 16 SpO2: 99 %      Weight: 185 lbs 0 oz    Constitutional: awake, alert, cooperative, no apparent distress.   Eyes: Lids and lashes normal, pupils equal, round and reactive to light   ENT: Normocephalic, without obvious abnormality, atraumatic, sinuses nontender on palpation   Hematologic / Lymphatic: no cervical lymphadenopathy   Respiratory: CTABL   Cardiovascular: RRR with no m/r/g   GI: Normal bowel sounds, soft, non-distended, non-tender.   Skin: normal skin color, texture, turgor   Musculoskeletal: There is no redness, warmth, or swelling of the joints. Full range of motion noted.   Neurologic: Awake, alert, oriented to name, place and time. Cranial nerves II-XII are grossly intact. Motor is 5 out of 5 bilaterally. Sensory is intact.   Neuropsychiatric: normal mood and affect    Data   Data reviewed today: I reviewed all medications, new labs and imaging results over the last 24 hours. I personally reviewed the chest x-ray image(s) showing no acute airspace disease.    Most Recent 3 CBC's:  Recent Labs   Lab Test 10/04/20  1240 07/16/20  0829 07/15/20  1641   WBC 7.9 8.5 7.1   HGB 6.8* 8.1* 8.7*   MCV 72* 85 85    270 298      Most Recent 3 BMP's:  Recent Labs   Lab Test 10/04/20  1240 07/16/20  0829 07/15/20  1641    141 142   POTASSIUM 3.3* 3.5 3.3*   CHLORIDE 107 109 109   CO2 27 23 27   BUN 15 4* 5*   CR 0.52 0.51* 0.62   ANIONGAP 5 9 6   EMILY 8.8 8.8 9.2   GLC 97 82 96     Most Recent 2 LFT's:  Recent Labs   Lab Test 10/04/20  1240 07/15/20  1641   AST 23 32   ALT 18 16   ALKPHOS 68 71   BILITOTAL 0.7 2.3*     Most Recent 3 INR's:  Recent Labs   Lab Test 07/16/20  0829 06/24/20  0759 06/23/20  1932   INR 2.05* 1.87* 2.06*     Recent Results (from the past 24 hour(s))   XR Chest Port 1 View    Narrative    CHEST ONE VIEW  10/4/2020 1:05 PM     HISTORY: Cough.    COMPARISON: March 3, 2020      Impression    IMPRESSION: No acute disease.    GINO RYAN MD

## 2020-10-04 NOTE — ED NOTES
DATE:  10/4/2020   TIME OF RECEIPT FROM LAB:  1:27 PM  LAB TEST:  Hemoglobin  LAB VALUE:  6.8  RESULTS GIVEN WITH READ-BACK TO (PROVIDER):  Ophelia Caraballo MD  TIME LAB VALUE REPORTED TO PROVIDER:   1:27 PM

## 2020-10-04 NOTE — ED PROVIDER NOTES
History     Chief Complaint:  Fever     HPI  Pat Delgado is a 30 year old female with a history of alcoholic hepatitis, 1 year sober, and hypertension who presents for evaluation of a fever. The patient has had an on/off fever of around 99 for the past two weeks; she states it is felt at night more.  She reports swelling in her nose, lymphadenopathy of her neck, constipation, sore throat, myalgia, rhinorrhea, and some nausea. She is also concerned with her history of alcoholic hepatitis that her legs may be slightly jaundiced and swollen from her baseline. She asks for her liver enzymes to be checked. She states she has not suffered from diarrhea, vomiting, shaking, dysuria or chills. She states that she is nervous when presenting to hospitals and that her HR runs around 100 when in the hospital.  The patient has had no known COVID contacts.     Allergies:  Amoxicillin  Penicillins     Medications:   Famotidine  Furosemide  Melatonin  Metoclopramide  Ondansetron  Pantoprazole  Rifaximin  Spironolactone  Venlafaxine    Medical History:   ADHD   Alcohol dependence  Anxiety  Chemical dependency  Depressive disorder  Hypertension   Migraine headache  Substance abuse  Tobacco abuse  Alcohol hepatitis  Acute kidney failure with tubular necrosis  Cholelithiasis  Cyclic vomiting syndrome    Surgical History   HC tooth extraction   IR CVC tunnel placement, over 5 years of age  IR CVC tunnel removal, R  IR feeding tube placement  IR PICC exchange, R  Laparoscopic cholecystectomy    Family History:   Anxiety disorder:  Mother  Dementia:  Maternal Grandfather  Substance abuse:  Other     Social History:  Smoking Status: Former smoker   Types:  Vaping device   Quit date:  2020   Year since quittin.6  Smokeless Tobacco: current user  Alcohol Use: not currently  Drug Use: yes   Types:  marijuana  PCP: .Clinic, [facility]    Review of Systems   All other systems reviewed and are negative.    Physical  Exam     Patient Vitals for the past 24 hrs:   BP Temp Temp src Pulse Resp SpO2 Weight   10/04/20 1215 (!) 140/50 98.2  F (36.8  C) Temporal 121 18 99 % 83.9 kg (185 lb)        Physical Exam   General: Resting on the bed.  Head: No obvious trauma to head.  Ears, Nose, Throat:  External ears normal.  Nose normal.  Clear posterior oral pharynx   Eyes:  Conjunctivae clear.  Pupils are equal, round, and reactive.   Neck: Normal range of motion.  Neck supple. no nuchal rigidity   CV: Regular rate and rhythm.  No murmurs.      Respiratory: Effort normal and breath sounds normal.  No wheezing or crackles.   Gastrointestinal: Soft.  No distension. There is mild tenderness.  There is no rigidity, no rebound and no guarding.   Musculoskeletal:  Mild lower extremity edema   Neuro: Alert. Moving all extremities appropriately.  Normal speech.    Skin: Skin is warm and dry.  No rash noted.   Rectal: no appreciable bleeding or fissures.  Noted external hemorrhoids.      Emergency Department Course     Imaging:  Radiology results were communicated with the patient who voiced understanding of the findings.    XR Chest Port 1 View  Final Result  IMPRESSION: No acute disease.    GINO RYAN MD    Reading per radiology     Laboratory:  Laboratory findings were communicated with the patient who voiced understanding of the findings.    CBC: WBC 7.9, HGB 6.8 (LL),    CMP: potassium 3.3 (L) Albumin 3.2 (L) (Creatinine 0.52) o/w WNL  Lactic: 0.8  Lipase: 218    Occult blood stool: Negative  HCG Qualitative: Negative    UA with Microscopic: specific gravity 1.001 (L) mucous present (A) o/w WNL     Rapid Strep Test with Reflex to PCRt: Negative  Strep Culture: Pending   COVID-19 Virus (Coronavirus), PCR NP Swab: pending     ABO: B Rh: positive  AntiB: negative     Emergency Department Course:    1220 Nursing notes and vitals reviewed.    1230 I performed an exam of the patient as documented above.     1240 IV was inserted and blood  was drawn for laboratory testing, results above.    1308 The patient was sent for XR while in the emergency department, results above.     1605 The patient provided a stool sample for laboratory testing as documented above.     1628 I spoke with Dr. Hobson of GI.      I consulted with Dr. Farah of the hospitalist services. They are in agreement to accept the patient for admission. Findings and plan explained to the Patient who consents to admission. Discussed the patient with Dr. Farah, who will admit the patient to a observation bed for further monitoring, evaluation, and treatment.    Impression & Plan     Medical Decision Making:  This presents with constipation and feeling generally unwell.  I considered a broad differential diagnosis for this patient including upper GIB (ulcer, gastritis, avm, tumor, etc) vs lower GIB (tumor, diverticulosis, avm, meckels, etc), colitis, aortoenteric fistula, hemorrhoids, fissures,bacterial stool infection, COVID-19, UTI, pyelonephritis, dehydration, strep pharyngitis, pneumonia, pneumothorax, effusion, bacteremia, etc.  Patient is afebrile.  Patient is tachycardic but her baseline heart rate is in the 100s.  CBC shows no leukocytosis but does show acute anemia 6.8.  CMP shows no acute electrolyte, metabolic or renal dysfunction.  LFTs and lipase are reassuring, not concerning for obstructive biliary process, hepatitis, pancreatitis.  Patient has been sober for several months and continues to be sober.  Occult stool is negative although she did endorse darker stools earlier.  Pregnancy test negative.  UA unrevealing no suggestion of UTI, pyelonephritis, etc.  Rapid strep is negative, no signs of retropharyngeal abscess, peritonsillar abscess, deep space neck infection.  COVID-19 swab obtained and pending.  We did call to the lab to have this sent as an emergency procedure after this was discussed with Dr. Hobson.  Given the darker schools and the decrease in hemoglobin we are  concerned about possible GI bleed.  Patient has been off her omeprazole..   I did give protonix just in case.  No indication to start octreotide as my suspicion of variceal bleed is so low.  Given amount of blood on exam/degree of anemia/sxs consistent with concerning degree of blood loss, I would admit at this point for serial hemoglobins.  Patient was typed and screened. Blood products will be hung when available.  Discussed with Dr. Hobson of GI who recommended admission. Patient is admitted in stable care do not need ICU care at this point.     Covid-19  Pat Delgado was evaluated during a global COVID-19 pandemic, which necessitated consideration that the patient might be at risk for infection with the SARS-CoV-2 virus that causes COVID-19.   Applicable protocols for evaluation were followed during the patient's care.   COVID-19 was considered as part of the patient's evaluation. The plan for testing is:  a test was obtained during this visit.    Diagnosis:     ICD-10-CM    1. Anemia, unspecified type  D64.9    2. Muscle weakness (generalized)  M62.81         Disposition:  Admitted to Dr. Farah.    Scribe Disclosure:  I, Ame Edwards, am serving as a scribe at 12:32 PM on 10/4/2020 to document services personally performed by Ophelia Caraballo MD based on my observations and the provider's statements to me.     Scribe Disclosure:  I, Damon Crockett, am serving as a scribe at 4:35 PM on 10/4/2020 to document services personally performed by Ophelia Caraballo MD based on my observations and the provider's statements to me.    Ophelia Nj MD  10/04/20 [id number]

## 2020-10-04 NOTE — ED NOTES
[facility]  ED Nurse Handoff Report    ED Chief complaint: Fever      ED Diagnosis:   Final diagnoses:   Anemia, unspecified type   Muscle weakness (generalized)       Code Status: Full Code    Allergies:   Allergies   Allergen Reactions     Amoxicillin      Penicillins Unknown     Hives         Patient Story: Hx of ETOH and liver disease  Focused Assessment:  Presents with generalized weakness and runny nose. Low hemoglobin. Alert and oriented x4. 20 G in R arm. Independent with cares. Plan to scope tomorrow. Blood currently infusing.    Labs Ordered and Resulted from Time of ED Arrival Up to the Time of Departure from the ED   CBC WITH PLATELETS DIFFERENTIAL - Abnormal; Notable for the following components:       Result Value    RBC Count 3.16 (*)     Hemoglobin 6.8 (*)     Hematocrit 22.8 (*)     MCV 72 (*)     MCH 21.5 (*)     MCHC 29.8 (*)     Nucleated RBCs 1 (*)     All other components within normal limits   COMPREHENSIVE METABOLIC PANEL - Abnormal; Notable for the following components:    Potassium 3.3 (*)     Albumin 3.2 (*)     All other components within normal limits   ROUTINE UA WITH MICROSCOPIC - Abnormal; Notable for the following components:    Specific Gravity Urine 1.001 (*)     Mucous Urine Present (*)     All other components within normal limits   LACTIC ACID WHOLE BLOOD   LIPASE   COVID-19 VIRUS (CORONAVIRUS) BY PCR   HCG QUALITATIVE URINE   OCCULT BLOOD STOOL   NURSING DRAW AND HOLD   ABO/RH TYPE AND SCREEN   RED BLOOD CELL PREPARE ORDER UNIT   BLOOD COMPONENT   STREPTOCOCCUS A RAPID SCR W REFLX TO PCR   GROUP A STREPTOCOCCUS PCR THROAT SWAB     XR Chest Port 1 View   Final Result   IMPRESSION: No acute disease.      GINO RYAN MD            Treatments and/or interventions provided: .monitoring, blood  Patient's response to treatments and/or interventions: *Tolerating medications well without significant side effects      To be done/followed up on inpatient unit:  Continue  with plan of care per admitting MD.    Does this patient have any cognitive concerns?: N/A    Activity level - Baseline/Home:  Independent  Activity Level - Current:   Independent    Patient's Preferred language: English   Needed?: No    Isolation: COVID r/o and special precautions  Infection: COVID r/o and special precautions  Bariatric?: No    Vital Signs:   Vitals:    10/04/20 1545 10/04/20 1600 10/04/20 1615 10/04/20 1642   BP:    114/77   Pulse:    109   Resp:    18   Temp:    99.4  F (37.4  C)   TempSrc:       SpO2: 99% 98% 99%    Weight:           Cardiac Rhythm:     Was the PSS-3 completed:   Yes  What interventions are required if any?               Family Comments: No family at bedside  OBS brochure/video discussed/provided to patient/family: N/A                 For the majority of the shift this patient's behavior was Green.     ED NURSE PHONE NUMBER: *[phone]

## 2020-10-04 NOTE — ED TRIAGE NOTES
Fevers off and on during the past 2 weeks. States highest temperature has been 99.5. Muscle aches, swollen lymph nodes, mild sore throat

## 2020-10-05 VITALS
WEIGHT: 182.6 LBS | BODY MASS INDEX: 27.76 KG/M2 | HEART RATE: 85 BPM | DIASTOLIC BLOOD PRESSURE: 77 MMHG | RESPIRATION RATE: 16 BRPM | OXYGEN SATURATION: 98 % | TEMPERATURE: 96.5 F | SYSTOLIC BLOOD PRESSURE: 126 MMHG

## 2020-10-05 LAB
ALBUMIN SERPL-MCNC: 2.8 G/DL (ref 3.4–5)
ALP SERPL-CCNC: 62 U/L (ref 40–150)
ALT SERPL W P-5'-P-CCNC: 15 U/L (ref 0–50)
ANION GAP SERPL CALCULATED.3IONS-SCNC: 5 MMOL/L (ref 3–14)
AST SERPL W P-5'-P-CCNC: 18 U/L (ref 0–45)
BILIRUB DIRECT SERPL-MCNC: 0.5 MG/DL (ref 0–0.2)
BILIRUB SERPL-MCNC: 1.3 MG/DL (ref 0.2–1.3)
BUN SERPL-MCNC: 8 MG/DL (ref 7–30)
CALCIUM SERPL-MCNC: 8.5 MG/DL (ref 8.5–10.1)
CHLORIDE SERPL-SCNC: 110 MMOL/L (ref 94–109)
CO2 SERPL-SCNC: 24 MMOL/L (ref 20–32)
CREAT SERPL-MCNC: 0.48 MG/DL (ref 0.52–1.04)
ERYTHROCYTE [DISTWIDTH] IN BLOOD BY AUTOMATED COUNT: 16.2 % (ref 10–15)
GFR SERPL CREATININE-BSD FRML MDRD: >90 ML/MIN/{1.73_M2}
GLUCOSE SERPL-MCNC: 85 MG/DL (ref 70–99)
HCT VFR BLD AUTO: 24.5 % (ref 35–47)
HGB BLD-MCNC: 7.5 G/DL (ref 11.7–15.7)
HGB BLD-MCNC: 7.6 G/DL (ref 11.7–15.7)
INR PPP: 1.55 (ref 0.86–1.14)
MCH RBC QN AUTO: 22.8 PG (ref 26.5–33)
MCHC RBC AUTO-ENTMCNC: 30.6 G/DL (ref 31.5–36.5)
MCV RBC AUTO: 75 FL (ref 78–100)
PLATELET # BLD AUTO: 223 10E9/L (ref 150–450)
POTASSIUM SERPL-SCNC: 3.9 MMOL/L (ref 3.4–5.3)
POTASSIUM SERPL-SCNC: 4 MMOL/L (ref 3.4–5.3)
PROT SERPL-MCNC: 6.3 G/DL (ref 6.8–8.8)
RBC # BLD AUTO: 3.29 10E12/L (ref 3.8–5.2)
SARS-COV-2 RNA SPEC QL NAA+PROBE: NOT DETECTED
SODIUM SERPL-SCNC: 139 MMOL/L (ref 133–144)
SPECIMEN SOURCE: NORMAL
WBC # BLD AUTO: 6.5 10E9/L (ref 4–11)

## 2020-10-05 PROCEDURE — 85027 COMPLETE CBC AUTOMATED: CPT | Performed by: STUDENT IN AN ORGANIZED HEALTH CARE EDUCATION/TRAINING PROGRAM

## 2020-10-05 PROCEDURE — 80048 BASIC METABOLIC PNL TOTAL CA: CPT | Performed by: STUDENT IN AN ORGANIZED HEALTH CARE EDUCATION/TRAINING PROGRAM

## 2020-10-05 PROCEDURE — C9113 INJ PANTOPRAZOLE SODIUM, VIA: HCPCS | Performed by: STUDENT IN AN ORGANIZED HEALTH CARE EDUCATION/TRAINING PROGRAM

## 2020-10-05 PROCEDURE — 250N000011 HC RX IP 250 OP 636: Performed by: STUDENT IN AN ORGANIZED HEALTH CARE EDUCATION/TRAINING PROGRAM

## 2020-10-05 PROCEDURE — 258N000003 HC RX IP 258 OP 636: Performed by: STUDENT IN AN ORGANIZED HEALTH CARE EDUCATION/TRAINING PROGRAM

## 2020-10-05 PROCEDURE — 85018 HEMOGLOBIN: CPT | Performed by: STUDENT IN AN ORGANIZED HEALTH CARE EDUCATION/TRAINING PROGRAM

## 2020-10-05 PROCEDURE — 36415 COLL VENOUS BLD VENIPUNCTURE: CPT | Performed by: PHYSICIAN ASSISTANT

## 2020-10-05 PROCEDURE — 85610 PROTHROMBIN TIME: CPT | Performed by: STUDENT IN AN ORGANIZED HEALTH CARE EDUCATION/TRAINING PROGRAM

## 2020-10-05 PROCEDURE — 36415 COLL VENOUS BLD VENIPUNCTURE: CPT | Performed by: STUDENT IN AN ORGANIZED HEALTH CARE EDUCATION/TRAINING PROGRAM

## 2020-10-05 PROCEDURE — 85018 HEMOGLOBIN: CPT | Performed by: PHYSICIAN ASSISTANT

## 2020-10-05 PROCEDURE — 250N000013 HC RX MED GY IP 250 OP 250 PS 637: Performed by: PHYSICIAN ASSISTANT

## 2020-10-05 PROCEDURE — 80076 HEPATIC FUNCTION PANEL: CPT | Performed by: STUDENT IN AN ORGANIZED HEALTH CARE EDUCATION/TRAINING PROGRAM

## 2020-10-05 PROCEDURE — 84132 ASSAY OF SERUM POTASSIUM: CPT | Performed by: PHYSICIAN ASSISTANT

## 2020-10-05 PROCEDURE — 99238 HOSP IP/OBS DSCHRG MGMT 30/<: CPT | Performed by: PHYSICIAN ASSISTANT

## 2020-10-05 RX ORDER — PANTOPRAZOLE SODIUM 40 MG/1
40 TABLET, DELAYED RELEASE ORAL
Status: DISCONTINUED | OUTPATIENT
Start: 2020-10-05 | End: 2020-10-05 | Stop reason: HOSPADM

## 2020-10-05 RX ADMIN — SODIUM CHLORIDE 8 MG/HR: 9 INJECTION, SOLUTION INTRAVENOUS at 06:57

## 2020-10-05 RX ADMIN — SODIUM CHLORIDE, POTASSIUM CHLORIDE, SODIUM LACTATE AND CALCIUM CHLORIDE: 600; 310; 30; 20 INJECTION, SOLUTION INTRAVENOUS at 05:50

## 2020-10-05 RX ADMIN — PANTOPRAZOLE SODIUM 40 MG: 40 TABLET, DELAYED RELEASE ORAL at 16:42

## 2020-10-05 ASSESSMENT — ACTIVITIES OF DAILY LIVING (ADL)
ADLS_ACUITY_SCORE: 11

## 2020-10-05 NOTE — CONSULTS
[facility]  Gastroenterology Consultation         Pat Delgado  [address]   [facility] [address]  30 year old female    Admission Date/Time: 10/4/2020  Primary Care Provider: Clinic, [facility]  Referring / Attending Physician:  Dr. Ac Farah    We were asked to see the patient in consultation by Dr. Ac Farah for evaluation of acute blood loss anemia.      CC: generalized weakness    HPI:  Pat Delgado is a 30 year old female who ha a past medical history of alcoholic cirrhosis, sober since 02/2020, hypertension, anxiety, major depression who presented to [facility] on 10/4/2020 with complaints of weakness and low grade fevers. She states over last 2 weeks she has had low grade fever around 99 F. She has had tender, swollen cervical lymph nodes, congestions and sore throat with increase in fatigue over last week. She has noted blood in her stool at times but not consistent and small amounts, has not been taking omeprazole. States stools in not dark or tarry or large amounts of bleed. She has had no weight loss. She has had no nausea, vomiting or abdominal pain. She denies use of alcohol or NSAIDs.    VSS. Tmax 99.4. -150/50-70. Labs note hemoglobin 6.6->7.3->7.5 with baseline at 8-9. Platelets 223, CMP Creatinine 0.48, Sodium 139, Potassium 4.0, calcium 2.8, Albumin 2.8. INR 1.55. Occult blood test negative. COVID pending ( ordered STAT) Chest Xray negative.     ROS: A comprehensive ten point review of systems was negative aside from those in mentioned in the HPI.      PAST MED HX:  I have reviewed this patient's medical history and updated it with pertinent information if needed.   Past Medical History:   Diagnosis Date     ADHD (attention deficit hyperactivity disorder)      Alcohol dependence (H) 1/20/2015     Anxiety      Chemical dependency (H) 1/22/2015     Depressive disorder, not elsewhere classified 8/20/2015    Per 1/22/15 visit with [place]  JAIRO Gonzalez      Hypertension goal BP (blood pressure) < 150/90 11/10/2015     Migraine headache      Substance abuse (H)      Tobacco abuse        MEDICATIONS:   Prior to Admission Medications   Prescriptions Last Dose Informant Patient Reported? Taking?   Multiple Vitamin (ONE-A-DAY ADULT VITACRAVES+DHA PO) Past Month at Unknown time Self Yes Yes   Sig: Take 1 tablet by mouth daily   amphetamine-dextroamphetamine (ADDERALL XR) 30 MG 24 hr capsule 10/4/2020 at Unknown time Self Yes Yes   Sig: Take 30 mg by mouth every morning   amphetamine-dextroamphetamine (ADDERALL) 20 MG tablet 10/3/2020 at Unknown time Self Yes Yes   Sig: Take 20 mg by mouth daily In the afternoon   gabapentin (NEURONTIN) 100 MG capsule  at PRN Self Yes Yes   Sig: Take 100 mg by mouth daily as needed   pantoprazole (PROTONIX) 40 MG EC tablet Past Week at Unknown time Self No Yes   Sig: Take 1 tablet (40 mg) by mouth 2 times daily (before meals)   venlafaxine (EFFEXOR-ER) 37.5 MG 24 hr tablet 10/3/2020 at Unknown time Self Yes Yes   Sig: Take 37.5 mg by mouth daily      Facility-Administered Medications: None       ALLERGIES:   Allergies   Allergen Reactions     Amoxicillin      Penicillins Unknown     Hives         SOCIAL HISTORY:  Social History     Tobacco Use     Smoking status: Former Smoker     Types: Vaping Device     Quit date: 2020     Years since quittin.6     Smokeless tobacco: Current User   Substance Use Topics     Alcohol use: Not Currently     Drug use: Yes     Types: Marijuana     Comment: occ       FAMILY HISTORY:  Family History   Problem Relation Age of Onset     Anxiety Disorder Mother      Dementia Maternal Grandfather      Substance Abuse Other      Unknown/Adopted No family hx of      Depression No family hx of      Schizophrenia No family hx of      Bipolar Disorder No family hx of      Suicide No family hx of      [place] Disease No family hx of      Parkinsonism No family hx of      Autism Spectrum Disorder No  family hx of      Intellectual Disability (Mental Retardation) No family hx of      Mental Illness No family hx of        PHYSICAL EXAM:   General  Alert, oriented and comfortable  Vital Signs with Ranges  Temp: 97.4  F (36.3  C) Temp src: Oral BP: 111/67 Pulse: 105   Resp: 16 SpO2: 98 % O2 Device: None (Room air)    I/O last 3 completed shifts:  In: 300   Out: -     Constitutional: healthy, alert and no distress   Cardiovascular: negative, PMI normal. No lifts, heaves, or thrills. RRR. No murmurs, clicks gallops or rub  Respiratory: negative, Percussion normal. Good diaphragmatic excursion. Lungs clear  Head: Normocephalic. No masses, lesions, tenderness or abnormalities  Neck: Neck supple. No adenopathy. Thyroid symmetric, normal size,, Carotids without bruits.  Abdomen: Abdomen soft, non-tender. BS normal. No masses, organomegaly          ADDITIONAL COMMENTS:   I reviewed the patient's new clinical lab test results.   Recent Labs   Lab Test 10/05/20  0409 10/04/20  2213 10/04/20  1240 07/16/20  0829   WBC 6.5  --  7.9 8.5   HGB 7.5* 7.3* 6.8* 8.1*   MCV 75*  --  72* 85     --  277 270   INR 1.55*  --  1.38* 2.05*     Recent Labs   Lab Test 10/05/20  0714 10/05/20  0409 10/04/20  1240 07/16/20  0829   POTASSIUM 3.9 4.0 3.3* 3.5   CHLORIDE  --  110* 107 109   CO2  --  24 27 23   BUN  --  8 15 4*   ANIONGAP  --  5 5 9     Recent Labs   Lab Test 10/05/20  0409 10/04/20  1240 07/15/20  1700 07/15/20  1641 07/12/20  0333 07/12/20  0333 06/07/20  1448 06/07/20  1448   ALBUMIN 2.8* 3.2*  --  2.8*   < > 2.7*   < >  --    BILITOTAL 1.3 0.7  --  2.3*   < > 2.4*   < >  --    ALT 15 18  --  16   < > 14   < >  --    AST 18 23  --  32   < > 31   < >  --    PROTEIN  --  Negative 30*  --   --   --   --  10*   LIPASE  --  218  --  105  --  137   < >  --     < > = values in this interval not displayed.       I reviewed the patient's new imaging results.        CONSULTATION ASSESSMENT AND PLAN:    Pat Delgado is a  pleasant 30 year old female with weakness, low grade fever, question of blood in stool with history of alcoholic liver cirrhosis. Gi consulted on 10/5 for concerns for possible GI bleed.    Principal Problem:    Anemia, unspecified type    Alcoholic liver Cirrhosis  Patient has history of alcoholic liver cirrhosis ( sober for 8 months) with low grade fevers and generalized weakness over last 2 weeks. She was noted to have hemoglobin of 6.6 on admission and has improved and stabilized around 7.5 after a unit of PRBC. Given underlying liver cirrhosis it is likely she has a viral infection ( possibly COVID-being r/o) vs other cause. This may be causing decompensation of liver cirrhosis and further increasing bone marrow suppression. She unlikely has a GI bleed given guaiac negative stool.   - Recommend to monitor hemoglobin and if stable ok for patient to be discharged with plans for outpatient EGD/colonoscopy  - If hemoglobin again decreasing will plan inpatient EGD tomorrow (assumig COVID is negative)  - Ok to start clear liquid diet and advance as tolerated  - Continue on pantoprazole 40 mg BID and  recommended to continue on omeprazole 20 mg as an outpatient daily,   - Continue with alcohol abstinence  - Serial hemoglobin - next draw at 1200.        [facility].  Office: [phone]  Cell : [phone] (Dr. Hobson)  Cell: [phone] (Aminata Rangel PA-C)

## 2020-10-05 NOTE — UTILIZATION REVIEW
Admission Status; Secondary Review Determination    Under the authority of the Utilization Management Committee, the utilization review process indicated a secondary review on the above patient. The review outcome is based on review of the medical records, discussions with staff, and applying clinical experience noted on the date of the review.    (x) Inpatient Status Appropriate - This patient's medical care is consistent with medical management for inpatient care and reasonable inpatient medical practice.    RATIONALE FOR DETERMINATION: 30-year-old female with history of alcoholism and probable cirrhosis with reported varices, mental health disease, hypertension who presented to the hospital with generalized weakness, low-grade fevers and found to have a hemoglobin of 6.8.  Patient has signs of chronic liver disease with an albumin of 2.8, INR 1.5.  Patient is expected to require greater than 2 nights in the hospital with serial hemoglobins, packed red blood cell transfusion, intravenous fluid as well as Protonix drip appropriate for inpatient care.    At the time of admission with the information available to the attending physician more than 2 nights Hospital complex care was anticipated, based on patient risk of adverse outcome if treated as outpatient and complex care required. Inpatient admission is appropriate based on the Medicare guidelines.    This document was produced using voice recognition software    The information on this document is developed by the utilization review team in order for the business office to ensure compliance. This only denotes the appropriateness of proper admission status and does not reflect the quality of care rendered.    The definitions of Inpatient Status and Observation Status used in making the determination above are those provided in the CMS Coverage Manual, Chapter 1 and Chapter 6, section 70.4.    Sincerely,    Melquiades Rosales MD  Utilization Review  Physician  Advisor  [facility].

## 2020-10-05 NOTE — PLAN OF CARE
Summary: Fever, Anemia  Care Plan Summary Note:  Orientation: A&OX4  Observation Goals (met & not met): Inpatient NA  Activity Level: Ind  Fall Risk: None  Behavior & Aggression Tool Color: Green  Pain Management: Denies pain  ABNL VS/O2: VSS on Ra ex tachy   ABNL Lab/BG: K+ 4.0 after replacement, Hgb 6.8 and 1 unit RBC given rechecks of 7.3 and 7.5  Diet: NPO since midnight  Bowel/Bladder: Continent of B/B, no bloody stools reported by patient  Drains/Devices: 2 PIV infusing LR/Protonix drip  Tests/Procedures for next shift: GI consult to see today possible procedures  Anticipated DC date: Discharge pending workups   Other Important Info: BS active, skin pale and jaundiced, PRN atarax and melatonin given, K+ replaced with recheck of 4.0, Continue to monitor.

## 2020-10-08 NOTE — DISCHARGE SUMMARY
[facility]    Discharge Summary  Hospitalist    Date of Admission:  10/4/2020  Date of Discharge:  10/5/2020  5:59 PM  Discharging Provider: Yogi Magallanes  Date of Service (when I saw the patient): 10/5/20    Discharge Diagnoses   1. Likely acute viral syndrome, COVID 19 negative  2. Acute blood Loss Anemia, unspecified type   3. Muscle weakness (generalized)     Chronic conditions  Hx of Alcoholic hepatitis  Hypertension   Recurrent major depressive disorder   Anxiety  Attention deficit hyperactivity disorder   Panic disorder    History of Present Illness   Pat Delgado is a 30 year old female with past medical history of alcoholic hepatitis, sobriety for the last year, hypertension, panic disorder, anxiety, major depression who presents for evaluation of generalized weakness and low-grade fevers.     Patient ports that for the past 2 weeks, she has had intermittent low-grade fevers around 99  F but mostly occurs at night.  She does endorse some swelling of her lymph nodes in her neck, a postnasal drip, and a sore throat.  She also reports feeling increasingly fatigued over the past week as well.  She  reports that she is not taking her omeprazole for the past couple weeks.  She endorsed seeing occasional blood in her stool, but no consistent melena or hematochezia.  She denies any weight loss or night sweats.  She denies any chest pain or shortness of breath.  She denies any COVID 19 exposures.  She otherwise denies any vomiting, no significant abdominal pain.  She denies any headaches, vision changes, localized weakness or numbness of her extremities.  She is currently not consuming alcohol.  She denies any urinary urgency or frequency or hematuria.    Hospital Course    Likely acute viral syndrome, COVID 19 negative  Acute blood Loss Anemia, unspecified type   Muscle weakness (generalized)  Hx of Alcoholic hepatitis  Patient has history of alcoholic liver cirrhosis (sober  for 8 months) with low grade fevers and generalized weakness over last 2 weeks. She was noted to have hemoglobin of 6.6 on admission and has improved and stabilized around 7.5 after a unit of PRBC. Given underlying liver cirrhosis it is likely she has a viral infection vs other cause. This may be causing decompensation of liver cirrhosis and further increasing bone marrow suppression. She unlikely has a GI bleed given guaiac negative stool.   -Hgb stable, GI states patient ok for patient to be discharged with plans for outpatient EGD/colonoscopy  - Tolerating clear liquid diet and advance as tolerated  - Continue on pantoprazole 40 mg BID and recommended to continue on omeprazole 20 mg as an outpatient daily.  - Continue with alcohol abstinence  - Serial hemoglobin  Recent Labs   Lab 10/05/20  1527 10/05/20  0409 10/04/20  2213 10/04/20  1240   HGB 7.6* 7.5* 7.3* 6.8*        Hypertension   --no BP meds PTA.      Recurrent major depressive disorder   Anxiety  Attention deficit hyperactivity disorder   Panic disorder  PTA on Adderall / Neurontin / Effexor  --Resume PTA Neurontin and Effexor  --Atarax as needed for anxiety        Yogi Magallanes PA-C    Significant Results and Procedures   As documented    Pending Results   None    Code Status   Full Code       Primary Care Physician   [facility]    Physical Exam   General  Alert, oriented and comfortable  Vital Signs with Ranges  Temp: 97.4  F (36.3  C) Temp src: Oral BP: 111/67 Pulse: 105   Resp: 16 SpO2: 98 % O2 Device: None (Room air)       Constitutional: healthy, alert and no distress   Cardiovascular: negative, PMI normal. No lifts, heaves, or thrills. RRR. No murmurs, clicks gallops or rub  Respiratory: negative, Percussion normal. Good diaphragmatic excursion. Lungs clear  Head: Normocephalic. No masses, lesions, tenderness or abnormalities  Neck: Neck supple. No adenopathy. Thyroid symmetric, normal size,, Carotids without  bruits.  Abdomen: Abdomen soft, non-tender. BS normal. No masses, organomegaly    Discharge Disposition   Discharged to home  Condition at discharge: Stable    Consultations This Hospital Stay   GASTROENTEROLOGY IP CONSULT    Time Spent on this Encounter   I, CARLOS Dorado, personally saw the patient today and spent less than or equal to 30 minutes discharging this patient.    Discharge Orders      Reason for your hospital stay    Acute anemia in the setting of liver disease     Follow-up and recommended labs and tests     Follow up with primary care provider, [facility], within 7 days for hospital follow- up.  The following labs/tests are recommended: CBC.  Follow up with Dr. Hobson G[facility] for outpatient EGD/colonoscopy.     Activity    Your activity upon discharge: activity as tolerated     Diet    Follow this diet upon discharge: Low Fiber     Discharge Medications   Discharge Medication List as of 10/5/2020  5:16 PM      CONTINUE these medications which have NOT CHANGED    Details   amphetamine-dextroamphetamine (ADDERALL XR) 30 MG 24 hr capsule Take 30 mg by mouth every morning, Historical      amphetamine-dextroamphetamine (ADDERALL) 20 MG tablet Take 20 mg by mouth daily In the afternoon, Historical      gabapentin (NEURONTIN) 100 MG capsule Take 100 mg by mouth daily as needed, Historical      Multiple Vitamin (ONE-A-DAY ADULT VITACRAVES+DHA PO) Take 1 tablet by mouth daily, Historical      pantoprazole (PROTONIX) 40 MG EC tablet Take 1 tablet (40 mg) by mouth 2 times daily (before meals), Disp-60 tablet, R-0, E-PrescribeFuture refills by PCP Dr. [facility] with phone number [phone].      venlafaxine (EFFEXOR-ER) 37.5 MG 24 hr tablet Take 37.5 mg by mouth daily, Historical           Allergies   Allergies   Allergen Reactions     Amoxicillin      Penicillins Unknown     Hives       Data   Most Recent 3 CBC's:  Recent Labs   Lab Test  10/05/20  1527 10/05/20  0409 10/04/20  2213 10/04/20  1240 07/16/20  0829   WBC  --  6.5  --  7.9 8.5   HGB 7.6* 7.5* 7.3* 6.8* 8.1*   MCV  --  75*  --  72* 85   PLT  --  223  --  277 270      Most Recent 3 BMP's:  Recent Labs   Lab Test 10/05/20  0714 10/05/20  0409 10/04/20  1240 07/16/20  0829   NA  --  139 139 141   POTASSIUM 3.9 4.0 3.3* 3.5   CHLORIDE  --  110* 107 109   CO2  --  24 27 23   BUN  --  8 15 4*   CR  --  0.48* 0.52 0.51*   ANIONGAP  --  5 5 9   EMILY  --  8.5 8.8 8.8   GLC  --  85 97 82     Most Recent 2 LFT's:  Recent Labs   Lab Test 10/05/20  0409 10/04/20  1240   AST 18 23   ALT 15 18   ALKPHOS 62 68   BILITOTAL 1.3 0.7     Most Recent INR's and Anticoagulation Dosing History:  Anticoagulation Dose History     Recent Dosing and Labs Latest Ref Rng & Units 3/5/2020 6/23/2020 6/23/2020 6/24/2020 7/16/2020 10/4/2020 10/5/2020    INR 0.86 - 1.14 1.93(H) 2.61(H) 2.06(H) 1.87(H) 2.05(H) 1.38(H) 1.55(H)        Most Recent 3 Troponin's:  Recent Labs   Lab Test 02/22/20  1150 02/22/20  0555 02/21/20  2350   TROPI 2.854* 3.195* 3.365*     Most Recent Cholesterol Panel:No lab results found.  Most Recent 6 Bacteria Isolates From Any Culture (See EPIC Reports for Culture Details):  Recent Labs   Lab Test 07/15/20  1700 03/14/20  0816 03/14/20  0812 02/23/20  1650 02/22/20  0607 02/21/20  1817   CULT 10,000 to 50,000 colonies/mL  mixed urogenital mireille  Susceptibility testing not routinely done   No growth No growth Moderate growth  Normal mireille   No growth No growth     Most Recent TSH, T4 and A1c Labs:  Recent Labs   Lab Test 02/09/20 2022   A1C 5.1     Results for orders placed or performed during the hospital encounter of 10/04/20   XR Chest Port 1 View    Narrative    CHEST ONE VIEW  10/4/2020 1:05 PM     HISTORY: Cough.    COMPARISON: March 3, 2020      Impression    IMPRESSION: No acute disease.    GINO RYAN MD

## 2022-09-10 NOTE — ED AVS SNAPSHOT
Emergency Department  [address] [facility] [address]  Phone:  [phone]  Fax:  [phone]                                    Pat Delgado   MRN: [id number]    Department:   Emergency Department   Date of Visit:  3/14/2020           After Visit Summary Signature Page    I have received my discharge instructions, and my questions have been answered. I have discussed any challenges I see with this plan with the nurse or doctor.    ..........................................................................................................................................  Patient/Patient Representative Signature      ..........................................................................................................................................  Patient Representative Print Name and Relationship to Patient    ..................................................               ................................................  Date                                   Time    ..........................................................................................................................................  Reviewed by Signature/Title    ...................................................              ..............................................  Date                                               Time          22EPIC Rev 08/18       
PAST SURGICAL HISTORY:  No significant past surgical history

## 2022-09-19 ENCOUNTER — APPOINTMENT (OUTPATIENT)
Dept: ULTRASOUND IMAGING | Facility: CLINIC | Age: 32
End: 2022-09-19
Attending: EMERGENCY MEDICINE
Payer: COMMERCIAL

## 2022-09-19 ENCOUNTER — HOSPITAL ENCOUNTER (EMERGENCY)
Facility: CLINIC | Age: 32
Discharge: HOME | End: 2022-09-19
Attending: EMERGENCY MEDICINE | Admitting: EMERGENCY MEDICINE
Payer: COMMERCIAL

## 2022-09-19 VITALS
RESPIRATION RATE: 16 BRPM | DIASTOLIC BLOOD PRESSURE: 71 MMHG | HEIGHT: 69 IN | OXYGEN SATURATION: 98 % | BODY MASS INDEX: 33.33 KG/M2 | SYSTOLIC BLOOD PRESSURE: 107 MMHG | HEART RATE: 99 BPM | WEIGHT: 225 LBS | TEMPERATURE: 98 F

## 2022-09-19 DIAGNOSIS — N93.8 DYSFUNCTIONAL UTERINE BLEEDING: ICD-10-CM

## 2022-09-19 LAB
ABO/RH(D): NORMAL
ALBUMIN SERPL-MCNC: 3.6 G/DL (ref 3.4–5)
ALP SERPL-CCNC: 80 U/L (ref 40–150)
ALT SERPL W P-5'-P-CCNC: 19 U/L (ref 0–50)
ANION GAP SERPL CALCULATED.3IONS-SCNC: 8 MMOL/L (ref 3–14)
ANTIBODY SCREEN: NEGATIVE
AST SERPL W P-5'-P-CCNC: 16 U/L (ref 0–45)
BASOPHILS # BLD MANUAL: 0.3 10E3/UL (ref 0–0.2)
BASOPHILS NFR BLD MANUAL: 2 %
BILIRUB SERPL-MCNC: 0.5 MG/DL (ref 0.2–1.3)
BUN SERPL-MCNC: 11 MG/DL (ref 7–30)
CALCIUM SERPL-MCNC: 9.2 MG/DL (ref 8.5–10.1)
CHLORIDE BLD-SCNC: 103 MMOL/L (ref 94–109)
CO2 SERPL-SCNC: 25 MMOL/L (ref 20–32)
CREAT SERPL-MCNC: 0.58 MG/DL (ref 0.52–1.04)
ELLIPTOCYTES BLD QL SMEAR: SLIGHT
EOSINOPHIL # BLD MANUAL: 0.1 10E3/UL (ref 0–0.7)
EOSINOPHIL NFR BLD MANUAL: 1 %
ERYTHROCYTE [DISTWIDTH] IN BLOOD BY AUTOMATED COUNT: 15.4 % (ref 10–15)
GFR SERPL CREATININE-BSD FRML MDRD: >90 ML/MIN/1.73M2
GLUCOSE BLD-MCNC: 122 MG/DL (ref 70–99)
HCG SERPL QL: NEGATIVE
HCT VFR BLD AUTO: 33.8 % (ref 35–47)
HGB BLD-MCNC: 10.1 G/DL (ref 11.7–15.7)
INR PPP: 1.18 (ref 0.85–1.15)
LYMPHOCYTES # BLD MANUAL: 7.2 10E3/UL (ref 0.8–5.3)
LYMPHOCYTES NFR BLD MANUAL: 50 %
MCH RBC QN AUTO: 21.8 PG (ref 26.5–33)
MCHC RBC AUTO-ENTMCNC: 29.9 G/DL (ref 31.5–36.5)
MCV RBC AUTO: 73 FL (ref 78–100)
MONOCYTES # BLD MANUAL: 0 10E3/UL (ref 0–1.3)
MONOCYTES NFR BLD MANUAL: 0 %
NEUTROPHILS # BLD MANUAL: 6.7 10E3/UL (ref 1.6–8.3)
NEUTROPHILS NFR BLD MANUAL: 47 %
PLAT MORPH BLD: ABNORMAL
PLATELET # BLD AUTO: 351 10E3/UL (ref 150–450)
POTASSIUM BLD-SCNC: 3.4 MMOL/L (ref 3.4–5.3)
PROT SERPL-MCNC: 7.6 G/DL (ref 6.8–8.8)
RBC # BLD AUTO: 4.63 10E6/UL (ref 3.8–5.2)
RBC MORPH BLD: ABNORMAL
SODIUM SERPL-SCNC: 136 MMOL/L (ref 133–144)
SPECIMEN EXPIRATION DATE: NORMAL
WBC # BLD AUTO: 14.3 10E3/UL (ref 4–11)

## 2022-09-19 PROCEDURE — 85027 COMPLETE CBC AUTOMATED: CPT | Performed by: EMERGENCY MEDICINE

## 2022-09-19 PROCEDURE — 80053 COMPREHEN METABOLIC PANEL: CPT | Performed by: EMERGENCY MEDICINE

## 2022-09-19 PROCEDURE — 258N000003 HC RX IP 258 OP 636: Performed by: EMERGENCY MEDICINE

## 2022-09-19 PROCEDURE — 85007 BL SMEAR W/DIFF WBC COUNT: CPT | Performed by: EMERGENCY MEDICINE

## 2022-09-19 PROCEDURE — 96360 HYDRATION IV INFUSION INIT: CPT

## 2022-09-19 PROCEDURE — 86850 RBC ANTIBODY SCREEN: CPT | Performed by: EMERGENCY MEDICINE

## 2022-09-19 PROCEDURE — 36415 COLL VENOUS BLD VENIPUNCTURE: CPT | Performed by: EMERGENCY MEDICINE

## 2022-09-19 PROCEDURE — 84703 CHORIONIC GONADOTROPIN ASSAY: CPT | Performed by: EMERGENCY MEDICINE

## 2022-09-19 PROCEDURE — 99284 EMERGENCY DEPT VISIT MOD MDM: CPT | Mod: 25

## 2022-09-19 PROCEDURE — 86901 BLOOD TYPING SEROLOGIC RH(D): CPT | Performed by: EMERGENCY MEDICINE

## 2022-09-19 PROCEDURE — 76856 US EXAM PELVIC COMPLETE: CPT

## 2022-09-19 PROCEDURE — 96361 HYDRATE IV INFUSION ADD-ON: CPT

## 2022-09-19 PROCEDURE — 85610 PROTHROMBIN TIME: CPT | Performed by: EMERGENCY MEDICINE

## 2022-09-19 RX ADMIN — SODIUM CHLORIDE 1000 ML: 9 INJECTION, SOLUTION INTRAVENOUS at 08:53

## 2022-09-19 ASSESSMENT — ACTIVITIES OF DAILY LIVING (ADL): ADLS_ACUITY_SCORE: 35

## 2022-09-19 NOTE — ED PROVIDER NOTES
History     Chief Complaint:  Vaginal bleeding    HPI   Pat Delgado is a 31 year old female who presents with dysfunctional uterine bleeding.  Patient states she has had irregular periods her whole life.  She states over the last 4 to 5 months she has been having increasing size of clots.  She states now when she changes her tampon she does not have the tampon soaked which is has a large clot adhered to the side.  Patient states she saw her primary care doctor when this began and they recommended NuvaRing for her but she never started it nor followed up for it.  Patient states she has not seen OB/GYN just her primary care provider.  Patient does have a history of alcohol induced cirrhosis but has been sober for 2 and half years.  She states her varices have resolved and she believes her cirrhosis is improving as well.  She was concerned about using the NuvaRing due to concern for clotting with her history of cirrhosis.  Patient states she has some dizziness and lightheadedness.  She states that the clots have increased in size and today it was quite large.  She did also have some cramping pain with her period today.  Patient states no other history of clotting disorder or difficulty with wound not healing.    Per chart review patient had presented approximately 1 month ago for the same complaint but left without being seen.  Has not seen a provider since that time.    ROS:  Review of Systems  Constitutional: Fatigue  Cardiovascular: Lightheadedness, no chest pain  : Irregular menstrual periods with large clotting  GI: No nausea, vomiting, diarrhea  Remainder of systems reviewed and negative    Allergies:  Amoxicillin  Penicillins     Medications:    amphetamine-dextroamphetamine (ADDERALL XR) 30 MG 24 hr capsule  amphetamine-dextroamphetamine (ADDERALL) 20 MG tablet  gabapentin (NEURONTIN) 100 MG capsule  Multiple Vitamin (ONE-A-DAY ADULT VITACRAVES+DHA PO)  pantoprazole (PROTONIX) 40 MG EC  "tablet  venlafaxine (EFFEXOR-ER) 37.5 MG 24 hr tablet        Past Medical History:    Past Medical History:   Diagnosis Date     ADHD (attention deficit hyperactivity disorder)      Alcohol dependence (H) 1/20/2015     Anxiety      Chemical dependency (H) 1/22/2015     Depressive disorder, not elsewhere classified 8/20/2015     Hypertension goal BP (blood pressure) < 150/90 11/10/2015     Migraine headache      Substance abuse (H)      Tobacco abuse        Past Surgical History:    Past Surgical History:   Procedure Laterality Date     HC TOOTH EXTRACTION W/FORCEP       IR CVC TUNNEL PLACEMENT > 5 YRS OF AGE  2/21/2020     IR CVC TUNNEL REMOVAL RIGHT  4/6/2020     IR FEEDING TUBE PLACEMENT W FLUORO/MD  2/19/2020     IR PICC EXCHANGE RIGHT  2/19/2020     LAPAROSCOPIC CHOLECYSTECTOMY N/A 6/23/2020    Procedure: LAPAROSCOPIC CHOLECYSTECTOMY;  Surgeon: Alan Reynoso MD;  Location:  OR        Family History:    family history includes Anxiety Disorder in her mother; Dementia in her maternal grandfather; Substance Abuse in an other family member.    Social History:   reports that she quit smoking about 2 years ago. Her smoking use included vaping device. She uses smokeless tobacco. She reports previous alcohol use. She reports current drug use. Drug: Marijuana.  PCP: Clinic, [facility]     Physical Exam     Patient Vitals for the past 24 hrs:   BP Temp Temp src Pulse Resp SpO2 Height Weight   09/19/22 1000 107/71 -- -- -- -- -- -- --   09/19/22 0900 135/77 -- -- 99 -- 98 % -- --   09/19/22 0825 139/77 98  F (36.7  C) Temporal 103 16 99 % 1.753 m (5' 9\") 102.1 kg (225 lb)        Physical Exam  General: Patient is alert and normal appearing.  HEENT: Head atraumatic    Eyes: pupils equal and reactive. Conjunctiva clear   Nares: patent   Oropharynx: no lesions, uvula midline, no palatal draping, normal voice, no trismus  Neck: Supple without lymphadenopathy, no meningismus  Chest: Heart regular rate " and rhythm.   Lungs: Equal clear to auscultation with no wheeze or rales  Abdomen: Soft, non tender, nondistended, normal bowel sounds  Back: No costovertebral angle tenderness, no midline C, T or L spine tenderness  Neuro: Grossly nonfocal, normal speech, strength equal bilaterally, CN 2-12 intact  Extremities: No deformities, equal radial and DP pulses. No clubbing, cyanosis.  No edema  Skin: Warm and dry with no rash.       Emergency Department Course   ECG:  ECG results from 07/15/20   EKG 12-lead, tracing only     Value    Interpretation ECG Click View Image link to view waveform and result       Imaging:  US Pelvic Complete w Transvaginal   Preliminary Result   IMPRESSION:   1.  Small cyst at the distal endometrium. Correlate with pregnancy   testing as clinically indicated. Endometrial stripe size is within   normal limits for age.   2.  Simple cyst of the right ovary consistent with a benign finding   measuring 3.2 cm.   3.  Left ovary obscured from visualization.      Premenopausal asymptomatic simple cyst:      </= 3 cm: Normal, no follow-up.      >3 to </= 5 cm: Benign finding in the physiologic size range. No   follow-up is needed.      >5 to </= 7 cm: Benign simple cyst. Clinically inconsequential   finding. Follow-up pelvic ultrasound in 6 months is recommended.      >7 cm: Benign simple cyst. Follow-up pelvic ultrasound in 6 months is   recommended.      REFERENCE:   Management of Asymptomatic Ovarian and Other Adnexal Cysts Imaged at   US: Society of Radiologists in Ultrasound Consensus Conference   Statement. Radiology September 2010; [id number].      Simple Adnexal Cysts: U Consensus Conference Update on Follow-up and   Reporting. Radiology September 2019. [id number].         Report per radiology    Laboratory:  Labs Ordered and Resulted from Time of ED Arrival to Time of ED Departure   INR - Abnormal       Result Value    INR 1.18 (*)    COMPREHENSIVE METABOLIC PANEL - Abnormal    Sodium 136       Potassium 3.4      Chloride 103      Carbon Dioxide (CO2) 25      Anion Gap 8      Urea Nitrogen 11      Creatinine 0.58      Calcium 9.2      Glucose 122 (*)     Alkaline Phosphatase 80      AST 16      ALT 19      Protein Total 7.6      Albumin 3.6      Bilirubin Total 0.5      GFR Estimate >90     CBC WITH PLATELETS AND DIFFERENTIAL - Abnormal    WBC Count 14.3 (*)     RBC Count 4.63      Hemoglobin 10.1 (*)     Hematocrit 33.8 (*)     MCV 73 (*)     MCH 21.8 (*)     MCHC 29.9 (*)     RDW 15.4 (*)     Platelet Count 351     HCG QUALITATIVE PREGNANCY - Normal    hCG Serum Qualitative Negative     TYPE AND SCREEN, ADULT   ABO/RH TYPE AND SCREEN          Emergency Department Course:             Reviewed:  I reviewed nursing notes, vitals and past medical history    Assessments:  8:36 AM I obtained history and examined the patient as noted above.   10:27 AM I rechecked the patient and explained findings. Ambulates without difficulty in ED      Interventions:  Medications   0.9% sodium chloride BOLUS (1,000 mLs Intravenous New Bag 9/19/22 0853)        Disposition:  The patient was discharged to home.     Impression & Plan    CMS Diagnoses: None      Medical Decision Making:  Pat Delgado is a 31 year old female who presents for evaluation of uterine bleeding.  This is clinically consistent with dysfunctional uterine bleeding.  An ultrasound shows simple endometrial cyst and pregnancy test is negative.  Also simple cyst of the right ovary.  No fibroids were noted..  Patient is not pregnant. There are no signs at this point of acute medical issues causing DUB to warrant further workup in this patient (pituitary tumor, thyroid disease, etc).  Her hemoglobin is reassurring.  She was informed that she is anemic but not at the level of transfusion.  Patient with history of cirrhosis but INR is within acceptable limits and LFTs have improved from previous.  Platelets are within acceptable limits.  Bleeding  precautions and return precautions were reviewed.  Recommended she start the birth control as was prescribed by her doctor several months ago to help control her dysfunctional uterine bleeding will have her follow-up with gynecology.       Diagnosis:    ICD-10-CM    1. Dysfunctional uterine bleeding  N93.8         Discharge Medications:  New Prescriptions    No medications on file        9/19/2022   Renetta Mcfarland MD Neuner, Maria Bea, MD  09/19/22 1031

## 2024-11-07 NOTE — PROGRESS NOTES
[facility]  Gastroenterology    PCP: Becca Gonzalez MD    11/7/24    HPI       Hemorrhoids     Additional comments: Multiple BMs (stools not really bulky but they are often)             Abdominal Pain     Additional comments: Past hx of h-pylori and ulcers          Last edited by Shana Mtz MA on 11/7/2024  9:37 AM.        Reason for Visit:     Subjective:   Cecille Boston is a 66 y.o. female here for evaluation of GI issues. Carries diagnosis of IBS-C and GERD. Last colonoscopy in 2023 with one polyp removed, internal hemorrhoids and diverticulosis, recall of 5 years recommended. Patient having multiple bowel movements a day that are not quite bulky and thinks it is flaring hemorrhoids- large prolapsed internal (looked at pictures). Has bleeding after BM that can go on for 10 minutes and require a pad. Also has been having epigastric discomfort. Wonders if she has celiac disease or food sensitivities. Had celiac testing before but was off of gluten at the time. Would like to see an allergist. Also saw CRS Dr. Aguero for hemorrhoids and was told she would need surgery but this would require 6 weeks recovery- not sure if she was spoken to about banding.       Past Medical History:   Diagnosis Date    Anxiety     Biliary dyskinesia     Duodenal ulcer disease     Fracture of metatarsal bone, closed     left    GERD (gastroesophageal reflux disease)     Hyperlipidemia     IBS (irritable bowel syndrome)     Constipation    Insomnia     Meningioma     dural based prepontine lesion on MRI    Menopausal and postmenopausal disorder     Migraine headache     Osteopenia     Spontaneous pneumothorax     x 2, with left CT placement x 2, pleurodesis.    Tinnitus of both ears     Vertigo        Past Surgical History:   Procedure Laterality Date    APPENDECTOMY      BREAST BIOPSY Bilateral     8/17 Dr. Brown is following.    COLONOSCOPY N/A 12/01/2017    Procedure: COLONOSCOPY;  Surgeon: Neisha RILEY  SP[facility]UAL [facility] SERVICES Progress Note  [facility]    After reading nursing and provider notes, SH assesses that pt is not yet able to engage in beneficial conversation about her drinking and potential efforts re: sobriety/recovery.  SH will plan to visit pt when she has gained greater conversational/reflective capacity.                                                                                                                                           Eliseo Dorado M.Div., [facility]  Staff   Pager [phone]     MD Dannie;  Location: [facility] ENDO;  Service: Endoscopy;  Laterality: N/A;    COLONOSCOPY N/A 8/9/2023    Procedure: COLONOSCOPY;  Surgeon: Mendy Harvey MD;  Location: [facility] ENDO;  Service: Endoscopy;  Laterality: N/A;    LAPAROSCOPIC CHOLECYSTECTOMY      lap.    NASAL SINUS SURGERY      TONSILLECTOMY         Current Outpatient Medications on File Prior to Visit   Medication Sig Dispense Refill    albuterol (PROVENTIL/VENTOLIN HFA) 90 mcg/actuation inhaler INHALE 1 PUFF BY MOUTH INTO THE LUNGS 4 TIMES A DAY FOR COUGH FOR 7 DAYS      azelastine (ASTELIN) 137 mcg (0.1 %) nasal spray PLACE 1 SPRAY BY NASAL ROUTE 2 (TWO) TIMES DAILY. 30 mL 11    fluticasone propionate (FLONASE) 50 mcg/actuation nasal spray 2 sprays (100 mcg total) by Each Nostril route once daily. 16 g 11    hydrocortisone-pramoxine (PROCTOFOAM HC) rectal foam ONE APPLICATOR RECTALLY TWICE DAILY 10 g 2    LORazepam (ATIVAN) 1 MG tablet Take 1 tablet (1 mg total) by mouth every 12 (twelve) hours as needed for Anxiety. 60 tablet 5    ondansetron (ZOFRAN-ODT) 8 MG TbDL TAKE 1 TABLET BY MOUTH EVERY 12 HOURS FOR 5 DAYS      prasterone, dhea, (INTRAROSA) 6.5 mg Inst Place 6.5 mg vaginally 3 (three) times a week. 20 each 10     Current Facility-Administered Medications on File Prior to Visit   Medication Dose Route Frequency Provider Last Rate Last Admin    lactated ringers infusion   Intravenous Continuous Mendy Harvey MD   Stopped at 08/09/23 1045       Review of patient's allergies indicates:   Allergen Reactions    Metoclopramide Other (See Comments)     paranoia    Other Reaction(s): Other (See Comments)      paranoia    Other Reaction(s): Other (See Comments)  paranoia      paranoia    Nitrofurantoin      Other reaction(s): other       Social History     Socioeconomic History    Marital status:    Tobacco Use    Smoking status: Former     Current packs/day: 0.00     Average packs/day: 1 pack/day for 17.0 years (17.0 ttl pk-yrs)     Types:  Cigarettes     Start date:      Quit date:      Years since quittin.8     Passive exposure: Past    Smokeless tobacco: Never   Substance and Sexual Activity    Alcohol use: Yes     Alcohol/week: 3.0 standard drinks of alcohol     Types: 3 Glasses of wine per week    Drug use: No    Sexual activity: Yes     Partners: Male     Birth control/protection: Post-menopausal   Social History Narrative    The patient is  and 2 children. She retired as  at [employer] in the [employer].     Social Drivers of Health     Financial Resource Strain: Low Risk  (2024)    Received from [facility]    Overall Financial Resource Strain (CARDIA)     Difficulty of Paying Living Expenses: Not hard at all   Food Insecurity: No Food Insecurity (2024)    Received from [facility]    Hunger Vital Sign     Worried About Running Out of Food in the Last Year: Never true     Ran Out of Food in the Last Year: Never true   Transportation Needs: No Transportation Needs (2024)    Received from [facility]    PRAPARE - Transportation     Lack of Transportation (Medical): No     Lack of Transportation (Non-Medical): No   Physical Activity: Sufficiently Active (2024)    Received from [facility]    Exercise Vital Sign     Days of Exercise per Week: 3 days     Minutes of Exercise per Session: 60 min   Stress: No Stress Concern Present (10/12/2022)    [employer] [place] [employer] - Occupational Stress Questionnaire     Feeling of Stress : Only a little   Housing Stability: Low Risk  (10/12/2022)    Housing Stability Vital Sign     Unable to Pay for Housing in the Last Year: No     Number of Places Lived in the Last Year: 1     Unstable Housing in the Last Year: No       Family History   Problem Relation Name Age of Onset    Breast cancer Mother      Cataracts Father      Heart attack Father      Lung cancer Sister Ledy     COPD Sister Ledy     COPD Sister Mattie      Polymyalgia rheumatica Sister [place]     Hypertension Sister [place]     Crohn's disease Sister Kalyani     Breast cancer Sister Kalyani     Cancer Brother Yunior         Head and neck, ACC    Acute lymphoblastic leukemia Son      Prostate cancer Maternal Uncle      Prostate cancer Maternal Uncle      Prostate cancer Maternal Uncle      Prostate cancer Maternal Uncle      Glaucoma Paternal Aunt      Diabetes Paternal Aunt      Heart disease Paternal Aunt      Breast cancer Maternal Grandmother Great        Review of Systems   Constitutional:  Negative for appetite change, fever and unexpected weight change.   HENT:  Negative for postnasal drip, rhinorrhea, sneezing, sore throat and trouble swallowing.    Eyes:  Negative for visual disturbance.   Respiratory:  Negative for cough, shortness of breath and wheezing.    Cardiovascular:  Negative for chest pain, palpitations and leg swelling.   Gastrointestinal:  Positive for abdominal pain and anal bleeding. Negative for blood in stool, constipation, diarrhea, nausea and vomiting.   Genitourinary:  Negative for dysuria.   Musculoskeletal:  Negative for arthralgias, joint swelling and myalgias.   Skin:  Negative for color change, pallor and rash.   Neurological:  Negative for weakness, light-headedness, numbness and headaches.   Hematological:  Negative for adenopathy. Does not bruise/bleed easily.   Psychiatric/Behavioral:  Negative for agitation.        Objective:   Vitals:   Vitals:    11/07/24 0926   BP: (!) 149/89   Pulse: 74       Physical Exam  Vitals reviewed.   Constitutional:       General: She is not in acute distress.     Appearance: She is not diaphoretic.   HENT:      Head: Normocephalic and atraumatic.      Mouth/Throat:      Pharynx: No oropharyngeal exudate.   Eyes:      General: No scleral icterus.        Right eye: No discharge.         Left eye: No discharge.      Conjunctiva/sclera: Conjunctivae normal.      Pupils: Pupils are equal, round, and reactive to  light.   Cardiovascular:      Rate and Rhythm: Normal rate and regular rhythm.   Abdominal:      General: There is no distension.      Palpations: Abdomen is soft. There is no mass.      Tenderness: There is no abdominal tenderness. There is no guarding.   Genitourinary:     Comments: Large prolapsed internal hemorrhoid on picture review  Musculoskeletal:         General: Normal range of motion.      Cervical back: Normal range of motion.   Skin:     General: Skin is warm and dry.      Coloration: Skin is not pale.      Findings: No erythema or rash.   Neurological:      Mental Status: She is alert and oriented to person, place, and time.       IMPRESSION     Problem List Items Addressed This Visit    None  Visit Diagnoses       Epigastric pain    -  Primary    History of colon polyps        Diverticulosis        Irritable bowel syndrome with constipation        Relevant Orders    Ambulatory referral/consult to Allergy    Internal hemorrhoids        Relevant Orders    Ambulatory referral/consult to Endo Procedure     Ambulatory referral/consult to Colorectal Surgery    History of Helicobacter pylori infection                PLANS:    - Schedule for EGD with gastric and duodenal bx. Patient will reintroduce some gluten into diet beforehand  - Add fiber 20-30 g daily for bowel movements  - Will send referral to CRS here to see if patient would be candidate for hemorrhoidal banding of the internal hemorrhoids  - Low FODMAP diet discussed and handout given in the clinic  - Allergy referral as requested by patient to see about food sensitivities  - Colonoscopy 2023 reviewed and recall of 5 years recommended  - Any further recommendations pending the above    Epigastric pain    History of colon polyps    Diverticulosis    Irritable bowel syndrome with constipation  -     Ambulatory referral/consult to Allergy; Future; Expected date: 11/14/2024    Internal hemorrhoids  -     Ambulatory referral/consult to Endo  Procedure ; Future; Expected date: 11/08/2024  -     Ambulatory referral/consult to Colorectal Surgery; Future; Expected date: 11/14/2024    History of Helicobacter pylori infection        Diamond Porter MD  Gastroenterology

## (undated) DEVICE — EVAC SYSTEM CLEAR FLOW SC082500

## (undated) DEVICE — GOWN IMPERVIOUS SPECIALTY XLG/XLONG 32474

## (undated) DEVICE — PREP CHLORAPREP 26ML TINTED ORANGE  260815

## (undated) DEVICE — PACK LAP CHOLE SLC15LCFSD

## (undated) DEVICE — SUCTION IRR STRYKERFLOW II W/TIP 250-070-520

## (undated) DEVICE — SU VICRYL 0 TIE 12X18" J906G

## (undated) DEVICE — SOL NACL 0.9% INJ 1000ML BAG 2B1324X

## (undated) DEVICE — SURGICEL HEMOSTAT 2X14" 1951

## (undated) DEVICE — SUCTION TIP YANKAUER W/O VENT K86

## (undated) DEVICE — SPONGE RAY-TEC 4X8" 7318

## (undated) DEVICE — TUBING SUCTION 12"X1/4" N612

## (undated) DEVICE — ESU GROUND PAD UNIVERSAL W/O CORD

## (undated) DEVICE — POUCH TISSUE RETRIEVAL LAP 10MM 2.21" INTRO TRS100SB2

## (undated) DEVICE — SUCTION CANISTER MEDIVAC LINER 3000ML W/LID 65651-530

## (undated) DEVICE — SU VICRYL 0 UR-6 27" J603H

## (undated) DEVICE — SOL WATER IRRIG 1000ML BOTTLE 2F7114

## (undated) DEVICE — SU VICRYL 4-0 PS-2 18" UND J496H

## (undated) DEVICE — SPONGE LAP 04X18" 23250-407

## (undated) DEVICE — ENDO TROCAR FIRST ENTRY KII FIOS Z-THRD 05X100MM CTF03

## (undated) DEVICE — ENDO SCOPE WARMER LF TM500

## (undated) DEVICE — LINEN TOWEL PACK X5 5464

## (undated) DEVICE — ENDO TROCAR FIRST ENTRY KII FIOS Z-THRD 11X100MM CTF33

## (undated) DEVICE — GLOVE PROTEXIS W/NEU-THERA 8.0  2D73TE80

## (undated) DEVICE — ESU HOLDER LAP INST DISP PURPLE LONG 330MM H-PRO-330

## (undated) DEVICE — DEVICE SUTURE PASSER 14GA WECK EFX EFXSP2

## (undated) DEVICE — STPL POWERED ECHELON 45MM PSEE45A

## (undated) DEVICE — NDL INSUFFLATION 13GA 120MM C2201

## (undated) DEVICE — ENDO TROCAR REPROC FIRST ENTRY KII FIOS 05X100MM THRD CTF03

## (undated) DEVICE — ENDO TROCAR SLEEVE KII Z-THREADED 05X100MM CTS02

## (undated) RX ORDER — ALBUTEROL SULFATE 90 UG/1
AEROSOL, METERED RESPIRATORY (INHALATION)
Status: DISPENSED
Start: 2020-06-23

## (undated) RX ORDER — ALBUMIN, HUMAN INJ 5% 5 %
SOLUTION INTRAVENOUS
Status: DISPENSED
Start: 2020-06-23

## (undated) RX ORDER — DEXAMETHASONE SODIUM PHOSPHATE 4 MG/ML
INJECTION, SOLUTION INTRA-ARTICULAR; INTRALESIONAL; INTRAMUSCULAR; INTRAVENOUS; SOFT TISSUE
Status: DISPENSED
Start: 2020-06-23

## (undated) RX ORDER — ONDANSETRON 2 MG/ML
INJECTION INTRAMUSCULAR; INTRAVENOUS
Status: DISPENSED
Start: 2020-06-23

## (undated) RX ORDER — FENTANYL CITRATE 50 UG/ML
INJECTION, SOLUTION INTRAMUSCULAR; INTRAVENOUS
Status: DISPENSED
Start: 2020-06-23

## (undated) RX ORDER — HYDROMORPHONE HYDROCHLORIDE 1 MG/ML
INJECTION, SOLUTION INTRAMUSCULAR; INTRAVENOUS; SUBCUTANEOUS
Status: DISPENSED
Start: 2020-06-23

## (undated) RX ORDER — CLINDAMYCIN PHOSPHATE 900 MG/50ML
INJECTION, SOLUTION INTRAVENOUS
Status: DISPENSED
Start: 2020-02-21

## (undated) RX ORDER — CLINDAMYCIN PHOSPHATE 900 MG/50ML
INJECTION, SOLUTION INTRAVENOUS
Status: DISPENSED
Start: 2020-06-23

## (undated) RX ORDER — LIDOCAINE HYDROCHLORIDE 20 MG/ML
INJECTION, SOLUTION EPIDURAL; INFILTRATION; INTRACAUDAL; PERINEURAL
Status: DISPENSED
Start: 2020-06-23

## (undated) RX ORDER — PROPOFOL 10 MG/ML
INJECTION, EMULSION INTRAVENOUS
Status: DISPENSED
Start: 2020-06-23

## (undated) RX ORDER — LIDOCAINE HYDROCHLORIDE 20 MG/ML
JELLY TOPICAL
Status: DISPENSED
Start: 2020-02-19

## (undated) RX ORDER — GLYCOPYRROLATE 0.2 MG/ML
INJECTION, SOLUTION INTRAMUSCULAR; INTRAVENOUS
Status: DISPENSED
Start: 2020-06-23

## (undated) RX ORDER — LIDOCAINE HYDROCHLORIDE 20 MG/ML
JELLY TOPICAL
Status: DISPENSED
Start: 2020-02-18

## (undated) RX ORDER — NEOSTIGMINE METHYLSULFATE 1 MG/ML
VIAL (ML) INJECTION
Status: DISPENSED
Start: 2020-06-23